# Patient Record
Sex: FEMALE | Race: WHITE | NOT HISPANIC OR LATINO | Employment: OTHER | URBAN - METROPOLITAN AREA
[De-identification: names, ages, dates, MRNs, and addresses within clinical notes are randomized per-mention and may not be internally consistent; named-entity substitution may affect disease eponyms.]

---

## 2017-01-05 RX ORDER — ROSUVASTATIN CALCIUM 40 MG/1
40 TABLET, COATED ORAL DAILY
Qty: 90 TABLET | Refills: 1 | Status: SHIPPED | OUTPATIENT
Start: 2017-01-05 | End: 2017-08-02 | Stop reason: SDUPTHER

## 2017-01-05 NOTE — TELEPHONE ENCOUNTER
Annual 07/12/16. Patient has current script for Rosuvastatin 40 mg for 30 day supply. 90 day supply is being requested. Please refill.

## 2017-01-26 ENCOUNTER — OFFICE VISIT (OUTPATIENT)
Dept: OPTOMETRY | Facility: CLINIC | Age: 67
End: 2017-01-26
Payer: MEDICARE

## 2017-01-26 DIAGNOSIS — H52.4 HYPEROPIA WITH PRESBYOPIA, BILATERAL: ICD-10-CM

## 2017-01-26 DIAGNOSIS — Z13.5 SCREENING FOR GLAUCOMA: ICD-10-CM

## 2017-01-26 DIAGNOSIS — H52.03 HYPEROPIA WITH PRESBYOPIA, BILATERAL: ICD-10-CM

## 2017-01-26 DIAGNOSIS — H04.123 DRY EYES, BILATERAL: ICD-10-CM

## 2017-01-26 DIAGNOSIS — H25.13 NUCLEAR SCLEROSIS, BILATERAL: Primary | ICD-10-CM

## 2017-01-26 PROCEDURE — 92015 DETERMINE REFRACTIVE STATE: CPT | Mod: ,,, | Performed by: OPTOMETRIST

## 2017-01-26 PROCEDURE — 99212 OFFICE O/P EST SF 10 MIN: CPT | Mod: PBBFAC,PO | Performed by: OPTOMETRIST

## 2017-01-26 PROCEDURE — 92014 COMPRE OPH EXAM EST PT 1/>: CPT | Mod: S$PBB,,, | Performed by: OPTOMETRIST

## 2017-01-26 PROCEDURE — 99999 PR PBB SHADOW E&M-EST. PATIENT-LVL II: CPT | Mod: PBBFAC,,, | Performed by: OPTOMETRIST

## 2017-01-26 NOTE — PROGRESS NOTES
HPI     DLS: 1/27/2016  Pt states no vision changes since last visit. +Dry eyes  Denies f/f    Soothe ou PRN  Restasis ou BID         Last edited by Katy Xiong on 1/26/2017  9:38 AM.     ROS     Positive for: Gastrointestinal, Endocrine, Eyes    Negative for: Constitutional, Neurological, Skin, Genitourinary,   Musculoskeletal, HENT, Cardiovascular, Respiratory, Psychiatric,   Allergic/Imm, Heme/Lymph    Last edited by Matthias Gutierrez, OD on 1/26/2017  9:46 AM. (History)        Assessment /Plan     For exam results, see Encounter Report.    Nuclear sclerosis, bilateral    Dry eyes, bilateral    Screening for glaucoma    Hyperopia with presbyopia, bilateral      1. Cat ou--pt happy w spex  2. OCTAVIO (sp punctal plugs LL OU). Pt to cont w RESTASIS BID/ATs as needed       Plan:    rtc 1 yr

## 2017-02-06 ENCOUNTER — OFFICE VISIT (OUTPATIENT)
Dept: ORTHOPEDICS | Facility: CLINIC | Age: 67
End: 2017-02-06
Payer: MEDICARE

## 2017-02-06 ENCOUNTER — HOSPITAL ENCOUNTER (OUTPATIENT)
Dept: RADIOLOGY | Facility: OTHER | Age: 67
Discharge: HOME | End: 2017-02-06
Attending: ORTHOPAEDIC SURGERY
Payer: MEDICARE

## 2017-02-06 VITALS
BODY MASS INDEX: 29.21 KG/M2 | HEART RATE: 76 BPM | SYSTOLIC BLOOD PRESSURE: 133 MMHG | HEIGHT: 62 IN | DIASTOLIC BLOOD PRESSURE: 75 MMHG | WEIGHT: 158.75 LBS

## 2017-02-06 DIAGNOSIS — M65.4 RADIAL STYLOID TENOSYNOVITIS OF LEFT HAND: Primary | ICD-10-CM

## 2017-02-06 DIAGNOSIS — R52 PAIN: ICD-10-CM

## 2017-02-06 PROCEDURE — 99999 PR PBB SHADOW E&M-EST. PATIENT-LVL III: CPT | Mod: PBBFAC,,, | Performed by: ORTHOPAEDIC SURGERY

## 2017-02-06 PROCEDURE — 73110 X-RAY EXAM OF WRIST: CPT | Mod: 26,LT,, | Performed by: RADIOLOGY

## 2017-02-06 PROCEDURE — 99214 OFFICE O/P EST MOD 30 MIN: CPT | Mod: S$PBB,,, | Performed by: ORTHOPAEDIC SURGERY

## 2017-02-06 PROCEDURE — 99213 OFFICE O/P EST LOW 20 MIN: CPT | Mod: PBBFAC | Performed by: ORTHOPAEDIC SURGERY

## 2017-02-15 DIAGNOSIS — M65.4 DE QUERVAIN'S TENOSYNOVITIS, LEFT: Primary | ICD-10-CM

## 2017-02-16 NOTE — PROGRESS NOTES
CHIEF COMPLAINT:  Left wrist styloid tenosynovitis.    HISTORY OF PRESENT ILLNESS:  Ms. Van is a 66-year-old female who was last   seen on 06/21/2016.  She had been evaluated and diagnosed with wrist tendinitis   on the left hand.  We had performed an injection, started on occupational   therapy.  She did well with therapy and she states that her left wrist pain is   0/10, except when she moves it and she states that it can do an 8-9/10 if she is   not immobilized, but she states with immobility, it is 0/10, with mobility it   is 8-9/10.    PAST MEDICAL HISTORY, SOCIAL HISTORY, SURGICAL HISTORY, REVIEW OF SYSTEMS:  Per   electronic medical record.    PHYSICAL EXAMINATION:  VITAL SIGNS:  Please see computer entry.  GENERAL:  Alert and oriented x3, well developed, well nourished, appears stated   age.  MUSCULOSKELETAL:  Gait is normal and nonantalgic.  EXTREMITIES:  On examination of bilateral upper extremities, median, radial,   ulnar, anterior interosseous, posterior interosseous, motor, sensation to light   touch intact.  Brisk capillary refill.  She has no swelling, no erythema.  Skin   is clean, dry and intact.  She is exquisitely tender to palpation over the first   dorsal compartment.  Positive Finkelstein test.    PLAN:  We had reviewed her last chart note with the patient and also in fact we   did an injection due to the immobilization.  However, considering she has return   of symptoms, we did discuss surgical release, risk and benefits were explained   in great detail to the patient.  The patient understands the surgical treatment.    At this time, since she was refractory to conservative treatment, I do think   that this is a good option for the patient.      LES/DARREN  dd: 02/15/2017 15:29:47 (CST)  td: 02/16/2017 10:51:05 (CST)  Doc ID   #[id number]  Job ID #[id number]    CC:

## 2017-02-27 ENCOUNTER — TELEPHONE (OUTPATIENT)
Dept: FAMILY MEDICINE | Facility: CLINIC | Age: 67
End: 2017-02-27

## 2017-02-27 ENCOUNTER — CLINICAL SUPPORT (OUTPATIENT)
Dept: FAMILY MEDICINE | Facility: CLINIC | Age: 67
End: 2017-02-27
Payer: MEDICARE

## 2017-02-27 DIAGNOSIS — R30.0 DYSURIA: Primary | ICD-10-CM

## 2017-02-27 LAB
BACTERIA #/AREA URNS AUTO: ABNORMAL /HPF
BILIRUB SERPL-MCNC: ABNORMAL MG/DL
BILIRUB UR QL STRIP: NEGATIVE
BLOOD URINE, POC: ABNORMAL
CLARITY UR REFRACT.AUTO: ABNORMAL
COLOR UR AUTO: YELLOW
COLOR, POC UA: YELLOW
GLUCOSE UR QL STRIP: NEGATIVE
GLUCOSE UR QL STRIP: NORMAL
HGB UR QL STRIP: NEGATIVE
KETONES UR QL STRIP: ABNORMAL
KETONES UR QL STRIP: NEGATIVE
LEUKOCYTE ESTERASE UR QL STRIP: ABNORMAL
LEUKOCYTE ESTERASE URINE, POC: ABNORMAL
MICROSCOPIC COMMENT: ABNORMAL
NITRITE UR QL STRIP: NEGATIVE
NITRITE, POC UA: ABNORMAL
PH UR STRIP: 7 [PH] (ref 5–8)
PH, POC UA: 7
PROT UR QL STRIP: NEGATIVE
PROTEIN, POC: ABNORMAL
RBC #/AREA URNS AUTO: 3 /HPF (ref 0–4)
SP GR UR STRIP: 1.01 (ref 1–1.03)
SPECIFIC GRAVITY, POC UA: 1
SQUAMOUS #/AREA URNS AUTO: 1 /HPF
URN SPEC COLLECT METH UR: ABNORMAL
UROBILINOGEN UR STRIP-ACNC: NEGATIVE EU/DL
UROBILINOGEN, POC UA: NORMAL
WBC #/AREA URNS AUTO: >100 /HPF (ref 0–5)
WBC CLUMPS UR QL AUTO: ABNORMAL

## 2017-02-27 PROCEDURE — 99999 PR PBB SHADOW E&M-EST. PATIENT-LVL II: CPT | Mod: PBBFAC,,,

## 2017-02-27 PROCEDURE — 81002 URINALYSIS NONAUTO W/O SCOPE: CPT | Mod: PBBFAC,PO

## 2017-02-27 PROCEDURE — 87088 URINE BACTERIA CULTURE: CPT

## 2017-02-27 PROCEDURE — 87077 CULTURE AEROBIC IDENTIFY: CPT

## 2017-02-27 PROCEDURE — 87186 SC STD MICRODIL/AGAR DIL: CPT

## 2017-02-27 PROCEDURE — 81001 URINALYSIS AUTO W/SCOPE: CPT

## 2017-02-27 PROCEDURE — 99212 OFFICE O/P EST SF 10 MIN: CPT | Mod: PBBFAC,PO

## 2017-02-27 PROCEDURE — 87086 URINE CULTURE/COLONY COUNT: CPT

## 2017-02-27 RX ORDER — CEPHALEXIN 250 MG/1
250 CAPSULE ORAL 4 TIMES DAILY
Qty: 40 CAPSULE | Refills: 0 | Status: SHIPPED | OUTPATIENT
Start: 2017-02-27 | End: 2017-03-09

## 2017-02-27 NOTE — PROGRESS NOTES
POCT urine dip performed. CARI Johnson notified of results. Urine collected to U/A and culture as ordered.

## 2017-02-27 NOTE — TELEPHONE ENCOUNTER
----- Message from Walt Myers MA sent at 2/27/2017  8:08 AM CST -----  Contact: [phone]  Type: Urinary Symptoms    Would you like to schedule an appointment? No    What symptoms are you having? Urinary Frequency/Urgency/Cloudy Urine    How long have you had these symptoms? 1 week    Pain? Location? No just a little discomfort    Pain Scale (0-10): 0    Do you have or had a fever? What was the temperature? No    Comments: Please advise and call. Thanks!

## 2017-03-01 ENCOUNTER — TELEPHONE (OUTPATIENT)
Dept: FAMILY MEDICINE | Facility: CLINIC | Age: 67
End: 2017-03-01

## 2017-03-01 NOTE — TELEPHONE ENCOUNTER
----- Message from ROBYN Butler-SARAH sent at 3/1/2017  7:04 AM CST -----  I sent abx for pt on Monday please call her and make sure that she started them.  Tell her I am waiting for the urine culture and if we need to change her antibiotics I will call or have someone call her to let her know.    Thank you

## 2017-03-01 NOTE — TELEPHONE ENCOUNTER
Patient verbalizes that she has started Abx. Informed regarding pending culture and verbalizes understanding.

## 2017-03-02 ENCOUNTER — TELEPHONE (OUTPATIENT)
Dept: FAMILY MEDICINE | Facility: CLINIC | Age: 67
End: 2017-03-02

## 2017-03-02 LAB — BACTERIA UR CULT: NORMAL

## 2017-03-02 NOTE — TELEPHONE ENCOUNTER
----- Message from ROBYN Butler-SARAH sent at 3/2/2017  1:41 PM CST -----  Please call pt and tell her that the antibiotic that I put her on will clear her infection

## 2017-03-06 ENCOUNTER — TELEPHONE (OUTPATIENT)
Dept: ORTHOPEDICS | Facility: CLINIC | Age: 67
End: 2017-03-06

## 2017-03-07 ENCOUNTER — ANESTHESIA EVENT (OUTPATIENT)
Dept: SURGERY | Facility: OTHER | Age: 67
End: 2017-03-07
Payer: MEDICARE

## 2017-03-07 NOTE — ANESTHESIA PREPROCEDURE EVALUATION
03/07/2017  Jackelyn Kendrick is a 66 y.o., female.    [facility] Anesthesia Evaluation    I have reviewed the Patient Summary Reports.    I have reviewed the Nursing Notes.   I have reviewed the Medications.     Review of Systems  Anesthesia Hx:  Personal Hx of Anesthesia complications, Post-Operative Nausea/Vomiting   Social:  Non-Smoker    Hematology/Oncology:     Oncology Normal     Cardiovascular:   Exercise tolerance: good Hypertension, well controlled    Pulmonary:  Pulmonary Normal    Renal/:  Renal/ Normal     Hepatic/GI:   GERD, well controlled    Musculoskeletal:   Arthritis     Endocrine:   Hypothyroidism        Physical Exam  General:  Well nourished      Dental:  Dental Findings: In tact             Anesthesia Plan  Type of Anesthesia, risks & benefits discussed:  Anesthesia Type:  MAC  Patient's Preference:   Intra-op Monitoring Plan:   Intra-op Monitoring Plan Comments:   Post Op Pain Control Plan:   Post Op Pain Control Plan Comments:   Induction:   IV  Beta Blocker:         Informed Consent: Patient understands risks and agrees with Anesthesia plan.  Questions answered. Anesthesia consent signed with patient.  ASA Score: 2     Day of Surgery Review of History & Physical:    H&P update referred to the surgeon.         Ready For Surgery From Anesthesia Perspective.

## 2017-03-07 NOTE — H&P
CHIEF COMPLAINT: Left wrist styloid tenosynovitis.     HISTORY OF PRESENT ILLNESS: Ms. Van is a 66-year-old female who was last   seen on 06/21/2016. She had been evaluated and diagnosed with wrist tendinitis   on the left hand. We had performed an injection, started on occupational   therapy. She did well with therapy and she states that her left wrist pain is   0/10, except when she moves it and she states that it can do an 8-9/10 if she is  not immobilized, but she states with immobility, it is 0/10, with mobility it   is 8-9/10.       PAST MEDICAL HISTORY:   Past Medical History:   Diagnosis Date    Bronchitis     seasonal    Cataract     Diverticulitis     Dry eye syndrome     GERD (gastroesophageal reflux disease)     Hyperlipidemia     Hypertension     Obese     PONV (postoperative nausea and vomiting)     Thyroid disease      PAST SURGICAL HISTORY:   Past Surgical History:   Procedure Laterality Date    BACK SURGERY      CHOLECYSTECTOMY      COLONOSCOPY  2007    diverticulosis    HERNIA REPAIR      HYSTERECTOMY      NASAL SEPTUM SURGERY      SHOULDER SURGERY      TONSILLECTOMY       FAMILY HISTORY:   Family History   Problem Relation Age of Onset    Cataracts Mother     Breast cancer Mother     Diabetes Mother     Hypertension Mother     Heart failure Mother     Cataracts Father     Hypertension Father     Amblyopia Neg Hx     Blindness Neg Hx     Glaucoma Neg Hx     Macular degeneration Neg Hx     Retinal detachment Neg Hx     Strabismus Neg Hx     Ovarian cancer Neg Hx      SOCIAL HISTORY:   Social History     Social History    Marital status:      Spouse name: N/A    Number of children: N/A    Years of education: N/A     Occupational History    Not on file.     Social History Main Topics    Smoking status: Never Smoker    Smokeless tobacco: Never Used    Alcohol use No    Drug use: No    Sexual activity: No     Other Topics Concern    Not on file      Social History Narrative    , 3 children, nonsmoker ,       MEDICATIONS: No current facility-administered medications for this encounter.     Current Outpatient Prescriptions:     acetic acid-hydrocortisone (VOSOL-HC) otic solution, 2-4 drops to affected ear twice a day, Disp: 10 mL, Rfl: 2    albuterol 90 mcg/actuation inhaler, Inhale 2 puffs into the lungs every 6 (six) hours as needed for Wheezing., Disp: 6.7 g, Rfl: 0    amlodipine (NORVASC) 2.5 MG tablet, Take 1 tablet (2.5 mg total) by mouth once daily., Disp: 30 tablet, Rfl: 11    cephALEXin (KEFLEX) 250 MG capsule, Take 1 capsule (250 mg total) by mouth 4 (four) times daily., Disp: 40 capsule, Rfl: 0    cetirizine (ZYRTEC) 10 MG tablet, Take 1 tablet (10 mg total) by mouth once daily., Disp: , Rfl: 0    econazole nitrate 1 % cream, Apply topically 2 (two) times daily. Qd- bid for ears. Ok for maintenance, Disp: 60 g, Rfl: 1    fluocinonide (LIDEX) 0.05 % external solution, Apply topically once daily. To scalp prn flare, Disp: 60 mL, Rfl: 3    hyoscyamine (LEVSIN/SL) 0.125 mg Subl, Take 1 tablet (0.125 mg total) by mouth every 6 (six) hours as needed., Disp: 90 tablet, Rfl: 11    ketoconazole (NIZORAL) 2 % shampoo, Apply topically every other day. Apply to damp scalp, lather, and let sit 5 minutes before rinsing, Disp: 120 mL, Rfl: 5    levothyroxine (SYNTHROID) 75 MCG tablet, Take 1 tablet (75 mcg total) by mouth once daily., Disp: 30 tablet, Rfl: 11    meloxicam (MOBIC) 7.5 MG tablet, Take 7.5 mg by mouth once daily., Disp: , Rfl:     Multi-Vitamin tablet, Take by mouth.  Tablet Oral , Disp: , Rfl:     OMEGA-3S/DHA/EPA/FISH OIL (OMEGA 3 ORAL), , Disp: , Rfl:     omeprazole (PRILOSEC) 20 MG capsule, Take 1 capsule (20 mg total) by mouth once daily. Capsule, Delayed Release(E.C.) Oral .  take one tablet daily, Disp: 30 capsule, Rfl: 11    oxybutynin (DITROPAN-XL) 10 MG 24 hr tablet, Take 1 tablet (10 mg total) by mouth once daily.,  "Disp: 30 tablet, Rfl: 11    PSYLLIUM SEED, WITH SUGAR, (METAMUCIL ORAL), Take by mouth., Disp: , Rfl:     RESTASIS 0.05 % ophthalmic emulsion, INSTILL ONE DROP IN EACH EYE TWO TIMES A DAY, Disp: 60 each, Rfl: 12    rosuvastatin (CRESTOR) 40 MG Tab, Take 1 tablet (40 mg total) by mouth once daily., Disp: 90 tablet, Rfl: 1  ALLERGIES:   Review of patient's allergies indicates:   Allergen Reactions    Erythromycin (bulk) Nausea And Vomiting    Levaquin [levofloxacin]      Other reaction(s): Swelling       VITAL SIGNS: Ht 5' 1" (1.549 m)  Wt 65.8 kg (145 lb)  Breastfeeding? No  BMI 27.4 kg/m2        PHYSICAL EXAMINATION:  VITAL SIGNS: Please see computer entry.  GENERAL: Alert and oriented x3, well developed, well nourished, appears stated   age.  MUSCULOSKELETAL: Gait is normal and nonantalgic.  EXTREMITIES: On examination of bilateral upper extremities, median, radial,   ulnar, anterior interosseous, posterior interosseous, motor, sensation to light   touch intact. Brisk capillary refill. She has no swelling, no erythema. Skin   is clean, dry and intact. She is exquisitely tender to palpation over the first  dorsal compartment. Positive Finkelstein test.     PLAN: We had reviewed her last chart note with the patient and also in fact we   did an injection due to the immobilization. However, considering she has return  of symptoms, we did discuss surgical release, risk and benefits were explained   in great detail to the patient. The patient understands the surgical treatment.  At this time, since she was refractory to conservative treatment, I do think   that this is a good option for the patient.  "

## 2017-03-08 ENCOUNTER — HOSPITAL ENCOUNTER (OUTPATIENT)
Facility: OTHER | Age: 67
Discharge: HOME | End: 2017-03-08
Attending: ORTHOPAEDIC SURGERY | Admitting: ORTHOPAEDIC SURGERY
Payer: MEDICARE

## 2017-03-08 ENCOUNTER — ANESTHESIA (OUTPATIENT)
Dept: SURGERY | Facility: OTHER | Age: 67
End: 2017-03-08
Payer: MEDICARE

## 2017-03-08 ENCOUNTER — SURGERY (OUTPATIENT)
Age: 67
End: 2017-03-08

## 2017-03-08 VITALS
WEIGHT: 145 LBS | SYSTOLIC BLOOD PRESSURE: 110 MMHG | BODY MASS INDEX: 27.38 KG/M2 | RESPIRATION RATE: 16 BRPM | HEART RATE: 65 BPM | HEIGHT: 61 IN | DIASTOLIC BLOOD PRESSURE: 62 MMHG | TEMPERATURE: 98 F | OXYGEN SATURATION: 95 %

## 2017-03-08 DIAGNOSIS — M77.8 TENDINITIS OF LEFT WRIST: Primary | ICD-10-CM

## 2017-03-08 DIAGNOSIS — M65.4 DE QUERVAIN'S DISEASE (TENOSYNOVITIS): ICD-10-CM

## 2017-03-08 PROCEDURE — 63600175 PHARM REV CODE 636 W HCPCS: Performed by: NURSE ANESTHETIST, CERTIFIED REGISTERED

## 2017-03-08 PROCEDURE — 25000003 PHARM REV CODE 250: Performed by: ORTHOPAEDIC SURGERY

## 2017-03-08 PROCEDURE — 25000 INCISION OF TENDON SHEATH: CPT | Mod: LT,GC,, | Performed by: ORTHOPAEDIC SURGERY

## 2017-03-08 PROCEDURE — 36000707: Performed by: ORTHOPAEDIC SURGERY

## 2017-03-08 PROCEDURE — 71000015 HC POSTOP RECOV 1ST HR: Performed by: ORTHOPAEDIC SURGERY

## 2017-03-08 PROCEDURE — 63600175 PHARM REV CODE 636 W HCPCS: Performed by: STUDENT IN AN ORGANIZED HEALTH CARE EDUCATION/TRAINING PROGRAM

## 2017-03-08 PROCEDURE — 37000008 HC ANESTHESIA 1ST 15 MINUTES: Performed by: ORTHOPAEDIC SURGERY

## 2017-03-08 PROCEDURE — 25000003 PHARM REV CODE 250: Performed by: ANESTHESIOLOGY

## 2017-03-08 PROCEDURE — 36000706: Performed by: ORTHOPAEDIC SURGERY

## 2017-03-08 PROCEDURE — 37000009 HC ANESTHESIA EA ADD 15 MINS: Performed by: ORTHOPAEDIC SURGERY

## 2017-03-08 RX ORDER — PROPOFOL 10 MG/ML
VIAL (ML) INTRAVENOUS CONTINUOUS PRN
Status: DISCONTINUED | OUTPATIENT
Start: 2017-03-08 | End: 2017-03-08

## 2017-03-08 RX ORDER — LIDOCAINE HYDROCHLORIDE 10 MG/ML
INJECTION, SOLUTION EPIDURAL; INFILTRATION; INTRACAUDAL; PERINEURAL
Status: DISCONTINUED | OUTPATIENT
Start: 2017-03-08 | End: 2017-03-08 | Stop reason: HOSPADM

## 2017-03-08 RX ORDER — FENTANYL CITRATE 50 UG/ML
INJECTION, SOLUTION INTRAMUSCULAR; INTRAVENOUS
Status: DISCONTINUED | OUTPATIENT
Start: 2017-03-08 | End: 2017-03-08

## 2017-03-08 RX ORDER — BUPIVACAINE HYDROCHLORIDE 2.5 MG/ML
INJECTION, SOLUTION EPIDURAL; INFILTRATION; INTRACAUDAL
Status: DISCONTINUED | OUTPATIENT
Start: 2017-03-08 | End: 2017-03-08 | Stop reason: HOSPADM

## 2017-03-08 RX ORDER — SODIUM CHLORIDE, SODIUM LACTATE, POTASSIUM CHLORIDE, CALCIUM CHLORIDE 600; 310; 30; 20 MG/100ML; MG/100ML; MG/100ML; MG/100ML
INJECTION, SOLUTION INTRAVENOUS CONTINUOUS PRN
Status: DISCONTINUED | OUTPATIENT
Start: 2017-03-08 | End: 2017-03-08

## 2017-03-08 RX ORDER — ACETAMINOPHEN 10 MG/ML
INJECTION, SOLUTION INTRAVENOUS
Status: DISCONTINUED | OUTPATIENT
Start: 2017-03-08 | End: 2017-03-08

## 2017-03-08 RX ORDER — DOCUSATE SODIUM 100 MG/1
100 CAPSULE, LIQUID FILLED ORAL 2 TIMES DAILY
Qty: 60 CAPSULE | Refills: 0 | Status: SHIPPED | OUTPATIENT
Start: 2017-03-08 | End: 2019-08-19

## 2017-03-08 RX ORDER — HYDROCODONE BITARTRATE AND ACETAMINOPHEN 5; 325 MG/1; MG/1
1 TABLET ORAL EVERY 4 HOURS PRN
Qty: 75 TABLET | Refills: 0 | Status: SHIPPED | OUTPATIENT
Start: 2017-03-08 | End: 2017-03-23 | Stop reason: SDUPTHER

## 2017-03-08 RX ORDER — MIDAZOLAM HYDROCHLORIDE 1 MG/ML
INJECTION INTRAMUSCULAR; INTRAVENOUS
Status: DISCONTINUED | OUTPATIENT
Start: 2017-03-08 | End: 2017-03-08

## 2017-03-08 RX ORDER — ONDANSETRON 2 MG/ML
INJECTION INTRAMUSCULAR; INTRAVENOUS
Status: DISCONTINUED | OUTPATIENT
Start: 2017-03-08 | End: 2017-03-08

## 2017-03-08 RX ORDER — PROPOFOL 10 MG/ML
VIAL (ML) INTRAVENOUS
Status: DISCONTINUED | OUTPATIENT
Start: 2017-03-08 | End: 2017-03-08

## 2017-03-08 RX ORDER — CEFAZOLIN SODIUM 2 G/50ML
2 SOLUTION INTRAVENOUS
Status: COMPLETED | OUTPATIENT
Start: 2017-03-08 | End: 2017-03-08

## 2017-03-08 RX ORDER — ONDANSETRON HYDROCHLORIDE 8 MG/1
8 TABLET, FILM COATED ORAL EVERY 12 HOURS PRN
Qty: 60 TABLET | Refills: 0 | Status: SHIPPED | OUTPATIENT
Start: 2017-03-08 | End: 2017-08-02

## 2017-03-08 RX ORDER — BACITRACIN 500 [USP'U]/G
OINTMENT TOPICAL
Status: DISCONTINUED | OUTPATIENT
Start: 2017-03-08 | End: 2017-03-08 | Stop reason: HOSPADM

## 2017-03-08 RX ADMIN — MIDAZOLAM HYDROCHLORIDE 2 MG: 1 INJECTION, SOLUTION INTRAMUSCULAR; INTRAVENOUS at 08:03

## 2017-03-08 RX ADMIN — PROPOFOL 50 MCG/KG/MIN: 10 INJECTION, EMULSION INTRAVENOUS at 09:03

## 2017-03-08 RX ADMIN — LIDOCAINE HYDROCHLORIDE 20 ML: 10 INJECTION, SOLUTION EPIDURAL; INFILTRATION; INTRACAUDAL; PERINEURAL at 08:03

## 2017-03-08 RX ADMIN — BUPIVACAINE HYDROCHLORIDE 10 ML: 2.5 INJECTION, SOLUTION EPIDURAL; INFILTRATION; INTRACAUDAL; PERINEURAL at 08:03

## 2017-03-08 RX ADMIN — ACETAMINOPHEN 1000 MG: 10 INJECTION, SOLUTION INTRAVENOUS at 09:03

## 2017-03-08 RX ADMIN — SODIUM CHLORIDE, SODIUM LACTATE, POTASSIUM CHLORIDE, AND CALCIUM CHLORIDE: 600; 310; 30; 20 INJECTION, SOLUTION INTRAVENOUS at 08:03

## 2017-03-08 RX ADMIN — BACITRACIN 14 G: 500 OINTMENT TOPICAL at 08:03

## 2017-03-08 RX ADMIN — ONDANSETRON 4 MG: 2 INJECTION INTRAMUSCULAR; INTRAVENOUS at 09:03

## 2017-03-08 RX ADMIN — CEFAZOLIN SODIUM 2 G: 2 SOLUTION INTRAVENOUS at 09:03

## 2017-03-08 RX ADMIN — PROPOFOL 20 MG: 10 INJECTION, EMULSION INTRAVENOUS at 09:03

## 2017-03-08 RX ADMIN — FENTANYL CITRATE 50 MCG: 50 INJECTION, SOLUTION INTRAMUSCULAR; INTRAVENOUS at 08:03

## 2017-03-08 NOTE — IP AVS SNAPSHOT
[facility] Location ([facility])  [address] [place] [id number]  Phone: [phone]           Patient Discharge Instructions     Our goal is to set you up for success. This packet includes information on your condition, medications, and your home care. It will help you to care for yourself so you don't get sicker and need to go back to the hospital.     Please ask your nurse if you have any questions.        There are many details to remember when preparing to leave the hospital. Here is what you will need to do:    1. Take your medicine. If you are prescribed medications, review your Medication List in the following pages. You may have new medications to  at the pharmacy and others that you'll need to stop taking. Review the instructions for how and when to take your medications. Talk with your doctor or nurses if you are unsure of what to do.     2. Go to your follow-up appointments. Specific follow-up information is listed in the following pages. Your may be contacted by a transition nurse or clinical provider about future appointments. Be sure we have all of the phone numbers to reach you, if needed. Please contact your provider's office if you are unable to make an appointment.     3. Watch for warning signs. Your doctor or nurse will give you detailed warning signs to watch for and when to call for assistance. These instructions may also include educational information about your condition. If you experience any of warning signs to your health, call your doctor.               ** Verify the list of medication(s) below is accurate and up to date. Carry this with you in case of emergency. If your medications have changed, please notify your healthcare provider.             Medication List      START taking these medications        Additional Info                      docusate sodium 100 MG capsule   Commonly known as:  COLACE   Quantity:  60 capsule   Refills:  0   Dose:  100 mg     Instructions:  Take 1 capsule (100 mg total) by mouth 2 (two) times daily. Available OTC as well     Begin Date    AM    Noon    PM    Bedtime       hydrocodone-acetaminophen 5-325mg 5-325 mg per tablet   Commonly known as:  NORCO   Quantity:  75 tablet   Refills:  0   Dose:  1 tablet    Instructions:  Take 1 tablet by mouth every 4 (four) hours as needed for Pain.     Begin Date    AM    Noon    PM    Bedtime       ondansetron 8 MG tablet   Commonly known as:  ZOFRAN   Quantity:  60 tablet   Refills:  0   Dose:  8 mg    Instructions:  Take 1 tablet (8 mg total) by mouth every 12 (twelve) hours as needed for Nausea.     Begin Date    AM    Noon    PM    Bedtime         CONTINUE taking these medications        Additional Info                      acetic acid-hydrocortisone otic solution   Commonly known as:  VOSOL-HC   Quantity:  10 mL   Refills:  2    Instructions:  2-4 drops to affected ear twice a day     Begin Date    AM    Noon    PM    Bedtime       albuterol 90 mcg/actuation inhaler   Quantity:  6.7 g   Refills:  0   Dose:  2 puff    Instructions:  Inhale 2 puffs into the lungs every 6 (six) hours as needed for Wheezing.     Begin Date    AM    Noon    PM    Bedtime       amlodipine 2.5 MG tablet   Commonly known as:  NORVASC   Quantity:  30 tablet   Refills:  11   Dose:  2.5 mg    Instructions:  Take 1 tablet (2.5 mg total) by mouth once daily.     Begin Date    AM    Noon    PM    Bedtime       cephALEXin 250 MG capsule   Commonly known as:  KEFLEX   Quantity:  40 capsule   Refills:  0   Dose:  250 mg    Instructions:  Take 1 capsule (250 mg total) by mouth 4 (four) times daily.     Begin Date    AM    Noon    PM    Bedtime       cetirizine 10 MG tablet   Commonly known as:  ZYRTEC   Refills:  0   Dose:  10 mg    Instructions:  Take 1 tablet (10 mg total) by mouth once daily.     Begin Date    AM    Noon    PM    Bedtime       econazole nitrate 1 % cream   Quantity:  60 g   Refills:  1    Instructions:   Apply topically 2 (two) times daily. Qd- bid for ears. Ok for maintenance     Begin Date    AM    Noon    PM    Bedtime       fluocinonide 0.05 % external solution   Commonly known as:  LIDEX   Quantity:  60 mL   Refills:  3    Instructions:  Apply topically once daily. To scalp prn flare     Begin Date    AM    Noon    PM    Bedtime       hyoscyamine 0.125 mg Subl   Commonly known as:  LEVSIN/SL   Quantity:  90 tablet   Refills:  11   Dose:  0.125 mg    Instructions:  Take 1 tablet (0.125 mg total) by mouth every 6 (six) hours as needed.     Begin Date    AM    Noon    PM    Bedtime       ketoconazole 2 % shampoo   Commonly known as:  NIZORAL   Quantity:  120 mL   Refills:  5    Instructions:  Apply topically every other day. Apply to damp scalp, lather, and let sit 5 minutes before rinsing     Begin Date    AM    Noon    PM    Bedtime       levothyroxine 75 MCG tablet   Commonly known as:  SYNTHROID   Quantity:  30 tablet   Refills:  11   Dose:  75 mcg    Instructions:  Take 1 tablet (75 mcg total) by mouth once daily.     Begin Date    AM    Noon    PM    Bedtime       meloxicam 7.5 MG tablet   Commonly known as:  MOBIC   Refills:  0   Dose:  7.5 mg    Instructions:  Take 7.5 mg by mouth once daily.     Begin Date    AM    Noon    PM    Bedtime       METAMUCIL ORAL   Refills:  0    Instructions:  Take by mouth.     Begin Date    AM    Noon    PM    Bedtime       MULTI-VITAMIN per tablet   Refills:  0   Generic drug:  multivitamin    Instructions:  Take by mouth.  Tablet Oral     Begin Date    AM    Noon    PM    Bedtime       OMEGA 3 ORAL   Refills:  0      Begin Date    AM    Noon    PM    Bedtime       omeprazole 20 MG capsule   Commonly known as:  PRILOSEC   Quantity:  30 capsule   Refills:  11   Dose:  20 mg    Instructions:  Take 1 capsule (20 mg total) by mouth once daily. Capsule, Delayed Release(E.C.) Oral .  take one tablet daily     Begin Date    AM    Noon    PM    Bedtime       oxybutynin 10 MG 24 hr  tablet   Commonly known as:  DITROPAN-XL   Quantity:  30 tablet   Refills:  11   Dose:  10 mg    Instructions:  Take 1 tablet (10 mg total) by mouth once daily.     Begin Date    AM    Noon    PM    Bedtime       RESTASIS 0.05 % ophthalmic emulsion   Quantity:  60 each   Refills:  12   Generic drug:  cycloSPORINE    Instructions:  INSTILL ONE DROP IN EACH EYE TWO TIMES A DAY     Begin Date    AM    Noon    PM    Bedtime       rosuvastatin 40 MG Tab   Commonly known as:  CRESTOR   Quantity:  90 tablet   Refills:  1   Dose:  40 mg    Instructions:  Take 1 tablet (40 mg total) by mouth once daily.     Begin Date    AM    Noon    PM    Bedtime            Where to Get Your Medications      You can get these medications from any pharmacy     Bring a paper prescription for each of these medications     docusate sodium 100 MG capsule    hydrocodone-acetaminophen 5-325mg 5-325 mg per tablet    ondansetron 8 MG tablet                  Please bring to all follow up appointments:    1. A copy of your discharge instructions.  2. All medicines you are currently taking in their original bottles.  3. Identification and insurance card.    Please arrive 15 minutes ahead of scheduled appointment time.    Please call 24 hours in advance if you must reschedule your appointment and/or time.        Follow-up Information     Follow up with Marcia Rodriguez MD In 2 weeks.    Specialties:  Hand Surgery, Orthopedic Surgery    Why:  For wound re-check, For suture removal    Contact information:    [address] [facility]  [address]  [facility] [id number] [phone]          Discharge Instructions     Future Orders    Activity as tolerated     Call MD for:  difficulty breathing or increased cough     Call MD for:  increased confusion or weakness     Call MD for:  persistent dizziness, light-headedness, or visual disturbances     Call MD for:  persistent nausea and vomiting or diarrhea     Call MD for:  redness, tenderness,  or signs of infection (pain, swelling, redness, odor or green/yellow discharge around incision site)     Call MD for:  severe persistent headache     Call MD for:  severe uncontrolled pain     Call MD for:  temperature >100.4     Call MD for:  worsening rash     Diet general     Questions:    Total calories:      Fat restriction, if any:      Protein restriction, if any:      Na restriction, if any:      Fluid restriction:      Additional restrictions:      Leave dressing on - Keep it clean, dry, and intact until clinic visit     Lifting restrictions     Comments:    No Strenuous Exercise or Lifting Greater Than 5 Pounds    Sponge bath only until clinic visit         Discharge Instructions         Anesthesia: Monitored Anesthesia Care (MAC)  Youre due to have surgery. During surgery, youll be given medication called anesthesia. This will keep you comfortable and pain-free. Your surgeon will use monitored anesthesia care (MAC). This sheet tells you more about this type of anesthesia.    What is monitored anesthesia care?  MAC keeps you very drowsy during surgery. You may be awake, but you will likely not remember much. And you wont feel pain. With MAC, medications are given through an IV line into a vein in your arm or hand. A local anesthetic will usually be injected into the skin and muscle around the surgical site to numb it. The anesthesia provider monitors you during the procedure. He or she checks your heart rate and rhythm, blood pressure, and blood oxygen level.    Anesthesia tools and medications that may be near you during your procedure  You will likely have:  · A pulse oximeter on the end of your finger. This measures your blood oxygen level.  · Electrocardiography leads (electrodes) on your chest. These record your heart rate and rhythm.  · Medications given through an IV. These relax you and prevent pain. You may be awake or sleep lightly. If you have local anesthetic, it is injected directly into  "your skin.  · A facemask to give you oxygen, if needed.  Risks and Possible Complications  MAC has some risks. These include:  · Breathing problems  · Nausea and vomiting  · Allergic reaction to the anesthetic      Anesthesia safety  · Follow all instructions you are given for how long not to eat or drink before your procedure.  · Be sure your doctor knows what medications you take, especially any anti-inflammatory medication or blood thinners. This includes aspirin and any other over-the-counter medications, herbs, and supplements.  · Have an adult family member or friend drive you home after the procedure.  · For the first 24 hours after your surgery:  ¨ Do not drive or use heavy equipment.  ¨ Do not make important decisions or sign documents.  ¨ Avoid alcohol.  ¨ Have someone stay with you, if possible. They can watch for problems and help keep you safe.  Date Last Reviewed: 10/16/2014    © 0008-3329 [employer]. [address] [place] [address]. All rights reserved. This information is not intended as a substitute for professional medical care. Always follow your healthcare professional's instructions.            Primary Diagnosis     Your primary diagnosis was:  Tendonitis      Admission Information     Date & Time Provider Department CSN    3/8/2017  7:17 AM Marcia Rodriguez MD [facility] [id number]      Care Providers     Provider Role Specialty Primary office phone    Marcia Rodriguez MD Attending Provider Hand Surgery [phone]    Marcia Rodriguez MD Surgeon  Hand Surgery [phone]      Your Vitals Were     BP Pulse Temp Resp Height Weight    136/80 (BP Location: Left arm, Patient Position: Sitting, BP Method: Automatic) 68 98.4 °F (36.9 °C) (Oral) 18 5' 1" (1.549 m) 65.8 kg (145 lb)    SpO2 BMI             98% 27.4 kg/m2         Recent Lab Values     No lab values to display.      Allergies as of 3/8/2017        Reactions    Erythromycin (Bulk) " Nausea And Vomiting    Levaquin [Levofloxacin]     Other reaction(s): Swelling      [facility]     [facility] Nurse Care Line - 24/7 Assistance  Unless otherwise directed by your provider, please contact [facility], our nurse care line that is available for 24/7 assistance.     Registered nurses in the [facility] provide clinical advisement, health education, appointment booking, and other advisory services.  Call for this free service at [phone].        Advance Directives     An advance directive is a document which, in the event you are no longer able to make decisions for yourself, tells your healthcare team what kind of treatment you do or do not want to receive, or who you would like to make those decisions for you.  If you do not currently have an advance directive, [facility] encourages you to create one.  For more information call:  [phone]), [phone] ([phone]),  or log on to [email or web address].        Language Assistance Services     ATTENTION: Language assistance services are available, free of charge. Please call [phone].      ATENCIÓN: Si habla español, tiene a kaminski disposición servicios gratuitos de asistencia lingüística. Llame al [phone].     CHÚ Ý: N?u b?n nói Ti?ng Vi?t, có các d?ch v? h? tr? ngôn ng? mi?n phí dành cho b?n. G?i s? [phone].         [facility] complies with applicable Federal civil rights laws and does not discriminate on the basis of race, color, national origin, age, disability, or sex.

## 2017-03-08 NOTE — ANESTHESIA POSTPROCEDURE EVALUATION
"Anesthesia Post Evaluation    Patient: Jackelyn Kendrick    Procedure(s) Performed: Procedure(s) (LRB):  RELEASE-DEQUERVAIN'S left (Left)    Final Anesthesia Type: MAC  Patient location during evaluation: [facility]  Patient participation: Yes- Able to Participate  Level of consciousness: awake and alert  Post-procedure vital signs: reviewed and stable  Pain management: adequate  Airway patency: patent  PONV status at discharge: No PONV  Anesthetic complications: no      Cardiovascular status: blood pressure returned to baseline  Respiratory status: unassisted and spontaneous ventilation  Hydration status: euvolemic  Follow-up not needed.        Visit Vitals    /80 (BP Location: Left arm, Patient Position: Sitting, BP Method: Automatic)    Pulse 68    Temp 36.9 °C (98.4 °F) (Oral)    Resp 18    Ht 5' 1" (1.549 m)    Wt 65.8 kg (145 lb)    SpO2 98%    Breastfeeding No    BMI 27.4 kg/m2       Pain/Maxi Score: Pain Assessment Performed: Yes (3/8/2017  8:28 AM)  Presence of Pain: denies (3/8/2017  8:28 AM)      "

## 2017-03-08 NOTE — INTERVAL H&P NOTE
The patient has been examined and the H&P has been reviewed:    I concur with the findings and no changes have occurred since H&P was written.    Anesthesia/Surgery risks, benefits and alternative options discussed and understood by patient/family.          Active Hospital Problems    Diagnosis  POA    *De Quervain's disease (tenosynovitis) [M65.4]  Yes      Resolved Hospital Problems    Diagnosis Date Resolved POA   No resolved problems to display.

## 2017-03-08 NOTE — DISCHARGE INSTRUCTIONS
Anesthesia: Monitored Anesthesia Care (MAC)  Youre due to have surgery. During surgery, youll be given medication called anesthesia. This will keep you comfortable and pain-free. Your surgeon will use monitored anesthesia care (MAC). This sheet tells you more about this type of anesthesia.    What is monitored anesthesia care?  MAC keeps you very drowsy during surgery. You may be awake, but you will likely not remember much. And you wont feel pain. With MAC, medications are given through an IV line into a vein in your arm or hand. A local anesthetic will usually be injected into the skin and muscle around the surgical site to numb it. The anesthesia provider monitors you during the procedure. He or she checks your heart rate and rhythm, blood pressure, and blood oxygen level.    Anesthesia tools and medications that may be near you during your procedure  You will likely have:  · A pulse oximeter on the end of your finger. This measures your blood oxygen level.  · Electrocardiography leads (electrodes) on your chest. These record your heart rate and rhythm.  · Medications given through an IV. These relax you and prevent pain. You may be awake or sleep lightly. If you have local anesthetic, it is injected directly into your skin.  · A facemask to give you oxygen, if needed.  Risks and Possible Complications  MAC has some risks. These include:  · Breathing problems  · Nausea and vomiting  · Allergic reaction to the anesthetic      Anesthesia safety  · Follow all instructions you are given for how long not to eat or drink before your procedure.  · Be sure your doctor knows what medications you take, especially any anti-inflammatory medication or blood thinners. This includes aspirin and any other over-the-counter medications, herbs, and supplements.  · Have an adult family member or friend drive you home after the procedure.  · For the first 24 hours after your surgery:  ¨ Do not drive or use heavy equipment.  ¨ Do  not make important decisions or sign documents.  ¨ Avoid alcohol.  ¨ Have someone stay with you, if possible. They can watch for problems and help keep you safe.  Date Last Reviewed: 10/16/2014    © 4878-4793 [employer]. [address] [place] [address], [place] [address]. All rights reserved. This information is not intended as a substitute for professional medical care. Always follow your healthcare professional's instructions.

## 2017-03-08 NOTE — PLAN OF CARE
Patient prefers to have spouse  present for discharge teaching. Please contact them @ spouse staying in OPS unit room #53.

## 2017-03-08 NOTE — OP NOTE
DATE OF PROCEDURE:  03/08/2017.    SERVICE:  Orthopedics.    ATTENDING SURGEON:  Marcia Rodriguez M.D.    ASSISTANT SURGEON:  Robson Andujar M.D. (RES).    PREOPERATIVE DIAGNOSIS:  Left de Quervain's tenosynovitis.    POSTOPERATIVE DIAGNOSES:  Left de Quervain tenosynovitis with tenosynovitis    PROCEDURES:  1.  First dorsal compartment release, left side.  2.  Left first dorsal compartment tenosynovectomy.  3.  Splint application.    ANESTHESIA:  Local placed by surgeon and MAC.    FLUIDS:  Lactated Ringers.    BLOOD LOSS:  None.  No blood was given.    TOURNIQUET TIME:  Less than 30 minutes.    PACKS AND DRAINS:  None.    IMPLANTS:  None.    SPECIMENS:  None.    COMPLICATIONS:  None.    INDICATIONS FOR PROCEDURE:  Ms. Kendrick is a 66-year-old female with de   Quervain's tenosynovitis that failed conservative treatment; therefore, after   much discussion with the patient, we elected for surgical intervention.  Risks   and benefits were explained to the patient in clinic.  Consents performed in   clinic.    PROCEDURE IN DETAIL:  After correct site was marked with the patient's   participation in the holding area, the patient was brought to the Operating   Room, placed in supine position, underwent MAC anesthesia.  A well-padded   nonsterile tourniquet was placed on the left forearm.  Timeout was conducted for   correct site, procedure and patient to be indicated.  IV antibiotics were given   to the patient preoperatively.  Under sterile conditions, an injection of   lidocaine 1% was injected as a block.  The arm was prepped and draped in normal   sterile fashion.  A longitudinal incision was marked out at the first dorsal   compartment.  The arm was exsanguinated with Esmarch.  Tourniquet was   insufflated to 250 mmHg.  The incision was made.  Careful dissection down to the   first dorsal compartment was maintained.  The first dorsal compartment was   identified.  There was a very thick retinaculum present, it was  incised and part   of it was excised.  There was a fibro-osseous sheath of the EPB tendons as well   as multiple slips of the APL.  It was significant amount of tenosynovitis,   significant amount of inflammation around the area and therefore, a   tenosynovectomy was conducted at this time.  The area was irrigated with copious   amounts of normal saline.  Vicryl, Monocryl, Dermabond closed the skin.    Sterile dressing was applied.  Tourniquet was deflated.  Brisk capillary refill   ensued.  The patient was placed in a well-padded thumb spica splint, tolerated   the procedure well, was brought to Recovery Room in stable condition.    POSTOPERATIVE PLAN FOR THIS PATIENT:  Keep the dressing clean, dry and intact.    We will see here back in 2 weeks' time.  She was placed in a brace, therapy will   be initiated at that time.      LES/SN  dd: 03/08/2017 11:09:20 (CST)  td: 03/08/2017 12:34:12 (CST)  Doc ID   #[id number]  Job ID #[id number]    CC:

## 2017-03-08 NOTE — DISCHARGE SUMMARY
[facility]  Brief Operative Note     SUMMARY     Surgery Date: 3/8/2017     Surgeon(s) and Role:     * Marcia Rodriguez MD - Primary    Assisting Surgeon: None    Pre-op Diagnosis:  De Quervain's tenosynovitis, left [M65.4]    Post-op Diagnosis:  Post-Op Diagnosis Codes:     * De Quervain's tenosynovitis, left [M65.4]    Procedure(s) (LRB):  RELEASE-DEQUERVAIN'S left (Left)    Anesthesia: Local MAC    Description of the findings of the procedure: see op note    Findings/Key Components: see op note    Estimated Blood Loss: * No values recorded between 3/8/2017  9:13 AM and 3/8/2017  9:38 AM *         Specimens:   Specimen     None          Discharge Note    SUMMARY     Admit Date: 3/8/2017    Discharge Date and Time:  03/08/2017 9:39 AM    Hospital Course:  The patient arrived to the [facility] on the second floor of [facility] for proper pre-operative management.  Upon completion of pre-operative preparation, the patient was taken back to the operative theatre.  A De Quervain's release was performed without complication and the patient was transported to the post anesthesia care unit in stable condition.     Gurrola: nil    Drain: nil    Pain Management:     - [facility]: yes    After appropriate recovery from the anaesthetic agents used during the surgery the patient was discharged home without complications    Final Diagnosis: Post-Op Diagnosis Codes:     * De Quervain's tenosynovitis, left [M65.4]    Disposition: Home or Self Care    Follow Up/Patient Instructions:     Medications:  Reconciled Home Medications:   Current Discharge Medication List      START taking these medications    Details   docusate sodium (COLACE) 100 MG capsule Take 1 capsule (100 mg total) by mouth 2 (two) times daily. Available OTC as well  Qty: 60 capsule, Refills: 0      hydrocodone-acetaminophen 5-325mg (NORCO) 5-325 mg per tablet Take 1 tablet by mouth every 4 (four) hours as needed  for Pain.  Qty: 75 tablet, Refills: 0      ondansetron (ZOFRAN) 8 MG tablet Take 1 tablet (8 mg total) by mouth every 12 (twelve) hours as needed for Nausea.  Qty: 60 tablet, Refills: 0         CONTINUE these medications which have NOT CHANGED    Details   acetic acid-hydrocortisone (VOSOL-HC) otic solution 2-4 drops to affected ear twice a day  Qty: 10 mL, Refills: 2    Associated Diagnoses: Itching of ear      albuterol 90 mcg/actuation inhaler Inhale 2 puffs into the lungs every 6 (six) hours as needed for Wheezing.  Qty: 6.7 g, Refills: 0    Associated Diagnoses: Bronchitis      amlodipine (NORVASC) 2.5 MG tablet Take 1 tablet (2.5 mg total) by mouth once daily.  Qty: 30 tablet, Refills: 11      cetirizine (ZYRTEC) 10 MG tablet Take 1 tablet (10 mg total) by mouth once daily.  Refills: 0    Associated Diagnoses: Bronchitis      econazole nitrate 1 % cream Apply topically 2 (two) times daily. Qd- bid for ears. Ok for maintenance  Qty: 60 g, Refills: 1    Associated Diagnoses: Seborrheic dermatitis      fluocinonide (LIDEX) 0.05 % external solution Apply topically once daily. To scalp prn flare  Qty: 60 mL, Refills: 3    Associated Diagnoses: Seborrheic dermatitis      ketoconazole (NIZORAL) 2 % shampoo Apply topically every other day. Apply to damp scalp, lather, and let sit 5 minutes before rinsing  Qty: 120 mL, Refills: 5    Associated Diagnoses: Seborrheic dermatitis      levothyroxine (SYNTHROID) 75 MCG tablet Take 1 tablet (75 mcg total) by mouth once daily.  Qty: 30 tablet, Refills: 11      meloxicam (MOBIC) 7.5 MG tablet Take 7.5 mg by mouth once daily.      Multi-Vitamin tablet Take by mouth.  Tablet Oral       OMEGA-3S/DHA/EPA/FISH OIL (OMEGA 3 ORAL)       omeprazole (PRILOSEC) 20 MG capsule Take 1 capsule (20 mg total) by mouth once daily. Capsule, Delayed Release(E.C.) Oral .  take one tablet daily  Qty: 30 capsule, Refills: 11      PSYLLIUM SEED, WITH SUGAR, (METAMUCIL ORAL) Take by mouth.       RESTASIS 0.05 % ophthalmic emulsion INSTILL ONE DROP IN EACH EYE TWO TIMES A DAY  Qty: 60 each, Refills: 12      rosuvastatin (CRESTOR) 40 MG Tab Take 1 tablet (40 mg total) by mouth once daily.  Qty: 90 tablet, Refills: 1      cephALEXin (KEFLEX) 250 MG capsule Take 1 capsule (250 mg total) by mouth 4 (four) times daily.  Qty: 40 capsule, Refills: 0    Associated Diagnoses: Dysuria      hyoscyamine (LEVSIN/SL) 0.125 mg Subl Take 1 tablet (0.125 mg total) by mouth every 6 (six) hours as needed.  Qty: 90 tablet, Refills: 11      oxybutynin (DITROPAN-XL) 10 MG 24 hr tablet Take 1 tablet (10 mg total) by mouth once daily.  Qty: 30 tablet, Refills: 11             Discharge Procedure Orders  Diet general     Activity as tolerated     Sponge bath only until clinic visit     Keep surgical extremity elevated     Ice to affected area     Lifting restrictions   Order Comments: No Strenuous Exercise or Lifting Greater Than 5 Pounds     Call MD for:  increased confusion or weakness     Call MD for:  persistent dizziness, light-headedness, or visual disturbances     Call MD for:  worsening rash     Call MD for:  severe persistent headache     Call MD for:  difficulty breathing or increased cough     Call MD for:  redness, tenderness, or signs of infection (pain, swelling, redness, odor or green/yellow discharge around incision site)     Call MD for:  severe uncontrolled pain     Call MD for:  persistent nausea and vomiting or diarrhea     Call MD for:  temperature >100.4     Leave dressing on - Keep it clean, dry, and intact until clinic visit       Follow-up Information     Follow up with Marcia Rodriguez MD In 2 weeks.    Specialties:  Hand Surgery, Orthopedic Surgery    Why:  For wound re-check, For suture removal    Contact information:    [address]  [address]  [facility] [id number] [phone]

## 2017-03-09 ENCOUNTER — TELEPHONE (OUTPATIENT)
Dept: ORTHOPEDICS | Facility: CLINIC | Age: 67
End: 2017-03-09

## 2017-03-09 NOTE — TELEPHONE ENCOUNTER
----- Message from Clarence Serrano sent at 3/8/2017  3:26 PM CST -----  Contact: patients  Hermes swan_ 1st Request   _ 2nd Request   _ 3rd Request     Who: SHEEBA CERNA [[id number]]    Why: Pt  called to schedule 2 week post op appointment.  Nothing available until 3/28 patient wanted to come on 3/24 if possible    What Number to Call Back: [phone]    When to Expect a call back: (Before the end of the day)   -- if call after 3:00 call back will be tomorrow.

## 2017-03-22 ENCOUNTER — TELEPHONE (OUTPATIENT)
Dept: ORTHOPEDICS | Facility: CLINIC | Age: 67
End: 2017-03-22

## 2017-03-23 ENCOUNTER — OFFICE VISIT (OUTPATIENT)
Dept: ORTHOPEDICS | Facility: CLINIC | Age: 67
End: 2017-03-23
Payer: MEDICARE

## 2017-03-23 VITALS
RESPIRATION RATE: 18 BRPM | DIASTOLIC BLOOD PRESSURE: 62 MMHG | HEART RATE: 73 BPM | WEIGHT: 145 LBS | BODY MASS INDEX: 27.38 KG/M2 | SYSTOLIC BLOOD PRESSURE: 135 MMHG | HEIGHT: 61 IN

## 2017-03-23 DIAGNOSIS — M65.4 DE QUERVAIN'S DISEASE (TENOSYNOVITIS): Primary | ICD-10-CM

## 2017-03-23 DIAGNOSIS — Z47.89 ORTHOPEDIC AFTERCARE: ICD-10-CM

## 2017-03-23 PROCEDURE — 99024 POSTOP FOLLOW-UP VISIT: CPT | Mod: ,,, | Performed by: PHYSICIAN ASSISTANT

## 2017-03-23 PROCEDURE — 99999 PR PBB SHADOW E&M-EST. PATIENT-LVL V: CPT | Mod: PBBFAC,,, | Performed by: PHYSICIAN ASSISTANT

## 2017-03-23 PROCEDURE — 99215 OFFICE O/P EST HI 40 MIN: CPT | Mod: PBBFAC | Performed by: PHYSICIAN ASSISTANT

## 2017-03-23 RX ORDER — HYDROCODONE BITARTRATE AND ACETAMINOPHEN 5; 325 MG/1; MG/1
1 TABLET ORAL EVERY 6 HOURS PRN
Qty: 21 TABLET | Refills: 0 | Status: SHIPPED | OUTPATIENT
Start: 2017-03-23 | End: 2017-08-02 | Stop reason: ALTCHOICE

## 2017-03-23 NOTE — MR AVS SNAPSHOT
[facility]  [address], [address]  [facility] [address]  Phone: [phone]                  Jackelyn Meilass   3/23/2017 10:00 AM   Office Visit    Description:  Female : 1950   Provider:  TINO Conner   Department:  [facility]           Reason for Visit     Post-op Evaluation                To Do List           Future Appointments        Provider Department Dept Phone    2017 10:00 AM TINO Conner [facility] [phone]      Goals (5 Years of Data)     None      [facility]     [facility] -  Assistance  Registered nurses in the [facility] provide clinical advisement, health education, appointment booking, and other advisory services.  Call for this free service at [phone].             Medications           Message regarding Medications     Verify the changes and/or additions to your medication regime listed below are the same as discussed with your clinician today.  If any of these changes or additions are incorrect, please notify your healthcare provider.             Verify that the below list of medications is an accurate representation of the medications you are currently taking.  If none reported, the list may be blank. If incorrect, please contact your healthcare provider. Carry this list with you in case of emergency.           Current Medications     acetic acid-hydrocortisone (VOSOL-HC) otic solution 2-4 drops to affected ear twice a day    albuterol 90 mcg/actuation inhaler Inhale 2 puffs into the lungs every 6 (six) hours as needed for Wheezing.    cetirizine (ZYRTEC) 10 MG tablet Take 1 tablet (10 mg total) by mouth once daily.    docusate sodium (COLACE) 100 MG capsule Take 1 capsule (100 mg total) by mouth 2 (two) times daily. Available OTC as well    econazole nitrate 1 % cream Apply topically 2 (two) times daily. Qd- bid for ears. Ok for maintenance    fluocinonide  "(LIDEX) 0.05 % external solution Apply topically once daily. To scalp prn flare    hydrocodone-acetaminophen 5-325mg (NORCO) 5-325 mg per tablet Take 1 tablet by mouth every 4 (four) hours as needed for Pain.    hyoscyamine (LEVSIN/SL) 0.125 mg Subl Take 1 tablet (0.125 mg total) by mouth every 6 (six) hours as needed.    ketoconazole (NIZORAL) 2 % shampoo Apply topically every other day. Apply to damp scalp, lather, and let sit 5 minutes before rinsing    levothyroxine (SYNTHROID) 75 MCG tablet Take 1 tablet (75 mcg total) by mouth once daily.    meloxicam (MOBIC) 7.5 MG tablet Take 7.5 mg by mouth once daily.    Multi-Vitamin tablet Take by mouth.  Tablet Oral     OMEGA-3S/DHA/EPA/FISH OIL (OMEGA 3 ORAL)     omeprazole (PRILOSEC) 20 MG capsule Take 1 capsule (20 mg total) by mouth once daily. Capsule, Delayed Release(E.C.) Oral .  take one tablet daily    ondansetron (ZOFRAN) 8 MG tablet Take 1 tablet (8 mg total) by mouth every 12 (twelve) hours as needed for Nausea.    PSYLLIUM SEED, WITH SUGAR, (METAMUCIL ORAL) Take by mouth.    RESTASIS 0.05 % ophthalmic emulsion INSTILL ONE DROP IN EACH EYE TWO TIMES A DAY    rosuvastatin (CRESTOR) 40 MG Tab Take 1 tablet (40 mg total) by mouth once daily.    amlodipine (NORVASC) 2.5 MG tablet Take 1 tablet (2.5 mg total) by mouth once daily.    oxybutynin (DITROPAN-XL) 10 MG 24 hr tablet Take 1 tablet (10 mg total) by mouth once daily.           Clinical Reference Information           Your Vitals Were     BP Pulse Resp Height Weight BMI    135/62 (BP Location: Right arm, Patient Position: Sitting, BP Method: Automatic) 73 18 5' 1" (1.549 m) 65.8 kg (145 lb) 27.4 kg/m2      Blood Pressure          Most Recent Value    BP  135/62      Allergies as of 3/23/2017     Erythromycin (Bulk)    Levaquin [Levofloxacin]      Immunizations Administered on Date of Encounter - 3/23/2017     None      Language Assistance Services     ATTENTION: Language assistance services are available, " free of charge. Please call [phone].      ATENCIÓN: Si habla español, tiene a kaminski disposición servicios gratuitos de asistencia lingüística. Llame al [phone].     CHÚ Ý: N?u b?n nói Ti?ng Vi?t, có các d?ch v? h? tr? ngôn ng? mi?n phí dành cho b?n. G?i s? [phone].         [facility] complies with applicable Federal civil rights laws and does not discriminate on the basis of race, color, national origin, age, disability, or sex.

## 2017-03-23 NOTE — PROGRESS NOTES
"Ms. Kendrick is here today for a post-operative visit.  She is 15 days status post First dorsal compartment release, left side and Left first dorsal compartment tenosynovectomy by Dr. Rodriguez on 3/8/2017. She reports that she is doing well but having some pain in the left thumb.  Pain is 3/10 currently.  She is taking pain medication, usually Norco 5/325 mg taking 1-2 tablets per day.  She denies fever, chills, and sweats since the time of the surgery.     Physical exam:    Vitals:    03/23/17 1003   BP: 135/62   Pulse: 73   Resp: 18   Weight: 65.8 kg (145 lb)   Height: 5' 1" (1.549 m)   PainSc:   3   PainLoc: Hand     Vital signs are stable, patient is afebrile.  Patient is well dressed and well groomed, no acute distress.  Alert and oriented to person, place, and time.  Post op dressing taken down.  Incision is clean, dry and intact.  There is no erythema or exudate.  There is no sign of any infection. She is NVI. Suture tails removed without difficulty.      Assessment: 15 days status post First dorsal compartment release, left side and Left first dorsal compartment tenosynovectomy    Plan:  Jackelyn was seen today for post-op evaluation.    Diagnoses and all orders for this visit:    De Quervain's disease (tenosynovitis)  -     Ambulatory Referral to Physical/Occupational Therapy    Orthopedic aftercare    Other orders  -     hydrocodone-acetaminophen 5-325mg (NORCO) 5-325 mg per tablet; Take 1 tablet by mouth every 6 (six) hours as needed for Pain (moderate to severe pain).        - PO instruction reviewed and provided to patient, discussed scar massage  - Order placed for hand therapy with OT  - Patient to use thumb spica brace  - Discussed lifting restrictions  - Follow up in 4 weeks  "

## 2017-03-29 ENCOUNTER — CLINICAL SUPPORT (OUTPATIENT)
Dept: REHABILITATION | Facility: HOSPITAL | Age: 67
End: 2017-03-29
Attending: ORTHOPAEDIC SURGERY
Payer: MEDICARE

## 2017-03-29 DIAGNOSIS — M25.532 LEFT WRIST PAIN: ICD-10-CM

## 2017-03-29 DIAGNOSIS — M25.539 PAIN IN WRIST, UNSPECIFIED LATERALITY: ICD-10-CM

## 2017-03-29 DIAGNOSIS — M25.531 WRIST PAIN, RIGHT: ICD-10-CM

## 2017-03-29 PROCEDURE — G8991 OTHER PT/OT GOAL STATUS: HCPCS | Mod: CK,PO

## 2017-03-29 PROCEDURE — 97035 APP MDLTY 1+ULTRASOUND EA 15: CPT | Mod: PO

## 2017-03-29 PROCEDURE — G8990 OTHER PT/OT CURRENT STATUS: HCPCS | Mod: CL,PO

## 2017-03-29 PROCEDURE — 97165 OT EVAL LOW COMPLEX 30 MIN: CPT | Mod: PO

## 2017-03-29 NOTE — PLAN OF CARE
Occupational Therapy Hand/ Wrist  Evaluation    Patient: Jackelyn Kendrick  Date of Evaluation: 3/29/2017  MRN: [id number]    Diagnosis:   Encounter Diagnosis   Name Primary?    Wrist pain, right      Physician: Marcia Rodriguez, *  Treatment Orders: Eval and treat     Past Medical History:   Diagnosis Date    Bronchitis     seasonal    Cataract     Diverticulitis     Dry eye syndrome     GERD (gastroesophageal reflux disease)     Hyperlipidemia     Hypertension     Obese     PONV (postoperative nausea and vomiting)     Thyroid disease      Current Outpatient Prescriptions   Medication Sig    acetic acid-hydrocortisone (VOSOL-HC) otic solution 2-4 drops to affected ear twice a day    albuterol 90 mcg/actuation inhaler Inhale 2 puffs into the lungs every 6 (six) hours as needed for Wheezing.    amlodipine (NORVASC) 2.5 MG tablet Take 1 tablet (2.5 mg total) by mouth once daily.    cetirizine (ZYRTEC) 10 MG tablet Take 1 tablet (10 mg total) by mouth once daily.    docusate sodium (COLACE) 100 MG capsule Take 1 capsule (100 mg total) by mouth 2 (two) times daily. Available OTC as well    econazole nitrate 1 % cream Apply topically 2 (two) times daily. Qd- bid for ears. Ok for maintenance    fluocinonide (LIDEX) 0.05 % external solution Apply topically once daily. To scalp prn flare    hydrocodone-acetaminophen 5-325mg (NORCO) 5-325 mg per tablet Take 1 tablet by mouth every 6 (six) hours as needed for Pain (moderate to severe pain).    hyoscyamine (LEVSIN/SL) 0.125 mg Subl Take 1 tablet (0.125 mg total) by mouth every 6 (six) hours as needed.    ketoconazole (NIZORAL) 2 % shampoo Apply topically every other day. Apply to damp scalp, lather, and let sit 5 minutes before rinsing    levothyroxine (SYNTHROID) 75 MCG tablet Take 1 tablet (75 mcg total) by mouth once daily.    meloxicam (MOBIC) 7.5 MG tablet Take 7.5 mg by mouth once daily.    Multi-Vitamin tablet Take by mouth.  Tablet Oral      "OMEGA-3S/DHA/EPA/FISH OIL (OMEGA 3 ORAL)     omeprazole (PRILOSEC) 20 MG capsule Take 1 capsule (20 mg total) by mouth once daily. Capsule, Delayed Release(E.C.) Oral .  take one tablet daily    ondansetron (ZOFRAN) 8 MG tablet Take 1 tablet (8 mg total) by mouth every 12 (twelve) hours as needed for Nausea.    oxybutynin (DITROPAN-XL) 10 MG 24 hr tablet Take 1 tablet (10 mg total) by mouth once daily.    PSYLLIUM SEED, WITH SUGAR, (METAMUCIL ORAL) Take by mouth.    RESTASIS 0.05 % ophthalmic emulsion INSTILL ONE DROP IN EACH EYE TWO TIMES A DAY    rosuvastatin (CRESTOR) 40 MG Tab Take 1 tablet (40 mg total) by mouth once daily.     No current facility-administered medications for this visit.      Review of patient's allergies indicates:   Allergen Reactions    Erythromycin (bulk) Nausea And Vomiting    Levaquin [levofloxacin]      Other reaction(s): Swelling     Precautions: N/A        Age: 66 y.o.  Sex: female  Hand dominance: Right      Occupation:  [employer]  Working presently:  yes   Last time worked:  no   Workmen's Compensation:   no    Date of surgery: 3-13-17  Involved area:  Left wrist   Mechanism of Injury:   Surgical      Environmental Concerns/Fall Risk: None  Language: None  Cultural/Spiritual: None  Barriers to Learning: None   Abuse/Neglect/Nutritional: None  Medications:  See med card   Allergies:  See allergy card   X-Rays/Tests:  See Epic    Subjective:  Pt reports , " I have a lot of burning pain." Pt arrived with brace on    Pain :  At rest: 0/10  With work/ Activity: 8/10  Sleepin/10 wear splint with sleep   Location of Pain :raidal side of wrist    Patients goals for therapy are:   To  be able to use hand again     Objective:     Sensation: thumb is numb at this time.   Scar / Wound: healed scar, raised /hypertrophic   Edema:  minimal         Range of Motion: AROM extension/flexion                    MP: wnl  PIP: wnl  DIP:wnl                                    AROM Thumb " "exten/flex  MP: 30   IP: 77    AROM:   Thumb radial ABD:38   Thumb izquierdo ABD: 50       AROM:  Thumb opposition: thumb to long finger to P2                                                         AROM   (R)    /   (L)     Wrist extension : 53  Wrist  Flexion: 75  Radial Deviation: 13  Ulnar Deviation: 31  Supination:wnl  Pronation:wnl           Manual Muscle Test:   R/L                                   NT      Strength: (GUMARO Dynamometer in lbs.), Position II  (R): 55  (L):  14    Pinch Strength: (Pinch Gauge in psis),       (L)  LAT: 4  3PT: unable to perform due to positional pain  2PT:unable to perform due to positional pain        Treatment included OT evaluation, the following exercises (HEP) were instructed and pt was able to demonstrate them prior to the end of the session. HEP are as follows:  All wrist AROM fisted and then all thumb motions in all planes x 10 reps 6 times a day, ice for 10 minutes, firm scar massage  With oil x 3 minutes 3/day, Patient received ultrasound to  Left lateral wrist area to increase blood flow, circulation, tissue elasticity, pain management and for wound/scar management for 8 minutes @ 3.3 Mhz, Intensity 0.5 w/cm2 at 20 duty cycle. Adjusted brace as patient having pain with metal rubbing on her incision          Assessment:   Problem List :       Decreased ROM,  Scar formation  Decreased  and pinch strength,   Decreased muscle strength,   Decreased functional hand use,  Increased pain,   Edema      History Examination Decision Making Complexity Score   Occupational Profile:   Pt's full occupational profile reviewed and provided, including OD(surgical notes), including report of previous therapies.    Medical and Therapy History:     Please see "Plan" section for reference of therapy goals.    Pt with Hx/o  Past Medical History:   Diagnosis Date    Bronchitis     seasonal    Cataract     Diverticulitis     Dry eye syndrome     GERD (gastroesophageal reflux " "disease)     Hyperlipidemia     Hypertension     Obese     PONV (postoperative nausea and vomiting)     Thyroid disease        Brief/expanded review:  moderate             Performance Deficits     Physical    Please see under "problem list" and the assessment portion of this eval note    (Biomechanical)  Body systems  -musculoskeletal: ROM,strength,FM and GM deficits,    Integumentary: scar formation, edema  Low     .    Rating: low Treatment to include :  paraffin, fluidotherapy, manual therapy/joint mobilizations,scar massage,   modalities for pain management, iontophoresis with dexamethasone, US 3mhz, therapeutic exercises/activities,        strengthening,       Clinical decision  Making of low complexity, low modifications for required to complete eval      Rating:low low in combination of the 3 categories          G CODE TOOL: FOTO    Category: Self Care      Current Score  =  70%  impaired  Goal at Discharge Score =  48% impaired    Score interpretation is as follows:     TEST SCORE  Modifier  Impairment Limitation Restriction    0%  CH  0 % impaired, limited or restricted    1-19%  CI  @ least 1% but less than 20% impaired, limited or restricted    20-39%  CJ  @ least 20%<40% impaired, limited or restricted    40-59%  CK  @ least 40%<60% impaired, limited or restricted    60-79%  CL  @ least 60% <80% impaired, limited or restricted    80-99%  CM  @ least 80%<100% impaired limited or restricted    100%  CN  100% impaired, limited or restricted         Goals to be met :  1) Patient to be IND with HEP and modalities for pain management by 6-8 weeks.  2)  Pt. will increase ROM 3-5 degrees to increase functional hand use for ADLs by 6-8 weeks.  3)   Pt. will increase  strength 3-5 lbs. to grasp cup by 6-8 weeks.  4)   Pt. will increase pinch 1-3 psis for buttoning by 6-8 weeks.  5)   Pt. will decrease edema .1-.3 mm to increase joint mobility /flexibility for hand use by 6-8 weeks.   6) Pt will score " FOTO score of 20% less than current score and obtain goal score.       Plan:   Patient to be treated by Occupational Therapy    1-2    times per week for  60 days   during the certification period from   3-29-17  to 5-29-17     to achieve the established goals.  Treatment to include :  paraffin, fluidotherapy, manual therapy/joint mobilizations,  modalities for pain management, iontophoresis with dexamethasone, US 3mhz, therapeutic exercises/activities,        strengthening,    as well as any other treatments deemed necessary based on the patient's needs or progress.                 I certify the need for these services furnished under this plan of treatment and while under my care    ____________________________________                         __________________  Physician/Referring Practitioner                                               Date of Signature

## 2017-03-29 NOTE — PROGRESS NOTES
Occupational Therapy Hand/ Wrist  Evaluation    Patient: Jackelyn Kendrick  Date of Evaluation: 3/29/2017  MRN: [id number]    Diagnosis:   Encounter Diagnosis   Name Primary?    Wrist pain, right      Physician: Marcia Rodriguez, *  Treatment Orders: Eval and treat     Past Medical History:   Diagnosis Date    Bronchitis     seasonal    Cataract     Diverticulitis     Dry eye syndrome     GERD (gastroesophageal reflux disease)     Hyperlipidemia     Hypertension     Obese     PONV (postoperative nausea and vomiting)     Thyroid disease      Current Outpatient Prescriptions   Medication Sig    acetic acid-hydrocortisone (VOSOL-HC) otic solution 2-4 drops to affected ear twice a day    albuterol 90 mcg/actuation inhaler Inhale 2 puffs into the lungs every 6 (six) hours as needed for Wheezing.    amlodipine (NORVASC) 2.5 MG tablet Take 1 tablet (2.5 mg total) by mouth once daily.    cetirizine (ZYRTEC) 10 MG tablet Take 1 tablet (10 mg total) by mouth once daily.    docusate sodium (COLACE) 100 MG capsule Take 1 capsule (100 mg total) by mouth 2 (two) times daily. Available OTC as well    econazole nitrate 1 % cream Apply topically 2 (two) times daily. Qd- bid for ears. Ok for maintenance    fluocinonide (LIDEX) 0.05 % external solution Apply topically once daily. To scalp prn flare    hydrocodone-acetaminophen 5-325mg (NORCO) 5-325 mg per tablet Take 1 tablet by mouth every 6 (six) hours as needed for Pain (moderate to severe pain).    hyoscyamine (LEVSIN/SL) 0.125 mg Subl Take 1 tablet (0.125 mg total) by mouth every 6 (six) hours as needed.    ketoconazole (NIZORAL) 2 % shampoo Apply topically every other day. Apply to damp scalp, lather, and let sit 5 minutes before rinsing    levothyroxine (SYNTHROID) 75 MCG tablet Take 1 tablet (75 mcg total) by mouth once daily.    meloxicam (MOBIC) 7.5 MG tablet Take 7.5 mg by mouth once daily.    Multi-Vitamin tablet Take by mouth.  Tablet Oral      "OMEGA-3S/DHA/EPA/FISH OIL (OMEGA 3 ORAL)     omeprazole (PRILOSEC) 20 MG capsule Take 1 capsule (20 mg total) by mouth once daily. Capsule, Delayed Release(E.C.) Oral .  take one tablet daily    ondansetron (ZOFRAN) 8 MG tablet Take 1 tablet (8 mg total) by mouth every 12 (twelve) hours as needed for Nausea.    oxybutynin (DITROPAN-XL) 10 MG 24 hr tablet Take 1 tablet (10 mg total) by mouth once daily.    PSYLLIUM SEED, WITH SUGAR, (METAMUCIL ORAL) Take by mouth.    RESTASIS 0.05 % ophthalmic emulsion INSTILL ONE DROP IN EACH EYE TWO TIMES A DAY    rosuvastatin (CRESTOR) 40 MG Tab Take 1 tablet (40 mg total) by mouth once daily.     No current facility-administered medications for this visit.      Review of patient's allergies indicates:   Allergen Reactions    Erythromycin (bulk) Nausea And Vomiting    Levaquin [levofloxacin]      Other reaction(s): Swelling     Precautions: N/A        Age: 66 y.o.  Sex: female  Hand dominance: Right      Occupation:  [employer]  Working presently:  yes   Last time worked:  no   Workmen's Compensation:   no    Date of surgery: 3-13-17  Involved area:  Left wrist   Mechanism of Injury:   Surgical      Environmental Concerns/Fall Risk: None  Language: None  Cultural/Spiritual: None  Barriers to Learning: None   Abuse/Neglect/Nutritional: None  Medications:  See med card   Allergies:  See allergy card   X-Rays/Tests:  See Epic    Subjective:  Pt reports , " I have a lot of burning pain." Pt arrived with brace on    Pain :  At rest: 0/10  With work/ Activity: 8/10  Sleepin/10 wear splint with sleep   Location of Pain :raidal side of wrist    Patients goals for therapy are:   To  be able to use hand again     Objective:     Sensation: thumb is numb at this time.   Scar / Wound: healed scar, raised /hypertrophic   Edema:  minimal         Range of Motion: AROM extension/flexion                    MP: wnl  PIP: wnl  DIP:wnl                                    AROM Thumb " "exten/flex  MP: 30   IP: 77    AROM:   Thumb radial ABD:38   Thumb izquierdo ABD: 50       AROM:  Thumb opposition: thumb to long finger to P2                                                         AROM   (R)    /   (L)     Wrist extension : 53  Wrist  Flexion: 75  Radial Deviation: 13  Ulnar Deviation: 31  Supination:wnl  Pronation:wnl           Manual Muscle Test:   R/L                                   NT      Strength: (GUMARO Dynamometer in lbs.), Position II  (R): 55  (L):  14    Pinch Strength: (Pinch Gauge in psis),       (L)  LAT: 4  3PT: unable to perform due to positional pain  2PT:unable to perform due to positional pain        Treatment included OT evaluation, the following exercises (HEP) were instructed and pt was able to demonstrate them prior to the end of the session. HEP are as follows:  All wrist AROM fisted and then all thumb motions in all planes x 10 reps 6 times a day, ice for 10 minutes, firm scar massage  With oil x 3 minutes 3/day, Patient received ultrasound to  Left lateral wrist area to increase blood flow, circulation, tissue elasticity, pain management and for wound/scar management for 8 minutes @ 3.3 Mhz, Intensity 0.5 w/cm2 at 20 duty cycle. Adjusted brace as patient having pain with metal rubbing on her incision          Assessment:   Problem List :       Decreased ROM,  Scar formation  Decreased  and pinch strength,   Decreased muscle strength,   Decreased functional hand use,  Increased pain,   Edema      History Examination Decision Making Complexity Score   Occupational Profile:   Pt's full occupational profile reviewed and provided, including OD(surgical notes), including report of previous therapies.    Medical and Therapy History:     Please see "Plan" section for reference of therapy goals.    Pt with Hx/o  Past Medical History:   Diagnosis Date    Bronchitis     seasonal    Cataract     Diverticulitis     Dry eye syndrome     GERD (gastroesophageal reflux " "disease)     Hyperlipidemia     Hypertension     Obese     PONV (postoperative nausea and vomiting)     Thyroid disease        Brief/expanded review:  moderate             Performance Deficits     Physical    Please see under "problem list" and the assessment portion of this eval note    (Biomechanical)  Body systems  -musculoskeletal: ROM,strength,FM and GM deficits,    Integumentary: scar formation, edema  Low     .    Rating: low Treatment to include :  paraffin, fluidotherapy, manual therapy/joint mobilizations,scar massage,   modalities for pain management, iontophoresis with dexamethasone, US 3mhz, therapeutic exercises/activities,        strengthening,       Clinical decision  Making of low complexity, low modifications for required to complete eval      Rating:low low in combination of the 3 categories          G CODE TOOL: FOTO    Category: Self Care      Current Score  =  70%  impaired  Goal at Discharge Score =  48% impaired    Score interpretation is as follows:     TEST SCORE  Modifier  Impairment Limitation Restriction    0%  CH  0 % impaired, limited or restricted    1-19%  CI  @ least 1% but less than 20% impaired, limited or restricted    20-39%  CJ  @ least 20%<40% impaired, limited or restricted    40-59%  CK  @ least 40%<60% impaired, limited or restricted    60-79%  CL  @ least 60% <80% impaired, limited or restricted    80-99%  CM  @ least 80%<100% impaired limited or restricted    100%  CN  100% impaired, limited or restricted         Goals to be met :  1) Patient to be IND with HEP and modalities for pain management by 6-8 weeks.  2)  Pt. will increase ROM 3-5 degrees to increase functional hand use for ADLs by 6-8 weeks.  3)   Pt. will increase  strength 3-5 lbs. to grasp cup by 6-8 weeks.  4)   Pt. will increase pinch 1-3 psis for buttoning by 6-8 weeks.  5)   Pt. will decrease edema .1-.3 mm to increase joint mobility /flexibility for hand use by 6-8 weeks.   6) Pt will score " FOTO score of 20% less than current score and obtain goal score.       Plan:   Patient to be treated by Occupational Therapy    1-2    times per week for  60 days   during the certification period from   3-29-17  to 5-29-17     to achieve the established goals.  Treatment to include :  paraffin, fluidotherapy, manual therapy/joint mobilizations,  modalities for pain management, iontophoresis with dexamethasone, US 3mhz, therapeutic exercises/activities,        strengthening,    as well as any other treatments deemed necessary based on the patient's needs or progress.                 I certify the need for these services furnished under this plan of treatment and while under my care    ____________________________________                         __________________  Physician/Referring Practitioner                                               Date of Signature

## 2017-04-05 ENCOUNTER — CLINICAL SUPPORT (OUTPATIENT)
Dept: REHABILITATION | Facility: HOSPITAL | Age: 67
End: 2017-04-05
Attending: ORTHOPAEDIC SURGERY
Payer: MEDICARE

## 2017-04-05 DIAGNOSIS — M25.539 PAIN IN WRIST, UNSPECIFIED LATERALITY: ICD-10-CM

## 2017-04-05 PROCEDURE — 97110 THERAPEUTIC EXERCISES: CPT | Mod: PO

## 2017-04-05 PROCEDURE — 97022 WHIRLPOOL THERAPY: CPT | Mod: PO

## 2017-04-05 PROCEDURE — 97018 PARAFFIN BATH THERAPY: CPT | Mod: PO

## 2017-04-05 NOTE — PROGRESS NOTES
"Occupational Therapy Progress Note    Patient: Jackelyn Kendrick  OT Diagnosis: s/p  DeQuervain's Tenosynovitis     Date of treatment: 04/05/2017    Total Treatment time: 60  Group Time: 30    Time in:3  Time out:4    Subjective: Pt reported, : every once in a while, I get a shooting pain."     Pain upon arrival:  2/10  Pain post session: 0/10    Objective:   Pt participated in OT on this date. Patient received paraffin bath to left  hand(s) for 8 minutes to increase blood flow, circulation, pain management and for tissue elasticity prior to therex.   Pt was instructed and performed the following therex in fluidotherapy machine to increase AROM for 40 minutes: fisted wrist flex/extension, fisted UD/RD, thumb palmar and radial ABD, thumb slides, and gentle fisting.  Scar massage with mini vibrator to reduce scar adherence x 5 minutes   Pt performed and instructed upon the following: wrist flex, extension on wedge to increase AROM and decrease pain and inflammation.    Treatment: Therex/fluido 40 minutes, scar massage 5 minutes, para x 8 minutes       Assessment:     The patient demonstrated proper understanding  of each exercise. Pt. Demonstrated difficulty with thumb glides and UD/RD secondary to pain. Pt. Demonstrated improved wrist extension AROM and thumb palmar and radial ABD. Pt. Reports being compliant with her HEP. After adjustment of brace last visit, pt. Reports no radial wrist pain. Pt  will continue to benefit from skilled OT intervention. Pt continues to be limited in functional and leisurely pursuits. Patient continues to requires cues and skilled supervision to complete HEP.       New/Revised Goals:  continue with POC      Plan:  Continue 1-2x week x 6-8  weeks during the certification period from 03/29/2017  To 05/29/2017  in pursuit of OT goals.      "

## 2017-04-07 ENCOUNTER — CLINICAL SUPPORT (OUTPATIENT)
Dept: REHABILITATION | Facility: HOSPITAL | Age: 67
End: 2017-04-07
Attending: ORTHOPAEDIC SURGERY
Payer: MEDICARE

## 2017-04-07 DIAGNOSIS — M25.539 PAIN IN WRIST, UNSPECIFIED LATERALITY: ICD-10-CM

## 2017-04-07 PROCEDURE — 97110 THERAPEUTIC EXERCISES: CPT | Mod: PO

## 2017-04-07 PROCEDURE — 97022 WHIRLPOOL THERAPY: CPT | Mod: PO

## 2017-04-07 PROCEDURE — 97124 MASSAGE THERAPY: CPT | Mod: PO

## 2017-04-07 NOTE — PROGRESS NOTES
"Occupational Therapy Progress Note    Patient: Jackelyn Kendrick  OT Diagnosis: s/p  DeQuervain's Tenosynovitis     Date of treatment: 04/07/2017    Total Treatment time: 60  Group Time: 30    Time in: 10:00  Time out:11:00    Subjective: Pt reported, " I did not have any twinges of pain yesterday, and I had a busy day yesterday."     Pain upon arrival:  0/10  Pain post session: 0/10    Objective:   Pt participated in OT on this date. Patient received paraffin bath to left hand(s) for 8 minutes to increase blood flow, circulation, pain management and for tissue elasticity prior to therex.   Pt was instructed and performed the following therex in fluidotherapy machine to increase AROM for 30 minutes: fisted wrist flex/extension, fisted UD/RD, thumb palmar and radial ABD, thumb slides, and gentle fisting.  Scar massage with mini vibrator to reduce scar adherence x 8 minutes   Pt performed and instructed upon the following: wrist flex, extension on wedge to increase AROM and decrease pain and inflammation.    Treatment: Therex/fluido 30 minutes, scar massage 8 minutes, para x 8 minutes.      Assessment:     The patient demonstrated proper understanding  of each exercise. Pt. Demonstrated difficulty with thumb glides and UD/RD secondary to pain. Pt. Demonstrated improved wrist extension AROM and thumb palmar and radial ABD. Pt. Reports being compliant with her HEP. Pt  will continue to benefit from skilled OT intervention. Pt continues to be limited in functional and leisurely pursuits. Patient continues to requires cues and skilled supervision to complete HEP.       New/Revised Goals:  continue with POC      Plan:  Continue 1-2x week x 6-8  weeks during the certification period from 03/29/2017  To 05/29/2017  in pursuit of OT goals.    "

## 2017-04-13 ENCOUNTER — CLINICAL SUPPORT (OUTPATIENT)
Dept: REHABILITATION | Facility: HOSPITAL | Age: 67
End: 2017-04-13
Attending: ORTHOPAEDIC SURGERY
Payer: MEDICARE

## 2017-04-13 DIAGNOSIS — M25.539 PAIN IN WRIST, UNSPECIFIED LATERALITY: ICD-10-CM

## 2017-04-13 PROCEDURE — 97022 WHIRLPOOL THERAPY: CPT | Mod: PO

## 2017-04-13 PROCEDURE — 97035 APP MDLTY 1+ULTRASOUND EA 15: CPT | Mod: PO

## 2017-04-13 PROCEDURE — 97110 THERAPEUTIC EXERCISES: CPT | Mod: PO

## 2017-04-13 PROCEDURE — 97018 PARAFFIN BATH THERAPY: CPT | Mod: PO

## 2017-04-13 NOTE — PROGRESS NOTES
"Occupational Therapy Progress Note    Patient: Jackelyn Kendrick  OT Diagnosis: s/p  DeQuervain's Tenosynovitis     Date of treatment: 04/13/2017    Total Treatment time: 60  Group Time: 30    Time in: 11:00  Time out:12:00    Subjective: Pt reported, " I have pain in my thumb that comes and goes."     Pain upon arrival:  0/10  Pain post session: 0/10    Objective:   Pt participated in OT on this date. Patient received paraffin bath to left hand(s) for 8 minutes to increase blood flow, circulation, pain management and for tissue elasticity prior to therex.   Pt was instructed and performed the following therex in fluidotherapy machine to increase AROM for 30 minutes: fisted wrist flex/extension, fisted UD/RD, thumb palmar and radial ABD, thumb slides, pinching yellow clothespin in lateral and 3 jaw x 1 minute each, gripping sponge x 2 minutes, thumb extension, thumb rad and izquierdo abd x 1 minute each, IP flexion. Yellow  Power web x 1 min for finger extension , applied tape for scar prior to leaving.   Patient receives ultrasound  for pain control and decreased inflammation @ 20 duty cycle, 3.3 Mhz, applied to scar/incision, intensity = 0.8 w/cm2 for 8 minutes.      Treatment: Therex/fluido 30 minutes, scar massage 8 minutes, para x 8 minutes.      Assessment:   Pt with more shooting pain while she is wear her brace more than when she has her brace off. She only takes brace off if she is sitting around the house. She comes to therapy with brace donned. She wears brace out in public and is diligently doing her HEP.   The patient demonstrated proper understanding  of each exercise. Pt. Demonstrated difficulty with thumb glides and UD/RD secondary to pain. Pt. Demonstrated improved wrist extension AROM and thumb palmar and radial ABD. Pt. Reports being compliant with her HEP. Pt  will continue to benefit from skilled OT intervention. Pt continues to be limited in functional and leisurely pursuits. Patient continues to " requires cues and skilled supervision to complete HEP.       New/Revised Goals:  continue with POC      Plan:  Continue 1-2x week x 6-8  weeks during the certification period from 03/29/2017  To 05/29/2017  in pursuit of OT goals.

## 2017-04-18 ENCOUNTER — TELEPHONE (OUTPATIENT)
Dept: ORTHOPEDICS | Facility: CLINIC | Age: 67
End: 2017-04-18

## 2017-04-21 ENCOUNTER — CLINICAL SUPPORT (OUTPATIENT)
Dept: REHABILITATION | Facility: HOSPITAL | Age: 67
End: 2017-04-21
Attending: ORTHOPAEDIC SURGERY
Payer: MEDICARE

## 2017-04-21 DIAGNOSIS — M25.539 PAIN IN WRIST, UNSPECIFIED LATERALITY: ICD-10-CM

## 2017-04-21 PROCEDURE — 97018 PARAFFIN BATH THERAPY: CPT | Mod: 59,PO

## 2017-04-21 PROCEDURE — 97110 THERAPEUTIC EXERCISES: CPT | Mod: PO

## 2017-04-21 PROCEDURE — 97140 MANUAL THERAPY 1/> REGIONS: CPT | Mod: PO

## 2017-04-21 NOTE — PROGRESS NOTES
"Occupational Therapy Progress Note    Patient: Jackelyn Kendrick  OT Diagnosis: s/p  DeQuervain's Tenosynovitis     Date of treatment: 04/21/2017    Total Treatment time: 60  Group Time: 0    Time in: 9:00   Time out: 10:00    Subjective: Pt reported, "The thumb is still bothering me some, and my wrist motion is still limited and has some pain."     Pain upon arrival:  0/10  Pain post session/ with exercises of wrist and thumb: 4/10    Objective:   Pt participated in OT on this date. Patient received paraffin bath to left hand(s) for 8 minutes to increase blood flow, circulation, pain management and for tissue elasticity prior to therex.   Patient received ultrasound for pain control and decreased inflammation @ 20 duty cycle, 3.3 Mhz, applied to scar/incision, intensity = 0.8 w/cm2 for 8 minutes. OT Performed scar massage to scar area for 8 minutes with minivibrator to decrease adhesions and improve tensile glide. Pt was instructed and performed the following therex in fluidotherapy machine to increase AROM for 30 minutes: fisted wrist flex/extension, fisted UD/RD, thumb slides, thumb radial ABD, thumb palmar ABD, thumb extension, pinching yellow clothespin in lateral and 3 jaw x 2 minute each, gripping pink sponge x 2 minutes.       Treatment: Therex/fluido 30 minutes, scar massage 8 minutes, para x 8 minutes, ultrasound x 8 minutes.       Assessment:  OT observed red, dry skin irritation, possibly from brace,  in web space between 1st and 2nd digit and patient reports that she first noticed it yesterday and it is itchy. OT advised her to refrain from wearing brace for the rest of today and to put cream that she already has at home on it and also to call us if it gets worse. After today, patient can continue wearing brace; she wears brace in public to avoid injury. She does not participate in strenuous activities such as lifting. Pt. Demonstrated continued difficulty with thumb glides secondary to adductor " pollicus weakness and difficulty with UD/RD secondary to pain limitation. Pt. Reports increased tenderness at at distal, medial scar perimeter nodule. Pt. Reports being compliant with her HEP 2x/day. Pt  will continue to benefit from skilled OT intervention. Pt continues to be limited in functional and leisurely pursuits. Patient continues to requires cues and skilled supervision to complete HEP.       New/Revised Goals:  continue with POC      Plan:  Continue 1-2x week x 6-8  weeks during the certification period from 03/29/2017  To 05/29/2017  in pursuit of OT goals.

## 2017-04-26 ENCOUNTER — CLINICAL SUPPORT (OUTPATIENT)
Dept: REHABILITATION | Facility: HOSPITAL | Age: 67
End: 2017-04-26
Attending: ORTHOPAEDIC SURGERY
Payer: MEDICARE

## 2017-04-26 DIAGNOSIS — M25.532 LEFT WRIST PAIN: ICD-10-CM

## 2017-04-26 PROCEDURE — 97022 WHIRLPOOL THERAPY: CPT | Mod: PO

## 2017-04-26 PROCEDURE — 97110 THERAPEUTIC EXERCISES: CPT | Mod: PO

## 2017-04-26 PROCEDURE — 97018 PARAFFIN BATH THERAPY: CPT | Mod: PO

## 2017-04-28 ENCOUNTER — CLINICAL SUPPORT (OUTPATIENT)
Dept: REHABILITATION | Facility: HOSPITAL | Age: 67
End: 2017-04-28
Attending: ORTHOPAEDIC SURGERY
Payer: MEDICARE

## 2017-04-28 DIAGNOSIS — M25.532 LEFT WRIST PAIN: ICD-10-CM

## 2017-04-28 PROCEDURE — 97022 WHIRLPOOL THERAPY: CPT | Mod: PO

## 2017-04-28 PROCEDURE — 97018 PARAFFIN BATH THERAPY: CPT | Mod: PO

## 2017-04-28 PROCEDURE — 97110 THERAPEUTIC EXERCISES: CPT | Mod: PO

## 2017-04-28 NOTE — PROGRESS NOTES
"Occupational Therapy Progress Note    Patient: Jackelyn Kendrick  OT Diagnosis: s/p  DeQuervain's Tenosynovitis     Date of treatment: 04/28/2017    Total Treatment time: 60  Group Time: 30    Time in: 9:00   Time out: 10:00    Subjective: Pt reported, "My thumb hurts."     Pain upon arrival:  1/10  Pain post session/ with exercises of wrist and thumb: 3/10    Objective:   Patient received paraffin bath to left hand(s) for 8 minutes in combination with thumb coban-ed thumb into  opposition to increase ROM and increase blood flow, circulation, pain management and for tissue elasticity prior to therex. Patient received ultrasound for pain control and decreased inflammation @ 20 duty cycle, 3.3 Mhz, applied to scar/incision, intensity = 0.8 w/cm2 for 8 minutes. Pt was instructed and performed the following therex in fluidotherapy machine to increase AROM for 30 minutes: fisted wrist flex/extension, fisted UD/RD all with 1#wt x 2 minutes each, thumb slides, thumb radial ABD, thumb palmar ABD, thumb extension, pinching pink hand sponge in lateral pinch, palmar pinch, and tip pinch x 2 minute each, gripping soft ball x 3 minutes.       Treatment: Therex/fluido 30 minutes,  para x 8 minutes, ultrasound x 8 minutes.       Assessment:  Pt arrived wearing brace, numbness is now resloved, but has little shooting pain from time to time in thumb area and scar area. Pt still demonstrates some decreased ROM in opposition secondary to nerve-type shooting pains. Pt. Continues to show improvement with strengthening and endurance in all wrist planes of motion. Pt  will continue to benefit from skilled OT intervention. Pt continues to be limited in functional and leisurely pursuits. Patient continues to requires cues and skilled supervision to complete HEP.       New/Revised Goals:  continue with POC      Plan:  Continue 1-2x week x 6-8  weeks during the certification period from 03/29/2017  To 05/29/2017  in pursuit of OT " "goals.    RODNEY Parker, BSRT(R)(T)(CT)    "I, Courtney ZIMMERMAN, ESVIN,  certify that I was present in the room directing the student in service delivery and guiding them using my skilled judgment. As the co-signing therapist, I have reviewed the student's documentation and am responsible for the treatment, assessment and plan."    "

## 2017-05-05 ENCOUNTER — CLINICAL SUPPORT (OUTPATIENT)
Dept: REHABILITATION | Facility: HOSPITAL | Age: 67
End: 2017-05-05
Attending: ORTHOPAEDIC SURGERY
Payer: MEDICARE

## 2017-05-05 DIAGNOSIS — M25.532 LEFT WRIST PAIN: Primary | ICD-10-CM

## 2017-05-05 PROCEDURE — 97110 THERAPEUTIC EXERCISES: CPT | Mod: PO

## 2017-05-05 PROCEDURE — 97022 WHIRLPOOL THERAPY: CPT | Mod: 59,PO

## 2017-05-05 PROCEDURE — 97018 PARAFFIN BATH THERAPY: CPT | Mod: PO

## 2017-05-05 NOTE — PROGRESS NOTES
"Occupational Therapy Progress Note    Patient: Jackelyn Kendrick  OT Diagnosis: s/p  DeQuervain's Tenosynovitis   DOS: 03/13/2017  Date of treatment: 05/05/2017    Total Treatment time: 60  Group Time: 30    Time in: 10:00  Time out: 10:53    Subjective: Pt reported, "I have been using my hand more, and I have less pain. I do have pain when I put my seatbelt on to drive. I have my brace on, and it still hurts."     Pain upon arrival:  1/10  Pain post session/ with exercises of wrist and thumb: 3/10    Objective:   Patient received paraffin bath to left hand(s) for 8 minutes in and increase blood flow, circulation, pain management and for tissue elasticity prior to therex. Patient received ultrasound for pain control and decreased inflammation @ 50 duty cycle, 3.3 Mhz, applied to scar/incision, intensity = 0.5 w/cm2 for 8 minutes. Pt was instructed and performed the following therex in fluidotherapy machine to increase AROM for 30 minutes: fisted wrist flex/extension, fisted UD/RD all with 1#wt x 2 minutes each, thumb slides, thumb radial ABD, thumb palmar ABD, thumb extension, thumb ADD, pinching pink hand sponge in lateral pinch, palmar pinch, and tip pinch x 2 minute each, gripping soft ball x 3 minutes.       Treatment: Therex/fluido 45 minutes,  para x 8 minutes       Assessment:  Pt. Reports Still experiencing minimal intermittent hypersensitivity over incision area. Pt still has shooting pain intermittently in thumb area and scar area, such as, when putting on seatbelt to drive even with thumb spica brace on; pt demonstrated motion and experienced shooting pain with wrist extension and ulnar deviation that the brace does not inhibit. OT student instructed patient to try to grab seatbelt with other hand for the next couple of days to rest from that motion to decrease pain and inflammation. Pt continues to demonstrate pain at end range of motion and with composite movements such as hooking bra and putting on " "seatbelt. Pt. Continues to show improvement with strengthening and endurance in all wrist and thumb ROM. Pt  will continue to benefit from skilled OT intervention. Pt continues to be limited in functional and leisurely pursuits. Patient continues to requires cues and skilled supervision to complete HEP.        New/Revised Goals:  continue with POC      Plan:  Continue 1-2x week x 6-8  weeks during the certification period from 03/29/2017  To 05/29/2017  in pursuit of OT goals.    RODNEY Parker, BSRT(R)(T)(CT)    "I, Courtney ZIMMERMAN, T,  certify that I was present in the room directing the student in service delivery and guiding them using my skilled judgment. As the co-signing therapist, I have reviewed the student's documentation and am responsible for the treatment, assessment and plan."  "

## 2017-05-10 ENCOUNTER — CLINICAL SUPPORT (OUTPATIENT)
Dept: REHABILITATION | Facility: HOSPITAL | Age: 67
End: 2017-05-10
Attending: ORTHOPAEDIC SURGERY
Payer: MEDICARE

## 2017-05-10 DIAGNOSIS — M25.532 LEFT WRIST PAIN: Primary | ICD-10-CM

## 2017-05-10 PROCEDURE — 97110 THERAPEUTIC EXERCISES: CPT | Mod: PO

## 2017-05-10 PROCEDURE — 97035 APP MDLTY 1+ULTRASOUND EA 15: CPT | Mod: PO

## 2017-05-10 NOTE — PROGRESS NOTES
"Occupational Therapy Progress Note    Patient: Jackelyn Kendrick  OT Diagnosis: s/p  DeQuervain's Tenosynovitis (LEFT)  DOS: 03/08/2017  Date of treatment: 05/10/2017 9 weeks post-op    Total Treatment time: 60  Group Time: 30    Time in: 1  Time out: 2    Subjective: Pt reported, "I have increased pain when I do a thumbs'up motion. It feels like a stretch plus a little bit of pain, about a 4/10. The ultrasound helped decrease the pain immediately and the exercises were easier after that. I remembered to grab my seatbelt with the opposite hand most of the time for the past 5 days since my last OT visit. I did notice that when I forgot and used my left hand to grab and pull the seatbelt, it did not hurt nearly as much. I think resting from that motion helped the pain go away."     Pain upon arrival:  1/10  Pain post session/ with exercises of wrist and thumb: 3/10    Objective:   Patient received paraffin bath to left hand(s) for 8 minutes in and increase blood flow, circulation, pain management and for tissue elasticity prior to therex. Patient received ultrasound for pain control and decreased inflammation @ 50 duty cycle, 3.3 Mhz, applied to left thumb/radial scar/incision, intensity = 0.5 w/cm2 for 8 minutes.Pt was instructed and performed the following therex in fluidotherapy machine to increase AROM for 30 minutes: thumb radial ABD neutral forearm x 2 min, thumb radial ABD pronated forearm x 2 min, thumb flex x 2 min, thumb slides x 2 min, thumb ext x 2 min, thumb ADD x 2 min, wrist RD/UD x 2 min, wrist flex/ext x 2 min; soft sponge in lateral pinch, palmar pinch, and tip pinch x 2 minute each,; gripping soft ball x 2 minutes, fisted wrist flex/ext x 2 min.       Treatment: Therex/fluido 45 minutes,  para x 8 minutes       Assessment:  Pt. Reported pain, 4/10, with end range of AROM radial ABD of thumb against gravity and no pain with radial ABD of the thumb in gravity eliminated position. Updated HEP : soft " "ball pinching in all 3 pinch motions x 15 reps once every other day.   Pt. Reports Still experiencing minimal intermittent hypersensitivity over incision area. Pt still has shooting pain intermittently in thumb area and scar area, such as, when putting on seatbelt to drive even with thumb spica brace on; pt demonstrated motion and experienced shooting pain with wrist extension and ulnar deviation that the brace does not inhibit. OT student instructed patient to try to grab seatbelt with other unaffected hand. Pt continues to demonstrate pain at end range of motion and with composite movements such as hooking bra and putting on seatbelt. Pt. Continues to show improvement with strengthening and endurance in all wrist and thumb ROM. Pt  will continue to benefit from skilled OT intervention. Pt continues to be limited in functional and leisurely pursuits. Patient continues to requires cues and skilled supervision to complete HEP.        New/Revised Goals:  continue with POC      Plan:  Continue 1-2x week x 6-8  weeks during the certification period from 03/29/2017  To 05/29/2017  in pursuit of OT goals.    RODNEY Parker, BSRT(R)(T)(CT)    "I, Courtney ZIMMERMAN, ESVIN,  certify that I was present in the room directing the student in service delivery and guiding them using my skilled judgment. As the co-signing therapist, I have reviewed the student's documentation and am responsible for the treatment, assessment and plan."  "

## 2017-05-17 ENCOUNTER — CLINICAL SUPPORT (OUTPATIENT)
Dept: REHABILITATION | Facility: HOSPITAL | Age: 67
End: 2017-05-17
Attending: ORTHOPAEDIC SURGERY
Payer: MEDICARE

## 2017-05-17 DIAGNOSIS — M25.532 LEFT WRIST PAIN: Primary | ICD-10-CM

## 2017-05-17 PROCEDURE — 97110 THERAPEUTIC EXERCISES: CPT | Mod: PO

## 2017-05-17 PROCEDURE — 97018 PARAFFIN BATH THERAPY: CPT | Mod: PO

## 2017-05-17 PROCEDURE — 97035 APP MDLTY 1+ULTRASOUND EA 15: CPT | Mod: PO

## 2017-05-17 NOTE — PROGRESS NOTES
"Occupational Therapy Progress Note    Patient: Jackelyn Kendrick  OT Diagnosis: s/p  DeQuervain's Tenosynovitis (LEFT)  DOS: 03/08/2017  Date of treatment: 05/17/2017 9 weeks post-op    Total Treatment time: 60  Group Time: 30    Time in: 3  Time out: 4    Subjective: Pt reported, "I have some shooting pain in the thumb sometimes with certain motions, but it's not all the time. I can  my granddaughter again easily."      Pain upon arrival:  1/10  Pain post session: 1/10    Objective:   Patient received paraffin bath to left hand(s) for 8 minutes in and increase blood flow, circulation, pain management and for tissue elasticity prior to therex. Patient received ultrasound for pain control and decreased inflammation @ 50 duty cycle, 3.3 Mhz, applied to left thumb/radial scar/incision, intensity = 0.5 w/cm2 for 8 minutes. Pt. was instructed and performed the following therex in fluidotherapy machine to increase AROM for 15 minutes: gripping with soft sponge x 5 min, wrist flex/ext/UD/RD x 2 min each, thumb radial and palmar ABD x 2 min ea, lateral pinch with red clothespin x 2 min, 3-pt pinch with red clothespin for 2 min, and 2-pt pinch with yellow clothespin x 2 min. Pt instructed and performed the following therex to promote  and pinch strength, to also be performed at home as HEP every other day with yellow putty: gross gripping, thumb ADD, lateral pinch, 3-pt pinch, 2-pt pinch, pinch and pull x 15 reps each; also thumb radial and palmar ABD with rubberband x 15 reps each.      Treatment: para x 8 min, us x 8 min, fluido x 15 min, therex x 20 min    Assessment:  Pt. Provided with new HEP as outlined above in objective to progress strengthening. Pt demonstrated improved strength with decreased pain when performing lateral pinch, 3-pt. Pinch, and 2-pt pinch. Pt demonstrated improved wrist RD AROM, but continues to have increased pain at end range. Pt is progressing well toward meeting all therapy goals. " Pt will continue to benefit from skilled OT intervention at this time. Pt continues to be limited in functional and leisurely pursuits. Patient continues to require cues and skilled supervision to complete HEP.         New/Revised Goals:  continue with POC      Plan:  Continue 1-2x week x 6-8  weeks during the certification period from 03/29/2017  To 05/29/2017  in pursuit of OT goals.

## 2017-05-19 ENCOUNTER — CLINICAL SUPPORT (OUTPATIENT)
Dept: REHABILITATION | Facility: HOSPITAL | Age: 67
End: 2017-05-19
Attending: ORTHOPAEDIC SURGERY
Payer: MEDICARE

## 2017-05-19 DIAGNOSIS — M25.532 LEFT WRIST PAIN: ICD-10-CM

## 2017-05-19 PROCEDURE — G8990 OTHER PT/OT CURRENT STATUS: HCPCS | Mod: CJ,PO

## 2017-05-19 PROCEDURE — 97035 APP MDLTY 1+ULTRASOUND EA 15: CPT | Mod: PO

## 2017-05-19 PROCEDURE — G8991 OTHER PT/OT GOAL STATUS: HCPCS | Mod: CJ,PO

## 2017-05-19 PROCEDURE — 97110 THERAPEUTIC EXERCISES: CPT | Mod: PO

## 2017-05-19 PROCEDURE — 97018 PARAFFIN BATH THERAPY: CPT | Mod: PO

## 2017-05-19 NOTE — PROGRESS NOTES
Occupational Therapy Progress Note    Patient: Jackelyn Kendrick  OT Diagnosis: s/p  DeQuervain's Tenosynovitis (LEFT)  DOS: 03/08/2017  Date of treatment: 05/19/2017     Total Treatment time: 60  Group Time: 30    Time in: 10  Time out: 11    Subjective: Pt reported no pain even with putting on seatbelt     Pain upon arrival:  0/10  Pain post session: 1/10    Objective:   Patient received paraffin bath to left hand with thumb cobanned into flexion for LLPS x 10 minutes to promote AROM and to increase blood flow, circulation, pain management and for tissue elasticity prior to therex. Pt. was instructed and performed the following therex in fluidotherapy machine to increase AROM for 15 minutes: gripping with soft sponge x 5 min, wrist flex/ext/UD/RD x 2 min ea, all three pinches w/ red clothespin x 2 min each, opposition with yellow clothespin x 2 min. Patient received ultrasound for pain control and decreased inflammation @ 100 duty cycle, 3.3 Mhz, applied to left thumb/radial scar/incision, intensity = 0.6 w/cm2 x 8 min. Performed opposition with red clothespin x 10 reps,  rubberband thumb radial and palmar ABD x 10 reps ea. Pt provided with new HEP.    AROM:  Wrist: WNL  Thumb: WNL     Strength: (GUMARO Dynamometer in lbs.), Position II  (R): 55  (L):  24 (+10)    Pinch Strength: (Pinch Gauge in psis),       (L)  LAT:      6 (+2)  3PT:     5 (+5)  2PT:     2 (+2)    Edema: no edema noted today    Treatment: para x 8 min, us x 8 min, fluido x 15 min, therex x 20 min    Assessment:  Pt supplied strengthening HEP including: Pt demonstrates full AROM in all thumb and wrist planes and strength is progressing. Pt demonstrates improved pain during resistive exercises. Pt is no longer limited in leisure/work/ADL pursuits and will not benefit from skilled OT intervention at this time.      G CODE TOOL: FOTO    Category: self care       Goal at Discharge Score = 48% impaired  Discharge Score  = 30% impaired      Score  interpretation is as follows:     TEST SCORE  Modifier  Impairment Limitation Restriction    0%  CH  0 % impaired, limited or restricted    1-19%  CI  @ least 1% but less than 20% impaired, limited or restricted    20-39%  CJ  @ least 20%<40% impaired, limited or restricted    40-59%  CK  @ least 40%<60% impaired, limited or restricted    60-79%  CL  @ least 60% <80% impaired, limited or restricted    80-99%  CM  @ least 80%<100% impaired limited or restricted    100%  CN  100% impaired, limited or restricted       Goals:  1) Patient to be IND with HEP and modalities for pain management by 6-8 weeks.------MET  2)  Pt. will increase ROM 3-5 degrees to increase functional hand use for ADLs by 6-8 weeks.------MET  3)   Pt. will increase  strength 3-5 lbs. to grasp cup by 6-8 weeks.----MET  4)   Pt. will increase pinch 1-3 psis for buttoning by 6-8 weeks.------MET  5)   Pt. will decrease edema .1-.3 mm to increase joint mobility /flexibility for hand use by 6-8 weeks. -----MET  6) Pt will score FOTO score of 20% less than current score and obtain goal score.-----MET     Plan:  Pt. Referred by Dr. Marcia Rodriguez for eval and treat on 03/29/17. Pt. Seen for 10 OT sessions. Pt had modalities, therex, manual therapy, and scar massage. Pt has met all goals at this time. Pt self-discharged at this time as she wants to preserve her visits.

## 2017-07-19 ENCOUNTER — TELEPHONE (OUTPATIENT)
Dept: FAMILY MEDICINE | Facility: CLINIC | Age: 67
End: 2017-07-19

## 2017-07-19 DIAGNOSIS — E78.5 HYPERLIPIDEMIA, UNSPECIFIED HYPERLIPIDEMIA TYPE: ICD-10-CM

## 2017-07-19 DIAGNOSIS — E03.9 HYPOTHYROIDISM, UNSPECIFIED TYPE: ICD-10-CM

## 2017-07-19 DIAGNOSIS — Z00.00 ANNUAL PHYSICAL EXAM: Primary | ICD-10-CM

## 2017-07-19 NOTE — TELEPHONE ENCOUNTER
----- Message from Sujata Cast sent at 7/19/2017  2:57 PM CDT -----  Pt has labwork scheduled for 7/24 and need orders attached please

## 2017-07-24 ENCOUNTER — LAB VISIT (OUTPATIENT)
Dept: LAB | Facility: HOSPITAL | Age: 67
End: 2017-07-24
Attending: FAMILY MEDICINE
Payer: MEDICARE

## 2017-07-24 DIAGNOSIS — E03.9 HYPOTHYROIDISM, UNSPECIFIED TYPE: ICD-10-CM

## 2017-07-24 DIAGNOSIS — E78.5 HYPERLIPIDEMIA, UNSPECIFIED HYPERLIPIDEMIA TYPE: ICD-10-CM

## 2017-07-24 DIAGNOSIS — Z00.00 ANNUAL PHYSICAL EXAM: ICD-10-CM

## 2017-07-24 LAB
ALBUMIN SERPL BCP-MCNC: 3.8 G/DL
ALP SERPL-CCNC: 45 U/L
ALT SERPL W/O P-5'-P-CCNC: 25 U/L
ANION GAP SERPL CALC-SCNC: 7 MMOL/L
AST SERPL-CCNC: 33 U/L
BASOPHILS # BLD AUTO: 0.03 K/UL
BASOPHILS NFR BLD: 0.5 %
BILIRUB SERPL-MCNC: 0.7 MG/DL
BUN SERPL-MCNC: 18 MG/DL
CALCIUM SERPL-MCNC: 9.4 MG/DL
CHLORIDE SERPL-SCNC: 106 MMOL/L
CHOLEST/HDLC SERPL: 2.7 {RATIO}
CO2 SERPL-SCNC: 27 MMOL/L
CREAT SERPL-MCNC: 0.9 MG/DL
DIFFERENTIAL METHOD: NORMAL
EOSINOPHIL # BLD AUTO: 0.2 K/UL
EOSINOPHIL NFR BLD: 2.7 %
ERYTHROCYTE [DISTWIDTH] IN BLOOD BY AUTOMATED COUNT: 13.1 %
EST. GFR  (AFRICAN AMERICAN): >60 ML/MIN/1.73 M^2
EST. GFR  (NON AFRICAN AMERICAN): >60 ML/MIN/1.73 M^2
GLUCOSE SERPL-MCNC: 100 MG/DL
HCT VFR BLD AUTO: 38.2 %
HDL/CHOLESTEROL RATIO: 37.2 %
HDLC SERPL-MCNC: 145 MG/DL
HDLC SERPL-MCNC: 54 MG/DL
HGB BLD-MCNC: 12.5 G/DL
LDLC SERPL CALC-MCNC: 60 MG/DL
LYMPHOCYTES # BLD AUTO: 2.1 K/UL
LYMPHOCYTES NFR BLD: 36 %
MCH RBC QN AUTO: 29.6 PG
MCHC RBC AUTO-ENTMCNC: 32.7 G/DL
MCV RBC AUTO: 90 FL
MONOCYTES # BLD AUTO: 0.5 K/UL
MONOCYTES NFR BLD: 8.7 %
NEUTROPHILS # BLD AUTO: 3 K/UL
NEUTROPHILS NFR BLD: 51.9 %
NONHDLC SERPL-MCNC: 91 MG/DL
PLATELET # BLD AUTO: 209 K/UL
PMV BLD AUTO: 11.1 FL
POTASSIUM SERPL-SCNC: 4.2 MMOL/L
PROT SERPL-MCNC: 7.2 G/DL
RBC # BLD AUTO: 4.23 M/UL
SODIUM SERPL-SCNC: 140 MMOL/L
TRIGL SERPL-MCNC: 155 MG/DL
TSH SERPL DL<=0.005 MIU/L-ACNC: 1.35 UIU/ML
WBC # BLD AUTO: 5.83 K/UL

## 2017-07-24 PROCEDURE — 36415 COLL VENOUS BLD VENIPUNCTURE: CPT | Mod: PO

## 2017-07-24 PROCEDURE — 84443 ASSAY THYROID STIM HORMONE: CPT

## 2017-07-24 PROCEDURE — 80061 LIPID PANEL: CPT

## 2017-07-24 PROCEDURE — 80053 COMPREHEN METABOLIC PANEL: CPT

## 2017-07-24 PROCEDURE — 85025 COMPLETE CBC W/AUTO DIFF WBC: CPT

## 2017-08-02 ENCOUNTER — OFFICE VISIT (OUTPATIENT)
Dept: FAMILY MEDICINE | Facility: CLINIC | Age: 67
End: 2017-08-02
Payer: MEDICARE

## 2017-08-02 VITALS
BODY MASS INDEX: 27.22 KG/M2 | HEIGHT: 61 IN | DIASTOLIC BLOOD PRESSURE: 74 MMHG | WEIGHT: 144.19 LBS | HEART RATE: 68 BPM | SYSTOLIC BLOOD PRESSURE: 126 MMHG | TEMPERATURE: 98 F

## 2017-08-02 DIAGNOSIS — Z12.31 SCREENING MAMMOGRAM FOR HIGH-RISK PATIENT: ICD-10-CM

## 2017-08-02 DIAGNOSIS — Z29.9 PREVENTIVE MEASURE: ICD-10-CM

## 2017-08-02 DIAGNOSIS — Z00.00 ROUTINE GENERAL MEDICAL EXAMINATION AT A HEALTH CARE FACILITY: Primary | ICD-10-CM

## 2017-08-02 DIAGNOSIS — J40 BRONCHITIS: ICD-10-CM

## 2017-08-02 LAB
BILIRUB UR QL STRIP: NEGATIVE
CLARITY UR REFRACT.AUTO: CLEAR
COLOR UR AUTO: YELLOW
GLUCOSE UR QL STRIP: NEGATIVE
HGB UR QL STRIP: NEGATIVE
KETONES UR QL STRIP: NEGATIVE
LEUKOCYTE ESTERASE UR QL STRIP: NEGATIVE
NITRITE UR QL STRIP: NEGATIVE
PH UR STRIP: 7 [PH] (ref 5–8)
PROT UR QL STRIP: NEGATIVE
SP GR UR STRIP: 1.01 (ref 1–1.03)
URN SPEC COLLECT METH UR: NORMAL
UROBILINOGEN UR STRIP-ACNC: NEGATIVE EU/DL

## 2017-08-02 PROCEDURE — 90732 PPSV23 VACC 2 YRS+ SUBQ/IM: CPT | Mod: PBBFAC,PO

## 2017-08-02 PROCEDURE — 99999 PR PBB SHADOW E&M-EST. PATIENT-LVL III: CPT | Mod: PBBFAC,,, | Performed by: FAMILY MEDICINE

## 2017-08-02 PROCEDURE — 99213 OFFICE O/P EST LOW 20 MIN: CPT | Mod: PBBFAC,PO | Performed by: FAMILY MEDICINE

## 2017-08-02 PROCEDURE — 81003 URINALYSIS AUTO W/O SCOPE: CPT

## 2017-08-02 PROCEDURE — 99397 PER PM REEVAL EST PAT 65+ YR: CPT | Mod: S$PBB,,, | Performed by: FAMILY MEDICINE

## 2017-08-02 RX ORDER — AMLODIPINE BESYLATE 2.5 MG/1
2.5 TABLET ORAL DAILY
Qty: 30 TABLET | Refills: 11 | Status: SHIPPED | OUTPATIENT
Start: 2017-08-02 | End: 2018-08-06 | Stop reason: SDUPTHER

## 2017-08-02 RX ORDER — ROSUVASTATIN CALCIUM 40 MG/1
40 TABLET, COATED ORAL DAILY
Qty: 30 TABLET | Refills: 11 | Status: SHIPPED | OUTPATIENT
Start: 2017-08-02 | End: 2017-08-04 | Stop reason: SDUPTHER

## 2017-08-02 RX ORDER — OMEPRAZOLE 40 MG/1
40 CAPSULE, DELAYED RELEASE ORAL DAILY
Qty: 30 CAPSULE | Refills: 11 | Status: SHIPPED | OUTPATIENT
Start: 2017-08-02 | End: 2018-08-02 | Stop reason: SDUPTHER

## 2017-08-02 RX ORDER — LEVOTHYROXINE SODIUM 75 UG/1
75 TABLET ORAL DAILY
Qty: 30 TABLET | Refills: 11 | Status: SHIPPED | OUTPATIENT
Start: 2017-08-02 | End: 2018-08-02 | Stop reason: SDUPTHER

## 2017-08-02 RX ORDER — ALBUTEROL SULFATE 90 UG/1
2 AEROSOL, METERED RESPIRATORY (INHALATION) EVERY 6 HOURS PRN
Qty: 6.7 G | Refills: 11 | Status: SHIPPED | OUTPATIENT
Start: 2017-08-02 | End: 2022-11-10 | Stop reason: SDUPTHER

## 2017-08-02 RX ORDER — OXYBUTYNIN CHLORIDE 10 MG/1
10 TABLET, EXTENDED RELEASE ORAL DAILY
Qty: 30 TABLET | Refills: 11 | Status: SHIPPED | OUTPATIENT
Start: 2017-08-02 | End: 2018-09-04 | Stop reason: SDUPTHER

## 2017-08-02 RX ORDER — HYOSCYAMINE SULFATE 0.12 MG/1
0.12 TABLET SUBLINGUAL EVERY 6 HOURS PRN
Qty: 90 TABLET | Refills: 11 | Status: SHIPPED | OUTPATIENT
Start: 2017-08-02 | End: 2018-09-04 | Stop reason: SDUPTHER

## 2017-08-02 NOTE — PROGRESS NOTES
Pneumovax vaccine given as ordered. Two patient identifiers used, allergies reviewed, consent signed, & vaccine verified. Administered to left deltoid. Pt tolerated injection well; no swelling, redness, or bruising noted at injection site. Pt advised to remain in clinic 15 mins following injection for observation.

## 2017-08-02 NOTE — PROGRESS NOTES
Subjective:       Patient ID: Jackelyn Kendrick is a 66 y.o. female.    Chief Complaint: Annual Exam    Disclaimer: This note has been generated using voice-recognition software. There may be typographical errors that have been missed during proof-reading    65 yo presents for routine exam, last exam one year ago  Immunizations:  Needs Pneumovax  Colonoscopy:  2014, diverticulosis  BMD:  2015, normal repeat 4-5 years      Review of Systems   Constitutional: Negative.  Negative for activity change, appetite change, chills, diaphoresis, fatigue, fever and unexpected weight change.   HENT: Negative.  Negative for congestion, ear pain, hearing loss, rhinorrhea, sinus pressure, sore throat, tinnitus, trouble swallowing and voice change.    Eyes: Negative.  Negative for photophobia, pain and visual disturbance.   Respiratory: Negative.  Negative for cough, chest tightness and shortness of breath.    Cardiovascular: Negative.  Negative for chest pain, palpitations and leg swelling.   Gastrointestinal: Negative.  Negative for abdominal distention, abdominal pain, blood in stool, constipation, diarrhea, nausea and vomiting.   Genitourinary: Negative.  Negative for difficulty urinating, dysuria, frequency and hematuria.   Musculoskeletal: Negative.  Negative for arthralgias, back pain, joint swelling, neck pain and neck stiffness.   Skin: Negative for color change, pallor and rash.   Neurological: Negative.  Negative for dizziness, tremors, speech difficulty, weakness, light-headedness, numbness and headaches.   Hematological: Negative for adenopathy. Does not bruise/bleed easily.   Psychiatric/Behavioral: Negative for agitation, confusion, dysphoric mood, hallucinations and sleep disturbance. The patient is not nervous/anxious.        Objective:      Physical Exam   Constitutional: She is oriented to person, place, and time. She appears well-developed and well-nourished.   HENT:   Right Ear: Tympanic membrane and external  ear normal.   Left Ear: Tympanic membrane and external ear normal.   Nose: Nose normal.   Mouth/Throat: Oropharynx is clear and moist.   Eyes: Conjunctivae and EOM are normal. Pupils are equal, round, and reactive to light.   Neck: Normal range of motion. Neck supple. No tracheal deviation present. No thyromegaly present.   Cardiovascular: Normal rate, regular rhythm, normal heart sounds and intact distal pulses.    Pulmonary/Chest: Effort normal. She has no wheezes. She has no rales. She exhibits no tenderness.   Abdominal: Soft. Bowel sounds are normal. She exhibits no distension and no mass. There is no rebound.   Genitourinary: No vaginal discharge found.   Genitourinary Comments: Breasts show no masses or nipple retraction, no axillary adenopathy     Musculoskeletal: Normal range of motion. She exhibits no edema or tenderness.   Lymphadenopathy:     She has no cervical adenopathy.   Neurological: She is alert and oriented to person, place, and time. She has normal reflexes. No cranial nerve deficit. Coordination normal.   Skin: Skin is warm and dry. No rash noted. No erythema.   Psychiatric: She has a normal mood and affect. Her behavior is normal.       Assessment:       1. Routine general medical examination at a health care facility    2. Bronchitis    3. Screening mammogram for high-risk patient    4. Preventive measure        Plan:       1.  Labs reviewed with pt  2.  Continue present medications  3.  UA, Pneumovax

## 2017-08-04 RX ORDER — ROSUVASTATIN CALCIUM 40 MG/1
40 TABLET, COATED ORAL DAILY
Qty: 90 TABLET | Refills: 3 | Status: SHIPPED | OUTPATIENT
Start: 2017-08-04 | End: 2018-11-01 | Stop reason: SDUPTHER

## 2017-08-10 ENCOUNTER — HOSPITAL ENCOUNTER (OUTPATIENT)
Dept: RADIOLOGY | Facility: HOSPITAL | Age: 67
Discharge: HOME | End: 2017-08-10
Attending: FAMILY MEDICINE
Payer: MEDICARE

## 2017-08-10 DIAGNOSIS — Z12.31 SCREENING MAMMOGRAM FOR HIGH-RISK PATIENT: ICD-10-CM

## 2017-08-10 PROCEDURE — 77067 SCR MAMMO BI INCL CAD: CPT | Mod: 26,,, | Performed by: RADIOLOGY

## 2017-08-10 PROCEDURE — 77067 SCR MAMMO BI INCL CAD: CPT | Mod: TC

## 2017-08-10 PROCEDURE — 77063 BREAST TOMOSYNTHESIS BI: CPT | Mod: 26,,, | Performed by: RADIOLOGY

## 2017-09-04 DIAGNOSIS — L21.9 SEBORRHEIC DERMATITIS: ICD-10-CM

## 2017-09-05 RX ORDER — MELOXICAM 7.5 MG/1
TABLET ORAL
Qty: 60 TABLET | Refills: 11 | Status: SHIPPED | OUTPATIENT
Start: 2017-09-05 | End: 2017-09-19 | Stop reason: SDUPTHER

## 2017-09-06 RX ORDER — FLUOCINONIDE TOPICAL SOLUTION USP, 0.05% 0.5 MG/ML
SOLUTION TOPICAL
Qty: 60 ML | Refills: 3 | Status: SHIPPED | OUTPATIENT
Start: 2017-09-06 | End: 2021-03-23

## 2017-09-11 ENCOUNTER — TELEPHONE (OUTPATIENT)
Dept: OPTOMETRY | Facility: CLINIC | Age: 67
End: 2017-09-11

## 2017-09-11 DIAGNOSIS — H04.123 DRY EYES, BILATERAL: Primary | ICD-10-CM

## 2017-09-11 RX ORDER — CYCLOSPORINE 0.5 MG/ML
1 EMULSION OPHTHALMIC 2 TIMES DAILY
Qty: 60 EACH | Refills: 12 | Status: SHIPPED | OUTPATIENT
Start: 2017-09-11 | End: 2019-10-29 | Stop reason: SDUPTHER

## 2017-09-11 NOTE — TELEPHONE ENCOUNTER
----- Message from Viviana Wadsworth sent at 9/11/2017 10:49 AM CDT -----  Contact: Jackelyn Kendrick   The pharmacy Ms [facility] would like to speak with  nurse about the eye drop prescription ,she can be reached at [phone] please thanks.

## 2017-09-11 NOTE — TELEPHONE ENCOUNTER
----- Message from Katy Xiong sent at 9/11/2017 11:08 AM CDT -----  Contact: Jackelyn Kendrick   Pt is requesting Rx for restasis to [facility]   ----- Message -----  From: Viviana Wadsworth  Sent: 9/11/2017  10:49 AM  To: [facility] Staff    The pharmacy Ms [facility] would like to speak with  nurse about the eye drop prescription ,she can be reached at [phone] please thanks.

## 2017-09-19 ENCOUNTER — OFFICE VISIT (OUTPATIENT)
Dept: FAMILY MEDICINE | Facility: CLINIC | Age: 67
End: 2017-09-19
Payer: MEDICARE

## 2017-09-19 VITALS
HEART RATE: 76 BPM | DIASTOLIC BLOOD PRESSURE: 62 MMHG | HEIGHT: 61 IN | WEIGHT: 142.88 LBS | SYSTOLIC BLOOD PRESSURE: 137 MMHG | TEMPERATURE: 99 F | BODY MASS INDEX: 26.98 KG/M2

## 2017-09-19 DIAGNOSIS — R06.5 MOUTH BREATHING: ICD-10-CM

## 2017-09-19 DIAGNOSIS — R05.9 COUGH: Primary | ICD-10-CM

## 2017-09-19 DIAGNOSIS — R68.2 DRY MOUTH: ICD-10-CM

## 2017-09-19 DIAGNOSIS — J06.9 UPPER RESPIRATORY TRACT INFECTION, UNSPECIFIED TYPE: ICD-10-CM

## 2017-09-19 PROCEDURE — 99213 OFFICE O/P EST LOW 20 MIN: CPT | Mod: PBBFAC,PO | Performed by: FAMILY MEDICINE

## 2017-09-19 PROCEDURE — 1125F AMNT PAIN NOTED PAIN PRSNT: CPT | Mod: ,,, | Performed by: FAMILY MEDICINE

## 2017-09-19 PROCEDURE — 3078F DIAST BP <80 MM HG: CPT | Mod: ,,, | Performed by: FAMILY MEDICINE

## 2017-09-19 PROCEDURE — 3075F SYST BP GE 130 - 139MM HG: CPT | Mod: ,,, | Performed by: FAMILY MEDICINE

## 2017-09-19 PROCEDURE — 1159F MED LIST DOCD IN RCRD: CPT | Mod: ,,, | Performed by: FAMILY MEDICINE

## 2017-09-19 PROCEDURE — 99214 OFFICE O/P EST MOD 30 MIN: CPT | Mod: S$PBB,,, | Performed by: FAMILY MEDICINE

## 2017-09-19 PROCEDURE — 99999 PR PBB SHADOW E&M-EST. PATIENT-LVL III: CPT | Mod: PBBFAC,,, | Performed by: FAMILY MEDICINE

## 2017-09-19 RX ORDER — CODEINE PHOSPHATE AND GUAIFENESIN 10; 100 MG/5ML; MG/5ML
5 SOLUTION ORAL EVERY 8 HOURS PRN
Qty: 240 ML | Refills: 0 | Status: SHIPPED | OUTPATIENT
Start: 2017-09-19 | End: 2017-09-29

## 2017-09-19 RX ORDER — PREDNISONE 20 MG/1
TABLET ORAL
Qty: 3 TABLET | Refills: 0 | Status: SHIPPED | OUTPATIENT
Start: 2017-09-19 | End: 2017-09-24

## 2017-09-19 NOTE — PROGRESS NOTES
Subjective:      Patient ID: Jackelyn Kendrick is a 66 y.o. female.    Chief Complaint: Cough; Chest Congestion (green mucus); and Nasal Congestion    Here today for Presents today  with 10days sore throat, nonproductive cough, post nasal drip . Cough Worse at night & she has increased PPI.  No fever , nausea, vomiting, diarrhea. Denies wheezing , shortness of breath. Nyquil, Mucinex , albuterol not helping much.  She cannot breathe in and out of her nose, has pressure in her ears, she is mainly mouth breathing.  Not sleeping much.  Decreased appetite. tx for bronchitis last year. In July received Pneumonia vaccine.       No results found for: HGBA1C  No results found for: MICALBCREAT      Current Outpatient Prescriptions on File Prior to Visit   Medication Sig    acetic acid-hydrocortisone (VOSOL-HC) otic solution 2-4 drops to affected ear twice a day    albuterol 90 mcg/actuation inhaler Inhale 2 puffs into the lungs every 6 (six) hours as needed for Wheezing.    cetirizine (ZYRTEC) 10 MG tablet Take 1 tablet (10 mg total) by mouth once daily.    cycloSPORINE (RESTASIS) 0.05 % ophthalmic emulsion Place 0.4 mLs (1 drop total) into both eyes 2 (two) times daily.    docusate sodium (COLACE) 100 MG capsule Take 1 capsule (100 mg total) by mouth 2 (two) times daily. Available OTC as well    econazole nitrate 1 % cream Apply topically 2 (two) times daily. Qd- bid for ears. Ok for maintenance    fluocinonide (LIDEX) 0.05 % external solution APPLY TO SCALP ONCE DAILY AS NEEDED FOR FLARE    hyoscyamine (LEVSIN/SL) 0.125 mg Subl Take 1 tablet (0.125 mg total) by mouth every 6 (six) hours as needed.    ketoconazole (NIZORAL) 2 % shampoo Apply topically every other day. Apply to damp scalp, lather, and let sit 5 minutes before rinsing    levothyroxine (SYNTHROID) 75 MCG tablet Take 1 tablet (75 mcg total) by mouth once daily.    meloxicam (MOBIC) 7.5 MG tablet Take 7.5 mg by mouth once daily.    Multi-Vitamin  "tablet Take by mouth.  Tablet Oral     OMEGA-3S/DHA/EPA/FISH OIL (OMEGA 3 ORAL)     omeprazole (PRILOSEC) 40 MG capsule Take 1 capsule (40 mg total) by mouth once daily. Capsule, Delayed Release(E.C.) Oral .  take one tablet daily    PSYLLIUM SEED, WITH SUGAR, (METAMUCIL ORAL) Take by mouth.    rosuvastatin (CRESTOR) 40 MG Tab Take 1 tablet (40 mg total) by mouth once daily.    amlodipine (NORVASC) 2.5 MG tablet Take 1 tablet (2.5 mg total) by mouth once daily.    oxybutynin (DITROPAN-XL) 10 MG 24 hr tablet Take 1 tablet (10 mg total) by mouth once daily.     No current facility-administered medications on file prior to visit.      Past Medical History:   Diagnosis Date    Bronchitis     seasonal    Cataract     Diverticulitis     Dry eye syndrome     GERD (gastroesophageal reflux disease)     Hyperlipidemia     Hypertension     Obese     PONV (postoperative nausea and vomiting)     Thyroid disease      Past Surgical History:   Procedure Laterality Date    BACK SURGERY      CHOLECYSTECTOMY      COLONOSCOPY  2007    diverticulosis    DE QUERVAIN'S RELEASE Left 03/2017    HERNIA REPAIR      HYSTERECTOMY      NASAL SEPTUM SURGERY      SHOULDER SURGERY      TONSILLECTOMY       Social History     Social History Narrative    , 3 children, nonsmoker ,     Family History   Problem Relation Age of Onset    Cataracts Mother     Breast cancer Mother     Diabetes Mother     Hypertension Mother     Heart failure Mother     Cataracts Father     Hypertension Father     Amblyopia Neg Hx     Blindness Neg Hx     Glaucoma Neg Hx     Macular degeneration Neg Hx     Retinal detachment Neg Hx     Strabismus Neg Hx     Ovarian cancer Neg Hx      Vitals:    09/19/17 1055   BP: 137/62   Pulse: 76   Temp: 98.6 °F (37 °C)   Weight: 64.8 kg (142 lb 13.7 oz)   Height: 5' 1" (1.549 m)   PainSc:   1     Objective:   Physical Exam   Constitutional: She appears well-developed and well-nourished. She " "appears distressed.   HENT:   Right Ear: External ear normal. Tympanic membrane is erythematous.   Left Ear: Tympanic membrane and external ear normal.   Nose: Mucosal edema and rhinorrhea present. Right sinus exhibits no maxillary sinus tenderness and no frontal sinus tenderness. Left sinus exhibits no maxillary sinus tenderness and no frontal sinus tenderness.   Mouth/Throat: Mucous membranes are normal. Posterior oropharyngeal erythema present. No oropharyngeal exudate.   Eyes: EOM are normal. Pupils are equal, round, and reactive to light.   Neck: Normal range of motion. Neck supple. No thyromegaly present.   Cardiovascular: Normal rate, regular rhythm and normal heart sounds.    No murmur heard.  Pulmonary/Chest: Effort normal and breath sounds normal. She has no wheezes. She has no rales.   Lymphadenopathy:     She has no cervical adenopathy.   Skin: Skin is warm and dry.   Nursing note and vitals reviewed.    Assessment:     1. Cough    2. Upper respiratory tract infection, unspecified type    3. Dry mouth    4. Mouth breathing      Plan:     Orders Placed This Encounter    predniSONE (DELTASONE) 20 MG tablet    guaifenesin-codeine 100-10 mg/5 ml (TUSSI-ORGANIDIN NR)  mg/5 mL syrup       Patient Instructions          An UPPER RESPIRATORY ILLNESS is initially caused by a virus or allergies 95% of the time. The goal to help you feel better is drying up the drainage & stopping the cough so the virus can run it's course in about 10 days. If your drainage becomes more thick and worse after 7-10 days of trying the below  over the counter medications, please make an appointment for further evaluation    If you have post nasal drip , "runny nose" or sore throat from clearing your throat :  Take an ANTIHISTAMINE  (Claritin or Zyrtec or Allegra ) AT NIGHT    Nasal Saline: Tilt head back and squirt into nostril 2-3 times until you taste saline in back of throat. Spit, and blow nose. Do this 4 times, alternating " "right and left nostril.   Do this routine 2-3 times per day.     Nasacort Nasal spray (steroid spray over the counter) or Flonase (fluticasone prescription)  Twice daily to decrease swelling & post nasal drip ------ very safe , works where the congestion is , & does not interfere with other medication or go through your blood stream    If you still have drainage or ear popping/ pressure/ decreased hearing, and you do NOT have high blood pressure:  You can add a DECONGESTANT like Sudafed (if it doesn't cause palpitations) or Sudafed PE  In the MORNING.     If you have thick mucus drainage from the nose or coughing up thick phlegm:  You can add a MUCOLYTIC like Mucinex (plain)    If your cough persist:  You can add a COUGH SUPPRESSANT like Delsym (dextromethorphan) twice a day    If you have pain, sore throat, fever or chills:  You can alternate 250 mg of  Tylenol with 200mg of   ibuprofen every 3 hours - for the next 3 days  Discontinue and call me if  you have stomach upset    For SORE THROAT , try: Gargle with warm salt water 2-3 times per day.     Drink lots of WATER until your urine is clear because all of the above medications can "dry you out" and cause constipation.     Come & see me  if you are not better in 7-10 days or if your symptoms worsen.                                "

## 2017-09-19 NOTE — MEDICAL/APP STUDENT
"Subjective:       Patient ID: Jackelyn Kendrick is a 66 y.o. female.  Here complaining of URTI symptoms for 1.5 weeks. She is complaining of sore throat, rhinorrhea, and productive cough (green sputum). Symptoms are worse at night and have not been helped by nyquil, mucinex, saline nasal spray or cough drops. Sxs not associated with fever, chills, nausea, weakness, appetite change, AMS, allergies, change in reflux symptoms or chest pain.    Pt has significant PMH of bronchitis last year, and is concerned about spreading pathogen to father or patients in the hospital.    Chief Complaint: Cough; Chest Congestion (green mucus); and Nasal Congestion      Review of Systems   Constitutional: Negative for chills, diaphoresis, fatigue and fever.   HENT: Positive for congestion, rhinorrhea, sinus pressure and sore throat. Negative for facial swelling, hearing loss and sinus pain.    Respiratory: Positive for cough. Negative for chest tightness, shortness of breath, wheezing and stridor.    Cardiovascular: Negative for chest pain and palpitations.   Gastrointestinal: Negative for abdominal pain, nausea and vomiting.        No increased gerd sx's.         Objective:      Vitals:    09/19/17 1055   BP: 137/62   BP Location: Right arm   Pulse: 76   Temp: 98.6 °F (37 °C)   Weight: 64.8 kg (142 lb 13.7 oz)   Height: 5' 1" (1.549 m)     Body mass index is 26.99 kg/m².  Physical Exam   Constitutional: No distress.   HENT:   Right Ear: External ear normal.   Left Ear: External ear normal.   Mouth/Throat: No oropharyngeal exudate.   Eyes: Conjunctivae are normal.   Neck: Normal range of motion.   Cardiovascular: Normal rate, regular rhythm and normal heart sounds.  Exam reveals no gallop and no friction rub.    No murmur heard.  Pulmonary/Chest: Effort normal and breath sounds normal. No stridor. No respiratory distress. She has no wheezes. She has no rales.   Rhinorrhea and nasal congestion causing obstruction of nasal airway. "   Lymphadenopathy:     She has no cervical adenopathy.   Skin: She is not diaphoretic.       Assessment:     Upper respiratory tract infection w/ rhinitis      Plan:     URTI: at this point, we are not concerned for LRTI, as the pt is not hypoxic, and has normal pulmonary auscultation. Abx are not currently indicated, as pt is afebrile, not hypoxic, and not exhibiting other signs of bacterial infection.    Rx: for nasal symptoms, we will try 3 days PO steroids, as pt has failed nasal irrigation and nasacourt spray. Continue with mucinex, add robitussin w/codeine, and take care to practice good droplet precautions/ hygiene to avoid spread of disease.

## 2017-10-02 DIAGNOSIS — L21.9 SEBORRHEIC DERMATITIS: ICD-10-CM

## 2017-10-02 RX ORDER — KETOCONAZOLE 20 MG/ML
SHAMPOO, SUSPENSION TOPICAL
Qty: 120 ML | Refills: 5 | Status: SHIPPED | OUTPATIENT
Start: 2017-10-02 | End: 2021-03-23 | Stop reason: SDUPTHER

## 2017-12-08 ENCOUNTER — OFFICE VISIT (OUTPATIENT)
Dept: FAMILY MEDICINE | Facility: CLINIC | Age: 67
End: 2017-12-08
Payer: MEDICARE

## 2017-12-08 VITALS
HEART RATE: 71 BPM | BODY MASS INDEX: 27.6 KG/M2 | SYSTOLIC BLOOD PRESSURE: 137 MMHG | HEIGHT: 61 IN | TEMPERATURE: 99 F | WEIGHT: 146.19 LBS | DIASTOLIC BLOOD PRESSURE: 67 MMHG

## 2017-12-08 DIAGNOSIS — J32.9 BACTERIAL SINUSITIS: Primary | ICD-10-CM

## 2017-12-08 DIAGNOSIS — J98.01 BRONCHOSPASM: ICD-10-CM

## 2017-12-08 DIAGNOSIS — K21.9 GASTROESOPHAGEAL REFLUX DISEASE, ESOPHAGITIS PRESENCE NOT SPECIFIED: ICD-10-CM

## 2017-12-08 DIAGNOSIS — B96.89 BACTERIAL SINUSITIS: Primary | ICD-10-CM

## 2017-12-08 PROCEDURE — 99214 OFFICE O/P EST MOD 30 MIN: CPT | Mod: S$PBB,,, | Performed by: INTERNAL MEDICINE

## 2017-12-08 PROCEDURE — 99213 OFFICE O/P EST LOW 20 MIN: CPT | Mod: PBBFAC,PO | Performed by: INTERNAL MEDICINE

## 2017-12-08 PROCEDURE — 99999 PR PBB SHADOW E&M-EST. PATIENT-LVL III: CPT | Mod: PBBFAC,,, | Performed by: INTERNAL MEDICINE

## 2017-12-08 RX ORDER — PREDNISONE 20 MG/1
TABLET ORAL
Qty: 10 TABLET | Refills: 0 | Status: SHIPPED | OUTPATIENT
Start: 2017-12-08 | End: 2019-05-06 | Stop reason: ALTCHOICE

## 2017-12-08 RX ORDER — AMOXICILLIN AND CLAVULANATE POTASSIUM 875; 125 MG/1; MG/1
1 TABLET, FILM COATED ORAL 2 TIMES DAILY
Qty: 20 TABLET | Refills: 0 | Status: SHIPPED | OUTPATIENT
Start: 2017-12-08 | End: 2017-12-18

## 2017-12-08 RX ORDER — PROMETHAZINE HYDROCHLORIDE AND CODEINE PHOSPHATE 6.25; 1 MG/5ML; MG/5ML
5 SOLUTION ORAL EVERY 4 HOURS PRN
Qty: 120 ML | Refills: 0 | Status: SHIPPED | OUTPATIENT
Start: 2017-12-08 | End: 2017-12-18

## 2017-12-08 NOTE — PATIENT INSTRUCTIONS
Add zantac 150 mg at bedtime or evening    Take prilosec 30 min before breakfast.    Prednisone 20 mg 2 tabs daily for 5 days    Aluberol inhaler 2 puffs 4 times a day    Cough syrup.    Restart nasacort when able.

## 2017-12-08 NOTE — PROGRESS NOTES
Subjective:       Patient ID: Jackelyn Kendrick is a 66 y.o. female.    Chief Complaint: Cough    HPI   She was seen in September for cough, but never resolved completely after steroids and codeine cough syrup.  Cough recurs with coughing.  Coughs up green and yellow sputum.  Congested in sinuses.  Neck pain, but no facial pain.  No fever.  Nasal congestion.  Some wheezing.  No sob.  She has an oral inhaler but doesn't use it.  H/o pneumonia.  Nasal congestion prevents use of nasal sprays.    She does have Gerd, for which she takes omeprazole 40 mg daily.     Review of Systems   Constitutional: Negative for fever and unexpected weight change.   HENT: Negative for congestion and postnasal drip.    Eyes: Negative for pain, discharge and visual disturbance.   Respiratory: Negative for cough, chest tightness, shortness of breath and wheezing.    Cardiovascular: Negative for chest pain and leg swelling.   Gastrointestinal: Negative for abdominal pain, constipation, diarrhea and nausea.   Genitourinary: Negative for difficulty urinating, dysuria and hematuria.   Skin: Negative for rash.   Neurological: Negative for headaches.   Psychiatric/Behavioral: Negative for dysphoric mood and sleep disturbance. The patient is not nervous/anxious.        Objective:      Physical Exam   Constitutional: She is oriented to person, place, and time. She appears well-developed and well-nourished. No distress.   HENT:   Head: Normocephalic and atraumatic.   Mouth/Throat: Oropharynx is clear and moist. No oropharyngeal exudate.   Tm's clear   Neck: Neck supple.   Cardiovascular: Normal rate and regular rhythm.    Pulmonary/Chest: Effort normal. No respiratory distress. She has wheezes (with forced expiration only). She has no rales.   Frequent coughing - bronchial with obvious bronchospasm   Lymphadenopathy:     She has no cervical adenopathy.   Neurological: She is alert and oriented to person, place, and time.   Psychiatric: She has a  normal mood and affect. Her behavior is normal.       Assessment:       1. Bacterial sinusitis    2. Bronchospasm    3. Gastroesophageal reflux disease, esophagitis presence not specified        Plan:       Jackelyn was seen today for cough.    Diagnoses and all orders for this visit:    Bacterial sinusitis    Bronchospasm    Gastroesophageal reflux disease, esophagitis presence not specified    Other orders  -     amoxicillin-clavulanate 875-125mg (AUGMENTIN) 875-125 mg per tablet; Take 1 tablet by mouth 2 (two) times daily.  -     predniSONE (DELTASONE) 20 MG tablet; 2 tabs po q day for 5 days  -     promethazine-codeine 6.25-10 mg/5 ml (PHENERGAN WITH CODEINE) 6.25-10 mg/5 mL syrup; Take 5 mLs by mouth every 4 (four) hours as needed for Cough.  -     ranitidine (ZANTAC) 150 MG tablet; 1 tab po q pm       Add zantac 150 mg at bedtime or evening    Take prilosec 30 min before breakfast.    Prednisone 20 mg 2 tabs daily for 5 days    Aluberol inhaler 2 puffs 4 times a day    Restart nasacort when able.

## 2018-02-02 ENCOUNTER — OFFICE VISIT (OUTPATIENT)
Dept: OPTOMETRY | Facility: CLINIC | Age: 68
End: 2018-02-02
Payer: MEDICARE

## 2018-02-02 DIAGNOSIS — H04.123 DRY EYES, BILATERAL: Primary | ICD-10-CM

## 2018-02-02 DIAGNOSIS — H52.4 HYPEROPIA WITH PRESBYOPIA, BILATERAL: ICD-10-CM

## 2018-02-02 DIAGNOSIS — H25.13 NUCLEAR SCLEROSIS, BILATERAL: ICD-10-CM

## 2018-02-02 DIAGNOSIS — H52.03 HYPEROPIA WITH PRESBYOPIA, BILATERAL: ICD-10-CM

## 2018-02-02 DIAGNOSIS — Z13.5 SCREENING FOR GLAUCOMA: ICD-10-CM

## 2018-02-02 PROCEDURE — 92014 COMPRE OPH EXAM EST PT 1/>: CPT | Mod: S$PBB,,, | Performed by: OPTOMETRIST

## 2018-02-02 PROCEDURE — 92015 DETERMINE REFRACTIVE STATE: CPT | Mod: ,,, | Performed by: OPTOMETRIST

## 2018-02-02 PROCEDURE — 99212 OFFICE O/P EST SF 10 MIN: CPT | Mod: PBBFAC,PO | Performed by: OPTOMETRIST

## 2018-02-02 PROCEDURE — 99999 PR PBB SHADOW E&M-EST. PATIENT-LVL II: CPT | Mod: PBBFAC,,, | Performed by: OPTOMETRIST

## 2018-02-02 RX ORDER — NITROFURANTOIN 25; 75 MG/1; MG/1
CAPSULE ORAL
COMMUNITY
Start: 2018-01-29 | End: 2018-05-07

## 2018-02-02 RX ORDER — AMLODIPINE BESYLATE 2.5 MG/1
TABLET ORAL
COMMUNITY
Start: 2018-01-02 | End: 2018-08-02 | Stop reason: SDUPTHER

## 2018-02-02 NOTE — PROGRESS NOTES
HPI     DLS: 1/26/2017  Pt states no vision changes since last visit. +Dry eyes--on RESTASIS and   ATs  Denies f/f    Soothe ou PRN  Restasis ou BID    CAT OU    Last edited by Matthias Gutierrez, OD on 2/2/2018 11:21 AM. (History)        ROS     Negative for: Constitutional, Gastrointestinal, Neurological, Skin,   Genitourinary, Musculoskeletal, HENT, Endocrine, Cardiovascular, Eyes,   Respiratory, Psychiatric, Allergic/Imm, Heme/Lymph    Last edited by Matthias Gutierrez, OD on 2/2/2018 11:21 AM. (History)        Assessment /Plan     For exam results, see Encounter Report.    Dry eyes, bilateral    Nuclear sclerosis, bilateral    Screening for glaucoma    Hyperopia with presbyopia, bilateral          1. Cat ou--pt happy w spex  2. OCTAVIO (sp punctal plugs LL OU). Pt to cont w RESTASIS BID/ATs as needed       Plan:    rtc 1 yr

## 2018-04-13 ENCOUNTER — TELEPHONE (OUTPATIENT)
Dept: FAMILY MEDICINE | Facility: CLINIC | Age: 68
End: 2018-04-13

## 2018-04-13 DIAGNOSIS — Z00.00 ANNUAL PHYSICAL EXAM: Primary | ICD-10-CM

## 2018-04-13 DIAGNOSIS — E03.9 HYPOTHYROIDISM, UNSPECIFIED TYPE: ICD-10-CM

## 2018-04-13 DIAGNOSIS — E78.5 HYPERLIPIDEMIA, UNSPECIFIED HYPERLIPIDEMIA TYPE: ICD-10-CM

## 2018-04-13 NOTE — TELEPHONE ENCOUNTER
----- Message from Tosin White sent at 4/13/2018 12:22 PM CDT -----  Contact: patient  Doctor appointment and lab have been scheduled.  Please link lab orders to the lab appointment.  Date of doctor appointment: 8-6   Physical or EP:  physical  Date of lab appointment:  8-1  Comments:

## 2018-05-07 ENCOUNTER — TELEPHONE (OUTPATIENT)
Dept: FAMILY MEDICINE | Facility: CLINIC | Age: 68
End: 2018-05-07

## 2018-05-07 ENCOUNTER — CLINICAL SUPPORT (OUTPATIENT)
Dept: FAMILY MEDICINE | Facility: CLINIC | Age: 68
End: 2018-05-07
Payer: MEDICARE

## 2018-05-07 DIAGNOSIS — R35.0 URINARY FREQUENCY: Primary | ICD-10-CM

## 2018-05-07 LAB
BILIRUB SERPL-MCNC: ABNORMAL MG/DL
BLOOD URINE, POC: ABNORMAL
COLOR, POC UA: YELLOW
GLUCOSE UR QL STRIP: NORMAL
KETONES UR QL STRIP: ABNORMAL
LEUKOCYTE ESTERASE URINE, POC: ABNORMAL
NITRITE, POC UA: ABNORMAL
PH, POC UA: 6
PROTEIN, POC: ABNORMAL
SPECIFIC GRAVITY, POC UA: 1.01
UROBILINOGEN, POC UA: NORMAL

## 2018-05-07 PROCEDURE — 81002 URINALYSIS NONAUTO W/O SCOPE: CPT | Mod: PBBFAC,PO

## 2018-05-07 RX ORDER — NITROFURANTOIN (MACROCRYSTALS) 100 MG/1
100 CAPSULE ORAL EVERY 12 HOURS
Qty: 20 CAPSULE | Refills: 0 | Status: SHIPPED | OUTPATIENT
Start: 2018-05-07 | End: 2018-05-17

## 2018-05-07 NOTE — PROGRESS NOTES
Patient here for urine dip. Dr. Area notified regarding results. Macrodantin ordered per Dr. Frank.

## 2018-05-07 NOTE — TELEPHONE ENCOUNTER
Patient had urine dip today. Dr. Frank ordered antibiotics. Patient notified regarding antibiotics and verbalizes understanding.

## 2018-05-07 NOTE — TELEPHONE ENCOUNTER
----- Message from Evette Contreras sent at 5/7/2018  8:07 AM CDT -----  Contact: self/[phone]  .1 Patient would like to get medical advice.  Symptoms (please be specific): Possible UTI  How long has patient had these symptoms: 3 to 4 days  Pharmacy name and phone#: [facility] - [place] [address] [phone] (Phone)  [phone] (Fax)  Any drug allergies: Erythromycin, Levaquin  Comments: Patient would like to get medical advice.

## 2018-05-21 ENCOUNTER — TELEPHONE (OUTPATIENT)
Dept: FAMILY MEDICINE | Facility: CLINIC | Age: 68
End: 2018-05-21

## 2018-05-21 ENCOUNTER — CLINICAL SUPPORT (OUTPATIENT)
Dept: FAMILY MEDICINE | Facility: CLINIC | Age: 68
End: 2018-05-21
Payer: MEDICARE

## 2018-05-21 DIAGNOSIS — N30.90 CYSTITIS: ICD-10-CM

## 2018-05-21 DIAGNOSIS — R35.0 URINARY FREQUENCY: Primary | ICD-10-CM

## 2018-05-21 LAB
BACTERIA #/AREA URNS AUTO: ABNORMAL /HPF
BILIRUB SERPL-MCNC: ABNORMAL MG/DL
BILIRUB UR QL STRIP: NEGATIVE
BLOOD URINE, POC: ABNORMAL
CLARITY UR REFRACT.AUTO: ABNORMAL
COLOR UR AUTO: ABNORMAL
COLOR, POC UA: YELLOW
GLUCOSE UR QL STRIP: NEGATIVE
GLUCOSE UR QL STRIP: NORMAL
HGB UR QL STRIP: NEGATIVE
HYALINE CASTS UR QL AUTO: 0 /LPF
KETONES UR QL STRIP: ABNORMAL
KETONES UR QL STRIP: NEGATIVE
LEUKOCYTE ESTERASE UR QL STRIP: ABNORMAL
LEUKOCYTE ESTERASE URINE, POC: ABNORMAL
MICROSCOPIC COMMENT: ABNORMAL
NITRITE UR QL STRIP: NEGATIVE
NITRITE, POC UA: ABNORMAL
PH UR STRIP: 5 [PH] (ref 5–8)
PH, POC UA: 5
PROT UR QL STRIP: ABNORMAL
PROTEIN, POC: ABNORMAL
RBC #/AREA URNS AUTO: 4 /HPF (ref 0–4)
SP GR UR STRIP: 1.02 (ref 1–1.03)
SPECIFIC GRAVITY, POC UA: 1.01
SQUAMOUS #/AREA URNS AUTO: 1 /HPF
URN SPEC COLLECT METH UR: ABNORMAL
UROBILINOGEN UR STRIP-ACNC: NEGATIVE EU/DL
UROBILINOGEN, POC UA: NORMAL
WBC #/AREA URNS AUTO: >100 /HPF (ref 0–5)
WBC CLUMPS UR QL AUTO: ABNORMAL

## 2018-05-21 PROCEDURE — 87086 URINE CULTURE/COLONY COUNT: CPT

## 2018-05-21 PROCEDURE — 81001 URINALYSIS AUTO W/SCOPE: CPT

## 2018-05-21 PROCEDURE — 87186 SC STD MICRODIL/AGAR DIL: CPT

## 2018-05-21 PROCEDURE — 87077 CULTURE AEROBIC IDENTIFY: CPT

## 2018-05-21 PROCEDURE — 99999 PR PBB SHADOW E&M-EST. PATIENT-LVL III: CPT | Mod: PBBFAC,,,

## 2018-05-21 PROCEDURE — 99213 OFFICE O/P EST LOW 20 MIN: CPT | Mod: PBBFAC,PO

## 2018-05-21 PROCEDURE — 87088 URINE BACTERIA CULTURE: CPT

## 2018-05-21 PROCEDURE — 81002 URINALYSIS NONAUTO W/O SCOPE: CPT | Mod: PBBFAC,PO,59

## 2018-05-21 RX ORDER — TETRACYCLINE HYDROCHLORIDE 250 MG/1
250 CAPSULE ORAL 4 TIMES DAILY
Qty: 28 CAPSULE | Refills: 0 | Status: SHIPPED | OUTPATIENT
Start: 2018-05-21 | End: 2018-06-12 | Stop reason: ALTCHOICE

## 2018-05-21 NOTE — PROGRESS NOTES
Patient here for POCT urine dip. Results given to CARI Johnson. Discussed results and instructions with patient.

## 2018-05-21 NOTE — TELEPHONE ENCOUNTER
----- Message from Pilar Anderson sent at 5/21/2018  8:06 AM CDT -----  Contact: call pt at [phone]  Patient is calling to speak with you about her UTI, states that she was given medication on 05/07/2018 for this, seems like it cleared up but now she is having symptoms again.

## 2018-05-21 NOTE — PROGRESS NOTES
Nurse came to me with dip Ua results  Pos for blood, leukocytes and nitrites  RX sent to pharmacy for tetracycline  Urology consult placed and UA/ Urine culture ordered  Erica to discuss all with pt

## 2018-05-21 NOTE — TELEPHONE ENCOUNTER
Recently treated for UTI. Reports return of symptoms. Reports cloudy urine, urinary frequency, urinary urgency and pressure. Patient to come give urine specimen.

## 2018-05-23 LAB — BACTERIA UR CULT: NORMAL

## 2018-05-24 ENCOUNTER — TELEPHONE (OUTPATIENT)
Dept: FAMILY MEDICINE | Facility: CLINIC | Age: 68
End: 2018-05-24

## 2018-05-24 NOTE — TELEPHONE ENCOUNTER
----- Message from ROBYN Butler-SARAH sent at 5/24/2018  7:41 AM CDT -----  Please call pt and tell her that she had E Coli in her urine.  This is a bacteria from her rectum.  Make sure she is wiping front o back when she goes to the restroom, drinks plenty of water.  Also let her know that the Tetracycline that we sent in will clear the infection, take all doses until complete.  Thank you

## 2018-05-31 ENCOUNTER — OFFICE VISIT (OUTPATIENT)
Dept: UROLOGY | Facility: CLINIC | Age: 68
End: 2018-05-31
Payer: MEDICARE

## 2018-05-31 VITALS
BODY MASS INDEX: 27.91 KG/M2 | DIASTOLIC BLOOD PRESSURE: 66 MMHG | HEART RATE: 72 BPM | SYSTOLIC BLOOD PRESSURE: 141 MMHG | WEIGHT: 147.69 LBS

## 2018-05-31 DIAGNOSIS — N39.0 RECURRENT UTI: Primary | ICD-10-CM

## 2018-05-31 PROCEDURE — 99213 OFFICE O/P EST LOW 20 MIN: CPT | Mod: PBBFAC | Performed by: NURSE PRACTITIONER

## 2018-05-31 PROCEDURE — 81002 URINALYSIS NONAUTO W/O SCOPE: CPT | Mod: PBBFAC | Performed by: NURSE PRACTITIONER

## 2018-05-31 PROCEDURE — 99999 PR PBB SHADOW E&M-EST. PATIENT-LVL III: CPT | Mod: PBBFAC,,, | Performed by: NURSE PRACTITIONER

## 2018-05-31 PROCEDURE — 99203 OFFICE O/P NEW LOW 30 MIN: CPT | Mod: S$PBB,,, | Performed by: NURSE PRACTITIONER

## 2018-05-31 NOTE — LETTER
May 31, 2018      Beba Johnson, FNP-C  [address] [facility]  [address]  [facility] [address]           [facility] - Urology 4th Floor  [address]  [place] [address]  Phone: [phone]          Patient: Jackelyn Kendrick   MR Number: [id number]   YOB: 1950   Date of Visit: 5/31/2018       Dear Beba Johnson:    Thank you for referring Jackelyn Kendrick to me for evaluation. Attached you will find relevant portions of my assessment and plan of care.    If you have questions, please do not hesitate to call me. I look forward to following Jackelyn Kendrick along with you.    Sincerely,    Ivory Pierce, CRAI    Enclosure  CC:  No Recipients    If you would like to receive this communication electronically, please contact [email or web address] or [phone] to request more information on [employer] access.    For providers and/or their staff who would like to refer a patient to [facility], please contact us through our one-stop-shop provider referral line, [facility], at [phone].    If you feel you have received this communication in error or would no longer like to receive these types of communications, please e-mail [email or web address]

## 2018-05-31 NOTE — PROGRESS NOTES
Subjective:       Patient ID: Jackelyn Kendrick is a 67 y.o. female.    Chief Complaint: Urinary Tract Infection (finished abx given by [facility] med)      HPI: Jackelyn Kendrick is a 67 y.o. White female who presents today for evaluation and management of recurrent UTI's. She is an established patient with [facility] but a new patient to me. Her last clinic visit was with Marta Rodriguez NP 12/2/14.    Today she presents to clinic for recurrent UTI's. She reports she has one documented UC but the other two UTI's she had this year were treated by GYN based on symptoms only and no UC completed. Her symptoms associated with UTI include urgency, frequency with decreased amounts, bladder pressure, and nocturia x 10. Denies f/c/n/v, dysuria, hematuria or flank pain associated with UTI.  Her baseline urination includes nocturia 2-3 x per night. Denies urgency, daytime frequency, urinary incontinence, dysuria, flank pain, bladder pain or pressure, or hematuria. She takes oxybutynin for OAB. She feels she empties her bladder. Denies f/c/n/v.  She does have leg swelling and does not elevate her legs prior to bed. She has decreased water intake and minimal caffeine intake. She has IBS both constipation and diarrhea and episodes of stool incontinence at times.   Denies vaginal dryness.    Urine culture 5/21/18:  ESCHERICHIA COLI >100,000 cfu/ml    Review of patient's allergies indicates:   Allergen Reactions    Erythromycin (bulk) Nausea And Vomiting    Levaquin [levofloxacin]      Other reaction(s): Swelling       Current Outpatient Prescriptions   Medication Sig Dispense Refill    acetic acid-hydrocortisone (VOSOL-HC) otic solution 2-4 drops to affected ear twice a day 10 mL 2    albuterol 90 mcg/actuation inhaler Inhale 2 puffs into the lungs every 6 (six) hours as needed for Wheezing. 6.7 g 11    amLODIPine (NORVASC) 2.5 MG tablet       cetirizine (ZYRTEC) 10 MG tablet Take 1 tablet (10 mg total) by mouth once daily.  0     cycloSPORINE (RESTASIS) 0.05 % ophthalmic emulsion Place 0.4 mLs (1 drop total) into both eyes 2 (two) times daily. 60 each 12    docusate sodium (COLACE) 100 MG capsule Take 1 capsule (100 mg total) by mouth 2 (two) times daily. Available OTC as well 60 capsule 0    econazole nitrate 1 % cream Apply topically 2 (two) times daily. Qd- bid for ears. Ok for maintenance 60 g 1    fluocinonide (LIDEX) 0.05 % external solution APPLY TO SCALP ONCE DAILY AS NEEDED FOR FLARE 60 mL 3    hyoscyamine (LEVSIN/SL) 0.125 mg Subl Take 1 tablet (0.125 mg total) by mouth every 6 (six) hours as needed. 90 tablet 11    ketoconazole (NIZORAL) 2 % shampoo APPLY TO DAMP SCALP EVERY OTHER DAY, LATHER AND LET SIT 5 MINUTES BEFORE RINSING 120 mL 5    levothyroxine (SYNTHROID) 75 MCG tablet Take 1 tablet (75 mcg total) by mouth once daily. 30 tablet 11    meloxicam (MOBIC) 7.5 MG tablet Take 7.5 mg by mouth once daily.      Multi-Vitamin tablet Take by mouth.  Tablet Oral       OMEGA-3S/DHA/EPA/FISH OIL (OMEGA 3 ORAL)       omeprazole (PRILOSEC) 40 MG capsule Take 1 capsule (40 mg total) by mouth once daily. Capsule, Delayed Release(E.C.) Oral .  take one tablet daily 30 capsule 11    oxybutynin (DITROPAN-XL) 10 MG 24 hr tablet Take 1 tablet (10 mg total) by mouth once daily. 30 tablet 11    predniSONE (DELTASONE) 20 MG tablet 2 tabs po q day for 5 days 10 tablet 0    PSYLLIUM SEED, WITH SUGAR, (METAMUCIL ORAL) Take by mouth.      ranitidine (ZANTAC) 150 MG tablet 1 tab po q pm 30 tablet 5    rosuvastatin (CRESTOR) 40 MG Tab Take 1 tablet (40 mg total) by mouth once daily. 90 tablet 3    tetracycline (ACHROMYCIN,SUMYCIN) 250 MG capsule Take 1 capsule (250 mg total) by mouth 4 (four) times daily. 28 capsule 0     No current facility-administered medications for this visit.        Past Medical History:   Diagnosis Date    Bronchitis     seasonal    Cataract     Diverticulitis     Dry eye syndrome     GERD (gastroesophageal  reflux disease)     Hyperlipidemia     Hypertension     Obese     PONV (postoperative nausea and vomiting)     Thyroid disease        Past Surgical History:   Procedure Laterality Date    BACK SURGERY      CHOLECYSTECTOMY      COLONOSCOPY  2007    diverticulosis    DE QUERVAIN'S RELEASE Left 03/2017    HERNIA REPAIR      HYSTERECTOMY      NASAL SEPTUM SURGERY      SHOULDER SURGERY      TONSILLECTOMY         Family History   Problem Relation Age of Onset    Cataracts Mother     Breast cancer Mother     Diabetes Mother     Hypertension Mother     Heart failure Mother     Cataracts Father     Hypertension Father     Amblyopia Neg Hx     Blindness Neg Hx     Glaucoma Neg Hx     Macular degeneration Neg Hx     Retinal detachment Neg Hx     Strabismus Neg Hx     Ovarian cancer Neg Hx        Review of Systems   Constitutional: Negative for chills, diaphoresis, fatigue and fever.   HENT: Negative for congestion and trouble swallowing.    Eyes: Negative for visual disturbance.   Respiratory: Negative for chest tightness and shortness of breath.    Cardiovascular: Negative for chest pain and palpitations.   Gastrointestinal: Positive for constipation and diarrhea. Negative for abdominal pain, nausea and vomiting.   Genitourinary: Negative for decreased urine volume, difficulty urinating, dysuria, enuresis, flank pain, frequency, hematuria and urgency.        See HPI   Musculoskeletal: Negative for back pain and gait problem.   Skin: Negative for pallor and rash.   Allergic/Immunologic: Negative for immunocompromised state.   Neurological: Negative for dizziness, seizures, syncope, weakness, light-headedness and headaches.   Hematological: Negative for adenopathy.   Psychiatric/Behavioral: Negative for confusion. The patient is not nervous/anxious.          All other systems were reviewed and were negative.    Objective:     Vitals:    05/31/18 0954   BP: (!) 141/66   Pulse: 72        Physical Exam    Nursing note and vitals reviewed.  Constitutional: She is oriented to person, place, and time. She appears well-developed and well-nourished.  Non-toxic appearance. She does not have a sickly appearance. She does not appear ill. No distress.   HENT:   Head: Normocephalic and atraumatic.   Eyes: Conjunctivae and EOM are normal.   Neck: Normal range of motion.   Cardiovascular: Normal rate and regular rhythm.    Pulmonary/Chest: Effort normal. No respiratory distress.   Abdominal: Soft. She exhibits no distension. There is no CVA tenderness.   Musculoskeletal: Normal range of motion.   Neurological: She is alert and oriented to person, place, and time.   Skin: Skin is warm and dry.     Psychiatric: She has a normal mood and affect. Her behavior is normal. Judgment and thought content normal.         Lab Results   Component Value Date    CREATININE 0.9 07/24/2017     Lab Results   Component Value Date    EGFRNONAA >60.0 07/24/2017     Lab Results   Component Value Date    ESTGFRAFRICA >60.0 07/24/2017     Urine dipstick in clinic: sp grav 1.010, pH 7, negative results    Assessment:       1. Recurrent UTI        Plan:     Jackelyn was seen today for urinary tract infection.    Diagnoses and all orders for this visit:    Recurrent UTI  -     POCT urinalysis, dipstick or tablet reag  -     Urine culture; Future  -     Urine culture; Future      -Discussed plan of care with patient  -Discussed with patient importance of UC prior to treating with antibiotics to prevent antibiotic resistance.  She verbalized understanding  -Home collect urine cultures ordered for if she becomes symptomatic   -Discussed recurrent UTI's. If she continues to have + UC will proceed with renal US and cystoscopy  She verbalized understanding  -Discussed E. Coli typically comes from stool; proper hygiene is important for UTI prevention  -UTI precautions:  Wipe front to back   Avoid constipation.  Avoid caffeine.  Drink lots of water- 2-3 liters  per day  Void every 3 hrs. Do not hold urine once urge arises  Expel urine from vagina post void  No dryer sheets or harsh detergents with the undergarments  No bubble baths  Void before and after intercourse  Avoid hot tub use  Void soon after urge arises  Avoid tight fitting clothes and panty hose  Start Cranberry supplement- need 36mg of proanthocyanidins (PAC) in supplement- helps to block bacteria from attaching to bladder wall.  Start Probiotic- [employer] Ultra 25 Billion CFU Probiotic Complex, Multi Strain.  The Multi Strain is specifically the one that is important as the greater variety of strains is better.  Make sure the product is not .    Start D-mannose- 2 grams daily- blocks bacteria receptors  -Follow up as needed     I spent 35 minutes with the patient of which more than half was spent in coordinating the patient's care as well as in direct consultation with the patient in regards to our treatment and plan.

## 2018-05-31 NOTE — PATIENT INSTRUCTIONS
UTI precautions:  Wipe front to back   avoid constipation.  Avoid caffeine.  Drink lots of water  Void every 3-4 hrs.  Expel urine from vagina post void  No dryer sheets or harsh detergents with the undergarments  No bubble baths  Void before and after intercourse  Avoid hot tub use  Void soon after urge arises  Avoid tight fitting clothes and panty hose  Cranberry supplement- need 36mg of proanthocyanidins (PAC) in supplement- helps to block bacteria from attaching to bladder wall.  Probiotic- [employer] Ultra 25 Billion CFU Probiotic Complex, Multi Strain.  The Multi Strain is specifically the one that is important as the greater variety of strains is better.  Make sure the product is not .    D-mannose- 2 grams daily- blocks bacteria receptors      UTI PREVENTION: (from National Association for Continence)  Urinary Tract Infection (UTI)    More than 4 million doctor office visits per year in the [place] are for urinary tract infections (UTI). About 12% of men and 50% of women will have a UTI during his or her lifetime. Most UTIs arise from bacteria that normally live in the colon and rectum and are present in bowel movements. These bacteria cling to the opening of the urethra, begin to multiply, & travel up to the bladder. Urine flow from the bladder usually washes bacteria out of the body. However, because women have a shorter urethra than men, bacteria can reach the bladder more easily and settle into the bladder wall. This is why women are more likely to develop UTIs than men. This risk factor is exacerbated by the greater likelihood that women introduce bacteria from fecal matter, following a bowel movement, into the urinary tract. Less often, bacteria can spread to the kidney from the bloodstream. A recurrent UTI is classified as three or more a year.    Risk Factors for UTI  Several factors can contribute to the risk of UTI. Sexual intercourse, use of contraceptive spermicide, low levels of estrogen,  catheterization, diabetes, pregnancy, and immune suppression increase susceptibility to UTI.  Naturally occurring estrogen helps prevent recurrent UTI in women. After menopause, estrogen levels drop along with the number of vaginal lactobacilli, the good bacteria which prevent growth of intestinal bacteria in the vagina. This makes postmenopausal women especially susceptible to UTI.  Catheters also present a risk of recurring UTI. Catheters are associated with colonization of bacteria and increased risks of clinical infection. Using the techniques described previously can help keep catheters clean and prevent recurrent UTI. While single-use of sterile catheters reduces the risks, it does not prevent UTI from occurring. It is therefore important to maintain proper care and use of catheters at all times while remaining alert to symptoms of UTI.    Common Symptoms of UTI   Painful urination   Frequency   Urgency   Lower abdominal or pelvic pain or pressure   Blood in the urine   Milky, cloudy, or pink/red urine   Fever   Strong smelling urine  In the elderly populations, symptoms of a urinary tract infection, can be easily overlooked, causing a delay in diagnosis. Elderly people with a UTI are more likely than younger people not to be diagnosed until the complication of sepsis occurs. The elderly often do not experience or report obvious symptoms that younger people have, such as difficult urination, or frequent urination. Instead they may exhibit far more vague symptoms that are similiar to many disease or may be assumed to be due to the aging process. These symptoms include: confusion, feelings of general discomfort, disorientation, fatigue, weakness, behavior changes, falling, and/or a new, acute incontinence. In addition, because incontinence is common in the elderly, they may not be aware of the symptom of frequency. If you experience any of these symptoms, see your healthcare professional.    Types of  UTIs   Cystitis - an inflammation of the bladder and the most common UTI. Almost always, it occurs in women. The infection typically affects only the surface of the bladder and is brief & acute.   Pyelonephritis - more commonly known as a kidney infection and usually occurs when a lower UTI spreads to the kidneys. Occasionally, bacteria will spread to the kidney from the bloodstresam. Kidney infections are more serious and less common than bladder infections. Symptoms include a fever, pain in the back or side below the ribs, nausea, or vomiting.   Urethritis - is an inflammation of the urethra. Men commonly contract urethritis through sexual intercourse with partners infected with sexually transmitted infections. Urethritis may also result from trauma to the area or from the catheterization process.    Prevention of UTI  There are several ways to prevent bladder infections.  Bathroom Behavior:Periodically emptying the bladder by trying to urinate every two to three hours.  Diet:The use of cranberry products seems to decrease the ability of bacteria to adhere to the lining of the urethra and bladder. As cranberry juice can have a high amount of sugar, cranberry extract can be taken in capsule or pill form instead. Increasing water intake by one or two glasses per day may help limit the length of time that you have symptoms and reduce the infections.  Hygiene: Proper hygiene in caring for the urethral area is another way to limit the amount or type of bacteria that can be drawn into the urethra. This is especially true in people who have decreased sensation in the perineal region such as those with MS or who experience any amount of fecal incontinence. Using soap & water or commercially available cleansing wipes several times per day & frequent changing of incontinence pads as they become wet can minimize the amount of bacteria in the urethral area. Women should always wipe from the front of the vagina to the  back of the anus after urination or a bowel movement and wear cotton underwear. It is also reported that wearing thong undergarments may increase one's risk of developing an infection.  Sexual Activity: Can be the source of UTIs in women. Insuring proper lubrication to the vagina and voiding before and after intercourse are tactics to help prevent infection. Using diaphragms and spermicidal jelly and/or foam may increase the risk of infection, so it is important to evaluate what type of birth control you use. Some physicians encourage women who have a history of recurrent infections to take an prophylactic antibiotic after intercourse, as it reduces risk of recurrent UTI by about 85%. At a minimum, restrict your number of sexual partners and urinate immediately after sexual intimacy to lower the risk of recurrent UTIs.    Treatment of UTI  Depending on the severity of infection, UTI can be treated with oral antibiotics. A three-day course of antibiotics can treat a simple UTI. However, some infections may need to be treated for several days or weeks. Length of antibiotic treatment also depends on the type of antibiotic prescribed.  Even if a few doses of medication relieve some symptoms, you should still complete the full course of medication prescribed by your doctor. Taking aspirin, Tylenol, or non-steroidal, anti-inflammatory medications may reduce symptoms during this episode, as they may be a result of increased body temperature.

## 2018-06-09 ENCOUNTER — PATIENT MESSAGE (OUTPATIENT)
Dept: DERMATOLOGY | Facility: CLINIC | Age: 68
End: 2018-06-09

## 2018-06-11 ENCOUNTER — PATIENT MESSAGE (OUTPATIENT)
Dept: DERMATOLOGY | Facility: CLINIC | Age: 68
End: 2018-06-11

## 2018-06-12 ENCOUNTER — OFFICE VISIT (OUTPATIENT)
Dept: DERMATOLOGY | Facility: CLINIC | Age: 68
End: 2018-06-12
Payer: MEDICARE

## 2018-06-12 DIAGNOSIS — L21.9 SEBORRHEIC DERMATITIS: Primary | ICD-10-CM

## 2018-06-12 DIAGNOSIS — R20.2 NOTALGIA PARESTHETICA: ICD-10-CM

## 2018-06-12 PROCEDURE — 99999 PR PBB SHADOW E&M-EST. PATIENT-LVL II: CPT | Mod: PBBFAC,,, | Performed by: DERMATOLOGY

## 2018-06-12 PROCEDURE — 99212 OFFICE O/P EST SF 10 MIN: CPT | Mod: S$PBB,,, | Performed by: DERMATOLOGY

## 2018-06-12 PROCEDURE — 99212 OFFICE O/P EST SF 10 MIN: CPT | Mod: PBBFAC | Performed by: DERMATOLOGY

## 2018-06-12 RX ORDER — TRIAMCINOLONE ACETONIDE 1 MG/G
CREAM TOPICAL
Qty: 60 G | Refills: 3 | Status: SHIPPED | OUTPATIENT
Start: 2018-06-12

## 2018-06-12 RX ORDER — FLUOCINONIDE TOPICAL SOLUTION USP, 0.05% 0.5 MG/ML
SOLUTION TOPICAL
Qty: 60 ML | Refills: 3 | Status: SHIPPED | OUTPATIENT
Start: 2018-06-12 | End: 2019-05-06 | Stop reason: SDUPTHER

## 2018-06-12 NOTE — PROGRESS NOTES
Subjective:       Patient ID:  Jackelyn Kendrick is a 67 y.o. female who presents for   Chief Complaint   Patient presents with    Lesion     on back     Lesion  - Initial  Affected locations: back  Duration: 3 months  Signs / symptoms: itching and dryness  Aggravated by: nothing  Relieving factors/Treatments tried: OTC moisturizer    Has had 2 back surgeries in the past.  This itching sensation is new; right sided only. Uses a towel to scrub at it.    Scalp flaking much better with Dr Díaz's treatment plan.  Needs refill of lidex solution to use for her ears.    Review of Systems   Skin: Positive for itching and rash.   Hematologic/Lymphatic: Does not bruise/bleed easily.        Objective:    Physical Exam   Constitutional: She appears well-developed and well-nourished. No distress.   Neurological: She is alert and oriented to person, place, and time. She is not disoriented.   Psychiatric: She has a normal mood and affect.   Skin:   Areas Examined (abnormalities noted in diagram):   Scalp / Hair Palpated and Inspected  Head / Face Inspection Performed  Neck Inspection Performed  Back Inspection Performed                  Diagram Legend     Erythematous scaling macule/papule c/w actinic keratosis       Vascular papule c/w angioma      Pigmented verrucoid papule/plaque c/w seborrheic keratosis      Yellow umbilicated papule c/w sebaceous hyperplasia      Irregularly shaped tan macule c/w lentigo     1-2 mm smooth white papules consistent with Milia      Movable subcutaneous cyst with punctum c/w epidermal inclusion cyst      Subcutaneous movable cyst c/w pilar cyst      Firm pink to brown papule c/w dermatofibroma      Pedunculated fleshy papule(s) c/w skin tag(s)      Evenly pigmented macule c/w junctional nevus     Mildly variegated pigmented, slightly irregular-bordered macule c/w mildly atypical nevus      Flesh colored to evenly pigmented papule c/w intradermal nevus       Pink pearly papule/plaque c/w  basal cell carcinoma      Erythematous hyperkeratotic cursted plaque c/w SCC      Surgical scar with no sign of skin cancer recurrence      Open and closed comedones      Inflammatory papules and pustules      Verrucoid papule consistent consistent with wart     Erythematous eczematous patches and plaques     Dystrophic onycholytic nail with subungual debris c/w onychomycosis     Umbilicated papule    Erythematous-base heme-crusted tan verrucoid plaque consistent with inflamed seborrheic keratosis     Erythematous Silvery Scaling Plaque c/w Psoriasis     See annotation      Assessment / Plan:        Seborrheic dermatitis - improved - continue nizoral shampoo PRN  -     fluocinonide (LIDEX) 0.05 % external solution; AAA scalp qday - bid prn pruritus  Dispense: 60 mL; Refill: 3    Notalgia paresthetica - back - may be related to underlying DJD - reassured not cancerous  -     triamcinolone acetonide 0.1% (KENALOG) 0.1 % cream; AAA bid  Dispense: 60 g; Refill: 3  -SARNA otc lotion daily  Good skin care regimen discussed including limiting to one bath or shower/day, using lukewarm water with mild soap.             Follow-up if symptoms worsen or fail to improve.

## 2018-07-05 ENCOUNTER — OFFICE VISIT (OUTPATIENT)
Dept: SPORTS MEDICINE | Facility: CLINIC | Age: 68
End: 2018-07-05
Payer: MEDICARE

## 2018-07-05 ENCOUNTER — HOSPITAL ENCOUNTER (OUTPATIENT)
Dept: RADIOLOGY | Facility: HOSPITAL | Age: 68
Discharge: HOME | End: 2018-07-05
Attending: ORTHOPAEDIC SURGERY
Payer: MEDICARE

## 2018-07-05 VITALS — HEIGHT: 61 IN | BODY MASS INDEX: 27.38 KG/M2 | WEIGHT: 145 LBS

## 2018-07-05 DIAGNOSIS — M25.512 LEFT SHOULDER PAIN, UNSPECIFIED CHRONICITY: ICD-10-CM

## 2018-07-05 DIAGNOSIS — M25.512 LEFT SHOULDER PAIN, UNSPECIFIED CHRONICITY: Primary | ICD-10-CM

## 2018-07-05 PROCEDURE — 73030 X-RAY EXAM OF SHOULDER: CPT | Mod: 26,LT,, | Performed by: RADIOLOGY

## 2018-07-05 PROCEDURE — 99214 OFFICE O/P EST MOD 30 MIN: CPT | Mod: S$PBB,,, | Performed by: ORTHOPAEDIC SURGERY

## 2018-07-05 PROCEDURE — 99213 OFFICE O/P EST LOW 20 MIN: CPT | Mod: PBBFAC,25,PO | Performed by: ORTHOPAEDIC SURGERY

## 2018-07-05 PROCEDURE — 99999 PR PBB SHADOW E&M-EST. PATIENT-LVL III: CPT | Mod: PBBFAC,,, | Performed by: ORTHOPAEDIC SURGERY

## 2018-07-05 PROCEDURE — 73030 X-RAY EXAM OF SHOULDER: CPT | Mod: TC,FY,PO,LT

## 2018-07-05 NOTE — PROGRESS NOTES
CC: LEFT shoulder pain      67 y.o. Female with a history of left shoulder pain who has a history of left rototar cuff repair in 2010.  She states that her recent pain began about 2 months ago. She does not recall a specific injury or trauma. The patient reports that her shoulder feels the same as before her surgery.         She reports that the pain and weakness is worse with overhead activity. It also bothers her at night.    Is affecting ADLs.  Pain is 6/10 at it's worst.      Past Medical History:   Diagnosis Date    Bronchitis     seasonal    Cataract     Diverticulitis     Dry eye syndrome     GERD (gastroesophageal reflux disease)     Hyperlipidemia     Hypertension     Obese     PONV (postoperative nausea and vomiting)     Thyroid disease        Past Surgical History:   Procedure Laterality Date    BACK SURGERY      CHOLECYSTECTOMY      COLONOSCOPY  2007    diverticulosis    DE QUERVAIN'S RELEASE Left 03/2017    HERNIA REPAIR      HYSTERECTOMY      NASAL SEPTUM SURGERY      SHOULDER SURGERY      TONSILLECTOMY         Family History   Problem Relation Age of Onset    Cataracts Mother     Breast cancer Mother     Diabetes Mother     Hypertension Mother     Heart failure Mother     Cataracts Father     Hypertension Father     Amblyopia Neg Hx     Blindness Neg Hx     Glaucoma Neg Hx     Macular degeneration Neg Hx     Retinal detachment Neg Hx     Strabismus Neg Hx     Ovarian cancer Neg Hx     Melanoma Neg Hx          Current Outpatient Prescriptions:     acetic acid-hydrocortisone (VOSOL-HC) otic solution, 2-4 drops to affected ear twice a day, Disp: 10 mL, Rfl: 2    albuterol 90 mcg/actuation inhaler, Inhale 2 puffs into the lungs every 6 (six) hours as needed for Wheezing., Disp: 6.7 g, Rfl: 11    amLODIPine (NORVASC) 2.5 MG tablet, , Disp: , Rfl:     cetirizine (ZYRTEC) 10 MG tablet, Take 1 tablet (10 mg total) by mouth once daily., Disp: , Rfl: 0    cycloSPORINE  (RESTASIS) 0.05 % ophthalmic emulsion, Place 0.4 mLs (1 drop total) into both eyes 2 (two) times daily., Disp: 60 each, Rfl: 12    docusate sodium (COLACE) 100 MG capsule, Take 1 capsule (100 mg total) by mouth 2 (two) times daily. Available OTC as well, Disp: 60 capsule, Rfl: 0    econazole nitrate 1 % cream, Apply topically 2 (two) times daily. Qd- bid for ears. Ok for maintenance, Disp: 60 g, Rfl: 1    fluocinonide (LIDEX) 0.05 % external solution, APPLY TO SCALP ONCE DAILY AS NEEDED FOR FLARE, Disp: 60 mL, Rfl: 3    fluocinonide (LIDEX) 0.05 % external solution, AAA scalp qday - bid prn pruritus, Disp: 60 mL, Rfl: 3    hyoscyamine (LEVSIN/SL) 0.125 mg Subl, Take 1 tablet (0.125 mg total) by mouth every 6 (six) hours as needed., Disp: 90 tablet, Rfl: 11    ketoconazole (NIZORAL) 2 % shampoo, APPLY TO DAMP SCALP EVERY OTHER DAY, LATHER AND LET SIT 5 MINUTES BEFORE RINSING, Disp: 120 mL, Rfl: 5    levothyroxine (SYNTHROID) 75 MCG tablet, Take 1 tablet (75 mcg total) by mouth once daily., Disp: 30 tablet, Rfl: 11    meloxicam (MOBIC) 7.5 MG tablet, Take 7.5 mg by mouth once daily., Disp: , Rfl:     Multi-Vitamin tablet, Take by mouth.  Tablet Oral , Disp: , Rfl:     OMEGA-3S/DHA/EPA/FISH OIL (OMEGA 3 ORAL), , Disp: , Rfl:     omeprazole (PRILOSEC) 40 MG capsule, Take 1 capsule (40 mg total) by mouth once daily. Capsule, Delayed Release(E.C.) Oral .  take one tablet daily, Disp: 30 capsule, Rfl: 11    oxybutynin (DITROPAN-XL) 10 MG 24 hr tablet, Take 1 tablet (10 mg total) by mouth once daily., Disp: 30 tablet, Rfl: 11    predniSONE (DELTASONE) 20 MG tablet, 2 tabs po q day for 5 days, Disp: 10 tablet, Rfl: 0    PSYLLIUM SEED, WITH SUGAR, (METAMUCIL ORAL), Take by mouth., Disp: , Rfl:     rosuvastatin (CRESTOR) 40 MG Tab, Take 1 tablet (40 mg total) by mouth once daily., Disp: 90 tablet, Rfl: 3    triamcinolone acetonide 0.1% (KENALOG) 0.1 % cream, AAA bid, Disp: 60 g, Rfl: 3    Review of patient's  allergies indicates:   Allergen Reactions    Erythromycin (bulk) Nausea And Vomiting    Levaquin [levofloxacin]      Other reaction(s): Swelling          REVIEW OF SYSTEMS:  Constitution: Negative. Negative for chills, fever and night sweats.   HENT: Negative for congestion and headaches.    Eyes: Negative for blurred vision, left vision loss and right vision loss.   Cardiovascular: Negative for chest pain and syncope.   Respiratory: Negative for cough and shortness of breath.    Endocrine: Negative for polydipsia, polyphagia and polyuria.   Hematologic/Lymphatic: Negative for bleeding problem. Does not bruise/bleed easily.   Skin: Negative for dry skin, itching and rash.   Musculoskeletal: Negative for falls.  Positive for left shoulder pain and muscle weakness.   Gastrointestinal: Negative for abdominal pain and bowel incontinence.   Genitourinary: Negative for bladder incontinence and nocturia.   Neurological: Negative for disturbances in coordination, loss of balance and seizures.   Psychiatric/Behavioral: Negative for depression. The patient does not have insomnia.    Allergic/Immunologic: Negative for hives and persistent infections.      PHYSICAL EXAMINATION:  Vitals:  There were no vitals taken for this visit.   General: The patient is alert and oriented x 3.  Mood is pleasant.  Observation of ears, eyes and nose reveal no gross abnormalities.  No labored breathing observed.  Gait is coordinated. Patient can toe walk and heel walk without difficulty.      LEFT Shoulder / Upper Extremity Exam    OBSERVATION:     Swelling  none  Deformity  none   Discoloration  none   Scapular winging none   Scars   none  Atrophy  none    TENDERNESS / CREPITUS (T/C):          T/C      T/C   Clavicle   -/-  SUPRAspinatus    -/-     AC Jt.    -/-  INFRAspinatus  -/-    SC Jt.    -/-  Deltoid    -/-      G. Tuberosity  -/-  LH BICEP groove  -/-   Acromion:  -/-  Midline Neck   -/-     Scapular Spine -/-  Trapezium   -/-   SMA  Scapula  -/-  GH jt. line - post  -/-     Scapulothoracic  -/-         ROM: (* = with pain)  Right shoulder   Left shoulder        AROM (PROM)   AROM (PROM)   FE    170° (175°)     85° (175°)     ER at 0°    60°  (65°)    65°  (65°)   ER at 90° ABD  90°  (90°)    90°  (90°)   IR at 90°  ABD   NA  (40°)     30  (40°)      IR (spine level)   T10     L5    STRENGTH: (* = with pain) Right shoulder   Left shoulder    SCAPTION   5/5    4/5    IR    5/5    5/5   ER    5/5    4/5   BICEPS   5/5    5/5   Deltoid    5/5    4/5     SIGNS:  Painful side       NEER   -    OYEES  neg    BLACK   +    SPEEDS  neg     DROP ARM   +   BELLY PRESS neg   Superior escape none    LIFT-OFF  neg   X-Body ADD    neg    MOVING VALGUS neg        STABILITY TESTING    Right shoulder   Left shoulder    Translation     Anterior  up face     up face    Posterior  up face    up face    Sulcus   < 10mm    < 10 mm     Signs   Apprehension   neg      neg       Relocation   no change     no change      Jerk test  neg     neg    EXTREMITY NEURO-VASCULAR EXAM:    Sensation grossly intact to light touch all dermatomal regions.    DTR 2+ Biceps, Triceps, BR and Negative Janias sign   Grossly intact motor function at Elbow, Wrist and Hand   Distal pulses radial and ulnar 2+, brisk cap refill, symmetric.      NECK:  Painless FROM and spinous processes non-tender. Negative Spurlings sign.      OTHER FINDINGS:      XRAYS:  Xrays including AP, Outlet and Axillary Lateral of Left shoulder are ordered / images reviewed by me:   No fracture dislocation or other pathology   Acromion type 1   Proximal migration of humeral head: None   GH arthritis: None          ASSESSMENT:   Left shoulder pain, possible:  1. Rotator cuff re-tear       PLAN:      1. MRI to rule out Left Rotator Cuff Tear  2. Follow up in clinic to discuss MRI results    All questions were answered, patient will contact us for questions or concerns in the interim.

## 2018-07-13 ENCOUNTER — HOSPITAL ENCOUNTER (OUTPATIENT)
Dept: RADIOLOGY | Facility: HOSPITAL | Age: 68
Discharge: HOME | End: 2018-07-13
Attending: ORTHOPAEDIC SURGERY
Payer: MEDICARE

## 2018-07-13 DIAGNOSIS — M25.512 LEFT SHOULDER PAIN, UNSPECIFIED CHRONICITY: ICD-10-CM

## 2018-07-13 PROCEDURE — 73221 MRI JOINT UPR EXTREM W/O DYE: CPT | Mod: 26,LT,, | Performed by: RADIOLOGY

## 2018-07-13 PROCEDURE — 73221 MRI JOINT UPR EXTREM W/O DYE: CPT | Mod: TC,LT

## 2018-07-19 ENCOUNTER — OFFICE VISIT (OUTPATIENT)
Dept: SPORTS MEDICINE | Facility: CLINIC | Age: 68
End: 2018-07-19
Payer: MEDICARE

## 2018-07-19 VITALS
HEIGHT: 61 IN | DIASTOLIC BLOOD PRESSURE: 75 MMHG | HEART RATE: 72 BPM | WEIGHT: 145 LBS | SYSTOLIC BLOOD PRESSURE: 172 MMHG | BODY MASS INDEX: 27.38 KG/M2

## 2018-07-19 DIAGNOSIS — M75.112 INCOMPLETE ROTATOR CUFF TEAR OR RUPTURE OF LEFT SHOULDER, NOT SPECIFIED AS TRAUMATIC: Primary | ICD-10-CM

## 2018-07-19 PROCEDURE — 99214 OFFICE O/P EST MOD 30 MIN: CPT | Mod: PBBFAC,PO | Performed by: PHYSICIAN ASSISTANT

## 2018-07-19 PROCEDURE — 99999 PR PBB SHADOW E&M-EST. PATIENT-LVL IV: CPT | Mod: PBBFAC,,, | Performed by: PHYSICIAN ASSISTANT

## 2018-07-19 PROCEDURE — 99214 OFFICE O/P EST MOD 30 MIN: CPT | Mod: S$PBB,,, | Performed by: PHYSICIAN ASSISTANT

## 2018-07-19 NOTE — PROGRESS NOTES
CC: LEFT shoulder pain      67 y.o. Female with a history of left shoulder pain who has a history of left rototar cuff repair in 2010.  She states that her recent pain began about 2.5 months ago. She does not recall a specific injury or trauma. The patient reports that her shoulder feels the same as before her surgery. She had a recent MRI performed to evaluate for RC re-tear. She is here today to review her results.        She reports that the pain and weakness is worse with overhead activity. It also bothers her at night.    Is affecting ADLs.  Pain is 6/10 at it's worst.      Past Medical History:   Diagnosis Date    Bronchitis     seasonal    Cataract     Diverticulitis     Dry eye syndrome     GERD (gastroesophageal reflux disease)     Hyperlipidemia     Hypertension     Obese     PONV (postoperative nausea and vomiting)     Thyroid disease        Past Surgical History:   Procedure Laterality Date    BACK SURGERY      CHOLECYSTECTOMY      COLONOSCOPY  2007    diverticulosis    DE QUERVAIN'S RELEASE Left 03/2017    HERNIA REPAIR      HYSTERECTOMY      NASAL SEPTUM SURGERY      SHOULDER SURGERY      TONSILLECTOMY         Family History   Problem Relation Age of Onset    Cataracts Mother     Breast cancer Mother     Diabetes Mother     Hypertension Mother     Heart failure Mother     Cataracts Father     Hypertension Father     Amblyopia Neg Hx     Blindness Neg Hx     Glaucoma Neg Hx     Macular degeneration Neg Hx     Retinal detachment Neg Hx     Strabismus Neg Hx     Ovarian cancer Neg Hx     Melanoma Neg Hx          Current Outpatient Prescriptions:     acetic acid-hydrocortisone (VOSOL-HC) otic solution, 2-4 drops to affected ear twice a day, Disp: 10 mL, Rfl: 2    albuterol 90 mcg/actuation inhaler, Inhale 2 puffs into the lungs every 6 (six) hours as needed for Wheezing., Disp: 6.7 g, Rfl: 11    amLODIPine (NORVASC) 2.5 MG tablet, , Disp: , Rfl:     cycloSPORINE  (RESTASIS) 0.05 % ophthalmic emulsion, Place 0.4 mLs (1 drop total) into both eyes 2 (two) times daily., Disp: 60 each, Rfl: 12    docusate sodium (COLACE) 100 MG capsule, Take 1 capsule (100 mg total) by mouth 2 (two) times daily. Available OTC as well, Disp: 60 capsule, Rfl: 0    econazole nitrate 1 % cream, Apply topically 2 (two) times daily. Qd- bid for ears. Ok for maintenance, Disp: 60 g, Rfl: 1    fluocinonide (LIDEX) 0.05 % external solution, APPLY TO SCALP ONCE DAILY AS NEEDED FOR FLARE, Disp: 60 mL, Rfl: 3    fluocinonide (LIDEX) 0.05 % external solution, AAA scalp qday - bid prn pruritus, Disp: 60 mL, Rfl: 3    hyoscyamine (LEVSIN/SL) 0.125 mg Subl, Take 1 tablet (0.125 mg total) by mouth every 6 (six) hours as needed., Disp: 90 tablet, Rfl: 11    ketoconazole (NIZORAL) 2 % shampoo, APPLY TO DAMP SCALP EVERY OTHER DAY, LATHER AND LET SIT 5 MINUTES BEFORE RINSING, Disp: 120 mL, Rfl: 5    levothyroxine (SYNTHROID) 75 MCG tablet, Take 1 tablet (75 mcg total) by mouth once daily., Disp: 30 tablet, Rfl: 11    meloxicam (MOBIC) 7.5 MG tablet, Take 7.5 mg by mouth once daily., Disp: , Rfl:     Multi-Vitamin tablet, Take by mouth.  Tablet Oral , Disp: , Rfl:     OMEGA-3S/DHA/EPA/FISH OIL (OMEGA 3 ORAL), , Disp: , Rfl:     omeprazole (PRILOSEC) 40 MG capsule, Take 1 capsule (40 mg total) by mouth once daily. Capsule, Delayed Release(E.C.) Oral .  take one tablet daily, Disp: 30 capsule, Rfl: 11    predniSONE (DELTASONE) 20 MG tablet, 2 tabs po q day for 5 days, Disp: 10 tablet, Rfl: 0    PSYLLIUM SEED, WITH SUGAR, (METAMUCIL ORAL), Take by mouth., Disp: , Rfl:     rosuvastatin (CRESTOR) 40 MG Tab, Take 1 tablet (40 mg total) by mouth once daily., Disp: 90 tablet, Rfl: 3    triamcinolone acetonide 0.1% (KENALOG) 0.1 % cream, AAA bid, Disp: 60 g, Rfl: 3    cetirizine (ZYRTEC) 10 MG tablet, Take 1 tablet (10 mg total) by mouth once daily., Disp: , Rfl: 0    oxybutynin (DITROPAN-XL) 10 MG 24 hr  "tablet, Take 1 tablet (10 mg total) by mouth once daily., Disp: 30 tablet, Rfl: 11    Review of patient's allergies indicates:   Allergen Reactions    Erythromycin (bulk) Nausea And Vomiting    Levaquin [levofloxacin]      Other reaction(s): Swelling          REVIEW OF SYSTEMS:  Constitution: Negative. Negative for chills, fever and night sweats.   HENT: Negative for congestion and headaches.    Eyes: Negative for blurred vision, left vision loss and right vision loss.   Cardiovascular: Negative for chest pain and syncope.   Respiratory: Negative for cough and shortness of breath.    Endocrine: Negative for polydipsia, polyphagia and polyuria.   Hematologic/Lymphatic: Negative for bleeding problem. Does not bruise/bleed easily.   Skin: Negative for dry skin, itching and rash.   Musculoskeletal: Negative for falls.  Positive for left shoulder pain and muscle weakness.   Gastrointestinal: Negative for abdominal pain and bowel incontinence.   Genitourinary: Negative for bladder incontinence and nocturia.   Neurological: Negative for disturbances in coordination, loss of balance and seizures.   Psychiatric/Behavioral: Negative for depression. The patient does not have insomnia.    Allergic/Immunologic: Negative for hives and persistent infections.      PHYSICAL EXAMINATION:  Vitals:  BP (!) 172/75   Pulse 72   Ht 5' 1" (1.549 m)   Wt 65.8 kg (145 lb)   BMI 27.40 kg/m²    General: The patient is alert and oriented x 3.  Mood is pleasant.  Observation of ears, eyes and nose reveal no gross abnormalities.  No labored breathing observed.  Gait is coordinated. Patient can toe walk and heel walk without difficulty.      LEFT Shoulder / Upper Extremity Exam    OBSERVATION:     Swelling  none  Deformity  none   Discoloration  none   Scapular winging none   Scars   none  Atrophy  none    TENDERNESS / CREPITUS (T/C):          T/C      T/C   Clavicle   -/-  SUPRAspinatus    -/-     AC Jt.    -/-  INFRAspinatus  -/-    SC Jt. "    -/-  Deltoid    -/-      G. Tuberosity  -/-  LH BICEP groove  -/-   Acromion:  -/-  Midline Neck   -/-     Scapular Spine -/-  Trapezium   -/-   SMA Scapula  -/-  GH jt. line - post  -/-     Scapulothoracic  -/-         ROM: (* = with pain)  Right shoulder   Left shoulder        AROM (PROM)   AROM (PROM)   FE    170° (175°)     85° (175°)     ER at 0°    60°  (65°)    65°  (65°)   ER at 90° ABD  90°  (90°)    90°  (90°)   IR at 90°  ABD   NA  (40°)     30  (40°)      IR (spine level)   T10     L5    STRENGTH: (* = with pain) Right shoulder   Left shoulder    SCAPTION   5/5    4/5    IR    5/5    5/5   ER    5/5    4/5   BICEPS   5/5    5/5   Deltoid    5/5    4/5     SIGNS:  Painful side       NEER   -    OYEES  neg    BLACK   +    SPEEDS  neg     DROP ARM   +   BELLY PRESS neg   Superior escape none    LIFT-OFF  neg   X-Body ADD    neg    MOVING VALGUS neg        STABILITY TESTING    Right shoulder   Left shoulder    Translation     Anterior  up face     up face    Posterior  up face    up face    Sulcus   < 10mm    < 10 mm     Signs   Apprehension   neg      neg       Relocation   no change     no change      Jerk test  neg     neg    EXTREMITY NEURO-VASCULAR EXAM:    Sensation grossly intact to light touch all dermatomal regions.    DTR 2+ Biceps, Triceps, BR and Negative Janias sign   Grossly intact motor function at Elbow, Wrist and Hand   Distal pulses radial and ulnar 2+, brisk cap refill, symmetric.      NECK:  Painless FROM and spinous processes non-tender. Negative Spurlings sign.      XRAYS:  Xrays including AP, Outlet and Axillary Lateral of Left shoulder are ordered / images reviewed by me:   No fracture dislocation or other pathology   Acromion type 1   Proximal migration of humeral head: None   GH arthritis: None    MRI Left shoulder 7/13/18    FINDINGS:  Rotator cuff: Status post rotator cuff repair low-grade undersurface tear of the infraspinatus tendon measuring 0.4 x 1.7 cm (AP  x TV) with fluid extending to the myotendinous junction.  Supraspinatus, subscapularis, and teres minor tendons are intact.  Muscle bulk is maintained.    Labrum: Circumferential fraying.    Biceps: Postoperative changes of prior biceps tenotomy.    Bone: No fractures.  Bone marrow signal is unremarkable.    Acromioclavicular joint: Hypertrophy and moderate arthrosis of the AC joint.    Cartilage: Focal full-thickness fissuring noted over the humeral head superiorly.    Miscellaneous: No additional findings.    Impression:  1. Low-grade undersurface retear of the rotator cuff.  2. Acromioclavicular arthrosis.  3. Focal superior humeral head cartilage loss.      ASSESSMENT:   Left shoulder pain, low grade RC re-tear (infraspinatous)    PLAN:      1. Continue Mobic  2. Start PT at [facility]  3. RTC in 6 weeks for follow up    All questions were answered, patient will contact us for questions or concerns in the interim.

## 2018-08-01 ENCOUNTER — CLINICAL SUPPORT (OUTPATIENT)
Dept: REHABILITATION | Facility: HOSPITAL | Age: 68
End: 2018-08-01
Payer: MEDICARE

## 2018-08-01 DIAGNOSIS — M25.512 LEFT SHOULDER PAIN, UNSPECIFIED CHRONICITY: ICD-10-CM

## 2018-08-01 PROCEDURE — G8985 CARRY GOAL STATUS: HCPCS | Mod: CJ,PO

## 2018-08-01 PROCEDURE — G8984 CARRY CURRENT STATUS: HCPCS | Mod: CK,PO

## 2018-08-01 PROCEDURE — 97161 PT EVAL LOW COMPLEX 20 MIN: CPT | Mod: PO

## 2018-08-01 PROCEDURE — 97530 THERAPEUTIC ACTIVITIES: CPT | Mod: PO

## 2018-08-01 NOTE — PROGRESS NOTES
Physical Therapy Evaluation  Visit:  1    Name: Jackelyn ARGUETA [facility] Number: [id number]    Jackelyn is a 67 y.o. female evaluated on 08/01/2018.     Diagnosis:   Encounter Diagnosis   Name Primary?    Left shoulder pain, unspecified chronicity        DOS: 8 years ago L RTC    Physician: Zoila Dos Santos PA-C  Treatment Orders: PT Eval and Treat    Past Medical History:   Diagnosis Date    Bronchitis     seasonal    Cataract     Diverticulitis     Dry eye syndrome     GERD (gastroesophageal reflux disease)     Hyperlipidemia     Hypertension     Obese     PONV (postoperative nausea and vomiting)     Thyroid disease      Current Outpatient Prescriptions   Medication Sig    acetic acid-hydrocortisone (VOSOL-HC) otic solution 2-4 drops to affected ear twice a day    albuterol 90 mcg/actuation inhaler Inhale 2 puffs into the lungs every 6 (six) hours as needed for Wheezing.    amLODIPine (NORVASC) 2.5 MG tablet     cetirizine (ZYRTEC) 10 MG tablet Take 1 tablet (10 mg total) by mouth once daily.    cycloSPORINE (RESTASIS) 0.05 % ophthalmic emulsion Place 0.4 mLs (1 drop total) into both eyes 2 (two) times daily.    docusate sodium (COLACE) 100 MG capsule Take 1 capsule (100 mg total) by mouth 2 (two) times daily. Available OTC as well    econazole nitrate 1 % cream Apply topically 2 (two) times daily. Qd- bid for ears. Ok for maintenance    fluocinonide (LIDEX) 0.05 % external solution APPLY TO SCALP ONCE DAILY AS NEEDED FOR FLARE    fluocinonide (LIDEX) 0.05 % external solution AAA scalp qday - bid prn pruritus    hyoscyamine (LEVSIN/SL) 0.125 mg Subl Take 1 tablet (0.125 mg total) by mouth every 6 (six) hours as needed.    ketoconazole (NIZORAL) 2 % shampoo APPLY TO DAMP SCALP EVERY OTHER DAY, LATHER AND LET SIT 5 MINUTES BEFORE RINSING    levothyroxine (SYNTHROID) 75 MCG tablet Take 1 tablet (75 mcg total) by mouth once  "daily.    meloxicam (MOBIC) 7.5 MG tablet Take 7.5 mg by mouth once daily.    Multi-Vitamin tablet Take by mouth.  Tablet Oral     OMEGA-3S/DHA/EPA/FISH OIL (OMEGA 3 ORAL)     omeprazole (PRILOSEC) 40 MG capsule Take 1 capsule (40 mg total) by mouth once daily. Capsule, Delayed Release(E.C.) Oral .  take one tablet daily    oxybutynin (DITROPAN-XL) 10 MG 24 hr tablet Take 1 tablet (10 mg total) by mouth once daily.    predniSONE (DELTASONE) 20 MG tablet 2 tabs po q day for 5 days    PSYLLIUM SEED, WITH SUGAR, (METAMUCIL ORAL) Take by mouth.    rosuvastatin (CRESTOR) 40 MG Tab Take 1 tablet (40 mg total) by mouth once daily.    triamcinolone acetonide 0.1% (KENALOG) 0.1 % cream AAA bid     No current facility-administered medications for this visit.      Review of patient's allergies indicates:   Allergen Reactions    Erythromycin (bulk) Nausea And Vomiting    Levaquin [levofloxacin]      Other reaction(s): Swelling     Precautions: Blood pressure/syncope  Occupation: Retired  Dominant hand: R      Subjective  Onset/GEOFFREY: gradual.  3 months ago  Involved Area/Location: left  Current Symptoms: L shoulder pain with certain movements    Increases symptoms: Raising her L arm over head  Decreases Symptoms: Avoiding painful movements    Previous Level of function: Independent in ADL's  Current level of function: Pain in L shoulder with "certain" movements    Diagnostic Tests: MRI    Pain Scale: Jackelyn rates pain to be 2.    Patient Goals: Pt would like to avoid surgery  Pt reported a Hx of an arthroscopic and RTC surgeries on the L shoulder - RTC 8 years ago    Objective    Functional Limitations  Reports - G Codes    Category:  carry   Tool:  FOTO Outcomes Measurement System.   Score:  Patient scored out 50 of 100 on the FOTO Outcomes Measurement System.   Current:  G 8944 CK   Goal:  G 8985 CJ         Posture: Rounded shoulders B    Passive Range of Motion: Shoulder   Left Right   Flexion: 115 175   IR: To " abdomen @ 45 deg of abd 50   ER: 37 deg @ 45 degrees of abd 90   Abduction: 80 170      UE Strength:  L shoulder ER 3+/5 p, Flexion 3+/5 p    Special Tests: NA    Joint Mobility: hypomobile L GH joint  Palpation: L shoulder joint line and B upper thoracic L greater than R    PT Evaluation Completed? Yes  Discussed Plan of Care with patient: Yes    TREATMENT:  Pt performed there ex's for L  strengthening, ROM, flexibility and endurance including:  Manual Therapy x 15 min  L GH joint Inferior, lateral anterior and posterior glides    Therapeutic exercise x 10 min  Scapular retractions  Hand slides on plinth    Exercises were reviewed and pt was able to demonstrate them prior to the end of the session.    Jackelyn chuo good understanding of the education provided. Patient demo good return demo of skill of exercises.      Assessment  This is a 67 y.o. female referred to outpatient physical therapy and presents with a medical diagnosis of   Encounter Diagnosis   Name Primary?    Left shoulder pain, unspecified chronicity     and demonstrates limitations as described in the problem list. Pt will benefit from physcial therapy services in order to maximize pain free and/or functional use of left shoulder. The following goals were discussed with the patient and patient is in agreement with them as to be addressed in the treatment plan.     Problem List: pain, decreased ROM, decreased flexibility, decreased strength and inability to perform ADL's independently..    GOALS: Short Term Goals:  3 weeks  1. Pt will demonstrate increased L shoulder ROM by.  2. Pt Independent with HEP to improve ROM and independence with ADL's    Long Term Goals: 6 weeks  1.Report decreased    L  shoulder    pain  <   / =  2  /10  to increase tolerance for ADL's  2.Increased MMT  for  L UE  to increase tolerance for ADLs.  3.Increased PROM to WNL's of L shoulder to improve normal movement patterns during ADL's.  4. Pt will report improvement in  overall functional abilities of UE, evidenced by improved score on FOTO Shoulder Survey.      Plan  Pt will be treated by physical therapy 2 times a week for 6 weeks for Pt Education, HEP, therapeutic exercises, neuromuscular re-education, joint mobilizations, modalities prn to achieve established goals. Jackelyn may at times be seen by a PTA as part of the Rehab Team.     Cont PT for  6         weeks.     I certify the need for these services furnished under this plan of treatment and while under my care.______________________________ Physician/Referring Practitioner  Date of Signature

## 2018-08-02 ENCOUNTER — CLINICAL SUPPORT (OUTPATIENT)
Dept: REHABILITATION | Facility: HOSPITAL | Age: 68
End: 2018-08-02
Payer: MEDICARE

## 2018-08-02 DIAGNOSIS — M25.512 LEFT SHOULDER PAIN, UNSPECIFIED CHRONICITY: ICD-10-CM

## 2018-08-02 PROCEDURE — 97140 MANUAL THERAPY 1/> REGIONS: CPT | Mod: PO

## 2018-08-02 PROCEDURE — 97110 THERAPEUTIC EXERCISES: CPT | Mod: PO

## 2018-08-02 RX ORDER — AMLODIPINE BESYLATE 2.5 MG/1
2.5 TABLET ORAL DAILY
Qty: 30 TABLET | Refills: 11 | Status: SHIPPED | OUTPATIENT
Start: 2018-08-02 | End: 2019-08-01 | Stop reason: SDUPTHER

## 2018-08-02 RX ORDER — LEVOTHYROXINE SODIUM 75 UG/1
75 TABLET ORAL DAILY
Qty: 30 TABLET | Refills: 11 | Status: SHIPPED | OUTPATIENT
Start: 2018-08-02 | End: 2019-08-01 | Stop reason: SDUPTHER

## 2018-08-02 RX ORDER — OMEPRAZOLE 40 MG/1
40 CAPSULE, DELAYED RELEASE ORAL DAILY
Qty: 30 CAPSULE | Refills: 11 | Status: SHIPPED | OUTPATIENT
Start: 2018-08-02 | End: 2019-08-01 | Stop reason: SDUPTHER

## 2018-08-02 NOTE — PROGRESS NOTES
Physical Therapy Daily note  Visit:  2    Name: Jackelyn ARGUETA [facility] Number: [id number]    Jackelyn is a 67 y.o. female treated on 08/02/2018.     Diagnosis:   Encounter Diagnosis   Name Primary?    Left shoulder pain, unspecified chronicity        DOS: 8 years ago L RTC    Physician: Zoila Dos Santos PA-C  Treatment Orders: PT Eval and Treat    Past Medical History:   Diagnosis Date    Bronchitis     seasonal    Cataract     Diverticulitis     Dry eye syndrome     GERD (gastroesophageal reflux disease)     Hyperlipidemia     Hypertension     Obese     PONV (postoperative nausea and vomiting)     Thyroid disease      Current Outpatient Prescriptions   Medication Sig    acetic acid-hydrocortisone (VOSOL-HC) otic solution 2-4 drops to affected ear twice a day    albuterol 90 mcg/actuation inhaler Inhale 2 puffs into the lungs every 6 (six) hours as needed for Wheezing.    amLODIPine (NORVASC) 2.5 MG tablet Take 1 tablet (2.5 mg total) by mouth once daily.    cetirizine (ZYRTEC) 10 MG tablet Take 1 tablet (10 mg total) by mouth once daily.    cycloSPORINE (RESTASIS) 0.05 % ophthalmic emulsion Place 0.4 mLs (1 drop total) into both eyes 2 (two) times daily.    docusate sodium (COLACE) 100 MG capsule Take 1 capsule (100 mg total) by mouth 2 (two) times daily. Available OTC as well    econazole nitrate 1 % cream Apply topically 2 (two) times daily. Qd- bid for ears. Ok for maintenance    fluocinonide (LIDEX) 0.05 % external solution APPLY TO SCALP ONCE DAILY AS NEEDED FOR FLARE    fluocinonide (LIDEX) 0.05 % external solution AAA scalp qday - bid prn pruritus    hyoscyamine (LEVSIN/SL) 0.125 mg Subl Take 1 tablet (0.125 mg total) by mouth every 6 (six) hours as needed.    ketoconazole (NIZORAL) 2 % shampoo APPLY TO DAMP SCALP EVERY OTHER DAY, LATHER AND LET SIT 5 MINUTES BEFORE RINSING    levothyroxine (SYNTHROID) 75 MCG  "tablet Take 1 tablet (75 mcg total) by mouth once daily.    meloxicam (MOBIC) 7.5 MG tablet Take 7.5 mg by mouth once daily.    Multi-Vitamin tablet Take by mouth.  Tablet Oral     OMEGA-3S/DHA/EPA/FISH OIL (OMEGA 3 ORAL)     omeprazole (PRILOSEC) 40 MG capsule Take 1 capsule (40 mg total) by mouth once daily. Capsule, Delayed Release(E.C.) Oral .  take one tablet daily    oxybutynin (DITROPAN-XL) 10 MG 24 hr tablet Take 1 tablet (10 mg total) by mouth once daily.    predniSONE (DELTASONE) 20 MG tablet 2 tabs po q day for 5 days    PSYLLIUM SEED, WITH SUGAR, (METAMUCIL ORAL) Take by mouth.    rosuvastatin (CRESTOR) 40 MG Tab Take 1 tablet (40 mg total) by mouth once daily.    triamcinolone acetonide 0.1% (KENALOG) 0.1 % cream AAA bid     No current facility-administered medications for this visit.      Review of patient's allergies indicates:   Allergen Reactions    Erythromycin (bulk) Nausea And Vomiting    Levaquin [levofloxacin]      Other reaction(s): Swelling     Precautions: Blood pressure/syncope  Occupation: Retired  Dominant hand: R      Subjective  Onset/GEOFFREY: gradual.  3 months ago  Involved Area/Location: left  Current Symptoms: L shoulder pain with certain movements    Increases symptoms: Raising her L arm over head  Decreases Symptoms: Avoiding painful movements    Previous Level of function: Independent in ADL's  Current level of function: Pain in L shoulder with "certain" movements    Diagnostic Tests: MRI    Pain Scale: Jackelyn rates pain to be 2.    Patient Goals: Pt would like to avoid surgery  Pt reported a Hx of an arthroscopic and RTC surgeries on the L shoulder - RTC 8 years ago  Pt stated that she was not very sore following the initial evaluation  Objective    Functional Limitations  Reports - G Codes    Category:  carry   Tool:  FOTO Outcomes Measurement System.   Score:  Patient scored out 50 of 100 on the FOTO Outcomes Measurement System.   Current:  G 0174 CK   Goal:  G 5145 CJ "         Posture: Rounded shoulders B    Passive Range of Motion: Shoulder - not reassessed   Left Right   Flexion: 115 175   IR: To abdomen @ 45 deg of abd 50   ER: 37 deg @ 45 degrees of abd 90   Abduction: 80 170      UE Strength:  L shoulder ER 3+/5 p, Flexion 3+/5 p    Special Tests: NA    Joint Mobility: hypomobile L GH joint  Palpation: L shoulder joint line and B upper thoracic L greater than R      TREATMENT:  Pt performed there ex's for L  strengthening, ROM, flexibility and endurance including:  Manual Therapy x 30 min  L GH joint Inferior, lateral anterior and posterior glides  Cx spine manual traction  Soft tissue mobilization Thoracic paraspinals and L upper trap  Passive mobilization thoracic spine and rib cage    Therapeutic exercise x 15 min  Sternal lifts - HEP  Hand slides on plinth  Manually resisted L shoulder ir/er 10 x 3  Active L shoulder flexion  Exercises were reviewed and pt was able to demonstrate them prior to the end of the session.    Jackelyn ruiz good understanding of the education provided. Patient demo good return demo of skill of exercises.      Assessment  This is a 67 y.o. female referred to outpatient physical therapy and presents with a medical diagnosis of   Encounter Diagnosis   Name Primary?    Left shoulder pain, unspecified chronicity     and demonstrates limitations as described in the problem list. Pt will benefit from physcial therapy services in order to maximize pain free and/or functional use of left shoulder. The following goals were discussed with the patient and patient is in agreement with them as to be addressed in the treatment plan.   Pt tolerated all Tx activities with some c/o discomfort with active supine L shoulder flexion and L thoracic spine/rib cage passive mobilization.  Problem List: pain, decreased ROM, decreased flexibility, decreased strength and inability to perform ADL's independently..    GOALS: Short Term Goals:  3 weeks  1. Pt will demonstrate  increased L shoulder ROM by.  2. Pt Independent with HEP to improve ROM and independence with ADL's    Long Term Goals: 6 weeks  1.Report decreased    L  shoulder    pain  <   / =  2  /10  to increase tolerance for ADL's  2.Increased MMT  for  L UE  to increase tolerance for ADLs.  3.Increased PROM to WNL's of L shoulder to improve normal movement patterns during ADL's.  4. Pt will report improvement in overall functional abilities of UE, evidenced by improved score on FOTO Shoulder Survey.      Plan  Pt will be treated by physical therapy 2 times a week for 6 weeks for Pt Education, HEP, therapeutic exercises, neuromuscular re-education, joint mobilizations, modalities prn to achieve established goals. Jackelyn may at times be seen by a PTA as part of the Rehab Team.     Cont PT for  6         weeks.     I certify the need for these services furnished under this plan of treatment and while under my care.______________________________ Physician/Referring Practitioner  Date of Signature

## 2018-08-02 NOTE — PLAN OF CARE
Physical Therapy Evaluation  Visit:  1    Name: Jackelyn ARGUETA [facility] Number: [id number]    Jackelyn is a 67 y.o. female evaluated on 08/01/2018.     Diagnosis:   Encounter Diagnosis   Name Primary?    Left shoulder pain, unspecified chronicity        DOS: 8 years ago L RTC    Physician: Zoila Dos Santos PA-C  Treatment Orders: PT Eval and Treat    Past Medical History:   Diagnosis Date    Bronchitis     seasonal    Cataract     Diverticulitis     Dry eye syndrome     GERD (gastroesophageal reflux disease)     Hyperlipidemia     Hypertension     Obese     PONV (postoperative nausea and vomiting)     Thyroid disease      Current Outpatient Prescriptions   Medication Sig    acetic acid-hydrocortisone (VOSOL-HC) otic solution 2-4 drops to affected ear twice a day    albuterol 90 mcg/actuation inhaler Inhale 2 puffs into the lungs every 6 (six) hours as needed for Wheezing.    amLODIPine (NORVASC) 2.5 MG tablet     cetirizine (ZYRTEC) 10 MG tablet Take 1 tablet (10 mg total) by mouth once daily.    cycloSPORINE (RESTASIS) 0.05 % ophthalmic emulsion Place 0.4 mLs (1 drop total) into both eyes 2 (two) times daily.    docusate sodium (COLACE) 100 MG capsule Take 1 capsule (100 mg total) by mouth 2 (two) times daily. Available OTC as well    econazole nitrate 1 % cream Apply topically 2 (two) times daily. Qd- bid for ears. Ok for maintenance    fluocinonide (LIDEX) 0.05 % external solution APPLY TO SCALP ONCE DAILY AS NEEDED FOR FLARE    fluocinonide (LIDEX) 0.05 % external solution AAA scalp qday - bid prn pruritus    hyoscyamine (LEVSIN/SL) 0.125 mg Subl Take 1 tablet (0.125 mg total) by mouth every 6 (six) hours as needed.    ketoconazole (NIZORAL) 2 % shampoo APPLY TO DAMP SCALP EVERY OTHER DAY, LATHER AND LET SIT 5 MINUTES BEFORE RINSING    levothyroxine (SYNTHROID) 75 MCG tablet Take 1 tablet (75 mcg total) by mouth once daily.    meloxicam (MOBIC) 7.5 MG tablet Take 7.5 mg by mouth once daily.  "   Multi-Vitamin tablet Take by mouth.  Tablet Oral     OMEGA-3S/DHA/EPA/FISH OIL (OMEGA 3 ORAL)     omeprazole (PRILOSEC) 40 MG capsule Take 1 capsule (40 mg total) by mouth once daily. Capsule, Delayed Release(E.C.) Oral .  take one tablet daily    oxybutynin (DITROPAN-XL) 10 MG 24 hr tablet Take 1 tablet (10 mg total) by mouth once daily.    predniSONE (DELTASONE) 20 MG tablet 2 tabs po q day for 5 days    PSYLLIUM SEED, WITH SUGAR, (METAMUCIL ORAL) Take by mouth.    rosuvastatin (CRESTOR) 40 MG Tab Take 1 tablet (40 mg total) by mouth once daily.    triamcinolone acetonide 0.1% (KENALOG) 0.1 % cream AAA bid     No current facility-administered medications for this visit.      Review of patient's allergies indicates:   Allergen Reactions    Erythromycin (bulk) Nausea And Vomiting    Levaquin [levofloxacin]      Other reaction(s): Swelling     Precautions: Blood pressure/syncope  Occupation: Retired  Dominant hand: R      Subjective  Onset/GEOFFREY: gradual.  3 months ago  Involved Area/Location: left  Current Symptoms: L shoulder pain with certain movements    Increases symptoms: Raising her L arm over head  Decreases Symptoms: Avoiding painful movements    Previous Level of function: Independent in ADL's  Current level of function: Pain in L shoulder with "certain" movements    Diagnostic Tests: MRI    Pain Scale: Jackelyn rates pain to be 2.    Patient Goals: Pt would like to avoid surgery  Pt reported a Hx of an arthroscopic and RTC surgeries on the L shoulder - RTC 8 years ago    Objective    Functional Limitations  Reports - G Codes    Category:  carry   Tool:  FOTO Outcomes Measurement System.   Score:  Patient scored out 50 of 100 on the FOTO Outcomes Measurement System.   Current:  G 6982 CK   Goal:  G 8985 CJ         Posture: Rounded shoulders B    Passive Range of Motion: Shoulder   Left Right   Flexion: 115 175   IR: To abdomen @ 45 deg of abd 50   ER: 37 deg @ 45 degrees of abd 90   Abduction: 80 " 170      UE Strength:  L shoulder ER 3+/5 p, Flexion 3+/5 p    Special Tests: NA    Joint Mobility: hypomobile L GH joint  Palpation: L shoulder joint line and B upper thoracic L greater than R    PT Evaluation Completed? Yes  Discussed Plan of Care with patient: Yes    TREATMENT:  Pt performed there ex's for L  strengthening, ROM, flexibility and endurance including:  Manual Therapy x 15 min  L GH joint Inferior, lateral anterior and posterior glides    Therapeutic exercise x 10 min  Scapular retractions  Hand slides on plinth    Exercises were reviewed and pt was able to demonstrate them prior to the end of the session.    Jackelyn ruiz good understanding of the education provided. Patient demo good return demo of skill of exercises.      Assessment  This is a 67 y.o. female referred to outpatient physical therapy and presents with a medical diagnosis of   Encounter Diagnosis   Name Primary?    Left shoulder pain, unspecified chronicity     and demonstrates limitations as described in the problem list. Pt will benefit from physcial therapy services in order to maximize pain free and/or functional use of left shoulder. The following goals were discussed with the patient and patient is in agreement with them as to be addressed in the treatment plan.     Problem List: pain, decreased ROM, decreased flexibility, decreased strength and inability to perform ADL's independently..    GOALS: Short Term Goals:  3 weeks  1. Pt will demonstrate increased L shoulder ROM by.  2. Pt Independent with HEP to improve ROM and independence with ADL's    Long Term Goals: 6 weeks  1.Report decreased    L  shoulder    pain  <   / =  2  /10  to increase tolerance for ADL's  2.Increased MMT  for  L UE  to increase tolerance for ADLs.  3.Increased PROM to WNL's of L shoulder to improve normal movement patterns during ADL's.  4. Pt will report improvement in overall functional abilities of UE, evidenced by improved score on FOTO Shoulder  Survey.      Plan  Pt will be treated by physical therapy 2 times a week for 6 weeks for Pt Education, HEP, therapeutic exercises, neuromuscular re-education, joint mobilizations, modalities prn to achieve established goals. Jackelyn may at times be seen by a PTA as part of the Rehab Team.     Cont PT for  6         weeks.     I certify the need for these services furnished under this plan of treatment and while under my care.______________________________ Physician/Referring Practitioner  Date of Signature

## 2018-08-03 ENCOUNTER — PATIENT MESSAGE (OUTPATIENT)
Dept: UROLOGY | Facility: CLINIC | Age: 68
End: 2018-08-03

## 2018-08-03 ENCOUNTER — LAB VISIT (OUTPATIENT)
Dept: LAB | Facility: HOSPITAL | Age: 68
End: 2018-08-03
Attending: NURSE PRACTITIONER
Payer: MEDICARE

## 2018-08-03 DIAGNOSIS — N39.0 RECURRENT UTI: ICD-10-CM

## 2018-08-03 PROCEDURE — 87186 SC STD MICRODIL/AGAR DIL: CPT

## 2018-08-03 PROCEDURE — 87086 URINE CULTURE/COLONY COUNT: CPT

## 2018-08-03 PROCEDURE — 87088 URINE BACTERIA CULTURE: CPT

## 2018-08-03 PROCEDURE — 87077 CULTURE AEROBIC IDENTIFY: CPT

## 2018-08-06 ENCOUNTER — TELEPHONE (OUTPATIENT)
Dept: UROLOGY | Facility: CLINIC | Age: 68
End: 2018-08-06

## 2018-08-06 ENCOUNTER — PATIENT MESSAGE (OUTPATIENT)
Dept: UROLOGY | Facility: CLINIC | Age: 68
End: 2018-08-06

## 2018-08-06 LAB — BACTERIA UR CULT: NORMAL

## 2018-08-06 RX ORDER — OMEPRAZOLE 40 MG/1
CAPSULE, DELAYED RELEASE ORAL
Qty: 30 CAPSULE | Refills: 11 | Status: SHIPPED | OUTPATIENT
Start: 2018-08-06 | End: 2019-05-06 | Stop reason: SDUPTHER

## 2018-08-06 RX ORDER — AMLODIPINE BESYLATE 2.5 MG/1
TABLET ORAL
Qty: 30 TABLET | Refills: 11 | Status: SHIPPED | OUTPATIENT
Start: 2018-08-06 | End: 2019-05-06 | Stop reason: SDUPTHER

## 2018-08-06 RX ORDER — LEVOTHYROXINE SODIUM 75 UG/1
TABLET ORAL
Qty: 30 TABLET | Refills: 11 | Status: SHIPPED | OUTPATIENT
Start: 2018-08-06 | End: 2019-05-06 | Stop reason: SDUPTHER

## 2018-08-06 NOTE — TELEPHONE ENCOUNTER
Called patient regarding + UC (ESCHERICHIA COLI >100,000 cfu/ml.)    No answer to cell phone and not available on home phone. Will send message to patient through portal.  Will treat with Bactrim.

## 2018-08-07 DIAGNOSIS — A49.9 BACTERIAL UTI: Primary | ICD-10-CM

## 2018-08-07 DIAGNOSIS — N39.0 BACTERIAL UTI: Primary | ICD-10-CM

## 2018-08-07 RX ORDER — SULFAMETHOXAZOLE AND TRIMETHOPRIM 800; 160 MG/1; MG/1
1 TABLET ORAL 2 TIMES DAILY
Qty: 10 TABLET | Refills: 0 | Status: SHIPPED | OUTPATIENT
Start: 2018-08-07 | End: 2018-08-12

## 2018-08-07 NOTE — PROGRESS NOTES
+UC ESCHERICHIA COLI   >100,000 cfu/ml    Will treat with bactrim. Prescription sent to pharmacy.  Creatinine 0.8/ GFR >60  Allergy to erythromycin and levaquin

## 2018-08-09 ENCOUNTER — CLINICAL SUPPORT (OUTPATIENT)
Dept: REHABILITATION | Facility: HOSPITAL | Age: 68
End: 2018-08-09
Payer: MEDICARE

## 2018-08-09 DIAGNOSIS — M25.512 LEFT SHOULDER PAIN, UNSPECIFIED CHRONICITY: ICD-10-CM

## 2018-08-09 PROCEDURE — 97140 MANUAL THERAPY 1/> REGIONS: CPT | Mod: PO

## 2018-08-09 PROCEDURE — 97110 THERAPEUTIC EXERCISES: CPT | Mod: PO

## 2018-08-10 ENCOUNTER — PATIENT MESSAGE (OUTPATIENT)
Dept: UROLOGY | Facility: CLINIC | Age: 68
End: 2018-08-10

## 2018-08-10 NOTE — PROGRESS NOTES
Physical Therapy Daily note  Visit:  3    Name: Jackelyn ARGUETA [facility] Number: [id number]    Jackelyn is a 67 y.o. female treated on 08/09/2018.     Diagnosis:   Encounter Diagnosis   Name Primary?    Left shoulder pain, unspecified chronicity        DOS: 8 years ago L RTC    Physician: Zoila Dos Santos PA-C  Treatment Orders: PT Eval and Treat    Past Medical History:   Diagnosis Date    Bronchitis     seasonal    Cataract     Diverticulitis     Dry eye syndrome     GERD (gastroesophageal reflux disease)     Hyperlipidemia     Hypertension     Obese     PONV (postoperative nausea and vomiting)     Thyroid disease      Current Outpatient Prescriptions   Medication Sig    acetic acid-hydrocortisone (VOSOL-HC) otic solution 2-4 drops to affected ear twice a day    albuterol 90 mcg/actuation inhaler Inhale 2 puffs into the lungs every 6 (six) hours as needed for Wheezing.    amLODIPine (NORVASC) 2.5 MG tablet Take 1 tablet (2.5 mg total) by mouth once daily.    amLODIPine (NORVASC) 2.5 MG tablet TAKE ONE TABLET BY MOUTH EVERY DAY    cetirizine (ZYRTEC) 10 MG tablet Take 1 tablet (10 mg total) by mouth once daily.    cycloSPORINE (RESTASIS) 0.05 % ophthalmic emulsion Place 0.4 mLs (1 drop total) into both eyes 2 (two) times daily.    docusate sodium (COLACE) 100 MG capsule Take 1 capsule (100 mg total) by mouth 2 (two) times daily. Available OTC as well    econazole nitrate 1 % cream Apply topically 2 (two) times daily. Qd- bid for ears. Ok for maintenance    fluocinonide (LIDEX) 0.05 % external solution APPLY TO SCALP ONCE DAILY AS NEEDED FOR FLARE    fluocinonide (LIDEX) 0.05 % external solution AAA scalp qday - bid prn pruritus    hyoscyamine (LEVSIN/SL) 0.125 mg Subl Take 1 tablet (0.125 mg total) by mouth every 6 (six) hours as needed.    ketoconazole (NIZORAL) 2 % shampoo APPLY TO DAMP SCALP EVERY OTHER DAY, LATHER AND  "LET SIT 5 MINUTES BEFORE RINSING    levothyroxine (SYNTHROID) 75 MCG tablet Take 1 tablet (75 mcg total) by mouth once daily.    levothyroxine (SYNTHROID) 75 MCG tablet TAKE ONE TABLET BY MOUTH EVERY DAY    meloxicam (MOBIC) 7.5 MG tablet Take 7.5 mg by mouth once daily.    Multi-Vitamin tablet Take by mouth.  Tablet Oral     OMEGA-3S/DHA/EPA/FISH OIL (OMEGA 3 ORAL)     omeprazole (PRILOSEC) 40 MG capsule Take 1 capsule (40 mg total) by mouth once daily. Capsule, Delayed Release(E.C.) Oral .  take one tablet daily    omeprazole (PRILOSEC) 40 MG capsule TAKE ONE CAPSULE BY MOUTH EVERY DAY    oxybutynin (DITROPAN-XL) 10 MG 24 hr tablet Take 1 tablet (10 mg total) by mouth once daily.    predniSONE (DELTASONE) 20 MG tablet 2 tabs po q day for 5 days    PSYLLIUM SEED, WITH SUGAR, (METAMUCIL ORAL) Take by mouth.    rosuvastatin (CRESTOR) 40 MG Tab Take 1 tablet (40 mg total) by mouth once daily.    sulfamethoxazole-trimethoprim 800-160mg (BACTRIM DS) 800-160 mg Tab Take 1 tablet by mouth 2 (two) times daily. for 5 days    triamcinolone acetonide 0.1% (KENALOG) 0.1 % cream AAA bid     No current facility-administered medications for this visit.      Review of patient's allergies indicates:   Allergen Reactions    Erythromycin (bulk) Nausea And Vomiting    Levaquin [levofloxacin]      Other reaction(s): Swelling     Precautions: Blood pressure/syncope  Occupation: Retired  Dominant hand: R      Subjective  Onset/GEOFFREY: gradual.  3 months ago  Involved Area/Location: left  Current Symptoms: L shoulder pain with certain movements    Increases symptoms: Raising her L arm over head  Decreases Symptoms: Avoiding painful movements    Previous Level of function: Independent in ADL's  Current level of function: Pain in L shoulder with "certain" movements    Diagnostic Tests: MRI    Pain Scale: Jackelyn rates pain to be 2.    Patient Goals: Pt would like to avoid surgery  Pt reported a Hx of an arthroscopic and RTC " "surgeries on the L shoulder - RTC 8 years ago  Pt stated that she had a good time at the beach and that she continues to have pain with"certain" movements.  She also stated that her L arm will feel "heavy" from time to time  Objective    Functional Limitations  Reports - G Codes    Category:  carry   Tool:  FOTO Outcomes Measurement System.   Score:  Patient scored out 50 of 100 on the FOTO Outcomes Measurement System.   Current:  G 8984 CK   Goal:  G 8985 CJ         Posture: Rounded shoulders B    Passive Range of Motion: Shoulder - not reassessed   Left Right   Flexion: 115 175   IR: To abdomen @ 45 deg of abd 50   ER: 37 deg @ 45 degrees of abd 90   Abduction: 80 170      UE Strength:  L shoulder ER 3+/5 p, Flexion 3+/5 p    Special Tests: NA    Joint Mobility: hypomobile L GH joint  Palpation: L shoulder joint line and B upper thoracic L greater than R      TREATMENT:  Pt performed there ex's for L  strengthening, ROM, flexibility and endurance including:  Manual Therapy x 30 min  L GH joint Inferior, lateral anterior and posterior glides  Cx spine manual traction  Soft tissue mobilization Thoracic paraspinals and L upper trap  Passive mobilization thoracic spine and rib cage    Therapeutic exercise x 10 min  Sternal lifts - HEP  B shoulder pro/retraction 20 x 2 with ball on plinth  B shoulder horizontal abd/add with ball on plinth 20 x 2    Tx not performed today  Hand slides on plinth  Manually resisted L shoulder ir/er 10 x 3  Active L shoulder flexion  Exercises were reviewed and pt was able to demonstrate them prior to the end of the session.    Jackelyn ruiz good understanding of the education provided. Patient demo good return demo of skill of exercises.      Assessment  This is a 67 y.o. female referred to outpatient physical therapy and presents with a medical diagnosis of   Encounter Diagnosis   Name Primary?    Left shoulder pain, unspecified chronicity     and demonstrates limitations as described in " the problem list. Pt will benefit from physcial therapy services in order to maximize pain free and/or functional use of left shoulder. The following goals were discussed with the patient and patient is in agreement with them as to be addressed in the treatment plan.   Pt tolerated all Tx activities with some c/o discomfort.   She is hypomobile in her Thoracic spine L > R.  Pt appears to have a neural component to her L upper 1/4 Sx  Problem List: pain, decreased ROM, decreased flexibility, decreased strength and inability to perform ADL's independently..    GOALS: Short Term Goals:  3 weeks  1. Pt will demonstrate increased L shoulder ROM by.  2. Pt Independent with HEP to improve ROM and independence with ADL's    Long Term Goals: 6 weeks  1.Report decreased    L  shoulder    pain  <   / =  2  /10  to increase tolerance for ADL's  2.Increased MMT  for  L UE  to increase tolerance for ADLs.  3.Increased PROM to WNL's of L shoulder to improve normal movement patterns during ADL's.  4. Pt will report improvement in overall functional abilities of UE, evidenced by improved score on FOTO Shoulder Survey.      Plan  Pt will be treated by physical therapy 2 times a week for 6 weeks for Pt Education, HEP, therapeutic exercises, neuromuscular re-education, joint mobilizations, modalities prn to achieve established goals. Jackelyn may at times be seen by a PTA as part of the Rehab Team.     Cont PT for  6         weeks.     I certify the need for these services furnished under this plan of treatment and while under my care.______________________________ Physician/Referring Practitioner  Date of Signature

## 2018-08-14 NOTE — PROGRESS NOTES
Physical Therapy Daily Treatment Note     Name: Jackelyn ARGUETA [place]  Clinic Number: [id number]    Therapy Diagnosis:   Encounter Diagnosis   Name Primary?    Left shoulder pain, unspecified chronicity Yes     Physician: Zoila Dos Santos PA-C    Visit Date: 8/15/2018    Medical Diagnosis: Left shoulder pain, unspecified chronicity  Evaluation Date: 8/1/18    Visit #/Visits authorized: 4/20  Time In: 9:45   Time Out:10:45  Treatment time: 50  Total Billable Time:30 minutes    Precautions:Blood pressure/syncope    Subjective     Pt reports: diminished (L) shoulder symptoms at rest now.  SOme continued (L) shoulder discomfort with end range movements.   Pain: 3/10       Objective   Patient receives moist heat to (L) shoulder x 5 minutes for muscle relaxation prior to ex's.     Jackelyn received therapeutic exercises to develop UE  strength, endurance, ROM and flexibility for 40 minutes including:  AAROM on pulleys for flex to tolerance x 2 minutes each  Scap retract/row with YTB 2 x 10  Shoulder ext with YTB 2 x 10  Shoulder er/ir with YTB 2 x 10  B shoulder pro/retraction 20 x 2 with ball on plinth  B shoulder horizontal abd/add with ball on plinth 20 x 2  AAROM shoulder flex with dowel 2 x 10  Supine scap protract 2 x 10    Jackelyn received the following manual therapy techniques: Joint mobilizations were applied to the: (L) shoulder  for 10 minutes, including:  L GH joint Inferior, lateral anterior and posterior glides  Cx spine manual traction--NP  Soft tissue mobilization Thoracic paraspinals and L upper trap  Passive mobilization thoracic spine and rib cage--NP     Written Home Exercises Provided: Patient educated to continue with previously issued HEP to tolerance.  Exercises were reviewed and Jackelyn was able to demonstrate them prior to the end of the session.  Jackelyn demonstrated good  understanding of the education provided.        Assessment     Patient daria Tx fairly well. She was able to perform the above  ex's/activities within tolerable level of muscular fatigue and discomfort including progressing to perform pulley ROM ex's and scap/shoulder strengthening activities.  Some guarding evident initially with manual techniques. Some discomfort and decreased ROM lingering although she does report a reduction in resting (L) shoulder pain. Pain level remained tolerable post session. WIll monitor and attempt to progress as tolerated.     Pt will continue to benefit from skilled outpatient physical therapy to address the deficits listed in the problem list box on initial evaluation, provide pt/family education and to maximize pt's level of independence in the home and community environment.      GOALS: Short Term Goals:  3 weeks  1. Pt will demonstrate increased L shoulder ROM by.  2. Pt Independent with HEP to improve ROM and independence with ADL's     Long Term Goals: 6 weeks  1.Report decreased    L  shoulder    pain  <   / =  2  /10  to increase tolerance for ADL's  2.Increased MMT  for  L UE  to increase tolerance for ADLs.  3.Increased PROM to WNL's of L shoulder to improve normal movement patterns during ADL's.  4. Pt will report improvement in overall functional abilities of UE, evidenced by improved score on FOTO Shoulder Survey.       Plan     Continue PT towards established plan of care and goals.     Subhash Wayne, PTA

## 2018-08-15 ENCOUNTER — OFFICE VISIT (OUTPATIENT)
Dept: FAMILY MEDICINE | Facility: CLINIC | Age: 68
End: 2018-08-15
Payer: MEDICARE

## 2018-08-15 ENCOUNTER — CLINICAL SUPPORT (OUTPATIENT)
Dept: REHABILITATION | Facility: HOSPITAL | Age: 68
End: 2018-08-15
Payer: MEDICARE

## 2018-08-15 VITALS
HEIGHT: 61 IN | DIASTOLIC BLOOD PRESSURE: 74 MMHG | WEIGHT: 153.25 LBS | HEART RATE: 69 BPM | BODY MASS INDEX: 28.93 KG/M2 | SYSTOLIC BLOOD PRESSURE: 130 MMHG | TEMPERATURE: 98 F

## 2018-08-15 DIAGNOSIS — Z12.39 SCREENING BREAST EXAMINATION: Primary | ICD-10-CM

## 2018-08-15 DIAGNOSIS — Z00.00 ROUTINE GENERAL MEDICAL EXAMINATION AT A HEALTH CARE FACILITY: ICD-10-CM

## 2018-08-15 DIAGNOSIS — E78.5 HYPERLIPIDEMIA, UNSPECIFIED HYPERLIPIDEMIA TYPE: ICD-10-CM

## 2018-08-15 DIAGNOSIS — I10 ESSENTIAL HYPERTENSION: ICD-10-CM

## 2018-08-15 DIAGNOSIS — M25.512 LEFT SHOULDER PAIN, UNSPECIFIED CHRONICITY: Primary | ICD-10-CM

## 2018-08-15 PROCEDURE — 99999 PR PBB SHADOW E&M-EST. PATIENT-LVL V: CPT | Mod: PBBFAC,,, | Performed by: FAMILY MEDICINE

## 2018-08-15 PROCEDURE — 97140 MANUAL THERAPY 1/> REGIONS: CPT | Mod: PO

## 2018-08-15 PROCEDURE — 99215 OFFICE O/P EST HI 40 MIN: CPT | Mod: PBBFAC,PO | Performed by: FAMILY MEDICINE

## 2018-08-15 PROCEDURE — 99397 PER PM REEVAL EST PAT 65+ YR: CPT | Mod: S$PBB,,, | Performed by: FAMILY MEDICINE

## 2018-08-15 PROCEDURE — 97110 THERAPEUTIC EXERCISES: CPT | Mod: PO

## 2018-08-15 NOTE — PROGRESS NOTES
Subjective:       Patient ID: Jackelyn Kendrick is a 67 y.o. female.    Chief Complaint: Annual Exam    Disclaimer: This note has been generated using voice-recognition software. There may be typographical errors that have been missed during proof-reading    68 yo presents for routine exam, last exam one year ago  Immunizations:  UTD  Colonoscopy:  2014, repeat 10 years  Followed by Urology for recurrent UTIs      Review of Systems   Constitutional: Negative.  Negative for activity change, appetite change, chills, diaphoresis, fatigue, fever and unexpected weight change.   HENT: Negative.  Negative for congestion, ear pain, hearing loss, rhinorrhea, sinus pressure, sore throat, tinnitus, trouble swallowing and voice change.    Eyes: Negative.  Negative for photophobia, pain, discharge and visual disturbance.   Respiratory: Negative.  Negative for cough, chest tightness, shortness of breath and wheezing.    Cardiovascular: Negative.  Negative for chest pain, palpitations and leg swelling.   Gastrointestinal: Negative.  Negative for abdominal distention, abdominal pain, blood in stool, constipation, diarrhea, nausea and vomiting.   Endocrine: Negative for polydipsia and polyuria.   Genitourinary: Negative.  Negative for difficulty urinating, dysuria, frequency, hematuria and menstrual problem.   Musculoskeletal: Negative.  Negative for arthralgias, back pain, joint swelling, neck pain and neck stiffness.   Skin: Negative for color change, pallor and rash.   Neurological: Negative.  Negative for dizziness, tremors, speech difficulty, weakness, light-headedness, numbness and headaches.   Hematological: Negative for adenopathy. Does not bruise/bleed easily.   Psychiatric/Behavioral: Negative for agitation, confusion, dysphoric mood, hallucinations and sleep disturbance. The patient is not nervous/anxious.        Objective:      Physical Exam   Constitutional: She is oriented to person, place, and time. She appears  well-developed and well-nourished.   HENT:   Right Ear: Tympanic membrane, external ear and ear canal normal.   Left Ear: Tympanic membrane, external ear and ear canal normal.   Nose: Nose normal.   Mouth/Throat: Oropharynx is clear and moist. No posterior oropharyngeal erythema.   Eyes: Conjunctivae and EOM are normal. Pupils are equal, round, and reactive to light.   Neck: Normal range of motion. Neck supple. No tracheal deviation present. No thyromegaly present.   Cardiovascular: Normal rate, regular rhythm, normal heart sounds and intact distal pulses.   Pulmonary/Chest: Effort normal. She has no wheezes. She has no rales. She exhibits no tenderness.   Abdominal: Soft. Bowel sounds are normal. She exhibits no distension and no mass. There is no rebound.   Genitourinary:   Genitourinary Comments: Breasts show no masses or nipple retraction, no axillary adenopathy     Musculoskeletal: Normal range of motion. She exhibits no edema or tenderness.   Lymphadenopathy:     She has no cervical adenopathy.   Neurological: She is alert and oriented to person, place, and time. She has normal reflexes. No cranial nerve deficit. Coordination normal.   Skin: Skin is warm and dry. No rash noted. No erythema.   Psychiatric: She has a normal mood and affect. Her behavior is normal.       Assessment:       1.  Physical exam  2.  Hypertension  3.  Hyperlipidemia  4.  hypothyroidism  Plan:       1.  Labs reviewed with pt  2.  Continue present medications  3.  mammogram

## 2018-08-17 ENCOUNTER — CLINICAL SUPPORT (OUTPATIENT)
Dept: REHABILITATION | Facility: HOSPITAL | Age: 68
End: 2018-08-17
Payer: MEDICARE

## 2018-08-17 DIAGNOSIS — M25.512 LEFT SHOULDER PAIN, UNSPECIFIED CHRONICITY: Primary | ICD-10-CM

## 2018-08-17 PROCEDURE — 97110 THERAPEUTIC EXERCISES: CPT | Mod: PO

## 2018-08-17 PROCEDURE — 97140 MANUAL THERAPY 1/> REGIONS: CPT | Mod: PO

## 2018-08-17 NOTE — PROGRESS NOTES
Physical Therapy Daily Treatment Note     Name: Jackelyn Meilass  Clinic Number: [id number]    Therapy Diagnosis:   Encounter Diagnosis   Name Primary?    Left shoulder pain, unspecified chronicity Yes     Physician: Zoila Dos Santos PA-C    Visit Date: 8/17/2018    Medical Diagnosis: Left shoulder pain, unspecified chronicity  Evaluation Date: 8/1/18    Visit #/Visits authorized: 5/20  Time In: 9:00  Time Out9:55  Treatment time: 50  Total Billable Time:50minutes    Precautions:Blood pressure/syncope    Subjective     Pt reports: diminished (L) shoulder symptoms at rest now.  Some continued (L) shoulder discomfort with end range movements. Min residual soreness with ex's. States that she does not have much neck pain.  Pain: 3/10 to (L0 shoulder       Objective   Patient receives moist heat to (L) shoulder x 5 minutes for muscle relaxation prior to ex's.     Jackelyn received therapeutic exercises to develop UE  strength, endurance, ROM and flexibility for 40 minutes including:  AAROM on pulleys for flex to tolerance x 2 minutes each  Scap retract/row with YTB 2 x 10  Shoulder ext with YTB 2 x 10  Shoulder er/ir with YTB 2 x 10  B shoulder pro/retraction 20 x 2 with ball on plinth  B shoulder horizontal abd/add with ball on plinth 20 x 2  AAROM shoulder flex with dowel 2 x 10  Supine scap protract 2 x 10  Sidelying ER 2 x 10    Jackelyn received the following manual therapy techniques: Joint mobilizations were applied to the: (L) shoulder  for 10 minutes, including:  L GH joint Inferior, lateral anterior and posterior glides  Cx spine manual traction--NP  Soft tissue mobilization Thoracic paraspinals and L upper trap  Passive mobilization thoracic spine and rib cage--NP     Written Home Exercises Provided: Patient educated to continue with previously issued HEP to tolerance. She was provided with a copy of scap retract.row and shoulder ext with YTB.   Exercises were reviewed and Jackelyn was able to demonstrate them  prior to the end of the session.  Jackelyn demonstrated good  understanding of the education provided.        Assessment     Patient daria Tx fairly well. She was challenged with ER strengthening with YTB in standing with increased muscular fatigue reported.  Still  some guarding initially with manual techniques but this improves with time . Some discomfort and decreased ROM lingering although she does report a reduction in resting (L) shoulder pain which she was encouraged by.. Pain level remained tolerable post session. WIll monitor and attempt to progress as tolerated.     Pt will continue to benefit from skilled outpatient physical therapy to address the deficits listed in the problem list box on initial evaluation, provide pt/family education and to maximize pt's level of independence in the home and community environment.      GOALS: Short Term Goals:  3 weeks  1. Pt will demonstrate increased L shoulder ROM by.  2. Pt Independent with HEP to improve ROM and independence with ADL's     Long Term Goals: 6 weeks  1.Report decreased    L  shoulder    pain  <   / =  2  /10  to increase tolerance for ADL's  2.Increased MMT  for  L UE  to increase tolerance for ADLs.  3.Increased PROM to WNL's of L shoulder to improve normal movement patterns during ADL's.  4. Pt will report improvement in overall functional abilities of UE, evidenced by improved score on FOTO Shoulder Survey.       Plan     Continue PT towards established plan of care and goals.     Subhash Wayne, PTA

## 2018-08-21 ENCOUNTER — CLINICAL SUPPORT (OUTPATIENT)
Dept: REHABILITATION | Facility: HOSPITAL | Age: 68
End: 2018-08-21
Payer: MEDICARE

## 2018-08-21 DIAGNOSIS — M25.512 LEFT SHOULDER PAIN, UNSPECIFIED CHRONICITY: ICD-10-CM

## 2018-08-21 PROCEDURE — 97110 THERAPEUTIC EXERCISES: CPT | Mod: PO

## 2018-08-21 PROCEDURE — 97140 MANUAL THERAPY 1/> REGIONS: CPT | Mod: PO

## 2018-08-21 NOTE — PROGRESS NOTES
Physical Therapy Daily Treatment Note     Name: Jackelyn ARGUETA [place]  Clinic Number: [id number]    Therapy Diagnosis:   Encounter Diagnosis   Name Primary?    Left shoulder pain, unspecified chronicity      Physician: Zoila Dos Santos PA-C    Visit Date: 8/21/2018    Medical Diagnosis: Left shoulder pain, unspecified chronicity  Evaluation Date: 8/1/18    Visit #/Visits authorized: 5/20  Time In: 9:00  Time Out 10:00  Treatment time: 45  Total Billable Time: 45 minutes    Precautions:Blood pressure/syncope    Subjective     Pt reports: that she is feeling a little better.  Sharp pains do not ocure as often.  Pain: 3/10 to (L shoulder)       Objective   Patient receives moist heat to (L) shoulder x 5 minutes for muscle relaxation prior to ex's.     Jackelyn received therapeutic exercises to develop UE  strength, endurance, ROM and flexibility for 40 minutes including:  AAROM on pulleys for flex to tolerance x 2 minutes each  Scap retract/row with YTB 2 x 10  Shoulder ext with YTB 2 x 10  Shoulder er/ir with YTB 2 x 10  B shoulder pro/retraction 20 x 2 with ball on plinth  B shoulder horizontal abd/add with ball on plinth 20 x 2  AAROM shoulder flex with dowel 2 x 10  Supine scap protract 2 x 10  Sidelying ER 2 x 10    Jackelyn received the following manual therapy techniques: Joint mobilizations were applied to the: (L) shoulder  for 15 minutes, including:  L GH joint Inferior, lateral anterior and posterior glides  Cx spine manual traction  Soft tissue mobilization Thoracic paraspinals and L upper trap  Passive mobilization thoracic spine and rib cage     Written Home Exercises Provided: Patient educated to continue with previously issued HEP to tolerance. She was provided with a copy of scap retract.row and shoulder ext with YTB.   Exercises were reviewed and Jackelyn was able to demonstrate them prior to the end of the session.  Jackelyn demonstrated good  understanding of the education provided.        Assessment   Pt  tolerated all exercises well.  Passive mobilization of the Thoracic spine and rib cage will reproduce her L upper arm Sx.    Pt will continue to benefit from skilled outpatient physical therapy to address the deficits listed in the problem list box on initial evaluation, provide pt/family education and to maximize pt's level of independence in the home and community environment.      GOALS: Short Term Goals:  3 weeks  1. Pt will demonstrate increased L shoulder ROM by.  2. Pt Independent with HEP to improve ROM and independence with ADL's     Long Term Goals: 6 weeks  1.Report decreased    L  shoulder    pain  <   / =  2  /10  to increase tolerance for ADL's  2.Increased MMT  for  L UE  to increase tolerance for ADLs.  3.Increased PROM to WNL's of L shoulder to improve normal movement patterns during ADL's.  4. Pt will report improvement in overall functional abilities of UE, evidenced by improved score on FOTO Shoulder Survey.       Plan     Continue PT towards established plan of care and goals.     Fermin Frias, PT

## 2018-08-23 ENCOUNTER — CLINICAL SUPPORT (OUTPATIENT)
Dept: REHABILITATION | Facility: HOSPITAL | Age: 68
End: 2018-08-23
Payer: MEDICARE

## 2018-08-23 DIAGNOSIS — M25.512 LEFT SHOULDER PAIN, UNSPECIFIED CHRONICITY: ICD-10-CM

## 2018-08-23 PROCEDURE — 97110 THERAPEUTIC EXERCISES: CPT | Mod: PO

## 2018-08-23 PROCEDURE — 97140 MANUAL THERAPY 1/> REGIONS: CPT | Mod: PO

## 2018-08-23 NOTE — PROGRESS NOTES
Physical Therapy Daily Treatment Note     Name: Jackelyn ARGUETA [place]  Clinic Number: [id number]    Therapy Diagnosis:   Encounter Diagnosis   Name Primary?    Left shoulder pain, unspecified chronicity      Physician: Zoila Dos Santos PA-C    Visit Date: 8/23/2018    Medical Diagnosis: Left shoulder pain, unspecified chronicity  Evaluation Date: 8/1/18    Visit #/Visits authorized: 5/20  Time In: 9:00  Time Out 10:00  Treatment time: 55  Total Billable Time: 30 minutes    Precautions:Blood pressure/syncope    Subjective     Pt reports: that she is feeling a little better, but continues to have pain with reaching forward.  Pain: 3/10 to (L shoulder)       Objective   Patient receives moist heat to (L) shoulder x 5 minutes for muscle relaxation prior to ex's.     Jackelyn received therapeutic exercises to develop UE  strength, endurance, ROM and flexibility for 40 minutes including:  AAROM on pulleys for flex to tolerance x 2 minutes each  Scap retract/row with YTB 2 x 10  Shoulder ext with YTB 2 x 10  Shoulder er/ir with YTB 2 x 10  B shoulder pro/retraction 20 x 2 with ball on plinth  B shoulder horizontal abd/add with ball on plinth 20 x 2  AAROM shoulder flex with dowel 2 x 10  Supine scap protract 2 x 10  Sidelying ER 2 x 10    Jackelyn received the following manual therapy techniques: Joint mobilizations were applied to the: (L) shoulder  for 15 minutes, including:  L GH joint Inferior, lateral anterior and posterior glides  Cx spine manual traction  Soft tissue mobilization Thoracic paraspinals and L upper trap  Passive mobilization thoracic spine and rib cage     Written Home Exercises Provided: Patient educated to continue with previously issued HEP to tolerance. She was provided with a copy of scap retract.row and shoulder ext with YTB.   Exercises were reviewed and Jackelyn was able to demonstrate them prior to the end of the session.  Jackelyn demonstrated good  understanding of the education provided.         Assessment     Patient daria Tx fairly well.  Reaching remains painful   Pt will continue to benefit from skilled outpatient physical therapy to address the deficits listed in the problem list box on initial evaluation, provide pt/family education and to maximize pt's level of independence in the home and community environment.      GOALS: Short Term Goals:  3 weeks  1. Pt will demonstrate increased L shoulder ROM by.  2. Pt Independent with HEP to improve ROM and independence with ADL's     Long Term Goals: 6 weeks  1.Report decreased    L  shoulder    pain  <   / =  2  /10  to increase tolerance for ADL's  2.Increased MMT  for  L UE  to increase tolerance for ADLs.  3.Increased PROM to WNL's of L shoulder to improve normal movement patterns during ADL's.  4. Pt will report improvement in overall functional abilities of UE, evidenced by improved score on FOTO Shoulder Survey.       Plan     Continue PT towards established plan of care and goals.     Fermin Frias, PT

## 2018-08-27 ENCOUNTER — HOSPITAL ENCOUNTER (OUTPATIENT)
Dept: RADIOLOGY | Facility: HOSPITAL | Age: 68
Discharge: HOME | End: 2018-08-27
Attending: FAMILY MEDICINE
Payer: MEDICARE

## 2018-08-27 DIAGNOSIS — Z12.39 SCREENING BREAST EXAMINATION: ICD-10-CM

## 2018-08-27 PROCEDURE — 77063 BREAST TOMOSYNTHESIS BI: CPT | Mod: 26,,, | Performed by: RADIOLOGY

## 2018-08-27 PROCEDURE — 77067 SCR MAMMO BI INCL CAD: CPT | Mod: TC,PO

## 2018-08-27 PROCEDURE — 77067 SCR MAMMO BI INCL CAD: CPT | Mod: 26,,, | Performed by: RADIOLOGY

## 2018-08-28 ENCOUNTER — CLINICAL SUPPORT (OUTPATIENT)
Dept: REHABILITATION | Facility: HOSPITAL | Age: 68
End: 2018-08-28
Payer: MEDICARE

## 2018-08-28 DIAGNOSIS — M25.512 LEFT SHOULDER PAIN, UNSPECIFIED CHRONICITY: ICD-10-CM

## 2018-08-28 PROCEDURE — 97140 MANUAL THERAPY 1/> REGIONS: CPT | Mod: PO

## 2018-08-28 PROCEDURE — 97110 THERAPEUTIC EXERCISES: CPT | Mod: PO

## 2018-08-28 NOTE — PROGRESS NOTES
Physical Therapy Daily Treatment Note     Name: Jackelyn Meilass  Clinic Number: [id number]    Therapy Diagnosis:   Encounter Diagnosis   Name Primary?    Left shoulder pain, unspecified chronicity      Physician: Zoila Dos Santos PA-C    Visit Date: 8/28/2018    Medical Diagnosis: Left shoulder pain, unspecified chronicity  Evaluation Date: 8/1/18    Visit #/Visits authorized: 5/20  Time In: 9:00  Time Out 10:00  Treatment time: 55  Total Billable Time: 55 minutes    Precautions:Blood pressure/syncope    Subjective     Pt reports: that she is feeling a little better.  She continues to report achyness in the L shoulder, but is not having the sharp pains she was having in the past.  Seh also reported increased pain with reaching into the back of the refrigerator   Pain: 3/10 to (L shoulder)       Objective   Patient receives moist heat to (L) shoulder x 5 minutes for muscle relaxation prior to ex's. - np    Jackelyn received therapeutic exercises to develop UE  strength, endurance, ROM and flexibility for 10 minutes including:  AAROM on pulleys for flex to tolerance x 2 minutes each - np  Scap retract/row with YTB 2 x 10 - np  Shoulder ext with YTB 2 x 10 - np  Shoulder er/ir with YTB 2 x 10 - np  B shoulder pro/retraction 20 x 2 with ball on plinth  B shoulder horizontal abd/add with ball on plinth 20 x 2  AAROM shoulder flex with dowel 2 x 10 - np  Supine scap protract 2 x 10 - np  Sidelying ER 2 x 10 - np    Jackelyn received the following manual therapy techniques: Joint mobilizations were applied to the: (L) shoulder  for 35 minutes, including:  L GH joint Inferior, lateral anterior and posterior glides  Cx spine manual traction  Soft tissue mobilization Thoracic paraspinals and L upper trap  Passive mobilization thoracic spine and rib cage     Written Home Exercises Provided: Patient educated to continue with previously issued HEP to tolerance. She was provided with a copy of scap retract.row and shoulder ext with  JESSE.   Exercises were reviewed and Jackelyn was able to demonstrate them prior to the end of the session.  Jackelyn demonstrated good  understanding of the education provided.        Assessment     Patient daria Tx fairly well. She reported increased pain following L shoulder and Cx spine mobilization today Pain level remained tolerable post session. WIll monitor and attempt to progress as tolerated.     Pt will continue to benefit from skilled outpatient physical therapy to address the deficits listed in the problem list box on initial evaluation, provide pt/family education and to maximize pt's level of independence in the home and community environment.      GOALS: Short Term Goals:  3 weeks  1. Pt will demonstrate increased L shoulder ROM by.  2. Pt Independent with HEP to improve ROM and independence with ADL's     Long Term Goals: 6 weeks  1.Report decreased    L  shoulder    pain  <   / =  2  /10  to increase tolerance for ADL's  2.Increased MMT  for  L UE  to increase tolerance for ADLs.  3.Increased PROM to WNL's of L shoulder to improve normal movement patterns during ADL's.  4. Pt will report improvement in overall functional abilities of UE, evidenced by improved score on FOTO Shoulder Survey.       Plan     Continue PT towards established plan of care and goals.     Fermin Frias, PT

## 2018-08-30 ENCOUNTER — OFFICE VISIT (OUTPATIENT)
Dept: SPORTS MEDICINE | Facility: CLINIC | Age: 68
End: 2018-08-30
Payer: MEDICARE

## 2018-08-30 VITALS
HEIGHT: 61 IN | WEIGHT: 153 LBS | HEART RATE: 64 BPM | SYSTOLIC BLOOD PRESSURE: 131 MMHG | DIASTOLIC BLOOD PRESSURE: 70 MMHG | BODY MASS INDEX: 28.89 KG/M2

## 2018-08-30 DIAGNOSIS — M75.112 INCOMPLETE ROTATOR CUFF TEAR OR RUPTURE OF LEFT SHOULDER, NOT SPECIFIED AS TRAUMATIC: Primary | ICD-10-CM

## 2018-08-30 PROCEDURE — 99999 PR PBB SHADOW E&M-EST. PATIENT-LVL IV: CPT | Mod: PBBFAC,,, | Performed by: PHYSICIAN ASSISTANT

## 2018-08-30 PROCEDURE — 99214 OFFICE O/P EST MOD 30 MIN: CPT | Mod: S$PBB,,, | Performed by: PHYSICIAN ASSISTANT

## 2018-08-30 PROCEDURE — 99214 OFFICE O/P EST MOD 30 MIN: CPT | Mod: PBBFAC,PO | Performed by: PHYSICIAN ASSISTANT

## 2018-08-30 RX ORDER — DICLOFENAC SODIUM 10 MG/G
2 GEL TOPICAL 4 TIMES DAILY
Qty: 1 TUBE | Refills: 5 | Status: SHIPPED | OUTPATIENT
Start: 2018-08-30 | End: 2020-11-30

## 2018-08-30 NOTE — PROGRESS NOTES
CC: LEFT shoulder pain      67 y.o. Female here for follow up of left shoulder pain who has a history of left rototar cuff repair in 2010. She has been going to PT at [facility] with a little improvement. They are going to try dry needling next. She does not feel like the mobic is helping.  She states that her recent pain began about 3-4 months ago. She does not recall a specific injury or trauma. The patient reports that her shoulder feels the same as before her surgery.         She reports that the pain and weakness is worse with overhead activity. It also bothers her at night.    Is affecting ADLs.  Pain is 6/10 at it's worst.      Past Medical History:   Diagnosis Date    Bronchitis     seasonal    Cataract     Diverticulitis     Dry eye syndrome     GERD (gastroesophageal reflux disease)     Hyperlipidemia     Hypertension     Hypothyroidism 7/19/2012    Obese     PONV (postoperative nausea and vomiting)     Thyroid disease        Past Surgical History:   Procedure Laterality Date    BACK SURGERY      CHOLECYSTECTOMY      COLONOSCOPY  2007    diverticulosis    DE QUERVAIN'S RELEASE Left 03/2017    HERNIA REPAIR      HYSTERECTOMY      NASAL SEPTUM SURGERY      SHOULDER SURGERY      TONSILLECTOMY         Family History   Problem Relation Age of Onset    Cataracts Mother     Breast cancer Mother     Diabetes Mother     Hypertension Mother     Heart failure Mother     Cataracts Father     Hypertension Father     Amblyopia Neg Hx     Blindness Neg Hx     Glaucoma Neg Hx     Macular degeneration Neg Hx     Retinal detachment Neg Hx     Strabismus Neg Hx     Ovarian cancer Neg Hx     Melanoma Neg Hx          Current Outpatient Medications:     acetic acid-hydrocortisone (VOSOL-HC) otic solution, 2-4 drops to affected ear twice a day, Disp: 10 mL, Rfl: 2    albuterol 90 mcg/actuation inhaler, Inhale 2 puffs into the lungs every 6 (six) hours as needed for Wheezing., Disp: 6.7 g,  Rfl: 11    amLODIPine (NORVASC) 2.5 MG tablet, Take 1 tablet (2.5 mg total) by mouth once daily., Disp: 30 tablet, Rfl: 11    amLODIPine (NORVASC) 2.5 MG tablet, TAKE ONE TABLET BY MOUTH EVERY DAY, Disp: 30 tablet, Rfl: 11    cycloSPORINE (RESTASIS) 0.05 % ophthalmic emulsion, Place 0.4 mLs (1 drop total) into both eyes 2 (two) times daily., Disp: 60 each, Rfl: 12    docusate sodium (COLACE) 100 MG capsule, Take 1 capsule (100 mg total) by mouth 2 (two) times daily. Available OTC as well, Disp: 60 capsule, Rfl: 0    econazole nitrate 1 % cream, Apply topically 2 (two) times daily. Qd- bid for ears. Ok for maintenance, Disp: 60 g, Rfl: 1    fluocinonide (LIDEX) 0.05 % external solution, APPLY TO SCALP ONCE DAILY AS NEEDED FOR FLARE, Disp: 60 mL, Rfl: 3    fluocinonide (LIDEX) 0.05 % external solution, AAA scalp qday - bid prn pruritus, Disp: 60 mL, Rfl: 3    hyoscyamine (LEVSIN/SL) 0.125 mg Subl, Take 1 tablet (0.125 mg total) by mouth every 6 (six) hours as needed., Disp: 90 tablet, Rfl: 11    ketoconazole (NIZORAL) 2 % shampoo, APPLY TO DAMP SCALP EVERY OTHER DAY, LATHER AND LET SIT 5 MINUTES BEFORE RINSING, Disp: 120 mL, Rfl: 5    levothyroxine (SYNTHROID) 75 MCG tablet, Take 1 tablet (75 mcg total) by mouth once daily., Disp: 30 tablet, Rfl: 11    levothyroxine (SYNTHROID) 75 MCG tablet, TAKE ONE TABLET BY MOUTH EVERY DAY, Disp: 30 tablet, Rfl: 11    meloxicam (MOBIC) 7.5 MG tablet, Take 7.5 mg by mouth once daily., Disp: , Rfl:     Multi-Vitamin tablet, Take by mouth.  Tablet Oral , Disp: , Rfl:     OMEGA-3S/DHA/EPA/FISH OIL (OMEGA 3 ORAL), , Disp: , Rfl:     omeprazole (PRILOSEC) 40 MG capsule, Take 1 capsule (40 mg total) by mouth once daily. Capsule, Delayed Release(E.C.) Oral .  take one tablet daily, Disp: 30 capsule, Rfl: 11    omeprazole (PRILOSEC) 40 MG capsule, TAKE ONE CAPSULE BY MOUTH EVERY DAY, Disp: 30 capsule, Rfl: 11    predniSONE (DELTASONE) 20 MG tablet, 2 tabs po q day for 5  "days, Disp: 10 tablet, Rfl: 0    PSYLLIUM SEED, WITH SUGAR, (METAMUCIL ORAL), Take by mouth., Disp: , Rfl:     ranitidine (ZANTAC) 150 MG tablet, Take 1 tablet by mouth every evening., Disp: , Rfl:     triamcinolone acetonide 0.1% (KENALOG) 0.1 % cream, AAA bid, Disp: 60 g, Rfl: 3    cetirizine (ZYRTEC) 10 MG tablet, Take 1 tablet (10 mg total) by mouth once daily., Disp: , Rfl: 0    oxybutynin (DITROPAN-XL) 10 MG 24 hr tablet, Take 1 tablet (10 mg total) by mouth once daily., Disp: 30 tablet, Rfl: 11    rosuvastatin (CRESTOR) 40 MG Tab, Take 1 tablet (40 mg total) by mouth once daily., Disp: 90 tablet, Rfl: 3    Review of patient's allergies indicates:   Allergen Reactions    Erythromycin (bulk) Nausea And Vomiting    Levaquin [levofloxacin]      Other reaction(s): Swelling          REVIEW OF SYSTEMS:  Constitution: Negative. Negative for chills, fever and night sweats.   HENT: Negative for congestion and headaches.    Eyes: Negative for blurred vision, left vision loss and right vision loss.   Cardiovascular: Negative for chest pain and syncope.   Respiratory: Negative for cough and shortness of breath.    Endocrine: Negative for polydipsia, polyphagia and polyuria.   Hematologic/Lymphatic: Negative for bleeding problem. Does not bruise/bleed easily.   Skin: Negative for dry skin, itching and rash.   Musculoskeletal: Negative for falls.  Positive for left shoulder pain and muscle weakness.   Gastrointestinal: Negative for abdominal pain and bowel incontinence.   Genitourinary: Negative for bladder incontinence and nocturia.   Neurological: Negative for disturbances in coordination, loss of balance and seizures.   Psychiatric/Behavioral: Negative for depression. The patient does not have insomnia.    Allergic/Immunologic: Negative for hives and persistent infections.      PHYSICAL EXAMINATION:  Vitals:  /70   Pulse 64   Ht 5' 1" (1.549 m)   Wt 69.4 kg (153 lb)   BMI 28.91 kg/m²    General: The " patient is alert and oriented x 3.  Mood is pleasant.  Observation of ears, eyes and nose reveal no gross abnormalities.  No labored breathing observed.  Gait is coordinated. Patient can toe walk and heel walk without difficulty.      LEFT Shoulder / Upper Extremity Exam    OBSERVATION:     Swelling  none  Deformity  none   Discoloration  none   Scapular winging none   Scars   none  Atrophy  none    TENDERNESS / CREPITUS (T/C):          T/C      T/C   Clavicle   -/-  SUPRAspinatus    -/-     AC Jt.    -/-  INFRAspinatus  -/-    SC Jt.    -/-  Deltoid    -/-      G. Tuberosity  -/-  LH BICEP groove  -/-   Acromion:  -/-  Midline Neck   -/-     Scapular Spine -/-  Trapezium   -/-   SMA Scapula  -/-  GH jt. line - post  -/-     Scapulothoracic  -/-         ROM: (* = with pain)  Right shoulder   Left shoulder        AROM (PROM)   AROM (PROM)   FE    170° (175°)     120° (175°)     ER at 0°    60°  (65°)    60°  (65°)   ER at 90° ABD  90°  (90°)    90°  (90°)   IR at 90°  ABD   NA  (40°)     NA  (40°)      IR (spine level)   T10     L1    STRENGTH: (* = with pain) Right shoulder   Left shoulder    SCAPTION   5/5    4/5    IR    5/5    5/5   ER    5/5    4/5   BICEPS   5/5    5/5   Deltoid    5/5    4/5     SIGNS:  Painful side       NEER   -    OYEES  neg    BLACK   +    SPEEDS  neg     DROP ARM   +   BELLY PRESS neg   Superior escape none    LIFT-OFF  neg   X-Body ADD    neg    MOVING VALGUS neg        STABILITY TESTING    Right shoulder   Left shoulder    Translation     Anterior  up face     up face    Posterior  up face    up face    Sulcus   < 10mm    < 10 mm     Signs   Apprehension   neg      neg       Relocation   no change     no change      Jerk test  neg     neg    EXTREMITY NEURO-VASCULAR EXAM:    Sensation grossly intact to light touch all dermatomal regions.    DTR 2+ Biceps, Triceps, BR and Negative Janias sign   Grossly intact motor function at Elbow, Wrist and Hand   Distal pulses radial  and ulnar 2+, brisk cap refill, symmetric.      NECK:  Painless FROM and spinous processes non-tender. Negative Spurlings sign.      XRAYS:  Xrays including AP, Outlet and Axillary Lateral of Left shoulder are ordered / images reviewed by me:   No fracture dislocation or other pathology   Acromion type 1   Proximal migration of humeral head: None   GH arthritis: None    MRI Left shoulder 7/13/18    FINDINGS:  Rotator cuff: Status post rotator cuff repair low-grade undersurface tear of the infraspinatus tendon measuring 0.4 x 1.7 cm (AP x TV) with fluid extending to the myotendinous junction.  Supraspinatus, subscapularis, and teres minor tendons are intact.  Muscle bulk is maintained.    Labrum: Circumferential fraying.    Biceps: Postoperative changes of prior biceps tenotomy.    Bone: No fractures.  Bone marrow signal is unremarkable.    Acromioclavicular joint: Hypertrophy and moderate arthrosis of the AC joint.    Cartilage: Focal full-thickness fissuring noted over the humeral head superiorly.    Miscellaneous: No additional findings.    Impression:  1. Low-grade undersurface retear of the rotator cuff.  2. Acromioclavicular arthrosis.  3. Focal superior humeral head cartilage loss.      ASSESSMENT:   Left shoulder pain, low grade RC re-tear (infraspinatous)    PLAN:      1. Start Voltaren gel  2. Continue PT at [facility]  3. RTC as needed for follow-up, will consider injection in the future but patient scared of needles  4. She is not interested in surgery at this time    All questions were answered, patient will contact us for questions or concerns in the interim.

## 2018-08-31 ENCOUNTER — CLINICAL SUPPORT (OUTPATIENT)
Dept: REHABILITATION | Facility: HOSPITAL | Age: 68
End: 2018-08-31
Payer: MEDICARE

## 2018-08-31 DIAGNOSIS — M25.512 LEFT SHOULDER PAIN, UNSPECIFIED CHRONICITY: ICD-10-CM

## 2018-08-31 PROCEDURE — 97140 MANUAL THERAPY 1/> REGIONS: CPT | Mod: PO

## 2018-08-31 PROCEDURE — 97110 THERAPEUTIC EXERCISES: CPT | Mod: PO

## 2018-08-31 NOTE — PROGRESS NOTES
Physical Therapy Daily Treatment Note     Name: Jackelyn ARGUETA Whitney  Clinic Number: [id number]    Therapy Diagnosis:   Encounter Diagnosis   Name Primary?    Left shoulder pain, unspecified chronicity      Physician: Zoila Dos Santos PA-C    Visit Date: 8/31/2018    Medical Diagnosis: Left shoulder pain, unspecified chronicity  Evaluation Date: 8/1/18    Visit #/Visits authorized: 5/20  Time In: 10:30  Time Out 11:30  Treatment time: 55  Total Billable Time: 55 minutes    Precautions:Blood pressure/syncope    Subjective     Pt reports: that she is feeling a little better, although her L shoulder remains sore  Pain: 3/10 to (L shoulder)       Objective   Patient receives moist heat to (L) shoulder x 5 minutes for muscle relaxation prior to ex's. - np    Jackelyn received therapeutic exercises to develop UE  strength, endurance, ROM and flexibility for 25 minutes including:  AAROM on pulleys for flex to tolerance x 2 minutes each - np  Scap retract/row with OTB 2 x 10   Shoulder ext with OTB 2 x 10   Shoulder er/ir with YTB 2 x 10 - np  B shoulder pro/retraction 20 x 2 with ball on plinth  B shoulder horizontal abd/add with ball on plinth 20 x 2  AAROM shoulder flex with dowel 2 x 10 - np  Supine scap protract 2 x 10 - np  Sidelying ER 2 x 10 - np    Jackelyn received the following manual therapy techniques: Joint mobilizations were applied to the: (L) shoulder  for 25 minutes, including:  L GH joint Inferior, lateral anterior and posterior glides  Cx spine manual traction  Soft tissue mobilization Thoracic paraspinals and L upper trap  Passive mobilization thoracic spine and rib cage     Written Home Exercises Provided: Patient educated to continue with previously issued HEP to tolerance. She was provided with a copy of scap retract.row and shoulder ext with YTB.   Exercises were reviewed and Jackelyn was able to demonstrate them prior to the end of the session.  Jackelyn demonstrated good  understanding of the education  provided.        Assessment     Patient daria Tx fairly well. She reported increased pain following L shoulder and Cx spine mobilization today Pain level remained tolerable post session. WIll monitor and attempt to progress as tolerated.     Pt will continue to benefit from skilled outpatient physical therapy to address the deficits listed in the problem list box on initial evaluation, provide pt/family education and to maximize pt's level of independence in the home and community environment.      GOALS: Short Term Goals:  3 weeks  1. Pt will demonstrate increased L shoulder ROM by.  2. Pt Independent with HEP to improve ROM and independence with ADL's     Long Term Goals: 6 weeks  1.Report decreased    L  shoulder    pain  <   / =  2  /10  to increase tolerance for ADL's  2.Increased MMT  for  L UE  to increase tolerance for ADLs.  3.Increased PROM to WNL's of L shoulder to improve normal movement patterns during ADL's.  4. Pt will report improvement in overall functional abilities of UE, evidenced by improved score on FOTO Shoulder Survey.       Plan     Continue PT towards established plan of care and goals.     Fermin Frias, PT

## 2018-09-04 ENCOUNTER — CLINICAL SUPPORT (OUTPATIENT)
Dept: REHABILITATION | Facility: HOSPITAL | Age: 68
End: 2018-09-04
Payer: MEDICARE

## 2018-09-04 DIAGNOSIS — M25.512 LEFT SHOULDER PAIN, UNSPECIFIED CHRONICITY: ICD-10-CM

## 2018-09-04 PROCEDURE — 97140 MANUAL THERAPY 1/> REGIONS: CPT | Mod: PO

## 2018-09-04 PROCEDURE — 97110 THERAPEUTIC EXERCISES: CPT | Mod: PO

## 2018-09-04 RX ORDER — OXYBUTYNIN CHLORIDE 10 MG/1
TABLET, EXTENDED RELEASE ORAL
Qty: 30 TABLET | Refills: 11 | Status: SHIPPED | OUTPATIENT
Start: 2018-09-04 | End: 2019-10-01 | Stop reason: SDUPTHER

## 2018-09-04 RX ORDER — MELOXICAM 7.5 MG/1
TABLET ORAL
Qty: 60 TABLET | Refills: 11 | Status: SHIPPED | OUTPATIENT
Start: 2018-09-04 | End: 2019-09-02 | Stop reason: SDUPTHER

## 2018-09-04 RX ORDER — HYOSCYAMINE SULFATE 0.12 MG/1
TABLET SUBLINGUAL
Qty: 90 TABLET | Refills: 11 | Status: SHIPPED | OUTPATIENT
Start: 2018-09-04 | End: 2019-10-01 | Stop reason: SDUPTHER

## 2018-09-04 NOTE — PROGRESS NOTES
Physical Therapy Daily Treatment Note     Name: Jackelyn eMilass  Clinic Number: [id number]    Therapy Diagnosis:   Encounter Diagnosis   Name Primary?    Left shoulder pain, unspecified chronicity      Physician: Zoila Dos Santos PA-C    Visit Date: 9/4/2018    Medical Diagnosis: Left shoulder pain, unspecified chronicity  Evaluation Date: 8/1/18    Visit #/Visits authorized: 6/20  Time In: 2:00  Time Out 3:00  Treatment time: 55  Total Billable Time: 30 minutes    Precautions:Blood pressure/syncope    Subjective     Pt reports: that her shoulder is feeling about the same.  Pain: 3/10 to (L shoulder)       Objective   Patient receives moist heat to (L) shoulder x 5 minutes for muscle relaxation prior to ex's. - np    Jackelyn received therapeutic exercises to develop UE  strength, endurance, ROM and flexibility for 25 minutes including:  UBE x 4 min  AAROM on pulleys for flex to tolerance x 2 minutes each - np  Scap retract/row with OTB 3 x 15   Shoulder ext with OTB 3 x 10   Shoulder er/ir with YTB 2 x 10   Supine B shoulder horizontal Abduction with yellow TB 10 x 2      Jackelyn received the following manual therapy techniques: Joint mobilizations were applied to the: (L) shoulder  for 25 minutes, including:  L GH joint Inferior, lateral anterior and posterior glides  Cx spine manual traction  Soft tissue mobilization Thoracic paraspinals and L upper trap  Passive mobilization thoracic spine and rib cage    Tx not performed today:  B shoulder pro/retraction 20 x 2 with ball on plinth  B shoulder horizontal abd/add with ball on plinth 20 x 2  AAROM shoulder flex with dowel 2 x 10 - np  Supine scap protract 2 x 10 - np  Sidelying ER 2 x 10 - np  Cx spine manual traction       Written Home Exercises Provided: Patient educated to continue with previously issued HEP to tolerance. She was provided with a copy of scap retract.row and shoulder ext with YTB.   Exercises were reviewed and Jackelyn was able to demonstrate them  prior to the end of the session.  Jackelyn demonstrated good  understanding of the education provided.        Assessment     Patient daria Tx fairly well today. She reported increased pain following thoracic spine and rib cage mobilization.  Pt will continue to benefit from skilled outpatient physical therapy to address the deficits listed in the problem list box on initial evaluation, provide pt/family education and to maximize pt's level of independence in the home and community environment.      GOALS: Short Term Goals:  3 weeks  1. Pt will demonstrate increased L shoulder ROM by.  2. Pt Independent with HEP to improve ROM and independence with ADL's     Long Term Goals: 6 weeks  1.Report decreased    L  shoulder    pain  <   / =  2  /10  to increase tolerance for ADL's  2.Increased MMT  for  L UE  to increase tolerance for ADLs.  3.Increased PROM to WNL's of L shoulder to improve normal movement patterns during ADL's.  4. Pt will report improvement in overall functional abilities of UE, evidenced by improved score on FOTO Shoulder Survey.       Plan     Continue PT towards established plan of care and goals.     Fermin Frias, PT

## 2018-09-06 ENCOUNTER — CLINICAL SUPPORT (OUTPATIENT)
Dept: REHABILITATION | Facility: HOSPITAL | Age: 68
End: 2018-09-06
Payer: MEDICARE

## 2018-09-06 DIAGNOSIS — M25.512 LEFT SHOULDER PAIN, UNSPECIFIED CHRONICITY: ICD-10-CM

## 2018-09-06 PROCEDURE — 97140 MANUAL THERAPY 1/> REGIONS: CPT | Mod: PO

## 2018-09-06 PROCEDURE — 97110 THERAPEUTIC EXERCISES: CPT | Mod: PO

## 2018-09-06 NOTE — PROGRESS NOTES
Physical Therapy Daily Treatment Note     Name: Jackelyn Meilass  Clinic Number: [id number]    Therapy Diagnosis:   Encounter Diagnosis   Name Primary?    Left shoulder pain, unspecified chronicity      Physician: Zoila Dos Santos PA-C    Visit Date: 9/6/2018    Medical Diagnosis: Left shoulder pain, unspecified chronicity  Evaluation Date: 8/1/18    Visit #/Visits authorized: 6/20  Time In: 8:00  Time Out 8:55  Treatment time: 55  Total Billable Time: 55 minutes    Precautions:Blood pressure/syncope    Subjective     Pt reports: that her shoulder is feeling about the same.  Pain: 3/10 to (L shoulder)       Objective   Patient receives moist heat to (L) shoulder x 5 minutes for muscle relaxation prior to ex's. - np    Jackelyn received therapeutic exercises to develop UE  strength, endurance, ROM and flexibility for 25 minutes including:  UBE x 4 min  AAROM on pulleys for flex to tolerance x 2 minutes each - np  Scap retract/row with OTB 3 x 15   Shoulder ext with OTB 3 x 10   Shoulder er/ir with YTB 2 x 10   Supine B shoulder horizontal Abduction with yellow TB 10 x 2      Jackelyn received the following manual therapy techniques: Joint mobilizations were applied to the: (L) shoulder  for 25 minutes, including:  L GH joint Inferior, lateral anterior and posterior glides  Cx spine manual traction  Soft tissue mobilization Thoracic paraspinals and L upper trap  Passive mobilization thoracic spine and rib cage    Tx not performed today:  B shoulder pro/retraction 20 x 2 with ball on plinth  B shoulder horizontal abd/add with ball on plinth 20 x 2  AAROM shoulder flex with dowel 2 x 10 - np  Supine scap protract 2 x 10 - np  Sidelying ER 2 x 10 - np  Cx spine manual traction       Written Home Exercises Provided: Patient educated to continue with previously issued HEP to tolerance. She was provided with a copy of scap retract.row and shoulder ext with YTB.   Exercises were reviewed and Jackelyn was able to demonstrate them  prior to the end of the session.  Jackelyn demonstrated good  understanding of the education provided.        Assessment     Patient daria Tx fairly well today. She reported increased pain following thoracic spine and rib cage mobilization.  Pt will continue to benefit from skilled outpatient physical therapy to address the deficits listed in the problem list box on initial evaluation, provide pt/family education and to maximize pt's level of independence in the home and community environment.      GOALS: Short Term Goals:  3 weeks  1. Pt will demonstrate increased L shoulder ROM by.  2. Pt Independent with HEP to improve ROM and independence with ADL's     Long Term Goals: 6 weeks  1.Report decreased    L  shoulder    pain  <   / =  2  /10  to increase tolerance for ADL's  2.Increased MMT  for  L UE  to increase tolerance for ADLs.  3.Increased PROM to WNL's of L shoulder to improve normal movement patterns during ADL's.  4. Pt will report improvement in overall functional abilities of UE, evidenced by improved score on FOTO Shoulder Survey.       Plan     Continue PT towards established plan of care and goals.     Fermin Frias, PT

## 2018-09-11 ENCOUNTER — CLINICAL SUPPORT (OUTPATIENT)
Dept: REHABILITATION | Facility: HOSPITAL | Age: 68
End: 2018-09-11
Payer: MEDICARE

## 2018-09-11 DIAGNOSIS — M25.512 LEFT SHOULDER PAIN, UNSPECIFIED CHRONICITY: ICD-10-CM

## 2018-09-11 PROCEDURE — 97140 MANUAL THERAPY 1/> REGIONS: CPT | Mod: PO

## 2018-09-11 PROCEDURE — 97110 THERAPEUTIC EXERCISES: CPT | Mod: PO

## 2018-09-13 ENCOUNTER — CLINICAL SUPPORT (OUTPATIENT)
Dept: REHABILITATION | Facility: HOSPITAL | Age: 68
End: 2018-09-13
Payer: MEDICARE

## 2018-09-13 DIAGNOSIS — M25.512 LEFT SHOULDER PAIN, UNSPECIFIED CHRONICITY: ICD-10-CM

## 2018-09-13 PROCEDURE — 97140 MANUAL THERAPY 1/> REGIONS: CPT | Mod: PO

## 2018-09-13 NOTE — PROGRESS NOTES
Physical Therapy Daily Treatment Note     Name: Jackelyn Kendrick  Clinic Number: [id number]    Therapy Diagnosis:   Encounter Diagnosis   Name Primary?    Left shoulder pain, unspecified chronicity      Physician: Zoila Dos Santos PA-C    Visit Date: 9/13/2018    Medical Diagnosis: Left shoulder pain, unspecified chronicity  Evaluation Date: 8/1/18    Visit #/Visits authorized: 6/20  Time In: 10:00  Time Out11:00  Treatment time: 55  Total Billable Time: 30 minutes    Precautions:Blood pressure/syncope    Subjective     Pt reports: that her shoulder is feeling better and that she is able to sleep on the L side with less discomfort  Pain: 3/10 to (L shoulder)       Objective   Patient receives moist heat to (L) shoulder x 5 minutes for muscle relaxation prior to ex's. - np    Jackelyn received therapeutic exercises to develop UE  strength, endurance, ROM and flexibility for 4 minutes including:  UBE x 4 min      Jackelyn received the following manual therapy techniques: Joint mobilizations were applied to the: (L) shoulder  for 25 minutes, including:  L GH joint Inferior, lateral anterior and posterior glides  Cx spine manual traction  Soft tissue mobilization Thoracic paraspinals and L upper trap  Passive mobilization thoracic spine and rib cage    Tx not performed today:  B shoulder pro/retraction 20 x 2 with ball on plinth  B shoulder horizontal abd/add with ball on plinth 20 x 2  AAROM shoulder flex with dowel 2 x 10 - np  Supine scap protract 2 x 10 - np  Sidelying ER 2 x 10 - np  AAROM on pulleys for flex to tolerance x 2 minutes each - np  Scap retract/row with OTB 3 x 15 - np  Shoulder ext with OTB 3 x 10 - np  Shoulder er/ir with YTB 2 x 10 - np  Supine B shoulder horizontal Abduction with yellow TB 10 x 2 - np           Written Home Exercises Provided: Patient educated to continue with previously issued HEP to tolerance. She was provided with a copy of [email or web address] [email or web address] and shoulder ext with YTB.   Exercises  were reviewed and Jackelyn was able to demonstrate them prior to the end of the session.  Jackelyn demonstrated good  understanding of the education provided.        Assessment     Patient daria Tx fairly well today. She reported increased pain with thoracic spine and rib cage mobilization in her L thoracic region and L shoulder.  Pt will continue to benefit from skilled outpatient physical therapy to address the deficits listed in the problem list box on initial evaluation, provide pt/family education and to maximize pt's level of independence in the home and community environment.      GOALS: Short Term Goals:  3 weeks  1. Pt will demonstrate increased L shoulder ROM by.  2. Pt Independent with HEP to improve ROM and independence with ADL's     Long Term Goals: 6 weeks  1.Report decreased    L  shoulder    pain  <   / =  2  /10  to increase tolerance for ADL's  2.Increased MMT  for  L UE  to increase tolerance for ADLs.  3.Increased PROM to WNL's of L shoulder to improve normal movement patterns during ADL's.  4. Pt will report improvement in overall functional abilities of UE, evidenced by improved score on FOTO Shoulder Survey.       Plan     Continue PT towards established plan of care and goals.     Fermin Frias, PT

## 2018-09-18 ENCOUNTER — CLINICAL SUPPORT (OUTPATIENT)
Dept: REHABILITATION | Facility: HOSPITAL | Age: 68
End: 2018-09-18
Payer: MEDICARE

## 2018-09-18 DIAGNOSIS — M25.512 LEFT SHOULDER PAIN, UNSPECIFIED CHRONICITY: ICD-10-CM

## 2018-09-18 PROCEDURE — 97110 THERAPEUTIC EXERCISES: CPT | Mod: PO

## 2018-09-18 PROCEDURE — 97012 MECHANICAL TRACTION THERAPY: CPT | Mod: PO

## 2018-09-18 PROCEDURE — 97140 MANUAL THERAPY 1/> REGIONS: CPT | Mod: PO,59

## 2018-09-18 NOTE — PROGRESS NOTES
Physical Therapy Daily Treatment Note     Name: Jackelyn Kendrick  Clinic Number: [id number]    Therapy Diagnosis:   Encounter Diagnosis   Name Primary?    Left shoulder pain, unspecified chronicity      Physician: Zoila Dos Santos PA-C    Visit Date: 9/18/2018    Medical Diagnosis: Left shoulder pain, unspecified chronicity  Evaluation Date: 8/1/18    Visit #/Visits authorized: 6/20  Time In: 9:00  Time Out10:00  Treatment time: 55  Total Billable Time: 30 minutes    Precautions:Blood pressure/syncope    Subjective     Pt reports: continued L shoulder pain   Pain: 3/10 to (L shoulder)       Objective   Patient receives moist heat to (L) shoulder x 5 minutes for muscle relaxation prior to ex's. - np    Jackelyn received therapeutic exercises to develop UE  strength, endurance, ROM and flexibility for 15 minutes including:  UBE x 4 min  Supine B shoulder horizontal Abduction with yellow TB 10 x 2  Shoulder ext with OTB 3 x 10   Standing rows 10 x 3 with orange TB    Modalities: Castro mechanical traction w 12# x 15 min    Jackelyn received the following manual therapy techniques: Joint mobilizations were applied to the: (L) shoulder  for 00 minutes, including:   Soft tissue mobilization Thoracic paraspinals and L upper trap  Passive mobilization thoracic spine and rib cage    Tx not performed today:  B shoulder pro/retraction 20 x 2 with ball on plinth  B shoulder horizontal abd/add with ball on plinth 20 x 2  AAROM shoulder flex with dowel 2 x 10 - np  Supine scap protract 2 x 10 - np  Sidelying ER 2 x 10 - np  AAROM on pulleys for flex to tolerance x 2 minutes each - np  Scap retract/row with OTB 3 x 15 - np  Shoulder er/ir with YTB 2 x 10 - np  L GH joint Inferior, lateral anterior and posterior glides  Cx spine manual traction           Written Home Exercises Provided: Patient educated to continue with previously issued HEP to tolerance. She was provided with a copy of [email or web address] [email or web address].row and shoulder ext with YTB.    Exercises were reviewed and Jackelyn was able to demonstrate them prior to the end of the session.  Jackelyn demonstrated good  understanding of the education provided.        Assessment     Patient daria Tx fairly well today. She reported increased pain with thoracic spine and rib cage mobilization in her L thoracic region and L shoulder.  Pt will continue to benefit from skilled outpatient physical therapy to address the deficits listed in the problem list box on initial evaluation, provide pt/family education and to maximize pt's level of independence in the home and community environment.      GOALS: Short Term Goals:  3 weeks  1. Pt will demonstrate increased L shoulder ROM by.  2. Pt Independent with HEP to improve ROM and independence with ADL's     Long Term Goals: 6 weeks  1.Report decreased    L  shoulder    pain  <   / =  2  /10  to increase tolerance for ADL's  2.Increased MMT  for  L UE  to increase tolerance for ADLs.  3.Increased PROM to WNL's of L shoulder to improve normal movement patterns during ADL's.  4. Pt will report improvement in overall functional abilities of UE, evidenced by improved score on FOTO Shoulder Survey.       Plan     Continue PT towards established plan of care and goals.     Fermin Frias, PT

## 2018-09-20 ENCOUNTER — CLINICAL SUPPORT (OUTPATIENT)
Dept: REHABILITATION | Facility: HOSPITAL | Age: 68
End: 2018-09-20
Payer: MEDICARE

## 2018-09-20 DIAGNOSIS — M25.512 LEFT SHOULDER PAIN, UNSPECIFIED CHRONICITY: ICD-10-CM

## 2018-09-20 PROCEDURE — 97110 THERAPEUTIC EXERCISES: CPT | Mod: PO

## 2018-09-20 PROCEDURE — 97140 MANUAL THERAPY 1/> REGIONS: CPT | Mod: PO

## 2018-09-25 ENCOUNTER — CLINICAL SUPPORT (OUTPATIENT)
Dept: REHABILITATION | Facility: HOSPITAL | Age: 68
End: 2018-09-25
Payer: MEDICARE

## 2018-09-25 DIAGNOSIS — M25.512 LEFT SHOULDER PAIN, UNSPECIFIED CHRONICITY: ICD-10-CM

## 2018-09-25 PROCEDURE — 97140 MANUAL THERAPY 1/> REGIONS: CPT | Mod: PO

## 2018-09-25 PROCEDURE — 97110 THERAPEUTIC EXERCISES: CPT | Mod: PO

## 2018-09-25 NOTE — PROGRESS NOTES
Physical Therapy Daily Treatment Note     Name: Jackelyn Kendrick  Clinic Number: [id number]    Therapy Diagnosis:   Encounter Diagnosis   Name Primary?    Left shoulder pain, unspecified chronicity      Physician: Zoila Dos Santos PA-C    Visit Date: 9/25/2018    Medical Diagnosis: Left shoulder pain, unspecified chronicity  Evaluation Date: 8/1/18    Visit #/Visits authorized: 6/20  Time In: 8:00  Time Out9:00  Treatment time: 55  Total Billable Time: 30 minutes    Precautions:Blood pressure/syncope    Subjective     Pt reports: that she has pain in her shoulder with seated B shoulder horizontal abduction and side lying L shoulder ER at end range  Pain: 3/10 to (L shoulder)       Objective   Patient receives moist heat to (L) shoulder x 5 minutes for muscle relaxation prior to ex's. - np    Jackelyn received therapeutic exercises to develop UE  strength, endurance, ROM and flexibility for 15 minutes including:  UBE x 4 min  Instruction in seated resisted B shoulder retractions  Instruction in R side lying L shoulder ER avoiding painful end range    Modalities: Castro mechanical traction w 12# x 15 min    Jackelyn received the following manual therapy techniques: Joint mobilizations were applied to the: (L) shoulder  for 00 minutes, including: Not performed today  L GH joint Inferior, lateral anterior and posterior glides  Cx spine manual traction  Soft tissue mobilization Thoracic paraspinals and L upper trap  Passive mobilization thoracic spine and rib cage    Tx not performed today:  B shoulder pro/retraction 20 x 2 with ball on plinth  B shoulder horizontal abd/add with ball on plinth 20 x 2  AAROM shoulder flex with dowel 2 x 10 - np  Supine scap protract 2 x 10 - np  Sidelying ER 2 x 10 - np  AAROM on pulleys for flex to tolerance x 2 minutes each - np  Scap retract/row with OTB 3 x 15 - np  Shoulder ext with OTB 3 x 10 - np  Shoulder er/ir with YTB 2 x 10 - np  Supine B shoulder horizontal Abduction with  yellow TB 10 x 2 - np           Written Home Exercises Provided: Patient educated to continue with previously issued HEP to tolerance. She was provided with a copy of scap retract.row and shoulder ext with YTB.   Exercises were reviewed and Jackelyn was able to demonstrate them prior to the end of the session.  Jackelyn demonstrated good  understanding of the education provided.        Assessment     Patient daria Tx fairly well today. She reported soreness with passive mobilization of the Thoracic spine.  Attempts at passive mobilization of the L shoulder increased her L upper arm pian.  Pt will continue to benefit from skilled outpatient physical therapy to address the deficits listed in the problem list box on initial evaluation, provide pt/family education and to maximize pt's level of independence in the home and community environment.      GOALS: Short Term Goals:  3 weeks  1. Pt will demonstrate increased L shoulder ROM by.  2. Pt Independent with HEP to improve ROM and independence with ADL's     Long Term Goals: 6 weeks  1.Report decreased    L  shoulder    pain  <   / =  2  /10  to increase tolerance for ADL's  2.Increased MMT  for  L UE  to increase tolerance for ADLs.  3.Increased PROM to WNL's of L shoulder to improve normal movement patterns during ADL's.  4. Pt will report improvement in overall functional abilities of UE, evidenced by improved score on FOTO Shoulder Survey.       Plan     Continue PT towards established plan of care and goals.     Fermin Frias, PT

## 2018-09-27 ENCOUNTER — CLINICAL SUPPORT (OUTPATIENT)
Dept: REHABILITATION | Facility: HOSPITAL | Age: 68
End: 2018-09-27
Payer: MEDICARE

## 2018-09-27 DIAGNOSIS — M25.512 LEFT SHOULDER PAIN, UNSPECIFIED CHRONICITY: ICD-10-CM

## 2018-09-27 PROCEDURE — 97140 MANUAL THERAPY 1/> REGIONS: CPT | Mod: PO

## 2018-09-27 PROCEDURE — 97110 THERAPEUTIC EXERCISES: CPT | Mod: PO

## 2018-10-05 ENCOUNTER — CLINICAL SUPPORT (OUTPATIENT)
Dept: REHABILITATION | Facility: HOSPITAL | Age: 68
End: 2018-10-05
Payer: MEDICARE

## 2018-10-05 DIAGNOSIS — M25.512 LEFT SHOULDER PAIN, UNSPECIFIED CHRONICITY: ICD-10-CM

## 2018-10-05 PROCEDURE — 97110 THERAPEUTIC EXERCISES: CPT | Mod: PO

## 2018-10-05 PROCEDURE — 97140 MANUAL THERAPY 1/> REGIONS: CPT | Mod: PO

## 2018-10-05 NOTE — PROGRESS NOTES
Physical Therapy Daily Treatment Note     Name: Jackelyn Meilass  Clinic Number: [id number]    Therapy Diagnosis:   Encounter Diagnosis   Name Primary?    Left shoulder pain, unspecified chronicity      Physician: Zoila Dos Santos PA-C    Visit Date: 10/5/2018    Medical Diagnosis: Left shoulder pain, unspecified chronicity  Evaluation Date: 8/1/18    Visit #/Visits authorized: 7/20  Time In: 9:30  Time Out10:30  Treatment time: 55  Total Billable Time: 55 minutes    Precautions:Blood pressure/syncope    Subjective     Pt reports: that she is not too bad today.  Pain remains at 3/10 at worse.  Pain: 3/10 to (L shoulder)       Objective       Jackelyn received therapeutic exercises to develop UE  strength, endurance, ROM and flexibility for 30 minutes including:  B shoulder pro/retraction 20 x 2 with ball on plinth  B shoulder horizontal abd/add with ball on plinth 20 x 2  UBE x 4 min  Standing Rows 10 x 3 with green TB  Standing B shoulder ext 10 x 3  Side lying L shoulder horizontal abduction      Jackelyn received the following manual therapy techniques: Joint mobilizations were applied to the: (L) shoulder  for 15 minutes, including:   Soft tissue mobilization Thoracic paraspinals and L upper trap  Passive mobilization thoracic spine and rib cage    Tx not performed today:  AAROM shoulder flex with dowel 2 x 10 - np  Supine scap protract 2 x 10 - np  Sidelying ER 2 x 10 - np  AAROM on pulleys for flex to tolerance x 2 minutes each - np  Scap retract/row with OTB 3 x 15 - np  Shoulder ext with OTB 3 x 10 - np  Shoulder er/ir with YTB 2 x 10 - np  Supine B shoulder horizontal Abduction with yellow TB 10 x 2 - np  Modalities: Castro mechanical traction w 12# x 15 min  L GH joint Inferior, lateral anterior and posterior glides  Cx spine manual traction  Patient receives moist heat to (L) shoulder x 5 minutes for muscle relaxation prior to ex's.      Written Home Exercises Provided: Patient educated to continue with  previously issued HEP to tolerance. She was provided with a copy of scap retract.row and shoulder ext with YTB.   Exercises were reviewed and Jackelyn was able to demonstrate them prior to the end of the session.  Jackelyn demonstrated good  understanding of the education provided.        Assessment     Patient daria Tx fairly well today.   Pt will continue to benefit from skilled outpatient physical therapy to address the deficits listed in the problem list box on initial evaluation, provide pt/family education and to maximize pt's level of independence in the home and community environment.      GOALS: Short Term Goals:  3 weeks  1. Pt will demonstrate increased L shoulder ROM by.  2. Pt Independent with HEP to improve ROM and independence with ADL's     Long Term Goals: 6 weeks  1.Report decreased    L  shoulder    pain  <   / =  2  /10  to increase tolerance for ADL's  2.Increased MMT  for  L UE  to increase tolerance for ADLs.  3.Increased PROM to WNL's of L shoulder to improve normal movement patterns during ADL's.  4. Pt will report improvement in overall functional abilities of UE, evidenced by improved score on FOTO Shoulder Survey.       Plan     Continue PT towards established plan of care and goals.     Fermin Frias, PT

## 2018-10-09 ENCOUNTER — CLINICAL SUPPORT (OUTPATIENT)
Dept: REHABILITATION | Facility: HOSPITAL | Age: 68
End: 2018-10-09
Payer: MEDICARE

## 2018-10-09 ENCOUNTER — PATIENT MESSAGE (OUTPATIENT)
Dept: SPORTS MEDICINE | Facility: CLINIC | Age: 68
End: 2018-10-09

## 2018-10-09 DIAGNOSIS — M25.512 LEFT SHOULDER PAIN, UNSPECIFIED CHRONICITY: ICD-10-CM

## 2018-10-09 PROCEDURE — 97110 THERAPEUTIC EXERCISES: CPT | Mod: PO

## 2018-10-09 PROCEDURE — 97140 MANUAL THERAPY 1/> REGIONS: CPT | Mod: PO

## 2018-10-10 NOTE — PROGRESS NOTES
Physical Therapy Daily Treatment Note     Name: Jackelyn Meilass  Clinic Number: [id number]    Therapy Diagnosis:   Encounter Diagnosis   Name Primary?    Left shoulder pain, unspecified chronicity      Physician: Zoila Dos Santos PA-C    Visit Date: 10/9/2018    Medical Diagnosis: Left shoulder pain, unspecified chronicity  Evaluation Date: 8/1/18    Visit #/Visits authorized: 6/20  Time In: 11:00  Time Out12:00  Treatment time: 55  Total Billable Time: 30 minutes    Precautions:Blood pressure/syncope    Subjective     Pt reports: that she has the same pain with the same movements.  Pain: 3/10 to (L shoulder)       Objective       Jackelyn received therapeutic exercises to develop UE  strength, endurance, ROM and flexibility for 30 minutes including:  UBE x 6 min  Standing Rows 10 x 3 with green TB  Standing B shoulder ext 10 x 3    Jackelyn received the following manual therapy techniques: Joint mobilizations were applied to the: (L) shoulder  for 15 minutes, including: Not performed today  Soft tissue mobilization Thoracic paraspinals and L upper trap  Passive mobilization thoracic spine and rib cage    Tx not performed today:  B shoulder pro/retraction 20 x 2 with ball on plinth  B shoulder horizontal abd/add with ball on plinth 20 x 2  Cx spine manual traction  AAROM shoulder flex with dowel 2 x 10 - np  Supine scap protract 2 x 10 - np  Sidelying ER 2 x 10 - np  AAROM on pulleys for flex to tolerance x 2 minutes each - np  Scap retract/row with OTB 3 x 15 - np  Shoulder ext with OTB 3 x 10 - np  Shoulder er/ir with YTB 2 x 10 - np  Supine B shoulder horizontal Abduction with yellow TB 10 x 2 - np  Modalities: Castro mechanical traction w 12# x 15 min  L GH joint Inferior, lateral anterior and posterior glides  Patient receives moist heat to (L) shoulder x 5 minutes for muscle relaxation prior to ex's.      Written Home Exercises Provided: Patient educated to continue with previously issued HEP to tolerance. She  was provided with a copy of scap retract.row and shoulder ext with YTB.   Exercises were reviewed and Jackelyn was able to demonstrate them prior to the end of the session.  Jackelyn demonstrated good  understanding of the education provided.        Assessment     Patient daria Tx fairly well today. Discussed her Sx and if we were addressing her problem.  Pt reported that she is feeling better, but that she continues to have limitations in pain free movement in her L shoulder girdle.  Pt will continue to benefit from skilled outpatient physical therapy to address the deficits listed in the problem list box on initial evaluation, provide pt/family education and to maximize pt's level of independence in the home and community environment.      GOALS: Short Term Goals:  3 weeks  1. Pt will demonstrate increased L shoulder ROM by.  2. Pt Independent with HEP to improve ROM and independence with ADL's     Long Term Goals: 6 weeks  1.Report decreased    L  shoulder    pain  <   / =  2  /10  to increase tolerance for ADL's  2.Increased MMT  for  L UE  to increase tolerance for ADLs.  3.Increased PROM to WNL's of L shoulder to improve normal movement patterns during ADL's.  4. Pt will report improvement in overall functional abilities of UE, evidenced by improved score on FOTO Shoulder Survey.       Plan     Continue PT towards established plan of care and goals.     Ferimn Frias, PT

## 2018-10-11 ENCOUNTER — OFFICE VISIT (OUTPATIENT)
Dept: SPORTS MEDICINE | Facility: CLINIC | Age: 68
End: 2018-10-11
Payer: MEDICARE

## 2018-10-11 ENCOUNTER — CLINICAL SUPPORT (OUTPATIENT)
Dept: REHABILITATION | Facility: HOSPITAL | Age: 68
End: 2018-10-11
Payer: MEDICARE

## 2018-10-11 VITALS
SYSTOLIC BLOOD PRESSURE: 129 MMHG | HEART RATE: 87 BPM | WEIGHT: 153 LBS | DIASTOLIC BLOOD PRESSURE: 72 MMHG | HEIGHT: 61 IN | BODY MASS INDEX: 28.89 KG/M2

## 2018-10-11 DIAGNOSIS — M25.512 LEFT SHOULDER PAIN, UNSPECIFIED CHRONICITY: ICD-10-CM

## 2018-10-11 DIAGNOSIS — M75.112 INCOMPLETE ROTATOR CUFF TEAR OR RUPTURE OF LEFT SHOULDER, NOT SPECIFIED AS TRAUMATIC: Primary | ICD-10-CM

## 2018-10-11 PROCEDURE — 99999 PR PBB SHADOW E&M-EST. PATIENT-LVL III: CPT | Mod: PBBFAC,,, | Performed by: PHYSICIAN ASSISTANT

## 2018-10-11 PROCEDURE — 97110 THERAPEUTIC EXERCISES: CPT | Mod: PO

## 2018-10-11 PROCEDURE — 99213 OFFICE O/P EST LOW 20 MIN: CPT | Mod: PBBFAC,PO,25 | Performed by: PHYSICIAN ASSISTANT

## 2018-10-11 PROCEDURE — 20610 DRAIN/INJ JOINT/BURSA W/O US: CPT | Mod: S$PBB,LT,, | Performed by: PHYSICIAN ASSISTANT

## 2018-10-11 PROCEDURE — 99214 OFFICE O/P EST MOD 30 MIN: CPT | Mod: 25,S$PBB,, | Performed by: PHYSICIAN ASSISTANT

## 2018-10-11 PROCEDURE — 20610 DRAIN/INJ JOINT/BURSA W/O US: CPT | Mod: PBBFAC,PO | Performed by: PHYSICIAN ASSISTANT

## 2018-10-11 PROCEDURE — 97140 MANUAL THERAPY 1/> REGIONS: CPT | Mod: PO

## 2018-10-11 RX ORDER — BUPIVACAINE HYDROCHLORIDE 2.5 MG/ML
3 INJECTION, SOLUTION INFILTRATION; PERINEURAL
Status: COMPLETED | OUTPATIENT
Start: 2018-10-11 | End: 2018-10-11

## 2018-10-11 RX ORDER — TRIAMCINOLONE ACETONIDE 40 MG/ML
40 INJECTION, SUSPENSION INTRA-ARTICULAR; INTRAMUSCULAR
Status: COMPLETED | OUTPATIENT
Start: 2018-10-11 | End: 2018-10-11

## 2018-10-11 RX ADMIN — TRIAMCINOLONE ACETONIDE 40 MG: 40 INJECTION, SUSPENSION INTRA-ARTICULAR; INTRAMUSCULAR at 09:10

## 2018-10-11 RX ADMIN — BUPIVACAINE HYDROCHLORIDE 7.5 MG: 2.5 INJECTION, SOLUTION INFILTRATION; PERINEURAL at 09:10

## 2018-10-11 NOTE — PROGRESS NOTES
CC: LEFT shoulder pain      67 y.o. Female here for follow up of left shoulder pain who has a history of left rototar cuff repair in 2010.     She has been going to PT at [facility] with a little improvement. She has tried dry needling.  She states that her recent pain began about 6 or so months ago. She does not recall a specific injury or trauma. The patient reports that her shoulder feels the same as before her surgery. She is here today requesting CSI.         She reports that the pain and weakness is worse with overhead activity. It also bothers her at night.    Is affecting ADLs.  Pain is 2/10 currently.    Past Medical History:   Diagnosis Date    Bronchitis     seasonal    Cataract     Diverticulitis     Dry eye syndrome     GERD (gastroesophageal reflux disease)     Hyperlipidemia     Hypertension     Hypothyroidism 7/19/2012    Obese     PONV (postoperative nausea and vomiting)     Thyroid disease        Past Surgical History:   Procedure Laterality Date    BACK SURGERY      CHOLECYSTECTOMY      COLONOSCOPY  2007    diverticulosis    COLONOSCOPY N/A 10/28/2014    Performed by LATRELL Crawford MD at [facility] ENDO (4TH FLR)    DE QUERVAIN'S RELEASE Left 03/2017    HERNIA REPAIR      HYSTERECTOMY      NASAL SEPTUM SURGERY      RELEASE-DEQUERVAIN'S left Left 3/8/2017    Performed by Marcia Rodriguez MD at [facility] OR    SHOULDER SURGERY      TONSILLECTOMY         Family History   Problem Relation Age of Onset    Cataracts Mother     Breast cancer Mother     Diabetes Mother     Hypertension Mother     Heart failure Mother     Cataracts Father     Hypertension Father     Amblyopia Neg Hx     Blindness Neg Hx     Glaucoma Neg Hx     Macular degeneration Neg Hx     Retinal detachment Neg Hx     Strabismus Neg Hx     Ovarian cancer Neg Hx     Melanoma Neg Hx          Current Outpatient Medications:     acetic acid-hydrocortisone (VOSOL-HC) otic solution, 2-4 drops to affected  ear twice a day, Disp: 10 mL, Rfl: 2    albuterol 90 mcg/actuation inhaler, Inhale 2 puffs into the lungs every 6 (six) hours as needed for Wheezing., Disp: 6.7 g, Rfl: 11    amLODIPine (NORVASC) 2.5 MG tablet, TAKE ONE TABLET BY MOUTH EVERY DAY, Disp: 30 tablet, Rfl: 11    cycloSPORINE (RESTASIS) 0.05 % ophthalmic emulsion, Place 0.4 mLs (1 drop total) into both eyes 2 (two) times daily., Disp: 60 each, Rfl: 12    docusate sodium (COLACE) 100 MG capsule, Take 1 capsule (100 mg total) by mouth 2 (two) times daily. Available OTC as well, Disp: 60 capsule, Rfl: 0    econazole nitrate 1 % cream, Apply topically 2 (two) times daily. Qd- bid for ears. Ok for maintenance, Disp: 60 g, Rfl: 1    fluocinonide (LIDEX) 0.05 % external solution, APPLY TO SCALP ONCE DAILY AS NEEDED FOR FLARE, Disp: 60 mL, Rfl: 3    fluocinonide (LIDEX) 0.05 % external solution, AAA scalp qday - bid prn pruritus, Disp: 60 mL, Rfl: 3    hyoscyamine (LEVSIN/SL) 0.125 mg Subl, PLACE ONE TABLET UNDER THE TONGUE EVERY 6 HOURS AS NEEDED, Disp: 90 tablet, Rfl: 11    ketoconazole (NIZORAL) 2 % shampoo, APPLY TO DAMP SCALP EVERY OTHER DAY, LATHER AND LET SIT 5 MINUTES BEFORE RINSING, Disp: 120 mL, Rfl: 5    levothyroxine (SYNTHROID) 75 MCG tablet, TAKE ONE TABLET BY MOUTH EVERY DAY, Disp: 30 tablet, Rfl: 11    meloxicam (MOBIC) 7.5 MG tablet, TAKE ONE TABLET BY MOUTH EVERY 12 HOURS, Disp: 60 tablet, Rfl: 11    Multi-Vitamin tablet, Take by mouth.  Tablet Oral , Disp: , Rfl:     OMEGA-3S/DHA/EPA/FISH OIL (OMEGA 3 ORAL), , Disp: , Rfl:     oxybutynin (DITROPAN-XL) 10 MG 24 hr tablet, TAKE ONE TABLET BY MOUTH EVERY DAY, Disp: 30 tablet, Rfl: 11    PSYLLIUM SEED, WITH SUGAR, (METAMUCIL ORAL), Take by mouth., Disp: , Rfl:     ranitidine (ZANTAC) 150 MG tablet, TAKE ONE TABLET BY MOUTH EVERY EVENING, Disp: 30 tablet, Rfl: 5    triamcinolone acetonide 0.1% (KENALOG) 0.1 % cream, AAA bid, Disp: 60 g, Rfl: 3    amLODIPine (NORVASC) 2.5 MG tablet,  Take 1 tablet (2.5 mg total) by mouth once daily., Disp: 30 tablet, Rfl: 11    cetirizine (ZYRTEC) 10 MG tablet, Take 1 tablet (10 mg total) by mouth once daily., Disp: , Rfl: 0    diclofenac sodium 1 % Gel, Apply 2 g topically 4 (four) times daily. for 10 days, Disp: 1 Tube, Rfl: 5    levothyroxine (SYNTHROID) 75 MCG tablet, Take 1 tablet (75 mcg total) by mouth once daily., Disp: 30 tablet, Rfl: 11    meloxicam (MOBIC) 7.5 MG tablet, Take 7.5 mg by mouth once daily., Disp: , Rfl:     omeprazole (PRILOSEC) 40 MG capsule, Take 1 capsule (40 mg total) by mouth once daily. Capsule, Delayed Release(E.C.) Oral .  take one tablet daily, Disp: 30 capsule, Rfl: 11    omeprazole (PRILOSEC) 40 MG capsule, TAKE ONE CAPSULE BY MOUTH EVERY DAY, Disp: 30 capsule, Rfl: 11    predniSONE (DELTASONE) 20 MG tablet, 2 tabs po q day for 5 days, Disp: 10 tablet, Rfl: 0    ranitidine (ZANTAC) 150 MG tablet, Take 1 tablet by mouth every evening., Disp: , Rfl:     rosuvastatin (CRESTOR) 40 MG Tab, Take 1 tablet (40 mg total) by mouth once daily., Disp: 90 tablet, Rfl: 3    Review of patient's allergies indicates:   Allergen Reactions    Erythromycin (bulk) Nausea And Vomiting    Levaquin [levofloxacin]      Other reaction(s): Swelling          REVIEW OF SYSTEMS:  Constitution: Negative. Negative for chills, fever and night sweats.   HENT: Negative for congestion and headaches.    Eyes: Negative for blurred vision, left vision loss and right vision loss.   Cardiovascular: Negative for chest pain and syncope.   Respiratory: Negative for cough and shortness of breath.    Endocrine: Negative for polydipsia, polyphagia and polyuria.   Hematologic/Lymphatic: Negative for bleeding problem. Does not bruise/bleed easily.   Skin: Negative for dry skin, itching and rash.   Musculoskeletal: Negative for falls.  Positive for left shoulder pain and muscle weakness.   Gastrointestinal: Negative for abdominal pain and bowel incontinence.  "  Genitourinary: Negative for bladder incontinence and nocturia.   Neurological: Negative for disturbances in coordination, loss of balance and seizures.   Psychiatric/Behavioral: Negative for depression. The patient does not have insomnia.    Allergic/Immunologic: Negative for hives and persistent infections.      PHYSICAL EXAMINATION:  Vitals:  /72   Pulse 87   Ht 5' 1" (1.549 m)   Wt 69.4 kg (153 lb)   BMI 28.91 kg/m²    General: The patient is alert and oriented x 3.  Mood is pleasant.  Observation of ears, eyes and nose reveal no gross abnormalities.  No labored breathing observed.  Gait is coordinated. Patient can toe walk and heel walk without difficulty.      LEFT Shoulder / Upper Extremity Exam    OBSERVATION:     Swelling  none  Deformity  none   Discoloration  none   Scapular winging none   Scars   none  Atrophy  none    TENDERNESS / CREPITUS (T/C):          T/C      T/C   Clavicle   -/-  SUPRAspinatus    -/-     AC Jt.    -/-  INFRAspinatus  -/-    SC Jt.    -/-  Deltoid    -/-      G. Tuberosity  -/-  LH BICEP groove  -/-   Acromion:  -/-  Midline Neck   -/-     Scapular Spine -/-  Trapezium   -/-   SMA Scapula  -/-  GH jt. line - post  -/-     Scapulothoracic  -/-         ROM: (* = with pain)  Right shoulder   Left shoulder        AROM (PROM)   AROM (PROM)   FE    170° (175°)     120° (175°)     ER at 0°    60°  (65°)    60°  (65°)   ER at 90° ABD  90°  (90°)    90°  (90°)   IR at 90°  ABD   NA  (40°)     NA  (40°)      IR (spine level)   T10     L1    STRENGTH: (* = with pain) Right shoulder   Left shoulder    SCAPTION   5/5    4/5    IR    5/5    5/5   ER    5/5    4/5   BICEPS   5/5    5/5   Deltoid    5/5    4/5     SIGNS:  Painful side       NEER   -    OYEES  neg    BLACK   +    SPEEDS  neg     DROP ARM   +   BELLY PRESS neg   Superior escape none    LIFT-OFF  neg   X-Body ADD    neg    MOVING VALGUS neg        STABILITY TESTING    Right shoulder   Left shoulder "    Translation     Anterior  up face     up face    Posterior  up face    up face    Sulcus   < 10mm    < 10 mm     Signs   Apprehension   neg      neg       Relocation   no change     no change      Jerk test  neg     neg    EXTREMITY NEURO-VASCULAR EXAM:    Sensation grossly intact to light touch all dermatomal regions.    DTR 2+ Biceps, Triceps, BR and Negative Janias sign   Grossly intact motor function at Elbow, Wrist and Hand   Distal pulses radial and ulnar 2+, brisk cap refill, symmetric.      NECK:  Painless FROM and spinous processes non-tender. Negative Spurlings sign.      XRAYS:  Xrays including AP, Outlet and Axillary Lateral of Left shoulder are ordered / images reviewed by me:   No fracture dislocation or other pathology   Acromion type 1   Proximal migration of humeral head: None   GH arthritis: None    MRI Left shoulder 7/13/18    FINDINGS:  Rotator cuff: Status post rotator cuff repair low-grade undersurface tear of the infraspinatus tendon measuring 0.4 x 1.7 cm (AP x TV) with fluid extending to the myotendinous junction.  Supraspinatus, subscapularis, and teres minor tendons are intact.  Muscle bulk is maintained.    Labrum: Circumferential fraying.    Biceps: Postoperative changes of prior biceps tenotomy.    Bone: No fractures.  Bone marrow signal is unremarkable.    Acromioclavicular joint: Hypertrophy and moderate arthrosis of the AC joint.    Cartilage: Focal full-thickness fissuring noted over the humeral head superiorly.    Miscellaneous: No additional findings.    Impression:  1. Low-grade undersurface retear of the rotator cuff.  2. Acromioclavicular arthrosis.  3. Focal superior humeral head cartilage loss.      ASSESSMENT:   Left shoulder pain, low grade RC re-tear (infraspinatous)    PLAN:      1. CSI left shoulder  2. Continue PT at [facility]  3. If patient fails injection therapy she wishes to consider surgery for RC re-tear    Injection Procedure  A time out was performed,  including verification of patient ID, procedure, site and side, availability of information and equipment, review of safety issues, and agreement with consent, the procedure site was marked.    After time out was performed, the patient was prepped aseptically with povidone-iodine swabsticks. A diagnostic and therapeutic injection of 3:1cc Marcaine:Kenalog was given under sterile technique using a 22.5 gauge needle from the Posterior aspect of the left Subacromial in the sitting position.      Jackelyn Kendrick had no adverse reactions to the medication. Pain decreased. She was instructed to apply ice to the joint for 20 minutes and avoid strenuous activities for 24-36 hours following the injection. She was warned of possible blood sugar and/or blood pressure changes during that time. Following that time, she can resume regular activities.    She was reminded to call the clinic immediately for any adverse side effects as explained in clinic today.      All questions were answered, patient will contact us for questions or concerns in the interim.

## 2018-10-12 NOTE — PROGRESS NOTES
Physical Therapy Daily Treatment Note     Name: Jackelyn Meilass  Clinic Number: [id number]    Therapy Diagnosis:   Encounter Diagnosis   Name Primary?    Left shoulder pain, unspecified chronicity      Physician: Zoila Dos Santos PA-C    Visit Date: 10/11/2018    Medical Diagnosis: Left shoulder pain, unspecified chronicity  Evaluation Date: 8/1/18    Visit #/Visits authorized: 21/20  Time In: 09:00  Time Out9:45  Treatment time: 45  Total Billable Time: 30 minutes    Precautions:Blood pressure/syncope    Subjective     Pt reports: that she has the same pain with the same movements.  She also reported that she received a cortisone injection in her L upper 1/4 toby morning and was told that she could perform her PT Tx as usual.  Pain: 3/10 to (L shoulder)       Objective       Jackelyn received therapeutic exercises to develop UE  strength, endurance, ROM and flexibility for 15 minutes including:  UBE x 6 min - np  Standing Rows 10 x 3 with green TB  Standing B shoulder ext 10 x 3    Jackelyn received the following manual therapy techniques: Joint mobilizations were applied to the: (L) shoulder  for 30 minutes, including: Not performed today  Soft tissue mobilization Thoracic paraspinals and L upper trap  Passive mobilization thoracic spine and rib cage    Tx not performed today:  B shoulder pro/retraction 20 x 2 with ball on plinth  B shoulder horizontal abd/add with ball on plinth 20 x 2  Cx spine manual traction  AAROM shoulder flex with dowel 2 x 10 - np  Supine scap protract 2 x 10 - np  Sidelying ER 2 x 10 - np  AAROM on pulleys for flex to tolerance x 2 minutes each - np  Scap retract/row with OTB 3 x 15 - np  Shoulder ext with OTB 3 x 10 - np  Shoulder er/ir with YTB 2 x 10 - np  Supine B shoulder horizontal Abduction with yellow TB 10 x 2 - np  Modalities: Castro mechanical traction w 12# x 15 min  L GH joint Inferior, lateral anterior and posterior glides  Patient receives moist heat to (L) shoulder x 5  minutes for muscle relaxation prior to ex's.      Written Home Exercises Provided: Patient educated to continue with previously issued HEP to tolerance. She was provided with a copy of scap retract.row and shoulder ext with JESSE.   Exercises were reviewed and Jackelyn was able to demonstrate them prior to the end of the session.  Jackelyn demonstrated good  understanding of the education provided.        Assessment     Patient daria Tx fairly well today.  Pt received a cortisone injection today.  She reports no change in her Sx from the injection at this time.  Pt will continue to benefit from skilled outpatient physical therapy to address the deficits listed in the problem list box on initial evaluation, provide pt/family education and to maximize pt's level of independence in the home and community environment.      GOALS: Short Term Goals:  3 weeks  1. Pt will demonstrate increased L shoulder ROM by.  2. Pt Independent with HEP to improve ROM and independence with ADL's     Long Term Goals: 6 weeks  1.Report decreased    L  shoulder    pain  <   / =  2  /10  to increase tolerance for ADL's  2.Increased MMT  for  L UE  to increase tolerance for ADLs.  3.Increased PROM to WNL's of L shoulder to improve normal movement patterns during ADL's.  4. Pt will report improvement in overall functional abilities of UE, evidenced by improved score on FOTO Shoulder Survey.       Plan     Continue PT towards established plan of care and goals.     Fermin Frias, PT

## 2018-10-19 ENCOUNTER — CLINICAL SUPPORT (OUTPATIENT)
Dept: REHABILITATION | Facility: HOSPITAL | Age: 68
End: 2018-10-19
Payer: MEDICARE

## 2018-10-19 DIAGNOSIS — M25.512 LEFT SHOULDER PAIN, UNSPECIFIED CHRONICITY: ICD-10-CM

## 2018-10-19 PROCEDURE — 97110 THERAPEUTIC EXERCISES: CPT | Mod: PO

## 2018-10-19 PROCEDURE — 97140 MANUAL THERAPY 1/> REGIONS: CPT | Mod: PO

## 2018-10-19 NOTE — PROGRESS NOTES
Physical Therapy Daily Treatment Note     Name: Jackelyn Meilass  Clinic Number: [id number]    Therapy Diagnosis:   Encounter Diagnosis   Name Primary?    Left shoulder pain, unspecified chronicity      Physician: Zoila Dos Santos PA-C    Visit Date: 10/19/2018    Medical Diagnosis: Left shoulder pain, unspecified chronicity  Evaluation Date: 8/1/18    Visit #/Visits authorized: 21/20  Time In: 09:00  Time Out9:45  Treatment time: 45  Total Billable Time: 30 minutes    Precautions:Blood pressure/syncope    Subjective     Pt reports: that she is feeling a little better following her injection last week.  Pain: 3/10 to (L shoulder)       Objective       Jackelyn received therapeutic exercises to develop UE  strength, endurance, ROM and flexibility for 15 minutes including:  UBE x 6 min - np  Standing Rows 10 x 3 with green TB  Standing B shoulder ext 10 x 3    Jackelyn received the following manual therapy techniques: Joint mobilizations were applied to the: (L) shoulder  for 30 minutes, including: Not performed today  Soft tissue mobilization Thoracic paraspinals and L upper trap  Passive mobilization thoracic spine and rib cage    Tx not performed today:  B shoulder pro/retraction 20 x 2 with ball on plinth  B shoulder horizontal abd/add with ball on plinth 20 x 2  Cx spine manual traction  AAROM shoulder flex with dowel 2 x 10 - np  Supine scap protract 2 x 10 - np  Sidelying ER 2 x 10 - np  AAROM on pulleys for flex to tolerance x 2 minutes each - np  Scap retract/row with OTB 3 x 15 - np  Shoulder ext with OTB 3 x 10 - np  Shoulder er/ir with YTB 2 x 10 - np  Supine B shoulder horizontal Abduction with yellow TB 10 x 2 - np  Modalities: Castro mechanical traction w 12# x 15 min  L GH joint Inferior, lateral anterior and posterior glides  Patient receives moist heat to (L) shoulder x 5 minutes for muscle relaxation prior to ex's.      Written Home Exercises Provided: Patient educated to continue with previously  issued HEP to tolerance. She was provided with a copy of scap retract.row and shoulder ext with YTB.   Exercises were reviewed and Jackelny was able to demonstrate them prior to the end of the session.  Jackelyn demonstrated good  understanding of the education provided.        Assessment     Patient daria Tx fairly well today.  Pt received a cortisone injection last week and she is beginning to report improvement with less overall pain.  Pt will continue to benefit from skilled outpatient physical therapy to address the deficits listed in the problem list box on initial evaluation, provide pt/family education and to maximize pt's level of independence in the home and community environment.      GOALS: Short Term Goals:  3 weeks  1. Pt will demonstrate increased L shoulder ROM by.  2. Pt Independent with HEP to improve ROM and independence with ADL's     Long Term Goals: 6 weeks  1.Report decreased    L  shoulder    pain  <   / =  2  /10  to increase tolerance for ADL's  2.Increased MMT  for  L UE  to increase tolerance for ADLs.  3.Increased PROM to WNL's of L shoulder to improve normal movement patterns during ADL's.  4. Pt will report improvement in overall functional abilities of UE, evidenced by improved score on FOTO Shoulder Survey.       Plan     Continue PT towards established plan of care and goals.     Fermin Frias, PT c

## 2018-11-01 RX ORDER — ROSUVASTATIN CALCIUM 40 MG/1
TABLET, COATED ORAL
Qty: 90 TABLET | Refills: 3 | Status: SHIPPED | OUTPATIENT
Start: 2018-11-01 | End: 2019-08-19 | Stop reason: SDUPTHER

## 2018-11-02 ENCOUNTER — CLINICAL SUPPORT (OUTPATIENT)
Dept: REHABILITATION | Facility: HOSPITAL | Age: 68
End: 2018-11-02
Payer: MEDICARE

## 2018-11-02 DIAGNOSIS — M25.512 LEFT SHOULDER PAIN, UNSPECIFIED CHRONICITY: ICD-10-CM

## 2018-11-02 PROCEDURE — 97140 MANUAL THERAPY 1/> REGIONS: CPT | Mod: PO

## 2018-11-02 PROCEDURE — 97110 THERAPEUTIC EXERCISES: CPT | Mod: PO

## 2018-11-02 NOTE — PROGRESS NOTES
Physical Therapy Daily Treatment Note     Name: Jackelyn Meilass  Clinic Number: [id number]    Therapy Diagnosis:   Encounter Diagnosis   Name Primary?    Left shoulder pain, unspecified chronicity      Physician: Zoila Dos Santos PA-C    Visit Date: 11/2/2018    Medical Diagnosis: Left shoulder pain, unspecified chronicity  Evaluation Date: 8/1/18    Visit #/Visits authorized: 21/20  Time In: 7:30  Time Out 8:30  Treatment time: 60  Total Billable Time: 60 minutes    Precautions:Blood pressure/syncope    Subjective     Pt reports: that she is feeling a better, but not too much better  Pain: 3/10 to (L shoulder)       Objective       Jackelyn received therapeutic exercises to develop UE  strength, endurance, ROM and flexibility for 30 minutes including:  UBE x 6 min   Supine B shoulder horizontal adb x 10 with no resistance to ~ 45 deg, x 10 with yellow TB  Standing Rows 10 x 3 with 3# on cable cross  Standing B shoulder ext 10 x 3 with 3# on cable cross  Overhead pulley x 4 min      Jackelyn received the following manual therapy techniques: Joint mobilizations were applied to the: (L) shoulder  for 15 minutes, including: Not performed today  Soft tissue mobilization Thoracic paraspinals and L upper trap  Passive mobilization thoracic spine and rib cage    Tx not performed today:  B shoulder pro/retraction 20 x 2 with ball on plinth  B shoulder horizontal abd/add with ball on plinth 20 x 2  Cx spine manual traction  AAROM shoulder flex with dowel 2 x 10 - np  Supine scap protract 2 x 10 - np  Sidelying ER 2 x 10 - np  AAROM on pulleys for flex to tolerance x 2 minutes each - np  Scap retract/row with OTB 3 x 15 - np  Shoulder ext with OTB 3 x 10 - np  Shoulder er/ir with YTB 2 x 10 - np  Supine B shoulder horizontal Abduction with yellow TB 10 x 2 - np  Modalities: Castro mechanical traction w 12# x 15 min  L GH joint Inferior, lateral anterior and posterior glides  Patient receives moist heat to (L) shoulder x 5  minutes for muscle relaxation prior to ex's.      Written Home Exercises Provided: Patient educated to continue with previously issued HEP to tolerance. She was provided with a copy of scap retract.row and shoulder ext with JESSE.   Exercises were reviewed and Jackelyn was able to demonstrate them prior to the end of the session.  Jackelyn demonstrated good  understanding of the education provided.        Assessment     Patient daria Tx fairly well today.  Pt tolerated cable cross exercise swith no increase in pain.  Pt will continue to benefit from skilled outpatient physical therapy to address the deficits listed in the problem list box on initial evaluation, provide pt/family education and to maximize pt's level of independence in the home and community environment.      GOALS: Short Term Goals:  3 weeks  1. Pt will demonstrate increased L shoulder ROM by.  2. Pt Independent with HEP to improve ROM and independence with ADL's     Long Term Goals: 6 weeks  1.Report decreased    L  shoulder    pain  <   / =  2  /10  to increase tolerance for ADL's  2.Increased MMT  for  L UE  to increase tolerance for ADLs.  3.Increased PROM to WNL's of L shoulder to improve normal movement patterns during ADL's.  4. Pt will report improvement in overall functional abilities of UE, evidenced by improved score on FOTO Shoulder Survey.       Plan     Continue PT towards established plan of care and goals.     Fermin Frias, PT c

## 2018-11-06 ENCOUNTER — CLINICAL SUPPORT (OUTPATIENT)
Dept: REHABILITATION | Facility: HOSPITAL | Age: 68
End: 2018-11-06
Payer: MEDICARE

## 2018-11-06 DIAGNOSIS — M25.512 LEFT SHOULDER PAIN, UNSPECIFIED CHRONICITY: ICD-10-CM

## 2018-11-06 PROCEDURE — 97140 MANUAL THERAPY 1/> REGIONS: CPT | Mod: PO

## 2018-11-06 NOTE — PROGRESS NOTES
"  Physical Therapy Daily Treatment Note     Name: Jackelyn Meilass  Clinic Number: [id number]    Therapy Diagnosis:   Encounter Diagnosis   Name Primary?    Left shoulder pain, unspecified chronicity      Physician: Zoila Dos Santos PA-C    Visit Date: 11/6/2018    Medical Diagnosis: Left shoulder pain, unspecified chronicity  Evaluation Date: 8/1/18    Visit #/Visits authorized: 21/20  Time In: 9:00  Time Out 10:00  Treatment time: 60  Total Billable Time: 30 minutes    Precautions:Blood pressure/syncope    Subjective     Pt reports: that she is feeling a better and that she is no longer having "shooting" pain into her L arm.  Pain: 3/10 to (L shoulder)       Objective       Jackelyn received therapeutic exercises to develop UE  strength, endurance, ROM and flexibility for 10 minutes including:  UBE x 6 min         Jackelyn received the following manual therapy techniques: Joint mobilizations were applied to the: (L) shoulder  for 30 minutes, including:   Soft tissue mobilization Thoracic paraspinals and L upper trap  Passive mobilization thoracic spine and rib cage  L AC and SC joint mobilization  GH joint mobilization into lateral glide, anterior glide and inferior glide.      Tx not performed today:  Supine B shoulder horizontal adb x 10 with no resistance to ~ 45 deg, x 10 with yellow TB - np  Standing Rows 10 x 3 with 3# on cable cross - np  Standing B shoulder ext 10 x 3 with 3# on cable cross - np  Overhead pulley x 4 min - np  B shoulder pro/retraction 20 x 2 with ball on plinth  B shoulder horizontal abd/add with ball on plinth 20 x 2  Cx spine manual traction  AAROM shoulder flex with dowel 2 x 10 - np  Supine scap protract 2 x 10 - np  Sidelying ER 2 x 10 - np  AAROM on pulleys for flex to tolerance x 2 minutes each - np  Scap retract/row with OTB 3 x 15 - np  Shoulder ext with OTB 3 x 10 - np  Shoulder er/ir with YTB 2 x 10 - np  Supine B shoulder horizontal Abduction with yellow TB 10 x 2 - np  Modalities: " Gloria mechanical traction w 12# x 15 min  L GH joint Inferior, lateral anterior and posterior glides  Patient receives moist heat to (L) shoulder x 5 minutes for muscle relaxation prior to ex's.      Written Home Exercises Provided: Patient educated to continue with previously issued HEP to tolerance. She was provided with a copy of [email or web address] [email or web address] and shoulder ext with YTB.   Exercises were reviewed and Jackelyn was able to demonstrate them prior to the end of the session.  Jackelyn demonstrated good  understanding of the education provided.        Assessment     Patient daria Tx fairly well today.  She reported increased discomfort with thoracic mobs in the mid thoracic spine and L clavical mobilization.  Pt will continue to benefit from skilled outpatient physical therapy to address the deficits listed in the problem list box on initial evaluation, provide pt/family education and to maximize pt's level of independence in the home and community environment.      GOALS: Short Term Goals:  3 weeks  1. Pt will demonstrate increased L shoulder ROM by.  2. Pt Independent with HEP to improve ROM and independence with ADL's     Long Term Goals: 6 weeks  1.Report decreased    L  shoulder    pain  <   / =  2  /10  to increase tolerance for ADL's  2.Increased MMT  for  L UE  to increase tolerance for ADLs.  3.Increased PROM to WNL's of L shoulder to improve normal movement patterns during ADL's.  4. Pt will report improvement in overall functional abilities of UE, evidenced by improved score on FOTO Shoulder Survey.       Plan     Continue PT towards established plan of care and goals.     Fermin Frias, PT vikki

## 2018-11-13 ENCOUNTER — CLINICAL SUPPORT (OUTPATIENT)
Dept: REHABILITATION | Facility: HOSPITAL | Age: 68
End: 2018-11-13
Payer: MEDICARE

## 2018-11-13 DIAGNOSIS — M25.512 LEFT SHOULDER PAIN, UNSPECIFIED CHRONICITY: ICD-10-CM

## 2018-11-13 PROCEDURE — 97140 MANUAL THERAPY 1/> REGIONS: CPT | Mod: PO

## 2018-11-13 PROCEDURE — 97110 THERAPEUTIC EXERCISES: CPT | Mod: PO

## 2018-11-13 PROCEDURE — 97530 THERAPEUTIC ACTIVITIES: CPT | Mod: PO

## 2018-11-14 NOTE — PROGRESS NOTES
Physical Therapy Daily Treatment Note     Name: Jackelyn ARGUETA [place]  Clinic Number: [id number]    Therapy Diagnosis:   Encounter Diagnosis   Name Primary?    Left shoulder pain, unspecified chronicity      Physician: Zoila Dos Santos PA-C    Visit Date: 11/13/2018    Medical Diagnosis: Left shoulder pain, unspecified chronicity  Evaluation Date: 8/1/18    Visit #/Visits authorized: 21/20  Time In: 8:00  Time Out 8:45  Treatment time: 45  Total Billable Time: 30 minutes    Precautions:Blood pressure/syncope    Subjective     Pt reports: that she was sore following her last appointment.  Pain: 3/10 to (L shoulder)       Objective       Jackelyn received therapeutic exercises to develop UE  strength, endurance, ROM and flexibility for 15 minutes including:  UBE x 6 min   Seated B shoulder horz abd 10 x 3 - pt instructed in a new exercise.  Side lying L shoulder horz abd 10 x 3      Jackelyn received the following manual therapy techniques: Joint mobilizations were applied to the: (L) shoulder  for 30 minutes, including:   Soft tissue mobilization Thoracic paraspinals and L upper trap  Passive mobilization thoracic spine and rib cage  GH joint mobilization into lateral glide, anterior glide and inferior glide.      Tx not performed today:  L AC and SC joint mobilization  Supine B shoulder horizontal adb x 10 with no resistance to ~ 45 deg, x 10 with yellow TB - np  Standing Rows 10 x 3 with 3# on cable cross - np  Standing B shoulder ext 10 x 3 with 3# on cable cross - np  Overhead pulley x 4 min - np  B shoulder pro/retraction 20 x 2 with ball on plinth  B shoulder horizontal abd/add with ball on plinth 20 x 2  Cx spine manual traction  AAROM shoulder flex with dowel 2 x 10 - np  Supine scap protract 2 x 10 - np  Sidelying ER 2 x 10 - np  AAROM on pulleys for flex to tolerance x 2 minutes each - np  Scap retract/row with OTB 3 x 15 - np  Shoulder ext with OTB 3 x 10 - np  Shoulder er/ir with YTB 2 x 10 - np  Supine B  shoulder horizontal Abduction with yellow TB 10 x 2 - np  Modalities: Castro mechanical traction w 12# x 15 min  L GH joint Inferior, lateral anterior and posterior glides  Patient receives moist heat to (L) shoulder x 5 minutes for muscle relaxation prior to ex's.      Written Home Exercises Provided: Patient educated to continue with previously issued HEP to tolerance. She was provided with a copy of [email or web address] [email or web address] and shoulder ext with YTB.   Exercises were reviewed and Jackelyn was able to demonstrate them prior to the end of the session.  Jackelyn demonstrated good  understanding of the education provided.        Assessment     Patient daria Tx fairly well today.  She reported increased discomfort with thoracic mobs in the mid thoracic spine and L clavical mobilization.  Pt will continue to benefit from skilled outpatient physical therapy to address the deficits listed in the problem list box on initial evaluation, provide pt/family education and to maximize pt's level of independence in the home and community environment.      GOALS: Short Term Goals:  3 weeks  1. Pt will demonstrate increased L shoulder ROM by.  2. Pt Independent with HEP to improve ROM and independence with ADL's     Long Term Goals: 6 weeks  1.Report decreased    L  shoulder    pain  <   / =  2  /10  to increase tolerance for ADL's  2.Increased MMT  for  L UE  to increase tolerance for ADLs.  3.Increased PROM to WNL's of L shoulder to improve normal movement patterns during ADL's.  4. Pt will report improvement in overall functional abilities of UE, evidenced by improved score on FOTO Shoulder Survey.       Plan     Continue PT towards established plan of care and goals.     Fermin Frias, PT vikki

## 2018-11-15 ENCOUNTER — CLINICAL SUPPORT (OUTPATIENT)
Dept: REHABILITATION | Facility: HOSPITAL | Age: 68
End: 2018-11-15
Payer: MEDICARE

## 2018-11-15 DIAGNOSIS — M25.512 LEFT SHOULDER PAIN, UNSPECIFIED CHRONICITY: ICD-10-CM

## 2018-11-15 PROCEDURE — 97140 MANUAL THERAPY 1/> REGIONS: CPT | Mod: PO

## 2018-11-15 PROCEDURE — 97110 THERAPEUTIC EXERCISES: CPT | Mod: PO

## 2018-11-15 NOTE — PROGRESS NOTES
Physical Therapy Daily Treatment Note     Name: Jackelyn Meilass  Clinic Number: [id number]    Therapy Diagnosis:   Encounter Diagnosis   Name Primary?    Left shoulder pain, unspecified chronicity      Physician: Zoila Dos Santos PA-C    Visit Date: 11/15/2018    Medical Diagnosis: Left shoulder pain, unspecified chronicity  Evaluation Date: 8/1/18    Visit #/Visits authorized: 21/20  Time In: 9:00  Time Out 9:45  Treatment time: 45  Total Billable Time: 30 minutes    Precautions:Blood pressure/syncope    Subjective     Pt reports: that she was sore following her last appointment.  Pain: 3/10 to (L shoulder)       Objective       Jackelyn received therapeutic exercises to develop UE  strength, endurance, ROM and flexibility for 15 minutes including:  UBE x 6 min   Supine B shoulder horz abd 10 x 3 with orange TB  Cable cross rows 10 x 3 with 3#  Cable cross B shld ext 10 x 3 with 3#      Jackelyn received the following manual therapy techniques: Joint mobilizations were applied to the: (L) shoulder  for 30 minutes, including:   Soft tissue mobilization Thoracic paraspinals and L upper trap  Passive mobilization thoracic spine and rib cage  GH joint mobilization into lateral glide, anterior glide and inferior glide.      Tx not performed today:  Side lying L shoulder horz abd 10 x 3  L AC and SC joint mobilization  Supine B shoulder horizontal adb x 10 with no resistance to ~ 45 deg, x 10 with yellow TB - np  Standing Rows 10 x 3 with 3# on cable cross - np  Standing B shoulder ext 10 x 3 with 3# on cable cross - np  Overhead pulley x 4 min - np  B shoulder pro/retraction 20 x 2 with ball on plinth  B shoulder horizontal abd/add with ball on plinth 20 x 2  Cx spine manual traction  AAROM shoulder flex with dowel 2 x 10 - np  Supine scap protract 2 x 10 - np  Sidelying ER 2 x 10 - np  AAROM on pulleys for flex to tolerance x 2 minutes each - np  Scap retract/row with OTB 3 x 15 - np  Shoulder ext with OTB 3 x 10 -  np  Shoulder er/ir with YTB 2 x 10 - np  Supine B shoulder horizontal Abduction with yellow TB 10 x 2 - np  Modalities: Castro mechanical traction w 12# x 15 min  L GH joint Inferior, lateral anterior and posterior glides  Patient receives moist heat to (L) shoulder x 5 minutes for muscle relaxation prior to ex's.      Written Home Exercises Provided: Patient educated to continue with previously issued HEP to tolerance. She was provided with a copy of [email or web address] [email or web address] and shoulder ext with YTB.   Exercises were reviewed and Jackelyn was able to demonstrate them prior to the end of the session.  Jackelyn demonstrated good  understanding of the education provided.        Assessment     Patient daria Tx fairly well today.  She reported cramping in her upper thoracic region and discomfort on the R & L With passive mobilization of her mid thoracic spin and rib cage.  Pt will continue to benefit from skilled outpatient physical therapy to address the deficits listed in the problem list box on initial evaluation, provide pt/family education and to maximize pt's level of independence in the home and community environment.      GOALS: Short Term Goals:  3 weeks  1. Pt will demonstrate increased L shoulder ROM by.  2. Pt Independent with HEP to improve ROM and independence with ADL's     Long Term Goals: 6 weeks  1.Report decreased    L  shoulder    pain  <   / =  2  /10  to increase tolerance for ADL's  2.Increased MMT  for  L UE  to increase tolerance for ADLs.  3.Increased PROM to WNL's of L shoulder to improve normal movement patterns during ADL's.  4. Pt will report improvement in overall functional abilities of UE, evidenced by improved score on FOTO Shoulder Survey.       Plan     Continue PT towards established plan of care and goals.     Fermin Frias, PT

## 2018-11-20 ENCOUNTER — CLINICAL SUPPORT (OUTPATIENT)
Dept: REHABILITATION | Facility: HOSPITAL | Age: 68
End: 2018-11-20
Payer: MEDICARE

## 2018-11-20 DIAGNOSIS — M25.512 LEFT SHOULDER PAIN, UNSPECIFIED CHRONICITY: ICD-10-CM

## 2018-11-20 PROCEDURE — 97140 MANUAL THERAPY 1/> REGIONS: CPT | Mod: PO

## 2018-11-20 PROCEDURE — 97110 THERAPEUTIC EXERCISES: CPT | Mod: PO

## 2018-11-20 NOTE — PROGRESS NOTES
Physical Therapy Daily Treatment Note     Name: Jackelyn Meilass  Clinic Number: [id number]    Therapy Diagnosis:   Encounter Diagnosis   Name Primary?    Left shoulder pain, unspecified chronicity      Physician: Zoila Dos Santos PA-C    Visit Date: 11/20/2018    Medical Diagnosis: Left shoulder pain, unspecified chronicity  Evaluation Date: 8/1/18    Visit #/Visits authorized: 21/20  Time In: 9:00  Time Out 9:45  Treatment time: 45  Total Billable Time: 30 minutes    Precautions:Blood pressure/syncope    Subjective     Pt reports: that her L shoulder is a little more uncomfortable today secondary to doing too much with her grandchildren last week.  Pain: 4/10 to (L shoulder)       Objective       Jackelyn received therapeutic exercises to develop UE  strength, endurance, ROM and flexibility for 15 minutes including:  UBE x 6 min   Supine B shoulder horz abd 10 x 3 with orange TB  Cable cross rows 10 x 3 with 3#  Cable cross B shld ext 10 x 3 with 3#  Supine B shoulder horizontal abd with orange TB 10 x 3    Jackelyn received the following manual therapy techniques: Joint mobilizations were applied to the: (L) shoulder  for 30 minutes, including:   Soft tissue mobilization Thoracic paraspinals and L upper trap  Passive mobilization thoracic spine and rib cage  GH joint mobilization into lateral glide, anterior glide and inferior glide.      Tx not performed today:  Side lying L shoulder horz abd 10 x 3  L AC and SC joint mobilization  Supine B shoulder horizontal adb x 10 with no resistance to ~ 45 deg, x 10 with yellow TB - np  Standing Rows 10 x 3 with 3# on cable cross - np  Standing B shoulder ext 10 x 3 with 3# on cable cross - np  Overhead pulley x 4 min - np  B shoulder pro/retraction 20 x 2 with ball on plinth  B shoulder horizontal abd/add with ball on plinth 20 x 2  Cx spine manual traction  AAROM shoulder flex with dowel 2 x 10 - np  Supine scap protract 2 x 10 - np  Sidelying ER 2 x 10 - np  AAROM on  pulleys for flex to tolerance x 2 minutes each - np  Scap retract/row with OTB 3 x 15 - np  Shoulder ext with OTB 3 x 10 - np  Shoulder er/ir with YTB 2 x 10 - np  Supine B shoulder horizontal Abduction with yellow TB 10 x 2 - np  Modalities: Castro mechanical traction w 12# x 15 min  L GH joint Inferior, lateral anterior and posterior glides  Patient receives moist heat to (L) shoulder x 5 minutes for muscle relaxation prior to ex's.      Written Home Exercises Provided: Patient educated to continue with previously issued HEP to tolerance. She was provided with a copy of scap retract.row and shoulder ext with YTB.   Exercises were reviewed and Jackelyn was able to demonstrate them prior to the end of the session.  Jackelyn demonstrated good  understanding of the education provided.        Assessment     Patient daria Tx fairly well today.  She reported greater discomfort in her L shoulder the last few days which may be related to playing basketball with her grandsons last week.  Pt with less costovertebral mobility in the L side of her thoracic spine than the R.  Pt will continue to benefit from skilled outpatient physical therapy to address the deficits listed in the problem list box on initial evaluation, provide pt/family education and to maximize pt's level of independence in the home and community environment.      GOALS: Short Term Goals:  3 weeks  1. Pt will demonstrate increased L shoulder ROM by.  2. Pt Independent with HEP to improve ROM and independence with ADL's     Long Term Goals: 6 weeks  1.Report decreased    L  shoulder    pain  <   / =  2  /10  to increase tolerance for ADL's  2.Increased MMT  for  L UE  to increase tolerance for ADLs.  3.Increased PROM to WNL's of L shoulder to improve normal movement patterns during ADL's.  4. Pt will report improvement in overall functional abilities of UE, evidenced by improved score on FOTO Shoulder Survey.       Plan     Continue PT towards established plan  of care and goals.     Fermin Frias, PT

## 2018-11-27 ENCOUNTER — CLINICAL SUPPORT (OUTPATIENT)
Dept: REHABILITATION | Facility: HOSPITAL | Age: 68
End: 2018-11-27
Payer: MEDICARE

## 2018-11-27 DIAGNOSIS — M25.512 LEFT SHOULDER PAIN, UNSPECIFIED CHRONICITY: ICD-10-CM

## 2018-11-27 PROCEDURE — 97140 MANUAL THERAPY 1/> REGIONS: CPT | Mod: PO

## 2018-11-27 PROCEDURE — 97110 THERAPEUTIC EXERCISES: CPT | Mod: PO

## 2018-11-27 NOTE — PROGRESS NOTES
Physical Therapy Daily Treatment Note     Name: Jackelyn Meilass  Clinic Number: [id number]    Therapy Diagnosis:   Encounter Diagnosis   Name Primary?    Left shoulder pain, unspecified chronicity      Physician: Zoila Dos Santos PA-C    Visit Date: 11/27/2018    Medical Diagnosis: Left shoulder pain, unspecified chronicity  Evaluation Date: 8/1/18    Visit #/Visits authorized: 7/20  Time In: 9:00  Time Out 9:45  Treatment time: 45  Total Billable Time: 30 minutes    Precautions:Blood pressure/syncope    Subjective     Pt reports: that her R upper trap region is sore today with the L not as sore as usual.  Pain: 4/10 to (L shoulder)       Objective       Jackelyn received therapeutic exercises to develop UE  strength, endurance, ROM and flexibility for 15 minutes including:  UBE x 6 min   Supine B shoulder horz abd 10 x 3 with orange TB  Cable cross rows 10 x 3 with 7#  Cable cross B shld ext 10 x 3 with 3#      Jackelyn received the following manual therapy techniques: Joint mobilizations were applied to the: (L) shoulder  for 30 minutes, including:   Soft tissue mobilization Thoracic paraspinals and B upper trap  Passive mobilization thoracic spine and rib cage  Cx spine manual traction      Tx not performed today:  Side lying L shoulder horz abd 10 x 3  L AC and SC joint mobilization  Supine B shoulder horizontal adb x 10 with no resistance to ~ 45 deg, x 10 with yellow TB - np  Standing Rows 10 x 3 with 3# on cable cross - np  Standing B shoulder ext 10 x 3 with 3# on cable cross - np  Overhead pulley x 4 min - np  B shoulder pro/retraction 20 x 2 with ball on plinth  B shoulder horizontal abd/add with ball on plinth 20 x 2  Cx spine manual traction  AAROM shoulder flex with dowel 2 x 10 - np  Supine scap protract 2 x 10 - np  Sidelying ER 2 x 10 - np  AAROM on pulleys for flex to tolerance x 2 minutes each - np  Scap retract/row with OTB 3 x 15 - np  Shoulder ext with OTB 3 x 10 - np  Shoulder er/ir with YTB 2 x  10 - np  Supine B shoulder horizontal Abduction with yellow TB 10 x 2 - np  Modalities: Castro mechanical traction w 12# x 15 min  L GH joint Inferior, lateral anterior and posterior glides  Patient receives moist heat to (L) shoulder x 5 minutes for muscle relaxation prior to ex's.   GH joint mobilization into lateral glide, anterior glide and inferior glide.   Written Home Exercises Provided: Patient educated to continue with previously issued HEP to tolerance. She was provided with a copy of scap [email or web address] and shoulder ext with YTB.   Exercises were reviewed and Jackelyn was able to demonstrate them prior to the end of the session.  Jackelyn demonstrated good  understanding of the education provided.        Assessment     Patient daria Tx with mis thoracic discomfort with passive mobilization of her thoracic spine and today.  She reported greater discomfort in her R upper trap region the last few days.  Pt with less costovertebral mobility in the R side of her thoracic spine than the L today especially in the mid to lower T spine.  Pt will continue to benefit from skilled outpatient physical therapy to address the deficits listed in the problem list box on initial evaluation, provide pt/family education and to maximize pt's level of independence in the home and community environment.      GOALS: Short Term Goals:  3 weeks  1. Pt will demonstrate increased L shoulder ROM by.  2. Pt Independent with HEP to improve ROM and independence with ADL's     Long Term Goals: 6 weeks  1.Report decreased    L  shoulder    pain  <   / =  2  /10  to increase tolerance for ADL's  2.Increased MMT  for  L UE  to increase tolerance for ADLs.  3.Increased PROM to WNL's of L shoulder to improve normal movement patterns during ADL's.  4. Pt will report improvement in overall functional abilities of UE, evidenced by improved score on FOTO Shoulder Survey.       Plan     Continue PT towards established plan of care and goals.      Fermin Frias, PT

## 2018-11-29 ENCOUNTER — CLINICAL SUPPORT (OUTPATIENT)
Dept: REHABILITATION | Facility: HOSPITAL | Age: 68
End: 2018-11-29
Payer: MEDICARE

## 2018-11-29 DIAGNOSIS — M25.512 LEFT SHOULDER PAIN, UNSPECIFIED CHRONICITY: ICD-10-CM

## 2018-11-29 PROCEDURE — 97140 MANUAL THERAPY 1/> REGIONS: CPT | Mod: KX,PO

## 2018-11-29 PROCEDURE — 97110 THERAPEUTIC EXERCISES: CPT | Mod: KX,PO

## 2018-12-04 ENCOUNTER — CLINICAL SUPPORT (OUTPATIENT)
Dept: REHABILITATION | Facility: HOSPITAL | Age: 68
End: 2018-12-04
Payer: MEDICARE

## 2018-12-04 DIAGNOSIS — M25.512 LEFT SHOULDER PAIN, UNSPECIFIED CHRONICITY: ICD-10-CM

## 2018-12-04 PROCEDURE — 97140 MANUAL THERAPY 1/> REGIONS: CPT | Mod: KX,PO

## 2018-12-04 PROCEDURE — 97110 THERAPEUTIC EXERCISES: CPT | Mod: KX,PO

## 2018-12-04 NOTE — PROGRESS NOTES
Physical Therapy Daily Treatment Note     Name: Jackelyn Meilass  Clinic Number: [id number]    Therapy Diagnosis:   Encounter Diagnosis   Name Primary?    Left shoulder pain, unspecified chronicity      Physician: Zoila Dos Santos PA-C    Visit Date: 12/4/2018    Medical Diagnosis: Left shoulder pain, unspecified chronicity  Evaluation Date: 8/1/18    Visit #/Visits authorized: 8/20  Time In: 8:00  Time Out 8:55  Treatment time: 55  Total Billable Time: 55 minutes    Precautions:Blood pressure/syncope    Subjective     Pt reports: that seated trunk rotation hurt her lower back.  Pain: 3/10 to (L shoulder)       Objective       Jackelyn received therapeutic exercises to develop UE  strength, endurance, ROM and flexibility for 15 minutes including:  UBE x 6 min   Cable cross rows 10 x 3 with 7#  Cable cross B shld ext 10 x 3 with 3#  Side lying L shoulder horz abd 10 x 3  Supine punch 10 x 3    Jackelyn received the following manual therapy techniques: Joint mobilizations were applied to the: (L) shoulder  for 30 minutes, including:   Soft tissue mobilization Thoracic paraspinals and B upper trap  Passive mobilization thoracic spine and rib cage  Contract relax L rib cage mobilization  L GH joint mobs    Tx not performed today:  Cx spine manual traction  Supine B shoulder horz abd 10 x 3 with orange TB  L AC and SC joint mobilization  Supine B shoulder horizontal adb x 10 with no resistance to ~ 45 deg, x 10 with yellow TB - np  Standing Rows 10 x 3 with 3# on cable cross - np  Standing B shoulder ext 10 x 3 with 3# on cable cross - np  Overhead pulley x 4 min - np  B shoulder pro/retraction 20 x 2 with ball on plinth  B shoulder horizontal abd/add with ball on plinth 20 x 2  Cx spine manual traction  AAROM shoulder flex with dowel 2 x 10 - np  Supine scap protract 2 x 10 - np  Sidelying ER 2 x 10 - np  AAROM on pulleys for flex to tolerance x 2 minutes each - np  Scap retract/row with OTB 3 x 15 - np  Shoulder ext with  OTB 3 x 10 - np  Shoulder er/ir with YTB 2 x 10 - np  Supine B shoulder horizontal Abduction with yellow TB 10 x 2 - np  Modalities: Castro mechanical traction w 12# x 15 min  L GH joint Inferior, lateral anterior and posterior glides  Patient receives moist heat to (L) shoulder x 5 minutes for muscle relaxation prior to ex's.   GH joint mobilization into lateral glide, anterior glide and inferior glide.   Written Home Exercises Provided: Patient educated to continue with previously issued HEP to tolerance. She was provided with a copy of scap [email or web address] and shoulder ext with YTB.   Exercises were reviewed and Jackelyn was able to demonstrate them prior to the end of the session.  Jackelyn demonstrated good  understanding of the education provided.        Assessment     Pt tolerated L rib cage contract relax mobilization with c/o L shoulder discomfort.  She had greater Thoracic spine mobility following tx in the thoracic region.  Pt reported that her lower back did not tolerate seated trunk rotation.     Pt will continue to benefit from skilled outpatient physical therapy to address the deficits listed in the problem list box on initial evaluation, provide pt/family education and to maximize pt's level of independence in the home and community environment.      GOALS: Short Term Goals:  3 weeks  1. Pt will demonstrate increased L shoulder ROM by.  2. Pt Independent with HEP to improve ROM and independence with ADL's     Long Term Goals: 6 weeks  1.Report decreased    L  shoulder    pain  <   / =  2  /10  to increase tolerance for ADL's  2.Increased MMT  for  L UE  to increase tolerance for ADLs.  3.Increased PROM to WNL's of L shoulder to improve normal movement patterns during ADL's.  4. Pt will report improvement in overall functional abilities of UE, evidenced by improved score on FOTO Shoulder Survey.       Plan     Continue PT towards established plan of care and goals.     Fermin Frias, PT

## 2018-12-07 ENCOUNTER — CLINICAL SUPPORT (OUTPATIENT)
Dept: REHABILITATION | Facility: HOSPITAL | Age: 68
End: 2018-12-07
Payer: MEDICARE

## 2018-12-07 DIAGNOSIS — M25.512 LEFT SHOULDER PAIN, UNSPECIFIED CHRONICITY: ICD-10-CM

## 2018-12-07 PROCEDURE — 97140 MANUAL THERAPY 1/> REGIONS: CPT | Mod: PO

## 2018-12-07 PROCEDURE — 97110 THERAPEUTIC EXERCISES: CPT | Mod: PO

## 2018-12-12 NOTE — PROGRESS NOTES
Physical Therapy Daily Treatment Note     Name: Jackelyn Meilass  Clinic Number: [id number]    Therapy Diagnosis:   Encounter Diagnosis   Name Primary?    Left shoulder pain, unspecified chronicity      Physician: Zoila Dos Santos PA-C    Visit Date: 9/20/2018    Medical Diagnosis: Left shoulder pain, unspecified chronicity  Evaluation Date: 8/1/18    Visit #/Visits authorized: 6/20  Time In: 10:00  Time Out11:00  Treatment time: 55  Total Billable Time: 30 minutes    Precautions:Blood pressure/syncope    Subjective     Pt reports: that her shoulder is feeling better and that she is able to sleep on the L side with less discomfort  Pain: 3/10 to (L shoulder)       Objective   Patient receives moist heat to (L) shoulder x 5 minutes for muscle relaxation prior to ex's. - np    Jackelyn received therapeutic exercises to develop UE  strength, endurance, ROM and flexibility for 20 minutes including:  UBE x 4 min  Shoulder ext with OTB 3 x 10   Supine B shoulder horizontal Abduction with yellow TB 10 x 2       Jackelyn received the following manual therapy techniques: Joint mobilizations were applied to the: (L) shoulder  for 10 minutes, including:  Soft tissue mobilization Thoracic paraspinals and L upper trap  Passive mobilization thoracic spine and rib cage    Tx not performed today:  B shoulder pro/retraction 20 x 2 with ball on plinth  B shoulder horizontal abd/add with ball on plinth 20 x 2  AAROM shoulder flex with dowel 2 x 10 - np  Supine scap protract 2 x 10 - np  Sidelying ER 2 x 10 - np  AAROM on pulleys for flex to tolerance x 2 minutes each - np  Scap retract/row with OTB 3 x 15 - np  L GH joint Inferior, lateral anterior and posterior glides  Shoulder er/ir with YTB 2 x 10 - np  Cx spine manual traction           Written Home Exercises Provided: Patient educated to continue with previously issued HEP to tolerance. She was provided with a copy of scap retract.row and shoulder ext with YTB.   Exercises were  reviewed and Jackelyn was able to demonstrate them prior to the end of the session.  Jackelyn demonstrated good  understanding of the education provided.        Assessment     Patient daria Tx fairly well today. She was able to perform exercises with little increase in pain.  Pt will continue to benefit from skilled outpatient physical therapy to address the deficits listed in the problem list box on initial evaluation, provide pt/family education and to maximize pt's level of independence in the home and community environment.      GOALS: Short Term Goals:  3 weeks  1. Pt will demonstrate increased L shoulder ROM by.  2. Pt Independent with HEP to improve ROM and independence with ADL's     Long Term Goals: 6 weeks  1.Report decreased    L  shoulder    pain  <   / =  2  /10  to increase tolerance for ADL's  2.Increased MMT  for  L UE  to increase tolerance for ADLs.  3.Increased PROM to WNL's of L shoulder to improve normal movement patterns during ADL's.  4. Pt will report improvement in overall functional abilities of UE, evidenced by improved score on FOTO Shoulder Survey.       Plan     Continue PT towards established plan of care and goals.     Fermin Frias, PT

## 2018-12-13 NOTE — PROGRESS NOTES
Physical Therapy Daily Treatment Note     Name: Jackelyn Meilass  Clinic Number: [id number]    Therapy Diagnosis:   Encounter Diagnosis   Name Primary?    Left shoulder pain, unspecified chronicity      Physician: Zoila Dos Santos PA-C    Visit Date: 9/11/2018    Medical Diagnosis: Left shoulder pain, unspecified chronicity  Evaluation Date: 8/1/18    Visit #/Visits authorized: 7/20  Time In: 9:00  Time Out 10:00  Treatment time: 55  Total Billable Time: 30 minutes    Precautions:Blood pressure/syncope    Subjective     Pt reports: that her shoulder is feeling about the same.  Pain: 3/10 to (L shoulder)       Objective   Patient receives moist heat to (L) shoulder x 5 minutes for muscle relaxation prior to ex's. - np    Jackelyn received therapeutic exercises to develop UE  strength, endurance, ROM and flexibility for 25 minutes including:  UBE x 4 min  AAROM on pulleys for flex to tolerance x 2 minutes each - np  Scap retract/row with OTB 3 x 15   Shoulder ext with OTB 3 x 10   Shoulder er/ir with YTB 2 x 10   Supine B shoulder horizontal Abduction with yellow TB 10 x 2      Jackelyn received the following manual therapy techniques: Joint mobilizations were applied to the: (L) shoulder  for 25 minutes, including:  L GH joint Inferior, lateral anterior and posterior glides  Cx spine manual traction  Soft tissue mobilization Thoracic paraspinals and L upper trap  Passive mobilization thoracic spine and rib cage    Tx not performed today:  B shoulder pro/retraction 20 x 2 with ball on plinth  B shoulder horizontal abd/add with ball on plinth 20 x 2  AAROM shoulder flex with dowel 2 x 10 - np  Supine scap protract 2 x 10 - np  Sidelying ER 2 x 10 - np  Cx spine manual traction       Written Home Exercises Provided: Patient educated to continue with previously issued HEP to tolerance. She was provided with a copy of scap retract.row and shoulder ext with YTB.   Exercises were reviewed and Jackelyn was able to demonstrate  them prior to the end of the session.  Jackelyn demonstrated good  understanding of the education provided.        Assessment     Patient daria Tx fairly well today. She reported increased pain following thoracic spine and rib cage mobilization.  Pt will continue to benefit from skilled outpatient physical therapy to address the deficits listed in the problem list box on initial evaluation, provide pt/family education and to maximize pt's level of independence in the home and community environment.      GOALS: Short Term Goals:  3 weeks  1. Pt will demonstrate increased L shoulder ROM by.  2. Pt Independent with HEP to improve ROM and independence with ADL's     Long Term Goals: 6 weeks  1.Report decreased    L  shoulder    pain  <   / =  2  /10  to increase tolerance for ADL's  2.Increased MMT  for  L UE  to increase tolerance for ADLs.  3.Increased PROM to WNL's of L shoulder to improve normal movement patterns during ADL's.  4. Pt will report improvement in overall functional abilities of UE, evidenced by improved score on FOTO Shoulder Survey.       Plan     Continue PT towards established plan of care and goals.     Fermin Frias, PT

## 2018-12-18 ENCOUNTER — CLINICAL SUPPORT (OUTPATIENT)
Dept: REHABILITATION | Facility: HOSPITAL | Age: 68
End: 2018-12-18
Payer: MEDICARE

## 2018-12-18 DIAGNOSIS — M25.512 LEFT SHOULDER PAIN, UNSPECIFIED CHRONICITY: ICD-10-CM

## 2018-12-18 PROCEDURE — 97140 MANUAL THERAPY 1/> REGIONS: CPT | Mod: KX,PO

## 2018-12-18 PROCEDURE — 97110 THERAPEUTIC EXERCISES: CPT | Mod: KX,PO

## 2018-12-18 NOTE — PROGRESS NOTES
Physical Therapy Daily Treatment Note     Name: Jackelyn Meilass  Clinic Number: [id number]    Therapy Diagnosis:   Encounter Diagnosis   Name Primary?    Left shoulder pain, unspecified chronicity      Physician: Zoila Dos Santos PA-C    Visit Date: 12/18/2018    Medical Diagnosis: Left shoulder pain, unspecified chronicity  Evaluation Date: 8/1/18    Visit # 31  Time In:9:00  Time Out: 10:00  Treatment time:  45  Total Billable Time: 25 minutes    Precautions:Blood pressure/syncope    Subjective     Pt reports: multiple c/o pain and stiffness to neck , upper back and (L) shoulder alogn with c/o HA. States that previous shoulder injection was only temporarily helpful.  Pain: 6/10 to (L shoulder, neck, upper back        Objective       Jackelyn received therapeutic exercises to develop UE  strength, endurance, ROM and flexibility for 15 minutes including:  UBE x 6 min level 1 ( 3 min forward/backward)  Cable cross rows 10 x 3 with 7#  Cable cross B shld ext 10 x 3 with 3#  Side lying L shoulder horz abd 10 x 3  Supine punch 10 x 3  Sub occipital release with lacrosse balls x 3 minutes     Jackelyn received the following manual therapy techniques: Joint mobilizations were applied to the: (L) shoulder  for 30 minutes, including:   Soft tissue mobilization Thoracic paraspinals and B upper trap  Suboccipital release  Passive mobilization thoracic spine and rib cage--NP  Contract relax L rib cage mobilization--NP  L GH joint mobs     Patient receives moist heat to neck and back x 10 minutes for muscle relaxation pain control.    Tx not performed today:  Cx spine manual traction  Supine B shoulder horz abd 10 x 3 with orange TB  L AC and SC joint mobilization  Supine B shoulder horizontal adb x 10 with no resistance to ~ 45 deg, x 10 with yellow TB - np  Standing Rows 10 x 3 with 3# on cable cross - np  Standing B shoulder ext 10 x 3 with 3# on cable cross - np  Overhead pulley x 4 min - np  B shoulder pro/retraction 20 x 2  with ball on plinth  B shoulder horizontal abd/add with ball on plinth 20 x 2  Cx spine manual traction  AAROM shoulder flex with dowel 2 x 10 - np  Supine scap protract 2 x 10 - np  Sidelying ER 2 x 10 - np  AAROM on pulleys for flex to tolerance x 2 minutes each - np  Scap retract/row with OTB 3 x 15 - np  Shoulder ext with OTB 3 x 10 - np  Shoulder er/ir with YTB 2 x 10 - np  Supine B shoulder horizontal Abduction with yellow TB 10 x 2 - np  Modalities: Castro mechanical traction w 12# x 15 min  L GH joint Inferior, lateral anterior and posterior glides  Patient receives moist heat to (L) shoulder x 5 minutes for muscle relaxation prior to ex's.   GH joint mobilization into lateral glide, anterior glide and inferior glide.     Written Home Exercises Provided: Patient educated to continue with previously issued HEP to tolerance.    Exercises were reviewed and Jackelyn was able to demonstrate them prior to the end of the session.  Jackelyn demonstrated good  understanding of the education provided.        Assessment    Patient daria Tx fair. Patient presenting to clinic reporting increased pain to neck/back and (L) shoulder.  She reported some increased (L) shoulder discomfort with serratus punches in supine today and had limited tolerance for GH mobs due to pain. She reports fair/temporary relief with shoulder injection previously and will discuss possibly having another injection with her MD.  Added self suboccipital release with use of lacrosse balls which she reported was helpful to reduce some of her HA symptoms . Pain level remained in the moderate range post session.    Pt will continue to benefit from skilled outpatient physical therapy to address the deficits listed in the problem list box on initial evaluation, provide pt/family education and to maximize pt's level of independence in the home and community environment.      GOALS: Short Term Goals:  3 weeks  1. Pt will demonstrate increased L shoulder ROM  by.  2. Pt Independent with HEP to improve ROM and independence with ADL's     Long Term Goals: 6 weeks  1.Report decreased    L  shoulder    pain  <   / =  2  /10  to increase tolerance for ADL's  2.Increased MMT  for  L UE  to increase tolerance for ADLs.  3.Increased PROM to WNL's of L shoulder to improve normal movement patterns during ADL's.  4. Pt will report improvement in overall functional abilities of UE, evidenced by improved score on FOTO Shoulder Survey.       Plan     Continue PT towards established plan of care and goals.     Subhash Wayne, PTA

## 2018-12-20 NOTE — PROGRESS NOTES
Physical Therapy Daily Treatment Note     Name: Jackelyn Kendrick  Clinic Number: [id number]    Therapy Diagnosis:   Encounter Diagnosis   Name Primary?    Left shoulder pain, unspecified chronicity      Physician: Zoila Dos Santos PA-C    Visit Date: 12/7/2018    Medical Diagnosis: Left shoulder pain, unspecified chronicity  Evaluation Date: 8/1/18    Visit #/Visits authorized: 8/20  Time In: 9:30  Time Out 10:30  Treatment time: 55  Total Billable Time: 30 minutes    Precautions:Blood pressure/syncope    Subjective     Pt reports: that she likes the side lying shoulder horz abd exercise.  Pain: 3/10 to (L shoulder)       Objective       Jackelyn received therapeutic exercises to develop UE  strength, endurance, ROM and flexibility for 15 minutes including:  UBE x 6 min   Cable cross rows 10 x 3 with 7#  Cable cross B shld ext 10 x 3 with 3#  Side lying L shoulder horz abd 10 x 3  Supine punch 10 x 3    Jackelyn received the following manual therapy techniques: Joint mobilizations were applied to the: (L) shoulder  for 30 minutes, including:   Soft tissue mobilization Thoracic paraspinals and B upper trap  Passive mobilization thoracic spine and rib cage  Contract relax L rib cage mobilization  L GH joint mobs    Tx not performed today:  Cx spine manual traction  Supine B shoulder horz abd 10 x 3 with orange TB  L AC and SC joint mobilization  Supine B shoulder horizontal adb x 10 with no resistance to ~ 45 deg, x 10 with yellow TB - np  Standing Rows 10 x 3 with 3# on cable cross - np  Standing B shoulder ext 10 x 3 with 3# on cable cross - np  Overhead pulley x 4 min - np  B shoulder pro/retraction 20 x 2 with ball on plinth  B shoulder horizontal abd/add with ball on plinth 20 x 2  Cx spine manual traction  AAROM shoulder flex with dowel 2 x 10 - np  Supine scap protract 2 x 10 - np  Sidelying ER 2 x 10 - np  AAROM on pulleys for flex to tolerance x 2 minutes each - np  Scap retract/row with OTB 3 x 15 -  np  Shoulder ext with OTB 3 x 10 - np  Shoulder er/ir with YTB 2 x 10 - np  Supine B shoulder horizontal Abduction with yellow TB 10 x 2 - np  Modalities: Castro mechanical traction w 12# x 15 min  L GH joint Inferior, lateral anterior and posterior glides  Patient receives moist heat to (L) shoulder x 5 minutes for muscle relaxation prior to ex's.   GH joint mobilization into lateral glide, anterior glide and inferior glide.   Written Home Exercises Provided: Patient educated to continue with previously issued HEP to tolerance. She was provided with a copy of scap retract.row and shoulder ext with YTB.   Exercises were reviewed and Jackelyn was able to demonstrate them prior to the end of the session.  Jackelyn demonstrated good  understanding of the education provided.        Assessment     Pt tolerated L rib cage mobilization and thoracic paraspinal soft tissue mobilisation well.     Pt will continue to benefit from skilled outpatient physical therapy to address the deficits listed in the problem list box on initial evaluation, provide pt/family education and to maximize pt's level of independence in the home and community environment.      GOALS: Short Term Goals:  3 weeks  1. Pt will demonstrate increased L shoulder ROM by.  2. Pt Independent with HEP to improve ROM and independence with ADL's     Long Term Goals: 6 weeks  1.Report decreased    L  shoulder    pain  <   / =  2  /10  to increase tolerance for ADL's  2.Increased MMT  for  L UE  to increase tolerance for ADLs.  3.Increased PROM to WNL's of L shoulder to improve normal movement patterns during ADL's.  4. Pt will report improvement in overall functional abilities of UE, evidenced by improved score on FOTO Shoulder Survey.       Plan     Continue PT towards established plan of care and goals.     Fermin Frias, PT

## 2018-12-21 ENCOUNTER — CLINICAL SUPPORT (OUTPATIENT)
Dept: REHABILITATION | Facility: HOSPITAL | Age: 68
End: 2018-12-21
Payer: MEDICARE

## 2018-12-21 DIAGNOSIS — M25.512 LEFT SHOULDER PAIN, UNSPECIFIED CHRONICITY: ICD-10-CM

## 2018-12-21 PROCEDURE — 97140 MANUAL THERAPY 1/> REGIONS: CPT | Mod: PO

## 2018-12-22 NOTE — PROGRESS NOTES
Physical Therapy Daily Treatment Note     Name: Jackelyn Kendrick  Clinic Number: [id number]    Therapy Diagnosis:   Encounter Diagnosis   Name Primary?    Left shoulder pain, unspecified chronicity      Physician: Zoila Dos Santos PA-C    Visit Date: 12/21/2018    Medical Diagnosis: Left shoulder pain, unspecified chronicity  Evaluation Date: 8/1/18    Visit # 31  Time In:7:30  Time Out: 8:30  Treatment time:  45  Total Billable Time: 25 minutes    Precautions:Blood pressure/syncope    Subjective     Pt reports: that she has been in more pain lately.  She stated that she may go back to the Dr for an injection because it seemed to help before.  She reported that she was going to try the trigger point technique Subhash showed her last Tx.  Pain: 6/10 to (L shoulder, neck, upper back        Objective       Jackelyn received therapeutic exercises to develop UE  strength, endurance, ROM and flexibility for 6 minutes including:  UBE x 6 min level 1 ( 3 min forward/backward)   Cable cross rows 10 x 3 with 7# - np  Cable cross B shld ext 10 x 3 with 3# - np  Side lying L shoulder horz abd 10 x 3 - np  Supine punch 10 x 3 - np  Sub occipital release with lacrosse balls x 3 minutes - np    Jackelyn received the following manual therapy techniques: Joint mobilizations were applied to the: (L) shoulder  for 30 minutes, including:  Cx spine manual traction  Soft tissue mobilization Thoracic paraspinals and B upper trap  Suboccipital release  Passive mobilization thoracic spine and rib cage  Contract relax L rib cage mobilization  L GH joint mobs - np     Patient receives moist heat to neck and back x 10 minutes for muscle relaxation pain control.    Tx not performed today:    Supine B shoulder horz abd 10 x 3 with orange TB  L AC and SC joint mobilization  Supine B shoulder horizontal adb x 10 with no resistance to ~ 45 deg, x 10 with yellow TB - np  Standing Rows 10 x 3 with 3# on cable cross - np  Standing B shoulder ext 10 x 3 with  3# on cable cross - np  Overhead pulley x 4 min - np  B shoulder pro/retraction 20 x 2 with ball on plinth  B shoulder horizontal abd/add with ball on plinth 20 x 2  Cx spine manual traction  AAROM shoulder flex with dowel 2 x 10 - np  Supine scap protract 2 x 10 - np  Sidelying ER 2 x 10 - np  AAROM on pulleys for flex to tolerance x 2 minutes each - np  Scap retract/row with OTB 3 x 15 - np  Shoulder ext with OTB 3 x 10 - np  Shoulder er/ir with YTB 2 x 10 - np  Supine B shoulder horizontal Abduction with yellow TB 10 x 2 - np  Modalities: Castro mechanical traction w 12# x 15 min  L GH joint Inferior, lateral anterior and posterior glides  Patient receives moist heat to (L) shoulder x 5 minutes for muscle relaxation prior to ex's.   GH joint mobilization into lateral glide, anterior glide and inferior glide.     Written Home Exercises Provided: Patient educated to continue with previously issued HEP to tolerance.    Exercises were reviewed and Jackelyn was able to demonstrate them prior to the end of the session.  Jackelyn demonstrated good  understanding of the education provided.        Assessment    Patient daria Tx fair. She reported that she has had more L upper arm pain the last few days.  She felt like the suboccipital release with the two balls was helpful and she is planning to do it at home.  Decreased Pt's exercise secondary to increased discomfort in her L upper arm.   Pt will continue to benefit from skilled outpatient physical therapy to address the deficits listed in the problem list box on initial evaluation, provide pt/family education and to maximize pt's level of independence in the home and community environment.      GOALS: Short Term Goals:  3 weeks  1. Pt will demonstrate increased L shoulder ROM by.  2. Pt Independent with HEP to improve ROM and independence with ADL's     Long Term Goals: 6 weeks  1.Report decreased    L  shoulder    pain  <   / =  2  /10  to increase tolerance for  ADL's  2.Increased MMT  for  L UE  to increase tolerance for ADLs.  3.Increased PROM to WNL's of L shoulder to improve normal movement patterns during ADL's.  4. Pt will report improvement in overall functional abilities of UE, evidenced by improved score on FOTO Shoulder Survey.       Plan     Continue PT towards established plan of care and goals.     Fermin Frias, PT

## 2019-01-04 ENCOUNTER — CLINICAL SUPPORT (OUTPATIENT)
Dept: REHABILITATION | Facility: HOSPITAL | Age: 69
End: 2019-01-04
Attending: FAMILY MEDICINE
Payer: MEDICARE

## 2019-01-04 DIAGNOSIS — M25.512 LEFT SHOULDER PAIN, UNSPECIFIED CHRONICITY: ICD-10-CM

## 2019-01-04 PROCEDURE — 97110 THERAPEUTIC EXERCISES: CPT | Mod: PO

## 2019-01-04 PROCEDURE — 97530 THERAPEUTIC ACTIVITIES: CPT | Mod: PO

## 2019-01-04 NOTE — PROGRESS NOTES
Physical Therapy Daily Treatment Note     Name: Jackelyn Meilass  Clinic Number: [id number]    Therapy Diagnosis:   Encounter Diagnosis   Name Primary?    Left shoulder pain, unspecified chronicity      Physician: Zoila Dos Santos PA-C    Visit Date: 1/4/2019    Medical Diagnosis: Left shoulder pain, unspecified chronicity  Evaluation Date: 8/1/18    Visit # 31  Time In:12:30  Time Out: 1:30  Treatment time:  45  Total Billable Time: 25 minutes    Precautions:Blood pressure/syncope    Subjective     Pt reports: Pt reported that she will forget that her L arm hurts until she reaches in a way that causes her pain in her L shoulder.  Pain: 6/10 intermittent pain to the L shoulder, neck, upper back region.       Objective       Jackelyn received therapeutic exercises to develop UE  strength, endurance, ROM and flexibility for 6 minutes including:  B shoulder pro/retraction 20 x 2 with ball on plinth  B shoulder horizontal abd/add with ball on plinth 20 x 2  Cable cross rows 10 x 3 with 7# - np  Cable cross B shld ext 10 x 3 with 3#   Side lying L shoulder horz abd 10 x 3   Side lying L shoulder ER 10 x 1 with 0#, 10 x 1 with 1#  Supine punch 10 x 3 - np    Discussed HEP going forward including: bike, UBE, rows and shoulder ext with TB, side lying horz abd, side lying ER and standing IR with TB    Jackelyn received the following manual therapy techniques: Joint mobilizations were applied to the: (L) shoulder  for 0 minutes, including:    Manual therapy not performed today  Cx spine manual traction  Soft tissue mobilization Thoracic paraspinals and B upper trap  Suboccipital release  Passive mobilization thoracic spine and rib cage  Contract relax L rib cage mobilization  L GH joint mobs - np     Patient receives moist heat to neck and back x 10 minutes for muscle relaxation pain control.    Tx not performed today:  Supine B shoulder horz abd 10 x 3 with orange TB  L AC and SC joint mobilization  Supine B shoulder horizontal  adb x 10 with no resistance to ~ 45 deg, x 10 with yellow TB - np  Standing Rows 10 x 3 with 3# on cable cross - np  Standing B shoulder ext 10 x 3 with 3# on cable cross - np  Overhead pulley x 4 min - np  UBE x 6 min level 1 ( 3 min forward/backward)   Cx spine manual traction  AAROM shoulder flex with dowel 2 x 10 - np  Supine scap protract 2 x 10 - np  Sidelying ER 2 x 10 - np  AAROM on pulleys for flex to tolerance x 2 minutes each - np  Scap retract/row with OTB 3 x 15 - np  Shoulder ext with OTB 3 x 10 - np  Shoulder er/ir with YTB 2 x 10 - np  Supine B shoulder horizontal Abduction with yellow TB 10 x 2 - np  Modalities: Castro mechanical traction w 12# x 15 min  L GH joint Inferior, lateral anterior and posterior glides  Patient receives moist heat to (L) shoulder x 5 minutes for muscle relaxation prior to ex's.   GH joint mobilization into lateral glide, anterior glide and inferior glide.     Written Home Exercises Provided: Patient educated to continue with previously issued HEP to tolerance.    Exercises were reviewed and Jackelyn was able to demonstrate them prior to the end of the session.  Jackelyn demonstrated good  understanding of the education provided.        Assessment    Patient daria Tx fair. She reported that she has gotten to the point where she will forget that she has a problem until she reaches for something causing the return of her pain.  D/C pt to a HEP.   GOALS: Short Term Goals:  3 weeks  1. Pt will demonstrate increased L shoulder ROM - met  2. Pt Independent with HEP to improve ROM and independence with ADL's - met     Long Term Goals: 6 weeks  1.Report decreased    L  shoulder    pain  <   / =  2  /10  to increase tolerance for ADL's - not met  2.Increased MMT  for  L UE  to increase tolerance for ADLs. - met  3.Increased PROM to WNL's of L shoulder to improve normal movement patterns during ADL's. - not met  4. Pt will report improvement in overall functional abilities of UE,  evidenced by improved score on FOTO Shoulder Survey. - not completed.       Plan     D/C Pt to a HEP.    Fermin Frias, PT

## 2019-01-10 ENCOUNTER — PES CALL (OUTPATIENT)
Dept: ADMINISTRATIVE | Facility: CLINIC | Age: 69
End: 2019-01-10

## 2019-01-31 PROBLEM — I70.0 ATHEROSCLEROSIS OF AORTA: Status: ACTIVE | Noted: 2019-01-31

## 2019-01-31 PROBLEM — M25.539 WRIST PAIN: Status: RESOLVED | Noted: 2017-03-29 | Resolved: 2019-01-31

## 2019-01-31 PROBLEM — M25.532 WRIST PAIN, LEFT: Status: RESOLVED | Noted: 2017-03-29 | Resolved: 2019-01-31

## 2019-01-31 PROBLEM — H04.123 DRY EYE SYNDROME OF BOTH EYES: Status: ACTIVE | Noted: 2019-01-31

## 2019-02-08 ENCOUNTER — OFFICE VISIT (OUTPATIENT)
Dept: OPTOMETRY | Facility: CLINIC | Age: 69
End: 2019-02-08
Payer: MEDICARE

## 2019-02-08 DIAGNOSIS — H25.13 NUCLEAR SCLEROSIS, BILATERAL: ICD-10-CM

## 2019-02-08 DIAGNOSIS — H52.03 HYPEROPIA WITH PRESBYOPIA, BILATERAL: ICD-10-CM

## 2019-02-08 DIAGNOSIS — H04.123 DRY EYES, BILATERAL: Primary | ICD-10-CM

## 2019-02-08 DIAGNOSIS — H52.4 HYPEROPIA WITH PRESBYOPIA, BILATERAL: ICD-10-CM

## 2019-02-08 DIAGNOSIS — Z13.5 SCREENING FOR GLAUCOMA: ICD-10-CM

## 2019-02-08 PROCEDURE — 99999 PR PBB SHADOW E&M-EST. PATIENT-LVL II: ICD-10-PCS | Mod: PBBFAC,,, | Performed by: OPTOMETRIST

## 2019-02-08 PROCEDURE — 92014 PR EYE EXAM, EST PATIENT,COMPREHESV: ICD-10-PCS | Mod: S$PBB,,, | Performed by: OPTOMETRIST

## 2019-02-08 PROCEDURE — 92014 COMPRE OPH EXAM EST PT 1/>: CPT | Mod: S$PBB,,, | Performed by: OPTOMETRIST

## 2019-02-08 PROCEDURE — 99212 OFFICE O/P EST SF 10 MIN: CPT | Mod: PBBFAC,PO | Performed by: OPTOMETRIST

## 2019-02-08 PROCEDURE — 92015 PR REFRACTION: ICD-10-PCS | Mod: ,,, | Performed by: OPTOMETRIST

## 2019-02-08 PROCEDURE — 92015 DETERMINE REFRACTIVE STATE: CPT | Mod: ,,, | Performed by: OPTOMETRIST

## 2019-02-08 PROCEDURE — 99999 PR PBB SHADOW E&M-EST. PATIENT-LVL II: CPT | Mod: PBBFAC,,, | Performed by: OPTOMETRIST

## 2019-02-08 NOTE — PROGRESS NOTES
HPI     DLS:2/2/18  Pt states she has been having dry eye and red eye off and on for the past   few months. Did try an OTC ointment for her eyes (bacitracin). Pt states   her eyes feel gritty today.  No VA problems.   No f/ f  Restasis gtts       Last edited by Matthias Gutierrez, OD on 2/8/2019  9:18 AM. (History)        ROS     Negative for: Constitutional, Gastrointestinal, Neurological, Skin,   Genitourinary, Musculoskeletal, HENT, Endocrine, Cardiovascular, Eyes,   Respiratory, Psychiatric, Allergic/Imm, Heme/Lymph    Last edited by Matthias Gutierrez, OD on 2/8/2019  9:18 AM. (History)        Assessment /Plan     For exam results, see Encounter Report.    Dry eyes, bilateral    Nuclear sclerosis, bilateral    Screening for glaucoma    Hyperopia with presbyopia, bilateral        1. Cat ou--pt happy w spex  2. OCTAVIO (sp punctal plugs LL OU). Pt to cont w RESTASIS BID, but add SOOTHE XP BID+.  Also OK to use REFRESH PM RUBIN at night       Plan:    rtc 1 yr

## 2019-04-09 ENCOUNTER — TELEPHONE (OUTPATIENT)
Dept: UROLOGY | Facility: CLINIC | Age: 69
End: 2019-04-09

## 2019-04-09 ENCOUNTER — PATIENT MESSAGE (OUTPATIENT)
Dept: UROLOGY | Facility: CLINIC | Age: 69
End: 2019-04-09

## 2019-04-09 ENCOUNTER — TELEPHONE (OUTPATIENT)
Dept: FAMILY MEDICINE | Facility: CLINIC | Age: 69
End: 2019-04-09

## 2019-04-09 NOTE — TELEPHONE ENCOUNTER
----- Message from Sujata Leal sent at 4/9/2019 10:31 AM CDT -----  Contact: Sujata Beaulieu ext [phone]  Pt dropped off a urine sample today we need orders in for it,please advise.

## 2019-04-09 NOTE — TELEPHONE ENCOUNTER
Spoke with pt and notified her that Ivory needs to see her in clinic for cath specimen for her uti sx. Pt voiced understanding. Scheduled appt for tomorrow. Pt agreed to date and time of appt.

## 2019-04-10 ENCOUNTER — OFFICE VISIT (OUTPATIENT)
Dept: UROLOGY | Facility: CLINIC | Age: 69
End: 2019-04-10
Payer: MEDICARE

## 2019-04-10 VITALS
BODY MASS INDEX: 29.38 KG/M2 | HEART RATE: 79 BPM | DIASTOLIC BLOOD PRESSURE: 68 MMHG | SYSTOLIC BLOOD PRESSURE: 138 MMHG | WEIGHT: 155.63 LBS | HEIGHT: 61 IN

## 2019-04-10 DIAGNOSIS — R35.0 URINARY FREQUENCY: ICD-10-CM

## 2019-04-10 DIAGNOSIS — R35.1 NOCTURIA: ICD-10-CM

## 2019-04-10 DIAGNOSIS — R39.9 UTI SYMPTOMS: Primary | ICD-10-CM

## 2019-04-10 DIAGNOSIS — R39.89 SENSATION OF PRESSURE IN BLADDER AREA: ICD-10-CM

## 2019-04-10 PROCEDURE — 87086 URINE CULTURE/COLONY COUNT: CPT

## 2019-04-10 PROCEDURE — 99214 OFFICE O/P EST MOD 30 MIN: CPT | Mod: S$PBB,25,, | Performed by: NURSE PRACTITIONER

## 2019-04-10 PROCEDURE — 51701 INSERT BLADDER CATHETER: CPT | Mod: PBBFAC | Performed by: NURSE PRACTITIONER

## 2019-04-10 PROCEDURE — 99213 OFFICE O/P EST LOW 20 MIN: CPT | Mod: PBBFAC,25 | Performed by: NURSE PRACTITIONER

## 2019-04-10 PROCEDURE — 51701 INSERT BLADDER CATHETER: CPT | Mod: S$PBB,,, | Performed by: NURSE PRACTITIONER

## 2019-04-10 PROCEDURE — 99214 PR OFFICE/OUTPT VISIT, EST, LEVL IV, 30-39 MIN: ICD-10-PCS | Mod: S$PBB,25,, | Performed by: NURSE PRACTITIONER

## 2019-04-10 PROCEDURE — 51701 PR INSERTION OF NON-INDWELLING BLADDER CATHETERIZATION FOR RESIDUAL UR: ICD-10-PCS | Mod: S$PBB,,, | Performed by: NURSE PRACTITIONER

## 2019-04-10 PROCEDURE — 99999 PR PBB SHADOW E&M-EST. PATIENT-LVL III: ICD-10-PCS | Mod: PBBFAC,,, | Performed by: NURSE PRACTITIONER

## 2019-04-10 PROCEDURE — 99999 PR PBB SHADOW E&M-EST. PATIENT-LVL III: CPT | Mod: PBBFAC,,, | Performed by: NURSE PRACTITIONER

## 2019-04-10 RX ORDER — SULFAMETHOXAZOLE AND TRIMETHOPRIM 800; 160 MG/1; MG/1
1 TABLET ORAL 2 TIMES DAILY
Qty: 6 TABLET | Refills: 0 | Status: SHIPPED | OUTPATIENT
Start: 2019-04-10 | End: 2019-04-13

## 2019-04-10 NOTE — PROGRESS NOTES
Subjective:       Patient ID: Jackelyn Kendrick is a 68 y.o. female.    Chief Complaint: Urinary Tract Infection      HPI: Jackelyn Kendrick is a 68 y.o. White female who presents today for possible UTI. Her last clinic visit was 5/31/18.    Pt reports frequency, nocturia x 5-6, and bladder pressure for the past few days. She started taking Uribel Monday which did improve symptoms. Denies dysuria, hematuria or flank pain. Denies fever or chills. She has IBS constipation and diarrhea and unsure if that may have caused UTI.She drinks 2-3 glasses of water per day. She is taking cranberry supplement daily but was not able to find d-mannose. Does not take probiotic. She takes oxybutynin for OAB, which controls symptoms.   Denies UTI since 8/3/18.      Last clinic visit:  Hx of recurrent UTI's. She reports she has one documented UC but the other two UTI's she had this year were treated by GYN based on symptoms only and no UC completed. Her symptoms associated with UTI include urgency, frequency with decreased amounts, bladder pressure, and nocturia x 10. Denies f/c/n/v, dysuria, hematuria or flank pain associated with UTI.  Her baseline urination includes nocturia 2-3 x per night. Denies urgency, daytime frequency, urinary incontinence, dysuria, flank pain, bladder pain or pressure, or hematuria. She takes oxybutynin for OAB. She feels she empties her bladder. Denies f/c/n/v.  She does have leg swelling and does not elevate her legs prior to bed. She has decreased water intake and minimal caffeine intake. She has IBS both constipation and diarrhea and episodes of stool incontinence at times.   Denies vaginal dryness.    Urine culture:  8/3/18 E coli  5/21/18 E coli  2/27/17 Enterobacter aerogenes      Review of patient's allergies indicates:   Allergen Reactions    Erythromycin (bulk) Nausea And Vomiting    Levaquin [levofloxacin]      Other reaction(s): Swelling       Current Outpatient Medications   Medication Sig  Dispense Refill    acetic acid-hydrocortisone (VOSOL-HC) otic solution 2-4 drops to affected ear twice a day 10 mL 2    albuterol 90 mcg/actuation inhaler Inhale 2 puffs into the lungs every 6 (six) hours as needed for Wheezing. 6.7 g 11    amLODIPine (NORVASC) 2.5 MG tablet TAKE ONE TABLET BY MOUTH EVERY DAY 30 tablet 11    cycloSPORINE (RESTASIS) 0.05 % ophthalmic emulsion Place 0.4 mLs (1 drop total) into both eyes 2 (two) times daily. 60 each 12    docusate sodium (COLACE) 100 MG capsule Take 1 capsule (100 mg total) by mouth 2 (two) times daily. Available OTC as well 60 capsule 0    econazole nitrate 1 % cream Apply topically 2 (two) times daily. Qd- bid for ears. Ok for maintenance 60 g 1    fluocinonide (LIDEX) 0.05 % external solution APPLY TO SCALP ONCE DAILY AS NEEDED FOR FLARE 60 mL 3    hyoscyamine (LEVSIN/SL) 0.125 mg Subl PLACE ONE TABLET UNDER THE TONGUE EVERY 6 HOURS AS NEEDED 90 tablet 11    ketoconazole (NIZORAL) 2 % shampoo APPLY TO DAMP SCALP EVERY OTHER DAY, LATHER AND LET SIT 5 MINUTES BEFORE RINSING 120 mL 5    levothyroxine (SYNTHROID) 75 MCG tablet TAKE ONE TABLET BY MOUTH EVERY DAY 30 tablet 11    meloxicam (MOBIC) 7.5 MG tablet TAKE ONE TABLET BY MOUTH EVERY 12 HOURS 60 tablet 11    Multi-Vitamin tablet Take by mouth.  Tablet Oral       OMEGA-3S/DHA/EPA/FISH OIL (OMEGA 3 ORAL)       omeprazole (PRILOSEC) 40 MG capsule Take 1 capsule (40 mg total) by mouth once daily. Capsule, Delayed Release(E.C.) Oral .  take one tablet daily 30 capsule 11    oxybutynin (DITROPAN-XL) 10 MG 24 hr tablet TAKE ONE TABLET BY MOUTH EVERY DAY 30 tablet 11    predniSONE (DELTASONE) 20 MG tablet 2 tabs po q day for 5 days 10 tablet 0    PSYLLIUM SEED, WITH SUGAR, (METAMUCIL ORAL) Take by mouth.      ranitidine (ZANTAC) 150 MG tablet Take 1 tablet by mouth every evening.      rosuvastatin (CRESTOR) 40 MG Tab TAKE ONE TABLET BY MOUTH EVERY DAY 90 tablet 3    triamcinolone acetonide 0.1%  (KENALOG) 0.1 % cream AAA bid 60 g 3    amLODIPine (NORVASC) 2.5 MG tablet Take 1 tablet (2.5 mg total) by mouth once daily. 30 tablet 11    cetirizine (ZYRTEC) 10 MG tablet Take 1 tablet (10 mg total) by mouth once daily.  0    diclofenac sodium 1 % Gel Apply 2 g topically 4 (four) times daily. for 10 days 1 Tube 5    fluocinonide (LIDEX) 0.05 % external solution AAA scalp qday - bid prn pruritus 60 mL 3    levothyroxine (SYNTHROID) 75 MCG tablet Take 1 tablet (75 mcg total) by mouth once daily. 30 tablet 11    meloxicam (MOBIC) 7.5 MG tablet Take 7.5 mg by mouth once daily.      omeprazole (PRILOSEC) 40 MG capsule TAKE ONE CAPSULE BY MOUTH EVERY DAY 30 capsule 11    ranitidine (ZANTAC) 150 MG tablet TAKE ONE TABLET BY MOUTH EVERY EVENING 30 tablet 5    sulfamethoxazole-trimethoprim 800-160mg (BACTRIM DS) 800-160 mg Tab Take 1 tablet by mouth 2 (two) times daily. for 3 days 6 tablet 0     No current facility-administered medications for this visit.        Past Medical History:   Diagnosis Date    Bronchitis     seasonal    Cataract     Diverticulitis     Dry eye syndrome     GERD (gastroesophageal reflux disease)     Hyperlipidemia     Hypertension     Hypothyroidism 7/19/2012    Obese     PONV (postoperative nausea and vomiting)     Thyroid disease        Past Surgical History:   Procedure Laterality Date    BACK SURGERY      CHOLECYSTECTOMY      COLONOSCOPY  2007    diverticulosis    COLONOSCOPY N/A 10/28/2014    Performed by LATRELL Crawford MD at [facility] END[facility] (4TH FLR)    DE QUERVAIN'S RELEASE Left 03/2017    HERNIA REPAIR      HYSTERECTOMY      NASAL SEPTUM SURGERY      RELEASE-DEQUERVAIN'S left Left 3/8/2017    Performed by [facility] at [facility]    SHOULDER SURGERY      TONSILLECTOMY         Family History   Problem Relation Age of Onset    Cataracts Mother     Breast cancer Mother     Diabetes Mother     Hypertension Mother     Heart failure Mother      Cataracts Father     Hypertension Father     Amblyopia Neg Hx     Blindness Neg Hx     Glaucoma Neg Hx     Macular degeneration Neg Hx     Retinal detachment Neg Hx     Strabismus Neg Hx     Ovarian cancer Neg Hx     Melanoma Neg Hx        Review of Systems   Constitutional: Negative for chills and fever.   HENT: Negative for congestion.    Eyes: Negative for discharge.   Respiratory: Negative for chest tightness and shortness of breath.    Cardiovascular: Negative for chest pain and palpitations.   Gastrointestinal: Positive for constipation and diarrhea. Negative for abdominal pain, nausea and vomiting.   Genitourinary: Positive for frequency. Negative for decreased urine volume, difficulty urinating, dysuria, enuresis, flank pain, hematuria and urgency.        See HPI   Musculoskeletal: Negative for gait problem.   Skin: Negative for rash.   Allergic/Immunologic: Negative for immunocompromised state.   Neurological: Negative for seizures and headaches.   Hematological: Negative for adenopathy.   Psychiatric/Behavioral: Negative for confusion.         All other systems were reviewed and were negative.    Objective:     Vitals:    04/10/19 1322   BP: 138/68   Pulse: 79        Physical Exam   Nursing note and vitals reviewed.  Constitutional: She is oriented to person, place, and time. She appears well-developed and well-nourished.  Non-toxic appearance. She does not have a sickly appearance. She does not appear ill. No distress.   HENT:   Head: Normocephalic.   Eyes: Right eye exhibits no discharge. Left eye exhibits no discharge.   Neck: Normal range of motion.   Cardiovascular: Normal rate and regular rhythm.    Pulmonary/Chest: Effort normal. No respiratory distress.   Abdominal: Soft. She exhibits no distension. There is no CVA tenderness.   Genitourinary:   Genitourinary Comments: The external genitalia were normal. No rash. The urethral meatus normal. Perineum normal.    Musculoskeletal: Normal range  of motion.   Neurological: She is alert and oriented to person, place, and time.   Skin: Skin is warm and dry.     Psychiatric: She has a normal mood and affect. Her behavior is normal. Judgment and thought content normal.         Lab Results   Component Value Date    CREATININE 0.8 08/01/2018     Lab Results   Component Value Date    EGFRNONAA >60.0 08/01/2018     Lab Results   Component Value Date    ESTGFRAFRICA >60.0 08/01/2018     Urine dipstick in clinic: unable to obtain, patient on Uribel    PVR: after verbal consent, an in and out cath was performed under sterile conditions immediately after voiding. The PVR was 20 ml.      Assessment:       1. UTI symptoms    2. Urinary frequency    3. Sensation of pressure in bladder area    4. Nocturia        Plan:     Jackelyn was seen today for urinary tract infection.    Diagnoses and all orders for this visit:    UTI symptoms  -     Urine culture  -     sulfamethoxazole-trimethoprim 800-160mg (BACTRIM DS) 800-160 mg Tab; Take 1 tablet by mouth 2 (two) times daily. for 3 days    Urinary frequency    Sensation of pressure in bladder area    Nocturia    -Catheterized urine specimen sent for urine culture  Will start on bactrim based on symptoms  Discussed side effects, indications, and MOA for bactrim. Prescription sent to the pharmacy. Pt verbalized understanding.  -UTI precautions:  Wipe front to back   Avoid constipation.  Avoid caffeine.  Drink lots of water- at least 64 oz per day  Void every 3 hrs. Do not hold urine once urge arises  Expel urine from vagina post void  No dryer sheets or harsh detergents with the undergarments  No bubble baths  Void before and after intercourse  Avoid hot tub use  Void soon after urge arises  Avoid tight fitting clothes and panty hose  Continue Cranberry supplement- need 36mg of proanthocyanidins (PAC) in supplement- helps to block bacteria from attaching to bladder wall.  Start Probiotic  Start D-mannose- 2 grams daily- blocks  bacteria receptors  -Discussed recurrent UTI work up (cysto and renal ultrasound) in detail. Will proceed with work up if patient develops 2 culture proven UTIs in 6 months or 3 culture proven UTIs in 12 months.  -RTC as needed     I spent 25 minutes with the patient of which more than half was spent in coordinating the patient's care as well as in direct consultation with the patient in regards to our treatment and plan.

## 2019-04-11 LAB — BACTERIA UR CULT: NO GROWTH

## 2019-04-12 ENCOUNTER — TELEPHONE (OUTPATIENT)
Dept: UROLOGY | Facility: CLINIC | Age: 69
End: 2019-04-12

## 2019-04-25 ENCOUNTER — TELEPHONE (OUTPATIENT)
Dept: FAMILY MEDICINE | Facility: CLINIC | Age: 69
End: 2019-04-25

## 2019-04-25 DIAGNOSIS — I10 ESSENTIAL HYPERTENSION: ICD-10-CM

## 2019-04-25 DIAGNOSIS — E78.2 MIXED HYPERLIPIDEMIA: ICD-10-CM

## 2019-04-25 DIAGNOSIS — E03.9 ACQUIRED HYPOTHYROIDISM: Primary | ICD-10-CM

## 2019-04-25 NOTE — TELEPHONE ENCOUNTER
----- Message from Leyla Desai sent at 4/25/2019 10:58 AM CDT -----  Doctor appointment and lab have been scheduled.  Please link lab orders to the lab appointment.  Date of doctor appointment:  8/19  Date of lab appointment:  8/12  Physical or EP: physical   Comments:

## 2019-05-06 ENCOUNTER — TELEPHONE (OUTPATIENT)
Dept: PRIMARY CARE CLINIC | Facility: CLINIC | Age: 69
End: 2019-05-06

## 2019-05-06 ENCOUNTER — OFFICE VISIT (OUTPATIENT)
Dept: PRIMARY CARE CLINIC | Facility: CLINIC | Age: 69
End: 2019-05-06
Payer: MEDICARE

## 2019-05-06 VITALS
SYSTOLIC BLOOD PRESSURE: 148 MMHG | BODY MASS INDEX: 29.3 KG/M2 | RESPIRATION RATE: 16 BRPM | WEIGHT: 155.19 LBS | DIASTOLIC BLOOD PRESSURE: 78 MMHG | HEIGHT: 61 IN | TEMPERATURE: 99 F

## 2019-05-06 DIAGNOSIS — R05.9 COUGH: Primary | ICD-10-CM

## 2019-05-06 PROCEDURE — 99214 PR OFFICE/OUTPT VISIT, EST, LEVL IV, 30-39 MIN: ICD-10-PCS | Mod: S$PBB,,, | Performed by: FAMILY MEDICINE

## 2019-05-06 PROCEDURE — 99999 PR PBB SHADOW E&M-EST. PATIENT-LVL III: CPT | Mod: PBBFAC,,, | Performed by: FAMILY MEDICINE

## 2019-05-06 PROCEDURE — 99214 OFFICE O/P EST MOD 30 MIN: CPT | Mod: S$PBB,,, | Performed by: FAMILY MEDICINE

## 2019-05-06 PROCEDURE — 99213 OFFICE O/P EST LOW 20 MIN: CPT | Mod: PBBFAC,PN | Performed by: FAMILY MEDICINE

## 2019-05-06 PROCEDURE — 99999 PR PBB SHADOW E&M-EST. PATIENT-LVL III: ICD-10-PCS | Mod: PBBFAC,,, | Performed by: FAMILY MEDICINE

## 2019-05-06 RX ORDER — PROMETHAZINE HYDROCHLORIDE AND DEXTROMETHORPHAN HYDROBROMIDE 6.25; 15 MG/5ML; MG/5ML
5 SYRUP ORAL 4 TIMES DAILY PRN
Qty: 180 ML | Refills: 1 | Status: SHIPPED | OUTPATIENT
Start: 2019-05-06 | End: 2019-05-16

## 2019-05-06 RX ORDER — PROMETHAZINE HYDROCHLORIDE 6.25 MG/5ML
6.25 SYRUP ORAL EVERY 6 HOURS PRN
Qty: 240 ML | Refills: 1 | Status: SHIPPED | OUTPATIENT
Start: 2019-05-06 | End: 2019-08-19

## 2019-05-06 RX ORDER — DOXYCYCLINE 100 MG/1
100 CAPSULE ORAL 2 TIMES DAILY
Qty: 20 CAPSULE | Refills: 0 | Status: SHIPPED | OUTPATIENT
Start: 2019-05-06 | End: 2019-05-16

## 2019-05-06 NOTE — TELEPHONE ENCOUNTER
----- Message from Adelaide Corey sent at 5/6/2019  2:00 PM CDT -----  Contact: MASON [phone] Crystal  You e-scribed a rx for promethazine with codeine which is on back order. The plain promethazine is available. Also, Bromped DM. Please send in another rx or call pharmacy to order something else.

## 2019-05-06 NOTE — PROGRESS NOTES
Subjective:       Patient ID: Jackelyn Kendrick is a 68 y.o. female.    Chief Complaint: Cough    67 yo developed generalized myalgias, fever and cough over 3 weeks ago.  Symptoms have mostly resolved except for a persistent cough, productive of yellow sputum, present most of the day.  She has noted some wheezing which is controlled with Albuterol MDI.  She has noted some nasal congestion without headaches.      Cough   This is a new problem. The current episode started in the past 7 days. The problem has been gradually worsening. The problem occurs hourly. The cough is productive of purulent sputum. Associated symptoms include headaches, myalgias, nasal congestion, postnasal drip, a sore throat, shortness of breath and wheezing. Pertinent negatives include no chest pain, chills, ear congestion, ear pain, fever, heartburn, hemoptysis, rash, rhinorrhea, sweats or weight loss. The symptoms are aggravated by other. She has tried OTC cough suppressant, OTC inhaler, a beta-agonist inhaler, body position changes and prescription cough suppressant for the symptoms. The treatment provided no relief. Her past medical history is significant for bronchitis and pneumonia. There is no history of asthma, bronchiectasis, COPD, emphysema or environmental allergies.     Review of Systems   Constitutional: Negative for chills, fever and weight loss.   HENT: Positive for postnasal drip and sore throat. Negative for ear pain and rhinorrhea.    Respiratory: Positive for cough, shortness of breath and wheezing. Negative for hemoptysis.    Cardiovascular: Negative for chest pain.   Gastrointestinal: Negative for heartburn.   Musculoskeletal: Positive for myalgias.   Skin: Negative for rash.   Allergic/Immunologic: Negative for environmental allergies.   Neurological: Positive for headaches.       Objective:      Physical Exam   Constitutional: She appears well-developed and well-nourished. No distress.   HENT:   Right Ear: Tympanic  membrane and ear canal normal.   Left Ear: Tympanic membrane and ear canal normal.   Nose: Rhinorrhea present. Right sinus exhibits no maxillary sinus tenderness and no frontal sinus tenderness. Left sinus exhibits no maxillary sinus tenderness and no frontal sinus tenderness.   Neck: Normal range of motion. No thyromegaly present.   Pulmonary/Chest: She has no wheezes. She has rales.   Scattered coarse rales and ronchi   Lymphadenopathy:     She has no cervical adenopathy.       Assessment:         Acute Bronchitis  Plan:     1.  Continue Albuterol on prn basis  2.  Doxycycline bid  3.  Promethazine DM syrup

## 2019-08-01 RX ORDER — OMEPRAZOLE 40 MG/1
CAPSULE, DELAYED RELEASE ORAL
Qty: 30 CAPSULE | Refills: 11 | Status: SHIPPED | OUTPATIENT
Start: 2019-08-01 | End: 2020-08-01 | Stop reason: SDUPTHER

## 2019-08-01 RX ORDER — AMLODIPINE BESYLATE 2.5 MG/1
TABLET ORAL
Qty: 30 TABLET | Refills: 11 | Status: SHIPPED | OUTPATIENT
Start: 2019-08-01 | End: 2020-08-01 | Stop reason: SDUPTHER

## 2019-08-01 RX ORDER — LEVOTHYROXINE SODIUM 75 UG/1
TABLET ORAL
Qty: 30 TABLET | Refills: 11 | Status: SHIPPED | OUTPATIENT
Start: 2019-08-01 | End: 2020-08-25 | Stop reason: SDUPTHER

## 2019-08-05 ENCOUNTER — PATIENT OUTREACH (OUTPATIENT)
Dept: ADMINISTRATIVE | Facility: HOSPITAL | Age: 69
End: 2019-08-05

## 2019-08-05 NOTE — PROGRESS NOTES
Pre-visit chart review completed  Immunizations reviewed and updated.    Patient eligible for digital htn.  Orders pended.

## 2019-08-12 ENCOUNTER — LAB VISIT (OUTPATIENT)
Dept: LAB | Facility: HOSPITAL | Age: 69
End: 2019-08-12
Attending: FAMILY MEDICINE
Payer: MEDICARE

## 2019-08-12 DIAGNOSIS — I10 ESSENTIAL HYPERTENSION: ICD-10-CM

## 2019-08-12 DIAGNOSIS — E03.9 ACQUIRED HYPOTHYROIDISM: ICD-10-CM

## 2019-08-12 DIAGNOSIS — E78.2 MIXED HYPERLIPIDEMIA: ICD-10-CM

## 2019-08-12 LAB
ALBUMIN SERPL BCP-MCNC: 3.7 G/DL (ref 3.5–5.2)
ALP SERPL-CCNC: 59 U/L (ref 55–135)
ALT SERPL W/O P-5'-P-CCNC: 17 U/L (ref 10–44)
ANION GAP SERPL CALC-SCNC: 8 MMOL/L (ref 8–16)
AST SERPL-CCNC: 24 U/L (ref 10–40)
BASOPHILS # BLD AUTO: 0.05 K/UL (ref 0–0.2)
BASOPHILS NFR BLD: 0.9 % (ref 0–1.9)
BILIRUB SERPL-MCNC: 0.5 MG/DL (ref 0.1–1)
BUN SERPL-MCNC: 14 MG/DL (ref 8–23)
CALCIUM SERPL-MCNC: 9.7 MG/DL (ref 8.7–10.5)
CHLORIDE SERPL-SCNC: 107 MMOL/L (ref 95–110)
CHOLEST SERPL-MCNC: 153 MG/DL (ref 120–199)
CHOLEST/HDLC SERPL: 3.1 {RATIO} (ref 2–5)
CO2 SERPL-SCNC: 29 MMOL/L (ref 23–29)
CREAT SERPL-MCNC: 0.9 MG/DL (ref 0.5–1.4)
DIFFERENTIAL METHOD: ABNORMAL
EOSINOPHIL # BLD AUTO: 0.1 K/UL (ref 0–0.5)
EOSINOPHIL NFR BLD: 2.1 % (ref 0–8)
ERYTHROCYTE [DISTWIDTH] IN BLOOD BY AUTOMATED COUNT: 12.6 % (ref 11.5–14.5)
EST. GFR  (AFRICAN AMERICAN): >60 ML/MIN/1.73 M^2
EST. GFR  (NON AFRICAN AMERICAN): >60 ML/MIN/1.73 M^2
GLUCOSE SERPL-MCNC: 95 MG/DL (ref 70–110)
HCT VFR BLD AUTO: 40.2 % (ref 37–48.5)
HDLC SERPL-MCNC: 50 MG/DL (ref 40–75)
HDLC SERPL: 32.7 % (ref 20–50)
HGB BLD-MCNC: 12.8 G/DL (ref 12–16)
IMM GRANULOCYTES # BLD AUTO: 0.01 K/UL (ref 0–0.04)
IMM GRANULOCYTES NFR BLD AUTO: 0.2 % (ref 0–0.5)
LDLC SERPL CALC-MCNC: 76.6 MG/DL (ref 63–159)
LYMPHOCYTES # BLD AUTO: 1.6 K/UL (ref 1–4.8)
LYMPHOCYTES NFR BLD: 29.4 % (ref 18–48)
MCH RBC QN AUTO: 28.8 PG (ref 27–31)
MCHC RBC AUTO-ENTMCNC: 31.8 G/DL (ref 32–36)
MCV RBC AUTO: 90 FL (ref 82–98)
MONOCYTES # BLD AUTO: 0.5 K/UL (ref 0.3–1)
MONOCYTES NFR BLD: 8.6 % (ref 4–15)
NEUTROPHILS # BLD AUTO: 3.1 K/UL (ref 1.8–7.7)
NEUTROPHILS NFR BLD: 58.8 % (ref 38–73)
NONHDLC SERPL-MCNC: 103 MG/DL
NRBC BLD-RTO: 0 /100 WBC
PLATELET # BLD AUTO: 210 K/UL (ref 150–350)
PMV BLD AUTO: 11.8 FL (ref 9.2–12.9)
POTASSIUM SERPL-SCNC: 4.4 MMOL/L (ref 3.5–5.1)
PROT SERPL-MCNC: 7 G/DL (ref 6–8.4)
RBC # BLD AUTO: 4.45 M/UL (ref 4–5.4)
SODIUM SERPL-SCNC: 144 MMOL/L (ref 136–145)
TRIGL SERPL-MCNC: 132 MG/DL (ref 30–150)
TSH SERPL DL<=0.005 MIU/L-ACNC: 1.69 UIU/ML (ref 0.4–4)
WBC # BLD AUTO: 5.34 K/UL (ref 3.9–12.7)

## 2019-08-12 PROCEDURE — 85025 COMPLETE CBC W/AUTO DIFF WBC: CPT

## 2019-08-12 PROCEDURE — 36415 COLL VENOUS BLD VENIPUNCTURE: CPT | Mod: PO

## 2019-08-12 PROCEDURE — 84443 ASSAY THYROID STIM HORMONE: CPT

## 2019-08-12 PROCEDURE — 80053 COMPREHEN METABOLIC PANEL: CPT

## 2019-08-12 PROCEDURE — 80061 LIPID PANEL: CPT

## 2019-08-19 ENCOUNTER — OFFICE VISIT (OUTPATIENT)
Dept: PRIMARY CARE CLINIC | Facility: CLINIC | Age: 69
End: 2019-08-19
Payer: MEDICARE

## 2019-08-19 VITALS
SYSTOLIC BLOOD PRESSURE: 115 MMHG | BODY MASS INDEX: 28.1 KG/M2 | HEIGHT: 61 IN | DIASTOLIC BLOOD PRESSURE: 60 MMHG | HEART RATE: 72 BPM | WEIGHT: 148.81 LBS | TEMPERATURE: 99 F

## 2019-08-19 DIAGNOSIS — Z00.00 ROUTINE GENERAL MEDICAL EXAMINATION AT A HEALTH CARE FACILITY: ICD-10-CM

## 2019-08-19 DIAGNOSIS — E03.9 HYPOTHYROIDISM, UNSPECIFIED TYPE: ICD-10-CM

## 2019-08-19 DIAGNOSIS — E78.5 HYPERLIPIDEMIA, UNSPECIFIED HYPERLIPIDEMIA TYPE: ICD-10-CM

## 2019-08-19 DIAGNOSIS — Z23 NEED FOR TD VACCINE: ICD-10-CM

## 2019-08-19 DIAGNOSIS — Z29.9 PREVENTIVE MEASURE: Primary | ICD-10-CM

## 2019-08-19 DIAGNOSIS — N39.3 STRESS INCONTINENCE: ICD-10-CM

## 2019-08-19 DIAGNOSIS — M81.0 OSTEOPOROSIS, UNSPECIFIED OSTEOPOROSIS TYPE, UNSPECIFIED PATHOLOGICAL FRACTURE PRESENCE: ICD-10-CM

## 2019-08-19 DIAGNOSIS — N81.6 RECTOCELE: ICD-10-CM

## 2019-08-19 DIAGNOSIS — I10 ESSENTIAL HYPERTENSION: ICD-10-CM

## 2019-08-19 PROCEDURE — 99999 PR PBB SHADOW E&M-EST. PATIENT-LVL III: CPT | Mod: PBBFAC,,, | Performed by: FAMILY MEDICINE

## 2019-08-19 PROCEDURE — 99397 PER PM REEVAL EST PAT 65+ YR: CPT | Mod: S$PBB,,, | Performed by: FAMILY MEDICINE

## 2019-08-19 PROCEDURE — 99397 PR PREVENTIVE VISIT,EST,65 & OVER: ICD-10-PCS | Mod: S$PBB,,, | Performed by: FAMILY MEDICINE

## 2019-08-19 PROCEDURE — 90714 TD VACC NO PRESV 7 YRS+ IM: CPT | Mod: PBBFAC,PN

## 2019-08-19 PROCEDURE — 99213 OFFICE O/P EST LOW 20 MIN: CPT | Mod: PBBFAC,PN,25 | Performed by: FAMILY MEDICINE

## 2019-08-19 PROCEDURE — 99999 PR PBB SHADOW E&M-EST. PATIENT-LVL III: ICD-10-PCS | Mod: PBBFAC,,, | Performed by: FAMILY MEDICINE

## 2019-08-19 RX ORDER — ROSUVASTATIN CALCIUM 40 MG/1
40 TABLET, COATED ORAL DAILY
Qty: 90 TABLET | Refills: 3 | Status: SHIPPED | OUTPATIENT
Start: 2019-08-19 | End: 2020-08-25 | Stop reason: SDUPTHER

## 2019-08-19 NOTE — PROGRESS NOTES
Two patient identifiers used, allergies reviewed, vaccine confirmed.  Pt tolerated well, no redness or bruising at injection site.  Pt advised to remain in clinic 15 mins following injection for observation.

## 2019-08-19 NOTE — PROGRESS NOTES
Subjective:       Patient ID: Jackelyn Kendrick is a 68 y.o. female.    Chief Complaint: Annual Exam    69 yo presents for routine exam, last exam one year ago  Immunizations:  Needs Td    Review of Systems   Constitutional: Negative.  Negative for activity change, appetite change, chills, diaphoresis, fatigue, fever and unexpected weight change.   HENT: Negative for congestion, ear pain, rhinorrhea, sinus pressure, sore throat, tinnitus, trouble swallowing and voice change.    Eyes: Positive for discharge. Negative for photophobia, pain and visual disturbance.   Respiratory: Positive for wheezing. Negative for cough, chest tightness and shortness of breath.    Cardiovascular: Negative.  Negative for chest pain, palpitations and leg swelling.   Gastrointestinal: Positive for constipation and diarrhea. Negative for abdominal distention, abdominal pain, blood in stool, nausea and vomiting.   Endocrine: Positive for polyuria. Negative for polydipsia.   Genitourinary: Positive for frequency. Negative for difficulty urinating, dysuria, hematuria, menstrual problem and pelvic pain.        Recurrent symptoms of stress incontinence, prior history of rectocele   Musculoskeletal: Positive for arthralgias, joint swelling and neck pain. Negative for back pain and neck stiffness.   Skin: Negative for color change, pallor and rash.   Neurological: Positive for headaches. Negative for dizziness, tremors, speech difficulty, weakness, light-headedness and numbness.   Hematological: Negative for adenopathy. Does not bruise/bleed easily.   Psychiatric/Behavioral: Negative for agitation, confusion, dysphoric mood, hallucinations and sleep disturbance. The patient is not nervous/anxious.        Objective:      Physical Exam   Constitutional: She is oriented to person, place, and time. She appears well-developed and well-nourished.   HENT:   Right Ear: Tympanic membrane, external ear and ear canal normal.   Left Ear: Tympanic membrane,  external ear and ear canal normal.   Nose: Nose normal.   Mouth/Throat: Oropharynx is clear and moist. No posterior oropharyngeal erythema.   Eyes: Pupils are equal, round, and reactive to light. Conjunctivae and EOM are normal.   Neck: Normal range of motion. Neck supple. No tracheal deviation present. No thyromegaly present.   Cardiovascular: Normal rate, regular rhythm, normal heart sounds and intact distal pulses.   Pulmonary/Chest: Effort normal. She has no wheezes. She has no rales. She exhibits no tenderness.   Abdominal: Soft. Bowel sounds are normal. She exhibits no distension and no mass. There is no rebound.   Musculoskeletal: Normal range of motion. She exhibits no edema or tenderness.   Lymphadenopathy:     She has no cervical adenopathy.   Neurological: She is alert and oriented to person, place, and time. She has normal reflexes. No cranial nerve deficit. Coordination normal.   Skin: Skin is warm and dry. No rash noted. No erythema.   Psychiatric: She has a normal mood and affect. Her behavior is normal.       Assessment:         1.  Physical exam  2.  Stress incontinence  3.  Hypertension  4.  Hypothyroid  5.  hyperlipidemia  Plan:       1.  Labs reviewed with pt  2.  Continue present medications  3.  Urogyn consult  4.  Td  5.  BMD

## 2019-08-20 ENCOUNTER — PATIENT MESSAGE (OUTPATIENT)
Dept: PRIMARY CARE CLINIC | Facility: CLINIC | Age: 69
End: 2019-08-20

## 2019-08-21 ENCOUNTER — TELEPHONE (OUTPATIENT)
Dept: UROGYNECOLOGY | Facility: CLINIC | Age: 69
End: 2019-08-21

## 2019-08-21 NOTE — TELEPHONE ENCOUNTER
Marco A Marin,  Appointment scheduled with Dr. Thurston for 8/29/2019 at 10:30am.      Thank you,  Francisca

## 2019-08-21 NOTE — TELEPHONE ENCOUNTER
----- Message from Lisset Horne sent at 8/20/2019  4:49 PM CDT -----  Regarding: Referral to Uro/GYN  Dr. Frank put in a referral for Uro/GYN.     Diag: Stress incontinence    Can you assist with scheduling, any help would be appreciated.    Thanks

## 2019-08-21 NOTE — TELEPHONE ENCOUNTER
Marco A Espinosa,    Can you please contact pt to schedule consult appointment with Nenita Fowler, or Pia for stress incontinence referred by Dr. Frank.    Thank you,  Tomas

## 2019-08-23 ENCOUNTER — APPOINTMENT (OUTPATIENT)
Dept: RADIOLOGY | Facility: CLINIC | Age: 69
End: 2019-08-23
Attending: FAMILY MEDICINE
Payer: MEDICARE

## 2019-08-23 DIAGNOSIS — M81.0 OSTEOPOROSIS, UNSPECIFIED OSTEOPOROSIS TYPE, UNSPECIFIED PATHOLOGICAL FRACTURE PRESENCE: ICD-10-CM

## 2019-08-23 PROCEDURE — 77080 DXA BONE DENSITY AXIAL: CPT | Mod: 26,,, | Performed by: INTERNAL MEDICINE

## 2019-08-23 PROCEDURE — 77080 DXA BONE DENSITY AXIAL: CPT | Mod: TC,PO

## 2019-08-23 PROCEDURE — 77080 DEXA BONE DENSITY SPINE HIP: ICD-10-PCS | Mod: 26,,, | Performed by: INTERNAL MEDICINE

## 2019-09-02 RX ORDER — MELOXICAM 7.5 MG/1
TABLET ORAL
Qty: 60 TABLET | Refills: 11 | Status: SHIPPED | OUTPATIENT
Start: 2019-09-02 | End: 2020-10-01 | Stop reason: SDUPTHER

## 2019-10-01 RX ORDER — OXYBUTYNIN CHLORIDE 10 MG/1
TABLET, EXTENDED RELEASE ORAL
Qty: 30 TABLET | Refills: 11 | Status: SHIPPED | OUTPATIENT
Start: 2019-10-01 | End: 2020-08-25 | Stop reason: SDUPTHER

## 2019-10-01 RX ORDER — HYOSCYAMINE SULFATE 0.12 MG/1
TABLET SUBLINGUAL
Qty: 90 TABLET | Refills: 11 | Status: SHIPPED | OUTPATIENT
Start: 2019-10-01 | End: 2020-03-10

## 2019-10-01 NOTE — PROGRESS NOTES
Physical Therapy Daily Treatment Note     Name: Jackelyn Meilass  Clinic Number: [id number]    Therapy Diagnosis:   Encounter Diagnosis   Name Primary?    Left shoulder pain, unspecified chronicity      Physician: Zoila Dos Santos PA-C    Visit Date: 11/29/2018    Medical Diagnosis: Left shoulder pain, unspecified chronicity  Evaluation Date: 8/1/18    Visit #/Visits authorized: 7/20  Time In: 9:00  Time Out 9:55  Treatment time: 55  Total Billable Time: 55 minutes    Precautions:Blood pressure/syncope    Subjective     Pt reports: that her L shoulder pain is about the same  Pain: 3/10 to (L shoulder)       Objective       Jackelyn received therapeutic exercises to develop UE  strength, endurance, ROM and flexibility for 15 minutes including:  UBE x 6 min         Jackelyn received the following manual therapy techniques: Joint mobilizations were applied to the: (L) shoulder  for 30 minutes, including:   Soft tissue mobilization Thoracic paraspinals and B upper trap  Passive mobilization thoracic spine and rib cage  Cx spine manual traction  Contract relax L rib cage mobilization  L GH joint mobs    Tx not performed today:  Supine B shoulder horz abd 10 x 3 with orange TB  Cable cross rows 10 x 3 with 7#  Cable cross B shld ext 10 x 3 with 3#  Side lying L shoulder horz abd 10 x 3  L AC and SC joint mobilization  Supine B shoulder horizontal adb x 10 with no resistance to ~ 45 deg, x 10 with yellow TB - np  Standing Rows 10 x 3 with 3# on cable cross - np  Standing B shoulder ext 10 x 3 with 3# on cable cross - np  Overhead pulley x 4 min - np  B shoulder pro/retraction 20 x 2 with ball on plinth  B shoulder horizontal abd/add with ball on plinth 20 x 2  Cx spine manual traction  AAROM shoulder flex with dowel 2 x 10 - np  Supine scap protract 2 x 10 - np  Sidelying ER 2 x 10 - np  AAROM on pulleys for flex to tolerance x 2 minutes each - np  Scap retract/row with OTB 3 x 15 - np  Shoulder ext with OTB 3 x 10 -  GI CLINIC VISIT    CC/REFERRING MD:  Terry Demarco    REASON FOR CONSULTATION:   The pt is a 26 year old female who I was asked to see in consultation at the request of Dr. Terry Demarco for belching and dysphagia.     ASSESSMENT/PLAN:  25 yo woman with anxiety who presents for belching and dysphagia x5 years.     # Dysphagia to solids and liquids  Feels like food gets stuck at levels of cricopharyngeus and clavicles before passing, occasionally regurgitates/refluxes when bending over (acidic) or belching (non acidic). Doesn't chew food again- not c/w rumination. No food impaction. No structural abnormalities aside from esophageal/cardia polyp that was biopsied (not resected) during EGD 3/1/19, consistent with reflux related changes. Manometry c/w IEM- peristalsis intact. EoE ruled out.  - video swallow and esophagram  - increase PPI to high dose x2 weeks as empiric trial    # Belching  Frequent belching throughout the day. May be related to acid reflux and clearing acid bolus, vs supradiaphragmatic belching vs aerophagia.   - PPI trial as above  - referral to health psychology for diaphragmatic breathing    # GERD  Changes related to reflux on recent biopsies of distal esophagus and polyp, although endoscopically no esophagitis. May be related to recent weight gain (152lbs 11/2017, 181lbs 10/2019). Asymptomatic prior to starting PPI, now notices symptoms if she forgets a PPI dose. Gastric emptying study not consistent with delayed gastric emptying.   - discuss weight loss at future appointment, not covered today  - omeprazole 40mg BID x2 weeks    RTC 3 months    Thank you for this consultation.  It was a pleasure to participate in the care of this patient; please contact us with any further questions.     Hernandez Coreas MD    Division of Gastroenterology, Hepatology and [facility]    HPI  25 yo woman with anxiety who presents for belching and dysphagia  np  Shoulder er/ir with YTB 2 x 10 - np  Supine B shoulder horizontal Abduction with yellow TB 10 x 2 - np  Modalities: Castro mechanical traction w 12# x 15 min  L GH joint Inferior, lateral anterior and posterior glides  Patient receives moist heat to (L) shoulder x 5 minutes for muscle relaxation prior to ex's.   GH joint mobilization into lateral glide, anterior glide and inferior glide.   Written Home Exercises Provided: Patient educated to continue with previously issued HEP to tolerance. She was provided with a copy of scap retract.row and shoulder ext with YTB.   Exercises were reviewed and Jackelyn was able to demonstrate them prior to the end of the session.  Jackelyn demonstrated good  understanding of the education provided.        Assessment     Pt tolerated L rib cage contract relax mobilization with c/o L shoulder discomfort.  She had greater Thoracic spine mobility following tx in the thoracic region, but continues with L shoulder pain.     Pt will continue to benefit from skilled outpatient physical therapy to address the deficits listed in the problem list box on initial evaluation, provide pt/family education and to maximize pt's level of independence in the home and community environment.      GOALS: Short Term Goals:  3 weeks  1. Pt will demonstrate increased L shoulder ROM by.  2. Pt Independent with HEP to improve ROM and independence with ADL's     Long Term Goals: 6 weeks  1.Report decreased    L  shoulder    pain  <   / =  2  /10  to increase tolerance for ADL's  2.Increased MMT  for  L UE  to increase tolerance for ADLs.  3.Increased PROM to WNL's of L shoulder to improve normal movement patterns during ADL's.  4. Pt will report improvement in overall functional abilities of UE, evidenced by improved score on FOTO Shoulder Survey.       Plan     Continue PT towards established plan of care and goals.     Fermin Frias, PT    x5 years.     She describes food and liquids sticking at the level of the cricopharyngeus and clavicles for the past 5 years. Usually food goes down but occasionally she will regurgitate after meals if she bends over (which is acidic) or if she belches (non acidid), ~once/week. She notices worse symptoms with lettuce, chicken, but still sometimes with liquids. Symptoms are not progressive. Never had food impaction. She had an EGD 3/1/19 which demonstrated 10mm polyp at distal esophagus/high cardia which was consistent with reactive changes from reflux- biopsied, not resected. She was started on omeprazole 20mg once per day with no improvement.     She also belches throughout the day continuously. She does not notice swallowing air. She doesn't chew gum, drink carbonated beverages, use straws, etc. No symptoms at night. She has regular bowel movements.     She underwent gastric emptying study that demonstrated normal gastric emptying at 4 hours (3%). She underwent manometry which was negative for hiatal hernia, had mean IRP 11.7, mean .  She had 2 swallows with IRP greater than 15.  She had ineffective motility on 5 swallows and no failed swallows. Bolus clearance was read as incomplete for 50% of swallows.     ROS:    No fevers or chills  No weight loss  No blurry vision, double vision or change in vision  No sore throat  No lymphadenopathy  No headache, paraesthesias, or weakness in a limb  No shortness of breath or wheezing  No chest pain or pressure  No arthralgias or myalgias  No rashes or skin changes  +dysphagia  No BRBPR, hematochezia, melena  No dysuria, frequency or urgency  No hot/cold intolerance or polyria  +anxiety    PROBLEM LIST  Patient Active Problem List    Diagnosis Date Noted     Seasonal allergic rhinitis due to pollen 07/11/2019     Priority: Medium     Allergic rhinitis due to dust mite 07/11/2019     Priority: Medium     Ear itching 07/11/2019     Priority: Medium     GERD  (gastroesophageal reflux disease) 07/11/2019     Priority: Medium     esophageal polyp       Dumping syndrome 07/11/2019     Priority: Medium     Lactose intolerance 07/11/2019     Priority: Medium     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     Priority: Medium     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/2018     Priority: Medium     PERTINENT PAST MEDICAL HISTORY:  Past Medical History:   Diagnosis Date     Anxiety Age 24     Esophageal reflux Age 10    Didnt notice as i aged until 25     Gastroparesis      Genital problems Age 24    Genito pelvic pain     GERD (gastroesophageal reflux disease)     esophageal polyp     PREVIOUS SURGERIES:  Past Surgical History:   Procedure Laterality Date     APPENDECTOMY      4/2002     ENT SURGERY  2011    tonsils     PREVIOUS ENDOSCOPY:  EGD 3/1/19  Z line 38 cm, biopsied from mid and distal  10mm esophageal polyp at GE junction biopsied with cold forceps  Normal stomach  Normal duodenum    ALLERGIES:   No Known Allergies    PERTINENT MEDICATIONS:    Current Outpatient Medications:      etonogestrel-ethinyl estradiol (NUVARING) 0.12-0.015 MG/24HR vaginal ring, Insert 1 ring vaginally every 21 days then remove for 1 week then repeat with new ring., Disp: 3 each, Rfl: 3     hydrOXYzine (ATARAX) 25 MG tablet, Take 1 tablet (25 mg) by mouth every 6 hours as needed for anxiety, Disp: 60 tablet, Rfl: 0     LORazepam (ATIVAN) 0.5 MG tablet, Take 1 tablet (0.5 mg) by mouth every 8 hours as needed for anxiety, Disp: 20 tablet, Rfl: 0     omeprazole (PRILOSEC) 20 MG DR capsule, , Disp: , Rfl:      ondansetron (ZOFRAN-ODT) 4 MG ODT tab, Take 8 mg by mouth every 8 hours as needed for nausea, Disp: , Rfl:      propranolol (INDERAL) 20 MG tablet, Take 0.5-1 tablets (10-20 mg) by mouth 3 times daily as needed (for anxiety), Disp: 60 tablet, Rfl: 0    SOCIAL HISTORY:  Social History     Socioeconomic History     Marital status:      Spouse name: Not on file     Number of children: Not on file      Years of education: Not on file     Highest education level: Not on file   Occupational History     Not on file   Social Needs     Financial resource strain: Not on file     Food insecurity:     Worry: Not on file     Inability: Not on file     Transportation needs:     Medical: Not on file     Non-medical: Not on file   Tobacco Use     Smoking status: Never Smoker     Smokeless tobacco: Never Used   Substance and Sexual Activity     Alcohol use: Yes     Comment: less than 1x a week     Drug use: No     Sexual activity: Yes     Partners: Male     Birth control/protection: Inserts/Ring   Lifestyle     Physical activity:     Days per week: Not on file     Minutes per session: Not on file     Stress: Not on file   Relationships     Social connections:     Talks on phone: Not on file     Gets together: Not on file     Attends Religion service: Not on file     Active member of club or organization: Not on file     Attends meetings of clubs or organizations: Not on file     Relationship status: Not on file     Intimate partner violence:     Fear of current or ex partner: Not on file     Emotionally abused: Not on file     Physically abused: Not on file     Forced sexual activity: Not on file   Other Topics Concern     Parent/sibling w/ CABG, MI or angioplasty before 65F 55M? Not Asked   Social History Narrative     Not on file     FAMILY HISTORY:  Family History   Problem Relation Age of Onset     Obesity Mother      Velasquez's esophagus Mother      Other - See Comments Mother         eosinophilic esophagitis     Hypertension Father      Hyperlipidemia Father      Diabetes Father      Anxiety Disorder Father      Stomach Cancer Maternal Grandmother      Breast Cancer Maternal Aunt      Diabetes Paternal Grandfather      Diabetes Paternal Grandmother      Other Cancer Paternal Grandmother         Stomach cancer     Breast Cancer Other      Anxiety Disorder Sister    Past/family/social history reviewed and no  "changes    PHYSICAL EXAMINATION:  Constitutional: aaox3, cooperative, pleasant, not dyspneic/diaphoretic, no acute distress  Vitals reviewed: /70   Pulse 73   Temp 98.3  F (36.8  C) (Oral)   Ht 1.638 m (5' 4.5\")   Wt 82.2 kg (181 lb 4.8 oz)   SpO2 95%   BMI 30.64 kg/m    Wt:   Wt Readings from Last 2 Encounters:   10/01/19 82.2 kg (181 lb 4.8 oz)   07/11/19 81.2 kg (179 lb)     Eyes: Sclera anicteric/injected  Ears/nose/mouth/throat: Normal oropharynx without ulcers or exudate, mucus membranes moist, hearing intact  Neck: supple, thyroid normal size  CV: No edema  Respiratory: Unlabored breathing  Lymph: No cervical lymphadenopathy  Abd: Nondistended, +bs, no hepatosplenomegaly, nontender, no peritoneal signs  Skin: warm, perfused, no jaundice  Psych: Normal affect  MSK: Normal gait    PERTINENT STUDIES:  Office Visit on 07/18/2018   Component Date Value Ref Range Status     PAP 07/18/2018 NIL   Final     Copath Report 07/18/2018    Final                    Value:  Patient Name: NATANAEL WADE  MR#: [id number]  Specimen #: [id number]  Collected: 7/18/2018  Received: 7/19/2018  Reported: 7/20/2018 14:11  Ordering Phy(s): DOUG PAULA    For improved result formatting, select 'View Enhanced Report Format' under   Linked Documents section.    SPECIMEN/STAIN PROCESS:  Pap imaged thin layer prep screening (Surepath, FocalPoint with guided   screening)       Pap-Cyto x 1, Pap with reflex to HPV if ASCUS x 1    SOURCE: Cervical, endocervical  ----------------------------------------------------------------   Pap imaged thin layer prep screening (Surepath, FocalPoint with guided   screening)  SPECIMEN ADEQUACY:  Satisfactory for evaluation.  -Transformation zone component present.    CYTOLOGIC INTERPRETATION:    Negative for intraepithelial lesion or malignancy    Electronically signed out by:  GHAZALA Scott (ASCP)    Processed and screened at [facility],   [facility] [place]    CLINICAL HISTORY:  LMP: 6/21/20                          18  Previous normal pap  Date of Last Pap: 9/1/2015,    Papanicolaou Test Limitations:  Cervical cytology is a screening test with   limited sensitivity; regular  screening is critical for cancer prevention; Pap tests are primarily   effective for the diagnosis/prevention of  squamous cell carcinoma, not adenocarcinomas or other cancers.    TESTING LAB LOCATION:  [facility], [address]  [address] [phone]    COLLECTION SITE:  Client:  [facility]  [facility] emptying 4/5/19  FINDINGS:  Percent retention at 60 min = 81%.  Percent retention at 120 min = 53%.  Percent retention at 180 min = 11%.  Percent retention at 240 min = 3%.   T 1/2 emptying time =  127 mins.  Answers for HPI/ROS submitted by the patient on 9/30/2019   General Symptoms: Yes  Skin Symptoms: No  HENT Symptoms: Yes  EYE SYMPTOMS: No  HEART SYMPTOMS: No  LUNG SYMPTOMS: No  INTESTINAL SYMPTOMS: Yes  URINARY SYMPTOMS: No  GYNECOLOGIC SYMPTOMS: No  BREAST SYMPTOMS: No  SKELETAL SYMPTOMS: No  BLOOD SYMPTOMS: No  NERVOUS SYSTEM SYMPTOMS: Yes  MENTAL HEALTH SYMPTOMS: Yes  Fever: No  Loss of appetite: No  Weight loss: No  Weight gain: No  Fatigue: No  Night sweats: No  Chills: No  Increased stress: Yes  Excessive hunger: No  Excessive thirst: No  Feeling hot or cold when others believe the temperature is normal: No  Loss of height: No  Post-operative complications: No  Surgical site pain: No  Hallucinations: No  Change in or Loss of Energy: No  Hyperactivity: No  Confusion: No  Ear pain: No  Ear discharge: No  Hearing loss: No  Tinnitus: No  Nosebleeds: No  Congestion: Yes  Sinus pain: No  Trouble swallowing: No   Voice hoarseness: No  Mouth sores: No  Sore throat: No  Tooth pain: No  Gum tenderness: No  Bleeding gums: No  Change in  taste: No  Change in sense of smell: No  Dry mouth: No  Hearing aid used: No  Neck lump: No  Heart burn or indigestion: Yes  Nausea: Yes  Vomiting: No  Abdominal pain: Yes  Bloating: Yes  Constipation: No  Diarrhea: Yes  Blood in stool: No  Black stools: No  Rectal or Anal pain: No  Fecal incontinence: No  Yellowing of skin or eyes: No  Vomit with blood: No  Change in stools: No  Trouble with coordination: No  Dizziness or trouble with balance: No  Fainting or black-out spells: No  Memory loss: No  Headache: Yes  Seizures: No  Speech problems: No  Tingling: No  Tremor: No  Weakness: No  Difficulty walking: No  Paralysis: No  Numbness: No  Nervous or Anxious: Yes  Depression: No  Trouble sleeping: Yes  Trouble thinking or concentrating: No  Mood changes: Yes  Panic attacks: Yes

## 2019-10-27 RX ORDER — LEVOTHYROXINE SODIUM 75 UG/1
TABLET ORAL
Qty: 30 TABLET | Refills: 6 | Status: SHIPPED | OUTPATIENT
Start: 2019-10-27 | End: 2020-03-02 | Stop reason: SDUPTHER

## 2019-10-29 DIAGNOSIS — H04.123 DRY EYES, BILATERAL: ICD-10-CM

## 2019-10-29 RX ORDER — CYCLOSPORINE 0.5 MG/ML
EMULSION OPHTHALMIC
Qty: 60 EACH | Refills: 12 | Status: SHIPPED | OUTPATIENT
Start: 2019-10-29 | End: 2020-12-01

## 2020-01-13 ENCOUNTER — PES CALL (OUTPATIENT)
Dept: ADMINISTRATIVE | Facility: CLINIC | Age: 70
End: 2020-01-13

## 2020-02-10 ENCOUNTER — OFFICE VISIT (OUTPATIENT)
Dept: OPTOMETRY | Facility: CLINIC | Age: 70
End: 2020-02-10
Payer: MEDICARE

## 2020-02-10 DIAGNOSIS — H25.13 NUCLEAR SCLEROSIS, BILATERAL: ICD-10-CM

## 2020-02-10 DIAGNOSIS — Z13.5 SCREENING FOR GLAUCOMA: ICD-10-CM

## 2020-02-10 DIAGNOSIS — H52.03 HYPEROPIA WITH PRESBYOPIA, BILATERAL: ICD-10-CM

## 2020-02-10 DIAGNOSIS — H52.4 HYPEROPIA WITH PRESBYOPIA, BILATERAL: ICD-10-CM

## 2020-02-10 DIAGNOSIS — H04.123 DRY EYES, BILATERAL: Primary | ICD-10-CM

## 2020-02-10 PROCEDURE — 99999 PR PBB SHADOW E&M-EST. PATIENT-LVL II: ICD-10-PCS | Mod: PBBFAC,,, | Performed by: OPTOMETRIST

## 2020-02-10 PROCEDURE — 99212 OFFICE O/P EST SF 10 MIN: CPT | Mod: PBBFAC,PO | Performed by: OPTOMETRIST

## 2020-02-10 PROCEDURE — 92015 DETERMINE REFRACTIVE STATE: CPT | Mod: ,,, | Performed by: OPTOMETRIST

## 2020-02-10 PROCEDURE — 92015 PR REFRACTION: ICD-10-PCS | Mod: ,,, | Performed by: OPTOMETRIST

## 2020-02-10 PROCEDURE — 92014 COMPRE OPH EXAM EST PT 1/>: CPT | Mod: S$PBB,,, | Performed by: OPTOMETRIST

## 2020-02-10 PROCEDURE — 92014 PR EYE EXAM, EST PATIENT,COMPREHESV: ICD-10-PCS | Mod: S$PBB,,, | Performed by: OPTOMETRIST

## 2020-02-10 PROCEDURE — 99999 PR PBB SHADOW E&M-EST. PATIENT-LVL II: CPT | Mod: PBBFAC,,, | Performed by: OPTOMETRIST

## 2020-02-10 RX ORDER — PREDNISOLONE ACETATE 10 MG/ML
1 SUSPENSION/ DROPS OPHTHALMIC 4 TIMES DAILY
Qty: 1 BOTTLE | Refills: 12 | Status: SHIPPED | OUTPATIENT
Start: 2020-02-10 | End: 2020-03-23 | Stop reason: ALTCHOICE

## 2020-02-10 RX ORDER — LOTEPREDNOL ETABONATE 5 MG/ML
1 SUSPENSION/ DROPS OPHTHALMIC 4 TIMES DAILY
Qty: 5 ML | Refills: 4 | Status: SHIPPED | OUTPATIENT
Start: 2020-02-10 | End: 2020-02-10

## 2020-02-10 NOTE — PROGRESS NOTES
HPI     DLS: 2/8/19  Pt states she has been having red and irritated eyes for a few weeks    States her eyes are gritty, also states that the gritty is affecting her   reading [facility].  Occ. Flashes, no floaters.   restasis gtts   Soothe XP gtts   Refresh gel   RESTASIS 3 or 4 times a day    Last edited by Matthias Gutierrez, OD on 2/10/2020  9:40 AM. (History)        ROS     Negative for: Constitutional, Gastrointestinal, Neurological, Skin,   Genitourinary, Musculoskeletal, HENT, Endocrine, Cardiovascular, Eyes,   Respiratory, Psychiatric, Allergic/Imm, Heme/Lymph    Last edited by Matthias Gutierrez, OD on 2/10/2020  9:40 AM. (History)        Assessment /Plan     For exam results, see Encounter Report.    Dry eyes, bilateral  -     loteprednol (LOTEMAX) 0.5 % ophthalmic suspension; Place 1 drop into both eyes 4 (four) times daily.  Dispense: 5 mL; Refill: 4    Nuclear sclerosis, bilateral    Screening for glaucoma    Hyperopia with presbyopia, bilateral      1. Cat ou--pt happy w spex  2. OCTAVIO (sp punctal plugs LL OU). Pt reports no relief w RESTASIS BID/SOOTHE XP BID+/ REFRESH PM RUBIN at night.  Will try pulse dose LOTEMAX--discussed risk of iop rise       Plan:    1. Start LOTEMAX QID OU  2. OK to DC RESTASIS for now, but cont w SOOTHE XP/gel at night as needed  3. rtc 2 weeks for follow up/iop ck         2/10/20 addendum--pharmacy called: LOTEMAX too expensive.  Will try PRED FORTE QID OU, and return as above

## 2020-02-28 ENCOUNTER — OFFICE VISIT (OUTPATIENT)
Dept: OPTOMETRY | Facility: CLINIC | Age: 70
End: 2020-02-28
Payer: MEDICARE

## 2020-02-28 DIAGNOSIS — H04.123 DRY EYES, BILATERAL: Primary | ICD-10-CM

## 2020-02-28 PROCEDURE — 92012 PR EYE EXAM, EST PATIENT,INTERMED: ICD-10-PCS | Mod: S$PBB,,, | Performed by: OPTOMETRIST

## 2020-02-28 PROCEDURE — 99999 PR PBB SHADOW E&M-EST. PATIENT-LVL II: CPT | Mod: PBBFAC,,, | Performed by: OPTOMETRIST

## 2020-02-28 PROCEDURE — 99212 OFFICE O/P EST SF 10 MIN: CPT | Mod: PBBFAC,PO | Performed by: OPTOMETRIST

## 2020-02-28 PROCEDURE — 99999 PR PBB SHADOW E&M-EST. PATIENT-LVL II: ICD-10-PCS | Mod: PBBFAC,,, | Performed by: OPTOMETRIST

## 2020-02-28 PROCEDURE — 92012 INTRM OPH EXAM EST PATIENT: CPT | Mod: S$PBB,,, | Performed by: OPTOMETRIST

## 2020-02-28 NOTE — PROGRESS NOTES
"HPI     DLS   02/10/2020  HX      Dry eyes OU  Pt here for IOP CK  Pt states she still experience some dryness with redness. FEELS "75%   BETTER"    Last edited by Matthias Gutierrez, OD on 2/28/2020  9:51 AM. (History)        ROS     Negative for: Constitutional, Gastrointestinal, Neurological, Skin,   Genitourinary, Musculoskeletal, HENT, Endocrine, Cardiovascular, Eyes,   Respiratory, Psychiatric, Allergic/Imm, Heme/Lymph    Last edited by Matthias Gutierrez, OD on 2/28/2020  9:51 AM. (History)        Assessment /Plan     For exam results, see Encounter Report.    Dry eyes, bilateral      See notes from last week:  1. Cat ou--pt happy w spex  2. OCTAVIO (sp punctal plugs LL OU). Pt reports no relief w RESTASIS BID/SOOTHE XP BID+/ REFRESH PM RUBIN at night.  Will try pulse dose LOTEMAX--discussed risk of iop rise (pt got PRED instead because Lotemax too expensive)    TODAY pt presents saying after 2 weeks of PRED eyes are "75% better", but still having some irritation.      PLAN:    1. Cont PRED QID OU  2. Cont ATs as needed  3. I will call pt in 2 weeks.  If better will discuss taper                 "

## 2020-03-02 ENCOUNTER — OFFICE VISIT (OUTPATIENT)
Dept: PRIMARY CARE CLINIC | Facility: CLINIC | Age: 70
End: 2020-03-02
Payer: MEDICARE

## 2020-03-02 VITALS
HEIGHT: 61 IN | DIASTOLIC BLOOD PRESSURE: 80 MMHG | HEART RATE: 82 BPM | WEIGHT: 149.94 LBS | SYSTOLIC BLOOD PRESSURE: 140 MMHG | OXYGEN SATURATION: 96 % | BODY MASS INDEX: 28.31 KG/M2

## 2020-03-02 DIAGNOSIS — R31.9 URINARY TRACT INFECTION WITH HEMATURIA, SITE UNSPECIFIED: ICD-10-CM

## 2020-03-02 DIAGNOSIS — Z00.00 ENCOUNTER FOR PREVENTIVE HEALTH EXAMINATION: Primary | ICD-10-CM

## 2020-03-02 DIAGNOSIS — E03.9 HYPOTHYROIDISM, UNSPECIFIED TYPE: ICD-10-CM

## 2020-03-02 DIAGNOSIS — K21.9 GASTROESOPHAGEAL REFLUX DISEASE, ESOPHAGITIS PRESENCE NOT SPECIFIED: ICD-10-CM

## 2020-03-02 DIAGNOSIS — I10 ESSENTIAL HYPERTENSION: ICD-10-CM

## 2020-03-02 DIAGNOSIS — I70.0 ATHEROSCLEROSIS OF AORTA: ICD-10-CM

## 2020-03-02 DIAGNOSIS — K58.2 IRRITABLE BOWEL SYNDROME WITH BOTH CONSTIPATION AND DIARRHEA: ICD-10-CM

## 2020-03-02 DIAGNOSIS — E66.3 OVERWEIGHT (BMI 25.0-29.9): ICD-10-CM

## 2020-03-02 DIAGNOSIS — N39.0 URINARY TRACT INFECTION WITH HEMATURIA, SITE UNSPECIFIED: ICD-10-CM

## 2020-03-02 LAB
BILIRUB SERPL-MCNC: ABNORMAL MG/DL
BLOOD URINE, POC: 250
COLOR, POC UA: ABNORMAL
GLUCOSE UR QL STRIP: NORMAL
KETONES UR QL STRIP: ABNORMAL
LEUKOCYTE ESTERASE URINE, POC: ABNORMAL
NITRITE, POC UA: ABNORMAL
PH, POC UA: 5
PROTEIN, POC: ABNORMAL
SPECIFIC GRAVITY, POC UA: 1.01
UROBILINOGEN, POC UA: NORMAL

## 2020-03-02 PROCEDURE — 87186 SC STD MICRODIL/AGAR DIL: CPT

## 2020-03-02 PROCEDURE — 99215 OFFICE O/P EST HI 40 MIN: CPT | Mod: PBBFAC,PN | Performed by: NURSE PRACTITIONER

## 2020-03-02 PROCEDURE — 87086 URINE CULTURE/COLONY COUNT: CPT

## 2020-03-02 PROCEDURE — 99999 PR PBB SHADOW E&M-EST. PATIENT-LVL V: CPT | Mod: PBBFAC,,, | Performed by: NURSE PRACTITIONER

## 2020-03-02 PROCEDURE — G0439 PPPS, SUBSEQ VISIT: HCPCS | Mod: S$GLB,,, | Performed by: NURSE PRACTITIONER

## 2020-03-02 PROCEDURE — 81002 URINALYSIS NONAUTO W/O SCOPE: CPT | Mod: PBBFAC,PN | Performed by: NURSE PRACTITIONER

## 2020-03-02 PROCEDURE — 87077 CULTURE AEROBIC IDENTIFY: CPT

## 2020-03-02 PROCEDURE — G0439 PR MEDICARE ANNUAL WELLNESS SUBSEQUENT VISIT: ICD-10-PCS | Mod: S$GLB,,, | Performed by: NURSE PRACTITIONER

## 2020-03-02 PROCEDURE — 87088 URINE BACTERIA CULTURE: CPT

## 2020-03-02 PROCEDURE — 99999 PR PBB SHADOW E&M-EST. PATIENT-LVL V: ICD-10-PCS | Mod: PBBFAC,,, | Performed by: NURSE PRACTITIONER

## 2020-03-02 RX ORDER — SULFAMETHOXAZOLE AND TRIMETHOPRIM 800; 160 MG/1; MG/1
1 TABLET ORAL 2 TIMES DAILY
Qty: 14 TABLET | Refills: 0 | Status: SHIPPED | OUTPATIENT
Start: 2020-03-02 | End: 2020-08-26

## 2020-03-02 RX ORDER — FLUTICASONE PROPIONATE 50 MCG
1 SPRAY, SUSPENSION (ML) NASAL DAILY
COMMUNITY
End: 2021-03-23

## 2020-03-02 NOTE — PATIENT INSTRUCTIONS
Counseling and Referral of Other Preventative  (Italic type indicates deductible and co-insurance are waived)    Patient Name: Jackelyn Kendrick  Today's Date: 3/2/2020    Health Maintenance       Date Due Completion Date    Mammogram 08/27/2020 8/27/2018    DEXA SCAN 08/23/2022 8/23/2019    Lipid Panel 08/12/2024 8/12/2019    Colonoscopy 10/28/2024 10/28/2014    TETANUS VACCINE 08/19/2029 8/19/2019    Override on 8/19/2019: Done        No orders of the defined types were placed in this encounter.    The following information is provided to all patients.  This information is to help you find resources for any of the problems found today that may be affecting your health:                Living healthy guide: www.[facility].[place].gov      Understanding Diabetes: www.diabetes.org      Eating healthy: www.cdc.gov/healthyweight      [facility] home safety checklist: www.cdc.gov/steadi/patient.html      Agency on Aging: www.goea.[place].gov      Alcoholics anonymous (AA): www.aa.org      Physical Activity: www.gerri.nih.gov/mn1vfkm      Tobacco use: www.quitwithusla.org     
Add 34369 Cpt? (Important Note: In 2017 The Use Of 68177 Is Being Tracked By Cms To Determine Future Global Period Reimbursement For Global Periods): no
Detail Level: Simple

## 2020-03-03 NOTE — PROGRESS NOTES
"Jackelyn Kendrick presented for a  Medicare AWV and comprehensive Health Risk Assessment today. The following components were reviewed and updated:    · Medical history  · Family History  · Social history  · Allergies and Current Medications  · Health Risk Assessment  · Health Maintenance  · Care Team     ** See Completed Assessments for Annual Wellness Visit within the encounter summary.**       The following assessments were completed:  · Living Situation  · CAGE  · Depression Screening  · Timed Get Up and Go  · Whisper Test  · Cognitive Function Screening  · Nutrition Screening  · ADL Screening  · PAQ Screening    Vitals:    03/02/20 0952   BP: (!) 140/80   Pulse: 82   SpO2: 96%   Weight: 68 kg (149 lb 14.6 oz)   Height: 5' 1" (1.549 m)     Body mass index is 28.33 kg/m².  Physical Exam   Constitutional: She is oriented to person, place, and time. She appears well-developed. No distress.   overweight   HENT:   Head: Atraumatic.   Eyes: No scleral icterus.   Neck: Normal range of motion. Neck supple.   Cardiovascular: Normal rate, regular rhythm and normal heart sounds.   Pulmonary/Chest: Effort normal and breath sounds normal. No stridor. No respiratory distress. She has no wheezes. She has no rales.   Abdominal: Soft. Bowel sounds are normal. She exhibits no distension.   Neurological: She is alert and oriented to person, place, and time.   Skin: Skin is warm and dry. She is not diaphoretic.   Psychiatric: She has a normal mood and affect. Her behavior is normal.   Nursing note and vitals reviewed.        Diagnoses and health risks identified today and associated recommendations/orders:    1. Encounter for preventive health examination  - Screenings performed, as noted above. Personal preventative testing needs reviewed.       2. Urinary tract infection with hematuria, site unspecified  - POCT urine dipstick without microscope  - Urine culture  - sulfamethoxazole-trimethoprim 800-160mg (BACTRIM DS) 800-160 mg " Tab; Take 1 tablet by mouth 2 (two) times daily.  Dispense: 14 tablet; Refill: 0    3. Irritable bowel syndrome with both constipation and diarrhea  - Ambulatory referral/consult to Gastroenterology; Future    4. Gastroesophageal reflux disease, esophagitis presence not specified  - Ambulatory referral/consult to Gastroenterology; Future    5. Atherosclerosis of aorta  - Stable, on statin    6. Essential hypertension  Slightly above goal today.     7. Hypothyroidism, unspecified type  - Controlled, followed by PCP    8. Overweight (BMI 25.0-29.9)  - Weight is stable       Provided Jackelyn with a 5-10 year written screening schedule and personal prevention plan. Recommendations were developed using the USPSTF age appropriate recommendations. Education, counseling, and referrals were provided as needed. After Visit Summary printed and given to patient which includes a list of additional screenings\tests needed.    No follow-ups on file.    Kathia Torres NP

## 2020-03-04 LAB — BACTERIA UR CULT: ABNORMAL

## 2020-03-10 ENCOUNTER — OFFICE VISIT (OUTPATIENT)
Dept: GASTROENTEROLOGY | Facility: CLINIC | Age: 70
End: 2020-03-10
Payer: MEDICARE

## 2020-03-10 ENCOUNTER — LAB VISIT (OUTPATIENT)
Dept: LAB | Facility: HOSPITAL | Age: 70
End: 2020-03-10
Attending: NURSE PRACTITIONER
Payer: MEDICARE

## 2020-03-10 VITALS
SYSTOLIC BLOOD PRESSURE: 118 MMHG | WEIGHT: 149.5 LBS | HEIGHT: 61 IN | DIASTOLIC BLOOD PRESSURE: 58 MMHG | BODY MASS INDEX: 28.22 KG/M2

## 2020-03-10 DIAGNOSIS — K57.90 DIVERTICULOSIS: ICD-10-CM

## 2020-03-10 DIAGNOSIS — K52.9 CHRONIC DIARRHEA: ICD-10-CM

## 2020-03-10 DIAGNOSIS — K21.9 GASTROESOPHAGEAL REFLUX DISEASE, ESOPHAGITIS PRESENCE NOT SPECIFIED: ICD-10-CM

## 2020-03-10 DIAGNOSIS — Z90.49 S/P CHOLE: ICD-10-CM

## 2020-03-10 DIAGNOSIS — K58.0 IRRITABLE BOWEL SYNDROME WITH DIARRHEA: ICD-10-CM

## 2020-03-10 DIAGNOSIS — K58.0 IRRITABLE BOWEL SYNDROME WITH DIARRHEA: Primary | ICD-10-CM

## 2020-03-10 DIAGNOSIS — R15.9 INCONTINENCE OF FECES, UNSPECIFIED FECAL INCONTINENCE TYPE: ICD-10-CM

## 2020-03-10 DIAGNOSIS — R10.9 ABDOMINAL PAIN, UNSPECIFIED ABDOMINAL LOCATION: ICD-10-CM

## 2020-03-10 LAB
ALBUMIN SERPL BCP-MCNC: 3.7 G/DL (ref 3.5–5.2)
ALP SERPL-CCNC: 57 U/L (ref 55–135)
ALT SERPL W/O P-5'-P-CCNC: 35 U/L (ref 10–44)
ANION GAP SERPL CALC-SCNC: 7 MMOL/L (ref 8–16)
AST SERPL-CCNC: 40 U/L (ref 10–40)
BASOPHILS # BLD AUTO: 0.06 K/UL (ref 0–0.2)
BASOPHILS NFR BLD: 0.8 % (ref 0–1.9)
BILIRUB SERPL-MCNC: 0.3 MG/DL (ref 0.1–1)
BUN SERPL-MCNC: 16 MG/DL (ref 8–23)
CALCIUM SERPL-MCNC: 9.5 MG/DL (ref 8.7–10.5)
CHLORIDE SERPL-SCNC: 106 MMOL/L (ref 95–110)
CO2 SERPL-SCNC: 27 MMOL/L (ref 23–29)
CREAT SERPL-MCNC: 1.1 MG/DL (ref 0.5–1.4)
CRP SERPL-MCNC: 1 MG/L (ref 0–8.2)
DIFFERENTIAL METHOD: ABNORMAL
EOSINOPHIL # BLD AUTO: 0.3 K/UL (ref 0–0.5)
EOSINOPHIL NFR BLD: 3.5 % (ref 0–8)
ERYTHROCYTE [DISTWIDTH] IN BLOOD BY AUTOMATED COUNT: 13.1 % (ref 11.5–14.5)
EST. GFR  (AFRICAN AMERICAN): 59.2 ML/MIN/1.73 M^2
EST. GFR  (NON AFRICAN AMERICAN): 51.3 ML/MIN/1.73 M^2
GLUCOSE SERPL-MCNC: 113 MG/DL (ref 70–110)
HCT VFR BLD AUTO: 39.3 % (ref 37–48.5)
HGB BLD-MCNC: 11.9 G/DL (ref 12–16)
IMM GRANULOCYTES # BLD AUTO: 0.01 K/UL (ref 0–0.04)
IMM GRANULOCYTES NFR BLD AUTO: 0.1 % (ref 0–0.5)
LYMPHOCYTES # BLD AUTO: 2.2 K/UL (ref 1–4.8)
LYMPHOCYTES NFR BLD: 30.1 % (ref 18–48)
MCH RBC QN AUTO: 29.1 PG (ref 27–31)
MCHC RBC AUTO-ENTMCNC: 30.3 G/DL (ref 32–36)
MCV RBC AUTO: 96 FL (ref 82–98)
MONOCYTES # BLD AUTO: 0.5 K/UL (ref 0.3–1)
MONOCYTES NFR BLD: 7.2 % (ref 4–15)
NEUTROPHILS # BLD AUTO: 4.3 K/UL (ref 1.8–7.7)
NEUTROPHILS NFR BLD: 58.3 % (ref 38–73)
NRBC BLD-RTO: 0 /100 WBC
PLATELET # BLD AUTO: 276 K/UL (ref 150–350)
PMV BLD AUTO: 11.7 FL (ref 9.2–12.9)
POTASSIUM SERPL-SCNC: 4.3 MMOL/L (ref 3.5–5.1)
PROT SERPL-MCNC: 6.9 G/DL (ref 6–8.4)
RBC # BLD AUTO: 4.09 M/UL (ref 4–5.4)
SODIUM SERPL-SCNC: 140 MMOL/L (ref 136–145)
WBC # BLD AUTO: 7.37 K/UL (ref 3.9–12.7)

## 2020-03-10 PROCEDURE — 85025 COMPLETE CBC W/AUTO DIFF WBC: CPT

## 2020-03-10 PROCEDURE — 80053 COMPREHEN METABOLIC PANEL: CPT

## 2020-03-10 PROCEDURE — 99999 PR PBB SHADOW E&M-EST. PATIENT-LVL III: ICD-10-PCS | Mod: PBBFAC,,, | Performed by: NURSE PRACTITIONER

## 2020-03-10 PROCEDURE — 99214 OFFICE O/P EST MOD 30 MIN: CPT | Mod: S$PBB,,, | Performed by: NURSE PRACTITIONER

## 2020-03-10 PROCEDURE — 99999 PR PBB SHADOW E&M-EST. PATIENT-LVL III: CPT | Mod: PBBFAC,,, | Performed by: NURSE PRACTITIONER

## 2020-03-10 PROCEDURE — 99213 OFFICE O/P EST LOW 20 MIN: CPT | Mod: PBBFAC,PO | Performed by: NURSE PRACTITIONER

## 2020-03-10 PROCEDURE — 99214 PR OFFICE/OUTPT VISIT, EST, LEVL IV, 30-39 MIN: ICD-10-PCS | Mod: S$PBB,,, | Performed by: NURSE PRACTITIONER

## 2020-03-10 PROCEDURE — 86140 C-REACTIVE PROTEIN: CPT

## 2020-03-10 PROCEDURE — 36415 COLL VENOUS BLD VENIPUNCTURE: CPT | Mod: PO

## 2020-03-10 RX ORDER — CHOLESTYRAMINE 4 G/9G
4 POWDER, FOR SUSPENSION ORAL 2 TIMES DAILY
Qty: 180 PACKET | Refills: 0 | Status: SHIPPED | OUTPATIENT
Start: 2020-03-10 | End: 2021-03-23

## 2020-03-10 RX ORDER — DICYCLOMINE HYDROCHLORIDE 10 MG/1
10 CAPSULE ORAL 3 TIMES DAILY
Qty: 90 CAPSULE | Refills: 5 | Status: SHIPPED | OUTPATIENT
Start: 2020-03-10 | End: 2020-12-01

## 2020-03-10 NOTE — PROGRESS NOTES
[facility] Consultation Note    Reason for Consult:  The primary encounter diagnosis was Irritable bowel syndrome with diarrhea. Diagnoses of Gastroesophageal reflux disease, esophagitis presence not specified, S/P deanna, Abdominal pain, unspecified abdominal location, Incontinence of feces, unspecified fecal incontinence type, Chronic diarrhea, and Diverticulosis were also pertinent to this visit.    PCP:   Everett SMITH [facility]   [address] / [facility] [address]    Referring MD:  Kathia Torres, Np  [address] [place] [address]    Initial History of Present Illness (HPI):  This is a 69 y.o. female here for evaluation of GERD and IBS. New pt.     GERD for many years. Has been on numerous different medications.   She gets regurgitation with burning up into her throat. Applesauce helps her sx. These episods happen once every couple of weeks. Day to day she reports her reflux is stabled.  Taking omeprazole 40 mg in the am and ranitidine at night.      IBS mixed. She goes between constipation and diarrhea. She reports she was diagnosed with this many years ago.   She does get cramping with this. Lower abd cramping is very bothersome to her.   +Accidents during diarrhea. When she has the diarrhea sh can have about 3 BMs, liquid stools in a day. The constipation starts only after taking imodium.  Taking Fiberconn. Taking two tablets now  S/p deanna long time ago.  Dysphagia - no   GI bleeding - none  NSAID usage - none      ROS:  Constitutional: No fevers, +chills, No weight loss  ENT:  No sore throat, no PND  CV: No chest pain, no palpitation  Pulm: No cough, + BERNAL, no wheezing  Ophtho: No vision changes, + dry eyes  GI: see HPI  Derm: No rash, no itching  Heme: No easy bruising  MSK: No significant arthritis  : No dysuria, No hematuria  Neuro: No syncope, No seizure  Psych: No uncontrolled anxiety, No uncontrolled depression    Medical History:  has a past medical history of  Bronchitis, Cataract, Diverticulitis, Dry eye syndrome, GERD (gastroesophageal reflux disease), Hyperlipidemia, Hypertension, Hypothyroidism (7/19/2012), Obese, PONV (postoperative nausea and vomiting), and Thyroid disease.    Surgical History:  has a past surgical history that includes Colonoscopy (2007); Nasal septum surgery; Shoulder surgery; Hysterectomy; Cholecystectomy; Hernia repair; Tonsillectomy; Back surgery; and De Quervain's release (Left, 03/2017).    Family History: family history includes Arthritis in her brother and sister; Breast cancer in her mother; Cataracts in her father and mother; Diabetes in her mother and paternal grandmother; Heart disease in her mother; Heart failure in her mother; Hyperlipidemia in her brother and sister; Hypertension in her father and mother; No Known Problems in her daughter, son, and son; Stroke in her father..     Social History:  reports that she has never smoked. She has never used smokeless tobacco. She reports that she does not drink alcohol or use drugs.    Review of patient's allergies indicates:   Allergen Reactions    Erythromycin (bulk) Nausea And Vomiting    Levaquin [levofloxacin]      Other reaction(s): Swelling       Medication List with Changes/Refills   New Medications    CHOLESTYRAMINE (QUESTRAN) 4 GRAM PACKET    Take 1 packet (4 g total) by mouth 2 (two) times daily.    DICYCLOMINE (BENTYL) 10 MG CAPSULE    Take 1 capsule (10 mg total) by mouth 3 (three) times daily.   Current Medications    ACETAMINOPHEN/DIPHENHYDRAMINE (TYLENOL PM ORAL)    Take by mouth.    ACETIC ACID-HYDROCORTISONE (VOSOL-HC) OTIC SOLUTION    2-4 drops to affected ear twice a day    ALBUTEROL 90 MCG/ACTUATION INHALER    Inhale 2 puffs into the lungs every 6 (six) hours as needed for Wheezing.    AMLODIPINE (NORVASC) 2.5 MG TABLET    TAKE ONE TABLET BY MOUTH EVERY DAY    CETIRIZINE (ZYRTEC) 10 MG TABLET    Take 1 tablet (10 mg total) by mouth once daily.    DICLOFENAC SODIUM 1 %  "GEL    Apply 2 g topically 4 (four) times daily. for 10 days    FLUOCINONIDE (LIDEX) 0.05 % EXTERNAL SOLUTION    APPLY TO SCALP ONCE DAILY AS NEEDED FOR FLARE    FLUTICASONE PROPIONATE (FLONASE) 50 MCG/ACTUATION NASAL SPRAY    1 spray by Each Nostril route once daily.    KETOCONAZOLE (NIZORAL) 2 % SHAMPOO    APPLY TO DAMP SCALP EVERY OTHER DAY, LATHER AND LET SIT 5 MINUTES BEFORE RINSING    LEVOTHYROXINE (SYNTHROID) 75 MCG TABLET    TAKE ONE TABLET BY MOUTH EVERY DAY    MELOXICAM (MOBIC) 7.5 MG TABLET    TAKE ONE TABLET BY MOUTH EVERY 12 HOURS    MULTI-VITAMIN TABLET    Take by mouth.  Tablet Oral     OMEPRAZOLE (PRILOSEC) 40 MG CAPSULE    TAKE ONE CAPSULE BY MOUTH EVERY DAY    OXYBUTYNIN (DITROPAN-XL) 10 MG 24 HR TABLET    TAKE ONE TABLET BY MOUTH EVERY DAY    PREDNISOLONE ACETATE (PRED FORTE) 1 % DRPS    Place 1 drop into both eyes 4 (four) times daily.    PSYLLIUM SEED, WITH SUGAR, (METAMUCIL ORAL)    Take by mouth.    RANITIDINE (ZANTAC) 150 MG TABLET    TAKE ONE TABLET BY MOUTH EVERY EVENING    RESTASIS 0.05 % OPHTHALMIC EMULSION    PLACE 1 DROP IN EACH EYE TWO TIMES A DAY    ROSUVASTATIN (CRESTOR) 40 MG TAB    Take 1 tablet (40 mg total) by mouth once daily.    SULFAMETHOXAZOLE-TRIMETHOPRIM 800-160MG (BACTRIM DS) 800-160 MG TAB    Take 1 tablet by mouth 2 (two) times daily.    TRIAMCINOLONE ACETONIDE 0.1% (KENALOG) 0.1 % CREAM    AAA bid   Discontinued Medications    HYOSCYAMINE (LEVSIN/SL) 0.125 MG SUBL    PLACE ONE TABLET UNDER THE TONGUE EVERY 6 HOURS AS NEEDED         Objective Findings:    Vital Signs:  BP (!) 118/58 (BP Location: Left arm)   Ht 5' 1" (1.549 m)   Wt 67.8 kg (149 lb 7.6 oz)   BMI 28.24 kg/m²   Body mass index is 28.24 kg/m².    Physical Exam:  General Appearance: Well appearing, NAD noted  Eyes:    No scleral icterus  ENT:  No lesions or masses   Lungs: BBS CTA ,  no wheezes  Heart:  HRRR, S1 & S2 normal, no murmurs heard  Abdomen:  Non distended, soft, no guarding, no rebound, +RLQ " tenderness  Musculoskeletal:  No major joint deformities  Skin: No petechiae or rash on exposed skin areas  Neurologic:  Alert and oriented x4  Psychiatric:  Normal speech mentation and affect    Labs reviewed:  Lab Results   Component Value Date    WBC 5.34 08/12/2019    HGB 12.8 08/12/2019    HCT 40.2 08/12/2019     08/12/2019    CHOL 153 08/12/2019    TRIG 132 08/12/2019    HDL 50 08/12/2019    ALT 17 08/12/2019    AST 24 08/12/2019     08/12/2019    K 4.4 08/12/2019     08/12/2019    CREATININE 0.9 08/12/2019    BUN 14 08/12/2019    CO2 29 08/12/2019    TSH 1.694 08/12/2019           Imaging reviewed:    Endoscopy reviewed:    Colonoscopy in 2014   Impression:           - Diverticulosis in the sigmoid colon, in the                         descending colon and at the splenic flexure.                        - The examination was otherwise normal.                        - The examined portion of the ileum was normal.  Recommendation:       - Repeat colonoscopy in 10 years for screening                         purposes.    Medical Decision Making:    Assessment:    Jackelyn Kendrick is a 69 y.o. female here for:    1. Irritable bowel syndrome with diarrhea    2. Gastroesophageal reflux disease, esophagitis presence not specified    3. S/P deanna    4. Abdominal pain, unspecified abdominal location    5. Incontinence of feces, unspecified fecal incontinence type    6. Chronic diarrhea    7. Diverticulosis        Fecal incontinence and diarrhea her main complaint. Has had four vaginal births in the past. Has urinary incontinence as well. May benefit from PFT in the future.     GERD is stable.    Recommendations:  1. Continue omeprazole in the am, zantac in PM. Start gaviscon PRN  2. Cholestyramine once to twice a day. We discussed she can titrate this as needed.   3. Colonoscopy . If normal will refer to CRS for the incontinence  4. Bentyl for the abd pain. She will let me know if this does not as  well as the levsin and I will change it back to levsin  5. Increase fibercon to 4 tablets per day  6. If Labs show elevated wbc or CRP will order CT to r/o diverticulitis    F/u pending above    Order summary:  Orders Placed This Encounter    Comprehensive metabolic panel    CBC auto differential    C-reactive protein    cholestyramine (QUESTRAN) 4 gram packet    dicyclomine (BENTYL) 10 MG capsule    Case request GI: COLONOSCOPY         Thank you for allowing me to participate in the care of CHARLES Mccabe

## 2020-03-10 NOTE — PATIENT INSTRUCTIONS
Treatment Names Currently Using Have Tried in the Past Wish to Discuss   General IBS Treatments    Diet Modifications   Low-FODMAP diet        Lifestyle Modifications   Exercise        Psychologic and Behavioral Therapies   Cognitive Behavioral Therapy       Hypnosis       Psychodynamic Psychotherapy        Over-the-counter Medicine   Soluble fiber (e.g. Psyllium, Ispaghula Husk)       Probiotics (e.g. Align, VSL#3)       Peppermint Oil (e.g. IBgard, Colpermin)        Prescription Medicine   Dicyclomine (Bentyl)       Hyoscyamine (Levsin)       Tricyclic Antidepressants (e.g. Elavil, Pamelor, Norpramin)       SSRIs (e.g. Celexa, Lexapro, Prozac, Zoloft)       SNRIs (e.g. Cymbalta, Effexor)       IBS with Constipation    Over-the-counter Medicine   Soluble fiber (e.g. Psyllium, Ispaghula Husk)       Magnesium oxide (Milk of Magnesia)       Polyethylene GlycolEG (Miralax)       Senna (Senokot)       Bisacodyl (Dulcolax)        Prescription Medicine   Lubiprostone (Amitiza)       Linaclotide (Linzess)       Plecanatide (Trulance)       Tegaserod (Zelnorm)       IBS with Diarrhea    Over-the-counter Medicine   Loperamide (Imodium)        Prescription Medicine   Alosetron (Lotronex)       Ondansetron (Zofran)       Eluxadoline (Viberzi)       Rifaximin (Xifaxan)         This table is copied directly from the American College of Gastroenterology web site and can be found at https://gi.org/patients/ibs-treatment-checklist/            The following foods have been identified as being high in FODMAPs:   THESE ARE FOODS TO AVOID  Fruits    Apples  Apricots  Blackberries  Cherries  Grapefruit  [place]  Nectarines  Peaches  Pears  Plums and prunes  Pomegranates  Watermelon  High concentration of fructose from canned fruit, dried fruit or fruit juice  Grains    Barley  Couscous  [place]ro  Rye  Semolina  Wheat  Lactose-Containing Foods    Buttermilk  Cream  Custard  Ice cream  Margarine  Milk (cow, goat, sheep)  Soft cheese, including  cottage cheese and ricotta  Yogurt (regular and Greek)  Dairy Substitutes    Oat milk (although a 1/8 serving is considered low-FODMAP)  Soy milk (U.S.)  Legumes    Baked beans  Black-eyed peas  Butter beans  Chickpeas  Lentils  Kidney beans  Lima beans  Soybeans  Split peas  Sweeteners    Agave  Fructose  High fructose corn syrup  Honey  Isomalt  Maltitol  Mannitol  Molasses  Sorbitol  Xylitol  Vegetables    Artichokes  Asparagus  Beets  [place] sprouts  Cauliflower  Celery  Garlic  Leeks  Mushrooms  Okra  Onions  Peas  Scallions (white parts)  Shallots  Snow peas  Sugar snap peas          The following foods have been identified as being low in FODMAPs:  THESE ARE FOODS THAT ARE OKAY TO EAT  Fruits    Avocado (limit 1/8 of whole)  Banana  Blueberry  Cantaloupe  Grapes  Honeydew melon  Kiwi  Lemon  Lime  Mandarin oranges  Olives  Orange  Papaya  Plantain  Pineapple  Raspberry  Rhubarb  Strawberry  Tangelo  Sweeteners    Artificial sweeteners that do not end in -ol  Brown sugar  Glucose  Maple syrup  Powdered sugar  Sugar (sucrose)  Dairy and Alternatives    [place] milk  Coconut milk (limit 1/2 cup)  Hemp milk  Rice milk  Butter  Certain cheeses, such as  brie, camembert, mozzarella, Parmesan  Lactose-free products, such as lactose-free milk, ice cream, and yogurt  Vegetables    Arugula (rocket lettuce)  Bamboo shoots  Bell peppers  Broccoli  Bok jade  Carrots  Celeriac  Slime greens  Common Cabbage  Corn (half a cob)  Eggplant  Endive  Fennel  Green beans  Kale  Lettuce  Parsley  Parsnip  Potato  Radicchio   Scallions (green parts only)  Spinach, baby  Squash  Sweet potato  Swiss chard  Tomato  Turnip  Water chestnut  Zucchini  Grains    Amaranth  Brown rice  Bulgur wheat (limit to 1/4 cup cooked)  Oats  Gluten-free products  Quinoa  Spelt products  Nuts    Almonds (limit 10)  Brazil nuts  Hazelnuts (limit 10)  Macadamia nuts  Peanuts  Pecan  Pine  nuts  Walnuts  Seeds    [place]  Jose  Pumpkin  Sesame  Sunflower  Protein Sources    Beef  Chicken  Eggs  Fish  Lamb  Pork  Shellfish  Tofu and tempeh  Turkey  *    [phone] endoscopy schedulers    *Start taking 4 tablets of fiber per day    * Start cholestyramine for the diarrhea    *During your bad reflux episodes try taking liquid gaviscon for relief

## 2020-03-10 NOTE — LETTER
March 10, 2020      Kathia Torres, CARI  [address] [facility] [address]           [place] [facility]  [address] [facility] [address]  Phone: [phone]          Patient: Jackelyn Kendrick   MR Number: [id number]   YOB: 1950   Date of Visit: 3/10/2020       Dear Kathia Torres:    Thank you for referring Jackelyn Kendrick to me for evaluation. Attached you will find relevant portions of my assessment and plan of care.    If you have questions, please do not hesitate to call me. I look forward to following Jackelyn Kendrick along with you.    Sincerely,    Kimberlee Briggs, CARI    Enclosure  CC:  No Recipients    If you would like to receive this communication electronically, please contact [email or web address] or [phone] to request more information on [employer] access.    For providers and/or their staff who would like to refer a patient to [facility], please contact us through our one-stop-shop provider referral line, [facility], at [phone].    If you feel you have received this communication in error or would no longer like to receive these types of communications, please e-mail [email or web address]

## 2020-03-11 ENCOUNTER — TELEPHONE (OUTPATIENT)
Dept: GASTROENTEROLOGY | Facility: CLINIC | Age: 70
End: 2020-03-11

## 2020-03-11 NOTE — TELEPHONE ENCOUNTER
Spoke with patient and gave her lab results and information.  She verbalized understanding and had no further questions at this time.  ----- Message from Kimberlee Briggs NP sent at 3/11/2020  7:55 AM CDT -----  Labs show a very slight anemia. Sometimes it can be that minimally low due to hydration status, so we will just monitor for now since the rest of her labs looked okay

## 2020-03-11 NOTE — PROGRESS NOTES
Labs show a very slight anemia. Sometimes it can be that minimally low due to hydration status, so we will just monitor for now since the rest of her labs looked okay

## 2020-03-11 NOTE — TELEPHONE ENCOUNTER
----- Message from Jodi Olson sent at 3/11/2020 12:03 PM CDT -----  Contact: pt: [phone]  Pt is returning a call to Delma     Please contact pt: [phone]

## 2020-03-13 ENCOUNTER — TELEPHONE (OUTPATIENT)
Dept: OPTOMETRY | Facility: CLINIC | Age: 70
End: 2020-03-13

## 2020-03-13 NOTE — TELEPHONE ENCOUNTER
"Called pt 12:30 PM.  She has been using PRED QID for 4 weeks, and is only "80% better" in her dry eyes.  AGAIN discussed switch to LOTEMAX or DUREZOL to improve her symptoms, but she wants to try PRED for one more week to see if she gets better.  She will message me in one week.  Discussed risks of long term steroid use with glaucoma/etc!  "

## 2020-03-20 ENCOUNTER — PATIENT MESSAGE (OUTPATIENT)
Dept: OPTOMETRY | Facility: CLINIC | Age: 70
End: 2020-03-20

## 2020-03-20 DIAGNOSIS — H04.123 DRY EYES, BILATERAL: Primary | ICD-10-CM

## 2020-03-23 RX ORDER — LOTEPREDNOL ETABONATE 5 MG/ML
1 SUSPENSION/ DROPS OPHTHALMIC 4 TIMES DAILY
Qty: 4 ML | Refills: 2 | Status: SHIPPED | OUTPATIENT
Start: 2020-03-23 | End: 2020-04-06

## 2020-03-27 ENCOUNTER — PATIENT MESSAGE (OUTPATIENT)
Dept: OPTOMETRY | Facility: CLINIC | Age: 70
End: 2020-03-27

## 2020-04-22 ENCOUNTER — PATIENT MESSAGE (OUTPATIENT)
Dept: OPTOMETRY | Facility: CLINIC | Age: 70
End: 2020-04-22

## 2020-04-24 ENCOUNTER — PATIENT MESSAGE (OUTPATIENT)
Dept: OPTOMETRY | Facility: CLINIC | Age: 70
End: 2020-04-24

## 2020-04-24 DIAGNOSIS — H04.123 DRY EYES, BILATERAL: Primary | ICD-10-CM

## 2020-04-24 RX ORDER — DIFLUPREDNATE OPHTHALMIC 0.5 MG/ML
1 EMULSION OPHTHALMIC 4 TIMES DAILY
Qty: 3 ML | Refills: 1 | Status: SHIPPED | OUTPATIENT
Start: 2020-04-24 | End: 2020-05-04

## 2020-04-25 ENCOUNTER — PATIENT MESSAGE (OUTPATIENT)
Dept: OPTOMETRY | Facility: CLINIC | Age: 70
End: 2020-04-25

## 2020-05-13 ENCOUNTER — PATIENT MESSAGE (OUTPATIENT)
Dept: OPTOMETRY | Facility: CLINIC | Age: 70
End: 2020-05-13

## 2020-05-14 ENCOUNTER — PATIENT MESSAGE (OUTPATIENT)
Dept: OPTOMETRY | Facility: CLINIC | Age: 70
End: 2020-05-14

## 2020-05-15 ENCOUNTER — PATIENT MESSAGE (OUTPATIENT)
Dept: PRIMARY CARE CLINIC | Facility: CLINIC | Age: 70
End: 2020-05-15

## 2020-05-15 RX ORDER — SULFAMETHOXAZOLE AND TRIMETHOPRIM 800; 160 MG/1; MG/1
1 TABLET ORAL 2 TIMES DAILY
Qty: 14 TABLET | Refills: 0 | Status: SHIPPED | OUTPATIENT
Start: 2020-05-15 | End: 2020-05-22

## 2020-05-19 ENCOUNTER — HOSPITAL ENCOUNTER (OUTPATIENT)
Dept: RADIOLOGY | Facility: HOSPITAL | Age: 70
Discharge: HOME | End: 2020-05-19
Attending: ORTHOPAEDIC SURGERY
Payer: MEDICARE

## 2020-05-19 ENCOUNTER — OFFICE VISIT (OUTPATIENT)
Dept: SPORTS MEDICINE | Facility: CLINIC | Age: 70
End: 2020-05-19
Payer: MEDICARE

## 2020-05-19 VITALS
BODY MASS INDEX: 28.13 KG/M2 | HEIGHT: 61 IN | HEART RATE: 83 BPM | SYSTOLIC BLOOD PRESSURE: 148 MMHG | WEIGHT: 149 LBS | DIASTOLIC BLOOD PRESSURE: 73 MMHG

## 2020-05-19 DIAGNOSIS — M25.511 RIGHT SHOULDER PAIN, UNSPECIFIED CHRONICITY: ICD-10-CM

## 2020-05-19 DIAGNOSIS — M75.41 IMPINGEMENT SYNDROME OF RIGHT SHOULDER: Primary | ICD-10-CM

## 2020-05-19 PROCEDURE — 20610 DRAIN/INJ JOINT/BURSA W/O US: CPT | Mod: PBBFAC | Performed by: ORTHOPAEDIC SURGERY

## 2020-05-19 PROCEDURE — 20610 LARGE JOINT ASPIRATION/INJECTION: R SUBACROMIAL BURSA: ICD-10-PCS | Mod: S$PBB,RT,, | Performed by: ORTHOPAEDIC SURGERY

## 2020-05-19 PROCEDURE — 99214 PR OFFICE/OUTPT VISIT, EST, LEVL IV, 30-39 MIN: ICD-10-PCS | Mod: 25,S$PBB,, | Performed by: ORTHOPAEDIC SURGERY

## 2020-05-19 PROCEDURE — 99214 OFFICE O/P EST MOD 30 MIN: CPT | Mod: 25,S$PBB,, | Performed by: ORTHOPAEDIC SURGERY

## 2020-05-19 PROCEDURE — 99213 OFFICE O/P EST LOW 20 MIN: CPT | Mod: PBBFAC,25 | Performed by: ORTHOPAEDIC SURGERY

## 2020-05-19 PROCEDURE — 73030 XR SHOULDER COMPLETE 2 OR MORE VIEWS RIGHT: ICD-10-PCS | Mod: 26,RT,, | Performed by: RADIOLOGY

## 2020-05-19 PROCEDURE — 99999 PR PBB SHADOW E&M-EST. PATIENT-LVL III: CPT | Mod: PBBFAC,,, | Performed by: ORTHOPAEDIC SURGERY

## 2020-05-19 PROCEDURE — 73030 X-RAY EXAM OF SHOULDER: CPT | Mod: TC,RT

## 2020-05-19 PROCEDURE — 99999 PR PBB SHADOW E&M-EST. PATIENT-LVL III: ICD-10-PCS | Mod: PBBFAC,,, | Performed by: ORTHOPAEDIC SURGERY

## 2020-05-19 PROCEDURE — 73030 X-RAY EXAM OF SHOULDER: CPT | Mod: 26,RT,, | Performed by: RADIOLOGY

## 2020-05-19 RX ORDER — TRIAMCINOLONE ACETONIDE 40 MG/ML
40 INJECTION, SUSPENSION INTRA-ARTICULAR; INTRAMUSCULAR
Status: DISCONTINUED | OUTPATIENT
Start: 2020-05-19 | End: 2020-05-19 | Stop reason: HOSPADM

## 2020-05-19 RX ADMIN — TRIAMCINOLONE ACETONIDE 40 MG: 40 INJECTION, SUSPENSION INTRA-ARTICULAR; INTRAMUSCULAR at 03:05

## 2020-05-19 NOTE — PROGRESS NOTES
CC: RIGHT shoulder pain     69 y.o. Female with a history of right shoulder pain of about 3 weeks in duration.  She states that the pain is severe and not responding to any conservative care.      She reports that the pain and weakness is worse with overhead activity and reaching behind. It also bothers her at night.    Is affecting ADLs.  Pain is 8/10 at it's worst. Currently 2-3/10    Denies any CSI or PT for the shoulder. She is RHD.    Hx of left shoulder arthroscopic RTC repair in 01/2010 by Dr. Pickens. No major complaints today      Past Medical History:   Diagnosis Date    Bronchitis     seasonal    Cataract     Diverticulitis     Dry eye syndrome     GERD (gastroesophageal reflux disease)     Hyperlipidemia     Hypertension     Hypothyroidism 7/19/2012    Obese     PONV (postoperative nausea and vomiting)     Thyroid disease        Past Surgical History:   Procedure Laterality Date    BACK SURGERY      CHOLECYSTECTOMY      COLONOSCOPY  2007    diverticulosis    DE QUERVAIN'S RELEASE Left 03/2017    HERNIA REPAIR      HYSTERECTOMY      NASAL SEPTUM SURGERY      SHOULDER SURGERY      TONSILLECTOMY         Family History   Problem Relation Age of Onset    Cataracts Mother     Breast cancer Mother     Diabetes Mother     Hypertension Mother     Heart failure Mother     Heart disease Mother     Cataracts Father     Hypertension Father     Stroke Father     No Known Problems Daughter     No Known Problems Son     Diabetes Paternal Grandmother     Hyperlipidemia Sister     Arthritis Sister     Hyperlipidemia Brother     Arthritis Brother     No Known Problems Son     Amblyopia Neg Hx     Blindness Neg Hx     Glaucoma Neg Hx     Macular degeneration Neg Hx     Retinal detachment Neg Hx     Strabismus Neg Hx     Ovarian cancer Neg Hx     Melanoma Neg Hx     Celiac disease Neg Hx     Colon cancer Neg Hx     Colon polyps Neg Hx     Esophageal cancer Neg Hx     Liver  cancer Neg Hx     Liver disease Neg Hx     Rectal cancer Neg Hx     Stomach cancer Neg Hx          Current Outpatient Medications:     acetaminophen/diphenhydramine (TYLENOL PM ORAL), Take by mouth., Disp: , Rfl:     acetic acid-hydrocortisone (VOSOL-HC) otic solution, 2-4 drops to affected ear twice a day, Disp: 10 mL, Rfl: 2    albuterol 90 mcg/actuation inhaler, Inhale 2 puffs into the lungs every 6 (six) hours as needed for Wheezing., Disp: 6.7 g, Rfl: 11    amLODIPine (NORVASC) 2.5 MG tablet, TAKE ONE TABLET BY MOUTH EVERY DAY, Disp: 30 tablet, Rfl: 11    cholestyramine (QUESTRAN) 4 gram packet, Take 1 packet (4 g total) by mouth 2 (two) times daily., Disp: 180 packet, Rfl: 0    dicyclomine (BENTYL) 10 MG capsule, Take 1 capsule (10 mg total) by mouth 3 (three) times daily., Disp: 90 capsule, Rfl: 5    fluocinonide (LIDEX) 0.05 % external solution, APPLY TO SCALP ONCE DAILY AS NEEDED FOR FLARE, Disp: 60 mL, Rfl: 3    fluticasone propionate (FLONASE) 50 mcg/actuation nasal spray, 1 spray by Each Nostril route once daily., Disp: , Rfl:     ketoconazole (NIZORAL) 2 % shampoo, APPLY TO DAMP SCALP EVERY OTHER DAY, LATHER AND LET SIT 5 MINUTES BEFORE RINSING, Disp: 120 mL, Rfl: 5    levothyroxine (SYNTHROID) 75 MCG tablet, TAKE ONE TABLET BY MOUTH EVERY DAY, Disp: 30 tablet, Rfl: 11    meloxicam (MOBIC) 7.5 MG tablet, TAKE ONE TABLET BY MOUTH EVERY 12 HOURS, Disp: 60 tablet, Rfl: 11    Multi-Vitamin tablet, Take by mouth.  Tablet Oral , Disp: , Rfl:     omeprazole (PRILOSEC) 40 MG capsule, TAKE ONE CAPSULE BY MOUTH EVERY DAY, Disp: 30 capsule, Rfl: 11    oxybutynin (DITROPAN-XL) 10 MG 24 hr tablet, TAKE ONE TABLET BY MOUTH EVERY DAY, Disp: 30 tablet, Rfl: 11    PSYLLIUM SEED, WITH SUGAR, (METAMUCIL ORAL), Take by mouth., Disp: , Rfl:     ranitidine (ZANTAC) 150 MG tablet, TAKE ONE TABLET BY MOUTH EVERY EVENING, Disp: 30 tablet, Rfl: 5    RESTASIS 0.05 % ophthalmic emulsion, PLACE 1 DROP IN EACH  EYE TWO TIMES A DAY, Disp: 60 each, Rfl: 12    rosuvastatin (CRESTOR) 40 MG Tab, Take 1 tablet (40 mg total) by mouth once daily., Disp: 90 tablet, Rfl: 3    sulfamethoxazole-trimethoprim 800-160mg (BACTRIM DS) 800-160 mg Tab, Take 1 tablet by mouth 2 (two) times daily., Disp: 14 tablet, Rfl: 0    sulfamethoxazole-trimethoprim 800-160mg (BACTRIM DS) 800-160 mg Tab, Take 1 tablet by mouth 2 (two) times daily. for 7 days, Disp: 14 tablet, Rfl: 0    triamcinolone acetonide 0.1% (KENALOG) 0.1 % cream, AAA bid, Disp: 60 g, Rfl: 3    cetirizine (ZYRTEC) 10 MG tablet, Take 1 tablet (10 mg total) by mouth once daily., Disp: , Rfl: 0    diclofenac sodium 1 % Gel, Apply 2 g topically 4 (four) times daily. for 10 days, Disp: 1 Tube, Rfl: 5    Review of patient's allergies indicates:   Allergen Reactions    Erythromycin (bulk) Nausea And Vomiting    Levaquin [levofloxacin]      Other reaction(s): Swelling          REVIEW OF SYSTEMS:  Constitution: Negative. Negative for chills, fever and night sweats.   HENT: Negative for congestion and headaches.    Eyes: Negative for blurred vision, left vision loss and right vision loss.   Cardiovascular: Negative for chest pain and syncope.   Respiratory: Negative for cough and shortness of breath.    Endocrine: Negative for polydipsia, polyphagia and polyuria.   Hematologic/Lymphatic: Negative for bleeding problem. Does not bruise/bleed easily.   Skin: Negative for dry skin, itching and rash.   Musculoskeletal: Negative for falls.  Positive for right shoulder pain and muscle weakness.   Gastrointestinal: Negative for abdominal pain and bowel incontinence.   Genitourinary: Negative for bladder incontinence and nocturia.   Neurological: Negative for disturbances in coordination, loss of balance and seizures.   Psychiatric/Behavioral: Negative for depression. The patient does not have insomnia.    Allergic/Immunologic: Negative for hives and persistent infections.      PHYSICAL  "EXAMINATION:  Vitals:  BP (!) 148/73   Pulse 83   Ht 5' 1" (1.549 m)   Wt 67.6 kg (149 lb)   BMI 28.15 kg/m²    General: The patient is alert and oriented x 3.  Mood is pleasant.  Observation of ears, eyes and nose reveal no gross abnormalities.  No labored breathing observed.  Gait is coordinated. Patient can toe walk and heel walk without difficulty.      RIGHT SHOULDER / UPPER EXTREMITY EXAM    OBSERVATION:     Swelling  none  Deformity  none   Discoloration  none   Scapular winging none   Scars   none  Atrophy  none    TENDERNESS / CREPITUS (T/C):          T/C      T/C   Clavicle   -/-  SUPRAspinatus    -/-     AC Jt.    -/-  INFRAspinatus  -/-    SC Jt.    -/-  Deltoid    -/-      G. Tuberosity  -/-  LH BICEP groove  -/-   Acromion:  -/-  Midline Neck   -/-     Scapular Spine -/-  Trapezium   -/-   SMA Scapula  -/-  GH jt. line - post  -/-     Scapulothoracic  -/-         ROM: (* = with pain)  Left shoulder   Right shoulder        AROM (PROM)   AROM (PROM)   FE    170° (175°)     170° (175°)*     ER at 0°    60°  (65°)    60°  (65°)   ER at 90° ABD  90°  (90°)    90°  (90°)   IR at 90°  ABD   NA  (40°)     NA  (40°)      IR (spine level)   T10     L5    STRENGTH: (* = with pain) Left shoulder   Right shoulder   SCAPTION   5/5    5-/5    IR    5/5    5-/5   ER    5/5    5/5   BICEPS   5/5    5/5   Deltoid    5/5    5/5     SIGNS:  Painful side       NEER   +    OYEES  neg    BLACK   +   SPEEDS  neg     DROP ARM   -   BELLY PRESS neg   Superior escape none    LIFT-OFF  neg   X-Body ADD    neg    MOVING VALGUS neg        STABILITY TESTING    Left shoulder   Right shoulder    Translation     Anterior  up face     up face    Posterior  up face    up face    Sulcus   < 10mm    < 10 mm     Signs   Apprehension   neg      neg       Relocation   no change     no change      Jerk test  neg     neg    EXTREMITY NEURO-VASCULAR EXAM:    Sensation grossly intact to light touch all dermatomal regions.    DTR 2+ " Biceps, Triceps, BR and Negative Janias sign   Grossly intact motor function at Elbow, Wrist and Hand   Distal pulses radial and ulnar 2+, brisk cap refill, symmetric.      NECK:  Painless FROM and spinous processes non-tender. Negative Spurlings sign.      OTHER FINDINGS:  + scapular dyskinesia    XRAYS:  Xrays including AP, Outlet and Axillary Lateral of shoulder are ordered / images reviewed by me:   No fracture dislocation or other pathology   Acromion type 2   Proximal migration of humeral head: None   GH arthritis: None          ASSESSMENT:   Right shoulder pain:  1. Right shoulder impingement syndrome   2.  Right shoulder possible RTC tear      PLAN:      1. Discussed diagnosis and treatment options. Patient agrees to CSI. This was given and tolerated  2. Physical therapy to include CORE  3. Nsaids prn, activity modification  4. F/u prn, next step will be MRI    All questions were answered, patient will contact us for questions or concerns in the interim.

## 2020-05-19 NOTE — PROCEDURES
Large Joint Aspiration/Injection: R subacromial bursa  Date/Time: 5/19/2020 3:45 PM  Performed by: Reginald Pickens MD  Authorized by: Reginald Pickens MD     Consent Done?:  Yes (Verbal)  Indications:  Pain  Site marked: the procedure site was marked    Timeout: prior to procedure the correct patient, procedure, and site was verified    Prep: patient was prepped and draped in usual sterile fashion      Details:  Needle Size:  22 G  Ultrasonic Guidance for needle placement?: No    Approach:  Posterior  Location:  Shoulder  Site:  R subacromial bursa  Medications:  40 mg triamcinolone acetonide 40 mg/mL  Patient tolerance:  Patient tolerated the procedure well with no immediate complications

## 2020-05-21 NOTE — PROGRESS NOTES
[facility]  Physical Therapy Initial Evaluation    Date: 5/22/2020   Name: Jackelyn Kendrick  Clinic Number: [id number]    Therapy Diagnosis:   Encounter Diagnoses   Name Primary?    Impingement syndrome of right shoulder     Post-traumatic stiffness of right shoulder joint      Physician: Foreign Masters, *    Physician Orders: PT Eval and Treat   Medical Diagnosis from Referral: Impingement syndrome of right shoulder  Evaluation Date: 5/22/2020  Authorization Period Expiration: 12/31/2020  Plan of Care Expiration: 7/17/2020  Visit # / Visits authorized: 1/ 20    Time In: 7:45  Time Out: 8:30  Total Appointment Time (timed & untimed codes): 45 minutes    Precautions: Standard    Subjective   Date of onset: R shoulder pain started about a month ago.  History of current condition - Jackelyn reports: Pt reports that she has pain with sleeping on her R side and has been waking with numbness in her both of her hands.  She reported that her R shoulder is feeling better since receiving an injection on Tuesday.       Medical History:   Past Medical History:   Diagnosis Date    Bronchitis     seasonal    Cataract     Diverticulitis     Dry eye syndrome     GERD (gastroesophageal reflux disease)     Hyperlipidemia     Hypertension     Hypothyroidism 7/19/2012    Obese     PONV (postoperative nausea and vomiting)     Thyroid disease        Surgical History:   Jackelyn Kendrick  has a past surgical history that includes Colonoscopy (2007); Nasal septum surgery; Shoulder surgery; Hysterectomy; Cholecystectomy; Hernia repair; Tonsillectomy; Back surgery; and De Quervain's release (Left, 03/2017).    Medications:   Jackelyn has a current medication list which includes the following prescription(s): acetaminophen/diphenhydramine, acetic acid-hydrocortisone, albuterol, amlodipine, cetirizine, cholestyramine, diclofenac sodium, dicyclomine, fluocinonide, fluticasone propionate,  ketoconazole, levothyroxine, meloxicam, multi-vitamin, omeprazole, oxybutynin, psyllium husk, ranitidine, restasis, rosuvastatin, sulfamethoxazole-trimethoprim 800-160mg, sulfamethoxazole-trimethoprim 800-160mg, and triamcinolone acetonide 0.1%.    Allergies:   Review of patient's allergies indicates:   Allergen Reactions    Erythromycin (bulk) Nausea And Vomiting    Levaquin [levofloxacin]      Other reaction(s): Swelling        Imaging, X-rays:     Prior Therapy: yes  Social History: Pt lives with their spouse  Occupation: retired  Prior Level of Function: Independent with R UE function   Current Level of Function: Pain with reaching into the cabinet or out to the side    Pain:  Current 2/10, worst 9/10, best 1/10   Location: right shoulder    Description: Aching, Dull and Sharp  Aggravating Factors: Laying and reaching  Easing Factors: avoiding painful motions and positions    Pts goals: decrease R shoulder pain    Objective     Observation: Pt was able to enter the clinic ambulating independently.    Posture: B rounded shoulders R greater than L      Passive Range of Motion:   Shoulder Left Right   Flexion 120 170   Abduction 107 95   ER at 0 nt nt   ER at 90 65 90   IR 30 65        Upper Extremity Strength   (L) UE (R) UE   Shoulder flexion: 4/5 4/5   Shoulder Abduction: 4/5 4/5   Shoulder ER 4/5 4/5   Shoulder IR 4+/5 4+/5   Lower Trap nt nt   Middle Trap nt nt   Rhomboids nt nt       Joint Mobility: hypomobile lower Cx and thoracic spine    Palpation: palpable tenderness along the transverse processes of the lower Cx and upper thoracic spine.    Sensation: intact        TREATMENT   Treatment Time In: 8:10  Treatment Time Out: 8:25  Total Treatment time (time-based codes) separate from Evaluation: 8 minutes    Jackelyn received therapeutic exercises to develop strength, endurance, posture and core stabilization for 8 minutes including:  Scapular retractions   Standing rows with green thera-band    Jackelyn  received the following manual therapy techniques: Joint mobilizations and Soft tissue Mobilization were applied to the: B shoulder girdles for 7 minutes, including:  GH joint mobilization  Soft tissue mobilization B upper traps and intrascapular muscles      Home Exercises and Patient Education Provided    Education provided:   - yes    Written Home Exercises Provided: yes.  Exercises were reviewed and Jackelyn was able to demonstrate them prior to the end of the session.  Jackelyn demonstrated good  understanding of the education provided.     See EMR under Patient Instructions for exercises provided 5/22/2020.    Assessment   Jackelyn is a 69 y.o. female referred to outpatient Physical Therapy with a medical diagnosis of  Impingement syndrome of right shoulder. Pt presents with Good ROM and strength in her R GH joint which may be due, in part, to an injection she received on Tuesday.  Pt has limited ROM in the L shoulder today and c/o numbness in B hands while in bed and when she awakens in the morning.    Pt prognosis is Good.   Pt will benefit from skilled outpatient Physical Therapy to address the deficits stated above and in the chart below, provide pt/family education, and to maximize pt's level of independence.     Plan of care discussed with patient: Yes  Pt's spiritual, cultural and educational needs considered and patient is agreeable to the plan of care and goals as stated below:     Anticipated Barriers for therapy: scheduling    Medical Necessity is demonstrated by the following  History  Co-morbidities and personal factors that may impact the plan of care Co-morbidities:   advanced age, high BMI and HTN    Personal Factors:   age     low   Examination  Body Structures and Functions, activity limitations and participation restrictions that may impact the plan of care Body Regions:   back  upper extremities  trunk    Body Systems:    gross symmetry  ROM  strength    Participation Restrictions:   Working  over head    Activity limitations:   Learning and applying knowledge  no deficits    General Tasks and Commands  no deficits    Communication  no deficits    Mobility  lifting and carrying objects  fine hand use (grasping/picking up)    Self care  washing oneself (bathing, drying, washing hands)  dressing    Domestic Life  doing house work (cleaning house, washing dishes, laundry)    Interactions/Relationships  no deficits    Life Areas  no deficits    Community and Social Life  recreation and leisure         low   Clinical Presentation evolving clinical presentation with changing clinical characteristics moderate   Decision Making/ Complexity Score: low     Goals:  Short Term Goals: 4 weeks   1. Pt will be instructed in a HEP to address B shoulder and UE function  2. Pt will have B shoulder abduction to 135 degrees  3. Pt will have L shoulder abduction to 135 degrees, ER to 70 degrees and IR to 40 degrees.    Long Term Goals: 8 weeks   1. Pt will have full pain free B shoulder PROM   2. Pt will report that she is able to perform ADL's without R or L shoulder pain  3. Pt will report that she is able to manage her R shoulder Sx through a HEP    Plan   Plan of care Certification: 5/22/2020 to 7/17/2020.    Outpatient Physical Therapy 2 times weekly for 8 weeks to include the following interventions: Manual Therapy, Moist Heat/ Ice, Neuromuscular Re-ed, Patient Education, Self Care, Therapeutic Activites, Therapeutic Exercise and Dry needling.     Fermin Frias, PT

## 2020-05-22 ENCOUNTER — CLINICAL SUPPORT (OUTPATIENT)
Dept: REHABILITATION | Facility: HOSPITAL | Age: 70
End: 2020-05-22
Payer: MEDICARE

## 2020-05-22 DIAGNOSIS — M25.611 POST-TRAUMATIC STIFFNESS OF RIGHT SHOULDER JOINT: ICD-10-CM

## 2020-05-22 DIAGNOSIS — M75.41 IMPINGEMENT SYNDROME OF RIGHT SHOULDER: ICD-10-CM

## 2020-05-22 PROCEDURE — 97530 THERAPEUTIC ACTIVITIES: CPT | Mod: PO

## 2020-05-22 PROCEDURE — 97161 PT EVAL LOW COMPLEX 20 MIN: CPT | Mod: PO

## 2020-05-22 NOTE — PLAN OF CARE
[facility]  Physical Therapy Initial Evaluation    Date: 5/22/2020   Name: Jackelyn Kendrick  Clinic Number: [id number]    Therapy Diagnosis:   Encounter Diagnoses   Name Primary?    Impingement syndrome of right shoulder     Post-traumatic stiffness of right shoulder joint      Physician: Foreign Masters, *    Physician Orders: PT Eval and Treat   Medical Diagnosis from Referral: Impingement syndrome of right shoulder  Evaluation Date: 5/22/2020  Authorization Period Expiration: 12/31/2020  Plan of Care Expiration: 7/17/2020  Visit # / Visits authorized: 1/ 20    Time In: 7:45  Time Out: 8:30  Total Appointment Time (timed & untimed codes): 45 minutes    Precautions: Standard    Subjective   Date of onset: R shoulder pain started about a month ago.  History of current condition - Jackelyn reports: Pt reports that she has pain with sleeping on her R side and has been waking with numbness in her both of her hands.  She reported that her R shoulder is feeling better since receiving an injection on Tuesday.       Medical History:   Past Medical History:   Diagnosis Date    Bronchitis     seasonal    Cataract     Diverticulitis     Dry eye syndrome     GERD (gastroesophageal reflux disease)     Hyperlipidemia     Hypertension     Hypothyroidism 7/19/2012    Obese     PONV (postoperative nausea and vomiting)     Thyroid disease        Surgical History:   Jaceklyn Kendrick  has a past surgical history that includes Colonoscopy (2007); Nasal septum surgery; Shoulder surgery; Hysterectomy; Cholecystectomy; Hernia repair; Tonsillectomy; Back surgery; and De Quervain's release (Left, 03/2017).    Medications:   Jackelyn has a current medication list which includes the following prescription(s): acetaminophen/diphenhydramine, acetic acid-hydrocortisone, albuterol, amlodipine, cetirizine, cholestyramine, diclofenac sodium, dicyclomine, fluocinonide, fluticasone propionate,  ketoconazole, levothyroxine, meloxicam, multi-vitamin, omeprazole, oxybutynin, psyllium husk, ranitidine, restasis, rosuvastatin, sulfamethoxazole-trimethoprim 800-160mg, sulfamethoxazole-trimethoprim 800-160mg, and triamcinolone acetonide 0.1%.    Allergies:   Review of patient's allergies indicates:   Allergen Reactions    Erythromycin (bulk) Nausea And Vomiting    Levaquin [levofloxacin]      Other reaction(s): Swelling        Imaging, X-rays:     Prior Therapy: yes  Social History: Pt lives with their spouse  Occupation: retired  Prior Level of Function: Independent with R UE function   Current Level of Function: Pain with reaching into the cabinet or out to the side    Pain:  Current 2/10, worst 9/10, best 1/10   Location: right shoulder    Description: Aching, Dull and Sharp  Aggravating Factors: Laying and reaching  Easing Factors: avoiding painful motions and positions    Pts goals: decrease R shoulder pain    Objective     Observation: Pt was able to enter the clinic ambulating independently.    Posture: B rounded shoulders R greater than L      Passive Range of Motion:   Shoulder Left Right   Flexion 120 170   Abduction 107 95   ER at 0 nt nt   ER at 90 65 90   IR 30 65        Upper Extremity Strength   (L) UE (R) UE   Shoulder flexion: 4/5 4/5   Shoulder Abduction: 4/5 4/5   Shoulder ER 4/5 4/5   Shoulder IR 4+/5 4+/5   Lower Trap nt nt   Middle Trap nt nt   Rhomboids nt nt       Joint Mobility: hypomobile lower Cx and thoracic spine    Palpation: palpable tenderness along the transverse processes of the lower Cx and upper thoracic spine.    Sensation: intact        TREATMENT   Treatment Time In: 8:10  Treatment Time Out: 8:25  Total Treatment time (time-based codes) separate from Evaluation: 8 minutes    Jackelyn received therapeutic exercises to develop strength, endurance, posture and core stabilization for 8 minutes including:  Scapular retractions   Standing rows with green thera-band    Jackelyn  received the following manual therapy techniques: Joint mobilizations and Soft tissue Mobilization were applied to the: B shoulder girdles for 7 minutes, including:  GH joint mobilization  Soft tissue mobilization B upper traps and intrascapular muscles      Home Exercises and Patient Education Provided    Education provided:   - yes    Written Home Exercises Provided: yes.  Exercises were reviewed and Jackelyn was able to demonstrate them prior to the end of the session.  Jackelyn demonstrated good  understanding of the education provided.     See EMR under Patient Instructions for exercises provided 5/22/2020.    Assessment   Jackelyn is a 69 y.o. female referred to outpatient Physical Therapy with a medical diagnosis of  Impingement syndrome of right shoulder. Pt presents with Good ROM and strength in her R GH joint which may be due, in part, to an injection she received on Tuesday.  Pt has limited ROM in the L shoulder today and c/o numbness in B hands while in bed and when she awakens in the morning.    Pt prognosis is Good.   Pt will benefit from skilled outpatient Physical Therapy to address the deficits stated above and in the chart below, provide pt/family education, and to maximize pt's level of independence.     Plan of care discussed with patient: Yes  Pt's spiritual, cultural and educational needs considered and patient is agreeable to the plan of care and goals as stated below:     Anticipated Barriers for therapy: scheduling    Medical Necessity is demonstrated by the following  History  Co-morbidities and personal factors that may impact the plan of care Co-morbidities:   advanced age, high BMI and HTN    Personal Factors:   age     low   Examination  Body Structures and Functions, activity limitations and participation restrictions that may impact the plan of care Body Regions:   back  upper extremities  trunk    Body Systems:    gross symmetry  ROM  strength    Participation Restrictions:   Working  over head    Activity limitations:   Learning and applying knowledge  no deficits    General Tasks and Commands  no deficits    Communication  no deficits    Mobility  lifting and carrying objects  fine hand use (grasping/picking up)    Self care  washing oneself (bathing, drying, washing hands)  dressing    Domestic Life  doing house work (cleaning house, washing dishes, laundry)    Interactions/Relationships  no deficits    Life Areas  no deficits    Community and Social Life  recreation and leisure         low   Clinical Presentation evolving clinical presentation with changing clinical characteristics moderate   Decision Making/ Complexity Score: low     Goals:  Short Term Goals: 4 weeks   1. Pt will be instructed in a HEP to address B shoulder and UE function  2. Pt will have B shoulder abduction to 135 degrees  3. Pt will have L shoulder abduction to 135 degrees, ER to 70 degrees and IR to 40 degrees.    Long Term Goals: 8 weeks   1. Pt will have full pain free B shoulder PROM   2. Pt will report that she is able to perform ADL's without R or L shoulder pain  3. Pt will report that she is able to manage her R shoulder Sx through a HEP    Plan   Plan of care Certification: 5/22/2020 to 7/17/2020.    Outpatient Physical Therapy 2 times weekly for 8 weeks to include the following interventions: Manual Therapy, Moist Heat/ Ice, Neuromuscular Re-ed, Patient Education, Self Care, Therapeutic Activites, Therapeutic Exercise and Dry needling.     Fermin Frias, PT

## 2020-05-29 ENCOUNTER — CLINICAL SUPPORT (OUTPATIENT)
Dept: REHABILITATION | Facility: HOSPITAL | Age: 70
End: 2020-05-29
Payer: MEDICARE

## 2020-05-29 DIAGNOSIS — M25.611 POST-TRAUMATIC STIFFNESS OF RIGHT SHOULDER JOINT: ICD-10-CM

## 2020-05-29 PROCEDURE — 97110 THERAPEUTIC EXERCISES: CPT | Mod: PO

## 2020-05-29 PROCEDURE — 97140 MANUAL THERAPY 1/> REGIONS: CPT | Mod: PO

## 2020-05-29 NOTE — PROGRESS NOTES
"  Physical Therapy Treatment Note     Name: Jackelyn Meilass  Clinic Number: [id number]    Therapy Diagnosis:   Encounter Diagnosis   Name Primary?    Post-traumatic stiffness of right shoulder joint      Physician: Foreign Masters, *    Visit Date: 5/29/2020    Physician Orders: PT Eval and Treat   Medical Diagnosis from Referral: Impingement syndrome of right shoulder  Evaluation Date: 5/22/2020  Authorization Period Expiration: 12/31/2020  Plan of Care Expiration: 7/17/2020  Visit # / Visits authorized: 1/ 20  Time In: 1:00  Time Out: 1:45  Total Billable Time: 45 minutes    Precautions: Standard    Subjective     Pt reports: That her left arm was numb earlier today.  She also reported pain in her R shoulder at end range of flexion.  She was compliant with home exercise program.  Response to previous treatment: no specific difficulty  Functional change: none    Pain: 2/10  Location: right shoulder      Objective     Jackelyn received therapeutic exercises to develop strength, endurance, ROM, flexibility, posture and core stabilization for 30 minutes including:  Standing row 10 x 3 with green TB  Standing B shoulder extension 10 x 3 with orange TB  Cable cross B lat pull downs 10 x 3 with 7# on each with machine arms at #4 - pt introduced to a new exercise.  Supine lying over a 1/2 6" bolster x 5 min    Jackelyn received the following manual therapy techniques: Joint mobilizations, Manual traction and Soft tissue Mobilization were applied to the: R shoudler and thoracic   for  minutes, including:  R GH joint passive mobilization   B upper trap soft tissue mobilization    Next tx:  Prone Thoracic and rib cage mobilization.  .      Home Exercises Provided and Patient Education Provided     Education provided:   - yes    Written Home Exercises Provided: yes.  Exercises were reviewed and Jackelyn was able to demonstrate them prior to the end of the session.  Jackelyn demonstrated good  understanding of the education " provided.     See EMR under Patient Instructions for exercises provided 5/29/2020.    Assessment     Pt with c/o B UE numbness and tingling into her hands.  Sx of numbness and tingling in B UE's are indicative of Cx and upper thoracic spine involvement.  Jackelyn is progressing well towards her goals.   Pt prognosis is Good.     Pt will continue to benefit from skilled outpatient physical therapy to address the deficits listed in the problem list box on initial evaluation, provide pt/family education and to maximize pt's level of independence in the home and community environment.     Pt's spiritual, cultural and educational needs considered and pt agreeable to plan of care and goals.     Anticipated barriers to physical therapy: none    Goals:   Short Term Goals: 4 weeks   1. Pt will be instructed in a HEP to address B shoulder and UE function  2. Pt will have B shoulder abduction to 135 degrees  3. Pt will have L shoulder abduction to 135 degrees, ER to 70 degrees and IR to 40 degrees.     Long Term Goals: 8 weeks   1. Pt will have full pain free B shoulder PROM   2. Pt will report that she is able to perform ADL's without R or L shoulder pain  3. Pt will report that she is able to manage her R shoulder Sx through a HEP    Plan     Progress B shoulder girdle mobilization and CX and thoracic stabilization exercises.    Fermin Frias, PT

## 2020-06-03 ENCOUNTER — CLINICAL SUPPORT (OUTPATIENT)
Dept: REHABILITATION | Facility: HOSPITAL | Age: 70
End: 2020-06-03
Payer: MEDICARE

## 2020-06-03 DIAGNOSIS — M25.611 POST-TRAUMATIC STIFFNESS OF RIGHT SHOULDER JOINT: ICD-10-CM

## 2020-06-03 PROCEDURE — 97110 THERAPEUTIC EXERCISES: CPT | Mod: PO

## 2020-06-03 PROCEDURE — 97140 MANUAL THERAPY 1/> REGIONS: CPT | Mod: PO

## 2020-06-03 NOTE — PROGRESS NOTES
"  Physical Therapy Treatment Note     Name: Jackelyn Meilass  Clinic Number: [id number]    Therapy Diagnosis:   Encounter Diagnosis   Name Primary?    Post-traumatic stiffness of right shoulder joint      Physician: Foreign Masters, *    Visit Date: 6/3/2020    Physician Orders: PT Eval and Treat   Medical Diagnosis from Referral: Impingement syndrome of right shoulder  Evaluation Date: 5/22/2020  Authorization Period Expiration: 12/31/2020  Plan of Care Expiration: 7/17/2020  Visit # / Visits authorized: 3/ 20  Time In: 2:30  Time Out: 3:15  Total Billable Time: 45 minutes    Precautions: Standard    Subjective     Pt reports: That both of her shoulders are sore today.  She was compliant with home exercise program.  Response to previous treatment: no specific difficulty  Functional change: none    Pain: 2/10  Location: right shoulder      Objective     Jackelyn received therapeutic exercises to develop strength, endurance, ROM, flexibility, posture and core stabilization for 30 minutes including:  Standing row 10 x 3 with green TB  Standing B shoulder extension 10 x 3 with orange TB  Cable cross B lat pull downs 10 x 3 with 7# on each with machine arms at #4 - pt introduced to a new exercise.  Supine lying over a 1/2 6" bolster x 5 min    Jackelyn received the following manual therapy techniques: Joint mobilizations, Manual traction and Soft tissue Mobilization were applied to the: R shoudler and thoracic   for  minutes, including:  R GH joint passive mobilization   B upper trap soft tissue mobilization    Next tx:  Prone Thoracic and rib cage mobilization.  .      Home Exercises Provided and Patient Education Provided     Education provided:   - yes    Written Home Exercises Provided: yes.  Exercises were reviewed and Jackelyn was able to demonstrate them prior to the end of the session.  Jackelyn demonstrated good  understanding of the education provided.     See EMR under Patient Instructions for exercises " provided 5/29/2020.    Assessment     Pt did not c/o numbness in her hands today.  Her L shoulder remains sore as well as the R.     Jackelyn is progressing well towards her goals.   Pt prognosis is Good.     Pt will continue to benefit from skilled outpatient physical therapy to address the deficits listed in the problem list box on initial evaluation, provide pt/family education and to maximize pt's level of independence in the home and community environment.     Pt's spiritual, cultural and educational needs considered and pt agreeable to plan of care and goals.     Anticipated barriers to physical therapy: none    Goals:   Short Term Goals: 4 weeks   1. Pt will be instructed in a HEP to address B shoulder and UE function  2. Pt will have B shoulder abduction to 135 degrees  3. Pt will have L shoulder abduction to 135 degrees, ER to 70 degrees and IR to 40 degrees.     Long Term Goals: 8 weeks   1. Pt will have full pain free B shoulder PROM   2. Pt will report that she is able to perform ADL's without R or L shoulder pain  3. Pt will report that she is able to manage her R shoulder Sx through a HEP    Plan     Progress B shoulder girdle mobilization and CX and thoracic stabilization exercises.    Fermin Frias, PT

## 2020-06-05 ENCOUNTER — CLINICAL SUPPORT (OUTPATIENT)
Dept: REHABILITATION | Facility: HOSPITAL | Age: 70
End: 2020-06-05
Payer: MEDICARE

## 2020-06-05 DIAGNOSIS — M25.611 POST-TRAUMATIC STIFFNESS OF RIGHT SHOULDER JOINT: ICD-10-CM

## 2020-06-05 PROCEDURE — 97110 THERAPEUTIC EXERCISES: CPT | Mod: PO

## 2020-06-05 PROCEDURE — 97140 MANUAL THERAPY 1/> REGIONS: CPT | Mod: PO

## 2020-06-05 NOTE — PROGRESS NOTES
"  Physical Therapy Treatment Note     Name: Jackelyn Meilass  Clinic Number: [id number]    Therapy Diagnosis:   Encounter Diagnosis   Name Primary?    Post-traumatic stiffness of right shoulder joint      Physician: Foreign Masters, *    Visit Date: 6/5/2020    Physician Orders: PT Eval and Treat   Medical Diagnosis from Referral: Impingement syndrome of right shoulder  Evaluation Date: 5/22/2020  Authorization Period Expiration: 12/31/2020  Plan of Care Expiration: 7/17/2020  Visit # / Visits authorized 4/ 20  Time In: 1:00  Time Out: 1:45  Total Billable Time: 45 minutes    Precautions: Standard    Subjective     Pt reports: That her left shoulder is more painful than her R today.  She was compliant with home exercise program.  Response to previous treatment: no specific difficulty  Functional change: none    Pain: 2/10  Location: right shoulder      Objective     Jackelyn received therapeutic exercises to develop strength, endurance, ROM, flexibility, posture and core stabilization for 30 minutes including:  B shoulder protraction/retraction 20 x 2 with ball on plinth  Overhead pulley x 4 min  Standing row 10 x 3 with green TB  Standing B shoulder extension 10 x 3 with green TB    Not performed:  Cable cross B lat pull downs 10 x 3 with 7# on each with machine arms at #4  Supine lying over a 1/2 6" bolster x 5 min    Jackelyn received the following manual therapy techniques: Joint mobilizations, Manual traction and Soft tissue Mobilization were applied to the: R shoudler and thoracic   for  minutes, including:  U GH joint passive mobilization   B upper trap and intrascapular muscle soft tissue mobilization  Prone Thoracic and rib cage mobilization.  .      Home Exercises Provided and Patient Education Provided     Education provided:   - yes    Written Home Exercises Provided: yes.  Exercises were reviewed and Jackelyn was able to demonstrate them prior to the end of the session.  Jackelyn demonstrated good  " understanding of the education provided.     See EMR under Patient Instructions for exercises provided 5/29/2020.    Assessment     Pt with symptoms that indicate upper thoracic spine as well as Cx spine involvement.  Plan to continue to progress thoracic stabilization and mobilization activities.      Jackelyn is progressing well towards her goals.   Pt prognosis is Good.     Pt will continue to benefit from skilled outpatient physical therapy to address the deficits listed in the problem list box on initial evaluation, provide pt/family education and to maximize pt's level of independence in the home and community environment.     Pt's spiritual, cultural and educational needs considered and pt agreeable to plan of care and goals.     Anticipated barriers to physical therapy: none    Goals:   Short Term Goals: 4 weeks   1. Pt will be instructed in a HEP to address B shoulder and UE function  2. Pt will have B shoulder abduction to 135 degrees  3. Pt will have L shoulder abduction to 135 degrees, ER to 70 degrees and IR to 40 degrees.     Long Term Goals: 8 weeks   1. Pt will have full pain free B shoulder PROM   2. Pt will report that she is able to perform ADL's without R or L shoulder pain  3. Pt will report that she is able to manage her R shoulder Sx through a HEP    Plan     Progress B shoulder girdle mobilization and CX and thoracic stabilization exercises.    Fermin Frias, PT

## 2020-06-08 ENCOUNTER — CLINICAL SUPPORT (OUTPATIENT)
Dept: REHABILITATION | Facility: HOSPITAL | Age: 70
End: 2020-06-08
Payer: MEDICARE

## 2020-06-08 DIAGNOSIS — M25.611 POST-TRAUMATIC STIFFNESS OF RIGHT SHOULDER JOINT: ICD-10-CM

## 2020-06-08 PROCEDURE — 97140 MANUAL THERAPY 1/> REGIONS: CPT | Mod: PO

## 2020-06-08 PROCEDURE — 97110 THERAPEUTIC EXERCISES: CPT | Mod: PO

## 2020-06-08 NOTE — PROGRESS NOTES
"  Physical Therapy Treatment Note     Name: Jackelyn Kendrick  Clinic Number: [id number]    Therapy Diagnosis:   Encounter Diagnoses   Name Primary?    Impingement syndrome of right shoulder      Post-traumatic stiffness of right shoulder joint        Physician: Foreign Masters, *    Visit Date: 6/8/2020    Physician Orders: PT Eval and Treat   Medical Diagnosis from Referral: Impingement syndrome of right shoulder  Evaluation Date: 5/22/2020  Authorization Period Expiration: 12/31/2020  Plan of Care Expiration: 7/17/2020  Visit # / Visits authorized 5/ 20  Time In: 1:50  Time Out: 2:45  Total Billable Time: 55 minutes  (MT-2, TE-2)    Precautions: Standard    Subjective     Pt reports: That her left shoulder is more painful than her R today.  She was compliant with home exercise program.  Response to previous treatment: no specific difficulty  Functional change: none    Pain: 2/10 R pre-session and 0/10 post session: 2/10 on L pre-session and 0/10 post session  Location: right shoulder      Objective     Jackelyn received therapeutic exercises to develop strength, endurance, ROM, flexibility, posture and core stabilization for 30 minutes including:  B shoulder protraction/retraction 20 x 1 with ball on plinth  Lat pulldown bilateral 3 x 10 7#  Overhead pulley 2 x1 min in scaption/flexion  Standing row 10 x 3 with orange TB  Standing B shoulder extension 10 x 3 with orange TB    Not performed:  Cable cross B lat pull downs 10 x 3 with 7# on each with machine arms at #4  Supine lying over a 1/2 6" bolster x 5 min    Jackelyn received the following manual therapy techniques: Joint mobilizations, Manual traction and Soft tissue Mobilization were applied to the: B shoudler and thoracic   for 25 minutes, including:  U GH joint passive mobilization   B upper trap, levator, rhomboids, biceps long head , triceps long head and intrascapular muscle soft tissue mobilization/MFR  Prone Thoracic and rib cage mobilization. " Grade II 3 x 20 sec oscillation for pain   .      Home Exercises Provided and Patient Education Provided     Education provided:   - yes    Written Home Exercises Provided: yes.  Exercises were reviewed and Jackelyn was able to demonstrate them prior to the end of the session.  Jackelyn demonstrated good  understanding of the education provided.     See EMR under Patient Instructions for exercises provided 5/29/2020.    Assessment     Pt with good relief overall today and noted issues with sleeping position still but overall better with pain control.  Pt with increased guarding levator and rhomboids and tender with biceps and triceps with compensation and overuse.  Pt with L rib hump and hypomobility at L thoracic middle.  Pt with good insight with POC and insight.  Pt with  R shoulder AROM scaption 170 and abduction 150 and L scaption 160 and abd 150 without pain at end of session      Jackelyn is progressing well towards her goals.   Pt prognosis is Good.     Pt will continue to benefit from skilled outpatient physical therapy to address the deficits listed in the problem list box on initial evaluation, provide pt/family education and to maximize pt's level of independence in the home and community environment.     Pt's spiritual, cultural and educational needs considered and pt agreeable to plan of care and goals.     Anticipated barriers to physical therapy: none    Goals:   Short Term Goals: 4 weeks   1. Pt will be instructed in a HEP to address B shoulder and UE function. Progressing 6/8/20  2. Pt will have B shoulder abduction to 135 degrees - Met 6/820  3. Pt will have L shoulder abduction to 135 degrees, ER to 70 degrees and IR to 40 degrees.     Long Term Goals: 8 weeks   1. Pt will have full pain free B shoulder PROM   2. Pt will report that she is able to perform ADL's without R or L shoulder pain  3. Pt will report that she is able to manage her R shoulder Sx through a HEP    Plan     Progress B shoulder  girdle mobilization and CX and thoracic stabilization exercises.    Yelena Thompson, PT

## 2020-06-09 NOTE — PROGRESS NOTES
After Visit Summary   10/26/2017    Yusef Alvares    MRN: [id number]           Patient Information     Date Of Birth          1974        Visit Information        Provider Department      10/26/2017 9:00 AM Aleida Ayala MD [facility]        Today's Diagnoses     Abdominal pain, left lower quadrant    -  1      Care Instructions          Thank you for choosing [facility].  You may be receiving a survey in the mail from [facility] regarding your visit today.  Please take a few minutes to complete and return the survey to let us know how we are doing.      If you have questions or concerns, please contact us via [employer] or you can contact your care team at [phone].    Our Clinic hours are:  Monday 6:40 am  to 7:00 pm  Tuesday -Friday 6:40 am to 5:00 pm    The [place] outpatient lab hours are:  Monday - Friday 6:10 am to 4:45 pm  Saturdays 7:00 am to 11:00 am  Appointments are required, call [phone]    If you have clinical questions after hours or would like to schedule an appointment,  call the clinic at [phone].  Treating Constipation    Constipation is a common and often uncomfortable problem. Constipation means you have bowel movements fewer than 3 times per week, or strain to pass hard, dry stool. It can last a short time. Or it can be a problem that never seems to go away. The good news is that it can often be treated and controlled.  Eat more fiber  One of the best ways to help treat constipation is to increase your fiber intake. You can do this either through diet or by using fiber supplements. Fiber (in whole grains, fruits, and vegetables) adds bulk and absorbs water to soften the stool. This helps the stool pass through the colon more easily. When you increase your fiber intake, do it slowly to avoid side effects such as bloating. Also increase the amount of water that you drink. Eating more of the following foods can add fiber to your  "  Physical Therapy Treatment Note     Name: Jackelyn Meilass  Clinic Number: [id number]    Therapy Diagnosis:   Encounter Diagnoses   Name Primary?    Impingement syndrome of right shoulder      Post-traumatic stiffness of right shoulder joint        Physician: Foreign Masters, *    Visit Date: 6/10/2020    Physician Orders: PT Eval and Treat   Medical Diagnosis from Referral: Impingement syndrome of right shoulder  Evaluation Date: 5/22/2020  Authorization Period Expiration: 12/31/2020  Plan of Care Expiration: 7/17/2020  Visit # / Visits authorized 6/ 20  Time In: 1:20 pm  Time Out: 2:17 pm  Total Billable Time: 57 minutes  (MT-2, TE-2)    Precautions: Standard    Subjective     Pt reports: woke up fine today but noted more pain today as day went on but uneventful.  She was compliant with home exercise program.  Response to previous treatment: good relief for 36 hrs after session  Functional change: increase activity with less pain    Pain: 4/10 R pre-session and 2/10 post session: 0/10 on L pre-session and 0/10 post session  Location: right shoulder      Objective     Jackelyn received therapeutic exercises to develop strength, endurance, ROM, flexibility, posture and core stabilization for 35 minutes including:  B shoulder protraction/retraction 20 x 2 with no ball on plinth supine  Lat pulldown bilateral 3 x 10 7#  Overhead pulley 2 x2 min in scaption/flexion  Standing row 10 x 3 with orange TB  Standing B shoulder extension 10 x 3 with yellow TB    Supine lying over a 3" towel roll x 2-3 min prolonged stretch in vertical and then horizontal each    AROM R 150 flexion 120 abduction at shoulder before painful range.    Jackelyn received the following manual therapy techniques: Joint mobilizations, Manual traction and Soft tissue Mobilization were applied to the: B shoudler and thoracic   for 27 minutes, including:  U GH joint passive mobilization   B upper trap, levator, rhomboids, biceps long head , triceps " diet.    High-fiber cereals    Whole grains, bran, and brown rice    Vegetables such as carrots, broccoli, and greens    Fresh fruits (especially apples, pears, and dried fruits like raisins and apricots)    Nuts and legumes (especially beans such as lentils, kidney beans, and lima beans)  Get physically active  Exercise helps improve the working of your colon which helps ease constipation. Try to get some physical activity every day. If you haven t been active for a while, talk to your healthcare provider before starting again.  Laxatives  Your healthcare provider may suggest an over-the-counter product to help ease your constipation. He or she may suggest the use of bulk-forming agents or laxatives. The use of laxatives, if used as directed, is common and safe. Follow directions carefully when using them. See your healthcare provider for new-onset constipation, or long-term constipation, to rule out other causes such as medicines or thyroid disease.  Date Last Reviewed: 7/1/2016 2000-2017 [employer]. [address] [place] [address]. All rights reserved. This information is not intended as a substitute for professional medical care. Always follow your healthcare professional's instructions.                Follow-ups after your visit        Who to contact     If you have questions or need follow up information about today's clinic visit or your schedule please contact [facility] directly at [phone].  Normal or non-critical lab and imaging results will be communicated to you by MyChart, letter or phone within 4 business days after the clinic has received the results. If you do not hear from us within 7 days, please contact the clinic through MyChart or phone. If you have a critical or abnormal lab result, we will notify you by phone as soon as possible.  Submit refill requests through [employer] or call your pharmacy and they will forward the refill request to us. Please  long head and intrascapular muscle soft tissue mobilization/MFR  Prone Thoracic and rib cage mobilization. Grade II 3 x 20 sec oscillation for pain   .      Home Exercises Provided and Patient Education Provided     Education provided:   - yes    Written Home Exercises Provided: yes.  Exercises were reviewed and Jackelyn was able to demonstrate them prior to the end of the session.  Jackelyn demonstrated good  understanding of the education provided.     See EMR under Patient Instructions for exercises provided 5/29/2020.    Assessment     Pt with good relief overall with last session and tolerated increased work load today without complaints of increased pain.  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  Pt independent with pain management otherwise with good technique with HEP and exercise in session.      Jackelyn is progressing well towards her goals.   Pt prognosis is Good.     Pt will continue to benefit from skilled outpatient physical therapy to address the deficits listed in the problem list box on initial evaluation, provide pt/family education and to maximize pt's level of independence in the home and community environment.     Pt's spiritual, cultural and educational needs considered and pt agreeable to plan of care and goals.     Anticipated barriers to physical therapy: none    Goals:   Short Term Goals: 4 weeks   1. Pt will be instructed in a HEP to address B shoulder and UE function. Achieved 6/10/20  2. Pt will have B shoulder abduction to 135 degrees - Met 6/820  3. Pt will have L shoulder abduction to 135 degrees, ER to 70 degrees and IR to 40 degrees. Progressing 6/10/20     Long Term Goals: 8 weeks   1. Pt will have full pain free B shoulder PROM   2. Pt will report that she is able to perform ADL's without R or L shoulder  "allow 3 business days for your refill to be completed.          Additional Information About Your Visit        MyChart Information     [facility] lets you send messages to your doctor, view your test results, renew your prescriptions, schedule appointments and more. To sign up, go to [email or web address] . Click on \"Log in\" on the left side of the screen, which will take you to the Welcome page. Then click on \"Sign up Now\" on the right side of the page.     You will be asked to enter the access code listed below, as well as some personal information. Please follow the directions to create your username and password.     Your access code is: [email or web address]  Expires: 2018  9:57 AM     Your access code will  in 90 days. If you need help or a new code, please call your [facility] clinic or [phone].        Care EveryWhere ID     This is your Care EveryWhere ID. This could be used by other organizations to access your [facility] medical records  [id number]        Your Vitals Were     Pulse Temperature Height BMI (Body Mass Index)          73 97.7  F (36.5  C) (Tympanic) 5' 5\" (1.651 m) 30.45 kg/m2         Blood Pressure from Last 3 Encounters:   10/26/17 126/86   17 120/64   17 133/84    Weight from Last 3 Encounters:   10/26/17 183 lb (83 kg)   17 193 lb 12.8 oz (87.9 kg)   17 193 lb (87.5 kg)               Primary Care Provider Office Phone # Fax #    Yusef Katz [phone] [address] [facility] [address]        Equal Access to Services     [facility]: Hadii kathy Montiel, wajose rda luqadaha, qaybta kaalmada negrita, vince burks. So [facility] [phone].    ATENCIÓN: Si habla español, tiene a lima disposición servicios gratuitos de asistencia lingüística. Llame al [phone].    We comply with applicable federal civil rights laws and [place] laws. We do not discriminate on the basis of race, color, national " pain  3. Pt will report that she is able to manage her R shoulder Sx through a HEP    Plan     Progress B shoulder girdle mobilization and CX and thoracic stabilization exercises.    Yelena Thompson, PT      origin, age, disability, sex, sexual orientation, or gender identity.            Thank you!     Thank you for choosing [facility]  for your care. Our goal is always to provide you with excellent care. Hearing back from our patients is one way we can continue to improve our services. Please take a few minutes to complete the written survey that you may receive in the mail after your visit with us. Thank you!             Your Updated Medication List - Protect others around you: Learn how to safely use, store and throw away your medicines at www.disposemymeds.org.          This list is accurate as of: 10/26/17  9:57 AM.  Always use your most recent med list.                   Brand Name Dispense Instructions for use Diagnosis    ABILIFY 15 MG tablet   Generic drug:  ARIPiprazole      Take 15 mg by mouth daily.        citalopram 40 MG tablet    celeXA     Take 40 mg by mouth daily.        DEPAKOTE  MG 24 hr tablet   Generic drug:  divalproex      Take 500 mg by mouth daily. Taking 1500 mg at night        hydrOXYzine 50 MG capsule    VISTARIL    120 capsule    Take 1 capsule (50 mg) by mouth 2 times daily As needed        levothyroxine 50 MCG tablet    SYNTHROID/LEVOTHROID    90 tablet    TAKE 1 TABLET(50 MCG) BY MOUTH DAILY    Hypothyroidism       naltrexone 50 MG tablet    DEPADE;REVIA     Take 50 mg by mouth daily        * simvastatin 40 MG tablet    ZOCOR    90 tablet    Take 1 tablet (40 mg) by mouth At Bedtime Take with 20 mg dose so that the total dose is 60 mg a day    Hyperlipidemia LDL goal <130       * simvastatin 20 MG tablet    ZOCOR    90 tablet    Take 1 tablet (20 mg) by mouth At Bedtime Take with 40 mg for total dose of 60 mg a day.    Hyperlipidemia LDL goal <130       STRATTERA 80 MG capsule   Generic drug:  atomoxetine      Take 80 mg by mouth daily        triamcinolone 0.1 % paste    KENALOG    5 g    Take by mouth 2 times daily Until sore is healed    Canker sore        triamcinolone 0.5 % cream    KENALOG    30 g    Apply sparingly to affected area three times daily to elbows    Eczema       * Notice:  This list has 2 medication(s) that are the same as other medications prescribed for you. Read the directions carefully, and ask your doctor or other care provider to review them with you.

## 2020-06-10 ENCOUNTER — CLINICAL SUPPORT (OUTPATIENT)
Dept: REHABILITATION | Facility: HOSPITAL | Age: 70
End: 2020-06-10
Payer: MEDICARE

## 2020-06-10 DIAGNOSIS — M25.611 POST-TRAUMATIC STIFFNESS OF RIGHT SHOULDER JOINT: ICD-10-CM

## 2020-06-10 PROCEDURE — 97140 MANUAL THERAPY 1/> REGIONS: CPT | Mod: PO

## 2020-06-10 PROCEDURE — 97110 THERAPEUTIC EXERCISES: CPT | Mod: PO

## 2020-06-17 ENCOUNTER — CLINICAL SUPPORT (OUTPATIENT)
Dept: REHABILITATION | Facility: HOSPITAL | Age: 70
End: 2020-06-17
Payer: MEDICARE

## 2020-06-17 DIAGNOSIS — M25.611 POST-TRAUMATIC STIFFNESS OF RIGHT SHOULDER JOINT: ICD-10-CM

## 2020-06-17 PROCEDURE — 97140 MANUAL THERAPY 1/> REGIONS: CPT | Mod: PO

## 2020-06-17 PROCEDURE — 97110 THERAPEUTIC EXERCISES: CPT | Mod: PO

## 2020-06-17 NOTE — PROGRESS NOTES
"  Physical Therapy Treatment Note     Name: Jackelyn Kendrick  Clinic Number: [id number]    Therapy Diagnosis:   Encounter Diagnoses   Name Primary?    Impingement syndrome of right shoulder      Post-traumatic stiffness of right shoulder joint        Physician: Foreign Masters, *    Visit Date: 6/17/2020    Physician Orders: PT Eval and Treat   Medical Diagnosis from Referral: Impingement syndrome of right shoulder  Evaluation Date: 5/22/2020  Authorization Period Expiration: 12/31/2020  Plan of Care Expiration: 7/17/2020  Visit # / Visits authorized 6/ 20  Time In: 1:30 pm  Time Out: 2:15 pm  Total Billable Time: 57 minutes  (MT-2)    Precautions: Standard    Subjective     Pt reports: that her R shoulder felt fine yesterday and she cleaned a double glass door.  She stated that today she has not been able to raise her arm today secondary to pain.  She was compliant with home exercise program.  Response to previous treatment: good relief for 36 hrs after session  Functional change: increase activity with less pain    Pain: 4/10 R pre-session and 2/10 post session: 0/10 on L pre-session and 0/10 post session  Location: right shoulder      Objective     Jackelyn received therapeutic exercises to develop strength, endurance, ROM, flexibility, posture and core stabilization for 35 minutes including:  B shoulder protraction/retraction 20 x 2 with no ball on plinth supine  Overhead pulley 2 x2 min in scaption/flexion  Standing row 10 x 3 with orange TB  Standing B shoulder extension 10 x 3 with orange TB        Not performed today:  Supine lying over a 3" towel roll x 2-3 min prolonged stretch in vertical and then horizontal each  Lat pulldown bilateral 3 x 10 7#  AROM R 150 flexion 120 abduction at shoulder before painful range.    Jackelyn received the following manual therapy techniques: Joint mobilizations, Manual traction and Soft tissue Mobilization were applied to the: B shoudler and thoracic   for 27 minutes, " including:  U GH joint passive mobilization   Prone Thoracic and rib cage mobilization. Grade II 3 x 20 sec oscillation for pain       Not performed:  B upper trap, levator, rhomboids, biceps long head , triceps long head and intrascapular muscle soft tissue mobilization/MFR    Home Exercises Provided and Patient Education Provided     Education provided:   - yes    Written Home Exercises Provided: yes.  Exercises were reviewed and Jackelyn was able to demonstrate them prior to the end of the session.  Jackelyn demonstrated good  understanding of the education provided.     See EMR under Patient Instructions for exercises provided 5/29/2020.    Assessment     Pt with increased pain today although she reported that she was able to reach to clean a sliding glass door yesterday.  Pt remains limited in her ability to elevate her R UE following Tx today.      Jackelyn is progressing well towards her goals.   Pt prognosis is Good.     Pt will continue to benefit from skilled outpatient physical therapy to address the deficits listed in the problem list box on initial evaluation, provide pt/family education and to maximize pt's level of independence in the home and community environment.     Pt's spiritual, cultural and educational needs considered and pt agreeable to plan of care and goals.     Anticipated barriers to physical therapy: none    Goals:   Short Term Goals: 4 weeks   1. Pt will be instructed in a HEP to address B shoulder and UE function. Achieved 6/10/20  2. Pt will have B shoulder abduction to 135 degrees - Met 6/820  3. Pt will have L shoulder abduction to 135 degrees, ER to 70 degrees and IR to 40 degrees. Progressing 6/10/20     Long Term Goals: 8 weeks   1. Pt will have full pain free B shoulder PROM   2. Pt will report that she is able to perform ADL's without R or L shoulder pain  3. Pt will report that she is able to manage her R shoulder Sx through a HEP    Plan     Progress B shoulder girdle  mobilization and CX and thoracic stabilization exercises.    Fermin Frias, PT

## 2020-06-19 ENCOUNTER — CLINICAL SUPPORT (OUTPATIENT)
Dept: REHABILITATION | Facility: HOSPITAL | Age: 70
End: 2020-06-19
Payer: MEDICARE

## 2020-06-19 DIAGNOSIS — M25.611 POST-TRAUMATIC STIFFNESS OF RIGHT SHOULDER JOINT: ICD-10-CM

## 2020-06-19 PROCEDURE — 97110 THERAPEUTIC EXERCISES: CPT | Mod: PO

## 2020-06-19 PROCEDURE — 97140 MANUAL THERAPY 1/> REGIONS: CPT | Mod: PO

## 2020-06-19 NOTE — PROGRESS NOTES
"  Physical Therapy Treatment Note     Name: Jackelyn Meilass  Clinic Number: [id number]    Therapy Diagnosis:   Encounter Diagnoses   Name Primary?    Impingement syndrome of right shoulder      Post-traumatic stiffness of right shoulder joint        Physician: Foreign Masters, *    Visit Date: 6/19/2020    Physician Orders: PT Eval and Treat   Medical Diagnosis from Referral: Impingement syndrome of right shoulder  Evaluation Date: 5/22/2020  Authorization Period Expiration: 12/31/2020  Plan of Care Expiration: 7/17/2020  Visit # / Visits authorized 6/ 20  Time In: 1:00 pm  Time Out: 1:45 pm  Total Billable Time: 57 minutes  (MT-1, TE - 2)    Precautions: Standard    Subjective     Pt reports: that she will be out of town the next week or so.  She stated that she will try to schedule for when she gets back, but her  is having a TKA in early July so she may have to wait until he is moving around better.  She was compliant with home exercise program.  Response to previous treatment: good relief for 36 hrs after session  Functional change: increase activity with less pain    Pain: 4/10 R   Location: right shoulder      Objective     Jackelyn received therapeutic exercises to develop strength, endurance, ROM, flexibility, posture and core stabilization for 35 minutes including:  B shoulder protraction/retraction 20 x 2 with no ball on plinth supine  Overhead pulley 2 x2 min in scaption/flexion  Standing row 10 x 3 with orange TB  Standing B shoulder extension 10 x 3 with orange TB        Not performed today:  Supine lying over a 3" towel roll x 2-3 min prolonged stretch in vertical and then horizontal each  Lat pulldown bilateral 3 x 10 7#  AROM R 150 flexion 120 abduction at shoulder before painful range.    Jackelyn received the following manual therapy techniques: Joint mobilizations, Manual traction and Soft tissue Mobilization were applied to the: B shoudler and thoracic   for 27 minutes, including:  U " GH joint passive mobilization   Prone Thoracic and rib cage mobilization. Grade II 3 x 20 sec oscillation for pain       Not performed:  B upper trap, levator, rhomboids, biceps long head , triceps long head and intrascapular muscle soft tissue mobilization/MFR    Home Exercises Provided and Patient Education Provided     Education provided:   - yes    Written Home Exercises Provided: yes.  Exercises were reviewed and Jackelyn was able to demonstrate them prior to the end of the session.  Jackelyn demonstrated good  understanding of the education provided.     See EMR under Patient Instructions for exercises provided 5/29/2020.    Assessment     Pt with little change in her pain level in the last few days although she was able to use her R UE over head the other day.  Pt will be out of town helping her son move.  I am afraid this my irritate her B shoulder symptoms.  Pt has exercise to perform while she is away.      Jackelyn is progressing well towards her goals.   Pt prognosis is Good.     Pt will continue to benefit from skilled outpatient physical therapy to address the deficits listed in the problem list box on initial evaluation, provide pt/family education and to maximize pt's level of independence in the home and community environment.     Pt's spiritual, cultural and educational needs considered and pt agreeable to plan of care and goals.     Anticipated barriers to physical therapy: none    Goals:   Short Term Goals: 4 weeks   1. Pt will be instructed in a HEP to address B shoulder and UE function. Achieved 6/10/20  2. Pt will have B shoulder abduction to 135 degrees - Met 6/820  3. Pt will have L shoulder abduction to 135 degrees, ER to 70 degrees and IR to 40 degrees. Progressing 6/10/20     Long Term Goals: 8 weeks   1. Pt will have full pain free B shoulder PROM   2. Pt will report that she is able to perform ADL's without R or L shoulder pain  3. Pt will report that she is able to manage her R shoulder  Sx through a HEP    Plan     Progress B shoulder girdle mobilization and CX and thoracic stabilization exercises.    Fermin Frias, PT

## 2020-07-06 ENCOUNTER — OFFICE VISIT (OUTPATIENT)
Dept: OPHTHALMOLOGY | Facility: CLINIC | Age: 70
End: 2020-07-06
Payer: MEDICARE

## 2020-07-06 DIAGNOSIS — H25.11 NUCLEAR SCLEROTIC CATARACT OF RIGHT EYE: ICD-10-CM

## 2020-07-06 DIAGNOSIS — H16.223 KERATOCONJUNCTIVITIS SICCA NOT SPECIFIED AS SJOGREN'S, BILATERAL: Primary | ICD-10-CM

## 2020-07-06 DIAGNOSIS — H25.12 NUCLEAR SCLEROTIC CATARACT OF LEFT EYE: ICD-10-CM

## 2020-07-06 PROCEDURE — 92014 PR EYE EXAM, EST PATIENT,COMPREHESV: ICD-10-PCS | Mod: 25,S$PBB,, | Performed by: OPHTHALMOLOGY

## 2020-07-06 PROCEDURE — 92014 COMPRE OPH EXAM EST PT 1/>: CPT | Mod: 25,S$PBB,, | Performed by: OPHTHALMOLOGY

## 2020-07-06 PROCEDURE — 99214 OFFICE O/P EST MOD 30 MIN: CPT | Mod: PBBFAC | Performed by: OPHTHALMOLOGY

## 2020-07-06 PROCEDURE — 99999 PR PBB SHADOW E&M-EST. PATIENT-LVL IV: CPT | Mod: PBBFAC,,, | Performed by: OPHTHALMOLOGY

## 2020-07-06 PROCEDURE — 68761 PR CLOSE TEAR DUCT OPENING BY PLUG,EA: ICD-10-PCS | Mod: 50,S$PBB,, | Performed by: OPHTHALMOLOGY

## 2020-07-06 PROCEDURE — 99999 PR PBB SHADOW E&M-EST. PATIENT-LVL IV: ICD-10-PCS | Mod: PBBFAC,,, | Performed by: OPHTHALMOLOGY

## 2020-07-06 PROCEDURE — 68761 CLOSE TEAR DUCT OPENING: CPT | Mod: 50,S$PBB,, | Performed by: OPHTHALMOLOGY

## 2020-07-06 PROCEDURE — 68761 CLOSE TEAR DUCT OPENING: CPT | Mod: PBBFAC | Performed by: OPHTHALMOLOGY

## 2020-07-06 NOTE — PATIENT INSTRUCTIONS
RESTASIS - 3 TIMES A DAY    LOTEMAX - 3 TIMES A DAY FOR 1 WK THEN DECREASE TO TWICE A DAY FOR 1 WK THEN DAILY FOR ONE WEEK THEN STOP    PRESERVATIVE FREE ARTIFICIAL TEARS - AS NEEDED

## 2020-07-06 NOTE — LETTER
July 6, 2020      Matthias Gutierrez, OD  [address]  [place] [address]           [facility] Ophthalmology  [address]  [place] [address]  Phone: [phone]  Fax: [phone]          Patient: Jackelyn Kendrick   MR Number: [id number]   YOB: 1950   Date of Visit: 7/6/2020       Dear Dr. Matthias Gutierrez:    Thank you for referring Jackelyn Kendrick to me for evaluation. Attached you will find relevant portions of my assessment and plan of care.    If you have questions, please do not hesitate to call me. I look forward to following Jackelyn Kendrick along with you.    Sincerely,    Martha Leigh MD    Enclosure  CC:  No Recipients    If you would like to receive this communication electronically, please contact [email or web address] or [phone] to request more information on [facility] access.    For providers and/or their staff who would like to refer a patient to [facility], please contact us through our one-stop-shop provider referral line, [facility], at [phone].    If you feel you have received this communication in error or would no longer like to receive these types of communications, please e-mail [email or web address]

## 2020-07-06 NOTE — PROGRESS NOTES
HPI     Refer by Dr. Gutierrez    OCTAVIO  NSC    Restasis bid ou  Systane ou prn    Patient here today with c/o persistent OCTAVIO ou x several months. Patient   has tried both prescription and OTC drops with no resolve. Patient has   tried plugs in the past with some resolve. No other ocular complaints at   this time.      Last edited by Sandi Mccann on 7/6/2020  8:35 AM. (History)            Assessment /Plan     For exam results, see Encounter Report.    Keratoconjunctivitis sicca not specified as Sjogren's, bilateral    Nuclear sclerotic cataract of right eye    Nuclear sclerotic cataract of left eye      Keratitis Sicca   -> normal schirmers however may be false negative  - >mild NaFl staining pattern, no philly bengal stain    Has tried in the past with minimal relief:  ATs, restasis, steroids    - Change ATs to preservative free ATs     - 3 wk tapering course of topical mild steroid    - Address lid margin inflammation component / evaporative OCTAVIO: warm compresses, Lid hygiene; discussed the option for oral doxy / topical azasite     - placed  plugs RLL, СВЕТЛАНА    - discussed other options for management of chronic condition: supplements - omega 3FA, flax seed/fish oil, humidifier, modifying external conditions at work on in the car, wrap around glasses, sleeping with humidifier    - also discussed the option of autologous serum tears if/when available in the near future.     PROCEDURE NOTE:    Diagnosis:  See above.    Indication:  Severe keratoconjunctivitis sicca    Procedure: Permanent punctal plug placed RLL, LLL today at the slit lamp, size 0.7.      C/O: none

## 2020-07-08 ENCOUNTER — CLINICAL SUPPORT (OUTPATIENT)
Dept: REHABILITATION | Facility: HOSPITAL | Age: 70
End: 2020-07-08
Payer: MEDICARE

## 2020-07-08 DIAGNOSIS — M25.611 POST-TRAUMATIC STIFFNESS OF RIGHT SHOULDER JOINT: ICD-10-CM

## 2020-07-08 DIAGNOSIS — Z12.11 SPECIAL SCREENING FOR MALIGNANT NEOPLASMS, COLON: Primary | ICD-10-CM

## 2020-07-08 DIAGNOSIS — Z01.818 PRE-OP TESTING: ICD-10-CM

## 2020-07-08 PROCEDURE — 97110 THERAPEUTIC EXERCISES: CPT | Mod: PO

## 2020-07-08 PROCEDURE — 97140 MANUAL THERAPY 1/> REGIONS: CPT | Mod: PO

## 2020-07-08 RX ORDER — POLYETHYLENE GLYCOL 3350, SODIUM SULFATE ANHYDROUS, SODIUM BICARBONATE, SODIUM CHLORIDE, POTASSIUM CHLORIDE 236; 22.74; 6.74; 5.86; 2.97 G/4L; G/4L; G/4L; G/4L; G/4L
4 POWDER, FOR SOLUTION ORAL ONCE
Qty: 4000 ML | Refills: 0 | Status: SHIPPED | OUTPATIENT
Start: 2020-07-08 | End: 2020-07-08

## 2020-07-08 NOTE — PROGRESS NOTES
"  Physical Therapy Treatment Note     Name: Jackelyn Kendrick  Clinic Number: [id number]    Therapy Diagnosis:   Encounter Diagnoses   Name Primary?    Impingement syndrome of right shoulder      Post-traumatic stiffness of right shoulder joint        Physician: Foreign Masters, *    Visit Date: 7/8/2020    Physician Orders: PT Eval and Treat   Medical Diagnosis from Referral: Impingement syndrome of right shoulder  Evaluation Date: 5/22/2020  Authorization Period Expiration: 12/31/2020  Plan of Care Expiration: 7/17/2020  Visit # / Visits authorized 8/ 20  Time In: 1:00 pm  Time Out: 1:45 pm  Total Billable Time: 57 minutes  (MT-1, TE - 2)    Precautions: Standard    Subjective     Pt reports: that she had some difficulty with helping her son move, but she avoided lifting too much.  She was compliant with home exercise program.  Response to previous treatment: good relief for 36 hrs after session  Functional change: reported that she has difficulty pushing off with her R UE to come to stand.    Pain: 4/10 R   Location: right shoulder      Objective     Jackelyn Kendrick received therapeutic exercises to develop strength, endurance, ROM, flexibility, posture and core stabilization for 20 minutes including:  B shoulder protraction/retraction 20 x 2 with no ball on plinth supine  Overhead pulley 2 x2 min in scaption/flexion  Standing row 10 x 3 with orange TB  Standing B shoulder extension 10 x 3 with orange TB        Not performed today:  Supine lying over a 3" towel roll x 2-3 min prolonged stretch in vertical and then horizontal each  Lat pulldown bilateral 3 x 10 7#  AROM R 150 flexion 120 abduction at shoulder before painful range.    Jackelyn Kendrick received the following manual therapy techniques: Joint mobilizations, Manual traction and Soft tissue Mobilization were applied to the: B shoudler and thoracic   for 25 minutes, including:  U GH joint passive mobilization   Prone Thoracic and rib cage " mobilization. Grade II 3 x 20 sec oscillation for pain  Cx spine manual traction     Not performed:  B upper trap, levator, rhomboids, biceps long head , triceps long head and intrascapular muscle soft tissue mobilization/MFR    Home Exercises Provided and Patient Education Provided     Education provided:   - yes    Written Home Exercises Provided: yes.  Exercises were reviewed and Jackelyn Kendrick was able to demonstrate them prior to the end of the session.  Jackelyn Kendrick demonstrated good  understanding of the education provided.     See EMR under Patient Instructions for exercises provided 5/29/2020.    Assessment     Pt with increased pain in her R shoulder and upper arm after her trip to help her son move.  Pt unable to use her R UE to assist with supine to sit transfers.     Jackelyn Kendrick is progressing well towards her goals.   Pt prognosis is Good.     Pt will continue to benefit from skilled outpatient physical therapy to address the deficits listed in the problem list box on initial evaluation, provide pt/family education and to maximize pt's level of independence in the home and community environment.     Pt's spiritual, cultural and educational needs considered and pt agreeable to plan of care and goals.     Anticipated barriers to physical therapy: none    Goals:   Short Term Goals: 4 weeks   1. Pt will be instructed in a HEP to address B shoulder and UE function. Achieved 6/10/20  2. Pt will have B shoulder abduction to 135 degrees - Met 6/820  3. Pt will have L shoulder abduction to 135 degrees, ER to 70 degrees and IR to 40 degrees. Progressing 6/10/20     Long Term Goals: 8 weeks   1. Pt will have full pain free B shoulder PROM   2. Pt will report that she is able to perform ADL's without R or L shoulder pain  3. Pt will report that she is able to manage her R shoulder Sx through a HEP    Plan     Progress B shoulder girdle mobilization and CX and thoracic stabilization  exercises.    Fermin Frias, PT

## 2020-07-10 ENCOUNTER — CLINICAL SUPPORT (OUTPATIENT)
Dept: REHABILITATION | Facility: HOSPITAL | Age: 70
End: 2020-07-10
Payer: MEDICARE

## 2020-07-10 DIAGNOSIS — M25.611 POST-TRAUMATIC STIFFNESS OF RIGHT SHOULDER JOINT: ICD-10-CM

## 2020-07-10 PROCEDURE — 97110 THERAPEUTIC EXERCISES: CPT | Mod: PO

## 2020-07-10 PROCEDURE — 97140 MANUAL THERAPY 1/> REGIONS: CPT | Mod: PO

## 2020-07-13 ENCOUNTER — TELEPHONE (OUTPATIENT)
Dept: SPORTS MEDICINE | Facility: CLINIC | Age: 70
End: 2020-07-13

## 2020-07-14 ENCOUNTER — TELEPHONE (OUTPATIENT)
Dept: SPORTS MEDICINE | Facility: CLINIC | Age: 70
End: 2020-07-14

## 2020-07-14 NOTE — TELEPHONE ENCOUNTER
----- Message from Jeremiah Coreas sent at 7/14/2020  9:21 AM CDT -----  Contact: Patient @ [phone]  Patient calling to r/s the 7-13th appt, patient states she needs to be seen before September ( which is the next available )

## 2020-07-16 NOTE — PROGRESS NOTES
"  Physical Therapy Treatment Note     Name: Jackelyn Kendrick  Clinic Number: [id number]    Therapy Diagnosis:   Encounter Diagnoses   Name Primary?    Impingement syndrome of right shoulder      Post-traumatic stiffness of right shoulder joint        Physician: Foreign Masters, *    Visit Date: 7/10/2020    Physician Orders: PT Eval and Treat   Medical Diagnosis from Referral: Impingement syndrome of right shoulder  Evaluation Date: 5/22/2020  Authorization Period Expiration: 12/31/2020  Plan of Care Expiration: 7/17/2020  Visit # / Visits authorized 8/ 20  Time In: 10:30m  Time Out: 11:15 pm  Total Billable Time: 45 minutes  (MT-2, TE - 1)    Precautions: Standard    Subjective     Pt reports: that she still has trouble pushing of her R arm to get out of achair or out of bed.  She was compliant with home exercise program.  Response to previous treatment: good relief for 36 hrs after session  Functional change: reported that she has difficulty pushing off with her R UE to come to stand.    Pain: 4/10 R   Location: right shoulder      Objective     Jackelyn Kendrick received therapeutic exercises to develop strength, endurance, ROM, flexibility, posture and core stabilization for 20 minutes including:  B shoulder protraction/retraction 20 x 2 with no ball on plinth supine  Overhead pulley 2 x2 min in scaption/flexion  Standing row 10 x 3 with orange TB  Standing B shoulder extension 10 x 3 with orange TB        Not performed today:  Supine lying over a 3" towel roll x 2-3 min prolonged stretch in vertical and then horizontal each  Lat pulldown bilateral 3 x 10 7#  AROM R 150 flexion 120 abduction at shoulder before painful range.    Jackelyn Kendrick received the following manual therapy techniques: Joint mobilizations, Manual traction and Soft tissue Mobilization were applied to the: B shoudler and thoracic   for 25 minutes, including:  U GH joint passive mobilization   Prone Thoracic and rib cage " mobilization. Grade II 3 x 20 sec oscillation for pain  Cx spine manual traction     Not performed:  B upper trap, levator, rhomboids, biceps long head , triceps long head and intrascapular muscle soft tissue mobilization/MFR    Home Exercises Provided and Patient Education Provided     Education provided:   - yes    Written Home Exercises Provided: yes.  Exercises were reviewed and Jackelyn Kendrick was able to demonstrate them prior to the end of the session.  Jackelyn Kendrick demonstrated good  understanding of the education provided.     See EMR under Patient Instructions for exercises provided 5/29/2020.    Assessment     Pt with continued R shoulder pain with supine to sit transfers.     Jackelyn Kendrick is progressing well towards her goals.   Pt prognosis is Good.     Pt will continue to benefit from skilled outpatient physical therapy to address the deficits listed in the problem list box on initial evaluation, provide pt/family education and to maximize pt's level of independence in the home and community environment.     Pt's spiritual, cultural and educational needs considered and pt agreeable to plan of care and goals.     Anticipated barriers to physical therapy: none    Goals:   Short Term Goals: 4 weeks   1. Pt will be instructed in a HEP to address B shoulder and UE function. Achieved 6/10/20  2. Pt will have B shoulder abduction to 135 degrees - Met 6/820  3. Pt will have L shoulder abduction to 135 degrees, ER to 70 degrees and IR to 40 degrees. Progressing 6/10/20     Long Term Goals: 8 weeks   1. Pt will have full pain free B shoulder PROM   2. Pt will report that she is able to perform ADL's without R or L shoulder pain  3. Pt will report that she is able to manage her R shoulder Sx through a HEP    Plan     Progress B shoulder girdle mobilization and CX and thoracic stabilization exercises.    Fermin Frias, PT

## 2020-07-22 ENCOUNTER — CLINICAL SUPPORT (OUTPATIENT)
Dept: REHABILITATION | Facility: HOSPITAL | Age: 70
End: 2020-07-22
Payer: MEDICARE

## 2020-07-22 DIAGNOSIS — M25.611 POST-TRAUMATIC STIFFNESS OF RIGHT SHOULDER JOINT: ICD-10-CM

## 2020-07-22 PROCEDURE — 97110 THERAPEUTIC EXERCISES: CPT | Mod: PO,CQ

## 2020-07-22 PROCEDURE — 97140 MANUAL THERAPY 1/> REGIONS: CPT | Mod: PO,CQ

## 2020-07-22 NOTE — PROGRESS NOTES
"  Physical Therapy Treatment Note     Name: Jackelyn Kendrick  Clinic Number: [id number]    Therapy Diagnosis:   Encounter Diagnoses   Name Primary?    Impingement syndrome of right shoulder      Post-traumatic stiffness of right shoulder joint        Physician: Foreign Masters, *    Visit Date: 7/22/2020    Physician Orders: PT Eval and Treat   Medical Diagnosis from Referral: Impingement syndrome of right shoulder  Evaluation Date: 5/22/2020  Authorization Period Expiration: 12/31/2020  Plan of Care Expiration: 7/17/2020  Visit # / Visits authorized 8/ 20  Time In: 10:30m  Time Out: 11:15 pm  Total Billable Time: 45 minutes  (MT-2, TE - 1)    Precautions: Standard    Subjective     Pt reports: "it hurts"   She was compliant with home exercise program.  Response to previous treatment: good relief for 36 hrs after session  Functional change: reported that she has difficulty pushing off with her R UE to come to stand.    Pain: 4/10 R   Location: right shoulder      Objective     Jackelyn Kendrick received therapeutic exercises to develop strength, endurance, ROM, flexibility, posture and core stabilization for 20 minutes including:    B shoulder protraction/retraction 20 x 2 with no ball on plinth supine  Overhead pulley 2 x2 min in scaption/flexion  Standing row 10 x 3 with orange TB  Standing B shoulder extension 10 x 3 with orange TB        Not performed today:  Supine lying over a 3" towel roll x 2-3 min prolonged stretch in vertical and then horizontal each  Lat pulldown bilateral 3 x 10 7#  AROM R 150 flexion 120 abduction at shoulder before painful range.    Jackelyn Kendrick received the following manual therapy techniques: Joint mobilizations, Manual traction and Soft tissue Mobilization were applied to the: B shoudler and thoracic   for 25 minutes, including:    U GH joint passive mobilization   Prone Thoracic and rib cage mobilization. Grade II 3 x 20 sec oscillation for pain - not performed   Cx " spine manual traction - not performed      Not performed:  B upper trap, levator, rhomboids, biceps long head , triceps long head and intrascapular muscle soft tissue mobilization/MFR    Home Exercises Provided and Patient Education Provided     Education provided:   - yes    Written Home Exercises Provided: yes.  Exercises were reviewed and Jackelyn Kendrick was able to demonstrate them prior to the end of the session.  Jackelyn Kendrick demonstrated good  understanding of the education provided.     See EMR under Patient Instructions for exercises provided 5/29/2020.    Assessment     Patient with fair tolerance for therapy session. Patient with increased pain with most shoulder movements. Will continue to benefit from skilled physical therapy to address deficits.     Jackelyn Kendrick is progressing well towards her goals.   Pt prognosis is Good.     Pt will continue to benefit from skilled outpatient physical therapy to address the deficits listed in the problem list box on initial evaluation, provide pt/family education and to maximize pt's level of independence in the home and community environment.     Pt's spiritual, cultural and educational needs considered and pt agreeable to plan of care and goals.     Anticipated barriers to physical therapy: none    Goals:   Short Term Goals: 4 weeks   1. Pt will be instructed in a HEP to address B shoulder and UE function. Achieved 6/10/20  2. Pt will have B shoulder abduction to 135 degrees - Met 6/820  3. Pt will have L shoulder abduction to 135 degrees, ER to 70 degrees and IR to 40 degrees. Progressing 6/10/20     Long Term Goals: 8 weeks   1. Pt will have full pain free B shoulder PROM   2. Pt will report that she is able to perform ADL's without R or L shoulder pain  3. Pt will report that she is able to manage her R shoulder Sx through a HEP    Plan     Progress B shoulder girdle mobilization and CX and thoracic stabilization exercises.    Lydia  Nickie, PTA

## 2020-07-24 ENCOUNTER — CLINICAL SUPPORT (OUTPATIENT)
Dept: REHABILITATION | Facility: HOSPITAL | Age: 70
End: 2020-07-24
Payer: MEDICARE

## 2020-07-24 DIAGNOSIS — M25.611 POST-TRAUMATIC STIFFNESS OF RIGHT SHOULDER JOINT: ICD-10-CM

## 2020-07-24 PROCEDURE — 97110 THERAPEUTIC EXERCISES: CPT | Mod: PO,CQ

## 2020-07-24 PROCEDURE — 97140 MANUAL THERAPY 1/> REGIONS: CPT | Mod: PO,CQ

## 2020-07-24 NOTE — PROGRESS NOTES
"     Physical Therapy Treatment Note     Name: Jackelyn Kendrick  Clinic Number: [id number]    Therapy Diagnosis:   Encounter Diagnoses   Name Primary?    Impingement syndrome of right shoulder      Post-traumatic stiffness of right shoulder joint        Physician: Foreign Masters, *    Visit Date: 7/24/2020    Physician Orders: PT Eval and Treat   Medical Diagnosis from Referral: Impingement syndrome of right shoulder  Evaluation Date: 5/22/2020  Authorization Period Expiration: 12/31/2020  Plan of Care Expiration: 7/17/2020  Visit # / Visits authorized 12 / 20  Time In: 10:30m  Time Out: 11:15 pm  Total Billable Time: 45 minutes  (MT-2, TE - 1)    Precautions: Standard    Subjective     Pt reports: feeling "eh"  She was compliant with home exercise program.  Response to previous treatment: good relief for 36 hrs after session  Functional change: reported that she has difficulty pushing off with her R UE to come to stand.    Pain: 4 /10 R   Location: right shoulder      Objective     Jackelyn Kendrick received therapeutic exercises to develop strength, endurance, ROM, flexibility, posture and core stabilization for 20 minutes including:    B shoulder protraction/retraction 20 x 2 with no ball on plinth supine  Overhead pulley 2 x2 min in scaption/flexion  Standing row 10 x 3 with orange TB  Standing B shoulder extension 10 x 3 with orange TB        Not performed today:  Supine lying over a 3" towel roll x 2-3 min prolonged stretch in vertical and then horizontal each  Lat pulldown bilateral 3 x 10 7#  AROM R 150 flexion 120 abduction at shoulder before painful range.    Jackelyn Kendrick received the following manual therapy techniques: Joint mobilizations, Manual traction and Soft tissue Mobilization were applied to the: B shoudler and thoracic   for 25 minutes, including:    U GH joint passive mobilization   Prone Thoracic and rib cage mobilization. Grade II 3 x 20 sec oscillation for pain - not " performed   Cx spine manual traction - not performed      Not performed:  B upper trap, levator, rhomboids, biceps long head , triceps long head and intrascapular muscle soft tissue mobilization/MFR    Home Exercises Provided and Patient Education Provided     Education provided:   - yes    Written Home Exercises Provided: yes.  Exercises were reviewed and Jackelyn Kendrick was able to demonstrate them prior to the end of the session.  Jackelyn Kendrick demonstrated good  understanding of the education provided.     See EMR under Patient Instructions for exercises provided 5/29/2020.    Assessment     Patient tolerated therapy session fairly well. Patient with increased pain with most shoulder movements. Will continue to benefit from skilled physical therapy to address deficits.     Jackelyn Kendrick is progressing well towards her goals.   Pt prognosis is Good.     Pt will continue to benefit from skilled outpatient physical therapy to address the deficits listed in the problem list box on initial evaluation, provide pt/family education and to maximize pt's level of independence in the home and community environment.     Pt's spiritual, cultural and educational needs considered and pt agreeable to plan of care and goals.     Anticipated barriers to physical therapy: none    Goals:   Short Term Goals: 4 weeks   1. Pt will be instructed in a HEP to address B shoulder and UE function. Achieved 6/10/20  2. Pt will have B shoulder abduction to 135 degrees - Met 6/820  3. Pt will have L shoulder abduction to 135 degrees, ER to 70 degrees and IR to 40 degrees. Progressing 6/10/20     Long Term Goals: 8 weeks   1. Pt will have full pain free B shoulder PROM   2. Pt will report that she is able to perform ADL's without R or L shoulder pain  3. Pt will report that she is able to manage her R shoulder Sx through a HEP    Plan     Progress B shoulder girdle mobilization and CX and thoracic stabilization  exercises.    Lydia Fu, PTA

## 2020-07-29 ENCOUNTER — CLINICAL SUPPORT (OUTPATIENT)
Dept: REHABILITATION | Facility: HOSPITAL | Age: 70
End: 2020-07-29
Payer: MEDICARE

## 2020-07-29 DIAGNOSIS — M25.611 POST-TRAUMATIC STIFFNESS OF RIGHT SHOULDER JOINT: ICD-10-CM

## 2020-07-29 PROCEDURE — 97140 MANUAL THERAPY 1/> REGIONS: CPT | Mod: PO

## 2020-07-29 NOTE — PROGRESS NOTES
"     Physical Therapy Treatment Note     Name: Jackelyn Kendrick  Clinic Number: [id number]    Therapy Diagnosis:   Encounter Diagnoses   Name Primary?    Impingement syndrome of right shoulder      Post-traumatic stiffness of right shoulder joint        Physician: Foreign Masters, *    Visit Date: 7/29/2020    Physician Orders: PT Eval and Treat   Medical Diagnosis from Referral: Impingement syndrome of right shoulder  Evaluation Date: 5/22/2020  Authorization Period Expiration: 12/31/2020  Plan of Care Expiration: 7/17/2020  Visit # / Visits authorized 13 / 20  Time In: 1:00 pm  Time Out: 1:45 pm  Total Billable Time: 45 minutes  (MT-3)    Precautions: Standard    Subjective     Pt reports: I feel like it is getting worse.  I have pain with trying to cut a sandwich.  She was compliant with home exercise program.  Response to previous treatment: good relief for 36 hrs after session  Functional change: reported that she has difficulty pushing off with her R UE to come to stand.    Pain: 4 /10 R   Location: right shoulder      Objective     Jackelyn Kendrick did not receive therapeutic exercises to develop strength, endurance, ROM, flexibility, posture and core stabilization for  minutes including:    B shoulder protraction/retraction 20 x 2 with no ball on plinth supine  Overhead pulley 2 x2 min in scaption/flexion  Standing row 10 x 3 with orange TB  Standing B shoulder extension 10 x 3 with orange TB        Not performed today:  Supine lying over a 3" towel roll x 2-3 min prolonged stretch in vertical and then horizontal each  Lat pulldown bilateral 3 x 10 7#  AROM R 150 flexion 120 abduction at shoulder before painful range.    Jackelyn Kendrick received the following manual therapy techniques: Joint mobilizations, Manual traction and Soft tissue Mobilization were applied to the: B shoudler and thoracic   for 40 minutes, including:    U GH joint passive mobilization   Prone Thoracic and rib cage " mobilization.  Cx spine manual traction      Not performed:  B upper trap, levator, rhomboids, biceps long head , triceps long head and intrascapular muscle soft tissue mobilization/MFR    Home Exercises Provided and Patient Education Provided     Education provided:   - yes    Written Home Exercises Provided: yes.  Exercises were reviewed and Jackelyn Kendrick was able to demonstrate them prior to the end of the session.  Jackelyn Kendrick demonstrated good  understanding of the education provided.     See EMR under Patient Instructions for exercises provided 5/29/2020.    Assessment     Patient tolerated therapy session with discomfort into the R UE with thoracic spine and rib cage mobilization. It appears that Pt's shoulder ROM limitation is related to the hypomibility in her thoracic spine and rib cage.     Jackelyn Kendrick is progressing well towards her goals.   Pt prognosis is Good.     Pt will continue to benefit from skilled outpatient physical therapy to address the deficits listed in the problem list box on initial evaluation, provide pt/family education and to maximize pt's level of independence in the home and community environment.     Pt's spiritual, cultural and educational needs considered and pt agreeable to plan of care and goals.     Anticipated barriers to physical therapy: none    Goals:   Short Term Goals: 4 weeks   1. Pt will be instructed in a HEP to address B shoulder and UE function. Achieved 6/10/20  2. Pt will have B shoulder abduction to 135 degrees - Met 6/820  3. Pt will have L shoulder abduction to 135 degrees, ER to 70 degrees and IR to 40 degrees. Progressing 6/10/20     Long Term Goals: 8 weeks   1. Pt will have full pain free B shoulder PROM   2. Pt will report that she is able to perform ADL's without R or L shoulder pain  3. Pt will report that she is able to manage her R shoulder Sx through a HEP    Plan     Progress B shoulder girdle mobilization and CX and thoracic  stabilization exercises.  2x/week x 4 weeks  Fermin Frias, PT

## 2020-07-30 NOTE — PLAN OF CARE
"  Physical Therapy Treatment Note     Name: Jackelyn Kendrick  Clinic Number: [id number]    Therapy Diagnosis:   Encounter Diagnoses   Name Primary?    Impingement syndrome of right shoulder      Post-traumatic stiffness of right shoulder joint        Physician: Foreign Masters, *    Visit Date: 7/29/2020    Physician Orders: PT Eval and Treat   Medical Diagnosis from Referral: Impingement syndrome of right shoulder  Evaluation Date: 5/22/2020  Authorization Period Expiration: 12/31/2020  Plan of Care Expiration: 7/17/2020  Visit # / Visits authorized 13 / 20  Time In: 1:00 pm  Time Out: 1:45 pm  Total Billable Time: 45 minutes  (MT-3)    Precautions: Standard    Subjective     Pt reports: I feel like it is getting worse.  I have pain with trying to cut a sandwich.  She was compliant with home exercise program.  Response to previous treatment: good relief for 36 hrs after session  Functional change: reported that she has difficulty pushing off with her R UE to come to stand.    Pain: 4 /10 R   Location: right shoulder      Objective     Jackelyn Kendrick did not receive therapeutic exercises to develop strength, endurance, ROM, flexibility, posture and core stabilization for  minutes including:    B shoulder protraction/retraction 20 x 2 with no ball on plinth supine  Overhead pulley 2 x2 min in scaption/flexion  Standing row 10 x 3 with orange TB  Standing B shoulder extension 10 x 3 with orange TB        Not performed today:  Supine lying over a 3" towel roll x 2-3 min prolonged stretch in vertical and then horizontal each  Lat pulldown bilateral 3 x 10 7#  AROM R 150 flexion 120 abduction at shoulder before painful range.    Jackelyn Kendrick received the following manual therapy techniques: Joint mobilizations, Manual traction and Soft tissue Mobilization were applied to the: B shoudler and thoracic   for 40 minutes, including:    U GH joint passive mobilization   Prone Thoracic and rib cage " mobilization.  Cx spine manual traction      Not performed:  B upper trap, levator, rhomboids, biceps long head , triceps long head and intrascapular muscle soft tissue mobilization/MFR    Home Exercises Provided and Patient Education Provided     Education provided:   - yes    Written Home Exercises Provided: yes.  Exercises were reviewed and Jackelyn Kendrick was able to demonstrate them prior to the end of the session.  Jackelyn Kendrick demonstrated good  understanding of the education provided.     See EMR under Patient Instructions for exercises provided 5/29/2020.    Assessment     Patient tolerated therapy session with discomfort into the R UE with thoracic spine and rib cage mobilization. It appears that Pt's shoulder ROM limitation is related to the hypomibility in her thoracic spine and rib cage.     Jackelyn Kendrick is progressing well towards her goals.   Pt prognosis is Good.     Pt will continue to benefit from skilled outpatient physical therapy to address the deficits listed in the problem list box on initial evaluation, provide pt/family education and to maximize pt's level of independence in the home and community environment.     Pt's spiritual, cultural and educational needs considered and pt agreeable to plan of care and goals.     Anticipated barriers to physical therapy: none    Goals:   Short Term Goals: 4 weeks   1. Pt will be instructed in a HEP to address B shoulder and UE function. Achieved 6/10/20  2. Pt will have B shoulder abduction to 135 degrees - Met 6/820  3. Pt will have L shoulder abduction to 135 degrees, ER to 70 degrees and IR to 40 degrees. Progressing 6/10/20     Long Term Goals: 8 weeks   1. Pt will have full pain free B shoulder PROM   2. Pt will report that she is able to perform ADL's without R or L shoulder pain  3. Pt will report that she is able to manage her R shoulder Sx through a HEP    Plan     Progress B shoulder girdle mobilization and CX and thoracic  stabilization exercises.  2x/week x 4 weeks  Fermin Frias, PT

## 2020-07-31 ENCOUNTER — OFFICE VISIT (OUTPATIENT)
Dept: SPORTS MEDICINE | Facility: CLINIC | Age: 70
End: 2020-07-31
Payer: MEDICARE

## 2020-07-31 VITALS
HEART RATE: 69 BPM | DIASTOLIC BLOOD PRESSURE: 71 MMHG | SYSTOLIC BLOOD PRESSURE: 120 MMHG | WEIGHT: 145 LBS | BODY MASS INDEX: 27.38 KG/M2 | HEIGHT: 61 IN

## 2020-07-31 DIAGNOSIS — M25.511 RIGHT SHOULDER PAIN, UNSPECIFIED CHRONICITY: ICD-10-CM

## 2020-07-31 PROCEDURE — 99215 OFFICE O/P EST HI 40 MIN: CPT | Mod: PBBFAC | Performed by: PHYSICIAN ASSISTANT

## 2020-07-31 PROCEDURE — 99999 PR PBB SHADOW E&M-EST. PATIENT-LVL V: CPT | Mod: PBBFAC,,, | Performed by: PHYSICIAN ASSISTANT

## 2020-07-31 PROCEDURE — 99212 OFFICE O/P EST SF 10 MIN: CPT | Mod: S$PBB,,, | Performed by: PHYSICIAN ASSISTANT

## 2020-07-31 PROCEDURE — 99212 PR OFFICE/OUTPT VISIT, EST, LEVL II, 10-19 MIN: ICD-10-PCS | Mod: S$PBB,,, | Performed by: PHYSICIAN ASSISTANT

## 2020-07-31 PROCEDURE — 99999 PR PBB SHADOW E&M-EST. PATIENT-LVL V: ICD-10-PCS | Mod: PBBFAC,,, | Performed by: PHYSICIAN ASSISTANT

## 2020-08-03 ENCOUNTER — OFFICE VISIT (OUTPATIENT)
Dept: OPTOMETRY | Facility: CLINIC | Age: 70
End: 2020-08-03
Payer: MEDICARE

## 2020-08-03 DIAGNOSIS — H04.123 DRY EYES, BILATERAL: Primary | ICD-10-CM

## 2020-08-03 PROCEDURE — 99499 UNLISTED E&M SERVICE: CPT | Mod: S$PBB,,, | Performed by: OPTOMETRIST

## 2020-08-03 PROCEDURE — 99213 OFFICE O/P EST LOW 20 MIN: CPT | Mod: PBBFAC,PO | Performed by: OPTOMETRIST

## 2020-08-03 PROCEDURE — 99499 NO LOS: ICD-10-PCS | Mod: S$PBB,,, | Performed by: OPTOMETRIST

## 2020-08-03 PROCEDURE — 99999 PR PBB SHADOW E&M-EST. PATIENT-LVL III: ICD-10-PCS | Mod: PBBFAC,,, | Performed by: OPTOMETRIST

## 2020-08-03 PROCEDURE — 99999 PR PBB SHADOW E&M-EST. PATIENT-LVL III: CPT | Mod: PBBFAC,,, | Performed by: OPTOMETRIST

## 2020-08-03 NOTE — PROGRESS NOTES
HPI     DLS; 7/6/2020 maria guadalupe   Pt states she is having very dry eye, with both eyes being red, no   tearing, some crusting. Pt states her eyes are always irritated.   No f/f  restasis gtts   systane gtts     Last edited by Paulina Horne MA on 8/3/2020  9:22 AM. (History)        ROS     Negative for: Constitutional, Gastrointestinal, Neurological, Skin,   Genitourinary, Musculoskeletal, HENT, Endocrine, Cardiovascular, Eyes,   Respiratory, Psychiatric, Allergic/Imm, Heme/Lymph    Last edited by Matthias Gutierrez, OD on 8/3/2020 11:22 AM. (History)        Assessment /Plan     For exam results, see Encounter Report.    Dry eyes, bilateral      See ALL prev notes re. Failed dry eye tx.  Dr Leigh put in plugs last month--pt says eyes felt good for a week, then went back to normal.  I do NOT see plug heads in, so suspect they fell out.  Pt may be candidate for laser/surgical punctal closure.      PLAN:    Will send message to Dr Leigh.  ZIYAD exam today

## 2020-08-03 NOTE — Clinical Note
Dr Leigh inserted punctal plugs in this patient.  She reports her eyes felt good for a week, then went back to normal.  I do NOT see plug heads on exam, so suspect they fell out.  She may be a candidate for laser or surgical punctal occlusion.  Will let you make an appt for pt to see Dr Leigh, or Dr Kaplan if Dr CHO doesn't do the procedure.  thanks

## 2020-08-04 ENCOUNTER — TELEPHONE (OUTPATIENT)
Dept: OPHTHALMOLOGY | Facility: CLINIC | Age: 70
End: 2020-08-04

## 2020-08-04 NOTE — TELEPHONE ENCOUNTER
Spoke to pt and apoglized for delay in getting back to her   Will see her in office mon 08/17 for dry eye chantal

## 2020-08-06 ENCOUNTER — TELEPHONE (OUTPATIENT)
Dept: PRIMARY CARE CLINIC | Facility: CLINIC | Age: 70
End: 2020-08-06

## 2020-08-06 NOTE — TELEPHONE ENCOUNTER
Received message from spouse regarding Omeprazole and Amlodipine prescriptions.      08/01/2020 Prescriptions defaulting to print at another location, called into pharmacy today.

## 2020-08-10 ENCOUNTER — HOSPITAL ENCOUNTER (OUTPATIENT)
Dept: RADIOLOGY | Facility: HOSPITAL | Age: 70
Discharge: HOME | End: 2020-08-10
Attending: PHYSICIAN ASSISTANT
Payer: MEDICARE

## 2020-08-10 DIAGNOSIS — M25.511 RIGHT SHOULDER PAIN, UNSPECIFIED CHRONICITY: ICD-10-CM

## 2020-08-10 PROCEDURE — 73221 MRI SHOULDER WITHOUT CONTRAST RIGHT: ICD-10-PCS | Mod: 26,RT,, | Performed by: RADIOLOGY

## 2020-08-10 PROCEDURE — 73221 MRI JOINT UPR EXTREM W/O DYE: CPT | Mod: 26,RT,, | Performed by: RADIOLOGY

## 2020-08-10 PROCEDURE — 73221 MRI JOINT UPR EXTREM W/O DYE: CPT | Mod: TC,RT

## 2020-08-12 ENCOUNTER — OFFICE VISIT (OUTPATIENT)
Dept: SPORTS MEDICINE | Facility: CLINIC | Age: 70
End: 2020-08-12
Payer: MEDICARE

## 2020-08-12 VITALS — WEIGHT: 145 LBS | HEIGHT: 61 IN | BODY MASS INDEX: 27.38 KG/M2

## 2020-08-12 DIAGNOSIS — M25.511 RIGHT SHOULDER PAIN, UNSPECIFIED CHRONICITY: Primary | ICD-10-CM

## 2020-08-12 DIAGNOSIS — M19.011 ARTHRITIS OF RIGHT ACROMIOCLAVICULAR JOINT: ICD-10-CM

## 2020-08-12 DIAGNOSIS — M75.21 BICEPS TENDINITIS OF RIGHT UPPER EXTREMITY: ICD-10-CM

## 2020-08-12 DIAGNOSIS — M75.101 NONTRAUMATIC TEAR OF RIGHT ROTATOR CUFF, UNSPECIFIED TEAR EXTENT: ICD-10-CM

## 2020-08-12 PROCEDURE — 99999 PR PBB SHADOW E&M-EST. PATIENT-LVL IV: CPT | Mod: PBBFAC,,, | Performed by: PHYSICIAN ASSISTANT

## 2020-08-12 PROCEDURE — 99212 OFFICE O/P EST SF 10 MIN: CPT | Mod: S$PBB,,, | Performed by: PHYSICIAN ASSISTANT

## 2020-08-12 PROCEDURE — 99999 PR PBB SHADOW E&M-EST. PATIENT-LVL IV: ICD-10-PCS | Mod: PBBFAC,,, | Performed by: PHYSICIAN ASSISTANT

## 2020-08-12 PROCEDURE — 99212 PR OFFICE/OUTPT VISIT, EST, LEVL II, 10-19 MIN: ICD-10-PCS | Mod: S$PBB,,, | Performed by: PHYSICIAN ASSISTANT

## 2020-08-12 PROCEDURE — 99214 OFFICE O/P EST MOD 30 MIN: CPT | Mod: PBBFAC | Performed by: PHYSICIAN ASSISTANT

## 2020-08-12 NOTE — PROGRESS NOTES
CC: RIGHT shoulder pain    Current HPI:  Patient presents today for follow-up evaluation of right shoulder pain.  She is here today to discuss right shoulder MRI results.  She states that since her last visit her symptoms have neither improved nor worsened.  She continues to endorse pain with overhead movement and a decrease in strength of her right shoulder.    Interval Hx: (7/31/2020):  Patient presents today for follow-up evaluation of right shoulder pain.  She has been compliant with formal physical therapy for the past 2 months.  She also received a steroid injection in May which she states provided relief for only 1-2 weeks and has since worn off.  Patient has also tried topical Voltaren gel which only provides some relief.  She would like to discuss further management options.    Previous HPI (5/19/2020): 69 y.o. Female with a history of right shoulder pain of about 3 weeks in duration.  She states that the pain is severe and not responding to any conservative care.      She reports that the pain and weakness is worse with overhead activity and reaching behind. It also bothers her at night.    Is affecting ADLs.  Pain is 8/10 at it's worst. Currently 2-3/10    Denies any CSI or PT for the shoulder. She is RHD.    Hx of left shoulder arthroscopic RTC repair in 01/2010 by Dr. Pickens. No major complaints today      Past Medical History:   Diagnosis Date    Bronchitis     seasonal    Cataract     Diverticulitis     Dry eye syndrome     GERD (gastroesophageal reflux disease)     Hyperlipidemia     Hypertension     Hypothyroidism 7/19/2012    Obese     PONV (postoperative nausea and vomiting)     Thyroid disease        Past Surgical History:   Procedure Laterality Date    BACK SURGERY      CHOLECYSTECTOMY      COLONOSCOPY  2007    diverticulosis    DE QUERVAIN'S RELEASE Left 03/2017    HERNIA REPAIR      HYSTERECTOMY      NASAL SEPTUM SURGERY      SHOULDER SURGERY      TONSILLECTOMY          Family History   Problem Relation Age of Onset    Cataracts Mother     Breast cancer Mother     Diabetes Mother     Hypertension Mother     Heart failure Mother     Heart disease Mother     Cataracts Father     Hypertension Father     Stroke Father     No Known Problems Daughter     No Known Problems Son     Diabetes Paternal Grandmother     Hyperlipidemia Sister     Arthritis Sister     Hyperlipidemia Brother     Arthritis Brother     No Known Problems Son     Amblyopia Neg Hx     Blindness Neg Hx     Glaucoma Neg Hx     Macular degeneration Neg Hx     Retinal detachment Neg Hx     Strabismus Neg Hx     Ovarian cancer Neg Hx     Melanoma Neg Hx     Celiac disease Neg Hx     Colon cancer Neg Hx     Colon polyps Neg Hx     Esophageal cancer Neg Hx     Liver cancer Neg Hx     Liver disease Neg Hx     Rectal cancer Neg Hx     Stomach cancer Neg Hx          Current Outpatient Medications:     acetaminophen/diphenhydramine (TYLENOL PM ORAL), Take by mouth., Disp: , Rfl:     acetic acid-hydrocortisone (VOSOL-HC) otic solution, 2-4 drops to affected ear twice a day, Disp: 10 mL, Rfl: 2    albuterol 90 mcg/actuation inhaler, Inhale 2 puffs into the lungs every 6 (six) hours as needed for Wheezing., Disp: 6.7 g, Rfl: 11    amLODIPine (NORVASC) 2.5 MG tablet, TAKE ONE TABLET BY MOUTH EVERY DAY, Disp: 30 tablet, Rfl: 11    dicyclomine (BENTYL) 10 MG capsule, Take 1 capsule (10 mg total) by mouth 3 (three) times daily., Disp: 90 capsule, Rfl: 5    fluocinonide (LIDEX) 0.05 % external solution, APPLY TO SCALP ONCE DAILY AS NEEDED FOR FLARE, Disp: 60 mL, Rfl: 3    fluticasone propionate (FLONASE) 50 mcg/actuation nasal spray, 1 spray by Each Nostril route once daily., Disp: , Rfl:     ketoconazole (NIZORAL) 2 % shampoo, APPLY TO DAMP SCALP EVERY OTHER DAY, LATHER AND LET SIT 5 MINUTES BEFORE RINSING, Disp: 120 mL, Rfl: 5    levothyroxine (SYNTHROID) 75 MCG tablet, TAKE ONE TABLET BY  MOUTH EVERY DAY, Disp: 30 tablet, Rfl: 11    meloxicam (MOBIC) 7.5 MG tablet, TAKE ONE TABLET BY MOUTH EVERY 12 HOURS, Disp: 60 tablet, Rfl: 11    Multi-Vitamin tablet, Take by mouth.  Tablet Oral , Disp: , Rfl:     omeprazole (PRILOSEC) 40 MG capsule, TAKE ONE CAPSULE BY MOUTH EVERY DAY, Disp: 30 capsule, Rfl: 11    oxybutynin (DITROPAN-XL) 10 MG 24 hr tablet, TAKE ONE TABLET BY MOUTH EVERY DAY, Disp: 30 tablet, Rfl: 11    PSYLLIUM SEED, WITH SUGAR, (METAMUCIL ORAL), Take by mouth., Disp: , Rfl:     RESTASIS 0.05 % ophthalmic emulsion, PLACE 1 DROP IN EACH EYE TWO TIMES A DAY, Disp: 60 each, Rfl: 12    rosuvastatin (CRESTOR) 40 MG Tab, Take 1 tablet (40 mg total) by mouth once daily., Disp: 90 tablet, Rfl: 3    sulfamethoxazole-trimethoprim 800-160mg (BACTRIM DS) 800-160 mg Tab, Take 1 tablet by mouth 2 (two) times daily., Disp: 14 tablet, Rfl: 0    triamcinolone acetonide 0.1% (KENALOG) 0.1 % cream, AAA bid, Disp: 60 g, Rfl: 3    cetirizine (ZYRTEC) 10 MG tablet, Take 1 tablet (10 mg total) by mouth once daily., Disp: , Rfl: 0    cholestyramine (QUESTRAN) 4 gram packet, Take 1 packet (4 g total) by mouth 2 (two) times daily., Disp: 180 packet, Rfl: 0    diclofenac sodium 1 % Gel, Apply 2 g topically 4 (four) times daily. for 10 days, Disp: 1 Tube, Rfl: 5    ranitidine (ZANTAC) 150 MG tablet, TAKE ONE TABLET BY MOUTH EVERY EVENING, Disp: 30 tablet, Rfl: 5    Review of patient's allergies indicates:   Allergen Reactions    Erythromycin (bulk) Nausea And Vomiting    Levaquin [levofloxacin]      Other reaction(s): Swelling          REVIEW OF SYSTEMS:  Constitution: Negative. Negative for chills, fever and night sweats.   HENT: Negative for congestion and headaches.    Eyes: Negative for blurred vision, left vision loss and right vision loss.   Cardiovascular: Negative for chest pain and syncope.   Respiratory: Negative for cough and shortness of breath.    Endocrine: Negative for polydipsia, polyphagia  "and polyuria.   Hematologic/Lymphatic: Negative for bleeding problem. Does not bruise/bleed easily.   Skin: Negative for dry skin, itching and rash.   Musculoskeletal: Negative for falls.  Positive for right shoulder pain and muscle weakness.   Gastrointestinal: Negative for abdominal pain and bowel incontinence.   Genitourinary: Negative for bladder incontinence and nocturia.   Neurological: Negative for disturbances in coordination, loss of balance and seizures.   Psychiatric/Behavioral: Negative for depression. The patient does not have insomnia.    Allergic/Immunologic: Negative for hives and persistent infections.      PHYSICAL EXAMINATION:  Vitals:  Ht 5' 1" (1.549 m)   Wt 65.8 kg (145 lb)   BMI 27.40 kg/m²    General: The patient is alert and oriented x 3.  Mood is pleasant.  Observation of ears, eyes and nose reveal no gross abnormalities.  No labored breathing observed.  Gait is coordinated. Patient can toe walk and heel walk without difficulty.      RIGHT SHOULDER / UPPER EXTREMITY EXAM    OBSERVATION:     Swelling  none  Deformity  none   Discoloration  none   Scapular winging none   Scars   none  Atrophy  none    TENDERNESS / CREPITUS (T/C):          T/C      T/C   Clavicle   -/-  SUPRAspinatus    +/-     AC Jt.    +/-  INFRAspinatus  -/-    SC Jt.    -/-  Deltoid    -/-      G. Tuberosity  -/-  LH BICEP groove  +/-   Acromion:  -/-  Midline Neck   -/-     Scapular Spine -/-  Trapezium   -/-   SMA Scapula  -/-  GH jt. line - post  -/-     Scapulothoracic  -/-         ROM: (* = with pain)  Left shoulder   Right shoulder        AROM (PROM)   AROM (PROM)   FE    170° (175°)     110°* (160°*)     ER at 0°    60°  (65°)    30°*  (45°*)     IR (spine level)   T10     Sacrum*    STRENGTH: (* = with pain) Left shoulder   Right shoulder   SCAPTION   5/5    5-/5    IR    5/5    4+/5*   ER    5/5    4+/5*   BICEPS   5/5    5/5*   Deltoid    5/5    5/5     SIGNS:  Painful side       NEER   + "    OYEES  neg    BLACK   +   SPEEDS  neg     DROP ARM   -   BELLY PRESS neg   Superior escape none    LIFT-OFF  neg   X-Body ADD    neg    MOVING VALGUS neg        STABILITY TESTING    Left shoulder   Right shoulder    Translation     Anterior  up face     up face    Posterior  up face    up face    Sulcus   < 10mm    < 10 mm     Signs   Apprehension   neg      neg       Relocation   no change     no change      Jerk test  neg     neg    EXTREMITY NEURO-VASCULAR EXAM:    Sensation grossly intact to light touch all dermatomal regions.    DTR 2+ Biceps, Triceps, BR and Negative Janias sign   Grossly intact motor function at Elbow, Wrist and Hand   Distal pulses radial and ulnar 2+, brisk cap refill, symmetric.      NECK:  Painless FROM and spinous processes non-tender. Negative Spurlings sign.      OTHER FINDINGS:  + scapular dyskinesia    XRAYS (5/19/2020):  FINDINGS:  Bones: No acute fracture.  No lytic or blastic lesion.     Joints: No evidence for glenohumeral dislocation.  Mild AC joint hypertrophy.     Soft tissues: Unremarkable.    MRI right shoulder (08/10/2020):  Impression:     1. High-grade partial thickness, partial width articular surface tear of the supraspinatus at the footplate.  2. Low-grade interstitial infraspinatus tear.  3. Tear through the superior labrum extending posterior to anterior (SLAP tear).  4. AC joint hypertrophic degenerative changes with moderate undersurface spurring.        ASSESSMENT:     1.  Right shoulder pain  2. High-grade partial-thickness rotator cuff tear involving supraspinatus and infraspinatus, right shoulder   3.  AC joint arthritis, right shoulder  4.  Biceps tendonitis, right shoulder      PLAN:      1. I made the decision to obtain old records of the patient including previous notes and imaging. I independently reviewed and interpreted the radiographs and/or MRIs today as well as prior imaging. Reviewed MRI and radiograph results with patient in  detail.    2.  Treatment options were discussed with the patient about shoulder.  I reviewed the MRI images with her and what this means for her shoulder.    We discussed both non-operative and operative options for her shoulder and the risks and benefits of each. Time was given for questions to be asked and all concerns were answered.    She would like to go forward with surgery for her shoulder which I think is reasonable.  All specific risks and benefits were reviewed.    These risks include but are not limited to: bleeding, infection, scarring, re-tear of repair, irreparability of the tear, damage to neurovascular structures, damage to cartilage, stiffness, blood clots, pulmonary embolism, swelling, compartment syndrome, need for further surgery, and the risks of anesthesia.      She verbalized her understanding of these risks and wished to proceed with surgery.    Time was spent face-to-face with the patient during this encounter on counseling about treatment options including surgery and coordination of her care for preoperative visits, surgery and post-operative rehab.     The operative plan will be:    right   1. Arthroscopic rotator cuff repair  2. Arthroscopic subacromial decompression  3. Arthroscopic distal clavicle excision  4. Possible open biceps subpectoral tenodesis    Patient will  need medical clearance prior to the pre-operative appointment.    All questions were answered, patient will contact us for questions or concerns in the interim.          Medical Dictation software was used during the dictation of portions or the entirety of this medical record.  Phonetic or grammatic errors may exist due to the use of this software. For clarification, refer to the author of the document.

## 2020-08-17 ENCOUNTER — TELEPHONE (OUTPATIENT)
Dept: SPORTS MEDICINE | Facility: CLINIC | Age: 70
End: 2020-08-17

## 2020-08-17 ENCOUNTER — OFFICE VISIT (OUTPATIENT)
Dept: OPHTHALMOLOGY | Facility: CLINIC | Age: 70
End: 2020-08-17
Payer: MEDICARE

## 2020-08-17 ENCOUNTER — TELEPHONE (OUTPATIENT)
Dept: PRIMARY CARE CLINIC | Facility: CLINIC | Age: 70
End: 2020-08-17

## 2020-08-17 DIAGNOSIS — M75.21 BICEPS TENDINITIS OF RIGHT UPPER EXTREMITY: ICD-10-CM

## 2020-08-17 DIAGNOSIS — H04.123 DRY EYE SYNDROME, BILATERAL: ICD-10-CM

## 2020-08-17 DIAGNOSIS — H16.223 KERATOCONJUNCTIVITIS SICCA NOT SPECIFIED AS SJOGREN'S, BILATERAL: Primary | ICD-10-CM

## 2020-08-17 DIAGNOSIS — H25.12 NUCLEAR SCLEROTIC CATARACT OF LEFT EYE: ICD-10-CM

## 2020-08-17 DIAGNOSIS — M75.101 NONTRAUMATIC TEAR OF RIGHT ROTATOR CUFF, UNSPECIFIED TEAR EXTENT: Primary | ICD-10-CM

## 2020-08-17 DIAGNOSIS — Z01.812 ENCOUNTER FOR PRE-OPERATIVE LABORATORY TESTING: Primary | ICD-10-CM

## 2020-08-17 DIAGNOSIS — Z12.31 SCREENING MAMMOGRAM FOR HIGH-RISK PATIENT: Primary | ICD-10-CM

## 2020-08-17 DIAGNOSIS — M19.011 ARTHRITIS OF RIGHT ACROMIOCLAVICULAR JOINT: ICD-10-CM

## 2020-08-17 DIAGNOSIS — H25.11 NUCLEAR SCLEROTIC CATARACT OF RIGHT EYE: ICD-10-CM

## 2020-08-17 PROCEDURE — 68761 CLOSE TEAR DUCT OPENING: CPT | Mod: PBBFAC | Performed by: OPHTHALMOLOGY

## 2020-08-17 PROCEDURE — 92014 PR EYE EXAM, EST PATIENT,COMPREHESV: ICD-10-PCS | Mod: 25,S$PBB,, | Performed by: OPHTHALMOLOGY

## 2020-08-17 PROCEDURE — 99213 OFFICE O/P EST LOW 20 MIN: CPT | Mod: PBBFAC | Performed by: OPHTHALMOLOGY

## 2020-08-17 PROCEDURE — 68761 CLOSE TEAR DUCT OPENING: CPT | Mod: 50,S$PBB,, | Performed by: OPHTHALMOLOGY

## 2020-08-17 PROCEDURE — 68761 PR CLOSE TEAR DUCT OPENING BY PLUG,EA: ICD-10-PCS | Mod: 50,S$PBB,, | Performed by: OPHTHALMOLOGY

## 2020-08-17 PROCEDURE — 92014 COMPRE OPH EXAM EST PT 1/>: CPT | Mod: 25,S$PBB,, | Performed by: OPHTHALMOLOGY

## 2020-08-17 PROCEDURE — 99999 PR PBB SHADOW E&M-EST. PATIENT-LVL III: ICD-10-PCS | Mod: PBBFAC,,, | Performed by: OPHTHALMOLOGY

## 2020-08-17 PROCEDURE — 99999 PR PBB SHADOW E&M-EST. PATIENT-LVL III: CPT | Mod: PBBFAC,,, | Performed by: OPHTHALMOLOGY

## 2020-08-17 NOTE — PROGRESS NOTES
HPI     Refer by Dr. Roger CUNNINGHAM   NSC     Restasis bid ou   Systane ou prn     Patient is here for OCTAVIO patient sts her dry eye drops are not working.  (-)Flashes (-)Floaters  (+)Itch, (-)tear, (+)burn, (+)Dryness      Last edited by Martha Leigh MD on 8/17/2020 10:22 AM. (History)            Assessment /Plan     For exam results, see Encounter Report.    Keratoconjunctivitis sicca not specified as Sjogren's, bilateral    Dry eye syndrome, bilateral    Nuclear sclerotic cataract of right eye    Nuclear sclerotic cataract of left eye        Keratitis Sicca   -> normal schirmers however may be false negative  - >mild NaFl staining pattern, no philly bengal stain     - replace plugs RLL, LLL today.    PROCEDURE NOTE:    Diagnosis:  See above.    Indication:  Severe keratoconjunctivitis sicca    Procedure: Permanent punctal plug placed RLL, LLL today at the slit lamp, size 0.8    C/O: none    F/up roger LONGORIA

## 2020-08-17 NOTE — TELEPHONE ENCOUNTER
----- Message from Ashley Block sent at 8/17/2020  4:57 PM CDT -----  Contact: Pt [phone]  Patient would like to schedule their annual mammogram appointment. Please put in the orders and contact the patient to schedule.    Thank You

## 2020-08-24 ENCOUNTER — TELEPHONE (OUTPATIENT)
Dept: RADIOLOGY | Facility: HOSPITAL | Age: 70
End: 2020-08-24

## 2020-08-25 ENCOUNTER — OFFICE VISIT (OUTPATIENT)
Dept: PRIMARY CARE CLINIC | Facility: CLINIC | Age: 70
End: 2020-08-25
Payer: MEDICARE

## 2020-08-25 ENCOUNTER — TELEPHONE (OUTPATIENT)
Dept: PRIMARY CARE CLINIC | Facility: CLINIC | Age: 70
End: 2020-08-25

## 2020-08-25 VITALS
WEIGHT: 148.56 LBS | HEART RATE: 80 BPM | HEIGHT: 61 IN | BODY MASS INDEX: 28.05 KG/M2 | DIASTOLIC BLOOD PRESSURE: 68 MMHG | SYSTOLIC BLOOD PRESSURE: 130 MMHG

## 2020-08-25 DIAGNOSIS — Z01.818 PREOPERATIVE CLEARANCE: Primary | ICD-10-CM

## 2020-08-25 DIAGNOSIS — E03.9 ACQUIRED HYPOTHYROIDISM: ICD-10-CM

## 2020-08-25 DIAGNOSIS — N39.46 URINARY INCONTINENCE, MIXED: Primary | ICD-10-CM

## 2020-08-25 DIAGNOSIS — R30.0 DYSURIA: ICD-10-CM

## 2020-08-25 DIAGNOSIS — E78.00 PURE HYPERCHOLESTEROLEMIA: ICD-10-CM

## 2020-08-25 LAB
BACTERIA #/AREA URNS AUTO: ABNORMAL /HPF
BILIRUB UR QL STRIP: ABNORMAL
CAOX CRY UR QL COMP ASSIST: ABNORMAL
CLARITY UR REFRACT.AUTO: ABNORMAL
COLOR UR AUTO: ABNORMAL
GLUCOSE UR QL STRIP: NEGATIVE
HGB UR QL STRIP: NEGATIVE
HYALINE CASTS UR QL AUTO: 0 /LPF
KETONES UR QL STRIP: NEGATIVE
LEUKOCYTE ESTERASE UR QL STRIP: ABNORMAL
MICROSCOPIC COMMENT: ABNORMAL
NITRITE UR QL STRIP: POSITIVE
PH UR STRIP: 5 [PH] (ref 5–8)
PROT UR QL STRIP: ABNORMAL
RBC #/AREA URNS AUTO: 0 /HPF (ref 0–4)
SP GR UR STRIP: 1.03 (ref 1–1.03)
SQUAMOUS #/AREA URNS AUTO: 13 /HPF
URN SPEC COLLECT METH UR: ABNORMAL
WBC #/AREA URNS AUTO: >100 /HPF (ref 0–5)

## 2020-08-25 PROCEDURE — 99999 PR PBB SHADOW E&M-EST. PATIENT-LVL IV: CPT | Mod: PBBFAC,,, | Performed by: FAMILY MEDICINE

## 2020-08-25 PROCEDURE — 99999 PR PBB SHADOW E&M-EST. PATIENT-LVL IV: ICD-10-PCS | Mod: PBBFAC,,, | Performed by: FAMILY MEDICINE

## 2020-08-25 PROCEDURE — 93010 EKG 12-LEAD: ICD-10-PCS | Mod: S$PBB,,, | Performed by: INTERNAL MEDICINE

## 2020-08-25 PROCEDURE — 99214 OFFICE O/P EST MOD 30 MIN: CPT | Mod: PBBFAC,PN,25 | Performed by: FAMILY MEDICINE

## 2020-08-25 PROCEDURE — 99214 OFFICE O/P EST MOD 30 MIN: CPT | Mod: S$PBB,,, | Performed by: FAMILY MEDICINE

## 2020-08-25 PROCEDURE — 81001 URINALYSIS AUTO W/SCOPE: CPT

## 2020-08-25 PROCEDURE — 93005 ELECTROCARDIOGRAM TRACING: CPT | Mod: PBBFAC,PN | Performed by: INTERNAL MEDICINE

## 2020-08-25 PROCEDURE — 93010 ELECTROCARDIOGRAM REPORT: CPT | Mod: S$PBB,,, | Performed by: INTERNAL MEDICINE

## 2020-08-25 PROCEDURE — 99214 PR OFFICE/OUTPT VISIT, EST, LEVL IV, 30-39 MIN: ICD-10-PCS | Mod: S$PBB,,, | Performed by: FAMILY MEDICINE

## 2020-08-25 RX ORDER — LEVOTHYROXINE SODIUM 75 UG/1
75 TABLET ORAL DAILY
Qty: 30 TABLET | Refills: 11 | Status: SHIPPED | OUTPATIENT
Start: 2020-08-25 | End: 2021-03-23 | Stop reason: SDUPTHER

## 2020-08-25 RX ORDER — OXYBUTYNIN CHLORIDE 10 MG/1
10 TABLET, EXTENDED RELEASE ORAL DAILY
Qty: 30 TABLET | Refills: 11 | Status: SHIPPED | OUTPATIENT
Start: 2020-08-25 | End: 2021-03-23 | Stop reason: SDUPTHER

## 2020-08-25 RX ORDER — ROSUVASTATIN CALCIUM 40 MG/1
40 TABLET, COATED ORAL DAILY
Qty: 90 TABLET | Refills: 3 | Status: SHIPPED | OUTPATIENT
Start: 2020-08-25 | End: 2021-03-23 | Stop reason: SDUPTHER

## 2020-08-25 NOTE — PROGRESS NOTES
Subjective:       Patient ID: Jackelyn Kendrick is a 69 y.o. female.    Chief Complaint: Pre-op Exam (right shoulder, rotator cuff) and Urinary Frequency    68 yo presents today for preop clearance, scheduled for right shoulder surgery by Dr. Pickens    Review of Systems   Constitutional: Negative.  Negative for activity change, appetite change, chills, diaphoresis, fatigue, fever and unexpected weight change.   HENT: Negative.  Negative for nasal congestion, ear pain, hearing loss, rhinorrhea, sinus pressure/congestion, sore throat, tinnitus, trouble swallowing and voice change.    Eyes: Negative.  Negative for photophobia, pain and visual disturbance.   Respiratory: Negative.  Negative for cough, chest tightness and shortness of breath.    Cardiovascular: Negative.  Negative for chest pain, palpitations and leg swelling.   Gastrointestinal: Negative.  Negative for abdominal distention, abdominal pain, blood in stool, constipation, diarrhea, nausea and vomiting.   Genitourinary: Positive for urgency. Negative for difficulty urinating, dysuria, frequency and hematuria.   Musculoskeletal: Positive for arthralgias. Negative for back pain, joint swelling, neck pain and neck stiffness.   Integumentary:  Negative for color change, pallor and rash.   Neurological: Negative.  Negative for dizziness, tremors, speech difficulty, weakness, light-headedness, numbness and headaches.   Hematological: Negative for adenopathy. Does not bruise/bleed easily.   Psychiatric/Behavioral: Negative for agitation, confusion, dysphoric mood, hallucinations and sleep disturbance. The patient is not nervous/anxious.          Objective:      Physical Exam  Constitutional:       Appearance: She is well-developed.   HENT:      Right Ear: Tympanic membrane, ear canal and external ear normal.      Left Ear: Tympanic membrane, ear canal and external ear normal.      Nose: Nose normal.      Mouth/Throat:      Pharynx: No posterior oropharyngeal  erythema.   Eyes:      Conjunctiva/sclera: Conjunctivae normal.      Pupils: Pupils are equal, round, and reactive to light.   Neck:      Musculoskeletal: Normal range of motion and neck supple.      Thyroid: No thyromegaly.      Trachea: No tracheal deviation.   Cardiovascular:      Rate and Rhythm: Normal rate and regular rhythm.      Heart sounds: Normal heart sounds.   Pulmonary:      Effort: Pulmonary effort is normal.      Breath sounds: No wheezing or rales.   Chest:      Chest wall: No tenderness.   Abdominal:      General: Bowel sounds are normal. There is no distension.      Palpations: Abdomen is soft. There is no mass.      Tenderness: There is no rebound.   Lymphadenopathy:      Cervical: No cervical adenopathy.   Skin:     General: Skin is warm and dry.      Findings: No erythema or rash.   Neurological:      Mental Status: She is alert and oriented to person, place, and time.      Cranial Nerves: No cranial nerve deficit.      Coordination: Coordination normal.      Deep Tendon Reflexes: Reflexes are normal and symmetric.   Psychiatric:         Behavior: Behavior normal.         Assessment:       1. Preoperative clearance    2. Acquired hypothyroidism    3. Pure hypercholesterolemia    4. Dysuria        Plan:       1.  Fasting labs  2.  EKG  3.  UA    8/31/20:  Pt's labs and EKG have been reviewed.  Pt has been cleared for anesthesia and surgery

## 2020-08-28 ENCOUNTER — LAB VISIT (OUTPATIENT)
Dept: LAB | Facility: HOSPITAL | Age: 70
End: 2020-08-28
Attending: FAMILY MEDICINE
Payer: MEDICARE

## 2020-08-28 DIAGNOSIS — E03.9 ACQUIRED HYPOTHYROIDISM: ICD-10-CM

## 2020-08-28 DIAGNOSIS — Z01.818 PREOPERATIVE CLEARANCE: ICD-10-CM

## 2020-08-28 DIAGNOSIS — E78.00 PURE HYPERCHOLESTEROLEMIA: ICD-10-CM

## 2020-08-28 LAB
ALBUMIN SERPL BCP-MCNC: 3.6 G/DL (ref 3.5–5.2)
ALP SERPL-CCNC: 55 U/L (ref 55–135)
ALT SERPL W/O P-5'-P-CCNC: 20 U/L (ref 10–44)
ANION GAP SERPL CALC-SCNC: 11 MMOL/L (ref 8–16)
AST SERPL-CCNC: 28 U/L (ref 10–40)
BASOPHILS # BLD AUTO: 0.04 K/UL (ref 0–0.2)
BASOPHILS NFR BLD: 0.6 % (ref 0–1.9)
BILIRUB SERPL-MCNC: 0.2 MG/DL (ref 0.1–1)
BUN SERPL-MCNC: 12 MG/DL (ref 8–23)
CALCIUM SERPL-MCNC: 9.1 MG/DL (ref 8.7–10.5)
CHLORIDE SERPL-SCNC: 108 MMOL/L (ref 95–110)
CHOLEST SERPL-MCNC: 148 MG/DL (ref 120–199)
CHOLEST/HDLC SERPL: 2.6 {RATIO} (ref 2–5)
CO2 SERPL-SCNC: 25 MMOL/L (ref 23–29)
CREAT SERPL-MCNC: 0.8 MG/DL (ref 0.5–1.4)
DIFFERENTIAL METHOD: ABNORMAL
EOSINOPHIL # BLD AUTO: 0.2 K/UL (ref 0–0.5)
EOSINOPHIL NFR BLD: 2.9 % (ref 0–8)
ERYTHROCYTE [DISTWIDTH] IN BLOOD BY AUTOMATED COUNT: 12.5 % (ref 11.5–14.5)
EST. GFR  (AFRICAN AMERICAN): >60 ML/MIN/1.73 M^2
EST. GFR  (NON AFRICAN AMERICAN): >60 ML/MIN/1.73 M^2
GLUCOSE SERPL-MCNC: 87 MG/DL (ref 70–110)
HCT VFR BLD AUTO: 39.8 % (ref 37–48.5)
HDLC SERPL-MCNC: 58 MG/DL (ref 40–75)
HDLC SERPL: 39.2 % (ref 20–50)
HGB BLD-MCNC: 12.3 G/DL (ref 12–16)
IMM GRANULOCYTES # BLD AUTO: 0.01 K/UL (ref 0–0.04)
IMM GRANULOCYTES NFR BLD AUTO: 0.2 % (ref 0–0.5)
LDLC SERPL CALC-MCNC: 71.2 MG/DL (ref 63–159)
LYMPHOCYTES # BLD AUTO: 1.7 K/UL (ref 1–4.8)
LYMPHOCYTES NFR BLD: 25.4 % (ref 18–48)
MCH RBC QN AUTO: 29.1 PG (ref 27–31)
MCHC RBC AUTO-ENTMCNC: 30.9 G/DL (ref 32–36)
MCV RBC AUTO: 94 FL (ref 82–98)
MONOCYTES # BLD AUTO: 0.5 K/UL (ref 0.3–1)
MONOCYTES NFR BLD: 7.2 % (ref 4–15)
NEUTROPHILS # BLD AUTO: 4.2 K/UL (ref 1.8–7.7)
NEUTROPHILS NFR BLD: 63.7 % (ref 38–73)
NONHDLC SERPL-MCNC: 90 MG/DL
NRBC BLD-RTO: 0 /100 WBC
PLATELET # BLD AUTO: 222 K/UL (ref 150–350)
PMV BLD AUTO: 12.4 FL (ref 9.2–12.9)
POTASSIUM SERPL-SCNC: 3.7 MMOL/L (ref 3.5–5.1)
PROT SERPL-MCNC: 7.1 G/DL (ref 6–8.4)
RBC # BLD AUTO: 4.22 M/UL (ref 4–5.4)
SODIUM SERPL-SCNC: 144 MMOL/L (ref 136–145)
TRIGL SERPL-MCNC: 94 MG/DL (ref 30–150)
TSH SERPL DL<=0.005 MIU/L-ACNC: 1.86 UIU/ML (ref 0.4–4)
WBC # BLD AUTO: 6.53 K/UL (ref 3.9–12.7)

## 2020-08-28 PROCEDURE — 36415 COLL VENOUS BLD VENIPUNCTURE: CPT | Mod: PO

## 2020-08-28 PROCEDURE — 84443 ASSAY THYROID STIM HORMONE: CPT

## 2020-08-28 PROCEDURE — 80053 COMPREHEN METABOLIC PANEL: CPT

## 2020-08-28 PROCEDURE — 85025 COMPLETE CBC W/AUTO DIFF WBC: CPT

## 2020-08-28 PROCEDURE — 80061 LIPID PANEL: CPT

## 2020-08-31 ENCOUNTER — LAB VISIT (OUTPATIENT)
Dept: URGENT CARE | Facility: CLINIC | Age: 70
End: 2020-08-31
Payer: MEDICARE

## 2020-08-31 VITALS — HEART RATE: 84 BPM | OXYGEN SATURATION: 97 % | TEMPERATURE: 98 F

## 2020-08-31 DIAGNOSIS — Z01.818 PRE-OP TESTING: ICD-10-CM

## 2020-08-31 PROCEDURE — U0003 INFECTIOUS AGENT DETECTION BY NUCLEIC ACID (DNA OR RNA); SEVERE ACUTE RESPIRATORY SYNDROME CORONAVIRUS 2 (SARS-COV-2) (CORONAVIRUS DISEASE [COVID-19]), AMPLIFIED PROBE TECHNIQUE, MAKING USE OF HIGH THROUGHPUT TECHNOLOGIES AS DESCRIBED BY CMS-2020-01-R: HCPCS

## 2020-09-01 LAB — SARS-COV-2 RNA RESP QL NAA+PROBE: NOT DETECTED

## 2020-09-03 ENCOUNTER — ANESTHESIA (OUTPATIENT)
Dept: ENDOSCOPY | Facility: HOSPITAL | Age: 70
End: 2020-09-03
Payer: MEDICARE

## 2020-09-03 ENCOUNTER — ANESTHESIA EVENT (OUTPATIENT)
Dept: ENDOSCOPY | Facility: HOSPITAL | Age: 70
End: 2020-09-03
Payer: MEDICARE

## 2020-09-03 ENCOUNTER — HOSPITAL ENCOUNTER (OUTPATIENT)
Facility: HOSPITAL | Age: 70
Discharge: HOME | End: 2020-09-03
Attending: COLON & RECTAL SURGERY | Admitting: COLON & RECTAL SURGERY
Payer: MEDICARE

## 2020-09-03 VITALS
DIASTOLIC BLOOD PRESSURE: 74 MMHG | HEIGHT: 61 IN | HEART RATE: 73 BPM | WEIGHT: 145 LBS | SYSTOLIC BLOOD PRESSURE: 137 MMHG | RESPIRATION RATE: 17 BRPM | TEMPERATURE: 98 F | BODY MASS INDEX: 27.38 KG/M2 | OXYGEN SATURATION: 95 %

## 2020-09-03 DIAGNOSIS — Z12.11 COLON CANCER SCREENING: ICD-10-CM

## 2020-09-03 PROCEDURE — 37000009 HC ANESTHESIA EA ADD 15 MINS: Performed by: COLON & RECTAL SURGERY

## 2020-09-03 PROCEDURE — 63600175 PHARM REV CODE 636 W HCPCS: Performed by: NURSE ANESTHETIST, CERTIFIED REGISTERED

## 2020-09-03 PROCEDURE — E9220 PRA ENDO ANESTHESIA: ICD-10-PCS | Mod: ,,, | Performed by: NURSE ANESTHETIST, CERTIFIED REGISTERED

## 2020-09-03 PROCEDURE — 25000003 PHARM REV CODE 250: Performed by: COLON & RECTAL SURGERY

## 2020-09-03 PROCEDURE — 45380 PR COLONOSCOPY,BIOPSY: ICD-10-PCS | Mod: ,,, | Performed by: COLON & RECTAL SURGERY

## 2020-09-03 PROCEDURE — 45380 COLONOSCOPY AND BIOPSY: CPT | Mod: ,,, | Performed by: COLON & RECTAL SURGERY

## 2020-09-03 PROCEDURE — E9220 PRA ENDO ANESTHESIA: HCPCS | Mod: ,,, | Performed by: NURSE ANESTHETIST, CERTIFIED REGISTERED

## 2020-09-03 PROCEDURE — 27201012 HC FORCEPS, HOT/COLD, DISP: Performed by: COLON & RECTAL SURGERY

## 2020-09-03 PROCEDURE — 88305 TISSUE EXAM BY PATHOLOGIST: CPT | Performed by: PATHOLOGY

## 2020-09-03 PROCEDURE — 88305 TISSUE EXAM BY PATHOLOGIST: CPT | Mod: 26,,, | Performed by: PATHOLOGY

## 2020-09-03 PROCEDURE — 37000008 HC ANESTHESIA 1ST 15 MINUTES: Performed by: COLON & RECTAL SURGERY

## 2020-09-03 PROCEDURE — 88305 TISSUE EXAM BY PATHOLOGIST: ICD-10-PCS | Mod: 26,,, | Performed by: PATHOLOGY

## 2020-09-03 PROCEDURE — 45380 COLONOSCOPY AND BIOPSY: CPT | Performed by: COLON & RECTAL SURGERY

## 2020-09-03 RX ORDER — LIDOCAINE HYDROCHLORIDE 20 MG/ML
INJECTION INTRAVENOUS
Status: DISCONTINUED | OUTPATIENT
Start: 2020-09-03 | End: 2020-09-03

## 2020-09-03 RX ORDER — PROPOFOL 10 MG/ML
VIAL (ML) INTRAVENOUS CONTINUOUS PRN
Status: DISCONTINUED | OUTPATIENT
Start: 2020-09-03 | End: 2020-09-03

## 2020-09-03 RX ORDER — SODIUM CHLORIDE 9 MG/ML
INJECTION, SOLUTION INTRAVENOUS CONTINUOUS
Status: DISCONTINUED | OUTPATIENT
Start: 2020-09-03 | End: 2020-09-03 | Stop reason: HOSPADM

## 2020-09-03 RX ORDER — PROPOFOL 10 MG/ML
VIAL (ML) INTRAVENOUS
Status: DISCONTINUED | OUTPATIENT
Start: 2020-09-03 | End: 2020-09-03

## 2020-09-03 RX ADMIN — LIDOCAINE HYDROCHLORIDE 100 MG: 20 INJECTION, SOLUTION INTRAVENOUS at 10:09

## 2020-09-03 RX ADMIN — PROPOFOL 40 MG: 10 INJECTION, EMULSION INTRAVENOUS at 10:09

## 2020-09-03 RX ADMIN — PROPOFOL 30 MG: 10 INJECTION, EMULSION INTRAVENOUS at 10:09

## 2020-09-03 RX ADMIN — PROPOFOL 150 MCG/KG/MIN: 10 INJECTION, EMULSION INTRAVENOUS at 10:09

## 2020-09-03 RX ADMIN — SODIUM CHLORIDE: 0.9 INJECTION, SOLUTION INTRAVENOUS at 09:09

## 2020-09-03 NOTE — [employer] PATIENT INSTRUCTIONS
Discharge Summary/Instructions after an Endoscopic Procedure  Patient Name: Jackelyn Kendrick  Patient MRN: [id number]  Patient YOB: 1950  Thursday, September 3, 2020  Alberta Henriquez MD  RESTRICTIONS:  During your procedure today, you received medications for sedation.  These   medications may affect your judgment, balance and coordination.  Therefore,   for 24 hours, you have the following restrictions:   - DO NOT drive a car, operate machinery, make legal/financial decisions,   sign important papers or drink alcohol.    ACTIVITY:  Today: no heavy lifting, straining or running due to procedural   sedation/anesthesia.  The following day: return to full activity including work.  DIET:  Eat and drink normally unless instructed otherwise.     TREATMENT FOR COMMON SIDE EFFECTS:  - Mild abdominal pain, nausea, belching, bloating or excessive gas:  rest,   eat lightly and use a heating pad.  - Sore Throat: treat with throat lozenges and/or gargle with warm salt   water.  - Because air was used during the procedure, expelling large amounts of air   from your rectum or belching is normal.  - If a bowel prep was taken, you may not have a bowel movement for 1-3 days.    This is normal.  SYMPTOMS TO WATCH FOR AND REPORT TO YOUR PHYSICIAN:  1. Abdominal pain or bloating, other than gas cramps.  2. Chest pain.  3. Back pain.  4. Signs of infection such as: chills or fever occurring within 24 hours   after the procedure.  5. Rectal bleeding, which would show as bright red, maroon, or black stools.   (A tablespoon of blood from the rectum is not serious, especially if   hemorrhoids are present.)  6. Vomiting.  7. Weakness or dizziness.  GO DIRECTLY TO THE NEAREST EMERGENCY ROOM IF YOU HAVE ANY OF THE FOLLOWING:      Difficulty breathing              Chills and/or fever over 101 F   Persistent vomiting and/or vomiting blood   Severe abdominal pain   Severe chest pain   Black, tarry stools   Bleeding- more than one  tablespoon   Any other symptom or condition that you feel may need urgent attention  Your doctor recommends these additional instructions:  If any biopsies were taken, your doctors clinic will contact you in 1 to 2   weeks with any results.  - Discharge patient to home.   - Resume previous diet.   - Continue present medications.   - Await pathology results.   - Repeat colonoscopy in 7 years for screening purposes.   - Return to referring physician.   - Written discharge instructions were provided to the patient.   - The signs and symptoms of potential delayed complications were discussed   with the patient.   - Patient has a contact number available for emergencies.   - Return to normal activities tomorrow.  For questions, problems or results please call your physician - Alberta Henriquez MD at Work:  [phone].  [facility], EMERGENCY ROOM PHONE NUMBER: [phone]  IF A COMPLICATION OR EMERGENCY SITUATION ARISES AND YOU ARE UNABLE TO REACH   YOUR PHYSICIAN - GO DIRECTLY TO THE EMERGENCY ROOM.  Alberta Henriquez MD  9/3/2020 11:10:35 AM  This report has been verified and signed electronically.  [employer]

## 2020-09-03 NOTE — ANESTHESIA PREPROCEDURE EVALUATION
09/03/2020  Jackelyn Kendrick is a 69 y.o., female.    Past Medical History:   Diagnosis Date    Bronchitis     seasonal    Cataract     Diverticulitis     Dry eye syndrome     GERD (gastroesophageal reflux disease)     Hyperlipidemia     Hypertension     Hypothyroidism 7/19/2012    Obese     PONV (postoperative nausea and vomiting)     Thyroid disease      Past Surgical History:   Procedure Laterality Date    BACK SURGERY      CHOLECYSTECTOMY      COLONOSCOPY  2007    diverticulosis    DE QUERVAIN'S RELEASE Left 03/2017    HERNIA REPAIR      HYSTERECTOMY      NASAL SEPTUM SURGERY      SHOULDER SURGERY      TONSILLECTOMY           Anesthesia Evaluation    I have reviewed the Patient Summary Reports.      I have reviewed the Medications.     Review of Systems  Anesthesia Hx:  No problems with previous Anesthesia Denies Hx of Anesthetic complications  Neg history of prior surgery. Denies Family Hx of Anesthesia complications.   Denies Personal Hx of Anesthesia complications.   Social:  Non-Smoker    Hematology/Oncology:  Hematology Normal   Oncology Normal     EENT/Dental:EENT/Dental Normal   Cardiovascular:   Exercise tolerance: good Hypertension    Pulmonary:  Pulmonary Normal    Renal/:  Renal/ Normal     Hepatic/GI:   GERD    Musculoskeletal:   Arthritis     Neurological:  Neurology Normal    Endocrine:   Hypothyroidism    Dermatological:  Skin Normal    Psych:  Psychiatric Normal           Physical Exam  General:  Well nourished    Airway/Jaw/Neck:  Airway Findings: Mouth Opening: Normal Tongue: Normal  General Airway Assessment: Adult  Mallampati: II  TM Distance: Normal, at least 6 cm        Eyes/Ears/Nose:  EYES/EARS/NOSE FINDINGS: Normal   Dental:  Dental Findings: In tact   Chest/Lungs:  Chest/Lungs Findings: Clear to auscultation, Normal Respiratory Rate      Heart/Vascular:  Heart Findings: Rate: Normal  Rhythm: Regular Rhythm  Sounds: Normal  Heart murmur: negative Vascular Findings: Normal    Abdomen:  Abdomen Findings:  Normal, Soft, Nontender     Musculoskeletal:  Musculoskeletal Findings: Normal   Skin:  Skin Findings: Normal    Mental Status:  Mental Status Findings:  Cooperative, Alert and Oriented         Anesthesia Plan  Type of Anesthesia, risks & benefits discussed:  Anesthesia Type:  general  Patient's Preference:   Intra-op Monitoring Plan: standard ASA monitors  Intra-op Monitoring Plan Comments:   Post Op Pain Control Plan:   Post Op Pain Control Plan Comments:   Induction:   IV  Beta Blocker:  Patient is not currently on a Beta-Blocker (No further documentation required).       Informed Consent: Patient understands risks and agrees with Anesthesia plan.  Questions answered. Anesthesia consent signed with patient.  ASA Score: 2     Day of Surgery Review of History & Physical:    H&P update referred to the provider.         Ready For Surgery From Anesthesia Perspective.

## 2020-09-03 NOTE — ANESTHESIA POSTPROCEDURE EVALUATION
Anesthesia Post Evaluation    Patient: Jackelyn Kendrick    Procedure(s) Performed: Procedure(s) (LRB):  COLONOSCOPY (N/A)    Final Anesthesia Type: general    Patient location during evaluation: PACU  Patient participation: Yes- Able to Participate  Level of consciousness: awake and alert and oriented  Post-procedure vital signs: reviewed and stable  Pain management: adequate  Airway patency: patent    PONV status at discharge: No PONV  Anesthetic complications: no      Cardiovascular status: stable  Respiratory status: unassisted, spontaneous ventilation and room air  Hydration status: euvolemic  Follow-up not needed.          Vitals Value Taken Time   /59 09/03/20 1116   Temp 36.6 °C (97.9 °F) 09/03/20 1116   Pulse 68 09/03/20 1116   Resp 16 09/03/20 1116   SpO2 99 % 09/03/20 1116         No case tracking events are documented in the log.      Pain/Maxi Score: Maxi Score: 9 (9/3/2020 11:17 AM)

## 2020-09-03 NOTE — ADDENDUM NOTE
Addendum  created 09/03/20 1143 by Alberta Stanton, CHAR    Attestation recorded in Intraprocedure, Flowsheet accepted, Intraprocedure Attestations filed, Intraprocedure Flowsheets edited

## 2020-09-03 NOTE — H&P
COLONOSCOPY HISTORY AND PHYSICAL EXAM    Procedure : Colonoscopy      INDICATIONS: abdominal pain. Patient has history of irritable bowel syndrome - both with diarrhea and constipation. Pain is intermittent.    Family Hx of CRC: None    Last Colonoscopy:  2014 with Dr. Crawford  Findings: Normal colonoscopy       Past Medical History:   Diagnosis Date    Bronchitis     seasonal    Cataract     Diverticulitis     Dry eye syndrome     GERD (gastroesophageal reflux disease)     Hyperlipidemia     Hypertension     Hypothyroidism 7/19/2012    Obese     PONV (postoperative nausea and vomiting)     Thyroid disease      Sedation Problems: NO  Family History   Problem Relation Age of Onset    Cataracts Mother     Breast cancer Mother     Diabetes Mother     Hypertension Mother     Heart failure Mother     Heart disease Mother     Cataracts Father     Hypertension Father     Stroke Father     No Known Problems Daughter     No Known Problems Son     Diabetes Paternal Grandmother     Hyperlipidemia Sister     Arthritis Sister     Hyperlipidemia Brother     Arthritis Brother     No Known Problems Son     Amblyopia Neg Hx     Blindness Neg Hx     Glaucoma Neg Hx     Macular degeneration Neg Hx     Retinal detachment Neg Hx     Strabismus Neg Hx     Ovarian cancer Neg Hx     Melanoma Neg Hx     Celiac disease Neg Hx     Colon cancer Neg Hx     Colon polyps Neg Hx     Esophageal cancer Neg Hx     Liver cancer Neg Hx     Liver disease Neg Hx     Rectal cancer Neg Hx     Stomach cancer Neg Hx      Fam Hx of Sedation Problems: NO  Social History     Socioeconomic History    Marital status:      Spouse name: Not on file    Number of children: Not on file    Years of education: Not on file    Highest education level: Not on file   Occupational History    Not on file   Social Needs    Financial resource strain: Not hard at all    Food insecurity     Worry: Never true     Inability:  "Never true    Transportation needs     Medical: No     Non-medical: No   Tobacco Use    Smoking status: Never Smoker    Smokeless tobacco: Never Used   Substance and Sexual Activity    Alcohol use: No     Frequency: Never     Binge frequency: Never     Comment: rare on a special occasion    Drug use: No    Sexual activity: Never   Lifestyle    Physical activity     Days per week: 2 days     Minutes per session: 30 min    Stress: Only a little   Relationships    Social connections     Talks on phone: Twice a week     Gets together: Once a week     Attends Christian service: Not on file     Active member of club or organization: Yes     Attends meetings of clubs or organizations: More than 4 times per year     Relationship status:    Other Topics Concern    Are you pregnant or think you may be? Not Asked    Breast-feeding Not Asked   Social History Narrative    , 3 children, nonsmoker ,       Review of Systems - Negative except   Respiratory ROS: no dyspnea  Cardiovascular ROS: no exertional chest pain  Gastrointestinal ROS: NO abdominal discomfort,  NO rectal bleeding  Musculoskeletal ROS: no muscular pain  Neurological ROS: no recent stroke    Physical Exam:  BP (!) 140/64 (BP Location: Left arm, Patient Position: Lying)   Pulse 86   Temp 98.2 °F (36.8 °C) (Temporal)   Resp 16   Ht 5' 1" (1.549 m)   Wt 65.8 kg (145 lb)   SpO2 (!) 94%   Breastfeeding No   BMI 27.40 kg/m²   General: no distress  Head: normocephalic  Mallampati Score   Neck: supple, symmetrical, trachea midline  Lungs:  clear to auscultation bilaterally and normal respiratory effort  Heart: regular rate and rhythm and no murmur  Abdomen: soft, non-tender non-distented; bowel sounds normal; no masses,  no organomegaly  Extremities: no cyanosis or edema, or clubbing      PLAN  COLONOSCOPY.    SedationPlan :MAC    The details of the procedure, the possible need for biopsy or polypectomy and the potential risks including " bleeding, perforation, missed polyps were discussed in detail.

## 2020-09-03 NOTE — TRANSFER OF CARE
"Anesthesia Transfer of Care Note    Patient: Jackelyn Kendrick    Procedure(s) Performed: Procedure(s) (LRB):  COLONOSCOPY (N/A)    Patient location: PACU    Anesthesia Type: general    Transport from OR: Transported from OR on 6-10 L/min O2 by face mask with adequate spontaneous ventilation    Post pain: adequate analgesia    Post assessment: no apparent anesthetic complications and tolerated procedure well    Post vital signs: stable    Level of consciousness: sedated and responds to stimulation    Nausea/Vomiting: no nausea/vomiting    Complications: none    Transfer of care protocol was followed      Last vitals:   Visit Vitals  BP (!) 123/59   Pulse 68   Temp 36.6 °C (97.9 °F)   Resp 16   Ht 5' 1" (1.549 m)   Wt 65.8 kg (145 lb)   SpO2 99%   Breastfeeding No   BMI 27.40 kg/m²     "

## 2020-09-03 NOTE — DISCHARGE INSTRUCTIONS
Colonoscopy     A camera attached to a flexible tube with a viewing lens is used to take video pictures.     Colonoscopy is a test to view the inside of your lower digestive tract (colon and rectum). Sometimes it can show the last part of the small intestine (ileum). During the test, small pieces of tissue may be removed for testing. This is called a biopsy. Small growths, such as polyps, may also be removed.   Why is colonoscopy done?  The test is done to help look for colon cancer. And it can help find the source of abdominal pain, bleeding, and changes in bowel habits. It may be needed once a year, depending on factors such as your:  · Age  · Health history  · Family health history  · Symptoms  · Results from any prior colonoscopy  Risks and possible complications  These include:  · Bleeding               · A puncture or tear in the colon   · Risks of anesthesia  · A cancer lesion not being seen  Getting ready   To prepare for the test:  · Talk with your healthcare provider about the risks of the test (see below). Also ask your healthcare provider about alternatives to the test.  · Tell your healthcare provider about any medicines you take. Also tell him or her about any health conditions you may have.  · Make sure your rectum and colon are empty for the test. Follow the diet and bowel prep instructions exactly. If you dont, the test may need to be rescheduled.  · Plan for a friend or family member to drive you home after the test.     Colonoscopy provides an inside view of the entire colon.     You may discuss the results with your doctor right away or at a future visit.  During the test   The test is usually done in the hospital on an outpatient basis. This means you go home the same day. The procedure takes about 30 minutes. During that time:  · You are given relaxing (sedating) medicine through an IV line. You may be drowsy, or fully asleep.  · The healthcare provider will first give you a physical exam to  check for anal and rectal problems.  · Then the anus is lubricated and the scope inserted.  · If you are awake, you may have a feeling similar to needing to have a bowel movement. You may also feel pressure as air is pumped into the colon. Its OK to pass gas during the procedure.  · Biopsy, polyp removal, or other treatments may be done during the test.  After the test   You may have gas right after the test. It can help to try to pass it to help prevent later bloating. Your healthcare provider may discuss the results with you right away. Or you may need to schedule a follow-up visit to talk about the results. After the test, you can go back to your normal eating and other activities. You may be tired from the sedation and need to rest for a few hours.  Date Last Reviewed: 11/1/2016 © 2000-2017 [employer] [address] [place] [address], [place] [address]. All rights reserved. This information is not intended as a substitute for professional medical care. Always follow your healthcare professional's instructions.

## 2020-09-04 NOTE — PLAN OF CARE
04-Sep-2020 Jackelyn Kendrick has met all discharge criteria from Phase II. Vital Signs are stable, ambulating  without difficulty. Discharge instructions given, patient verbalized understanding. Discharged from facility via wheelchair in stable condition.

## 2020-09-09 ENCOUNTER — TELEPHONE (OUTPATIENT)
Dept: SPORTS MEDICINE | Facility: CLINIC | Age: 70
End: 2020-09-09

## 2020-09-09 DIAGNOSIS — M75.101 NONTRAUMATIC TEAR OF RIGHT ROTATOR CUFF, UNSPECIFIED TEAR EXTENT: Primary | ICD-10-CM

## 2020-09-09 NOTE — TELEPHONE ENCOUNTER
Right RCR 9/28    ----- Message from Shani Luna MA sent at 9/9/2020 10:09 AM CDT -----  Patient will do PT at [facility]    Pre op - 09/18/20    Date of surgery - 09/23/20

## 2020-09-10 LAB
FINAL PATHOLOGIC DIAGNOSIS: NORMAL
GROSS: NORMAL

## 2020-09-16 ENCOUNTER — NURSE TRIAGE (OUTPATIENT)
Dept: ADMINISTRATIVE | Facility: CLINIC | Age: 70
End: 2020-09-16

## 2020-09-16 NOTE — TELEPHONE ENCOUNTER
Contacted patient on behalf of [facility]'s post procedure call back symptom tracker.  Patient denies any Covid 19 symptoms

## 2020-09-17 ENCOUNTER — PATIENT OUTREACH (OUTPATIENT)
Dept: ADMINISTRATIVE | Facility: OTHER | Age: 70
End: 2020-09-17

## 2020-09-18 ENCOUNTER — OFFICE VISIT (OUTPATIENT)
Dept: SPORTS MEDICINE | Facility: CLINIC | Age: 70
End: 2020-09-18
Payer: MEDICARE

## 2020-09-18 VITALS
HEART RATE: 75 BPM | BODY MASS INDEX: 27.68 KG/M2 | WEIGHT: 146.63 LBS | HEIGHT: 61 IN | SYSTOLIC BLOOD PRESSURE: 130 MMHG | DIASTOLIC BLOOD PRESSURE: 73 MMHG

## 2020-09-18 DIAGNOSIS — M75.101 NONTRAUMATIC TEAR OF RIGHT ROTATOR CUFF, UNSPECIFIED TEAR EXTENT: Primary | ICD-10-CM

## 2020-09-18 PROCEDURE — 99499 UNLISTED E&M SERVICE: CPT | Mod: S$PBB,,, | Performed by: PHYSICIAN ASSISTANT

## 2020-09-18 PROCEDURE — 99499 NO LOS: ICD-10-PCS | Mod: S$PBB,,, | Performed by: PHYSICIAN ASSISTANT

## 2020-09-18 PROCEDURE — 99999 PR PBB SHADOW E&M-EST. PATIENT-LVL V: CPT | Mod: PBBFAC,,, | Performed by: PHYSICIAN ASSISTANT

## 2020-09-18 PROCEDURE — 99999 PR PBB SHADOW E&M-EST. PATIENT-LVL V: ICD-10-PCS | Mod: PBBFAC,,, | Performed by: PHYSICIAN ASSISTANT

## 2020-09-18 PROCEDURE — 99215 OFFICE O/P EST HI 40 MIN: CPT | Mod: PBBFAC | Performed by: PHYSICIAN ASSISTANT

## 2020-09-18 RX ORDER — SODIUM CHLORIDE 9 MG/ML
INJECTION, SOLUTION INTRAVENOUS CONTINUOUS
Status: CANCELLED | OUTPATIENT
Start: 2020-09-18

## 2020-09-18 RX ORDER — TRAMADOL HYDROCHLORIDE 50 MG/1
50 TABLET ORAL EVERY 6 HOURS PRN
Qty: 28 TABLET | Refills: 0 | Status: SHIPPED | OUTPATIENT
Start: 2020-09-18 | End: 2020-10-21 | Stop reason: SDUPTHER

## 2020-09-18 RX ORDER — OXYCODONE AND ACETAMINOPHEN 10; 325 MG/1; MG/1
1 TABLET ORAL EVERY 6 HOURS PRN
Qty: 28 TABLET | Refills: 0 | Status: SHIPPED | OUTPATIENT
Start: 2020-09-18 | End: 2021-03-23

## 2020-09-18 RX ORDER — ASPIRIN 325 MG
325 TABLET ORAL DAILY
Qty: 21 TABLET | Refills: 0 | Status: SHIPPED | OUTPATIENT
Start: 2020-09-18 | End: 2021-03-23

## 2020-09-18 RX ORDER — PROMETHAZINE HYDROCHLORIDE 25 MG/1
25 TABLET ORAL EVERY 6 HOURS PRN
Qty: 20 TABLET | Refills: 0 | Status: SHIPPED | OUTPATIENT
Start: 2020-09-18 | End: 2021-03-23 | Stop reason: SDUPTHER

## 2020-09-18 NOTE — H&P
Jackelyn Kendrick  is here for a completion of her perioperative paperwork. she  Is scheduled to undergo:    right   1. Arthroscopic rotator cuff repair  2. Arthroscopic subacromial decompression  3. Arthroscopic distal clavicle excision  4. Possible open biceps subpectoral tenodesis     on 9/23/2020.      She is a healthy individual but does need clearance for this procedure.    Per patient's PCP Dr. Frank, patient is medically cleared for surgery.     PAST MEDICAL HISTORY:   Past Medical History:   Diagnosis Date    Bronchitis     seasonal    Cataract     Diverticulitis     Dry eye syndrome     GERD (gastroesophageal reflux disease)     Hyperlipidemia     Hypertension     Hypothyroidism 7/19/2012    Obese     PONV (postoperative nausea and vomiting)     Thyroid disease      PAST SURGICAL HISTORY:   Past Surgical History:   Procedure Laterality Date    BACK SURGERY      CHOLECYSTECTOMY      COLONOSCOPY  2007    diverticulosis    COLONOSCOPY N/A 9/3/2020    Procedure: COLONOSCOPY;  Surgeon: Alberta Henriquez MD;  Location: [facility] (4TH FLR);  Service: Colon and Rectal;  Laterality: N/A;  pt requested this time-8/31-covid-uc [place]-tb    DE QUERVAIN'S RELEASE Left 03/2017    HERNIA REPAIR      HYSTERECTOMY      NASAL SEPTUM SURGERY      SHOULDER SURGERY      TONSILLECTOMY       FAMILY HISTORY:   Family History   Problem Relation Age of Onset    Cataracts Mother     Breast cancer Mother     Diabetes Mother     Hypertension Mother     Heart failure Mother     Heart disease Mother     Cataracts Father     Hypertension Father     Stroke Father     No Known Problems Daughter     No Known Problems Son     Diabetes Paternal Grandmother     Hyperlipidemia Sister     Arthritis Sister     Hyperlipidemia Brother     Arthritis Brother     No Known Problems Son     Amblyopia Neg Hx     Blindness Neg Hx     Glaucoma Neg Hx     Macular degeneration Neg Hx     Retinal detachment Neg  Hx     Strabismus Neg Hx     Ovarian cancer Neg Hx     Melanoma Neg Hx     Celiac disease Neg Hx     Colon cancer Neg Hx     Colon polyps Neg Hx     Esophageal cancer Neg Hx     Liver cancer Neg Hx     Liver disease Neg Hx     Rectal cancer Neg Hx     Stomach cancer Neg Hx      SOCIAL HISTORY:   Social History     Socioeconomic History    Marital status:      Spouse name: Not on file    Number of children: Not on file    Years of education: Not on file    Highest education level: Not on file   Occupational History    Not on file   Social Needs    Financial resource strain: Not hard at all    Food insecurity     Worry: Never true     Inability: Never true    Transportation needs     Medical: No     Non-medical: No   Tobacco Use    Smoking status: Never Smoker    Smokeless tobacco: Never Used   Substance and Sexual Activity    Alcohol use: No     Frequency: Never     Binge frequency: Never     Comment: rare on a special occasion    Drug use: No    Sexual activity: Never   Lifestyle    Physical activity     Days per week: 2 days     Minutes per session: 30 min    Stress: Only a little   Relationships    Social connections     Talks on phone: Twice a week     Gets together: Once a week     Attends [employer] witness service: Not on file     Active member of club or organization: Yes     Attends meetings of clubs or organizations: More than 4 times per year     Relationship status:    Other Topics Concern    Are you pregnant or think you may be? Not Asked    Breast-feeding Not Asked   Social History Narrative    , 3 children, nonsmoker ,       MEDICATIONS:   Current Outpatient Medications:     acetaminophen/diphenhydramine (TYLENOL PM ORAL), Take by mouth., Disp: , Rfl:     acetic acid-hydrocortisone (VOSOL-HC) otic solution, 2-4 drops to affected ear twice a day, Disp: 10 mL, Rfl: 2    albuterol 90 mcg/actuation inhaler, Inhale 2 puffs into the lungs every 6 (six) hours as  needed for Wheezing., Disp: 6.7 g, Rfl: 11    amLODIPine (NORVASC) 2.5 MG tablet, TAKE ONE TABLET BY MOUTH EVERY DAY, Disp: 30 tablet, Rfl: 11    fluocinonide (LIDEX) 0.05 % external solution, APPLY TO SCALP ONCE DAILY AS NEEDED FOR FLARE, Disp: 60 mL, Rfl: 3    fluticasone propionate (FLONASE) 50 mcg/actuation nasal spray, 1 spray by Each Nostril route once daily., Disp: , Rfl:     ketoconazole (NIZORAL) 2 % shampoo, APPLY TO DAMP SCALP EVERY OTHER DAY, LATHER AND LET SIT 5 MINUTES BEFORE RINSING, Disp: 120 mL, Rfl: 5    levothyroxine (SYNTHROID) 75 MCG tablet, Take 1 tablet (75 mcg total) by mouth once daily., Disp: 30 tablet, Rfl: 11    meloxicam (MOBIC) 7.5 MG tablet, TAKE ONE TABLET BY MOUTH EVERY 12 HOURS, Disp: 60 tablet, Rfl: 11    Multi-Vitamin tablet, Take by mouth.  Tablet Oral , Disp: , Rfl:     nitrofurantoin (MACRODANTIN) 100 MG capsule, Take 1 capsule (100 mg total) by mouth every 12 (twelve) hours., Disp: 20 capsule, Rfl: 0    omeprazole (PRILOSEC) 40 MG capsule, TAKE ONE CAPSULE BY MOUTH EVERY DAY, Disp: 30 capsule, Rfl: 11    oxybutynin (DITROPAN-XL) 10 MG 24 hr tablet, Take 1 tablet (10 mg total) by mouth once daily., Disp: 30 tablet, Rfl: 11    PSYLLIUM SEED, WITH SUGAR, (METAMUCIL ORAL), Take by mouth., Disp: , Rfl:     RESTASIS 0.05 % ophthalmic emulsion, PLACE 1 DROP IN EACH EYE TWO TIMES A DAY, Disp: 60 each, Rfl: 12    rosuvastatin (CRESTOR) 40 MG Tab, Take 1 tablet (40 mg total) by mouth once daily., Disp: 90 tablet, Rfl: 3    triamcinolone acetonide 0.1% (KENALOG) 0.1 % cream, AAA bid, Disp: 60 g, Rfl: 3    cetirizine (ZYRTEC) 10 MG tablet, Take 1 tablet (10 mg total) by mouth once daily., Disp: , Rfl: 0    cholestyramine (QUESTRAN) 4 gram packet, Take 1 packet (4 g total) by mouth 2 (two) times daily., Disp: 180 packet, Rfl: 0    diclofenac sodium 1 % Gel, Apply 2 g topically 4 (four) times daily. for 10 days, Disp: 1 Tube, Rfl: 5  ALLERGIES:   Review of patient's  "allergies indicates:   Allergen Reactions    Erythromycin (bulk) Nausea And Vomiting    Levaquin [levofloxacin]      Other reaction(s): Swelling       VITAL SIGNS: /73   Pulse 75   Ht 5' 1" (1.549 m)   Wt 66.5 kg (146 lb 9.6 oz)   BMI 27.70 kg/m²      Risks, indications and benefits of the surgical procedure were discussed with the patient. All questions with regard to surgery, rehab, expected return to functional activities, activities of daily living and recreational endeavors were answered to her satisfaction.    It was explained to the patient that there may be an increase in surgical risks if the patient has certain co-morbidities such as but not limited to: Obesity, Cardiovascular issues (CHF, CAD, Arrhythmias), chronic pulmonary issues, previous or current neurovascular/neurological issues, previous strokes, diabetes mellitus, previous wound healing issues, previous wound or skin infections, PVD, clotting disorders, if the patient uses chronic steroids, if the patient takes or has immune compromising medications or diseases, or has previously or currently used tobacco products.     The patient verbalized that he/she does not have any additional clotting, bleeding, or blood disorders, other than what is list in her chart on today's review.     Then a brief history and physical exam were performed.    Review of Systems   Constitution: Negative. Negative for chills, fever and night sweats.   HENT: Negative for congestion and headaches.    Eyes: Negative for blurred vision, left vision loss and right vision loss.   Cardiovascular: Negative for chest pain and syncope.   Respiratory: Negative for cough and shortness of breath.    Endocrine: Negative for polydipsia, polyphagia and polyuria.   Hematologic/Lymphatic: Negative for bleeding problem. Does not bruise/bleed easily.   Skin: Negative for dry skin, itching and rash.   Musculoskeletal: Negative for falls and muscle weakness.   Gastrointestinal: " Negative for abdominal pain and bowel incontinence.   Genitourinary: Negative for bladder incontinence and nocturia.   Neurological: Negative for disturbances in coordination, loss of balance and seizures.   Psychiatric/Behavioral: Negative for depression. The patient does not have insomnia.    Allergic/Immunologic: Negative for hives and persistent infections.     PHYSICAL EXAM:  GEN: A&Ox3, WD WN NAD  HEENT: WNL  CHEST: CTAB, no W/R/R  HEART: RRR, no M/R/G  ABD: Soft, NT ND, BS x4 QUADS  MS; See Epic  NEURO: CN II-XII intact       The surgical consent was then reviewed with the patient, who agreed with all the contents of the consent form and it was signed. she was then given the [facility] surgery packet to bring with her to surgery for the anesthesia portion of her perioperative paperwork.   For all physicians except for Dr. Pickens, we will email and possibly fax the consent forms and booking sheets to [facility] pre-admit.    The patient was given the opportunity to ask questions about the surgical plan and consent form, and once no other questions were asked, I proceeded with the pre-op appointment.    PHYSICAL THERAPY:  She was also instructed regarding physical therapy and will begin on  POD#3-5. She was given a copy of the original prescription to schedule. Another copy of this prescription was also faxed to [facility] and [facility].    POST OP CARE:instructions were reviewed including care of the wound and dressing after surgery and when she can shower.     CRUTCHES OR WALKER: It was explained to the patient that if they are having a lower extremity surgery that they will require either a walker or crutches to ambulate safely with after surgery. It was explained that a cane or other assistive devices are not sufficient to safely ambulate with after surgery. I explained to the patient that I will place an order for them to receive either crutches or a walker after  surgery to go home with. It was explained that if they have crutches or a walker at home already, that they are REQUIRED to bring them to the hospital on the day of surgery. It was explained that if they do not have them at the hospital on the day of surgery that they WILL be provided a new pair or crutches or a walker to go home with to ensure ambulation will be safe if the patient needs to stop somewhere on the way home.      PAIN MANAGEMENT: Jackelyn Kendrick was also given her pain management regime, which includes the TENS unit given to her by lilian along with the education required for its use. She was also instructed regarding the Polar ice unit that will be in place after surgery and her postoperative pain medications.     PATIENT WILL RECEIVE TENS UNIT    PAIN MEDICATION:  Percocet 10/325mg 1 po q 4-6 hours prn pain  Ultram 50 mg Take 1-2 p.o. q.6 hours p.r.n. breakthrough pain,   Phenergan 25 mg one p.o. q.6 hours p.r.n. nausea and vomiting.  Aspirin 325 mg one p.o. QD x 3 weeks for DVT prophylaxis    Post op meds to be delivered bedside prior to discharge. Deliver to family if patient is in surgery at 5pm.    The patient was told that narcotic pain medications may make them drowsy and instructions were given to not sign legal documents, drive or operate heavy machinery, cars, or equipment while under the influence of narcotic medications. The patient was told and understands that narcotic pain medications should only be used as needed to control pain and that other options of pain control include TENs unit and ice packs/unit.     Patient was instructed to purchase and take Colace to counter possible GI side effects of taking opiates.     DVT prophylaxis was discussed with the patient today including risk factors for developing DVTs and history of DVTs. The patient was asked if any specific recommendations were given from the doctor/s that did pre-operative surgical clearance. The patient was then given  an education sheet about DVTs and PE with warning signs and symptoms of both and steps to take if they suspect either of these.    Patient was asked if they were taking or using OCP pills or devices. If they answered yes, then they were instructed to stop using OCPs at this pre-operative appointment until 2 months post-op to help prevent DVT development. They understand that there are other forms of birth control that do not involve hormones. They expressed understanding that ignoring/not following this instruction could result in a DVT which could turn into a deadly pulmonary embolism.     This along with the Modified Caprini risk assessment model for VTE in general surgical patients was used to determine the patient's DVT risk.     From: Sally DYE, Vimal DA, Becca SM, et al. Prevention of VTE in nonorthopedic surgical patients: antithrombotic therapy and prevention of thrombosis, 9th ed: American College of Chest Physicians evidence-based clinical practical guidelines. Chest 2012; 141:e227S. Copyright © 2012. Reproduced with permission from the American College of Chest Physicians.    The below listed DVT prophylaxis regimen was discussed along with SCDs during surgery and bilateral TIM compression stockings to be used post-op. Length of treatment has been determined to be 10-42 days post-op by the above noted Caprini assessment model. Early ambulation post-op was also discussed and emphasized with the patient.     Patient was instructed to buy and take:  Aspirin 325mg QD x 3 weeks for DVT prophylaxis starting on the evening after surgery.  Patient will also use bilateral TEDs on lower extremities, SCDs during surgery, and early ambulation post-op. If the patient was previously taking 81mg baby aspirin, they were told to not take it will using the above stated aspirin and to restart the 81mg aspirin after completion of the aspirin dose.      Patient was also told to buy over the counter Prilosec medication and take  it once daily for GI protection as long as they are taking NSAIDs or Aspirin.     Published data in Santiago OMER, et al. J Arthroplasty. Oct; 31(10):2237-40, 2016; showed that aspirin use as prophylaxis during revision total joint arthroplasty was more effective than warfarin in preventing symptomatic venous thromboembolic events and was associated with lower complications.  Patients in the study were also treated with intermittent pneumatic compression devices. Compression stockings would be our method of mechanical prophylaxis, which has been shown to be similar to pneumatic compression in the systematic review, Jacob RJ, et al. Marta Surg. Feb; 239(2): 162-171, 2004.     Results showed a significantly higher incidence of symptomatic venous thromboembolic events among patients in the warfarin group vs. the aspirin group (1.75% vs. 0.56%). Researchers also noted a bleeding event rate of 1.5% among patients who received warfarin compared with a rate of 0.4% among patients who received aspirin.    Patient denies history of seizures.     I explained to following and the patient expressed understanding:  The patient is currently aware of the COVID19 pandemic and that proceeding with their surgical procedure could potentially increase exposure to coronavirus in the community. The patient understands that there is the possibility of delayed or cancelled appts or PT visits in the future. They understand that infection with the coronavirus could complicate their surgery recovery. They are aware of the current policies and procedures of [facility] and the government regarding the pandemic and they were given the option of delaying my surgery. The patient elects to proceed with surgery at this time.       The patient has been scheduled to undergo coronavirus testing on the day prior to surgery.     The patient was instructed to practice strict social distancing, hand washing/hygiene, respiratory hygiene, and cough etiquette  from now until 6 weeks following surgery to reduce the risk of tiff coronavirus.    As there were no other questions to be asked, she was given my business card along with Reginald Pickens MD business card if she has any questions or concerns prior to surgery or in the postop period.

## 2020-09-18 NOTE — PROGRESS NOTES
Care Everywhere:   Immunization:   Health Maintenance: updated  Media Review:   Legacy Review:   Order placed:   Upcoming appts:  Mammogram scheduling ticket sent to patient's portal

## 2020-09-18 NOTE — H&P (VIEW-ONLY)
Jackelyn Kendrick  is here for a completion of her perioperative paperwork. she  Is scheduled to undergo:    right   1. Arthroscopic rotator cuff repair  2. Arthroscopic subacromial decompression  3. Arthroscopic distal clavicle excision  4. Possible open biceps subpectoral tenodesis     on 9/23/2020.      She is a healthy individual but does need clearance for this procedure.    Per patient's PCP Dr. Frank, patient is medically cleared for surgery.     PAST MEDICAL HISTORY:   Past Medical History:   Diagnosis Date    Bronchitis     seasonal    Cataract     Diverticulitis     Dry eye syndrome     GERD (gastroesophageal reflux disease)     Hyperlipidemia     Hypertension     Hypothyroidism 7/19/2012    Obese     PONV (postoperative nausea and vomiting)     Thyroid disease      PAST SURGICAL HISTORY:   Past Surgical History:   Procedure Laterality Date    BACK SURGERY      CHOLECYSTECTOMY      COLONOSCOPY  2007    diverticulosis    COLONOSCOPY N/A 9/3/2020    Procedure: COLONOSCOPY;  Surgeon: Alberta Henriquez MD;  Location: [facility] (4TH FLR);  Service: Colon and Rectal;  Laterality: N/A;  pt requested this time-8/31-covid-uc [place]    DE QUERVAIN'S RELEASE Left 03/2017    HERNIA REPAIR      HYSTERECTOMY      NASAL SEPTUM SURGERY      SHOULDER SURGERY      TONSILLECTOMY       FAMILY HISTORY:   Family History   Problem Relation Age of Onset    Cataracts Mother     Breast cancer Mother     Diabetes Mother     Hypertension Mother     Heart failure Mother     Heart disease Mother     Cataracts Father     Hypertension Father     Stroke Father     No Known Problems Daughter     No Known Problems Son     Diabetes Paternal Grandmother     Hyperlipidemia Sister     Arthritis Sister     Hyperlipidemia Brother     Arthritis Brother     No Known Problems Son     Amblyopia Neg Hx     Blindness Neg Hx     Glaucoma Neg Hx     Macular degeneration Neg Hx     Retinal detachment Neg  Hx     Strabismus Neg Hx     Ovarian cancer Neg Hx     Melanoma Neg Hx     Celiac disease Neg Hx     Colon cancer Neg Hx     Colon polyps Neg Hx     Esophageal cancer Neg Hx     Liver cancer Neg Hx     Liver disease Neg Hx     Rectal cancer Neg Hx     Stomach cancer Neg Hx      SOCIAL HISTORY:   Social History     Socioeconomic History    Marital status:      Spouse name: Not on file    Number of children: Not on file    Years of education: Not on file    Highest education level: Not on file   Occupational History    Not on file   Social Needs    Financial resource strain: Not hard at all    Food insecurity     Worry: Never true     Inability: Never true    Transportation needs     Medical: No     Non-medical: No   Tobacco Use    Smoking status: Never Smoker    Smokeless tobacco: Never Used   Substance and Sexual Activity    Alcohol use: No     Frequency: Never     Binge frequency: Never     Comment: rare on a special occasion    Drug use: No    Sexual activity: Never   Lifestyle    Physical activity     Days per week: 2 days     Minutes per session: 30 min    Stress: Only a little   Relationships    Social connections     Talks on phone: Twice a week     Gets together: Once a week     Attends [employer] service: Not on file     Active member of club or organization: Yes     Attends meetings of clubs or organizations: More than 4 times per year     Relationship status:    Other Topics Concern    Are you pregnant or think you may be? Not Asked    Breast-feeding Not Asked   Social History Narrative    , 3 children, nonsmoker ,       MEDICATIONS:   Current Outpatient Medications:     acetaminophen/diphenhydramine (TYLENOL PM ORAL), Take by mouth., Disp: , Rfl:     acetic acid-hydrocortisone (VOSOL-HC) otic solution, 2-4 drops to affected ear twice a day, Disp: 10 mL, Rfl: 2    albuterol 90 mcg/actuation inhaler, Inhale 2 puffs into the lungs every 6 (six) hours as  needed for Wheezing., Disp: 6.7 g, Rfl: 11    amLODIPine (NORVASC) 2.5 MG tablet, TAKE ONE TABLET BY MOUTH EVERY DAY, Disp: 30 tablet, Rfl: 11    fluocinonide (LIDEX) 0.05 % external solution, APPLY TO SCALP ONCE DAILY AS NEEDED FOR FLARE, Disp: 60 mL, Rfl: 3    fluticasone propionate (FLONASE) 50 mcg/actuation nasal spray, 1 spray by Each Nostril route once daily., Disp: , Rfl:     ketoconazole (NIZORAL) 2 % shampoo, APPLY TO DAMP SCALP EVERY OTHER DAY, LATHER AND LET SIT 5 MINUTES BEFORE RINSING, Disp: 120 mL, Rfl: 5    levothyroxine (SYNTHROID) 75 MCG tablet, Take 1 tablet (75 mcg total) by mouth once daily., Disp: 30 tablet, Rfl: 11    meloxicam (MOBIC) 7.5 MG tablet, TAKE ONE TABLET BY MOUTH EVERY 12 HOURS, Disp: 60 tablet, Rfl: 11    Multi-Vitamin tablet, Take by mouth.  Tablet Oral , Disp: , Rfl:     nitrofurantoin (MACRODANTIN) 100 MG capsule, Take 1 capsule (100 mg total) by mouth every 12 (twelve) hours., Disp: 20 capsule, Rfl: 0    omeprazole (PRILOSEC) 40 MG capsule, TAKE ONE CAPSULE BY MOUTH EVERY DAY, Disp: 30 capsule, Rfl: 11    oxybutynin (DITROPAN-XL) 10 MG 24 hr tablet, Take 1 tablet (10 mg total) by mouth once daily., Disp: 30 tablet, Rfl: 11    PSYLLIUM SEED, WITH SUGAR, (METAMUCIL ORAL), Take by mouth., Disp: , Rfl:     RESTASIS 0.05 % ophthalmic emulsion, PLACE 1 DROP IN EACH EYE TWO TIMES A DAY, Disp: 60 each, Rfl: 12    rosuvastatin (CRESTOR) 40 MG Tab, Take 1 tablet (40 mg total) by mouth once daily., Disp: 90 tablet, Rfl: 3    triamcinolone acetonide 0.1% (KENALOG) 0.1 % cream, AAA bid, Disp: 60 g, Rfl: 3    cetirizine (ZYRTEC) 10 MG tablet, Take 1 tablet (10 mg total) by mouth once daily., Disp: , Rfl: 0    cholestyramine (QUESTRAN) 4 gram packet, Take 1 packet (4 g total) by mouth 2 (two) times daily., Disp: 180 packet, Rfl: 0    diclofenac sodium 1 % Gel, Apply 2 g topically 4 (four) times daily. for 10 days, Disp: 1 Tube, Rfl: 5  ALLERGIES:   Review of patient's  "allergies indicates:   Allergen Reactions    Erythromycin (bulk) Nausea And Vomiting    Levaquin [levofloxacin]      Other reaction(s): Swelling       VITAL SIGNS: /73   Pulse 75   Ht 5' 1" (1.549 m)   Wt 66.5 kg (146 lb 9.6 oz)   BMI 27.70 kg/m²      Risks, indications and benefits of the surgical procedure were discussed with the patient. All questions with regard to surgery, rehab, expected return to functional activities, activities of daily living and recreational endeavors were answered to her satisfaction.    It was explained to the patient that there may be an increase in surgical risks if the patient has certain co-morbidities such as but not limited to: Obesity, Cardiovascular issues (CHF, CAD, Arrhythmias), chronic pulmonary issues, previous or current neurovascular/neurological issues, previous strokes, diabetes mellitus, previous wound healing issues, previous wound or skin infections, PVD, clotting disorders, if the patient uses chronic steroids, if the patient takes or has immune compromising medications or diseases, or has previously or currently used tobacco products.     The patient verbalized that he/she does not have any additional clotting, bleeding, or blood disorders, other than what is list in her chart on today's review.     Then a brief history and physical exam were performed.    Review of Systems   Constitution: Negative. Negative for chills, fever and night sweats.   HENT: Negative for congestion and headaches.    Eyes: Negative for blurred vision, left vision loss and right vision loss.   Cardiovascular: Negative for chest pain and syncope.   Respiratory: Negative for cough and shortness of breath.    Endocrine: Negative for polydipsia, polyphagia and polyuria.   Hematologic/Lymphatic: Negative for bleeding problem. Does not bruise/bleed easily.   Skin: Negative for dry skin, itching and rash.   Musculoskeletal: Negative for falls and muscle weakness.   Gastrointestinal: " Negative for abdominal pain and bowel incontinence.   Genitourinary: Negative for bladder incontinence and nocturia.   Neurological: Negative for disturbances in coordination, loss of balance and seizures.   Psychiatric/Behavioral: Negative for depression. The patient does not have insomnia.    Allergic/Immunologic: Negative for hives and persistent infections.     PHYSICAL EXAM:  GEN: A&Ox3, WD WN NAD  HEENT: WNL  CHEST: CTAB, no W/R/R  HEART: RRR, no M/R/G  ABD: Soft, NT ND, BS x4 QUADS  MS; See Epic  NEURO: CN II-XII intact       The surgical consent was then reviewed with the patient, who agreed with all the contents of the consent form and it was signed. she was then given the [facility] surgery packet to bring with her to surgery for the anesthesia portion of her perioperative paperwork.   For all physicians except for Dr. Pickens, we will email and possibly fax the consent forms and booking sheets to [facility] pre-admit.    The patient was given the opportunity to ask questions about the surgical plan and consent form, and once no other questions were asked, I proceeded with the pre-op appointment.    PHYSICAL THERAPY:  She was also instructed regarding physical therapy and will begin on  POD#3-5. She was given a copy of the original prescription to schedule. Another copy of this prescription was also faxed to [facility] and [facility].    POST OP CARE:instructions were reviewed including care of the wound and dressing after surgery and when she can shower.     CRUTCHES OR WALKER: It was explained to the patient that if they are having a lower extremity surgery that they will require either a walker or crutches to ambulate safely with after surgery. It was explained that a cane or other assistive devices are not sufficient to safely ambulate with after surgery. I explained to the patient that I will place an order for them to receive either crutches or a walker after  surgery to go home with. It was explained that if they have crutches or a walker at home already, that they are REQUIRED to bring them to the hospital on the day of surgery. It was explained that if they do not have them at the hospital on the day of surgery that they WILL be provided a new pair or crutches or a walker to go home with to ensure ambulation will be safe if the patient needs to stop somewhere on the way home.      PAIN MANAGEMENT: Jackelyn Kendrick was also given her pain management regime, which includes the TENS unit given to her by lilian along with the education required for its use. She was also instructed regarding the Polar ice unit that will be in place after surgery and her postoperative pain medications.     PATIENT WILL RECEIVE TENS UNIT    PAIN MEDICATION:  Percocet 10/325mg 1 po q 4-6 hours prn pain  Ultram 50 mg Take 1-2 p.o. q.6 hours p.r.n. breakthrough pain,   Phenergan 25 mg one p.o. q.6 hours p.r.n. nausea and vomiting.  Aspirin 325 mg one p.o. QD x 3 weeks for DVT prophylaxis    Post op meds to be delivered bedside prior to discharge. Deliver to family if patient is in surgery at 5pm.    The patient was told that narcotic pain medications may make them drowsy and instructions were given to not sign legal documents, drive or operate heavy machinery, cars, or equipment while under the influence of narcotic medications. The patient was told and understands that narcotic pain medications should only be used as needed to control pain and that other options of pain control include TENs unit and ice packs/unit.     Patient was instructed to purchase and take Colace to counter possible GI side effects of taking opiates.     DVT prophylaxis was discussed with the patient today including risk factors for developing DVTs and history of DVTs. The patient was asked if any specific recommendations were given from the doctor/s that did pre-operative surgical clearance. The patient was then given  an education sheet about DVTs and PE with warning signs and symptoms of both and steps to take if they suspect either of these.    Patient was asked if they were taking or using OCP pills or devices. If they answered yes, then they were instructed to stop using OCPs at this pre-operative appointment until 2 months post-op to help prevent DVT development. They understand that there are other forms of birth control that do not involve hormones. They expressed understanding that ignoring/not following this instruction could result in a DVT which could turn into a deadly pulmonary embolism.     This along with the Modified Caprini risk assessment model for VTE in general surgical patients was used to determine the patient's DVT risk.     From: Sally DYE, Vimal DA, Becca SM, et al. Prevention of VTE in nonorthopedic surgical patients: antithrombotic therapy and prevention of thrombosis, 9th ed: American College of Chest Physicians evidence-based clinical practical guidelines. Chest 2012; 141:e227S. Copyright © 2012. Reproduced with permission from the American College of Chest Physicians.    The below listed DVT prophylaxis regimen was discussed along with SCDs during surgery and bilateral TIM compression stockings to be used post-op. Length of treatment has been determined to be 10-42 days post-op by the above noted Caprini assessment model. Early ambulation post-op was also discussed and emphasized with the patient.     Patient was instructed to buy and take:  Aspirin 325mg QD x 3 weeks for DVT prophylaxis starting on the evening after surgery.  Patient will also use bilateral TEDs on lower extremities, SCDs during surgery, and early ambulation post-op. If the patient was previously taking 81mg baby aspirin, they were told to not take it will using the above stated aspirin and to restart the 81mg aspirin after completion of the aspirin dose.      Patient was also told to buy over the counter Prilosec medication and take  it once daily for GI protection as long as they are taking NSAIDs or Aspirin.     Published data in Santiago OMER, et al. J Arthroplasty. Oct; 31(10):2237-40, 2016; showed that aspirin use as prophylaxis during revision total joint arthroplasty was more effective than warfarin in preventing symptomatic venous thromboembolic events and was associated with lower complications.  Patients in the study were also treated with intermittent pneumatic compression devices. Compression stockings would be our method of mechanical prophylaxis, which has been shown to be similar to pneumatic compression in the systematic review, Jacob RJ, et al. Marta Surg. Feb; 239(2): 162-171, 2004.     Results showed a significantly higher incidence of symptomatic venous thromboembolic events among patients in the warfarin group vs. the aspirin group (1.75% vs. 0.56%). Researchers also noted a bleeding event rate of 1.5% among patients who received warfarin compared with a rate of 0.4% among patients who received aspirin.    Patient denies history of seizures.     I explained to following and the patient expressed understanding:  The patient is currently aware of the COVID19 pandemic and that proceeding with their surgical procedure could potentially increase exposure to coronavirus in the community. The patient understands that there is the possibility of delayed or cancelled appts or PT visits in the future. They understand that infection with the coronavirus could complicate their surgery recovery. They are aware of the current policies and procedures of [facility] and the government regarding the pandemic and they were given the option of delaying my surgery. The patient elects to proceed with surgery at this time.       The patient has been scheduled to undergo coronavirus testing on the day prior to surgery.     The patient was instructed to practice strict social distancing, hand washing/hygiene, respiratory hygiene, and cough etiquette  from now until 6 weeks following surgery to reduce the risk of tiff coronavirus.    As there were no other questions to be asked, she was given my business card along with Reginald Pickens MD business card if she has any questions or concerns prior to surgery or in the postop period.

## 2020-09-20 ENCOUNTER — LAB VISIT (OUTPATIENT)
Dept: SPORTS MEDICINE | Facility: CLINIC | Age: 70
End: 2020-09-20
Payer: MEDICARE

## 2020-09-20 DIAGNOSIS — Z01.812 ENCOUNTER FOR PRE-OPERATIVE LABORATORY TESTING: ICD-10-CM

## 2020-09-20 LAB — SARS-COV-2 RNA RESP QL NAA+PROBE: NOT DETECTED

## 2020-09-20 PROCEDURE — U0003 INFECTIOUS AGENT DETECTION BY NUCLEIC ACID (DNA OR RNA); SEVERE ACUTE RESPIRATORY SYNDROME CORONAVIRUS 2 (SARS-COV-2) (CORONAVIRUS DISEASE [COVID-19]), AMPLIFIED PROBE TECHNIQUE, MAKING USE OF HIGH THROUGHPUT TECHNOLOGIES AS DESCRIBED BY CMS-2020-01-R: HCPCS

## 2020-09-21 NOTE — ANESTHESIA PAT ROS NOTE
09/21/2020  Jackelyn Kendrick is a 69 y.o., female.      Pre-op Assessment    I have reviewed the Patient Summary Reports.     I have reviewed the Nursing Notes. I have reviewed the NPO Status.   I have reviewed the Medications.     Review of Systems  Anesthesia Hx:  Hx of Anesthetic complications  History of prior surgery of interest to airway management or planning: Denies Family Hx of Anesthesia complications.  Personal Hx of Anesthesia complications, Post-Operative Nausea/Vomiting   Social:  Non-Smoker, No Alcohol Use  Denies Tobacco Use. Denies Alcohol Use.   Hematology/Oncology:  Hematology Normal   Oncology Normal     EENT/Dental:  EENT/Dental Normal Denies Eye Symptoms   Denies ear symptoms  Denies Active Dental Problems    Cardiovascular:   Hypertension  Denies Deep Venous Thrombosis (DVT)  Hypertension    Pulmonary:  Pulmonary Normal  Denies Asthma.  Denies Chronic Obstructive Pulmonary Disease (COPD).  Denies Pulmonary Infection.    Renal/:  Renal/ Normal   Denies Renal Symptoms/Infections/Stones    Hepatic/GI:   GERD  Denies Hepatic/GI Symptoms    Musculoskeletal:   Arthritis   Denies Musculoskeletal General/Symptoms  Denies Bone Disorder    Neurological:   no Neuro Symptoms Denies Seizure Disorder  Denies Head Injury    Endocrine:   Hypothyroidism  Denies Diabetes  Denies Thyroid Disease   Denies Pituitary Disease    Dermatological:  Skin Normal    Psych:  Psychiatric Normal    Denies Anxiety Disorder.             Anesthesia Assessment: Preoperative EQUATION    Planned Procedure: Procedure(s) (LRB):  REPAIR, ROTATOR CUFF, ARTHROSCOPIC (Right)  FIXATION, TENDON (Right)  Requested Anesthesia Type:General  Surgeon: Reginald Pickens MD  Service: Orthopedics  Known or anticipated Date of Surgery:9/23/2020    Surgeon notes: reviewed     Past Surgical History:   Procedure Laterality Date     BACK SURGERY      CHOLECYSTECTOMY      COLONOSCOPY  2007    diverticulosis    COLONOSCOPY N/A 9/3/2020    Procedure: COLONOSCOPY;  Surgeon: Alberta Henriquez MD;  Location: [facility] ([facility]TH [facility]);  Service: Colon and Rectal;  Laterality: N/A;  pt requested this time-8/31-covid- [place]-tb    DE QUERVAIN'S RELEASE Left 03/2017    HERNIA REPAIR      HYSTERECTOMY      NASAL SEPTUM SURGERY      SHOULDER SURGERY      TONSILLECTOMY         Electronic QUestionnaire Assessment completed via nurse interview with patient.        Triage considerations:     The patient has no apparent active cardiac condition (No unstable coronary Syndrome such as severe unstable angina or recent [<1 month] myocardial infarction, decompensated CHF, severe valvular   disease or significant arrhythmia)    Previous anesthesia records:Hx PONV    Last PCP note: within [facility]      Per Dr. Area 8/31/20:  Pt's labs and EKG have been reviewed.  Pt has been cleared for anesthesia and surgery      Subspecialty notes: Ortho     Past Medical History:   Diagnosis Date    Bronchitis     seasonal    Cataract     Diverticulitis     Dry eye syndrome     GERD (gastroesophageal reflux disease)     Hyperlipidemia     Hypertension     Hypothyroidism 7/19/2012    Obese     PONV (postoperative nausea and vomiting)     Thyroid disease      Other important co-morbidities: see hx       Tests already available:  No recent tests. EKG and labs     Vent. Rate : 071 BPM     Atrial Rate : 071 BPM      P-R Int : 126 ms          QRS Dur : 088 ms       QT Int : 414 ms       P-R-T Axes : 041 016 030 degrees      QTc Int : 449 ms     Normal sinus rhythm   Low voltage, precordial leads     When compared with ECG of 02-JUL-2014 13:35,   No significant change was found   Confirmed by DINAH HINOJOSA MD ([id number]) on 8/26/2020 9:45:39 AM             Instructions given. (See in Nurse's note)    Optimization:  Anesthesia Preop Clinic Assessment  Indicated    Medical Opinion  Indicated       Sub-specialist consult indicated:   TBD       Plan:    Testing:  See Orders.   Pre-anesthesia  visit       Visit focus: possible regional anesthesia and/or nerve block      Consultation:Patient's PCP for a statement of optimization      Patient  has previously scheduled Medical Appointment:    Navigation: Tests Scheduled.              Consults scheduled.             Results will be tracked by Preop Clinic.                 Straight Line to surgery.               No tests, anesthesia preop clinic visit, or consult required.

## 2020-09-22 ENCOUNTER — ANESTHESIA EVENT (OUTPATIENT)
Dept: SURGERY | Facility: HOSPITAL | Age: 70
End: 2020-09-22
Payer: MEDICARE

## 2020-09-22 ENCOUNTER — TELEPHONE (OUTPATIENT)
Dept: SPORTS MEDICINE | Facility: CLINIC | Age: 70
End: 2020-09-22

## 2020-09-23 ENCOUNTER — HOSPITAL ENCOUNTER (OUTPATIENT)
Facility: HOSPITAL | Age: 70
Discharge: HOME | End: 2020-09-23
Attending: ORTHOPAEDIC SURGERY | Admitting: ORTHOPAEDIC SURGERY
Payer: MEDICARE

## 2020-09-23 ENCOUNTER — ANESTHESIA (OUTPATIENT)
Dept: SURGERY | Facility: HOSPITAL | Age: 70
End: 2020-09-23
Payer: MEDICARE

## 2020-09-23 DIAGNOSIS — M75.111 NONTRAUMATIC INCOMPLETE TEAR OF RIGHT ROTATOR CUFF: Primary | ICD-10-CM

## 2020-09-23 DIAGNOSIS — M75.101 NONTRAUMATIC TEAR OF RIGHT ROTATOR CUFF, UNSPECIFIED TEAR EXTENT: ICD-10-CM

## 2020-09-23 PROCEDURE — 94761 N-INVAS EAR/PLS OXIMETRY MLT: CPT

## 2020-09-23 PROCEDURE — 23430 PR REPAIR BICEPS LONG TENDON: ICD-10-PCS | Mod: RT,GC,, | Performed by: ORTHOPAEDIC SURGERY

## 2020-09-23 PROCEDURE — 99900035 HC TECH TIME PER 15 MIN (STAT)

## 2020-09-23 PROCEDURE — 25000003 PHARM REV CODE 250: Performed by: PHYSICIAN ASSISTANT

## 2020-09-23 PROCEDURE — 25000003 PHARM REV CODE 250: Performed by: ORTHOPAEDIC SURGERY

## 2020-09-23 PROCEDURE — 64416 NJX AA&/STRD BRCH PL NFS IMG: CPT | Mod: 59,RT,, | Performed by: ANESTHESIOLOGY

## 2020-09-23 PROCEDURE — 25000003 PHARM REV CODE 250: Performed by: NURSE ANESTHETIST, CERTIFIED REGISTERED

## 2020-09-23 PROCEDURE — 63600175 PHARM REV CODE 636 W HCPCS: Performed by: STUDENT IN AN ORGANIZED HEALTH CARE EDUCATION/TRAINING PROGRAM

## 2020-09-23 PROCEDURE — 71000033 HC RECOVERY, INTIAL HOUR: Performed by: ORTHOPAEDIC SURGERY

## 2020-09-23 PROCEDURE — 29826 PR SHLDR ARTHROSCOP,PART ACROMIOPLAS: ICD-10-PCS | Mod: RT,GC,, | Performed by: ORTHOPAEDIC SURGERY

## 2020-09-23 PROCEDURE — D9220A PRA ANESTHESIA: Mod: ANES,,, | Performed by: ANESTHESIOLOGY

## 2020-09-23 PROCEDURE — S0028 INJECTION, FAMOTIDINE, 20 MG: HCPCS | Performed by: NURSE ANESTHETIST, CERTIFIED REGISTERED

## 2020-09-23 PROCEDURE — 63600175 PHARM REV CODE 636 W HCPCS: Performed by: PHYSICIAN ASSISTANT

## 2020-09-23 PROCEDURE — 63600175 PHARM REV CODE 636 W HCPCS: Performed by: ORTHOPAEDIC SURGERY

## 2020-09-23 PROCEDURE — 76942 ECHO GUIDE FOR BIOPSY: CPT | Performed by: STUDENT IN AN ORGANIZED HEALTH CARE EDUCATION/TRAINING PROGRAM

## 2020-09-23 PROCEDURE — 64416 PR NERVE BLOCK INJ, ANES/STEROID, BRACHIAL PLEXUS, CONT INFUSION, INCL IMAG GUIDANCE: ICD-10-PCS | Mod: 59,RT,, | Performed by: ANESTHESIOLOGY

## 2020-09-23 PROCEDURE — 25000003 PHARM REV CODE 250: Performed by: ANESTHESIOLOGY

## 2020-09-23 PROCEDURE — 63600175 PHARM REV CODE 636 W HCPCS: Performed by: NURSE ANESTHETIST, CERTIFIED REGISTERED

## 2020-09-23 PROCEDURE — 64416 NJX AA&/STRD BRCH PL NFS IMG: CPT | Performed by: STUDENT IN AN ORGANIZED HEALTH CARE EDUCATION/TRAINING PROGRAM

## 2020-09-23 PROCEDURE — 36000711: Performed by: ORTHOPAEDIC SURGERY

## 2020-09-23 PROCEDURE — 23430 REPAIR BICEPS TENDON: CPT | Mod: RT,GC,, | Performed by: ORTHOPAEDIC SURGERY

## 2020-09-23 PROCEDURE — 29826 SHO ARTHRS SRG DECOMPRESSION: CPT | Mod: RT,GC,, | Performed by: ORTHOPAEDIC SURGERY

## 2020-09-23 PROCEDURE — 71000015 HC POSTOP RECOV 1ST HR: Performed by: ORTHOPAEDIC SURGERY

## 2020-09-23 PROCEDURE — 37000008 HC ANESTHESIA 1ST 15 MINUTES: Performed by: ORTHOPAEDIC SURGERY

## 2020-09-23 PROCEDURE — D9220A PRA ANESTHESIA: ICD-10-PCS | Mod: ANES,,, | Performed by: ANESTHESIOLOGY

## 2020-09-23 PROCEDURE — C1713 ANCHOR/SCREW BN/BN,TIS/BN: HCPCS | Performed by: ORTHOPAEDIC SURGERY

## 2020-09-23 PROCEDURE — 36000710: Performed by: ORTHOPAEDIC SURGERY

## 2020-09-23 PROCEDURE — D9220A PRA ANESTHESIA: Mod: CRNA,,, | Performed by: NURSE ANESTHETIST, CERTIFIED REGISTERED

## 2020-09-23 PROCEDURE — D9220A PRA ANESTHESIA: ICD-10-PCS | Mod: CRNA,,, | Performed by: NURSE ANESTHETIST, CERTIFIED REGISTERED

## 2020-09-23 PROCEDURE — 94770 HC EXHALED C02 TEST: CPT

## 2020-09-23 PROCEDURE — 29824 PR SHLDR ARTHROSCOP,SURG,DIS CLAVICULECTOMY: ICD-10-PCS | Mod: 51,RT,GC, | Performed by: ORTHOPAEDIC SURGERY

## 2020-09-23 PROCEDURE — 27201423 OPTIME MED/SURG SUP & DEVICES STERILE SUPPLY: Performed by: ORTHOPAEDIC SURGERY

## 2020-09-23 PROCEDURE — 76942 ECHO GUIDE FOR BIOPSY: CPT | Mod: 26,,, | Performed by: ANESTHESIOLOGY

## 2020-09-23 PROCEDURE — 76942 PR U/S GUIDANCE FOR NEEDLE GUIDANCE: ICD-10-PCS | Mod: 26,,, | Performed by: ANESTHESIOLOGY

## 2020-09-23 PROCEDURE — 37000009 HC ANESTHESIA EA ADD 15 MINS: Performed by: ORTHOPAEDIC SURGERY

## 2020-09-23 PROCEDURE — 29824 SHO ARTHRS SRG DSTL CLAVICLC: CPT | Mod: 51,RT,GC, | Performed by: ORTHOPAEDIC SURGERY

## 2020-09-23 DEVICE — ANCHOR FORKED TIP 7MM PEEI: Type: IMPLANTABLE DEVICE | Site: SHOULDER | Status: FUNCTIONAL

## 2020-09-23 RX ORDER — HYDROMORPHONE HYDROCHLORIDE 1 MG/ML
0.2 INJECTION, SOLUTION INTRAMUSCULAR; INTRAVENOUS; SUBCUTANEOUS EVERY 5 MIN PRN
Status: DISCONTINUED | OUTPATIENT
Start: 2020-09-23 | End: 2020-09-23 | Stop reason: HOSPADM

## 2020-09-23 RX ORDER — EPHEDRINE SULFATE 50 MG/ML
INJECTION, SOLUTION INTRAVENOUS
Status: DISCONTINUED | OUTPATIENT
Start: 2020-09-23 | End: 2020-09-23

## 2020-09-23 RX ORDER — LIDOCAINE HCL/PF 100 MG/5ML
SYRINGE (ML) INTRAVENOUS
Status: DISCONTINUED | OUTPATIENT
Start: 2020-09-23 | End: 2020-09-23

## 2020-09-23 RX ORDER — BUPIVACAINE HYDROCHLORIDE 2.5 MG/ML
INJECTION, SOLUTION EPIDURAL; INFILTRATION; INTRACAUDAL
Status: DISCONTINUED | OUTPATIENT
Start: 2020-09-23 | End: 2020-09-23 | Stop reason: HOSPADM

## 2020-09-23 RX ORDER — ONDANSETRON 2 MG/ML
INJECTION INTRAMUSCULAR; INTRAVENOUS
Status: DISCONTINUED | OUTPATIENT
Start: 2020-09-23 | End: 2020-09-23

## 2020-09-23 RX ORDER — SODIUM CHLORIDE 0.9 % (FLUSH) 0.9 %
3 SYRINGE (ML) INJECTION
Status: DISCONTINUED | OUTPATIENT
Start: 2020-09-23 | End: 2020-09-23 | Stop reason: HOSPADM

## 2020-09-23 RX ORDER — ROCURONIUM BROMIDE 10 MG/ML
INJECTION, SOLUTION INTRAVENOUS
Status: DISCONTINUED | OUTPATIENT
Start: 2020-09-23 | End: 2020-09-23

## 2020-09-23 RX ORDER — HALOPERIDOL 5 MG/ML
0.5 INJECTION INTRAMUSCULAR ONCE AS NEEDED
Status: DISCONTINUED | OUTPATIENT
Start: 2020-09-23 | End: 2020-09-23 | Stop reason: HOSPADM

## 2020-09-23 RX ORDER — NEOSTIGMINE METHYLSULFATE 1 MG/ML
INJECTION, SOLUTION INTRAVENOUS
Status: DISCONTINUED | OUTPATIENT
Start: 2020-09-23 | End: 2020-09-23

## 2020-09-23 RX ORDER — ROPIVACAINE HYDROCHLORIDE 5 MG/ML
INJECTION, SOLUTION EPIDURAL; INFILTRATION; PERINEURAL
Status: DISCONTINUED | OUTPATIENT
Start: 2020-09-23 | End: 2020-09-23

## 2020-09-23 RX ORDER — FAMOTIDINE 10 MG/ML
INJECTION INTRAVENOUS
Status: DISCONTINUED | OUTPATIENT
Start: 2020-09-23 | End: 2020-09-23

## 2020-09-23 RX ORDER — PROPOFOL 10 MG/ML
VIAL (ML) INTRAVENOUS
Status: DISCONTINUED | OUTPATIENT
Start: 2020-09-23 | End: 2020-09-23

## 2020-09-23 RX ORDER — CELECOXIB 200 MG/1
400 CAPSULE ORAL ONCE
Status: COMPLETED | OUTPATIENT
Start: 2020-09-23 | End: 2020-09-23

## 2020-09-23 RX ORDER — CARBOXYMETHYLCELLULOSE SODIUM 10 MG/ML
GEL OPHTHALMIC
Status: DISCONTINUED | OUTPATIENT
Start: 2020-09-23 | End: 2020-09-23

## 2020-09-23 RX ORDER — KETAMINE HCL IN 0.9 % NACL 50 MG/5 ML
SYRINGE (ML) INTRAVENOUS
Status: DISCONTINUED | OUTPATIENT
Start: 2020-09-23 | End: 2020-09-23

## 2020-09-23 RX ORDER — CEFAZOLIN SODIUM 1 G/3ML
2 INJECTION, POWDER, FOR SOLUTION INTRAMUSCULAR; INTRAVENOUS
Status: COMPLETED | OUTPATIENT
Start: 2020-09-23 | End: 2020-09-23

## 2020-09-23 RX ORDER — MIDAZOLAM HYDROCHLORIDE 1 MG/ML
0.5 INJECTION INTRAMUSCULAR; INTRAVENOUS
Status: DISCONTINUED | OUTPATIENT
Start: 2020-09-23 | End: 2020-09-23 | Stop reason: HOSPADM

## 2020-09-23 RX ORDER — FENTANYL CITRATE 50 UG/ML
25 INJECTION, SOLUTION INTRAMUSCULAR; INTRAVENOUS EVERY 5 MIN PRN
Status: DISCONTINUED | OUTPATIENT
Start: 2020-09-23 | End: 2020-09-23 | Stop reason: HOSPADM

## 2020-09-23 RX ORDER — MIDAZOLAM HYDROCHLORIDE 1 MG/ML
INJECTION, SOLUTION INTRAMUSCULAR; INTRAVENOUS
Status: DISCONTINUED | OUTPATIENT
Start: 2020-09-23 | End: 2020-09-23

## 2020-09-23 RX ORDER — PHENYLEPHRINE HYDROCHLORIDE 10 MG/ML
INJECTION INTRAVENOUS
Status: DISCONTINUED | OUTPATIENT
Start: 2020-09-23 | End: 2020-09-23

## 2020-09-23 RX ORDER — EPINEPHRINE 1 MG/ML
INJECTION, SOLUTION INTRACARDIAC; INTRAMUSCULAR; INTRAVENOUS; SUBCUTANEOUS
Status: DISCONTINUED | OUTPATIENT
Start: 2020-09-23 | End: 2020-09-23 | Stop reason: HOSPADM

## 2020-09-23 RX ORDER — DEXAMETHASONE SODIUM PHOSPHATE 4 MG/ML
INJECTION, SOLUTION INTRA-ARTICULAR; INTRALESIONAL; INTRAMUSCULAR; INTRAVENOUS; SOFT TISSUE
Status: DISCONTINUED | OUTPATIENT
Start: 2020-09-23 | End: 2020-09-23

## 2020-09-23 RX ORDER — ACETAMINOPHEN 500 MG
1000 TABLET ORAL ONCE
Status: COMPLETED | OUTPATIENT
Start: 2020-09-23 | End: 2020-09-23

## 2020-09-23 RX ORDER — FENTANYL CITRATE 50 UG/ML
INJECTION, SOLUTION INTRAMUSCULAR; INTRAVENOUS
Status: DISCONTINUED | OUTPATIENT
Start: 2020-09-23 | End: 2020-09-23

## 2020-09-23 RX ORDER — SODIUM CHLORIDE 9 MG/ML
INJECTION, SOLUTION INTRAVENOUS CONTINUOUS
Status: DISCONTINUED | OUTPATIENT
Start: 2020-09-23 | End: 2020-09-23 | Stop reason: HOSPADM

## 2020-09-23 RX ADMIN — EPHEDRINE SULFATE 10 MG: 50 INJECTION INTRAVENOUS at 01:09

## 2020-09-23 RX ADMIN — CELECOXIB 400 MG: 200 CAPSULE ORAL at 11:09

## 2020-09-23 RX ADMIN — ACETAMINOPHEN 1000 MG: 500 TABLET ORAL at 11:09

## 2020-09-23 RX ADMIN — DEXAMETHASONE SODIUM PHOSPHATE 8 MG: 4 INJECTION, SOLUTION INTRAMUSCULAR; INTRAVENOUS at 12:09

## 2020-09-23 RX ADMIN — ROPIVACAINE HYDROCHLORIDE: 2 INJECTION, SOLUTION EPIDURAL; INFILTRATION at 02:09

## 2020-09-23 RX ADMIN — ROCURONIUM BROMIDE 40 MG: 10 INJECTION, SOLUTION INTRAVENOUS at 12:09

## 2020-09-23 RX ADMIN — NEOSTIGMINE METHYLSULFATE 3.5 MG: 1 INJECTION INTRAVENOUS at 01:09

## 2020-09-23 RX ADMIN — PHENYLEPHRINE HYDROCHLORIDE 100 MCG: 10 INJECTION INTRAVENOUS at 01:09

## 2020-09-23 RX ADMIN — FENTANYL CITRATE 50 MCG: 50 INJECTION, SOLUTION INTRAMUSCULAR; INTRAVENOUS at 12:09

## 2020-09-23 RX ADMIN — SODIUM CHLORIDE, SODIUM GLUCONATE, SODIUM ACETATE, POTASSIUM CHLORIDE, MAGNESIUM CHLORIDE, SODIUM PHOSPHATE, DIBASIC, AND POTASSIUM PHOSPHATE: .53; .5; .37; .037; .03; .012; .00082 INJECTION, SOLUTION INTRAVENOUS at 01:09

## 2020-09-23 RX ADMIN — Medication 10 MG: at 12:09

## 2020-09-23 RX ADMIN — ONDANSETRON 4 MG: 2 INJECTION, SOLUTION INTRAMUSCULAR; INTRAVENOUS at 01:09

## 2020-09-23 RX ADMIN — ROPIVACAINE HYDROCHLORIDE 10 ML: 5 INJECTION, SOLUTION EPIDURAL; INFILTRATION; PERINEURAL at 11:09

## 2020-09-23 RX ADMIN — FENTANYL CITRATE 50 MCG: 50 INJECTION, SOLUTION INTRAMUSCULAR; INTRAVENOUS at 01:09

## 2020-09-23 RX ADMIN — MIDAZOLAM 2 MG: 1 INJECTION INTRAMUSCULAR; INTRAVENOUS at 11:09

## 2020-09-23 RX ADMIN — MIDAZOLAM HYDROCHLORIDE 2 MG: 1 INJECTION, SOLUTION INTRAMUSCULAR; INTRAVENOUS at 12:09

## 2020-09-23 RX ADMIN — EPHEDRINE SULFATE 5 MG: 50 INJECTION INTRAVENOUS at 01:09

## 2020-09-23 RX ADMIN — FENTANYL CITRATE 50 MCG: 50 INJECTION INTRAMUSCULAR; INTRAVENOUS at 11:09

## 2020-09-23 RX ADMIN — CARBOXYMETHYLCELLULOSE SODIUM 2 DROP: 10 GEL OPHTHALMIC at 12:09

## 2020-09-23 RX ADMIN — CEFAZOLIN 2 G: 330 INJECTION, POWDER, FOR SOLUTION INTRAMUSCULAR; INTRAVENOUS at 12:09

## 2020-09-23 RX ADMIN — PROPOFOL 180 MG: 10 INJECTION, EMULSION INTRAVENOUS at 12:09

## 2020-09-23 RX ADMIN — PHENYLEPHRINE HYDROCHLORIDE 100 MCG: 10 INJECTION INTRAVENOUS at 12:09

## 2020-09-23 RX ADMIN — SODIUM CHLORIDE: 0.9 INJECTION, SOLUTION INTRAVENOUS at 11:09

## 2020-09-23 RX ADMIN — FAMOTIDINE 20 MG: 10 INJECTION, SOLUTION INTRAVENOUS at 12:09

## 2020-09-23 RX ADMIN — LIDOCAINE HYDROCHLORIDE 100 MG: 20 INJECTION, SOLUTION INTRAVENOUS at 12:09

## 2020-09-23 NOTE — DISCHARGE INSTRUCTIONS
[address], [place]                                    [phone]             Postoperative Instructions for Shoulder Surgery               Your Surgery Included:   Open              Arthroscopic [] Instability Repair     [] Diagnostic   [] Rotator Cuff Repair     [] Lysis of Adhesions / Manipulation [] Distal Clavicle Resection    [x] Debridement [x] Biceps Tenodesis       [x] Labrum  [x] Rotator Cuff   [] Cartilage   [] Contracture Release    [] SLAP Repair   [] Fracture Fixation    [] Instability Repair  [] AC Joint Reconstruction      [] Rotator Cuff Repair [] Joint Replacement     [x] Subacromial Decompression / Bursectomy   [] Hemiarthroplasty  [] Total Shoulder    [] Biceps Tenotomy / Tenodesis    [] Reverse Total Shoulder       [x] Distal Clavicle Resection          [] Amniox Arthrocentesis    [] Contracture Release                 Call our office ([phone]) immediately if you experience any of the following:      Excessive bleeding or pus like drainage at the incision site      Uncontrollable pain not relieved by pain medication      Excessive swelling or redness at the incision site      Fever above 101.5 degrees not controlled with Tylenol or Motrin      Shortness of Breath      Any foul odor or blistering from the surgery site   FOR EMERGENCIES: If any unusual problems or difficulties occur, call our office at [phone], or page the  at [phone] who will direct your call appropriately   1.   Pain Management: A cold therapy cuff, pain medications, local injections, and in some cases, regional anesthesia injections are used to manage your post-operative pain. The decision to use each of these options is based on their risks and benefits.    Medications: You were given one or more of the following medication prescriptions before leaving the hospital. Have the prescriptions filled at a pharmacy on your way home and follow the instructions on  the bottles. If you need a refill, please call your pharmacy.     Narcotic Medication (usually Vicodin ES, Lortab, Percocet or Nucynta): Begin taking the medication before your shoulder starts to hurt. Some patients do not like to take any medication, but if you wait until your pain is severe before taking, you will be very uncomfortable for several hours waiting for the narcotic to work. Always take with food.    Nausea / Vomiting: For this issue, we prescribe Phenergan, use this medication as directed.    Cold Therapy: You may have been sent home with a Eligible cold therapy unit and wrap for your shoulder. Fill with ice and water to the indicated fill line and use throughout the day for the first two days and then as needed to help relieve pain and control swelling.     Regional Anesthesia Injections (Blocks): You may have been given a regional nerve block either before or after surgery. This may make your entire shoulder numb for 24-36 hours.                    2.   Diet: Eat a bland diet for the first day after surgery. Progress your diet as tolerated. Constipation may occur with Narcotic usage, contact our office if you are experiencing constipation.   3.   Activity: After you arrive at home, spend most of the first 24 hours resting in bed, on the couch, or in a reclining chair. After the first 24 hours at home, slowly increase your activity level based on your symptoms.   4.   Dressing Change: Remove the dressing on the 3rd day. It is normal for some blood to be seen on the dressings. It is also normal for you to see apparent bruising on the skin around your shoulder when you remove the dressing. If present, leave the steri-strip tape across the incisions. If you are concerned by the drainage or the appearance of your shoulder, please call one of the numbers listed below.   5.   Showering: You may shower on the 3rd day after surgery with waterproof bandages over small incisions. If you have an incision  with Prineo (clear mesh), it does not need to be covered. Do not submerge in any water until after your postoperative appointment in clinic.   6.   Shoulder Sling (with/without Pillow attachment): You may have been sent home with a sling / pillow attachment holding your arm away from your body. You may remove the sling when changing clothes or bathing. Make sure to wear the sling while sleeping unless instructed otherwise. You may remove at rest or for exercises.           [x] You need to wear the sling with pillow for 24 hours a day for 4 weeks.   7.  Shoulder Exercises: Begin these exercises the first day after surgery in order to help you regain your motion and strength. You may do the following marked exercises for 2-5 mins five times a day:   [x] Shoulder shrugs - Shrug your shoulders up and down.   [] Pendulums - Bend forward allowing your arm to hang down in front of you. Gently swing your arm side-to-side and front to back.                                                                                                                               [] Passive abduction - Have a family member gently lift your arm away from your body bringing your elbow up to the level of your shoulder.                                                                                                                   [] Shoulder rotation - With your arm at your side, have a family member gently rotate your arm internally and externally.                                                                                                                  [x] Scapular retractions - (Squeeze shoulder blades together): Squeeze shoulder blades together while slightly pulling them down (do not shrug your shoulders upward); You can perform 10-15 reps, several times throughout the day, when seated at your desk, driving in the car, etc.                                                                                                                      [] Pulley exercises - Put a towel or long sleeve shirt over the top of a door. Stand facing the door. Use your good arm to gently pull your operative arm up in front of you.   [] Elbow motion - Straighten and bend your elbow.                                                                                                               [x] Ball squeezes - use ball attached to sling/pillow or soft (nerf) ball for  strengthening                                                                                                                 8.  Physical Therapy: Physical therapy is an essential component to your recovery from surgery. Your physical therapy will start in 5 days.   FIRST POSTOPERATIVE VISIT: As scheduled.

## 2020-09-23 NOTE — ANESTHESIA PROCEDURE NOTES
Intubation  Performed by: Lex Yung CRNA  Authorized by: Lex Yung CRNA     Intubation:     Induction:  Intravenous    Intubated:  Postinduction    Mask Ventilation:  Easy mask    Attempts:  1    Attempted By:  Staff anesthesiologist    Method of Intubation:  Direct    Blade:  Hurd 2    Laryngeal View Grade: Grade I - full view of chords      Difficult Airway Encountered?: No      Complications:  None    Airway Device Size:  7.0    Style/Cuff Inflation:  Cuffed    Inflation Amount (mL):  5    Tube secured:  22    Complicating Factors:  None

## 2020-09-23 NOTE — ANESTHESIA PROCEDURE NOTES
Interscalene catheter    Patient location during procedure: pre-op   Block not for primary anesthetic.  Reason for block: at surgeon's request and post-op pain management   Post-op Pain Location: right shoulder  Start time: 9/23/2020 11:29 AM  Timeout: 9/23/2020 11:28 AM   End time: 9/23/2020 11:50 AM    Staffing  Authorizing Provider: Francisca Pizarro MD  Performing Provider: Selam Cruz MD    Preanesthetic Checklist  Completed: patient identified, site marked, surgical consent, pre-op evaluation, timeout performed, IV checked, risks and benefits discussed and monitors and equipment checked  Peripheral Block  Patient position: sitting  Prep: ChloraPrep and site prepped and draped  Patient monitoring: heart rate, cardiac monitor, continuous pulse ox, continuous capnometry and frequent blood pressure checks  Block type: interscalene  Laterality: right  Injection technique: continuous  Needle  Needle type: Tuohy   Needle gauge: 18 G  Needle length: 2 in  Needle localization: anatomical landmarks and ultrasound guidance  Catheter type: non-stimulating  Catheter size: 20 G  Test dose: lidocaine 1.5% with Epi 1-to-200,000 and negative   -ultrasound image captured on disc.  Assessment  Injection assessment: negative aspiration, negative parasthesia and local visualized surrounding nerve  Paresthesia pain: none  Heart rate change: no  Slow fractionated injection: yes  Additional Notes  VSS.  [facility] RN monitoring vitals throughout procedure.  Patient tolerated procedure well. 20cc 0.25% ropivicaine with 1:200k epi given

## 2020-09-23 NOTE — TRANSFER OF CARE
"Anesthesia Transfer of Care Note    Patient: Jackelyn Kendrick    Procedure(s) Performed: Procedure(s) (LRB):  REPAIR, ROTATOR CUFF, ARTHROSCOPIC (Right)  FIXATION, TENDON (Right)    Patient location: PACU    Anesthesia Type: general    Transport from OR: Transported from OR on 6-10 L/min O2 by face mask with adequate spontaneous ventilation    Post pain: adequate analgesia    Post assessment: no apparent anesthetic complications    Post vital signs: stable    Level of consciousness: sedated    Nausea/Vomiting: no nausea/vomiting    Complications: none          Last vitals:   Visit Vitals  BP (!) 128/57 (BP Location: Left arm, Patient Position: Lying)   Pulse 78   Temp 36.9 °C (98.4 °F) (Oral)   Resp 14   Ht 5' 1" (1.549 m)   Wt 66.2 kg (146 lb)   SpO2 100%   Breastfeeding No   BMI 27.59 kg/m²     "

## 2020-09-23 NOTE — PROGRESS NOTES
On-Q teaching done with patients  in waiting room. Verbalizes understanding.  agrees to stay with patient for the next 72 hours while medication is infusing. All questions answered. On-Q contract signed, home care instruction pamphlet and extra home care supplies provided to patient upon discharge. Encouraged to contact anesthesia with any questions/concerns.

## 2020-09-23 NOTE — PLAN OF CARE
VSS.  Patient tolerating oral liquids without difficulty.   No apparent s&s of distress noted at this time, no complaints voiced at this time.   Discharge instructions reviewed with patient/ with good verbal feedback received.   Post op medications received, sling and polar ice in place.   Patient ready for discharge.

## 2020-09-23 NOTE — BRIEF OP NOTE
Operative Note       Surgery Date: 9/23/2020     Surgeon(s) and Role:     * Reginald Pickens MD - Primary    Pre-op Diagnosis:  Nontraumatic tear of right rotator cuff, unspecified tear extent [M75.101]  Biceps tendinitis of right upper extremity [M75.21]  Arthritis of right acromioclavicular joint [M19.011]    Post-op Diagnosis:  Nontraumatic tear of right rotator cuff, unspecified tear extent [M75.101]  Biceps tendinitis of right upper extremity [M75.21]  Arthritis of right acromioclavicular joint [M19.011]    Procedure(s) (LRB):  REPAIR, ROTATOR CUFF, ARTHROSCOPIC (Right)  FIXATION, TENDON (Right)    Anesthesia: General    Findings/Key Components:  See op note    Core Measure Documentation:  Were antibiotics extended? No  Was the patient administered a VTE Prophylaxis? No. Short procedure; low risk  Estimated Blood Loss: minimal           Specimens (From admission, onward)    None        Implants:   Implant Name Type Inv. Item Serial No.  Lot No. LRB No. Used Action   ANCHOR FORKED TIP 7MM PEEI - [id number]  ANCHOR FORKED TIP 7MM PEEI  [employer] [id number] Right 1 Implanted       Complications: none           Disposition: PACU - hemodynamically stable.           Condition: Stable    Attestation:  I was present for the entire procedure.

## 2020-09-23 NOTE — ANESTHESIA POSTPROCEDURE EVALUATION
Anesthesia Post Evaluation    Patient: Jackelyn Kendrick    Procedure(s) Performed: Procedure(s) (LRB):  REPAIR, ROTATOR CUFF, ARTHROSCOPIC (Right)  FIXATION, TENDON (Right)    Final Anesthesia Type: general    Patient location during evaluation: PACU  Patient participation: Yes- Able to Participate  Level of consciousness: awake and alert  Post-procedure vital signs: reviewed and stable  Pain management: adequate  Airway patency: patent    PONV status at discharge: No PONV  Anesthetic complications: no      Cardiovascular status: blood pressure returned to baseline  Respiratory status: unassisted and spontaneous ventilation  Hydration status: euvolemic  Follow-up not needed.          Vitals Value Taken Time   /61 09/23/20 1448   Temp 36.7 °C (98 °F) 09/23/20 1445   Pulse 80 09/23/20 1456   Resp 24 09/23/20 1455   SpO2 92 % 09/23/20 1456   Vitals shown include unvalidated device data.      Event Time   Out of Recovery 15:00:00         Pain/Maxi Score: Pain Rating Prior to Med Admin: 0 (9/23/2020  2:27 PM)  Maxi Score: 9 (9/23/2020  2:28 PM)

## 2020-09-23 NOTE — ANESTHESIA PREPROCEDURE EVALUATION
09/23/2020  Jackelyn Kendrick is a 69 y.o., female.      Anesthesia Evaluation    I have reviewed the Patient Summary Reports.    I have reviewed the Nursing Notes. I have reviewed the NPO Status.   I have reviewed the Medications.     Review of Systems  Anesthesia Hx:  Hx of Anesthetic complications  History of prior surgery of interest to airway management or planning: Denies Family Hx of Anesthesia complications.  Personal Hx of Anesthesia complications, Post-Operative Nausea/Vomiting   Social:  Non-Smoker, No Alcohol Use  Denies Tobacco Use. Denies Alcohol Use.   Hematology/Oncology:  Hematology Normal   Oncology Normal     EENT/Dental:  EENT/Dental Normal Denies Eye Symptoms   Denies ear symptoms  Denies Active Dental Problems    Cardiovascular:   Hypertension  Denies Deep Venous Thrombosis (DVT)  Hypertension    Pulmonary:  Pulmonary Normal  Denies Asthma.  Denies Chronic Obstructive Pulmonary Disease (COPD).  Denies Pulmonary Infection.    Renal/:  Renal/ Normal   Denies Renal Symptoms/Infections/Stones    Hepatic/GI:   GERD  Denies Hepatic/GI Symptoms    Musculoskeletal:   Arthritis   Denies Musculoskeletal General/Symptoms  Denies Bone Disorder    Neurological:   no Neuro Symptoms Denies Seizure Disorder  Denies Head Injury    Endocrine:   Hypothyroidism  Denies Diabetes  Denies Thyroid Disease   Denies Pituitary Disease    Dermatological:  Skin Normal    Psych:  Psychiatric Normal    Denies Anxiety Disorder.          Physical Exam  General:  Well nourished    Airway/Jaw/Neck:  Jaw/Neck Findings: Neck ROM: Normal ROM      Dental:  Dental Findings: In tact   Chest/Lungs:  Chest/Lungs Findings: Clear to auscultation, Normal Respiratory Rate     Heart/Vascular:  Heart Findings: Rate: Normal  Rhythm: Regular Rhythm        Mental Status:  Mental Status Findings:  Cooperative, Alert and Oriented          Anesthesia Assessment: Preoperative EQUATION    Planned Procedure: Procedure(s) (LRB):  REPAIR, ROTATOR CUFF, ARTHROSCOPIC (Right)  FIXATION, TENDON (Right)  Requested Anesthesia Type:General  Surgeon: Reginald Pickens MD  Service: Orthopedics  Known or anticipated Date of Surgery:9/23/2020    Surgeon notes: reviewed     Past Surgical History:   Procedure Laterality Date    BACK SURGERY      CHOLECYSTECTOMY      COLONOSCOPY  2007    diverticulosis    COLONOSCOPY N/A 9/3/2020    Procedure: COLONOSCOPY;  Surgeon: Alberta Henriquez MD;  [facility]  Service: Colon and Rectal;  Laterality: N/A;  pt requested this time-8/31-covid-uc [place]    DE QUERVAIN'S RELEASE Left 03/2017    HERNIA REPAIR      HYSTERECTOMY      NASAL SEPTUM SURGERY      SHOULDER SURGERY      TONSILLECTOMY         Electronic QUestionnaire Assessment completed via nurse interview with patient.        Triage considerations:     The patient has no apparent active cardiac condition (No unstable coronary Syndrome such as severe unstable angina or recent [<1 month] myocardial infarction, decompensated CHF, severe valvular   disease or significant arrhythmia)    Previous anesthesia records:Hx PONV    Last PCP note: within [facility]      Per Dr. Area 8/31/20:  Pt's labs and EKG have been reviewed.  Pt has been cleared for anesthesia and surgery      Subspecialty notes: Ortho     Past Medical History:   Diagnosis Date    Bronchitis     seasonal    Cataract     Diverticulitis     Dry eye syndrome     GERD (gastroesophageal reflux disease)     Hyperlipidemia     Hypertension     Hypothyroidism 7/19/2012    Obese     PONV (postoperative nausea and vomiting)     Thyroid disease      Other important co-morbidities: see hx       Tests already available:  No recent tests. EKG and labs     Vent. Rate : 071 BPM     Atrial Rate : 071 BPM      P-R Int : 126 ms          QRS Dur : 088 ms       QT Int : 414 ms        P-R-T Axes : 041 016 030 degrees      QTc Int : 449 ms     Normal sinus rhythm   Low voltage, precordial leads     When compared with ECG of 02-JUL-2014 13:35,   No significant change was found   Confirmed by DINAH HINOJOSA MD ([id number]) on 8/26/2020 9:45:39 AM             Instructions given. (See in Nurse's note)    Optimization:  Anesthesia Preop Clinic Assessment  Indicated    Medical Opinion Indicated       Sub-specialist consult indicated:   TBD       Plan:    Testing:  See Orders.   Pre-anesthesia  visit       Visit focus: possible regional anesthesia and/or nerve block      Consultation:Patient's PCP for a statement of optimization      Patient  has previously scheduled Medical Appointment:    Navigation: Tests Scheduled.              Consults scheduled.             Results will be tracked by Preop Clinic.                 Straight Line to surgery.               No tests, anesthesia preop clinic visit, or consult required.      Anesthesia Plan  Type of Anesthesia, risks & benefits discussed:  Anesthesia Type:  general, regional  Patient's Preference:   Intra-op Monitoring Plan: standard ASA monitors  Intra-op Monitoring Plan Comments:   Post Op Pain Control Plan: IV/PO Opioids PRN, multimodal analgesia and peripheral nerve block  Post Op Pain Control Plan Comments:   Induction:   IV  Beta Blocker:  Patient is not currently on a Beta-Blocker (No further documentation required).       Informed Consent: Patient understands risks and agrees with Anesthesia plan.  Questions answered. Anesthesia consent signed with patient.  ASA Score: 2     Day of Surgery Review of History & Physical:    H&P update referred to the surgeon.         Ready For Surgery From Anesthesia Perspective.

## 2020-09-23 NOTE — INTERVAL H&P NOTE
The patient has been examined and the H&P has been reviewed:    I concur with the findings and no changes have occurred since H&P was written.    Surgery risks, benefits and alternative options discussed and understood by patient/family.          Active Hospital Problems    Diagnosis  POA    Nontraumatic tear of right rotator cuff [M75.101]  Yes      Resolved Hospital Problems   No resolved problems to display.

## 2020-09-24 NOTE — ANESTHESIA POSTPROCEDURE EVALUATION
Anesthesia Post Evaluation    Patient: Jackelyn Kendrick    Procedure(s) Performed: Procedure(s) (LRB):  REPAIR, ROTATOR CUFF, ARTHROSCOPIC (Right)  FIXATION, TENDON (Right)    Final Anesthesia Type: general    Patient location during evaluation: PACU  Patient participation: Yes- Able to Participate  Level of consciousness: awake and alert  Post-procedure vital signs: reviewed and stable  Pain management: adequate  Airway patency: patent    PONV status at discharge: No PONV  Anesthetic complications: no      Cardiovascular status: blood pressure returned to baseline  Respiratory status: unassisted  Hydration status: euvolemic  Follow-up not needed.          Vitals Value Taken Time   /61 09/23/20 1448   Temp 36.7 °C (98 °F) 09/23/20 1445   Pulse 80 09/23/20 1456   Resp 24 09/23/20 1455   SpO2 92 % 09/23/20 1456   Vitals shown include unvalidated device data.      Event Time   Out of Recovery 15:00:00         Pain/Maxi Score: Pain Rating Prior to Med Admin: 0 (9/23/2020  2:27 PM)  Maxi Score: 9 (9/23/2020  2:28 PM)

## 2020-09-25 VITALS
HEART RATE: 90 BPM | DIASTOLIC BLOOD PRESSURE: 61 MMHG | SYSTOLIC BLOOD PRESSURE: 144 MMHG | HEIGHT: 61 IN | OXYGEN SATURATION: 95 % | WEIGHT: 146 LBS | BODY MASS INDEX: 27.56 KG/M2 | RESPIRATION RATE: 18 BRPM | TEMPERATURE: 98 F

## 2020-09-25 NOTE — PROGRESS NOTES
9/25/2020 1130    Patient called at home. Reports pain well controlled with On-Q. Patient denies signs of local anesthetic toxicity. PNC dressing remains clean, dry, and intact. All questions answered. Encouraged to call if any issues arise.

## 2020-09-25 NOTE — PROGRESS NOTES
Ms. Kendrick contacted at home to discuss on Q pump function and level of pain. She reports the pump appears to be infusing and that pain is controlled. Denies leakage of fluid at the catheter site.  Dressing intact. Reports some hand numbness but it has been improving. Addressed all questions.

## 2020-09-26 NOTE — CARE UPDATE
Spoke with patient today. Reports adequate pain control. PNC removed with no complications; blue tip intact. Questions answered and concerns addressed.     Pardeep Hensley MD  [facility]

## 2020-09-26 NOTE — ADDENDUM NOTE
Addendum  created 09/26/20 2967 by Pardeep Hensley MD    Clinical Note Signed, Intraprocedure Event edited

## 2020-09-28 ENCOUNTER — CLINICAL SUPPORT (OUTPATIENT)
Dept: REHABILITATION | Facility: HOSPITAL | Age: 70
End: 2020-09-28
Attending: ORTHOPAEDIC SURGERY
Payer: MEDICARE

## 2020-09-28 DIAGNOSIS — M25.611 DECREASED RIGHT SHOULDER RANGE OF MOTION: ICD-10-CM

## 2020-09-28 DIAGNOSIS — M25.519 SHOULDER PAIN, UNSPECIFIED CHRONICITY, UNSPECIFIED LATERALITY: ICD-10-CM

## 2020-09-28 DIAGNOSIS — M75.101 NONTRAUMATIC TEAR OF RIGHT ROTATOR CUFF, UNSPECIFIED TEAR EXTENT: ICD-10-CM

## 2020-09-28 PROCEDURE — 97161 PT EVAL LOW COMPLEX 20 MIN: CPT

## 2020-09-28 PROCEDURE — 97110 THERAPEUTIC EXERCISES: CPT

## 2020-09-28 NOTE — PLAN OF CARE
[facility]  Physical Therapy Initial Evaluation    Date: 9/28/2020   Name: Jackelyn Kendrick  Clinic Number: [id number]    Therapy Diagnosis:   Encounter Diagnoses   Name Primary?    Nontraumatic tear of right rotator cuff, unspecified tear extent     Decreased right shoulder range of motion     Shoulder pain, unspecified chronicity, unspecified laterality      Physician: Reginald Pickens MD    Physician Orders: PT Eval and Treat s/p R RTC repair  Medical Diagnosis from Referral: M75.101 (ICD-10-CM) - Nontraumatic tear of right rotator cuff, unspecified tear extent  Evaluation Date: 9/28/2020  Authorization Period Expiration: 9/9/21  Plan of Care Expiration: 12/28/20  Visit # / Visits authorized: 1/ 1    PROCEDURE:   1. Right shoulder open subpectoral biceps tenodesis  2. Right shoulder arthroscopic distal clavicle excision  3. Right shoulder arthroscopic subacromial decompression.   4. Right shoulder arthroscopic debridement labrum / rotator cuff    Time In: 11:20  Time Out: 12:00  Total Appointment Time (timed & untimed codes): 40 minutes    Precautions: Standard    Postop plan for the patient is to follow the biceps tenodesis protocol.     Subjective   Date of onset: DOS 9/23/20  History of current condition - Jackelyn Kendrick reports: she started having R shoulder about 4 months ago. After failing conservative treatment (PT and injection) she decided to undergo R biceps tenodesis by Dr. Pickens 9/23/20. Pt denies any complications following surgery other than nausea from pain medication. Pt reports she underwent L RTC repair about 5 years ago and was able to reach [facility] despite confirmed retear on MRI. Pt would like to get back to performing ADL's, gardening and cleaning pool at [facility].     Medical History:   Past Medical History:   Diagnosis Date    Bronchitis     seasonal    Cataract     Diverticulitis     Dry eye syndrome     GERD (gastroesophageal reflux disease)      Hyperlipidemia     Hypertension     Hypothyroidism 7/19/2012    Obese     PONV (postoperative nausea and vomiting)     Thyroid disease        Surgical History:   Jackelyn Kendrick  has a past surgical history that includes Colonoscopy (2007); Nasal septum surgery; Shoulder surgery; Hysterectomy; Cholecystectomy; Hernia repair; Tonsillectomy; Back surgery; De Quervain's release (Left, 03/2017); Colonoscopy (N/A, 9/3/2020); Arthroscopic repair of rotator cuff of shoulder (Right, 9/23/2020); and Fixation of tendon (Right, 9/23/2020).    Medications:   Jackelyn has a current medication list which includes the following prescription(s): acetaminophen/diphenhydramine, acetic acid-hydrocortisone, albuterol, amlodipine, aspirin, cetirizine, cholestyramine, diclofenac sodium, fluocinonide, fluticasone propionate, ketoconazole, levothyroxine, meloxicam, multi-vitamin, nitrofurantoin, omeprazole, oxybutynin, oxycodone-acetaminophen, promethazine, psyllium husk, restasis, rosuvastatin, tramadol, triamcinolone acetonide 0.1%, amlodipine, and omeprazole.    Allergies:   Review of patient's allergies indicates:   Allergen Reactions    Erythromycin (bulk) Nausea And Vomiting    Levaquin [levofloxacin]      Other reaction(s): Swelling        Imaging, MRI studies: 7/31/20: Impression:     1. High-grade partial thickness, partial width articular surface tear of the supraspinatus at the footplate.  2. Low-grade interstitial infraspinatus tear.  3. Tear through the superior labrum extending posterior to anterior (SLAP tear).  4. AC joint hypertrophic degenerative changes with moderate undersurface spurring.    Prior Therapy: PT following L RTC repair, and prior to surgery on R   Social History: Pt lives with her , has friend to help with don/doffing clothes  Occupation: retired nurse  Prior Level of Function: Pt independent with all ADLs, hobbies and participating in regular physical activity  Current Level of  Function: Pt limited with ADL's, hobbies and avoiding physical activity    Pain:  Current 5/10, worst 7/10, best 3/10   Location: { right shoulder(s)  Description: Aching, Dull, Sharp, Shooting and Variable  Aggravating Factors: Sitting, Standing, Laying, Coughing/Sneezing, Walking, Night Time, Morning, Extension and Getting out of bed/chair  Easing Factors: relaxation, ice, lying down and rest    Pts goals: gardening, cleaning pool,     Objective     Observation: Pt's dressings have been changed with no signs/ sx of infection or DVT    Posture: FHP, FSP, pt in shoulder sling with abduction pillow    Passive Range of Motion:   Shoulder right   Flexion 170   Abduction 170   ER at 20 75   ER at 90 100   IR at 90 45      Active Range of Motion:   Shoulder Right   Flexion 170   Abduction 170   ER at 0 60   IR (behind back) T7     Upper Extremity Strength:  Formal MMT not performed 2/2 POD#5 and increased pain.      Joint Mobility: grossly restricted at GH, elbow extension restricted as expected post-op.    Palpation:  TTP to shoulder as expected post-op.    Sensation: Intact but diminished 2/2 residual nerve block.    Limitation/Restriction for FOTO Shoulder Survey    Therapist reviewed FOTO scores for Jackelyn Kendrick on 9/28/2020.   FOTO documents entered into [facility] [employer] - see Media section.       TREATMENT   Treatment Time In: 11:40  Treatment Time Out: 12:00  Total Treatment time (time-based codes) separate from Evaluation: 20 minutes    Jackelyn Kendrick received therapeutic exercises to develop strength, endurance, ROM, flexibility, posture and core stabilization for 20 minutes including:    Pendulum hang 2x1 min  scap retraction 5 sec holds x20  Elbow AAROM x20  Wrist AROM x20    Home Exercises and Patient Education Provided    Education provided:   - posture, activity modification, post op precautions, roll of PT, POC, prognosis    Written Home Exercises Provided: yes.  Exercises were reviewed and Jackelyn KENDALL  Aroldo was able to demonstrate them prior to the end of the session.  Jackelyn Kendrick demonstrated good  understanding of the education provided.     See EMR under Patient Instructions for exercisesprovided 9/28/2020.    Assessment   Jackelyn is a 69 y.o. female referred to outpatient Physical Therapy with a medical diagnosis of s/p R biceps tenodesis. Pt presents with decreased strength, decreased ROM, decreased flexibility, faulty posture, and increased pain. Due to impairments, pt is unable to perform ADL's, participate in hobbies or physical activity at [facility].    Pt prognosis is Excellent.   Pt will benefit from skilled outpatient Physical Therapy to address the deficits stated above and in the chart below, provide pt/family education, and to maximize pt's level of independence.     Plan of care discussed with patient: Yes  Pt's spiritual, cultural and educational needs considered and patient is agreeable to the plan of care and goals as stated below:     Anticipated Barriers for therapy: n/a    Medical Necessity is demonstrated by the following  History  Co-morbidities and personal factors that may impact the plan of care Co-morbidities:   See medical chart    Personal Factors:   no deficits     low   Examination  Body Structures and Functions, activity limitations and participation restrictions that may impact the plan of care Body Regions:   lower extremities    Body Systems:    ROM  strength  balance  gait  transfers    Participation Restrictions:   covid-19    Activity limitations:   Learning and applying knowledge  no deficits    General Tasks and Commands  no deficits    Communication  no deficits    Mobility  lifting and carrying objects  fine hand use (grasping/picking up)    Self care  washing oneself (bathing, drying, washing hands)  dressing    Domestic Life  no deficits    Interactions/Relationships  no deficits    Life Areas  no deficits    Community and Social Life  no deficits         low    Clinical Presentation stable and uncomplicated low   Decision Making/ Complexity Score: low     Goals:  Short Term Goals: 3 weeks   1. Pt will be independent with HEP and report compliance at least 4 days/week  2. Pt will demonstrate PROM ER to at least 30 deg in scapular plane and flexion to at least 120 deg  3. Pt will be compliant with post-op precautions and understand activity modifications for ADL's    Long Term Goals: 12 weeks   1. Pt will demonstrate full active shoulder ROM compared to L UE.  2. Pt will be able to perform all ADL's at PLOF reporting no pain  3. Pt will be able to garden/ clean pool with sound mechanics reporting no pain    Plan   Plan of care Certification: 9/28/2020 to 12/28/20.    Outpatient Physical Therapy 2 times weekly for 12 weeks to include the following interventions: Cervical/Lumbar Traction, Electrical Stimulation TENS/NMES, Gait Training, Manual Therapy, Moist Heat/ Ice, Neuromuscular Re-ed, Patient Education, Self Care, Therapeutic Activites, Therapeutic Exercise and Dry Needling.     Dg Ivey, PT

## 2020-09-28 NOTE — OP NOTE
[facility]  Surgery Department  Operative Note    SUMMARY     Date of Procedure: 9/23/2020     Procedure: Procedure(s) (LRB):  REPAIR, ROTATOR CUFF, ARTHROSCOPIC (Right)  FIXATION, TENDON (Right)     Surgeon(s) and Role:     * Reginald Pickens MD - Primary    Assisting Surgeon:   MD Mick Dow PA-C Nicole Phegley    Pre-Operative Diagnosis: Nontraumatic tear of right rotator cuff, unspecified tear extent [M75.101]  Biceps tendinitis of right upper extremity [M75.21]  Arthritis of right acromioclavicular joint [M19.011]    Post-Operative Diagnosis: Post-Op Diagnosis Codes:     * Nontraumatic tear of right rotator cuff, unspecified tear extent [M75.101]     * Biceps tendinitis of right upper extremity [M75.21]     * Arthritis of right acromioclavicular joint [M19.011]    Anesthesia: General    Technical Procedures Used:     DATE OF SURGERY:  9/23/2020     PREOPERATIVE DIAGNOSES:   1. Right shoulder rotator cuff tear.   2. Right shoulder AC joint arthritis  3. Right shoulder labral tear / biceps tendinopathy    POSTOPERATIVE DIAGNOSES:   1. Right shoulder rotator cuff tear.   2. Right shoulder AC joint arthritis  3. Right shoulder labral tear / biceps tendinopathy    PROCEDURE:   1. Right shoulder open subpectoral biceps tenodesis  2. Right shoulder arthroscopic distal clavicle excision  3. Right shoulder arthroscopic subacromial decompression.   4. Right shoulder arthroscopic debridement labrum / rotator cuff    SURGEON: Reginald Pickens M.D.     ASSISTANTS:  MD Mick Dow PA-C Nicole Phegle    COMPLICATIONS: None.     POSITION: Beach chair.     ANESTHESIA: General endotracheal plus right upper extremity interscalene   block.     EXAMINATION UNDER ANESTHESIA: Right shoulder forward flexion 180 degrees,   abduction 120 degrees, full internal and external rotation   1+ anterior drawer, 1+ sulcus sign, 1+ posterior drawer.     ARTHROSCOPIC FINDINGS:   1.  Partial thickness, low grade articular surface supraspinatus / upper infraspinatus rotator cuff tear.   2. Small anterior acromial spur.   3. Arthritic distal clavicle  4. Intact glenohumeral cartilage surface  5. Biceps: tendinopathy  6. Subscapularis: intact  7. Labrum:  Degenerative SLAP1      INDICATIONS FOR PROCEDURE: The patient is a 69 y.o.  year-old female  who has pain in her right shoulder. MRI confirms a tear of her rotator cuff.  After risks and benefits of surgery were discussed, she elects to proceed with operative  intervention. All risks and benefits have been discussed including the risks of stiffness for recurrent instability, irreparability of the tear, damage to neurovascular structures, damage to cartilage and the risk of anesthesia including stroke and heart attack. The patient seemed to understand and wished to proceed.     DESCRIPTION OF PROCEDURE: The patient was brought in the room. After undergoing general endotracheal anesthesia and right upper extremity interscalene block, she was placed in a well-padded modified beachchair position. Perioperative antibiotics were given.  her right upper extremity was prepped and draped in usual sterile fashion including the use of a sterile Spider arm forrest.     After time-out was performed, the standard posterior portal and anterior superior portal were created. Diagnostic arthroscopy performed. The glenohumeral joint was entered. There was no chondromalacia noted in the glenoid or humeral head. There was significant fraying at the superior labrum. The anterior inferior labrum and posterior labrum were intact without evidence of tearing. The subscapularis had mild fraying.  Damaged subscap tissue was debrided down.  The remainder of the insertion was intact.    The supraspinatus tendon had an undersurface partial thickness tear.     DEBRIDEMENT: Oscillating shaver, straight and curved biters were used to debride a small flap of tissue off the superior  labrum. The biceps had tendinopathy and was released from it's origin on the labrum for planned tenodesis.    SUBACROMIAL DECOMPRESSION: The scope was taken out and redirected in the subacromial space. After excellent visualization was achieved, a lateral portal was created. The bursal tissue was cleaned off. The rotator cuff was identified and appeared intact on the bursal surface.  The undersurface of the acromion was cleaned off of soft tissues including releasing the CA ligament. A 5-mm kurtis was introduced through the posterior portal and decompression was completed using cutting block technique down to a type 1 configuration.     DISTAL CLAVICLE EXCISION:  The soft tissues were cleaned off of the undersurface of the AC joint with Mitek VAPR device. The arthritic aspect of the distal clavicle was visualized and excellent hemostasis was achieved.  A 5-mm kurtis was used to resect between 8-9 mm of bone from the distal aspect of the clavicle.  Careful attention was paid to resect adequate bone from the posterior and superior aspect of the AC joint and not to disrupt the superior aspect of the AC joint capsule.    OPEN SUBPECTORAL BICEPS TENODESIS: The scope was taken out of the joint.  A 3 cm incision was made in the axillary fold for our open subpec biceps tenodesis.  Excellent hemostasis was achieved.  Blunt dissection was taken down to the fascia.  The fascia was released.  A pointed Adarsh was placed laterally under the pec.  A blunt Darrian was placed medially to protect the neurovascular structures.  An Army-Navy was placed superiorly to complete exposure. Excellent visualization of the biceps groove and biceps tendon was achieved.  The tendon was brought into the wound with a right angle hemostat.  Frayed and tendinopathic aspect were debrided down.  An Arthrex Fiberloop suture started at the musculotendinous junction and was sutured several cm distally.  The excess tendon was removed and excellent control  of our biceps was achieved.  A guide wire and 7 mm  hole was drilled into the humerus in the sub-pec position.  Excess bone debris was removed.  An Arthrex 7x15 mm PEEK bio-tenodesis screw was placed along with the tendon into the drilled hole without difficulty.  Excellent purchase was achieved and the sutures were tied to provide a suture anchor effect as well.  The correct length-tension relationship was restored for the biceps as the muscle tendon junction rested directly deep to the pectoralis major.  All areas were irrigated.  The wound was closed with 2-0 vicryl and 4-0 subcuticular monocryl.      Portal sites were closed with 4-0 Monocryl, covered with Mastisol, Steri-Strips, Xeroform, 4 x 4 fluffs, ABD pads and tape. The patient was placed in a sling and pillow for protection, was extubated in the room, transferred to recovery room in stable condition accompanied by her physician.    There were no complications in the case. I was present, scrubbed and did perform all critical portions of the case.     Postop plan for the patient is to follow the biceps tenodesis protocol.         Reginald Pickens M.D.       Description of the Findings of the Procedure: above    Significant Surgical Tasks Conducted by the Assistant(s), if Applicable: none    Complications: No    Estimated Blood Loss (EBL): * No values recorded between 9/23/2020 12:57 PM and 9/23/2020  2:17 PM *           Implants:   Implant Name Type Inv. Item Serial No.  Lot No. LRB No. Used Action   ANCHOR FORKED TIP 7MM PEEI - [id number]  ANCHOR FORKED TIP 7MM PEEI  ARTHREX [id number] Right 1 Implanted       Specimens:   Specimen (12h ago, onward)    None                  Condition: Good    Disposition: PACU - hemodynamically stable.    Attestation: I was present and scrubbed for the entire procedure.

## 2020-09-29 ENCOUNTER — PATIENT MESSAGE (OUTPATIENT)
Dept: OTHER | Facility: OTHER | Age: 70
End: 2020-09-29

## 2020-10-01 NOTE — BRIEF OP NOTE
[facility]  Brief Operative Note    Surgery Date: 9/23/2020     Surgeon(s) and Role:     * Reginald Pickens MD - Primary    Assisting Surgeon: None    Pre-op Diagnosis:  Nontraumatic tear of right rotator cuff, unspecified tear extent [M75.101]  Biceps tendinitis of right upper extremity [M75.21]  Arthritis of right acromioclavicular joint [M19.011]    Post-op Diagnosis:  Post-Op Diagnosis Codes:     * Nontraumatic tear of right rotator cuff, unspecified tear extent [M75.101]     * Biceps tendinitis of right upper extremity [M75.21]     * Arthritis of right acromioclavicular joint [M19.011]    Procedure(s) (LRB):  REPAIR, ROTATOR CUFF, ARTHROSCOPIC (Right)  FIXATION, TENDON (Right)    Anesthesia: General    Description of the findings of the procedure(s): see op note             Specimens:   Specimen (12h ago, onward)    None            Discharge Note    OUTCOME: Patient tolerated treatment/procedure well without complication and is now ready for discharge.    DISPOSITION: Home or Self Care    FINAL DIAGNOSIS:  <principal problem not specified>    FOLLOWUP: In clinic    DISCHARGE INSTRUCTIONS:    Discharge Procedure Orders   Diet Adult Regular     Keep surgical extremity elevated     Ice to affected area     Lifting restrictions   Order Comments: No lifting over one pound to right upper extremity     Notify your health care provider if you experience any of the following:  temperature >100.4     Notify your health care provider if you experience any of the following:  persistent nausea and vomiting or diarrhea     Notify your health care provider if you experience any of the following:  severe uncontrolled pain     Notify your health care provider if you experience any of the following:  redness, tenderness, or signs of infection (pain, swelling, redness, odor or green/yellow discharge around incision site)     Notify your health care provider if you experience any of the following:   difficulty breathing or increased cough      Remove dressing in 72 hours     Shower on day dressing removed (No bath)

## 2020-10-06 ENCOUNTER — CLINICAL SUPPORT (OUTPATIENT)
Dept: REHABILITATION | Facility: HOSPITAL | Age: 70
End: 2020-10-06
Attending: ORTHOPAEDIC SURGERY
Payer: MEDICARE

## 2020-10-06 ENCOUNTER — NURSE TRIAGE (OUTPATIENT)
Dept: ADMINISTRATIVE | Facility: CLINIC | Age: 70
End: 2020-10-06

## 2020-10-06 DIAGNOSIS — M25.511 ACUTE PAIN OF RIGHT SHOULDER: ICD-10-CM

## 2020-10-06 DIAGNOSIS — M25.611 DECREASED RIGHT SHOULDER RANGE OF MOTION: ICD-10-CM

## 2020-10-06 PROCEDURE — 97140 MANUAL THERAPY 1/> REGIONS: CPT | Mod: PO

## 2020-10-06 PROCEDURE — 97110 THERAPEUTIC EXERCISES: CPT | Mod: PO

## 2020-10-06 NOTE — TELEPHONE ENCOUNTER
Pt contacted through Post Procedural Symptom Tracking. Denies any cough, fever, or difficulty breathing since procedure.  Pt instructed to call [facility]OC or contact provider with any changes of condition or concerns.     Reason for Disposition   [1] Follow-up call to recent contact AND [2] information only call, no triage required    Protocols used: INFORMATION ONLY CALL-A-

## 2020-10-06 NOTE — PROGRESS NOTES
"    Physical Therapy Daily Treatment Note     Name: Jackelyn Kendrick  Clinic Number: [id number]    Therapy Diagnosis:   Encounter Diagnoses   Name Primary?    Decreased right shoulder range of motion     Acute pain of right shoulder      Physician: Reginald Pickens MD    Visit Date: 10/6/2020    Physician Orders: PT Eval and Treat s/p R RTC repair  Medical Diagnosis from Referral: M75.101 (ICD-10-CM) - Nontraumatic tear of right rotator cuff, unspecified tear extent  Evaluation Date: 9/28/2020  Authorization Period Expiration: 9/9/21  Plan of Care Expiration: 12/28/20  Visit # / Visits authorized: 2/ 1      Time In: 245  Time Out: 325  Total Billable Time: 40 minutes  MT TE 2    Precautions: Standard per shoulder protocol for biceps tenodesis and RTC repair    Subjective     Pt reports: was aching coming out of arm sling today but that's normal.  She was compliant with home exercise program.  Response to previous treatment: increase tightness overall  Functional change: none at this time    Pain: 6/10  Location: right  shoulder      Objective     Jackelyn Kendrick received therapeutic exercises to develop strength, endurance and ROM for 25 minutes including:  Pendulum hang x 1 min  Pendulum self PROM with contralateral arm small amplitude 4-6" into flex/ext, Med/lat, CW/CCW  scap retraction 5 sec holds x20  Elbow AAROM x20  Wrist AROM x20 in all planes radial ulnar deviation, wrist flex/ext,  with cues to keep reps slow and controlled for supination/pronation,       Jackelyn Kendrick received the following manual therapy techniques: Myofacial release and Soft tissue Mobilization were applied to the: pect minor, long head biceps/triceps, teres minor for 10 minutes, including:    Jackelyn Kendrick received cold pack for 0 minutes to R shoulder.      Home Exercises Provided and Patient Education Provided     Education provided:   - HEP, pain management    Written Home Exercises Provided: Patient instructed " to cont prior HEP.  Exercises were reviewed and Jackelyn Kendrick was able to demonstrate them prior to the end of the session.  Jackelyn Kendrick demonstrated good  understanding of the education provided.     See EMR under Media for exercises provided prior visit.    Assessment     Pt with apprehension with motion and deferred CW and CCW motion due to pain. Pt tends to focus and hold shoulder in slight flexion while bent over for pendulums and tends to lead to increased pain and guarding.  All wrist exercises performed with elbow at 90 deg and shoulder neutral to limit pain and tension.    Jackelyn Kendrick is progressing well towards her goals.   Pt prognosis is Good.     Pt will continue to benefit from skilled outpatient physical therapy to address the deficits listed in the problem list box on initial evaluation, provide pt/family education and to maximize pt's level of independence in the home and community environment.     Pt's spiritual, cultural and educational needs considered and pt agreeable to plan of care and goals.     Anticipated barriers to physical therapy: apprehension    Goals:   Short Term Goals: 3 weeks   1. Pt will be independent with HEP and report compliance at least 4 days/week. Progressing 10/6/20  2. Pt will demonstrate PROM ER to at least 30 deg in scapular plane and flexion to at least 120 deg Progressing 10/6/20  3. Pt will be compliant with post-op precautions and understand activity modifications for ADL's     Long Term Goals: 12 weeks   1. Pt will demonstrate full active shoulder ROM compared to L UE.  2. Pt will be able to perform all ADL's at PLOF reporting no pain  3. Pt will be able to garden/ clean pool with sound mechanics reporting no pain      Plan     Cont with POC    Yelena Thompson PT

## 2020-10-08 ENCOUNTER — CLINICAL SUPPORT (OUTPATIENT)
Dept: REHABILITATION | Facility: HOSPITAL | Age: 70
End: 2020-10-08
Attending: ORTHOPAEDIC SURGERY
Payer: MEDICARE

## 2020-10-08 ENCOUNTER — OFFICE VISIT (OUTPATIENT)
Dept: SPORTS MEDICINE | Facility: CLINIC | Age: 70
End: 2020-10-08
Payer: MEDICARE

## 2020-10-08 VITALS
DIASTOLIC BLOOD PRESSURE: 71 MMHG | WEIGHT: 146 LBS | SYSTOLIC BLOOD PRESSURE: 116 MMHG | HEIGHT: 61 IN | BODY MASS INDEX: 27.56 KG/M2 | HEART RATE: 75 BPM

## 2020-10-08 DIAGNOSIS — M25.511 ACUTE PAIN OF RIGHT SHOULDER: ICD-10-CM

## 2020-10-08 DIAGNOSIS — Z98.890 S/P SHOULDER SURGERY: Primary | ICD-10-CM

## 2020-10-08 DIAGNOSIS — M25.611 DECREASED RIGHT SHOULDER RANGE OF MOTION: ICD-10-CM

## 2020-10-08 PROCEDURE — 99999 PR PBB SHADOW E&M-EST. PATIENT-LVL V: CPT | Mod: PBBFAC,,, | Performed by: PHYSICIAN ASSISTANT

## 2020-10-08 PROCEDURE — 99024 PR POST-OP FOLLOW-UP VISIT: ICD-10-PCS | Mod: POP,,, | Performed by: PHYSICIAN ASSISTANT

## 2020-10-08 PROCEDURE — 97110 THERAPEUTIC EXERCISES: CPT | Mod: PO,CQ

## 2020-10-08 PROCEDURE — 99024 POSTOP FOLLOW-UP VISIT: CPT | Mod: POP,,, | Performed by: PHYSICIAN ASSISTANT

## 2020-10-08 PROCEDURE — 97140 MANUAL THERAPY 1/> REGIONS: CPT | Mod: PO,CQ

## 2020-10-08 PROCEDURE — 99999 PR PBB SHADOW E&M-EST. PATIENT-LVL V: ICD-10-PCS | Mod: PBBFAC,,, | Performed by: PHYSICIAN ASSISTANT

## 2020-10-08 PROCEDURE — 99215 OFFICE O/P EST HI 40 MIN: CPT | Mod: PBBFAC | Performed by: PHYSICIAN ASSISTANT

## 2020-10-08 NOTE — PROGRESS NOTES
"CC: Right shoulder post op 2 weeks    Patient is here for her 2 week post op appointment s/p below and is doing well. Patient is doing PT at [facility] location and is progressing as expected. Patient is taking pain medication 2-3 x/day at night for sleep and on days she has physical therapy. she denies any chest pain, SOB, fevers, chills, nausea, vomiting, or drainage from incision sites.     DATE OF SURGERY:  9/23/2020      PREOPERATIVE DIAGNOSES:   1. Right shoulder rotator cuff tear.   2. Right shoulder AC joint arthritis  3. Right shoulder labral tear / biceps tendinopathy     POSTOPERATIVE DIAGNOSES:   1. Right shoulder rotator cuff tear.   2. Right shoulder AC joint arthritis  3. Right shoulder labral tear / biceps tendinopathy     PROCEDURE:   1. Right shoulder open subpectoral biceps tenodesis  2. Right shoulder arthroscopic distal clavicle excision  3. Right shoulder arthroscopic subacromial decompression.   4. Right shoulder arthroscopic debridement labrum / rotator cuff     SURGEON: Reginald Pickens M.D.   Pain well tolerated on pain medication  Sling in place  No issues reported    Review of Systems   Constitution: Negative. Negative for chills, fever and night sweats.    Cardiovascular: Negative for chest pain and syncope.   Respiratory: Negative for cough and shortness of breath.    Gastrointestinal: Negative for abdominal pain and bowel incontinence.      PE:    /71   Pulse 75   Ht 5' 1" (1.549 m)   Wt 66.2 kg (146 lb)   BMI 27.59 kg/m²      Right shoulder    Incision healed  No sign of infection  Swelling resolved  Compartments soft  Neurovascular status intact in extremity    PROM  Forward elevation 90 degrees  External rotation 15 degrees    Assessment:  2 weeks s/p right shoulder arthroscopic rotator cuff debridement, subacromial decompression, distal clavicle excision, and open subpectoral biceps tenodesis    Plan:  1. Continue PT   2. Pain medication as needed for PT; try to " wean off for next visit  3. Return to clinic in 4 weeks for 6 week post op follow up    All questions were answered. Instructed patient to call with questions or concerns in the interim.

## 2020-10-08 NOTE — PROGRESS NOTES
"    Physical Therapy Daily Treatment Note     Name: Jackelyn Kendrick  Clinic Number: [id number]    Therapy Diagnosis:   Encounter Diagnoses   Name Primary?    Decreased right shoulder range of motion     Acute pain of right shoulder      Physician: Reginald Pickens MD    Visit Date: 10/8/2020    Physician Orders: PT Eval and Treat s/p R RTC repair  Medical Diagnosis from Referral: M75.101 (ICD-10-CM) - Nontraumatic tear of right rotator cuff, unspecified tear extent  Evaluation Date: 9/28/2020  Authorization Period Expiration: 9/9/21  Plan of Care Expiration: 12/28/20  Visit # / Visits authorized: 3       Time In: 01:15 pm   Time Out: 0:54 pm  Total Billable Time: 39 minutes  MT TE 2    Precautions: Standard per shoulder protocol for biceps tenodesis and RTC repair    Subjective     Pt reports: she is a little sore currently.   She was compliant with home exercise program.  Response to previous treatment: increase tightness overall  Functional change: none at this time    Pain: 6/10  Location: right  shoulder      Objective     Jackelyn Kendrick received therapeutic exercises to develop strength, endurance and ROM for 25 minutes including:    Pendulum hang x 1 minutes  Pendulum self PROM with contralateral arm small amplitude 4-6" into flex/ext, Med/lat, CW/CCW  scap retraction 5 sec holds x20  Elbow AAROM x20  Wrist AROM x20 in all planes radial ulnar deviation, wrist flex/ext,  with cues to keep reps slow and controlled for supination/pronation      Jackelyn Kendrick received the following manual therapy techniques: Myofacial release and Soft tissue Mobilization were applied to the: pect minor, long head biceps/triceps, teres minor for 14 minutes, including:    Jackelyn Kendrick received cold pack for 0 minutes to R shoulder.      Home Exercises Provided and Patient Education Provided     Education provided:   - HEP, pain management    Written Home Exercises Provided: Patient instructed to cont prior " HEP.  Exercises were reviewed and Jackelyn Kendrick was able to demonstrate them prior to the end of the session.  Jackelyn Kendrick demonstrated good  understanding of the education provided.     See EMR under Media for exercises provided prior visit.    Assessment     Patient with apprehension and muscle guarding with pendulums with cueing to relax. Tolerated session well without reports of increased pain or symptoms.   Jackelyn Kendrick is progressing well towards her goals.   Pt prognosis is Good.     Pt will continue to benefit from skilled outpatient physical therapy to address the deficits listed in the problem list box on initial evaluation, provide pt/family education and to maximize pt's level of independence in the home and community environment.     Pt's spiritual, cultural and educational needs considered and pt agreeable to plan of care and goals.     Anticipated barriers to physical therapy: apprehension    Goals:   Short Term Goals: 3 weeks   1. Pt will be independent with HEP and report compliance at least 4 days/week. Progressing 10/6/20  2. Pt will demonstrate PROM ER to at least 30 deg in scapular plane and flexion to at least 120 deg Progressing 10/6/20  3. Pt will be compliant with post-op precautions and understand activity modifications for ADL's     Long Term Goals: 12 weeks   1. Pt will demonstrate full active shoulder ROM compared to L UE.  2. Pt will be able to perform all ADL's at PLOF reporting no pain  3. Pt will be able to garden/ clean pool with sound mechanics reporting no pain      Plan     Cont with DENISE Fu PTA

## 2020-10-09 ENCOUNTER — PATIENT MESSAGE (OUTPATIENT)
Dept: ADMINISTRATIVE | Facility: HOSPITAL | Age: 70
End: 2020-10-09

## 2020-10-13 ENCOUNTER — CLINICAL SUPPORT (OUTPATIENT)
Dept: REHABILITATION | Facility: HOSPITAL | Age: 70
End: 2020-10-13
Attending: ORTHOPAEDIC SURGERY
Payer: MEDICARE

## 2020-10-13 DIAGNOSIS — M25.611 DECREASED RIGHT SHOULDER RANGE OF MOTION: ICD-10-CM

## 2020-10-13 DIAGNOSIS — M25.511 ACUTE PAIN OF RIGHT SHOULDER: ICD-10-CM

## 2020-10-13 PROCEDURE — 97140 MANUAL THERAPY 1/> REGIONS: CPT | Mod: PO

## 2020-10-13 PROCEDURE — 97110 THERAPEUTIC EXERCISES: CPT | Mod: PO

## 2020-10-13 NOTE — PROGRESS NOTES
"    Physical Therapy Daily Treatment Note     Name: Jackelyn Kendrick  Clinic Number: [id number]    Therapy Diagnosis:   Encounter Diagnoses   Name Primary?    Decreased right shoulder range of motion     Acute pain of right shoulder      Physician: Reginald Pickens MD    Visit Date: 10/13/2020    Physician Orders: PT Eval and Treat s/p R RTC repair  Medical Diagnosis from Referral: M75.101 (ICD-10-CM) - Nontraumatic tear of right rotator cuff, unspecified tear extent  Evaluation Date: 9/28/2020  Authorization Period Expiration: 9/9/21  Plan of Care Expiration: 12/28/20  Visit # / Visits authorized: 3       Time In: 155  pm   Time Out: 233 pm  Total Billable Time: 38  minutes  MT2 TE 1    Precautions: Standard per shoulder protocol for biceps tenodesis and RTC repair    Subjective     Pt reports: woke up pain today and had bad few days  She was compliant with home exercise program.  Response to previous treatment: increase tightness overall  Functional change: none at this time    Pain: 4-5/10 pre-session and  1 /10 post session  Location: right  shoulder      Objective     Jackelyn Kendrick received therapeutic exercises to develop strength, endurance and ROM for 15 minutes including: mod cues to ensure PROM     Pendulum hang x 1 minutes  Pendulum self PROM with contralateral arm small amplitude 4-6" into flex/ext, Med/lat, CW/CCW 3 x 10 attempted upright and sitting but ended up in prone of EOM and PT assist for PROM  scap retraction 5 sec holds x20 NP  Elbow AAROM x20  Wrist AROM x20 in all planes radial ulnar deviation, wrist flex/ext,  with cues to keep reps slow and controlled for supination/pronation       Jackelyn Kendrick received the following manual therapy techniques: Myofacial release and Soft tissue Mobilization were applied to the: pect minor, long head biceps/triceps, teres minor, levator, supraspinatus.  for 23 minutes, including:    Jackelyn Kendrick received cold pack for 0 minutes to R " shoulder.      Home Exercises Provided and Patient Education Provided     Education provided:   - HEP, pain management    Written Home Exercises Provided: Patient instructed to cont prior HEP.  Exercises were reviewed and Jackelyn Kendrick was able to demonstrate them prior to the end of the session.  Jackelyn Kendrick demonstrated good  understanding of the education provided.     See EMR under Media for exercises provided prior visit.    Assessment     Patient with apprehension and muscle guarding with pendulums with mod cueing to relax as she attempts to assist motion more AAROM so not independent with HEP or compliat with PROM only at the shoulder but progressing.  Pt need cues to avoid activation and use of shoulder.  Pt with increased tone on arrival improved with session especially teres minor, levator and supraspinatus.  Pt sling was adjusted for support and position for relief.      Jackelyn Kendrick is progressing well towards her goals.   Pt prognosis is Good.     Pt will continue to benefit from skilled outpatient physical therapy to address the deficits listed in the problem list box on initial evaluation, provide pt/family education and to maximize pt's level of independence in the home and community environment.     Pt's spiritual, cultural and educational needs considered and pt agreeable to plan of care and goals.     Anticipated barriers to physical therapy: apprehension    Goals:   Short Term Goals: 3 weeks   1. Pt will be independent with HEP and report compliance at least 4 days/week. Progressing 10/13//20  2. Pt will demonstrate PROM ER to at least 30 deg in scapular plane and flexion to at least 120 deg Progressing 10/6/20  3. Pt will be compliant with post-op precautions and understand activity modifications for ADL's. Progressing 10/13/20     Long Term Goals: 12 weeks   1. Pt will demonstrate full active shoulder ROM compared to L UE.  2. Pt will be able to perform all ADL's at PLOF  reporting no pain  3. Pt will be able to garden/ clean pool with sound mechanics reporting no pain      Plan     Cont with POC    Yelena Thompson, PT

## 2020-10-21 ENCOUNTER — PATIENT MESSAGE (OUTPATIENT)
Dept: SPORTS MEDICINE | Facility: CLINIC | Age: 70
End: 2020-10-21

## 2020-10-21 DIAGNOSIS — M75.101 NONTRAUMATIC TEAR OF RIGHT ROTATOR CUFF, UNSPECIFIED TEAR EXTENT: ICD-10-CM

## 2020-10-21 RX ORDER — TRAMADOL HYDROCHLORIDE 50 MG/1
50 TABLET ORAL EVERY 6 HOURS PRN
Qty: 20 TABLET | Refills: 0 | Status: SHIPPED | OUTPATIENT
Start: 2020-10-21 | End: 2020-11-23 | Stop reason: SDUPTHER

## 2020-10-22 ENCOUNTER — CLINICAL SUPPORT (OUTPATIENT)
Dept: REHABILITATION | Facility: HOSPITAL | Age: 70
End: 2020-10-22
Attending: ORTHOPAEDIC SURGERY
Payer: MEDICARE

## 2020-10-22 DIAGNOSIS — M25.511 ACUTE PAIN OF RIGHT SHOULDER: ICD-10-CM

## 2020-10-22 DIAGNOSIS — M25.611 DECREASED RIGHT SHOULDER RANGE OF MOTION: ICD-10-CM

## 2020-10-22 PROCEDURE — 97140 MANUAL THERAPY 1/> REGIONS: CPT | Mod: PO

## 2020-10-22 NOTE — PROGRESS NOTES
"    Physical Therapy Daily Treatment Note     Name: Jackelyn Kendrick  Clinic Number: [id number]    Therapy Diagnosis:   Encounter Diagnoses   Name Primary?    Decreased right shoulder range of motion     Acute pain of right shoulder      Physician: Reginald Pickens MD    Visit Date: 10/22/2020    Physician Orders: PT Eval and Treat s/p R RTC repair  Medical Diagnosis from Referral: M75.101 (ICD-10-CM) - Nontraumatic tear of right rotator cuff, unspecified tear extent  Evaluation Date: 9/28/2020  Authorization Period Expiration: 9/9/21  Plan of Care Expiration: 12/28/20  Visit # / Visits authorized: 3       Time In: 107  pm   Time Out: 145 pm  Total Billable Time: 38  minutes  MT2     Precautions: Standard per shoulder protocol for biceps tenodesis and RTC repair    Subjective     Pt reports: reported that she lost her balance yesterday causing increased R shoulder pain.  She was compliant with home exercise program.  Response to previous treatment: increase tightness overall  Functional change: none at this time    Pain: 4/10 pre-session   Location: right  shoulder      Objective     Jackelyn Kendrick did not received therapeutic exercises to develop strength, endurance and ROM for 15 minutes including: mod cues to ensure PROM     Pendulum hang x 1 minutes  Pendulum self PROM with contralateral arm small amplitude 4-6" into flex/ext, Med/lat, CW/CCW 3 x 10 attempted upright and sitting but ended up in prone of EOM and PT assist for PROM  scap retraction 5 sec holds x20 NP  Elbow AAROM x20  Wrist AROM x20 in all planes radial ulnar deviation, wrist flex/ext,  with cues to keep reps slow and controlled for supination/pronation       Jackelyn Kendrick received the following manual therapy techniques: Passive mobilization and  Soft tissue Mobilization were applied to the: pect minor and R GH joint.  for 37 minutes, including:  R GH joint mobilization  R shoulder PROM  Jackelyn Kendrick received cold pack for " 0 minutes to R shoulder.      Home Exercises Provided and Patient Education Provided     Education provided:   - HEP, pain management    Written Home Exercises Provided: Patient instructed to cont prior HEP.  Exercises were reviewed and Jackelyn Kendrick was able to demonstrate them prior to the end of the session.  Jackelyn Kendrick demonstrated good  understanding of the education provided.     See EMR under Media for exercises provided prior visit.    Assessment     Patient with pain at end range of all R shoulder movements with PROM.  She exhibited increased PROM in the R shoulder following Tx.       Jackelyn Kendrick is progressing well towards her goals.   Pt prognosis is Good.     Pt will continue to benefit from skilled outpatient physical therapy to address the deficits listed in the problem list box on initial evaluation, provide pt/family education and to maximize pt's level of independence in the home and community environment.     Pt's spiritual, cultural and educational needs considered and pt agreeable to plan of care and goals.     Anticipated barriers to physical therapy: apprehension    Goals:   Short Term Goals: 3 weeks   1. Pt will be independent with HEP and report compliance at least 4 days/week. Progressing 10/13//20  2. Pt will demonstrate PROM ER to at least 30 deg in scapular plane and flexion to at least 120 deg Progressing 10/6/20  3. Pt will be compliant with post-op precautions and understand activity modifications for ADL's. Progressing 10/13/20     Long Term Goals: 12 weeks   1. Pt will demonstrate full active shoulder ROM compared to L UE.  2. Pt will be able to perform all ADL's at PLOF reporting no pain  3. Pt will be able to garden/ clean pool with sound mechanics reporting no pain      Plan     Cont with DENISE Frias, PT

## 2020-10-27 ENCOUNTER — CLINICAL SUPPORT (OUTPATIENT)
Dept: REHABILITATION | Facility: HOSPITAL | Age: 70
End: 2020-10-27
Attending: ORTHOPAEDIC SURGERY
Payer: MEDICARE

## 2020-10-27 DIAGNOSIS — M25.511 ACUTE PAIN OF RIGHT SHOULDER: ICD-10-CM

## 2020-10-27 DIAGNOSIS — M25.611 DECREASED RIGHT SHOULDER RANGE OF MOTION: ICD-10-CM

## 2020-10-27 PROCEDURE — 97140 MANUAL THERAPY 1/> REGIONS: CPT | Mod: PO

## 2020-10-27 NOTE — PROGRESS NOTES
"    Physical Therapy Daily Treatment Note     Name: Jackelyn Kendrick  Clinic Number: [id number]    Therapy Diagnosis:   Encounter Diagnoses   Name Primary?    Decreased right shoulder range of motion     Acute pain of right shoulder      Physician: Reginald Pickens MD    Visit Date: 10/27/2020    Physician Orders: PT Eval and Treat s/p R RTC repair  Medical Diagnosis from Referral: M75.101 (ICD-10-CM) - Nontraumatic tear of right rotator cuff, unspecified tear extent  Evaluation Date: 9/28/2020  Authorization Period Expiration: 9/9/21  Plan of Care Expiration: 12/28/20  Visit # / Visits authorized: 3       Time In: 100  pm   Time Out: 145 pm  Total Billable Time: 45  minutes  MT3     Precautions: Standard per shoulder protocol for biceps tenodesis and RTC repair    Surgery date 9/23/2020  Subjective     Pt reports: reports that her shoulder remains sore, but it is different than prior to surgery.  She was compliant with home exercise program.  Response to previous treatment: increase tightness overall  Functional change: none at this time    Pain: 4/10 pre-session   Location: right  shoulder      Objective     Jackelyn Kendrick  received therapeutic exercises to develop strength, endurance and ROM for 20 minutes including: mod cues to ensure PROM     Pendulum hang x 1 minutes  Pendulum self PROM with contralateral arm small amplitude 4-6" into flex/ext, Med/lat, CW/CCW 3 x 10 attempted upright and sitting but ended up in prone of EOM and PT assist for PROM  scap retraction 5 sec holds x20   Pt performed AA R shoulder flexion using the L UE x 10.    Not performed  Elbow AAROM x20  Wrist AROM x20 in all planes radial ulnar deviation, wrist flex/ext,  with cues to keep reps slow and controlled for supination/pronation       Jackelyn Kendrick received the following manual therapy techniques: Passive mobilization and  Soft tissue Mobilization were applied to the: pect minor and R GH joint.  for 25 minutes, " including:  R GH joint mobilization  R shoulder PROM into flexion in supine  AA R shoulder ER/IR 10 x 3      Jackelyn Kendrick did not receive cold pack for 0 minutes to R shoulder.      Home Exercises Provided and Patient Education Provided     Education provided:   - HEP, pain management    Written Home Exercises Provided: Patient instructed to cont prior HEP.  Exercises were reviewed and Jackelyn Kendrick was able to demonstrate them prior to the end of the session.  Jackelyn Kendrick demonstrated good  understanding of the education provided.     See EMR under Media for exercises provided prior visit.    Assessment     Patient with a painful arc with AA R shoulder flexion.  Continue to progress through RCR with biceps tenodesis protocol       Jackelyn Kendrick is progressing well towards her goals.   Pt prognosis is Good.     Pt will continue to benefit from skilled outpatient physical therapy to address the deficits listed in the problem list box on initial evaluation, provide pt/family education and to maximize pt's level of independence in the home and community environment.     Pt's spiritual, cultural and educational needs considered and pt agreeable to plan of care and goals.     Anticipated barriers to physical therapy: apprehension    Goals:   Short Term Goals: 3 weeks   1. Pt will be independent with HEP and report compliance at least 4 days/week. Progressing 10/13//20  2. Pt will demonstrate PROM ER to at least 30 deg in scapular plane and flexion to at least 120 deg Progressing 10/6/20  3. Pt will be compliant with post-op precautions and understand activity modifications for ADL's. Progressing 10/13/20     Long Term Goals: 12 weeks   1. Pt will demonstrate full active shoulder ROM compared to L UE.  2. Pt will be able to perform all ADL's at OF reporting no pain  3. Pt will be able to garden/ clean pool with sound mechanics reporting no pain      Plan     Cont with DENISE Frias  PT

## 2020-11-02 DIAGNOSIS — R10.9 ABDOMINAL PAIN, UNSPECIFIED ABDOMINAL LOCATION: ICD-10-CM

## 2020-11-02 DIAGNOSIS — K58.0 IRRITABLE BOWEL SYNDROME WITH DIARRHEA: ICD-10-CM

## 2020-11-03 NOTE — TELEPHONE ENCOUNTER
MA contacted pt to ask about a medication request that was sent over. Pt did not answer, pt  was given a message to tell pt to give the clinic a call back and MA provided number to clinic. Pt  verbalized understanding.

## 2020-11-04 ENCOUNTER — CLINICAL SUPPORT (OUTPATIENT)
Dept: REHABILITATION | Facility: HOSPITAL | Age: 70
End: 2020-11-04
Attending: ORTHOPAEDIC SURGERY
Payer: MEDICARE

## 2020-11-04 DIAGNOSIS — M25.511 ACUTE PAIN OF RIGHT SHOULDER: ICD-10-CM

## 2020-11-04 DIAGNOSIS — M25.611 DECREASED RIGHT SHOULDER RANGE OF MOTION: ICD-10-CM

## 2020-11-04 PROCEDURE — 97140 MANUAL THERAPY 1/> REGIONS: CPT | Mod: PO

## 2020-11-04 PROCEDURE — 97110 THERAPEUTIC EXERCISES: CPT | Mod: PO

## 2020-11-05 ENCOUNTER — OFFICE VISIT (OUTPATIENT)
Dept: SPORTS MEDICINE | Facility: CLINIC | Age: 70
End: 2020-11-05
Payer: MEDICARE

## 2020-11-05 VITALS
WEIGHT: 145 LBS | BODY MASS INDEX: 27.38 KG/M2 | HEART RATE: 82 BPM | DIASTOLIC BLOOD PRESSURE: 77 MMHG | SYSTOLIC BLOOD PRESSURE: 153 MMHG | HEIGHT: 61 IN

## 2020-11-05 DIAGNOSIS — Z98.890 S/P SHOULDER SURGERY: Primary | ICD-10-CM

## 2020-11-05 PROCEDURE — 99999 PR PBB SHADOW E&M-EST. PATIENT-LVL III: ICD-10-PCS | Mod: PBBFAC,,, | Performed by: ORTHOPAEDIC SURGERY

## 2020-11-05 PROCEDURE — 99024 PR POST-OP FOLLOW-UP VISIT: ICD-10-PCS | Mod: POP,,, | Performed by: ORTHOPAEDIC SURGERY

## 2020-11-05 PROCEDURE — 99999 PR PBB SHADOW E&M-EST. PATIENT-LVL III: CPT | Mod: PBBFAC,,, | Performed by: ORTHOPAEDIC SURGERY

## 2020-11-05 PROCEDURE — 99213 OFFICE O/P EST LOW 20 MIN: CPT | Mod: PBBFAC | Performed by: ORTHOPAEDIC SURGERY

## 2020-11-05 PROCEDURE — 99024 POSTOP FOLLOW-UP VISIT: CPT | Mod: POP,,, | Performed by: ORTHOPAEDIC SURGERY

## 2020-11-05 NOTE — PROGRESS NOTES
"CC: Right shoulder post op 6 weeks    Patient is here for her 2 week post op appointment s/p below and is doing well. Patient is doing PT at [facility] location and is progressing as expected. She states an increase in shoulder soreness due to an increase at physical therapy.   Patient is taking pain medication 2-3 x/day at night for sleep and on days she has physical therapy. she denies any chest pain, SOB, fevers, chills, nausea, vomiting, or drainage from incision sites.     DATE OF SURGERY:  9/23/2020      PREOPERATIVE DIAGNOSES:   1. Right shoulder rotator cuff tear.   2. Right shoulder AC joint arthritis  3. Right shoulder labral tear / biceps tendinopathy     POSTOPERATIVE DIAGNOSES:   1. Right shoulder rotator cuff tear.   2. Right shoulder AC joint arthritis  3. Right shoulder labral tear / biceps tendinopathy     PROCEDURE:   1. Right shoulder open subpectoral biceps tenodesis  2. Right shoulder arthroscopic distal clavicle excision  3. Right shoulder arthroscopic subacromial decompression.   4. Right shoulder arthroscopic debridement labrum / rotator cuff     SURGEON: Reginald Pickens M.D.   Pain well tolerated on pain medication  Sling in place  No issues reported    Review of Systems   Constitution: Negative. Negative for chills, fever and night sweats.    Cardiovascular: Negative for chest pain and syncope.   Respiratory: Negative for cough and shortness of breath.    Gastrointestinal: Negative for abdominal pain and bowel incontinence.      PE:    BP (!) 153/77   Pulse 82   Ht 5' 1" (1.549 m)   Wt 65.8 kg (145 lb)   BMI 27.40 kg/m²      Right shoulder    Incision healed  No sign of infection  Swelling resolved  Compartments soft  Neurovascular status intact in extremity    PROM  Forward elevation 90 degrees  External rotation 20 degrees    Assessment:  6 weeks s/p right shoulder arthroscopic rotator cuff debridement, subacromial decompression, distal clavicle excision, and open subpectoral " biceps tenodesis    Plan:  1. Continue PT   2. Discontinue Sling   3. Return to clinic in 6 weeks.     All questions were answered. Instructed patient to call with questions or concerns in the interim.

## 2020-11-05 NOTE — PROGRESS NOTES
"    Physical Therapy Daily Treatment Note     Name: Jackelny Kendrick  Clinic Number: [id number]    Therapy Diagnosis:   Encounter Diagnoses   Name Primary?    Decreased right shoulder range of motion     Acute pain of right shoulder      Physician: Reginald Pickens MD    Visit Date: 11/4/2020    Physician Orders: PT Eval and Treat s/p R RTC repair  Medical Diagnosis from Referral: M75.101 (ICD-10-CM) - Nontraumatic tear of right rotator cuff, unspecified tear extent  Evaluation Date: 9/28/2020  Authorization Period Expiration: 9/9/21  Plan of Care Expiration: 12/28/20  Visit # / Visits authorized: 3       Time In: 100  pm   Time Out: 145 pm  Total Billable Time: 45  minutes  MT2, TE 1     Precautions: Standard per shoulder protocol for biceps tenodesis and RTC repair    Surgery date 9/23/2020  Subjective     Pt reports: reports that she is tired of her R shoulder hurting.  She was compliant with home exercise program.  Response to previous treatment: increase tightness overall  Functional change: none at this time    Pain: 6/10 pre-session   Location: right  shoulder      Objective     Jackelyn Kendrick  received therapeutic exercises to develop strength, endurance and ROM for 20 minutes including: mod cues to ensure PROM     Pendulum hang x 1 minutes  Pendulum self PROM with contralateral arm small amplitude 4-6" into flex/ext, Med/lat, CW/CCW 3 x 10 attempted upright and sitting but ended up in prone of EOM and PT assist for PROM  scap retraction 5 sec holds x20   Pt performed AA R shoulder flexion using the L UE x 10.    Not performed  Elbow AAROM x20  Wrist AROM x20 in all planes radial ulnar deviation, wrist flex/ext,  with cues to keep reps slow and controlled for supination/pronation       Jackelyn Kendrick received the following manual therapy techniques: Passive mobilization and  Soft tissue Mobilization were applied to the: pect minor and R GH joint.  for 25 minutes, including:  R GH joint " mobilization  R shoulder PROM into flexion in supine  AA R shoulder Flexion and ER      Jackelyn Kendrick received cold pack for 10 minutes to R shoulder following Tx.      Home Exercises Provided and Patient Education Provided     Education provided:   - HEP, pain management    Written Home Exercises Provided: Patient instructed to cont prior HEP.  Exercises were reviewed and Jackelyn Kendrick was able to demonstrate them prior to the end of the session.  Jackelyn Kendrick demonstrated good  understanding of the education provided.     See EMR under Media for exercises provided prior visit.    Assessment     Patient's R shoulder pain remains constant.  PROM has improved into flexion, but abduction and ER remain limited.        Jackelyn Kendrick is progressing well towards her goals.   Pt prognosis is Good.     Pt will continue to benefit from skilled outpatient physical therapy to address the deficits listed in the problem list box on initial evaluation, provide pt/family education and to maximize pt's level of independence in the home and community environment.     Pt's spiritual, cultural and educational needs considered and pt agreeable to plan of care and goals.     Anticipated barriers to physical therapy: apprehension    Goals:   Short Term Goals: 3 weeks   1. Pt will be independent with HEP and report compliance at least 4 days/week. Progressing 10/13//20  2. Pt will demonstrate PROM ER to at least 30 deg in scapular plane and flexion to at least 120 deg Progressing 10/6/20  3. Pt will be compliant with post-op precautions and understand activity modifications for ADL's. Progressing 10/13/20     Long Term Goals: 12 weeks   1. Pt will demonstrate full active shoulder ROM compared to L UE.  2. Pt will be able to perform all ADL's at PLOF reporting no pain  3. Pt will be able to garden/ clean pool with sound mechanics reporting no pain      Plan     Cont with DENISE Frias, PT

## 2020-11-06 ENCOUNTER — CLINICAL SUPPORT (OUTPATIENT)
Dept: REHABILITATION | Facility: HOSPITAL | Age: 70
End: 2020-11-06
Attending: ORTHOPAEDIC SURGERY
Payer: MEDICARE

## 2020-11-06 DIAGNOSIS — M25.611 DECREASED RIGHT SHOULDER RANGE OF MOTION: ICD-10-CM

## 2020-11-06 DIAGNOSIS — M25.511 ACUTE PAIN OF RIGHT SHOULDER: ICD-10-CM

## 2020-11-06 PROCEDURE — 97110 THERAPEUTIC EXERCISES: CPT | Mod: PO,CQ

## 2020-11-06 PROCEDURE — 97140 MANUAL THERAPY 1/> REGIONS: CPT | Mod: PO,CQ

## 2020-11-06 RX ORDER — DICYCLOMINE HYDROCHLORIDE 10 MG/1
CAPSULE ORAL
Qty: 90 CAPSULE | Refills: 5 | OUTPATIENT
Start: 2020-11-06

## 2020-11-06 NOTE — PROGRESS NOTES
"    Physical Therapy Daily Treatment Note     Name: Jackelyn Kendrick  Clinic Number: [id number]    Therapy Diagnosis:   Encounter Diagnoses   Name Primary?    Decreased right shoulder range of motion     Acute pain of right shoulder      Physician: Reginald Pickens MD    Visit Date: 11/6/2020    Physician Orders: PT Eval and Treat s/p R RTC repair  Medical Diagnosis from Referral: M75.101 (ICD-10-CM) - Nontraumatic tear of right rotator cuff, unspecified tear extent  Evaluation Date: 9/28/2020  Authorization Period Expiration: 9/9/21  Plan of Care Expiration: 12/28/20  Visit # / Visits authorized: 37      Time In: 12:30  pm   Time Out: 145 pm  Total Billable Time: 40  minutes  MT2, TE 1     Precautions: Standard per shoulder protocol for biceps tenodesis and RTC repair    Surgery date 9/23/2020  Subjective     Pt reports: " I hurt"  She was compliant with home exercise program.  Response to previous treatment: increase tightness overall  Functional change: none at this time    Pain: 5 /10 pre-session   Location: right  shoulder      Objective     Jackelyn Kendrick  received therapeutic exercises to develop strength, endurance and ROM for 25 minutes including: mod cues to ensure PROM     Pendulum hang x 1 minutes  Pendulum self PROM with contralateral arm small amplitude 4-6" into flex/ext, Med/lat, CW/CCW 3 x 10 attempted upright and sitting but ended up in prone of EOM and PT assist for PROM  scap retraction 5 sec holds x20   Pt performed AA R shoulder flexion using the L UE x 10.    Not performed  Elbow AAROM x20  Wrist AROM x20 in all planes radial ulnar deviation, wrist flex/ext,  with cues to keep reps slow and controlled for supination/pronation       Jackelyn Kendrick received the following manual therapy techniques: Passive mobilization and  Soft tissue Mobilization were applied to the: pect minor and R GH joint.  for 15 minutes, including:  R GH joint mobilization  R shoulder PROM into flexion in " supine  AA R shoulder Flexion and ER      Jackelyn Kendrick received cold pack for 10 minutes to R shoulder following Tx.      Home Exercises Provided and Patient Education Provided     Education provided:   - HEP, pain management    Written Home Exercises Provided: Patient instructed to cont prior HEP.  Exercises were reviewed and Jackelyn Kendrick was able to demonstrate them prior to the end of the session.  Jackelyn Kendrick demonstrated good  understanding of the education provided.     See EMR under Media for exercises provided prior visit.    Assessment     Patient with fair tolerance for therapy session. Muscle guarding noted during manual techniques.   Jackelyn Kendrick is progressing well towards her goals.   Pt prognosis is Good.     Pt will continue to benefit from skilled outpatient physical therapy to address the deficits listed in the problem list box on initial evaluation, provide pt/family education and to maximize pt's level of independence in the home and community environment.     Pt's spiritual, cultural and educational needs considered and pt agreeable to plan of care and goals.     Anticipated barriers to physical therapy: apprehension    Goals:   Short Term Goals: 3 weeks   1. Pt will be independent with HEP and report compliance at least 4 days/week. Progressing 10/13//20  2. Pt will demonstrate PROM ER to at least 30 deg in scapular plane and flexion to at least 120 deg Progressing 10/6/20  3. Pt will be compliant with post-op precautions and understand activity modifications for ADL's. Progressing 10/13/20     Long Term Goals: 12 weeks   1. Pt will demonstrate full active shoulder ROM compared to L UE.  2. Pt will be able to perform all ADL's at PLOF reporting no pain  3. Pt will be able to garden/ clean pool with sound mechanics reporting no pain      Plan     Cont with POC    Lydia Fu, TETO

## 2020-11-11 ENCOUNTER — CLINICAL SUPPORT (OUTPATIENT)
Dept: REHABILITATION | Facility: HOSPITAL | Age: 70
End: 2020-11-11
Attending: ORTHOPAEDIC SURGERY
Payer: MEDICARE

## 2020-11-11 DIAGNOSIS — M25.611 DECREASED RIGHT SHOULDER RANGE OF MOTION: ICD-10-CM

## 2020-11-11 DIAGNOSIS — M25.511 ACUTE PAIN OF RIGHT SHOULDER: ICD-10-CM

## 2020-11-11 PROCEDURE — 97110 THERAPEUTIC EXERCISES: CPT | Mod: PO

## 2020-11-11 PROCEDURE — 97140 MANUAL THERAPY 1/> REGIONS: CPT | Mod: PO

## 2020-11-11 NOTE — PROGRESS NOTES
"    Physical Therapy Daily Treatment Note     Name: Jackelyn Kendrick  Clinic Number: [id number]    Therapy Diagnosis:   Encounter Diagnoses   Name Primary?    Decreased right shoulder range of motion     Acute pain of right shoulder      Physician: Reginald Pickens MD    Visit Date: 11/11/2020    Physician Orders: PT Eval and Treat s/p R RTC repair  Medical Diagnosis from Referral: M75.101 (ICD-10-CM) - Nontraumatic tear of right rotator cuff, unspecified tear extent  Evaluation Date: 9/28/2020  Authorization Period Expiration: 9/9/21  Plan of Care Expiration: 12/28/20  Visit # / Visits authorized: 37      Time In: 12:45  pm   Time Out: 1:30 pm  Total Billable Time: 45  minutes  MT2, TE 1     Precautions: Standard per shoulder protocol for biceps tenodesis and RTC repair    Surgery date 9/23/2020  Subjective     Pt reports: pt reported that Dr. Pickens asked her wear the sling when out of the house  She was compliant with home exercise program.  Response to previous treatment: increase tightness overall  Functional change: none at this time    Pain: 5 /10 pre-session   Location: right  shoulder      Objective     Jackelyn Kendrick  received therapeutic exercises to develop strength, endurance and ROM for 25 minutes including: mod cues to ensure PROM   B shoulder pro/retraction 10 x 3  B shoulder horizontal abduction/aduction  scap retraction 5 sec holds x20       Not performed  Pendulum hang x 1 minutes  Pendulum self PROM with contralateral arm small amplitude 4-6" into flex/ext, Med/lat, CW/CCW 3 x 10 attempted upright and sitting but ended up in prone of EOM and PT assist for PROM  Elbow AAROM x20  Wrist AROM x20 in all planes radial ulnar deviation, wrist flex/ext,  with cues to keep reps slow and controlled for supination/pronation       Jackelyn Kendrick received the following manual therapy techniques: Passive mobilization and  Soft tissue Mobilization were applied to the: pect minor and R GH " joint.  for 20 minutes, including:  R GH joint mobilization  R shoulder PROM into flexion in supine  AA R shoulder Flexion and ER      Jackelyn Kendrick did not receive cold pack for 10 minutes to R shoulder following Tx.      Home Exercises Provided and Patient Education Provided     Education provided:   - HEP, pain management    Written Home Exercises Provided: Patient instructed to cont prior HEP.  Exercises were reviewed and Jackelyn Kendrick was able to demonstrate them prior to the end of the session.  Jackelyn Kendrick demonstrated good  understanding of the education provided.     See EMR under Media for exercises provided prior visit.    Assessment     Patient tolerated her session with less overall discomfort.    Jackelyn Kendrick is progressing well towards her goals.   Pt prognosis is Good.     Pt will continue to benefit from skilled outpatient physical therapy to address the deficits listed in the problem list box on initial evaluation, provide pt/family education and to maximize pt's level of independence in the home and community environment.     Pt's spiritual, cultural and educational needs considered and pt agreeable to plan of care and goals.     Anticipated barriers to physical therapy: apprehension    Goals:   Short Term Goals: 3 weeks   1. Pt will be independent with HEP and report compliance at least 4 days/week. Progressing 10/13//20  2. Pt will demonstrate PROM ER to at least 30 deg in scapular plane and flexion to at least 120 deg Progressing 10/6/20  3. Pt will be compliant with post-op precautions and understand activity modifications for ADL's. Progressing 10/13/20     Long Term Goals: 12 weeks   1. Pt will demonstrate full active shoulder ROM compared to L UE.  2. Pt will be able to perform all ADL's at PLOF reporting no pain  3. Pt will be able to garden/ clean pool with sound mechanics reporting no pain      Plan     Cont with DENISE Frias, PT

## 2020-11-13 ENCOUNTER — CLINICAL SUPPORT (OUTPATIENT)
Dept: REHABILITATION | Facility: HOSPITAL | Age: 70
End: 2020-11-13
Attending: ORTHOPAEDIC SURGERY
Payer: MEDICARE

## 2020-11-13 DIAGNOSIS — M25.611 DECREASED RIGHT SHOULDER RANGE OF MOTION: ICD-10-CM

## 2020-11-13 DIAGNOSIS — M25.511 ACUTE PAIN OF RIGHT SHOULDER: ICD-10-CM

## 2020-11-13 PROCEDURE — 97110 THERAPEUTIC EXERCISES: CPT | Mod: PO

## 2020-11-13 PROCEDURE — 97140 MANUAL THERAPY 1/> REGIONS: CPT | Mod: PO

## 2020-11-13 NOTE — PROGRESS NOTES
"    Physical Therapy Daily Treatment Note     Name: Jackelyn Kendrick  Clinic Number: [id number]    Therapy Diagnosis:   Encounter Diagnoses   Name Primary?    Decreased right shoulder range of motion     Acute pain of right shoulder      Physician: Reginald Pickens MD    Visit Date: 11/13/2020    Physician Orders: PT Eval and Treat s/p R RTC repair  Medical Diagnosis from Referral: M75.101 (ICD-10-CM) - Nontraumatic tear of right rotator cuff, unspecified tear extent  Evaluation Date: 9/28/2020  Authorization Period Expiration: 9/9/21  Plan of Care Expiration: 12/28/20  Visit # / Visits authorized: 37      Time In: 1:30  pm   Time Out: 2:15 pm  Total Billable Time: 45  minutes  MT2, TE 1     Precautions: Standard per shoulder protocol for biceps tenodesis and RTC repair    Surgery date 9/23/2020  Subjective     Pt reports: pt reported that she continues to wear the sling when she goes out of the house.  She was compliant with home exercise program.  Response to previous treatment: increase tightness overall  Functional change: none at this time    Pain: 5 /10 pre-session   Location: right  shoulder      Objective     Jackelyn Kendrick  received therapeutic exercises to develop strength, endurance and ROM for 25 minutes including: mod cues to ensure PROM   B shoulder pro/retraction 20 x 3  B shoulder horizontal abduction/aduction  scap retraction 5 sec holds x20       Not performed  Pendulum hang x 1 minutes  Pendulum self PROM with contralateral arm small amplitude 4-6" into flex/ext, Med/lat, CW/CCW 3 x 10 attempted upright and sitting but ended up in prone of EOM and PT assist for PROM  Elbow AAROM x20  Wrist AROM x20 in all planes radial ulnar deviation, wrist flex/ext,  with cues to keep reps slow and controlled for supination/pronation       Jackelyn Kendrick received the following manual therapy techniques: Passive mobilization and  Soft tissue Mobilization were applied to the: pect minor and R GH " joint.  for 20 minutes, including:  R GH joint mobilization  R shoulder PROM into flexion in supine  AA R shoulder Flexion and ER      Jackelyn Kendrick did not receive cold pack for 10 minutes to R shoulder following Tx.      Home Exercises Provided and Patient Education Provided     Education provided:   - HEP, pain management    Written Home Exercises Provided: Patient instructed to cont prior HEP.  Exercises were reviewed and Jackelyn Kendrick was able to demonstrate them prior to the end of the session.  Jackelyn Kendrick demonstrated good  understanding of the education provided.     See EMR under Media for exercises provided prior visit.    Assessment     Patient tolerated her tx well today.   Jackelyn Kendrick is progressing well towards her goals.   Pt prognosis is Good.     Pt will continue to benefit from skilled outpatient physical therapy to address the deficits listed in the problem list box on initial evaluation, provide pt/family education and to maximize pt's level of independence in the home and community environment.     Pt's spiritual, cultural and educational needs considered and pt agreeable to plan of care and goals.     Anticipated barriers to physical therapy: apprehension    Goals:   Short Term Goals: 3 weeks   1. Pt will be independent with HEP and report compliance at least 4 days/week. Progressing 10/13//20  2. Pt will demonstrate PROM ER to at least 30 deg in scapular plane and flexion to at least 120 deg Progressing 10/6/20  3. Pt will be compliant with post-op precautions and understand activity modifications for ADL's. Progressing 10/13/20     Long Term Goals: 12 weeks   1. Pt will demonstrate full active shoulder ROM compared to L UE.  2. Pt will be able to perform all ADL's at PLOF reporting no pain  3. Pt will be able to garden/ clean pool with sound mechanics reporting no pain      Plan     Cont with DENISE Frias, PT

## 2020-11-18 ENCOUNTER — CLINICAL SUPPORT (OUTPATIENT)
Dept: REHABILITATION | Facility: HOSPITAL | Age: 70
End: 2020-11-18
Attending: ORTHOPAEDIC SURGERY
Payer: MEDICARE

## 2020-11-18 DIAGNOSIS — M25.511 ACUTE PAIN OF RIGHT SHOULDER: ICD-10-CM

## 2020-11-18 DIAGNOSIS — M25.611 DECREASED RIGHT SHOULDER RANGE OF MOTION: ICD-10-CM

## 2020-11-18 PROCEDURE — 97110 THERAPEUTIC EXERCISES: CPT | Mod: PO

## 2020-11-18 PROCEDURE — 97140 MANUAL THERAPY 1/> REGIONS: CPT | Mod: PO

## 2020-11-18 NOTE — PROGRESS NOTES
"    Physical Therapy Daily Treatment Note     Name: Jackelyn Kendrick  Clinic Number: [id number]    Therapy Diagnosis:   Encounter Diagnoses   Name Primary?    Decreased right shoulder range of motion     Acute pain of right shoulder      Physician: Reginald Pickens MD    Visit Date: 11/18/2020    Physician Orders: PT Eval and Treat s/p R RTC repair  Medical Diagnosis from Referral: M75.101 (ICD-10-CM) - Nontraumatic tear of right rotator cuff, unspecified tear extent  Evaluation Date: 9/28/2020  Authorization Period Expiration: 9/9/21  Plan of Care Expiration: 12/28/20  Visit # / Visits authorized: 37      Time In: 1:30  pm   Time Out: 2:15 pm  Total Billable Time: 45  minutes  MT2, TE 1     Precautions: Standard per shoulder protocol for biceps tenodesis and RTC repair    Surgery date 9/23/2020  Subjective     Pt reports: pt reported that she continues to wear the sling when she goes out of the house.  She was compliant with home exercise program.  Response to previous treatment: increase tightness overall  Functional change: none at this time    Pain: 5 /10 pre-session   Location: right  shoulder      Objective     Jackelyn Kendrick  received therapeutic exercises to develop strength, endurance and ROM for 20 minutes including: mod cues to ensure PROM   B shoulder pro/retraction 20 x 3  B shoulder horizontal abduction/aduction  scap retraction 5 sec holds x20       Not performed  Pendulum hang x 1 minutes  Pendulum self PROM with contralateral arm small amplitude 4-6" into flex/ext, Med/lat, CW/CCW 3 x 10 attempted upright and sitting but ended up in prone of EOM and PT assist for PROM  Elbow AAROM x20  Wrist AROM x20 in all planes radial ulnar deviation, wrist flex/ext,  with cues to keep reps slow and controlled for supination/pronation       Jackelyn Kendrick received the following manual therapy techniques: Passive mobilization and  Soft tissue Mobilization were applied to the: pect minor and R GH " joint.  for 25 minutes, including:  R GH joint mobilization  R shoulder PROM into flexion in supine 10 x 3  AA R shoulder Flexion and ER 10 x 3      Jackelyn Kendrick did not receive cold pack for 10 minutes to R shoulder following Tx.      Home Exercises Provided and Patient Education Provided     Education provided:   - HEP, pain management    Written Home Exercises Provided: Patient instructed to cont prior HEP.  Exercises were reviewed and Jackelyn Kendrick was able to demonstrate them prior to the end of the session.  Jackelyn Kendrick demonstrated good  understanding of the education provided.     See EMR under Media for exercises provided prior visit.    Assessment     Patient is 8 weeks post op today.  She is not at a point that she is be allowed to perform active R shoulder ROM activities.  Pt tolerated Tx well and continues to have limited R shoulder ROM in all directions.  Jackelyn Kendrick is progressing well towards her goals.   Pt prognosis is Good.     Pt will continue to benefit from skilled outpatient physical therapy to address the deficits listed in the problem list box on initial evaluation, provide pt/family education and to maximize pt's level of independence in the home and community environment.     Pt's spiritual, cultural and educational needs considered and pt agreeable to plan of care and goals.     Anticipated barriers to physical therapy: apprehension    Goals:   Short Term Goals: 3 weeks   1. Pt will be independent with HEP and report compliance at least 4 days/week. Progressing 10/13//20  2. Pt will demonstrate PROM ER to at least 30 deg in scapular plane and flexion to at least 120 deg Progressing 10/6/20  3. Pt will be compliant with post-op precautions and understand activity modifications for ADL's. Progressing 10/13/20     Long Term Goals: 12 weeks   1. Pt will demonstrate full active shoulder ROM compared to L UE.  2. Pt will be able to perform all ADL's at PLOF  reporting no pain  3. Pt will be able to garden/ clean pool with sound mechanics reporting no pain      Plan     Cont with POC    Fermin Frias, PT

## 2020-11-20 ENCOUNTER — PATIENT OUTREACH (OUTPATIENT)
Dept: ADMINISTRATIVE | Facility: OTHER | Age: 70
End: 2020-11-20

## 2020-11-20 ENCOUNTER — TELEPHONE (OUTPATIENT)
Dept: ORTHOPEDICS | Facility: CLINIC | Age: 70
End: 2020-11-20

## 2020-11-20 ENCOUNTER — CLINICAL SUPPORT (OUTPATIENT)
Dept: REHABILITATION | Facility: HOSPITAL | Age: 70
End: 2020-11-20
Attending: ORTHOPAEDIC SURGERY
Payer: MEDICARE

## 2020-11-20 DIAGNOSIS — R52 PAIN: Primary | ICD-10-CM

## 2020-11-20 DIAGNOSIS — Z12.31 BREAST CANCER SCREENING BY MAMMOGRAM: Primary | ICD-10-CM

## 2020-11-20 DIAGNOSIS — M25.611 DECREASED RIGHT SHOULDER RANGE OF MOTION: ICD-10-CM

## 2020-11-20 DIAGNOSIS — M25.511 ACUTE PAIN OF RIGHT SHOULDER: ICD-10-CM

## 2020-11-20 PROCEDURE — 97110 THERAPEUTIC EXERCISES: CPT | Mod: PO,CQ

## 2020-11-20 PROCEDURE — 97140 MANUAL THERAPY 1/> REGIONS: CPT | Mod: PO,CQ

## 2020-11-20 NOTE — PROGRESS NOTES
"    Physical Therapy Daily Treatment Note     Name: Jackelyn Kendrick  Clinic Number: [id number]    Therapy Diagnosis:   Encounter Diagnoses   Name Primary?    Decreased right shoulder range of motion     Acute pain of right shoulder      Physician: Reginald Pickens MD    Visit Date: 11/20/2020    Physician Orders: PT Eval and Treat s/p R RTC repair  Medical Diagnosis from Referral: M75.101 (ICD-10-CM) - Nontraumatic tear of right rotator cuff, unspecified tear extent  Evaluation Date: 9/28/2020  Authorization Period Expiration: 9/9/21  Plan of Care Expiration: 12/28/20  Visit # / Visits authorized: 37      Time In: 1:30  pm   Time Out: 2:15 pm  Total Billable Time: 45  minutes  MT2, TE 1     Precautions: Standard per shoulder protocol for biceps tenodesis and RTC repair    Surgery date 9/23/2020  Subjective     Pt reports: she is have shoulder pain and pain under her arm.   She was compliant with home exercise program.  Response to previous treatment: increase tightness overall  Functional change: none at this time    Pain: 5 /10 pre-session   Location: right  shoulder      Objective     Jackelyn Kendrick  received therapeutic exercises to develop strength, endurance and ROM for 30 minutes including:     Mod cues to ensure PROM   B shoulder pro/retraction 20 x 3  B shoulder horizontal abduction/aduction  scap retraction 5 sec holds x20   Table flexion: 20 x 5 second hold     Pendulum hang x 1 minutes  Pendulum self PROM with contralateral arm small amplitude 4-6" into flex/ext, Med/lat, CW/CCW 3 x 10 attempted upright and sitting but ended up in prone of EOM and PT assist for PROM  Elbow AAROM x20  Wrist AROM x20 in all planes radial ulnar deviation, wrist flex/ext,  with cues to keep reps slow and controlled for supination/pronation       Jackelyn Kendrick received the following manual therapy techniques: Passive mobilization and  Soft tissue Mobilization were applied to the: pect minor and R GH joint.  " for 10 minutes, including:  R GH joint mobilization  R shoulder PROM into flexion in supine 10 x 3  AA R shoulder Flexion and ER 10 x 3      Jackelyn Kendrick did not receive cold pack for 10 minutes to R shoulder following Tx.      Home Exercises Provided and Patient Education Provided     Education provided:   - HEP, pain management    Written Home Exercises Provided: Patient instructed to cont prior HEP.  Exercises were reviewed and Jackelyn Kendrick was able to demonstrate them prior to the end of the session.  Jackelyn Kendrick demonstrated good  understanding of the education provided.     See EMR under Media for exercises provided prior visit.    Assessment     Patient is 8 weeks post op today.  She is not at a point that she is be allowed to perform active R shoulder ROM activities.  Pt tolerated Tx well and continues to have limited R shoulder ROM in all directions.  Jackelyn Kendrick is progressing well towards her goals.   Pt prognosis is Good.     Pt will continue to benefit from skilled outpatient physical therapy to address the deficits listed in the problem list box on initial evaluation, provide pt/family education and to maximize pt's level of independence in the home and community environment.     Pt's spiritual, cultural and educational needs considered and pt agreeable to plan of care and goals.     Anticipated barriers to physical therapy: apprehension    Goals:   Short Term Goals: 3 weeks   1. Pt will be independent with HEP and report compliance at least 4 days/week. Progressing 10/13//20  2. Pt will demonstrate PROM ER to at least 30 deg in scapular plane and flexion to at least 120 deg Progressing 10/6/20  3. Pt will be compliant with post-op precautions and understand activity modifications for ADL's. Progressing 10/13/20     Long Term Goals: 12 weeks   1. Pt will demonstrate full active shoulder ROM compared to L UE.  2. Pt will be able to perform all ADL's at OF reporting no  pain  3. Pt will be able to garden/ clean pool with sound mechanics reporting no pain      Plan     Cont with POC    Lydia Fu, PTA

## 2020-11-20 NOTE — PROGRESS NOTES
Health Maintenance Due   Topic Date Due    Mammogram  08/27/2020     Updates were requested from care everywhere.  Chart was reviewed for overdue Proactive [facility] Encounters (RIZWAN) topics (CRS, Breast Cancer Screening, Eye exam)  Health Maintenance has been updated.  [employer] immunization registry triggered.  Immunizations were reconciled.  Mammogram w/CAD task appt sent to [facility]

## 2020-11-21 ENCOUNTER — PATIENT MESSAGE (OUTPATIENT)
Dept: SPORTS MEDICINE | Facility: CLINIC | Age: 70
End: 2020-11-21

## 2020-11-21 DIAGNOSIS — M75.101 NONTRAUMATIC TEAR OF RIGHT ROTATOR CUFF, UNSPECIFIED TEAR EXTENT: ICD-10-CM

## 2020-11-23 ENCOUNTER — OFFICE VISIT (OUTPATIENT)
Dept: ORTHOPEDICS | Facility: CLINIC | Age: 70
End: 2020-11-23
Payer: MEDICARE

## 2020-11-23 ENCOUNTER — HOSPITAL ENCOUNTER (OUTPATIENT)
Dept: RADIOLOGY | Facility: OTHER | Age: 70
Discharge: HOME | End: 2020-11-23
Attending: PHYSICIAN ASSISTANT
Payer: MEDICARE

## 2020-11-23 ENCOUNTER — CLINICAL SUPPORT (OUTPATIENT)
Dept: REHABILITATION | Facility: HOSPITAL | Age: 70
End: 2020-11-23
Attending: ORTHOPAEDIC SURGERY
Payer: MEDICARE

## 2020-11-23 VITALS
HEIGHT: 61 IN | SYSTOLIC BLOOD PRESSURE: 147 MMHG | HEART RATE: 76 BPM | DIASTOLIC BLOOD PRESSURE: 71 MMHG | BODY MASS INDEX: 27.38 KG/M2 | WEIGHT: 145 LBS

## 2020-11-23 DIAGNOSIS — R52 PAIN: ICD-10-CM

## 2020-11-23 DIAGNOSIS — M25.611 DECREASED RIGHT SHOULDER RANGE OF MOTION: ICD-10-CM

## 2020-11-23 DIAGNOSIS — M25.511 ACUTE PAIN OF RIGHT SHOULDER: ICD-10-CM

## 2020-11-23 DIAGNOSIS — M18.0 ARTHRITIS OF CARPOMETACARPAL (CMC) JOINT OF BOTH THUMBS: Primary | ICD-10-CM

## 2020-11-23 PROCEDURE — 97140 MANUAL THERAPY 1/> REGIONS: CPT | Mod: PO,CQ

## 2020-11-23 PROCEDURE — 97110 THERAPEUTIC EXERCISES: CPT | Mod: PO,CQ

## 2020-11-23 PROCEDURE — 99999 PR PBB SHADOW E&M-EST. PATIENT-LVL III: CPT | Mod: PBBFAC,,, | Performed by: PHYSICIAN ASSISTANT

## 2020-11-23 PROCEDURE — 73130 XR HAND COMPLETE 3 VIEWS BILATERAL: ICD-10-PCS | Mod: 26,50,, | Performed by: RADIOLOGY

## 2020-11-23 PROCEDURE — 73130 X-RAY EXAM OF HAND: CPT | Mod: 26,50,, | Performed by: RADIOLOGY

## 2020-11-23 PROCEDURE — 99213 OFFICE O/P EST LOW 20 MIN: CPT | Mod: S$PBB,,, | Performed by: PHYSICIAN ASSISTANT

## 2020-11-23 PROCEDURE — 99999 PR PBB SHADOW E&M-EST. PATIENT-LVL III: ICD-10-PCS | Mod: PBBFAC,,, | Performed by: PHYSICIAN ASSISTANT

## 2020-11-23 PROCEDURE — 99213 PR OFFICE/OUTPT VISIT, EST, LEVL III, 20-29 MIN: ICD-10-PCS | Mod: S$PBB,,, | Performed by: PHYSICIAN ASSISTANT

## 2020-11-23 PROCEDURE — 99213 OFFICE O/P EST LOW 20 MIN: CPT | Mod: PBBFAC,25 | Performed by: PHYSICIAN ASSISTANT

## 2020-11-23 PROCEDURE — 73130 X-RAY EXAM OF HAND: CPT | Mod: TC,50,FY

## 2020-11-23 RX ORDER — TRAMADOL HYDROCHLORIDE 50 MG/1
50 TABLET ORAL EVERY 6 HOURS PRN
Qty: 20 TABLET | Refills: 0 | Status: SHIPPED | OUTPATIENT
Start: 2020-11-23 | End: 2021-03-23

## 2020-11-23 NOTE — PROGRESS NOTES
Subjective:      Patient ID: Jackelyn Kendrick is a 69 y.o. female.    Chief Complaint: Pain of the Left Wrist and Pain of the Right Wrist      HPI  Jackelyn Kendrick is a 69 y.o. female presenting today for bl thumb pain. Onset months ago. Pain is greatest at the base of bilateral thumbs, L>R. She reports increased pain with use, especially opening jars, turning things. She reports she did previously experience intermittent numbness and tingling in the mornings but this has not been very bothersome recently.    Review of patient's allergies indicates:   Allergen Reactions    Erythromycin (bulk) Nausea And Vomiting    Levaquin [levofloxacin]      Other reaction(s): Swelling         Current Outpatient Medications   Medication Sig Dispense Refill    acetaminophen/diphenhydramine (TYLENOL PM ORAL) Take by mouth.      acetic acid-hydrocortisone (VOSOL-HC) otic solution 2-4 drops to affected ear twice a day 10 mL 2    albuterol 90 mcg/actuation inhaler Inhale 2 puffs into the lungs every 6 (six) hours as needed for Wheezing. 6.7 g 11    amLODIPine (NORVASC) 2.5 MG tablet TAKE ONE TABLET BY MOUTH EVERY DAY 30 tablet 11    aspirin 325 MG tablet Take 1 tablet (325 mg total) by mouth once daily. 21 tablet 0    fluocinonide (LIDEX) 0.05 % external solution APPLY TO SCALP ONCE DAILY AS NEEDED FOR FLARE 60 mL 3    fluticasone propionate (FLONASE) 50 mcg/actuation nasal spray 1 spray by Each Nostril route once daily.      ketoconazole (NIZORAL) 2 % shampoo APPLY TO DAMP SCALP EVERY OTHER DAY, LATHER AND LET SIT 5 MINUTES BEFORE RINSING 120 mL 5    levothyroxine (SYNTHROID) 75 MCG tablet Take 1 tablet (75 mcg total) by mouth once daily. 30 tablet 11    meloxicam (MOBIC) 7.5 MG tablet TAKE ONE TABLET BY MOUTH EVERY 12 HOURS 60 tablet 11    Multi-Vitamin tablet Take by mouth.  Tablet Oral       nitrofurantoin (MACRODANTIN) 100 MG capsule Take 1 capsule (100 mg total) by mouth every 12 (twelve) hours. 20 capsule 0     omeprazole (PRILOSEC) 40 MG capsule TAKE ONE CAPSULE BY MOUTH EVERY DAY 30 capsule 11    oxybutynin (DITROPAN-XL) 10 MG 24 hr tablet Take 1 tablet (10 mg total) by mouth once daily. 30 tablet 11    oxyCODONE-acetaminophen (PERCOCET)  mg per tablet Take 1 tablet by mouth every 6 (six) hours as needed for Pain. 28 tablet 0    promethazine (PHENERGAN) 25 MG tablet Take 1 tablet (25 mg total) by mouth every 6 (six) hours as needed for Nausea. 20 tablet 0    PSYLLIUM SEED, WITH SUGAR, (METAMUCIL ORAL) Take by mouth.      RESTASIS 0.05 % ophthalmic emulsion PLACE 1 DROP IN EACH EYE TWO TIMES A DAY 60 each 12    rosuvastatin (CRESTOR) 40 MG Tab Take 1 tablet (40 mg total) by mouth once daily. 90 tablet 3    traMADoL (ULTRAM) 50 mg tablet Take 1 tablet (50 mg total) by mouth every 6 (six) hours as needed for Pain. 20 tablet 0    triamcinolone acetonide 0.1% (KENALOG) 0.1 % cream AAA bid 60 g 3    cetirizine (ZYRTEC) 10 MG tablet Take 1 tablet (10 mg total) by mouth once daily.  0    cholestyramine (QUESTRAN) 4 gram packet Take 1 packet (4 g total) by mouth 2 (two) times daily. 180 packet 0    diclofenac sodium 1 % Gel Apply 2 g topically 4 (four) times daily. for 10 days 1 Tube 5     No current facility-administered medications for this visit.        Past Medical History:   Diagnosis Date    Bronchitis     seasonal    Cataract     Diverticulitis     Dry eye syndrome     GERD (gastroesophageal reflux disease)     Hyperlipidemia     Hypertension     Hypothyroidism 7/19/2012    Obese     PONV (postoperative nausea and vomiting)     Thyroid disease        Past Surgical History:   Procedure Laterality Date    ARTHROSCOPIC REPAIR OF ROTATOR CUFF OF SHOULDER Right 9/23/2020    Procedure: REPAIR, ROTATOR CUFF, ARTHROSCOPIC;  Surgeon: Reginald Pickens MD;  Location: [facility];  Service: Orthopedics;  Laterality: Right;  regional w/catheter (interscalene)    BACK SURGERY      CHOLECYSTECTOMY       "COLONOSCOPY  2007    diverticulosis    COLONOSCOPY N/A 9/3/2020    Procedure: COLONOSCOPY;  Surgeon: Alberta Henriquez MD;  Location: [facility] (4TH FLR);  Service: Colon and Rectal;  Laterality: N/A;  pt requested this time-8/31-covid- [place]    DE QUERVAIN'S RELEASE Left 03/2017    FIXATION OF TENDON Right 9/23/2020    Procedure: FIXATION, TENDON;  Surgeon: Reginald Pickens MD;  Location: [facility] OR;  Service: Orthopedics;  Laterality: Right;    HERNIA REPAIR      HYSTERECTOMY      NASAL SEPTUM SURGERY      SHOULDER SURGERY      TONSILLECTOMY         Review of Systems:  Constitutional: Negative for chills and fever.   Respiratory: Negative for cough and shortness of breath.    Gastrointestinal: Negative for nausea and vomiting.   Skin: Negative for rash.   Neurological: Negative for dizziness and headaches.   Psychiatric/Behavioral: Negative for depression.   MSK as in HPI       OBJECTIVE:     PHYSICAL EXAM:  BP (!) 147/71 (BP Location: Left arm, Patient Position: Sitting, BP Method: Large (Automatic))   Pulse 76   Ht 5' 1" (1.549 m)   Wt 65.8 kg (145 lb)   BMI 27.40 kg/m²     GEN:  NAD, well-developed, well-groomed.  NEURO: Awake, alert, and oriented. Normal attention and concentration.    PSYCH: Normal mood and affect. Behavior is normal.  HEENT: No cervical lymphadenopathy noted.  CARDIOVASCULAR: Radial pulses 2+ bilaterally. No LE edema noted.  PULMONARY: Breath sounds normal. No respiratory distress.  SKIN: Intact, no rashes.      MSK:   BUE:  Good active ROM of the wrist and fingers. ttp bl thumb CMC joints. ttp over the right dorsal wrist. AIN/PIN/Radial/Median/Ulnar Nerves assessed in isolation without deficit. Radial & Ulnar arteries palpated 2+. Capillary Refill <3s.    RADIOGRAPHS:  Xray bl hands 11/23/20  Impression:  Degenerative changes are most notable at the 5th DIP joints, more so on the right than the left.  Comments: I have personally reviewed the imaging and I agree with " the above radiologist's report.    ASSESSMENT/PLAN:       ICD-10-CM ICD-9-CM   1. Arthritis of carpometacarpal (CMC) joint of both thumbs  M18.0 716.94       Orders Placed This Encounter    Ambulatory referral/consult to Physical/Occupational Therapy     Plan:   Reviewed thumb CMC arthritis. Will proceed with OT, comfort cool bracing, paraffin, tumeric.   If numbness and tingling returns/ worsens, please inform us.  RTC if needed         The patient indicates understanding of these issues and agrees to the plan.    Katerin Lopez PA-C  [facility]   [facility]  [place], [place]

## 2020-11-23 NOTE — PROGRESS NOTES
"    Physical Therapy Daily Treatment Note     Name: Jackelyn Kendrick  Clinic Number: [id number]    Therapy Diagnosis:   Encounter Diagnoses   Name Primary?    Decreased right shoulder range of motion     Acute pain of right shoulder      Physician: Reginald Pickens MD    Visit Date: 11/23/2020    Physician Orders: PT Eval and Treat s/p R RTC repair  Medical Diagnosis from Referral: M75.101 (ICD-10-CM) - Nontraumatic tear of right rotator cuff, unspecified tear extent  Evaluation Date: 9/28/2020  Authorization Period Expiration: 9/9/21  Plan of Care Expiration: 12/28/20  Visit # / Visits authorized: 37      Time In: 1:00  pm   Time Out: 01:45 pm  Total Billable Time: 45  minutes  MT2, TE 1     Precautions: Standard per shoulder protocol for biceps tenodesis and RTC repair    Surgery date 9/23/2020  Subjective     Pt reports: she is have shoulder pain and pain under her arm.   She was compliant with home exercise program.  Response to previous treatment: increase tightness overall  Functional change: none at this time    Pain: 5 /10 pre-session   Location: right  shoulder      Objective     Jackelyn Kendrick  received therapeutic exercises to develop strength, endurance and ROM for 30 minutes including:     Pulleys flex / scap x 3 minutes   Mod cues to ensure PROM   B shoulder pro/retraction 20 x 3  B shoulder horizontal abduction/aduction  scap retraction 5 sec holds x20   Table flexion: 20 x 5 second hold     Pendulum hang x 1 minutes  Pendulum self PROM with contralateral arm small amplitude 4-6" into flex/ext, Med/lat, CW/CCW 3 x 10 attempted upright and sitting but ended up in prone of EOM and PT assist for PROM  Elbow AAROM x20  Wrist AROM x20 in all planes radial ulnar deviation, wrist flex/ext,  with cues to keep reps slow and controlled for supination/pronation       Jackelyn Kendrick received the following manual therapy techniques: Passive mobilization and  Soft tissue Mobilization were applied to " the: pect minor and R GH joint.  for 10 minutes, including:  R GH joint mobilization  R shoulder PROM into flexion in supine 10 x 3  AA R shoulder Flexion and ER 10 x 3      Jackelyn Kendrick did not receive cold pack for 10 minutes to R shoulder following Tx.      Home Exercises Provided and Patient Education Provided     Education provided:   - HEP, pain management    Written Home Exercises Provided: Patient instructed to cont prior HEP.  Exercises were reviewed and Jackelyn Kendrick was able to demonstrate them prior to the end of the session.  Jackelyn Kendrick demonstrated good  understanding of the education provided.     See EMR under Media for exercises provided prior visit.    Assessment     Patient is 8 weeks post op today.  She is not at a point that she is be allowed to perform active R shoulder ROM activities.  Pt tolerated Tx well and continues to have limited R shoulder ROM in all directions.  Jackelyn Kendrick is progressing well towards her goals.   Pt prognosis is Good.     Pt will continue to benefit from skilled outpatient physical therapy to address the deficits listed in the problem list box on initial evaluation, provide pt/family education and to maximize pt's level of independence in the home and community environment.     Pt's spiritual, cultural and educational needs considered and pt agreeable to plan of care and goals.     Anticipated barriers to physical therapy: apprehension    Goals:   Short Term Goals: 3 weeks   1. Pt will be independent with HEP and report compliance at least 4 days/week. Progressing 10/13//20  2. Pt will demonstrate PROM ER to at least 30 deg in scapular plane and flexion to at least 120 deg Progressing 10/6/20  3. Pt will be compliant with post-op precautions and understand activity modifications for ADL's. Progressing 10/13/20     Long Term Goals: 12 weeks   1. Pt will demonstrate full active shoulder ROM compared to L UE.  2. Pt will be able to perform  all ADL's at PLOF reporting no pain  3. Pt will be able to garden/ clean pool with sound mechanics reporting no pain      Plan     Cont with POC    Lydia Fu, PTA

## 2020-11-25 ENCOUNTER — CLINICAL SUPPORT (OUTPATIENT)
Dept: REHABILITATION | Facility: HOSPITAL | Age: 70
End: 2020-11-25
Attending: ORTHOPAEDIC SURGERY
Payer: MEDICARE

## 2020-11-25 DIAGNOSIS — M25.511 ACUTE PAIN OF RIGHT SHOULDER: ICD-10-CM

## 2020-11-25 DIAGNOSIS — M25.611 DECREASED RIGHT SHOULDER RANGE OF MOTION: ICD-10-CM

## 2020-11-25 PROCEDURE — 97110 THERAPEUTIC EXERCISES: CPT | Mod: PO,CQ

## 2020-11-25 PROCEDURE — 97140 MANUAL THERAPY 1/> REGIONS: CPT | Mod: PO,CQ

## 2020-11-25 NOTE — PROGRESS NOTES
"    Physical Therapy Daily Treatment Note     Name: Jackelyn Kendrick  Clinic Number: [id number]    Therapy Diagnosis:   Encounter Diagnoses   Name Primary?    Decreased right shoulder range of motion     Acute pain of right shoulder      Physician: Reginald Pickens MD    Visit Date: 11/25/2020    Physician Orders: PT Eval and Treat s/p R RTC repair  Medical Diagnosis from Referral: M75.101 (ICD-10-CM) - Nontraumatic tear of right rotator cuff, unspecified tear extent  Evaluation Date: 9/28/2020  Authorization Period Expiration: 9/9/21  Plan of Care Expiration: 12/28/20  Visit # / Visits authorized: 37      Time In: 1:00  pm   Time Out: 01:45 pm  Total Billable Time: 45  minutes  MT2, TE 1     Precautions: Standard per shoulder protocol for biceps tenodesis and RTC repair    Surgery date 9/23/2020  Subjective     Pt reports: she is have shoulder pain and pain under her arm.   She was compliant with home exercise program.  Response to previous treatment: increase tightness overall  Functional change: none at this time    Pain: 5 /10 pre-session   Location: right  shoulder      Objective     Jackelyn Kendrick  received therapeutic exercises to develop strength, endurance and ROM for 30 minutes including:     Pulleys flex / scap x 3 minutes   Mod cues to ensure PROM   B shoulder pro/retraction 20 x 3  B shoulder horizontal abduction/aduction  scap retraction 5 sec holds x20   Table flexion: 20 x 5 second hold   Theraband rows/ ext: 2 x 10 green     Pendulum hang x 1 minutes  Pendulum self PROM with contralateral arm small amplitude 4-6" into flex/ext, Med/lat, CW/CCW 3 x 10 attempted upright and sitting but ended up in prone of EOM and PT assist for PROM  Elbow AAROM x20  Wrist AROM x20 in all planes radial ulnar deviation, wrist flex/ext,  with cues to keep reps slow and controlled for supination/pronation       Jackelyn Kendrick received the following manual therapy techniques: Passive mobilization and  Soft " tissue Mobilization were applied to the: pect minor and R GH joint.  for 10 minutes, including:  R GH joint mobilization  R shoulder PROM into flexion in supine 10 x 3  AA R shoulder Flexion and ER 10 x 3      Jackelyn Kendrick did not receive cold pack for 10 minutes to R shoulder following Tx.      Home Exercises Provided and Patient Education Provided     Education provided:   - HEP, pain management    Written Home Exercises Provided: Patient instructed to cont prior HEP.  Exercises were reviewed and Jackelyn Kendrick was able to demonstrate them prior to the end of the session.  Jackelyn Kendrick demonstrated good  understanding of the education provided.     See EMR under Media for exercises provided prior visit.    Assessment     Patient  Tolerated therapy session fairly well.  She is not at a point that she is be allowed to perform active R shoulder ROM activities.  Pt tolerated Tx well and continues to have limited R shoulder ROM in all directions.  Jackelyn Kendrick is progressing well towards her goals.   Pt prognosis is Good.     Pt will continue to benefit from skilled outpatient physical therapy to address the deficits listed in the problem list box on initial evaluation, provide pt/family education and to maximize pt's level of independence in the home and community environment.     Pt's spiritual, cultural and educational needs considered and pt agreeable to plan of care and goals.     Anticipated barriers to physical therapy: apprehension    Goals:   Short Term Goals: 3 weeks   1. Pt will be independent with HEP and report compliance at least 4 days/week. Progressing 10/13//20  2. Pt will demonstrate PROM ER to at least 30 deg in scapular plane and flexion to at least 120 deg Progressing 10/6/20  3. Pt will be compliant with post-op precautions and understand activity modifications for ADL's. Progressing 10/13/20     Long Term Goals: 12 weeks   1. Pt will demonstrate full active shoulder ROM  compared to L UE.  2. Pt will be able to perform all ADL's at PLOF reporting no pain  3. Pt will be able to garden/ clean pool with sound mechanics reporting no pain      Plan     Cont with POC    Lydia Fu, PTA

## 2020-11-26 ENCOUNTER — PATIENT MESSAGE (OUTPATIENT)
Dept: ORTHOPEDICS | Facility: CLINIC | Age: 70
End: 2020-11-26

## 2020-11-26 DIAGNOSIS — M18.0 ARTHRITIS OF CARPOMETACARPAL (CMC) JOINT OF BOTH THUMBS: Primary | ICD-10-CM

## 2020-11-30 ENCOUNTER — PATIENT MESSAGE (OUTPATIENT)
Dept: ORTHOPEDICS | Facility: CLINIC | Age: 70
End: 2020-11-30

## 2020-11-30 RX ORDER — LIDOCAINE AND PRILOCAINE 25; 25 MG/G; MG/G
CREAM TOPICAL 2 TIMES DAILY PRN
Qty: 30 G | Refills: 2 | Status: SHIPPED | OUTPATIENT
Start: 2020-11-30 | End: 2021-11-01 | Stop reason: SDUPTHER

## 2020-11-30 RX ORDER — DICLOFENAC SODIUM 10 MG/G
2 GEL TOPICAL 2 TIMES DAILY PRN
Qty: 100 G | Refills: 2 | Status: SHIPPED | OUTPATIENT
Start: 2020-11-30 | End: 2021-03-23 | Stop reason: SDUPTHER

## 2020-11-30 RX ORDER — DICLOFENAC SODIUM 20 MG/G
2 SOLUTION TOPICAL 2 TIMES DAILY PRN
Qty: 112 G | Refills: 2 | Status: SHIPPED | OUTPATIENT
Start: 2020-11-30 | End: 2020-11-30

## 2020-12-02 ENCOUNTER — CLINICAL SUPPORT (OUTPATIENT)
Dept: REHABILITATION | Facility: HOSPITAL | Age: 70
End: 2020-12-02
Payer: MEDICARE

## 2020-12-02 DIAGNOSIS — M25.611 DECREASED RIGHT SHOULDER RANGE OF MOTION: ICD-10-CM

## 2020-12-02 DIAGNOSIS — M25.511 ACUTE PAIN OF RIGHT SHOULDER: ICD-10-CM

## 2020-12-02 PROCEDURE — 97110 THERAPEUTIC EXERCISES: CPT | Mod: PO

## 2020-12-02 PROCEDURE — 97140 MANUAL THERAPY 1/> REGIONS: CPT | Mod: PO

## 2020-12-02 NOTE — PROGRESS NOTES
"    Physical Therapy Daily Treatment Note     Name: Jackelyn Kendrick  Clinic Number: [id number]    Therapy Diagnosis:   Encounter Diagnoses   Name Primary?    Decreased right shoulder range of motion     Acute pain of right shoulder      Physician: Reginald Pickens MD    Visit Date: 12/2/2020    Physician Orders: PT Eval and Treat s/p R RTC repair  Medical Diagnosis from Referral: M75.101 (ICD-10-CM) - Nontraumatic tear of right rotator cuff, unspecified tear extent  Evaluation Date: 9/28/2020  Authorization Period Expiration: 9/9/21  Plan of Care Expiration: 12/28/20  Visit # / Visits authorized: 37      Time In: 12:45  pm   Time Out: 01:30 pm  Total Billable Time: 45 minutes  MT2, TE 1     Precautions: Standard per shoulder protocol for biceps tenodesis and RTC repair    Surgery date 9/23/2020  Subjective     Pt reports: she is have R shoulder pain and pain in B hands.  She stated taht she will see a hand therapist next week.   She was compliant with home exercise program.  Response to previous treatment: increase tightness overall  Functional change: none at this time    Pain: 5 /10 pre-session   Location: right  shoulder      Objective     Jackelyn Kendrick  received therapeutic exercises to develop strength, endurance and ROM for 15 minutes including:     Pulleys flex / scap x 4 minutes   Bannester slides flexion: 20 x 2 with 3 second hold      Mod cues to ensure PROM   B shoulder pro/retraction 20 x 3  B shoulder horizontal abduction/aduction  scap retraction 5 sec holds x 20    Theraband rows/ ext: 2 x 10 green   Pendulum hang x 1 minutes  Pendulum self PROM with contralateral arm small amplitude 4-6" into flex/ext, Med/lat, CW/CCW 3 x 10 attempted upright and sitting but ended up in prone of EOM and PT assist for PROM  Elbow AAROM x20  Wrist AROM x20 in all planes radial ulnar deviation, wrist flex/ext,  with cues to keep reps slow and controlled for supination/pronation       Jackelyn Kendrick " received the following manual therapy techniques: Passive mobilization and  Soft tissue Mobilization were applied to the: pect minor and R GH joint.  for 30 minutes, including:  R GH joint mobilization  R shoulder PROM into flexion in supine 10 x 2  Manually resisted R shoulder IR/ER 10 x 2    Not performed:  AA R shoulder Flexion and ER 10 x 3    Jackelyn Kendrick did not receive cold pack for 10 minutes to R shoulder following Tx.      Home Exercises Provided and Patient Education Provided     Education provided:   - HEP, pain management    Written Home Exercises Provided: Patient instructed to cont prior HEP.  Exercises were reviewed and Jackelyn Kendrick was able to demonstrate them prior to the end of the session.  Jackelyn Kendrick demonstrated good  understanding of the education provided.     See EMR under Media for exercises provided prior visit.    Assessment     Patient reported that her hands are becoming as great of a problem as her shoulders have been.  Pt is presently 11 weeks post R shoulder arthroscopic surgery to address rotator cuff tears and biceps tenodesis.  Jackelyn Kendrick is progressing well towards her goals.   Pt prognosis is Good.     Pt will continue to benefit from skilled outpatient physical therapy to address the deficits listed in the problem list box on initial evaluation, provide pt/family education and to maximize pt's level of independence in the home and community environment.     Pt's spiritual, cultural and educational needs considered and pt agreeable to plan of care and goals.     Anticipated barriers to physical therapy: apprehension    Goals:   Short Term Goals: 3 weeks   1. Pt will be independent with HEP and report compliance at least 4 days/week. Progressing 10/13//20  2. Pt will demonstrate PROM ER to at least 30 deg in scapular plane and flexion to at least 120 deg Progressing 10/6/20  3. Pt will be compliant with post-op precautions and understand activity  modifications for ADL's. Progressing 10/13/20     Long Term Goals: 12 weeks   1. Pt will demonstrate full active shoulder ROM compared to L UE.  2. Pt will be able to perform all ADL's at PLOF reporting no pain  3. Pt will be able to garden/ clean pool with sound mechanics reporting no pain      Plan     Cont with POC    Fermin Frias, PT

## 2020-12-03 ENCOUNTER — TELEPHONE (OUTPATIENT)
Dept: PRIMARY CARE CLINIC | Facility: CLINIC | Age: 70
End: 2020-12-03

## 2020-12-03 NOTE — TELEPHONE ENCOUNTER
----- Message from Karina Serrano MA sent at 12/3/2020 11:09 AM CST -----  Contact: [phone]  Hermes is calling today for his wife, and he would like the PCP to become .  Pt haven't seen doctor  before and the  didn't want to schedule an appt.  Please call to advise the pt.

## 2020-12-04 ENCOUNTER — CLINICAL SUPPORT (OUTPATIENT)
Dept: REHABILITATION | Facility: HOSPITAL | Age: 70
End: 2020-12-04
Payer: MEDICARE

## 2020-12-04 DIAGNOSIS — M25.611 DECREASED RIGHT SHOULDER RANGE OF MOTION: ICD-10-CM

## 2020-12-04 DIAGNOSIS — M25.511 ACUTE PAIN OF RIGHT SHOULDER: ICD-10-CM

## 2020-12-04 PROCEDURE — 97140 MANUAL THERAPY 1/> REGIONS: CPT | Mod: PO

## 2020-12-04 PROCEDURE — 97110 THERAPEUTIC EXERCISES: CPT | Mod: PO

## 2020-12-04 PROCEDURE — 97530 THERAPEUTIC ACTIVITIES: CPT | Mod: PO

## 2020-12-04 NOTE — PROGRESS NOTES
"    Physical Therapy Daily Treatment Note     Name: Jackelyn Kendrick  Clinic Number: [id number]    Therapy Diagnosis:   Encounter Diagnoses   Name Primary?    Decreased right shoulder range of motion     Acute pain of right shoulder      Physician: Reginald Pickens MD    Visit Date: 12/4/2020    Physician Orders: PT Eval and Treat s/p R RTC repair  Medical Diagnosis from Referral: M75.101 (ICD-10-CM) - Nontraumatic tear of right rotator cuff, unspecified tear extent  Evaluation Date: 9/28/2020  Authorization Period Expiration: 9/9/21  Plan of Care Expiration: 12/28/20  Visit # / Visits authorized: 37      Time In: 12:50  pm   Time Out: 01:40 pm  Total Billable Time: 50 minutes  MT2, TE 1     Precautions: Standard per shoulder protocol for biceps tenodesis and RTC repair    Surgery date 9/23/2020  Subjective     Pt reports: that she continues to have R shoulder pain and pain in B hands.    She was compliant with home exercise program.  Response to previous treatment: increase tightness overall  Functional change: none at this time    Pain: 5 /10 pre-session   Location: right  shoulder      Objective     Jackelyn Kendrick  received therapeutic exercises to develop strength, endurance and ROM for 30 minutes including:      B shoulder pro/retraction 20 x 3  B shoulder horizontal abduction/aduction  Pulleys flex / scap x 4 minutes   AA R shoulder flexion with assist form the MARIAH velásquez flexion: 20 x 2 with 3 second hold  Mod cues to ensure PROM   scap retraction 5 sec holds x 20    Theraband rows/ ext: 2 x 10 green   Pendulum hang x 1 minutes  Pendulum self PROM with contralateral arm small amplitude 4-6" into flex/ext, Med/lat, CW/CCW 3 x 10 attempted upright and sitting but ended up in prone of EOM and PT assist for PROM  Elbow AAROM x20  Wrist AROM x20 in all planes radial ulnar deviation, wrist flex/ext,  with cues to keep reps slow and controlled for supination/pronation       Jackelyn BGordy" Aroldo received the following manual therapy techniques: Passive mobilization and  Soft tissue Mobilization were applied to the: pect minor and R GH joint.  for 15 minutes, including:  R GH joint mobilization  R shoulder PROM into flexion in supine 10 x 2  Manually resisted R shoulder IR/ER 10 x 2    Not performed:  AA R shoulder Flexion and ER 10 x 3    Jcakelyn Kendrick did not receive cold pack for 10 minutes to R shoulder following Tx.      Home Exercises Provided and Patient Education Provided     Education provided:   - HEP, pain management    Written Home Exercises Provided: Patient instructed to cont prior HEP.  Exercises were reviewed and Jackelyn Kendrick was able to demonstrate them prior to the end of the session.  Jackelyn Kendrick demonstrated good  understanding of the education provided.     See EMR under Media for exercises provided prior visit.    Assessment     Pt is presently 12 weeks post R shoulder arthroscopic surgery to address rotator cuff tears and biceps tenodesis.  She able to begin AA and AROM through a pain free ROM.  Jackelyn Kendrick is progressing well towards her goals.   Pt prognosis is Good.     Pt will continue to benefit from skilled outpatient physical therapy to address the deficits listed in the problem list box on initial evaluation, provide pt/family education and to maximize pt's level of independence in the home and community environment.     Pt's spiritual, cultural and educational needs considered and pt agreeable to plan of care and goals.     Anticipated barriers to physical therapy: apprehension    Goals:   Short Term Goals: 3 weeks   1. Pt will be independent with HEP and report compliance at least 4 days/week. Progressing 10/13//20  2. Pt will demonstrate PROM ER to at least 30 deg in scapular plane and flexion to at least 120 deg Progressing 10/6/20  3. Pt will be compliant with post-op precautions and understand activity modifications for ADL's.  Progressing 10/13/20     Long Term Goals: 12 weeks   1. Pt will demonstrate full active shoulder ROM compared to L UE.  2. Pt will be able to perform all ADL's at PLOF reporting no pain  3. Pt will be able to garden/ clean pool with sound mechanics reporting no pain      Plan     Cont with POC    Fermin Frias, PT

## 2020-12-08 ENCOUNTER — CLINICAL SUPPORT (OUTPATIENT)
Dept: REHABILITATION | Facility: HOSPITAL | Age: 70
End: 2020-12-08
Payer: MEDICARE

## 2020-12-08 DIAGNOSIS — R52 PAIN: ICD-10-CM

## 2020-12-08 DIAGNOSIS — M18.0 ARTHRITIS OF CARPOMETACARPAL (CMC) JOINT OF BOTH THUMBS: ICD-10-CM

## 2020-12-08 DIAGNOSIS — R53.1 WEAKNESS: ICD-10-CM

## 2020-12-08 PROCEDURE — 97110 THERAPEUTIC EXERCISES: CPT | Mod: PO

## 2020-12-08 PROCEDURE — 97166 OT EVAL MOD COMPLEX 45 MIN: CPT | Mod: PO

## 2020-12-08 NOTE — PATIENT INSTRUCTIONS
"            Touch tip of thumb to nail tip of each finger in turn, making an "O" shape.  Repeat _15___ times. Do _5___ sessions per day. (RIGHT AND LEFT)          Actively bend right thumb across palm as far as possible. Hold _3__ seconds. Relax. Then pull thumb back into hitchhike position.  Repeat __15__ times per set.  Do __5__ sessions per day.( RIGHT AND LEFT HAND)            With palm on table, lift thumb up. Hold _3___ seconds. Relax and lower thumb.  Repeat _15___ times. Do _5___ sessions per day.( RIGHT HAND)           Radial Adduction/Abduction (Active)        Move thumb out to side. Move back alongside index finger.  Repeat 15 times. Do 5 sessions per day.(BOTH HANDS- pain-free)                  Keeping right fingertips straight, press putty toward base of palm.  Repeat  2  minutes . Do __2__ sessions per day.  Activity: Squeeze flour sifter, plastic squeeze bottles, turkey baster, juice from fruit.* every other day (RIGHT AND LEFT HAND)          Squeeze putty using thumb and all fingers.  Repeat  for 2 minutes . Do __2__ sessions per day. Every other day ( RIGHT HAND)      .            Squeeze between thumb and side of index  finger in turn.  Repeat  for 2 minutes .  Do __2_ sessions per day. Every other day (RIGHT HAND)       2. Putty Pinch:    Roll up the putty to create a small tubular section. Next, pinch the putty and repeat down the section with just your index finger,  middle finger, and your thumb.  Repeat . Try to avoid hyperextension of the thumb.   Repeat each direction  15 pinches,  2 times a day. Every other day (RIGHT HAND)          All exercises are done every other day; do not exceed this. About 2-3 times  A week.     - get thumb braces - cool comfort             Arm on table with thumb-up. Bend hand back at wrist. Hold __5__ seconds.  Alternate way: Use other hand to bring hand up, then let go.  Repeat __12__ times. Do __3__ sessions per day.          Regarding left hand:    DO NOT BEND " WRIST DOWN AND PINCH AT THE SAME TIME AS THIS MAY AGGRAVATED YOUR SYMPTOMS   AVOID REPETITIVE ACTIVITIES REQUIRING PINCHING AND PULLING WITH THE THUMB  DO NOT SUSTAIN PINCHING OR GRIPPING.  WEAR BRACE FOR 6 WEEKS TO REST THE TENDON  TAKE OFF BRACE WITH EXERCISES  DO THE WRIST AND THUMB EXERCISES AT DIFFERENT TIMES OF THE DAY     STOP ALL MOTIONS THAT CAUSE PAIN AND MODIFY THE TASK

## 2020-12-08 NOTE — PLAN OF CARE
[facility] Occupational Therapy  Hand and Wrist  Evaluation     Date: 12/8/2020  Name: Jackelyn Kendrick  Clinic Number: [id number]    Therapy Diagnosis:   Encounter Diagnoses   Name Primary?    Arthritis of carpometacarpal (CMC) joint of both thumbs     Weakness     Pain      Physician: Katerin Lopez PA-C    Physician Orders: Evaluate and treat ; 2x/wk x 6 wks , Modalities prn  Medical Diagnosis: Arthritis of carpometacarpal (CMC) joint of both thumbs  Evaluation Date: 12/8/2020  Insurance Authorization Expiration date : 11-  Plan of Care Certification Period: 1/15/2021  Date of Return to MD: not set date     Visit # / Visits authorized: 1 / 1  Time In: 115pmw  Time Out: 2 pm   Total Billable Time: 45 minutes    Precautions:  Standard    Subjective       Involved Side: (B) thumbs   Dominant Side: Right  Date of Onset: years   History of Current Condition/Mechanism of Injury:  insidious onset. Pain is greatest at the base of bilateral thumbs, L>R. She reports increased pain with use, especially opening jars, turning things. She reports she did previously experience intermittent numbness and tingling in the mornings but this has not been very bothersome recently. Per hand clinic on 11-  Imaging: Please see image report       Past Medical History/Physical Systems Review:   Jackelyn Kendrick  has a past medical history of Bronchitis, Cataract, Diverticulitis, Dry eye syndrome, GERD (gastroesophageal reflux disease), Hyperlipidemia, Hypertension, Hypothyroidism, Obese, PONV (postoperative nausea and vomiting), and Thyroid disease.    Jackelyn Kendrick  has a past surgical history that includes Colonoscopy (2007); Nasal septum surgery; Shoulder surgery; Hysterectomy; Cholecystectomy; Hernia repair; Tonsillectomy; Back surgery; De Quervain's release (Left, 03/2017); Colonoscopy (N/A, 9/3/2020); Arthroscopic repair of rotator cuff of shoulder (Right, 9/23/2020); and Fixation of  tendon (Right, 9/23/2020).    Jackelyn has a current medication list which includes the following prescription(s): acetaminophen/diphenhydramine, acetic acid-hydrocortisone, albuterol, amlodipine, aspirin, cetirizine, cholestyramine, diclofenac sodium, dicyclomine, fluocinonide, fluticasone propionate, ketoconazole, levothyroxine, lidocaine-prilocaine, meloxicam, multi-vitamin, nitrofurantoin, omeprazole, oxybutynin, oxycodone-acetaminophen, promethazine, psyllium husk, restasis, rosuvastatin, tramadol, triamcinolone acetonide 0.1%, amlodipine, meloxicam, meloxicam, and omeprazole.    Review of patient's allergies indicates:   Allergen Reactions    Erythromycin (bulk) Nausea And Vomiting    Levaquin [levofloxacin]      Other reaction(s): Swelling            Pain:  Functional Pain Scale Rating 0-10:   3/10 on current  3/10 at best  3/10 at worst  Location: Right  And left wrist/thumb Left worse than the right     Patient's Goals for Therapy:  to increase motion, reduce pain, return to normal function of hand     Occupation:  Retired   - folding clothes    Functional Limitations/Social History:    Previous functional status includes: Independent with all ADLs.     Current FunctionalStatus   Home/Living environment : lives with spouse       Limitation of Functional Status as follows:   ADLs/IADLs:     - has troubles with buttons, gripping  And  Opening jars      Objective       Observation:   Skin intact, arrived with (B) brace. Left brace is not correct brace, needs to transfer to longer wrist brace       Sensation: Median Nerve Distribution   12/8/2020 12/8/2020    Left Right   Monofilament Testing     Normal 1.65-2.83 Present Present   Diminished Light Touch 3.22-3.61 Present Present   Diminished Protective 3.84-4.31 Present Present   Loss of Protective 4.56-6.65 Present Present   Untestable >6.65 Present Present             Range of Motion:   Right Active   12/8/2020   THUMB                          MP Ext/Flex  0/33                         IP Ext/Flex full                         Malone ABD full                         Radial ABD 70                         Opposition Thumb  To SF P3     THUMB (LEFT)                          MP Ext/Flex 0/30                         IP Ext/Flex full                         Malone ABD 55                         Radial ABD 55                         Opposition Thumb to SF P2     Comments: patient had surgery on 9- in left shoulder and feels her  is poor due to this      Strength: (GUMARO Dynamometer in lbs.) Average 3 trials, Position II:     12/8/2020 12/8/2020   Rung 2  Right  Left   GUMARO # 2 17# 17#       Pinch Strength (Measured in psi)     12/8/2020 12/8/2020    Right Left   Key Pinch 4 psi 4 psi   3pt Pinch 3 psi 1 psi         Treatment     Treatment Time In/out  (separate from total time) : 10 minutes at the end of the hour     Home Exercise Program/Education:  Issued HEP (see patient instructions in EMR) and educated on modality use for pain management . Exercises were reviewed and Jackelyn Kendrick was able to demonstrate them prior to the end of the session.   Pt received a written copy of exercises to perform at home. Jackelyn Kendrick demonstrated good  understanding of the education provided.  Pt was advised to perform these exercises free of pain, and to stop performing them if pain occurs.    Patient/Family Education: role of OT, goals for OT, double booking , scheduling/cancellations - pt verbalized understanding. Discussed insurance limitations with patient.    Additional Education provided: role of CHT/OT, goals for CHT/OT, discussed insurance limitation with patient- patient verbalized understanding     Patient received paraffin bath to (B) hand(s) for 8 minutes to increase blood flow, circulation, pain management and for tissue elasticity prior to therex.       Assessment     This 69 y.o. female referred to Outpatient Occupational Therapy with diagnosis  of   Encounter Diagnoses   Name Primary?    Arthritis of carpometacarpal (CMC) joint of both thumbs     Weakness     Pain     presents with limitations as described in problem list. Patient can benefit from Occupational Therapy services for Iontophoresis, moist heat, AAROM, AROM, Theraputic exercises, home exercise program provied with written instructions, ice and strengthening and Orthosis, if deemed necessary . The following goals were discussed with the patient and she is in agreement with them as to be addressed in the treatment plan.     Problem List:   Decreased function of Right UE, Decreased function of Left UE, Decreased ROM, Increased pain and Decreased strength      Anticipated barriers to occupational therapy: not tested  Pt has no cultural, educational or language barriers to learning provided.        Profile and History Assessment of Occupational Performance Level of Clinical Decision Making Complexity Score   Occupational Profile:   Jackelyn Kendrick is a 69 y.o. female who lives with their family and is currently employed Jackelyn Kendrick has difficulty with  ADLs and IADLs as listed previously, which  affecting his/her daily functional abilities.      Comorbidities:    has a past medical history of Bronchitis, Cataract, Diverticulitis, Dry eye syndrome, GERD (gastroesophageal reflux disease), Hyperlipidemia, Hypertension, Hypothyroidism, Obese, PONV (postoperative nausea and vomiting), and Thyroid disease.    Medical and Therapy History Review:   Expanded               Performance Deficits    Physical:  Joint Mobility  Joint Stability   Strength  Pinch Strength  Gross Motor Coordination  Fine Motor Coordination  Pain    Cognitive:  No Deficits    Psychosocial:    No Deficits     Clinical Decision Making:  moderate    Assessment Process:  Problem-Focused Assessments    Modification/Need for Assistance:  Not Necessary    Intervention Selection:  Limited Treatment Options       low  Based on  PMHX, co morbidities , data from assessments and functional level of assistance required with task and clinical presentation directly impacting function.         The following goals were discussed with the patient and patient is in agreement with them as to be addressed in the treatment plan.     Goals:   STG: 3 and 4 weeks :  1) Patient to be IND with HEP and modalities for pain managment.  2) Patient will  Increase Active Range of motion 8-10 degrees in hand/wrist to increase functional hand use for ADLs/work/leisure activities.  3) Pt will wear cmc brace to reduce thumb pain     LTG:     Goals to be met in 6  weeks:    1) Patient to be IND with HEP and modalities for pain managment.  2) Patient will  Increase Active Range of motion 15-20 degrees in hand/wrist to increase functional hand use for ADLs/work/leisure activities.  3)Pt will increase  strength 10-20 lbs. to improve functional grasp for ADLs/work/leisure activities.   4) Pt will increase pinch strength in all 3 limited positions by 1-3 psi to assist with manipulation and fine motor task  5).  Pt will wear cmc brace to reduce thumb pain       Plan     Plan    Certification Period/Plan of care expiration: 12/8/2020 to 1/15/2021.    Outpatient Occupational Therapy 1 times weekly for 4 weeks to include the following interventions: Paraffin, Fluidotherapy, Manual therapy/joint mobilizations, Modalities for pain management, US 3 mhz, Therapeutic exercises/activities., Strengthening and Orthotic Fabrication/Fit/Training.      Courtney Kramer, MOT,  OTR/L, CHT  Occupational therapist, Certified Hand Therapist

## 2020-12-09 ENCOUNTER — CLINICAL SUPPORT (OUTPATIENT)
Dept: REHABILITATION | Facility: HOSPITAL | Age: 70
End: 2020-12-09
Payer: MEDICARE

## 2020-12-09 DIAGNOSIS — M25.611 DECREASED RIGHT SHOULDER RANGE OF MOTION: ICD-10-CM

## 2020-12-09 DIAGNOSIS — M25.511 ACUTE PAIN OF RIGHT SHOULDER: ICD-10-CM

## 2020-12-09 PROCEDURE — 97140 MANUAL THERAPY 1/> REGIONS: CPT | Mod: PO

## 2020-12-09 PROCEDURE — 97110 THERAPEUTIC EXERCISES: CPT | Mod: PO

## 2020-12-09 NOTE — PROGRESS NOTES
"    Physical Therapy Daily Treatment Note     Name: Jackelyn Kendrick  Clinic Number: [id number]    Therapy Diagnosis:   Encounter Diagnoses   Name Primary?    Decreased right shoulder range of motion     Acute pain of right shoulder      Physician: Reginald Pickens MD    Visit Date: 12/9/2020    Physician Orders: PT Eval and Treat s/p R RTC repair  Medical Diagnosis from Referral: M75.101 (ICD-10-CM) - Nontraumatic tear of right rotator cuff, unspecified tear extent  Evaluation Date: 9/28/2020  Authorization Period Expiration: 9/9/21  Plan of Care Expiration: 12/28/20  Visit # / Visits authorized: 37      Time In: 12:45  pm   Time Out: 01:30 pm  Total Billable Time: 45 minutes  MT2, TE 1     Precautions: Standard per shoulder protocol for biceps tenodesis and RTC repair    Surgery date 9/23/2020  Subjective     Pt reports: that she continues to have R shoulder pain.  She reported that hand therapy went well.  She was compliant with home exercise program.  Response to previous treatment: increase tightness overall  Functional change: none at this time    Pain: 5 /10 pre-session   Location: right  shoulder      Objective     Jackelyn Kendrick  received therapeutic exercises to develop strength, endurance and ROM for 30 minutes including:      B shoulder pro/retraction 20 x 3  B shoulder horizontal abduction/aduction  Pulleys flex / scap x 4 minutes   AA R shoulder flexion with assist form the MARIAH Dotson slides flexion: 20 x 2 with 3 second hold  Mod cues to ensure PROM   scap retraction 5 sec holds x 20    Theraband rows/ ext: 2 x 10 green   Pendulum hang x 1 minutes  Pendulum self PROM with contralateral arm small amplitude 4-6" into flex/ext, Med/lat, CW/CCW 3 x 10 attempted upright and sitting but ended up in prone of EOM and PT assist for PROM  Elbow AAROM x20  Wrist AROM x20 in all planes radial ulnar deviation, wrist flex/ext,  with cues to keep reps slow and controlled for supination/pronation "       Jackelyn Kendrick received the following manual therapy techniques: Passive mobilization and  Soft tissue Mobilization were applied to the: pect minor and R GH joint.  for 15 minutes, including:  R GH joint mobilization  R shoulder PROM into flexion in supine 10 x 2  Manually resisted R shoulder IR/ER 10 x 2    Not performed:  AA R shoulder Flexion and ER 10 x 3    Jackelyn Kendrick did not receive cold pack for 10 minutes to R shoulder following Tx.      Home Exercises Provided and Patient Education Provided     Education provided:   - HEP, pain management    Written Home Exercises Provided: Patient instructed to cont prior HEP.  Exercises were reviewed and Jackelyn Kendrick was able to demonstrate them prior to the end of the session.  Jackelyn Kendrick demonstrated good  understanding of the education provided.     See EMR under Media for exercises provided prior visit.    Assessment     Pt tolerated Tx today well with pain at end range of R shoulder flexion and external rotation.  Jackelyn Kendrick is progressing well towards her goals.   Pt prognosis is Good.     Pt will continue to benefit from skilled outpatient physical therapy to address the deficits listed in the problem list box on initial evaluation, provide pt/family education and to maximize pt's level of independence in the home and community environment.     Pt's spiritual, cultural and educational needs considered and pt agreeable to plan of care and goals.     Anticipated barriers to physical therapy: apprehension    Goals:   Short Term Goals: 3 weeks   1. Pt will be independent with HEP and report compliance at least 4 days/week. Progressing 10/13//20  2. Pt will demonstrate PROM ER to at least 30 deg in scapular plane and flexion to at least 120 deg Progressing 10/6/20  3. Pt will be compliant with post-op precautions and understand activity modifications for ADL's. Progressing 10/13/20     Long Term Goals: 12 weeks   1. Pt will  demonstrate full active shoulder ROM compared to L UE.  2. Pt will be able to perform all ADL's at PLOF reporting no pain  3. Pt will be able to garden/ clean pool with sound mechanics reporting no pain      Plan     Cont with POC    Fermin Frias, PT

## 2020-12-11 ENCOUNTER — PATIENT MESSAGE (OUTPATIENT)
Dept: OTHER | Facility: OTHER | Age: 70
End: 2020-12-11

## 2020-12-11 ENCOUNTER — CLINICAL SUPPORT (OUTPATIENT)
Dept: REHABILITATION | Facility: HOSPITAL | Age: 70
End: 2020-12-11
Payer: MEDICARE

## 2020-12-11 DIAGNOSIS — M25.611 DECREASED RIGHT SHOULDER RANGE OF MOTION: ICD-10-CM

## 2020-12-11 DIAGNOSIS — M25.511 ACUTE PAIN OF RIGHT SHOULDER: ICD-10-CM

## 2020-12-11 PROCEDURE — 97140 MANUAL THERAPY 1/> REGIONS: CPT | Mod: PO

## 2020-12-11 PROCEDURE — 97110 THERAPEUTIC EXERCISES: CPT | Mod: PO

## 2020-12-11 NOTE — PROGRESS NOTES
"    Physical Therapy Daily Treatment Note     Name: Jackelyn Kendrick  Clinic Number: [id number]    Therapy Diagnosis:   Encounter Diagnoses   Name Primary?    Decreased right shoulder range of motion     Acute pain of right shoulder      Physician: Reginald Pickens MD    Visit Date: 12/11/2020    Physician Orders: PT Eval and Treat s/p R RTC repair  Medical Diagnosis from Referral: M75.101 (ICD-10-CM) - Nontraumatic tear of right rotator cuff, unspecified tear extent  Evaluation Date: 9/28/2020  Authorization Period Expiration: 9/9/21  Plan of Care Expiration: 12/28/20  Visit # / Visits authorized: 37      Time In: 12:45  pm   Time Out: 01:30 pm  Total Billable Time: 45 minutes  MT1, TE 2     Precautions: Standard per shoulder protocol for biceps tenodesis and RTC repair    Surgery date 9/23/2020  Subjective     Pt reports: that her R and L shoulders are sore today.   She was compliant with home exercise program.  Response to previous treatment: increase tightness overall  Functional change: none at this time    Pain: 6 /10 pre-session   Location: right  shoulder      Objective     Jackelyn Kendrick  received therapeutic exercises to develop strength, endurance and ROM for 30 minutes including:      B shoulder pro/retraction 20 x 3 with the ball of the plinth  B shoulder horizontal abduction/aduction with the ball of the plinth   Pulleys flex / scap x 4 minutes   AA R shoulder flexion with dowel 10 x 3  AA R shoulder ER with dowel 10 x 3  Supine punch 10 x 3    Not performed today:  L side lying R shoulder ER 10 x 2 with 1#  L side lying R shoulder abduction 10 x 2 with 0#    Bannester slides flexion: 20 x 2 with 3 second hold  Mod cues to ensure PROM   scap retraction 5 sec holds x 20    Theraband rows/ ext: 2 x 10 green   Pendulum hang x 1 minutes  Pendulum self PROM with contralateral arm small amplitude 4-6" into flex/ext, Med/lat, CW/CCW 3 x 10 attempted upright and sitting but ended up in prone of EOM " and PT assist for PROM  Elbow AAROM x20  Wrist AROM x20 in all planes radial ulnar deviation, wrist flex/ext,  with cues to keep reps slow and controlled for supination/pronation       Jackelyn Kendrick received the following manual therapy techniques: Passive mobilization and  Soft tissue Mobilization were applied to the: pect minor and R GH joint.  for 15 minutes, including:  R GH joint mobilization   AA R shoulder flexion   AA R shoulder Flexion and ER 10 x 3  Not performed:      Jackelyn Kendrick did not receive cold pack for 10 minutes to R shoulder following Tx.      Home Exercises Provided and Patient Education Provided     Education provided:   - HEP, pain management    Written Home Exercises Provided: Patient instructed to cont prior HEP.  Exercises were reviewed and Jackelyn Kendrick was able to demonstrate them prior to the end of the session.  Jackelyn Kendrick demonstrated good  understanding of the education provided.     See EMR under Media for exercises provided prior visit.    Assessment     Pt is presently 12 weeks post R shoulder arthroscopic surgery to address rotator cuff tears and biceps tenodesis.  She tolerating AAROM and active resisted activities with her  R shoulder with little change in her pain level.  Jackelyn Kendrick is progressing well towards her goals.   Pt prognosis is Good.     Pt will continue to benefit from skilled outpatient physical therapy to address the deficits listed in the problem list box on initial evaluation, provide pt/family education and to maximize pt's level of independence in the home and community environment.     Pt's spiritual, cultural and educational needs considered and pt agreeable to plan of care and goals.     Anticipated barriers to physical therapy: apprehension    Goals:   Short Term Goals: 3 weeks   1. Pt will be independent with HEP and report compliance at least 4 days/week. Progressing 10/13//20  2. Pt will demonstrate PROM ER to at  least 30 deg in scapular plane and flexion to at least 120 deg Progressing 10/6/20  3. Pt will be compliant with post-op precautions and understand activity modifications for ADL's. Progressing 10/13/20     Long Term Goals: 12 weeks   1. Pt will demonstrate full active shoulder ROM compared to L UE.  2. Pt will be able to perform all ADL's at PLOF reporting no pain  3. Pt will be able to garden/ clean pool with sound mechanics reporting no pain      Plan     Cont with POC    Fermin Frias, PT

## 2020-12-13 ENCOUNTER — PATIENT MESSAGE (OUTPATIENT)
Dept: ADMINISTRATIVE | Facility: OTHER | Age: 70
End: 2020-12-13

## 2020-12-15 ENCOUNTER — CLINICAL SUPPORT (OUTPATIENT)
Dept: REHABILITATION | Facility: HOSPITAL | Age: 70
End: 2020-12-15
Payer: MEDICARE

## 2020-12-15 DIAGNOSIS — R53.1 WEAKNESS: ICD-10-CM

## 2020-12-15 DIAGNOSIS — R52 PAIN: ICD-10-CM

## 2020-12-15 PROCEDURE — 97035 APP MDLTY 1+ULTRASOUND EA 15: CPT | Mod: PO

## 2020-12-15 PROCEDURE — 97110 THERAPEUTIC EXERCISES: CPT | Mod: PO

## 2020-12-15 PROCEDURE — 97022 WHIRLPOOL THERAPY: CPT | Mod: PO

## 2020-12-15 NOTE — PROGRESS NOTES
Occupational Therapy Daily Treatment Note       Date: 12/15/2020  Name: Jackelyn Kendrick  Clinic Number: [id number]    Therapy Diagnosis:   Encounter Diagnoses   Name Primary?    Weakness     Pain      Physician: Katerin Lopez PA-C       Physician Orders: Evaluate and treat ; 2x/wk x 6 wks , Modalities prn  Medical Diagnosis: Arthritis of carpometacarpal (CMC) joint of both thumbs  Evaluation Date: 12/8/2020  Insurance Authorization Expiration date : 11-  Plan of Care Certification Period: 1/15/2021  Date of Return to MD: not set date      Visit # / Visits authorized:1/20  Time In: 11 am  Time Out: 11:50 pm   Total Billable Time: 50 minutes    Subjective     Pt reports: Pt arrived with new braces in (B) hands    she was compliant with home exercise program given last session.   Response to previous treatment: less pain in wrist since wearing brace       Pain: 3/10  Location: bilateral wrists      Objective    received the following supervised modalities after being cleared for contradictions for 0 minutes:     - omitted paraffin      Jackelyn Kendrick   received the following direct contact modalities after being cleared for contraindications for 8 minutes:    -Patient receives ultrasound  for pain control and remold scar tissue to increase tissue elasticity @ 20 duty cycle, 3.3 Mhz, applied to radial wrist , intensity = 0.6 w/cm2 for 8 minutes. ( 4 minutes each)               Patient received fluidotherapy to (B) hand(s) for 20 minutes to increase blood flow, circulation, desensitization, sensory re-education and for pain management. Performed the following exercises to increase functional performance  x 2 minutes each: with thumb abd with wrist and sup/pro    - 5 minute warm up  -thumb ABD  -thumb opposition  - wrist extension  - sup/pro       Home Exercises and Education Provided     Education provided:  - discussed insurance limitation  - Progress towards goals  - Pt also  instructed on importance of attention to postural alignment during rest and activity to decrease compensatory movement patterns.       Written Home Exercises Provided: Patient instructed to cont prior HEP.  Exercises were reviewed and Jackelyn Kendrick was able to demonstrate them prior to the end of the session.  Jackelyn Kendrick demonstrated good  understanding of the HEP provided.   .    See EMR under Patient Instructions for exercises provided prior visit.       Assessment     Pt would continue to benefit from skilled OT. Pt advised to not perform wrist and thumb function at same time to avoid pain in APL and EPB tendons . Wearing brace for sleep and during day    Jackelyn Kendrick is progressing well towards her goals and there are no updates to goals at this time. Pt prognosis is Good.       The patient demonstrated proper understanding  of each exercise.Pt required verbal and tactile cues for all  Thumb placement for wrist motions to avoid pain .  Pt continues to be limited in functional and leisurely pursuits. Pain limits patients participation in ADL's. Pt is not able to carryout necessary vocational tasks. Patient continues to requires cues and skilled supervision to complete HEP       Anticipated barriers to occupational therapy: tendonitis     Pt's spiritual, cultural and educational needs considered and pt agreeable to plan of care and goals.      Goals:   STG: 3 and 4 weeks :  1) Patient to be IND with HEP and modalities for pain managment.  2) Patient will  Increase Active Range of motion 8-10 degrees in hand/wrist to increase functional hand use for ADLs/work/leisure activities.  3) Pt will wear cmc brace to reduce thumb pain      LTG:      Goals to be met in 6  weeks:     1) Patient to be IND with HEP and modalities for pain managment.  2) Patient will  Increase Active Range of motion 15-20 degrees in hand/wrist to increase functional hand use for ADLs/work/leisure activities.  3)Pt will  increase  strength 10-20 lbs. to improve functional grasp for ADLs/work/leisure activities.   4) Pt will increase pinch strength in all 3 limited positions by 1-3 psi to assist with manipulation and fine motor task  5).  Pt will wear cmc brace to reduce thumb pain         Plan      Plan     Certification Period/Plan of care expiration: 12/8/2020 to 1/15/2021.     Outpatient Occupational Therapy 1 times weekly for 4 weeks to include the following interventions: Paraffin, Fluidotherapy, Manual therapy/joint mobilizations, Modalities for pain management, US 3 mhz, Therapeutic exercises/activities., Strengthening and Orthotic Fabrication/Fit/Training.        Courtney Kramer, MOT,  OTR/L, CHT  Occupational therapist, Certified Hand Therapist

## 2020-12-16 ENCOUNTER — CLINICAL SUPPORT (OUTPATIENT)
Dept: REHABILITATION | Facility: HOSPITAL | Age: 70
End: 2020-12-16
Payer: MEDICARE

## 2020-12-16 DIAGNOSIS — M25.611 DECREASED RIGHT SHOULDER RANGE OF MOTION: ICD-10-CM

## 2020-12-16 DIAGNOSIS — M25.511 ACUTE PAIN OF RIGHT SHOULDER: ICD-10-CM

## 2020-12-16 PROCEDURE — 97110 THERAPEUTIC EXERCISES: CPT | Mod: PO

## 2020-12-16 PROCEDURE — 97140 MANUAL THERAPY 1/> REGIONS: CPT | Mod: PO

## 2020-12-17 ENCOUNTER — OFFICE VISIT (OUTPATIENT)
Dept: SPORTS MEDICINE | Facility: CLINIC | Age: 70
End: 2020-12-17
Payer: MEDICARE

## 2020-12-17 VITALS
SYSTOLIC BLOOD PRESSURE: 151 MMHG | WEIGHT: 145 LBS | BODY MASS INDEX: 27.38 KG/M2 | HEIGHT: 61 IN | HEART RATE: 76 BPM | DIASTOLIC BLOOD PRESSURE: 73 MMHG

## 2020-12-17 DIAGNOSIS — Z98.890 S/P SHOULDER SURGERY: Primary | ICD-10-CM

## 2020-12-17 PROCEDURE — 20610 LARGE JOINT ASPIRATION/INJECTION: L SUBACROMIAL BURSA: ICD-10-PCS | Mod: 58,S$PBB,RT, | Performed by: ORTHOPAEDIC SURGERY

## 2020-12-17 PROCEDURE — 99999 PR PBB SHADOW E&M-EST. PATIENT-LVL V: ICD-10-PCS | Mod: PBBFAC,,, | Performed by: ORTHOPAEDIC SURGERY

## 2020-12-17 PROCEDURE — 99024 POSTOP FOLLOW-UP VISIT: CPT | Mod: POP,,, | Performed by: ORTHOPAEDIC SURGERY

## 2020-12-17 PROCEDURE — 99215 OFFICE O/P EST HI 40 MIN: CPT | Mod: PBBFAC,25 | Performed by: ORTHOPAEDIC SURGERY

## 2020-12-17 PROCEDURE — 99024 PR POST-OP FOLLOW-UP VISIT: ICD-10-PCS | Mod: POP,,, | Performed by: ORTHOPAEDIC SURGERY

## 2020-12-17 PROCEDURE — 99999 PR PBB SHADOW E&M-EST. PATIENT-LVL V: CPT | Mod: PBBFAC,,, | Performed by: ORTHOPAEDIC SURGERY

## 2020-12-17 PROCEDURE — 20610 DRAIN/INJ JOINT/BURSA W/O US: CPT | Mod: PBBFAC | Performed by: ORTHOPAEDIC SURGERY

## 2020-12-17 RX ORDER — TRIAMCINOLONE ACETONIDE 40 MG/ML
60 INJECTION, SUSPENSION INTRA-ARTICULAR; INTRAMUSCULAR
Status: DISCONTINUED | OUTPATIENT
Start: 2020-12-17 | End: 2020-12-17 | Stop reason: HOSPADM

## 2020-12-17 RX ADMIN — TRIAMCINOLONE ACETONIDE 60 MG: 40 INJECTION, SUSPENSION INTRA-ARTICULAR; INTRAMUSCULAR at 02:12

## 2020-12-17 NOTE — PROGRESS NOTES
"    Physical Therapy Daily Treatment Note     Name: Jackelyn Kendrick  Clinic Number: [id number]    Therapy Diagnosis:   Encounter Diagnoses   Name Primary?    Decreased right shoulder range of motion     Acute pain of right shoulder      Physician: Reginald Pickens MD    Visit Date: 12/16/2020    Physician Orders: PT Eval and Treat s/p R RTC repair  Medical Diagnosis from Referral: M75.101 (ICD-10-CM) - Nontraumatic tear of right rotator cuff, unspecified tear extent  Evaluation Date: 9/28/2020  Authorization Period Expiration: 9/9/21  Plan of Care Expiration: 12/28/20  Visit # / Visits authorized: 37      Time In: 12:45  pm   Time Out: 01:30 pm  Total Billable Time: 50 minutes  MT1, TE 2     Precautions: Standard per shoulder protocol for biceps tenodesis and RTC repair    Surgery date 9/23/2020  Subjective     Pt reports: that her R shoulder is feeling a little better today.   She was compliant with home exercise program.  Response to previous treatment: increase tightness overall  Functional change: none at this time    Pain: 5 /10 pre-session   Location: right  shoulder      Objective     Jackelyn Kendrick  received therapeutic exercises to develop strength, endurance and ROM for 30 minutes including:      B shoulder pro/retraction 20 x 3 with the ball of the plinth  B shoulder horizontal abduction/aduction with the ball of the plinth   Pulleys flex / scap x 4 minutes   AA R shoulder flexion with dowel 10 x 3  AA R shoulder ER with dowel 10 x 3    Not performed  Supine punch 10 x 3  L side lying R shoulder ER 10 x 2 with 1#  L side lying R shoulder abduction 10 x 2 with 0#    Not performed today:  Nila velásquez flexion: 20 x 2 with 3 second hold  Mod cues to ensure PROM   scap retraction 5 sec holds x 20    Theraband rows/ ext: 2 x 10 green   Pendulum hang x 1 minutes  Pendulum self PROM with contralateral arm small amplitude 4-6" into flex/ext, Med/lat, CW/CCW 3 x 10 attempted upright and sitting but " ended up in prone of EOM and PT assist for PROM  Elbow AAROM x20  Wrist AROM x20 in all planes radial ulnar deviation, wrist flex/ext,  with cues to keep reps slow and controlled for supination/pronation       Jackelyn Kendrick received the following manual therapy techniques: Passive mobilization and  Soft tissue Mobilization were applied to the: pect minor and R GH joint.  for 15 minutes, including:  R GH joint mobilization   AA R shoulder flexion     Not performed:  AA R shoulder Flexion and ER 10 x 3    Jackelyn Kendrick did not receive cold pack for 10 minutes to R shoulder following Tx.      Home Exercises Provided and Patient Education Provided     Education provided:   - HEP, pain management    Written Home Exercises Provided: Patient instructed to cont prior HEP.  Exercises were reviewed and Jackelyn Kendrick was able to demonstrate them prior to the end of the session.  Jackelyn Kendrick demonstrated good  understanding of the education provided.     See EMR under Media for exercises provided prior visit.    Assessment     Pt tolerated minimal resistance with R shoulder supine IR/ER today. Jackelyn Kendrick is progressing well towards her goals.   Pt prognosis is Good.     Pt will continue to benefit from skilled outpatient physical therapy to address the deficits listed in the problem list box on initial evaluation, provide pt/family education and to maximize pt's level of independence in the home and community environment.     Pt's spiritual, cultural and educational needs considered and pt agreeable to plan of care and goals.     Anticipated barriers to physical therapy: apprehension    Goals:   Short Term Goals: 3 weeks   1. Pt will be independent with HEP and report compliance at least 4 days/week. Progressing 10/13//20  2. Pt will demonstrate PROM ER to at least 30 deg in scapular plane and flexion to at least 120 deg Progressing 10/6/20  3. Pt will be compliant with post-op precautions and  understand activity modifications for ADL's. Progressing 10/13/20     Long Term Goals: 12 weeks   1. Pt will demonstrate full active shoulder ROM compared to L UE.  2. Pt will be able to perform all ADL's at PLOF reporting no pain  3. Pt will be able to garden/ clean pool with sound mechanics reporting no pain      Plan     Cont with POC    Fermin Frias, PT

## 2020-12-17 NOTE — PROCEDURES
Large Joint Aspiration/Injection: L subacromial bursa    Date/Time: 12/17/2020 2:15 PM  Performed by: Reginald Pickens MD  Authorized by: Reginald Pickens MD     Consent Done?:  Yes (Verbal)  Indications:  Pain  Site marked: the procedure site was marked    Timeout: prior to procedure the correct patient, procedure, and site was verified    Prep: patient was prepped and draped in usual sterile fashion      Details:  Needle Size:  22 G  Ultrasonic Guidance for needle placement?: No    Approach:  Posterior  Location:  Shoulder  Site:  L subacromial bursa  Medications:  60 mg triamcinolone acetonide 40 mg/mL  Patient tolerance:  Patient tolerated the procedure well with no immediate complications

## 2020-12-17 NOTE — PROGRESS NOTES
"CC: Right shoulder post op 3 months    Patient is here for her 3 month post op appointment s/p below and is doing well. Patient is doing PT at [facility] location and is progressing as expected. She says she is slowly getting better and her pain has decreased. She is requesting a CSI for the left shoulder today.      DATE OF SURGERY:  9/23/2020      PREOPERATIVE DIAGNOSES:   1. Right shoulder rotator cuff tear.   2. Right shoulder AC joint arthritis  3. Right shoulder labral tear / biceps tendinopathy     POSTOPERATIVE DIAGNOSES:   1. Right shoulder rotator cuff tear.   2. Right shoulder AC joint arthritis  3. Right shoulder labral tear / biceps tendinopathy     PROCEDURE:   1. Right shoulder open subpectoral biceps tenodesis  2. Right shoulder arthroscopic distal clavicle excision  3. Right shoulder arthroscopic subacromial decompression.   4. Right shoulder arthroscopic debridement labrum / rotator cuff     SURGEON: Reginald Pickens M.D.   Pain well tolerated on pain medication  Sling in place  No issues reported    Review of Systems   Constitution: Negative. Negative for chills, fever and night sweats.    Cardiovascular: Negative for chest pain and syncope.   Respiratory: Negative for cough and shortness of breath.    Gastrointestinal: Negative for abdominal pain and bowel incontinence.      PE:    BP (!) 151/73   Pulse 76   Ht 5' 1" (1.549 m)   Wt 65.8 kg (145 lb)   BMI 27.40 kg/m²      Right shoulder    Incision healed  Compartments soft  Neurovascular status intact in extremity    PROM  Forward elevation 100 degrees  External rotation 25 degrees    Assessment:  3 months s/p right shoulder arthroscopic rotator cuff debridement, subacromial decompression, distal clavicle excision, and open subpectoral biceps tenodesis    Plan:  1. Continue PT  2. Left shoulder SAB CSI  3. RTC 2-3 months    All questions were answered. Instructed patient to call with questions or concerns in the interim.     "

## 2020-12-18 ENCOUNTER — CLINICAL SUPPORT (OUTPATIENT)
Dept: REHABILITATION | Facility: HOSPITAL | Age: 70
End: 2020-12-18
Payer: MEDICARE

## 2020-12-18 DIAGNOSIS — M25.511 ACUTE PAIN OF RIGHT SHOULDER: ICD-10-CM

## 2020-12-18 DIAGNOSIS — M25.611 DECREASED RIGHT SHOULDER RANGE OF MOTION: ICD-10-CM

## 2020-12-18 PROCEDURE — 97110 THERAPEUTIC EXERCISES: CPT | Mod: PO

## 2020-12-18 PROCEDURE — 97140 MANUAL THERAPY 1/> REGIONS: CPT | Mod: PO

## 2020-12-18 NOTE — PROGRESS NOTES
Physical Therapy Daily Treatment Note     Name: Jackelyn Kendrick  Clinic Number: [id number]    Therapy Diagnosis:   Encounter Diagnoses   Name Primary?    Decreased right shoulder range of motion     Acute pain of right shoulder      Physician: Reginald Pickens MD    Visit Date: 12/18/2020    Physician Orders: PT Eval and Treat s/p R RTC repair  Medical Diagnosis from Referral: M75.101 (ICD-10-CM) - Nontraumatic tear of right rotator cuff, unspecified tear extent  Evaluation Date: 9/28/2020  Authorization Period Expiration: 9/9/21  Plan of Care Expiration: 12/28/20  Visit # / Visits authorized: 37      Time In: 12:45  pm   Time Out: 01:30 pm  Total Billable Time: 50 minutes  MT1, TE 2     Precautions: Standard per shoulder protocol for biceps tenodesis and RTC repair    Surgery date 9/23/2020  Subjective     Pt reports: that her shoulders are feeling great today.  She saw Dr Pickens yesterday and received an injection in her L shoulder.  She was compliant with home exercise program.  Response to previous treatment: increase tightness overall  Functional change: none at this time    Pain: 5 /10 pre-session   Location: right  shoulder      Objective     Jackelyn Kendrick  received therapeutic exercises to develop strength, endurance and ROM for 30 minutes including:      B shoulder pro/retraction 20 x 3 with the ball of the plinth  B shoulder horizontal abduction/aduction with the ball of the plinth   Pulleys flex / scap x 4 minutes   Supine punch 10 x 3 with 3#    Not performed  AA R shoulder flexion with dowel 10 x 3  AA R shoulder ER with dowel 10 x 3    L side lying R shoulder ER 10 x 2 with 1#  L side lying R shoulder abduction 10 x 2 with 0#    Not performed today:  Nila slides flexion: 20 x 2 with 3 second hold  Mod cues to ensure PROM   scap retraction 5 sec holds x 20    Theraband rows/ ext: 2 x 10 green   Pendulum hang x 1 minutes  Pendulum self PROM with contralateral arm small amplitude  "4-6" into flex/ext, Med/lat, CW/CCW 3 x 10 attempted upright and sitting but ended up in prone of EOM and PT assist for PROM  Elbow AAROM x20  Wrist AROM x20 in all planes radial ulnar deviation, wrist flex/ext,  with cues to keep reps slow and controlled for supination/pronation       Jackelyn Kendrick received the following manual therapy techniques: Passive mobilization and  Soft tissue Mobilization were applied to the: pect minor and R GH joint.  for 15 minutes, including:  R GH joint mobilization   AA R shoulder flexion     Not performed:  AA R shoulder Flexion and ER 10 x 3    Jackelyn Kendrick did not receive cold pack for 10 minutes to R shoulder following Tx.      Home Exercises Provided and Patient Education Provided     Education provided:   - HEP, pain management    Written Home Exercises Provided: Patient instructed to cont prior HEP.  Exercises were reviewed and Jackelyn Kendrick was able to demonstrate them prior to the end of the session.  Jackelyn Kendrick demonstrated good  understanding of the education provided.     See EMR under Media for exercises provided prior visit.    Assessment     Pt tolerated minimal resistance with R shoulder supine IR/ER today.     Jackelyn Kendrick is progressing well towards her goals.   Pt prognosis is Good.     Pt will continue to benefit from skilled outpatient physical therapy to address the deficits listed in the problem list box on initial evaluation, provide pt/family education and to maximize pt's level of independence in the home and community environment.     Pt's spiritual, cultural and educational needs considered and pt agreeable to plan of care and goals.     Anticipated barriers to physical therapy: apprehension    Goals:   Short Term Goals: 3 weeks   1. Pt will be independent with HEP and report compliance at least 4 days/week. Progressing 10/13//20  2. Pt will demonstrate PROM ER to at least 30 deg in scapular plane and flexion to at least " 120 deg Progressing 10/6/20  3. Pt will be compliant with post-op precautions and understand activity modifications for ADL's. Progressing 10/13/20     Long Term Goals: 12 weeks   1. Pt will demonstrate full active shoulder ROM compared to L UE.  2. Pt will be able to perform all ADL's at PLOF reporting no pain  3. Pt will be able to garden/ clean pool with sound mechanics reporting no pain      Plan     Cont with POC    Fermin Frias, PT

## 2020-12-22 ENCOUNTER — CLINICAL SUPPORT (OUTPATIENT)
Dept: REHABILITATION | Facility: HOSPITAL | Age: 70
End: 2020-12-22
Payer: MEDICARE

## 2020-12-22 DIAGNOSIS — M25.611 DECREASED RIGHT SHOULDER RANGE OF MOTION: ICD-10-CM

## 2020-12-22 DIAGNOSIS — M25.511 ACUTE PAIN OF RIGHT SHOULDER: ICD-10-CM

## 2020-12-22 PROCEDURE — 97110 THERAPEUTIC EXERCISES: CPT | Mod: PO

## 2020-12-22 PROCEDURE — 97140 MANUAL THERAPY 1/> REGIONS: CPT | Mod: PO

## 2020-12-22 NOTE — PROGRESS NOTES
Physical Therapy Daily Treatment Note     Name: Jackelyn Kendrick  Clinic Number: [id number]    Therapy Diagnosis:   Encounter Diagnoses   Name Primary?    Decreased right shoulder range of motion     Acute pain of right shoulder      Physician: Reginald Pickens MD    Visit Date: 12/22/2020    Physician Orders: PT Eval and Treat s/p R RTC repair  Medical Diagnosis from Referral: M75.101 (ICD-10-CM) - Nontraumatic tear of right rotator cuff, unspecified tear extent  Evaluation Date: 9/28/2020  Authorization Period Expiration: 9/9/21  Plan of Care Expiration: 12/28/20  Visit # / Visits authorized: 37      Time In: 12:05  pm   Time Out: 12:50 pm  Total Billable Time: 45 minutes  MT1, TE 2     Precautions: Standard per shoulder protocol for biceps tenodesis and RTC repair    Surgery date 9/23/2020  Subjective     Pt reports: that her R shoulder is still feeling better ,but her L shoulder is a little sore.  She was compliant with home exercise program.  Response to previous treatment: increase tightness overall  Functional change: none at this time    Pain: 5 /10 pre-session   Location: right  shoulder      Objective     Jackelyn Kendrick  received therapeutic exercises to develop strength, endurance and ROM for 30 minutes including:      B shoulder pro/retraction 20 x 3 with the ball of the plinth  B shoulder horizontal abduction/aduction with the ball of the plinth   Standing rows 15 x 2 with green TB  Standing B shoulder extension 15 x 2 with green TB  L side lying R shoulder ER 10 x 3 with 1#  L side lying R shoulder abduction 10 x 3 with 1#  Pulleys flex / scap x 4 minutes   Supine punch 10 x 3 with 4#    Not performed  AA R shoulder flexion with dowel 10 x 3  AA R shoulder ER with dowel 10 x 3      Not performed today:  Nila slides flexion: 20 x 2 with 3 second hold  Mod cues to ensure PROM   scap retraction 5 sec holds x 20    Theraband rows/ ext: 2 x 10 green   Pendulum hang x 1 minutes  Pendulum  "self PROM with contralateral arm small amplitude 4-6" into flex/ext, Med/lat, CW/CCW 3 x 10 attempted upright and sitting but ended up in prone of EOM and PT assist for PROM  Elbow AAROM x20  Wrist AROM x20 in all planes radial ulnar deviation, wrist flex/ext,  with cues to keep reps slow and controlled for supination/pronation       Jackelyn Kendrick received the following manual therapy techniques: Passive mobilization and  Soft tissue Mobilization were applied to the: pect minor and R GH joint.  for 15 minutes, including:  R GH joint mobilization   AA R shoulder flexion     Not performed:  AA R shoulder Flexion and ER 10 x 3    Jackelyn Kendrick did not receive cold pack for 10 minutes to R shoulder following Tx.      Home Exercises Provided and Patient Education Provided     Education provided:   - HEP, pain management    Written Home Exercises Provided: Patient instructed to cont prior HEP.  Exercises were reviewed and Jackelyn Kendrick was able to demonstrate them prior to the end of the session.  Jackelyn Kendrick demonstrated good  understanding of the education provided.     See EMR under Media for exercises provided prior visit.    Assessment     Pt was able to tolerate increased resistance with her exercises today.     Jackelyn Kendrick is progressing well towards her goals.   Pt prognosis is Good.     Pt will continue to benefit from skilled outpatient physical therapy to address the deficits listed in the problem list box on initial evaluation, provide pt/family education and to maximize pt's level of independence in the home and community environment.     Pt's spiritual, cultural and educational needs considered and pt agreeable to plan of care and goals.     Anticipated barriers to physical therapy: apprehension    Goals:   Short Term Goals: 3 weeks   1. Pt will be independent with HEP and report compliance at least 4 days/week. Progressing 10/13//20  2. Pt will demonstrate PROM ER to at " least 30 deg in scapular plane and flexion to at least 120 deg Progressing 10/6/20  3. Pt will be compliant with post-op precautions and understand activity modifications for ADL's. Progressing 10/13/20     Long Term Goals: 12 weeks   1. Pt will demonstrate full active shoulder ROM compared to L UE.  2. Pt will be able to perform all ADL's at PLOF reporting no pain  3. Pt will be able to garden/ clean pool with sound mechanics reporting no pain      Plan     Cont with POC    Fermin Frias, PT

## 2020-12-23 ENCOUNTER — PATIENT MESSAGE (OUTPATIENT)
Dept: PRIMARY CARE CLINIC | Facility: CLINIC | Age: 70
End: 2020-12-23

## 2020-12-23 RX ORDER — SULFAMETHOXAZOLE AND TRIMETHOPRIM 800; 160 MG/1; MG/1
1 TABLET ORAL 2 TIMES DAILY
Qty: 14 TABLET | Refills: 0 | Status: SHIPPED | OUTPATIENT
Start: 2020-12-23 | End: 2020-12-30

## 2020-12-24 ENCOUNTER — CLINICAL SUPPORT (OUTPATIENT)
Dept: REHABILITATION | Facility: HOSPITAL | Age: 70
End: 2020-12-24
Payer: MEDICARE

## 2020-12-24 DIAGNOSIS — M25.611 DECREASED RIGHT SHOULDER RANGE OF MOTION: ICD-10-CM

## 2020-12-24 DIAGNOSIS — M25.511 ACUTE PAIN OF RIGHT SHOULDER: ICD-10-CM

## 2020-12-24 PROCEDURE — 97140 MANUAL THERAPY 1/> REGIONS: CPT | Mod: PO

## 2020-12-24 PROCEDURE — 97110 THERAPEUTIC EXERCISES: CPT | Mod: PO

## 2020-12-24 NOTE — PROGRESS NOTES
Physical Therapy Daily Treatment Note     Name: Jackelyn Kendrick  Clinic Number: [id number]    Therapy Diagnosis:   Encounter Diagnoses   Name Primary?    Decreased right shoulder range of motion     Acute pain of right shoulder      Physician: Foreign Masters, *    Visit Date: 12/24/2020    Physician Orders: PT Eval and Treat s/p R RTC repair  Medical Diagnosis from Referral: M75.101 (ICD-10-CM) - Nontraumatic tear of right rotator cuff, unspecified tear extent  Evaluation Date: 9/28/2020  Authorization Period Expiration: 9/9/21  Plan of Care Expiration: 12/28/20  Visit # / Visits authorized: 38      Time In: 9:30  pm   Time Out: 10:15 pm  Total Billable Time: 45 minutes  MT2, TE 1     Precautions: Standard per shoulder protocol for biceps tenodesis and RTC repair    Surgery date 9/23/2020  Subjective     Pt reports: that her R shoulder feeling OK today.  She was compliant with home exercise program.  Response to previous treatment: increase tightness overall  Functional change: none at this time    Pain: 5 /10 pre-session   Location: right  shoulder      Objective     Jackelyn Kendrick  received therapeutic exercises to develop strength, endurance and ROM for 10 minutes including:      B shoulder pro/retraction 20 x 3 with the ball of the plinth  B shoulder horizontal abduction/aduction with the ball of the plinth     Not performed  Standing rows 15 x 2 with green TB  Standing B shoulder extension 15 x 2 with green TB  L side lying R shoulder ER 10 x 3 with 1#  L side lying R shoulder abduction 10 x 3 with 1#  Pulleys flex / scap x 4 minutes   Supine punch 10 x 3 with 4#  AA R shoulder flexion with dowel 10 x 3  AA R shoulder ER with dowel 10 x 3  Bannester slides flexion: 20 x 2 with 3 second hold        Jackelyn Kendrick received the following manual therapy techniques: Passive mobilization and  Soft tissue Mobilization were applied to the: pect minor and R GH joint.  for 35 minutes, including:  R  GH joint mobilization   AA R shoulder flexion   Manually resisted R shoulder IR/ER 10 x 3   AA R shoulder flexion 10 x 3        Jackelyn Kendrick did not receive cold pack for 10 minutes to R shoulder following Tx.      Home Exercises Provided and Patient Education Provided     Education provided:   - HEP, pain management    Written Home Exercises Provided: Patient instructed to cont prior HEP.  Exercises were reviewed and Jackelyn Kendrick was able to demonstrate them prior to the end of the session.  Jackelyn Kendrick demonstrated good  understanding of the education provided.     See EMR under Media for exercises provided prior visit.    Assessment     Pt was able to tolerate all Tx activities well today.  AROM into R shoulder flexion was improved following manually resisted IR/ER and AA flexion.     Jackelyn Kendrick is progressing well towards her goals.   Pt prognosis is Good.     Pt will continue to benefit from skilled outpatient physical therapy to address the deficits listed in the problem list box on initial evaluation, provide pt/family education and to maximize pt's level of independence in the home and community environment.     Pt's spiritual, cultural and educational needs considered and pt agreeable to plan of care and goals.     Anticipated barriers to physical therapy: apprehension    Goals:   Short Term Goals: 3 weeks   1. Pt will be independent with HEP and report compliance at least 4 days/week. Progressing 10/13//20  2. Pt will demonstrate PROM ER to at least 30 deg in scapular plane and flexion to at least 120 deg Progressing 10/6/20  3. Pt will be compliant with post-op precautions and understand activity modifications for ADL's. Progressing 10/13/20     Long Term Goals: 12 weeks   1. Pt will demonstrate full active shoulder ROM compared to L UE.  2. Pt will be able to perform all ADL's at [facility] reporting no pain  3. Pt will be able to garden/ clean pool with sound mechanics reporting  no pain      Plan     Cont with POC    Fermin Frias, PT

## 2020-12-29 ENCOUNTER — CLINICAL SUPPORT (OUTPATIENT)
Dept: REHABILITATION | Facility: HOSPITAL | Age: 70
End: 2020-12-29
Payer: MEDICARE

## 2020-12-29 DIAGNOSIS — M25.611 DECREASED RIGHT SHOULDER RANGE OF MOTION: ICD-10-CM

## 2020-12-29 DIAGNOSIS — M25.511 ACUTE PAIN OF RIGHT SHOULDER: ICD-10-CM

## 2020-12-29 PROCEDURE — 97530 THERAPEUTIC ACTIVITIES: CPT | Mod: PO

## 2020-12-29 PROCEDURE — 97140 MANUAL THERAPY 1/> REGIONS: CPT | Mod: PO

## 2020-12-29 NOTE — PROGRESS NOTES
Physical Therapy Daily Treatment Note     Name: Jackelyn Kendrick  Clinic Number: [id number]    Therapy Diagnosis:   Encounter Diagnoses   Name Primary?    Decreased right shoulder range of motion     Acute pain of right shoulder      Physician: Foreign Masters, *    Visit Date: 12/29/2020    Physician Orders: PT Eval and Treat s/p R RTC repair  Medical Diagnosis from Referral: M75.101 (ICD-10-CM) - Nontraumatic tear of right rotator cuff, unspecified tear extent  Evaluation Date: 9/28/2020  Authorization Period Expiration: 9/9/21  Plan of Care Expiration: 2/21/2020  Visit # / Visits authorized: 38      Time In: 12:00  pm   Time Out: 12:45 pm  Total Billable Time: 45 minutes  MT2, TE 1     Precautions: Standard per shoulder protocol for biceps tenodesis and RTC repair    Surgery date 9/23/2020  Subjective     Pt reports: that her R shoulder feeling OK today.  She was compliant with home exercise program.  Response to previous treatment: increase tightness overall  Functional change: none at this time    Pain: 5 /10 pre-session   Location: right  shoulder      Objective         Posture: Pt with rounded shoulders bilaterally      Passive Range of Motion:   Shoulder Left Right   Flexion nt 160   Abduction nt 90   ER at 0 nt nt   ER at 90 nt 35   IR nt 40        Upper Extremity Strength   (L) UE (R) UE   Shoulder flexion: nt 3/5   Shoulder Abduction: nt 2/5   Shoulder ER nt 3+/5   Shoulder IR nt 4/5   Lower Trap nt nt   Middle Trap nt nt   Rhomboids nt nt       Joint Mobility: R GH joint hypomobility    Palpation: no palpable tenderness    Sensation: intact  Jackelyn Kendrick  received therapeutic exercises to develop strength, endurance and ROM for 5 minutes including:    UBE x 4 min - 2 min forward and 2 back  B shoulder pro/retraction 20 x 3 with the ball of the plinth  B shoulder horizontal abduction/aduction with the ball of the plinth     Not performed  Standing rows 15 x 2 with green TB  Standing B  shoulder extension 15 x 2 with green TB  L side lying R shoulder ER 10 x 3 with 1#  L side lying R shoulder abduction 10 x 3 with 1#  Pulleys flex / scap x 4 minutes   Supine punch 10 x 3 with 4#  AA R shoulder flexion with dowel 10 x 3  AA R shoulder ER with dowel 10 x 3  Bannester slides flexion: 20 x 2 with 3 second hold        Jackelyn Kendrick received the following manual therapy techniques: Passive mobilization and  Soft tissue Mobilization were applied to the: pect minor and R GH joint.  for 30 minutes, including:  R GH joint mobilization into inferior glide and posterior glide  AA R shoulder flexion   Manually resisted R shoulder IR/ER 10 x 3     Jackelyn Kendrick did not receive cold pack for 10 minutes to R shoulder following Tx.      Home Exercises Provided and Patient Education Provided     Education provided:   - HEP, pain management    Written Home Exercises Provided: Patient instructed to cont prior HEP.  Exercises were reviewed and Jackelyn Kendrick was able to demonstrate them prior to the end of the session.  Jackelyn Kendrick demonstrated good  understanding of the education provided.     See EMR under Media for exercises provided prior visit.    Assessment     Pt reports that she is able to reach out with her R UE, but is not able to  very much weight.  Hre R shoulder flexion ROM is improving, but she remains limited in abduction, internal and external rotation.        Jackelyn Kendrick is progressing well towards her goals.   Pt prognosis is Good.     Pt will continue to benefit from skilled outpatient physical therapy to address the deficits listed in the problem list box on initial evaluation, provide pt/family education and to maximize pt's level of independence in the home and community environment.     Pt's spiritual, cultural and educational needs considered and pt agreeable to plan of care and goals.     Anticipated barriers to physical therapy: apprehension    Goals:    Short Term Goals: 3 weeks   1. Pt will be independent with HEP and report compliance at least 4 days/week. MET 12/29/20  2. Pt will demonstrate PROM ER to at least 30 deg in scapular plane and flexion to at least 120 deg. Met 12/29/2020  3. Pt will be compliant with post-op precautions and understand activity modifications for ADL's. Progressing 10/13/20     Long Term Goals: 12 weeks   1. Pt will demonstrate full active shoulder ROM compared to L UE. Progressing 12/29/2020  2. Pt will be able to perform all ADL's at [facility] reporting no pain  Progressing 12/29/2020  3. Pt will be able to garden/ clean pool with sound mechanics reporting no pain  Progressing 12/29/2020      Plan     Pt to continue physical therapy treatments 2x/week for 8 weeks to further improve her R shoulder ROM and R shoulder girdle strength and R UE function.    Fermin Frias, PT

## 2020-12-30 NOTE — PLAN OF CARE
Physical Therapy Daily Treatment Note     Name: Jackelyn Kendrick  Clinic Number: [id number]    Therapy Diagnosis:   Encounter Diagnoses   Name Primary?    Decreased right shoulder range of motion     Acute pain of right shoulder      Physician: Foreign Masters, *    Visit Date: 12/29/2020    Physician Orders: PT Eval and Treat s/p R RTC repair  Medical Diagnosis from Referral: M75.101 (ICD-10-CM) - Nontraumatic tear of right rotator cuff, unspecified tear extent  Evaluation Date: 9/28/2020  Authorization Period Expiration: 9/9/21  Plan of Care Expiration: 2/21/2020  Visit # / Visits authorized: 38      Time In: 12:00  pm   Time Out: 12:45 pm  Total Billable Time: 45 minutes  MT2, TE 1     Precautions: Standard per shoulder protocol for biceps tenodesis and RTC repair    Surgery date 9/23/2020  Subjective     Pt reports: that her R shoulder feeling OK today.  She was compliant with home exercise program.  Response to previous treatment: increase tightness overall  Functional change: none at this time    Pain: 5 /10 pre-session   Location: right  shoulder      Objective         Posture: Pt with rounded shoulders bilaterally      Passive Range of Motion:   Shoulder Left Right   Flexion nt 160   Abduction nt 90   ER at 0 nt nt   ER at 90 nt 35   IR nt 40        Upper Extremity Strength   (L) UE (R) UE   Shoulder flexion: nt 3/5   Shoulder Abduction: nt 2/5   Shoulder ER nt 3+/5   Shoulder IR nt 4/5   Lower Trap nt nt   Middle Trap nt nt   Rhomboids nt nt       Joint Mobility: R GH joint hypomobility    Palpation: no palpable tenderness    Sensation: intact  Jackelyn Kendrick  received therapeutic exercises to develop strength, endurance and ROM for 5 minutes including:    UBE x 4 min - 2 min forward and 2 back  B shoulder pro/retraction 20 x 3 with the ball of the plinth  B shoulder horizontal abduction/aduction with the ball of the plinth     Not performed  Standing rows 15 x 2 with green TB  Standing B  shoulder extension 15 x 2 with green TB  L side lying R shoulder ER 10 x 3 with 1#  L side lying R shoulder abduction 10 x 3 with 1#  Pulleys flex / scap x 4 minutes   Supine punch 10 x 3 with 4#  AA R shoulder flexion with dowel 10 x 3  AA R shoulder ER with dowel 10 x 3  Bannester slides flexion: 20 x 2 with 3 second hold        Jackelyn Kendrick received the following manual therapy techniques: Passive mobilization and  Soft tissue Mobilization were applied to the: pect minor and R GH joint.  for 30 minutes, including:  R GH joint mobilization into inferior glide and posterior glide  AA R shoulder flexion   Manually resisted R shoulder IR/ER 10 x 3     Jackelyn Kendrick did not receive cold pack for 10 minutes to R shoulder following Tx.      Home Exercises Provided and Patient Education Provided     Education provided:   - HEP, pain management    Written Home Exercises Provided: Patient instructed to cont prior HEP.  Exercises were reviewed and Jackelyn Kendrick was able to demonstrate them prior to the end of the session.  Jackelyn Kendrick demonstrated good  understanding of the education provided.     See EMR under Media for exercises provided prior visit.    Assessment     Pt reports that she is able to reach out with her R UE, but is not able to  very much weight.  Hre R shoulder flexion ROM is improving, but she remains limited in abduction, internal and external rotation.        Jaceklyn Kendrick is progressing well towards her goals.   Pt prognosis is Good.     Pt will continue to benefit from skilled outpatient physical therapy to address the deficits listed in the problem list box on initial evaluation, provide pt/family education and to maximize pt's level of independence in the home and community environment.     Pt's spiritual, cultural and educational needs considered and pt agreeable to plan of care and goals.     Anticipated barriers to physical therapy: apprehension    Goals:    Short Term Goals: 3 weeks   1. Pt will be independent with HEP and report compliance at least 4 days/week. MET 12/29/20  2. Pt will demonstrate PROM ER to at least 30 deg in scapular plane and flexion to at least 120 deg. Met 12/29/2020  3. Pt will be compliant with post-op precautions and understand activity modifications for ADL's. Progressing 10/13/20     Long Term Goals: 12 weeks   1. Pt will demonstrate full active shoulder ROM compared to L UE. Progressing 12/29/2020  2. Pt will be able to perform all ADL's at [facility] reporting no pain  Progressing 12/29/2020  3. Pt will be able to garden/ clean pool with sound mechanics reporting no pain  Progressing 12/29/2020      Plan     Pt to continue physical therapy treatments 2x/week for 8 weeks to further improve her R shoulder ROM and R shoulder girdle strength and R UE function.    Fermin Frias, PT

## 2021-01-04 ENCOUNTER — CLINICAL SUPPORT (OUTPATIENT)
Dept: REHABILITATION | Facility: HOSPITAL | Age: 71
End: 2021-01-04
Attending: ORTHOPAEDIC SURGERY
Payer: MEDICARE

## 2021-01-04 DIAGNOSIS — R52 PAIN: ICD-10-CM

## 2021-01-04 DIAGNOSIS — R53.1 WEAKNESS: ICD-10-CM

## 2021-01-04 PROCEDURE — 97022 WHIRLPOOL THERAPY: CPT | Mod: PO

## 2021-01-04 PROCEDURE — 97110 THERAPEUTIC EXERCISES: CPT | Mod: PO

## 2021-01-07 ENCOUNTER — CLINICAL SUPPORT (OUTPATIENT)
Dept: REHABILITATION | Facility: HOSPITAL | Age: 71
End: 2021-01-07
Attending: ORTHOPAEDIC SURGERY
Payer: MEDICARE

## 2021-01-07 DIAGNOSIS — M25.611 DECREASED RIGHT SHOULDER RANGE OF MOTION: ICD-10-CM

## 2021-01-07 DIAGNOSIS — M25.511 ACUTE PAIN OF RIGHT SHOULDER: ICD-10-CM

## 2021-01-07 PROCEDURE — 97110 THERAPEUTIC EXERCISES: CPT | Mod: PO

## 2021-01-07 PROCEDURE — 97140 MANUAL THERAPY 1/> REGIONS: CPT | Mod: PO

## 2021-01-13 ENCOUNTER — CLINICAL SUPPORT (OUTPATIENT)
Dept: REHABILITATION | Facility: HOSPITAL | Age: 71
End: 2021-01-13
Attending: ORTHOPAEDIC SURGERY
Payer: MEDICARE

## 2021-01-13 DIAGNOSIS — R52 PAIN: ICD-10-CM

## 2021-01-13 DIAGNOSIS — M25.511 ACUTE PAIN OF RIGHT SHOULDER: ICD-10-CM

## 2021-01-13 DIAGNOSIS — R53.1 WEAKNESS: ICD-10-CM

## 2021-01-13 DIAGNOSIS — M25.611 DECREASED RIGHT SHOULDER RANGE OF MOTION: ICD-10-CM

## 2021-01-13 PROCEDURE — 97110 THERAPEUTIC EXERCISES: CPT | Mod: PO,CQ

## 2021-01-13 PROCEDURE — 97140 MANUAL THERAPY 1/> REGIONS: CPT | Mod: PO

## 2021-01-13 PROCEDURE — 97140 MANUAL THERAPY 1/> REGIONS: CPT | Mod: PO,CQ

## 2021-01-13 PROCEDURE — 97110 THERAPEUTIC EXERCISES: CPT | Mod: PO

## 2021-01-13 PROCEDURE — 97018 PARAFFIN BATH THERAPY: CPT | Mod: PO,59

## 2021-01-19 ENCOUNTER — CLINICAL SUPPORT (OUTPATIENT)
Dept: REHABILITATION | Facility: HOSPITAL | Age: 71
End: 2021-01-19
Attending: ORTHOPAEDIC SURGERY
Payer: MEDICARE

## 2021-01-19 DIAGNOSIS — M25.511 ACUTE PAIN OF RIGHT SHOULDER: ICD-10-CM

## 2021-01-19 DIAGNOSIS — R53.1 WEAKNESS: ICD-10-CM

## 2021-01-19 DIAGNOSIS — M25.611 DECREASED RIGHT SHOULDER RANGE OF MOTION: ICD-10-CM

## 2021-01-19 DIAGNOSIS — R52 PAIN: ICD-10-CM

## 2021-01-19 PROCEDURE — 97022 WHIRLPOOL THERAPY: CPT | Mod: PO

## 2021-01-19 PROCEDURE — 97110 THERAPEUTIC EXERCISES: CPT | Mod: PO

## 2021-01-19 PROCEDURE — 97140 MANUAL THERAPY 1/> REGIONS: CPT | Mod: PO

## 2021-01-21 ENCOUNTER — CLINICAL SUPPORT (OUTPATIENT)
Dept: REHABILITATION | Facility: HOSPITAL | Age: 71
End: 2021-01-21
Attending: ORTHOPAEDIC SURGERY
Payer: MEDICARE

## 2021-01-21 DIAGNOSIS — R52 PAIN: ICD-10-CM

## 2021-01-21 DIAGNOSIS — M25.611 DECREASED RIGHT SHOULDER RANGE OF MOTION: ICD-10-CM

## 2021-01-21 DIAGNOSIS — R53.1 WEAKNESS: ICD-10-CM

## 2021-01-21 DIAGNOSIS — M25.511 ACUTE PAIN OF RIGHT SHOULDER: ICD-10-CM

## 2021-01-21 PROCEDURE — 97110 THERAPEUTIC EXERCISES: CPT | Mod: PO

## 2021-01-21 PROCEDURE — 97022 WHIRLPOOL THERAPY: CPT | Mod: PO

## 2021-01-21 PROCEDURE — 97140 MANUAL THERAPY 1/> REGIONS: CPT | Mod: PO

## 2021-01-22 ENCOUNTER — PATIENT MESSAGE (OUTPATIENT)
Dept: ADMINISTRATIVE | Facility: OTHER | Age: 71
End: 2021-01-22

## 2021-01-26 ENCOUNTER — CLINICAL SUPPORT (OUTPATIENT)
Dept: REHABILITATION | Facility: HOSPITAL | Age: 71
End: 2021-01-26
Attending: ORTHOPAEDIC SURGERY
Payer: MEDICARE

## 2021-01-26 DIAGNOSIS — R52 PAIN: ICD-10-CM

## 2021-01-26 DIAGNOSIS — M25.611 DECREASED RIGHT SHOULDER RANGE OF MOTION: ICD-10-CM

## 2021-01-26 DIAGNOSIS — M25.511 ACUTE PAIN OF RIGHT SHOULDER: ICD-10-CM

## 2021-01-26 DIAGNOSIS — R53.1 WEAKNESS: ICD-10-CM

## 2021-01-26 PROCEDURE — 97110 THERAPEUTIC EXERCISES: CPT | Mod: PO

## 2021-01-26 PROCEDURE — 97022 WHIRLPOOL THERAPY: CPT | Mod: PO

## 2021-01-26 PROCEDURE — 97035 APP MDLTY 1+ULTRASOUND EA 15: CPT | Mod: PO

## 2021-01-26 PROCEDURE — 97140 MANUAL THERAPY 1/> REGIONS: CPT | Mod: PO

## 2021-01-28 ENCOUNTER — CLINICAL SUPPORT (OUTPATIENT)
Dept: REHABILITATION | Facility: HOSPITAL | Age: 71
End: 2021-01-28
Attending: ORTHOPAEDIC SURGERY
Payer: MEDICARE

## 2021-01-28 DIAGNOSIS — R52 PAIN: ICD-10-CM

## 2021-01-28 DIAGNOSIS — R53.1 WEAKNESS: ICD-10-CM

## 2021-01-28 PROCEDURE — 97110 THERAPEUTIC EXERCISES: CPT | Mod: PO

## 2021-01-29 ENCOUNTER — CLINICAL SUPPORT (OUTPATIENT)
Dept: REHABILITATION | Facility: HOSPITAL | Age: 71
End: 2021-01-29
Attending: ORTHOPAEDIC SURGERY
Payer: MEDICARE

## 2021-01-29 DIAGNOSIS — M25.511 ACUTE PAIN OF RIGHT SHOULDER: ICD-10-CM

## 2021-01-29 DIAGNOSIS — M25.611 DECREASED RIGHT SHOULDER RANGE OF MOTION: ICD-10-CM

## 2021-01-29 PROCEDURE — 97140 MANUAL THERAPY 1/> REGIONS: CPT | Mod: PO

## 2021-01-29 PROCEDURE — 97110 THERAPEUTIC EXERCISES: CPT | Mod: PO

## 2021-02-03 ENCOUNTER — HOSPITAL ENCOUNTER (OUTPATIENT)
Dept: RADIOLOGY | Facility: HOSPITAL | Age: 71
Discharge: HOME | End: 2021-02-03
Attending: FAMILY MEDICINE
Payer: MEDICARE

## 2021-02-03 DIAGNOSIS — Z12.31 BREAST CANCER SCREENING BY MAMMOGRAM: ICD-10-CM

## 2021-02-03 PROCEDURE — 77067 SCR MAMMO BI INCL CAD: CPT | Mod: TC,PO

## 2021-02-03 PROCEDURE — 77067 SCR MAMMO BI INCL CAD: CPT | Mod: 26,,, | Performed by: RADIOLOGY

## 2021-02-03 PROCEDURE — 77067 MAMMO DIGITAL SCREENING BILAT WITH TOMO: ICD-10-PCS | Mod: 26,,, | Performed by: RADIOLOGY

## 2021-02-03 PROCEDURE — 77063 MAMMO DIGITAL SCREENING BILAT WITH TOMO: ICD-10-PCS | Mod: 26,,, | Performed by: RADIOLOGY

## 2021-02-03 PROCEDURE — 77063 BREAST TOMOSYNTHESIS BI: CPT | Mod: 26,,, | Performed by: RADIOLOGY

## 2021-02-11 ENCOUNTER — CLINICAL SUPPORT (OUTPATIENT)
Dept: REHABILITATION | Facility: HOSPITAL | Age: 71
End: 2021-02-11
Attending: ORTHOPAEDIC SURGERY
Payer: MEDICARE

## 2021-02-11 DIAGNOSIS — M25.611 DECREASED RIGHT SHOULDER RANGE OF MOTION: ICD-10-CM

## 2021-02-11 DIAGNOSIS — M25.511 ACUTE PAIN OF RIGHT SHOULDER: ICD-10-CM

## 2021-02-11 PROCEDURE — 97110 THERAPEUTIC EXERCISES: CPT | Mod: PO

## 2021-02-11 PROCEDURE — 97140 MANUAL THERAPY 1/> REGIONS: CPT | Mod: PO

## 2021-02-19 ENCOUNTER — CLINICAL SUPPORT (OUTPATIENT)
Dept: REHABILITATION | Facility: HOSPITAL | Age: 71
End: 2021-02-19
Attending: ORTHOPAEDIC SURGERY
Payer: MEDICARE

## 2021-02-19 DIAGNOSIS — M25.611 DECREASED RIGHT SHOULDER RANGE OF MOTION: ICD-10-CM

## 2021-02-19 DIAGNOSIS — M25.511 ACUTE PAIN OF RIGHT SHOULDER: ICD-10-CM

## 2021-02-19 PROCEDURE — 97129 THER IVNTJ 1ST 15 MIN: CPT | Mod: PO

## 2021-02-19 PROCEDURE — 97130 THER IVNTJ EA ADDL 15 MIN: CPT | Mod: PO

## 2021-02-19 PROCEDURE — 97140 MANUAL THERAPY 1/> REGIONS: CPT | Mod: PO

## 2021-02-23 ENCOUNTER — CLINICAL SUPPORT (OUTPATIENT)
Dept: REHABILITATION | Facility: HOSPITAL | Age: 71
End: 2021-02-23
Attending: ORTHOPAEDIC SURGERY
Payer: MEDICARE

## 2021-02-23 DIAGNOSIS — M25.611 POST-TRAUMATIC STIFFNESS OF RIGHT SHOULDER JOINT: ICD-10-CM

## 2021-02-23 PROCEDURE — 97110 THERAPEUTIC EXERCISES: CPT | Mod: PO

## 2021-02-23 PROCEDURE — 97140 MANUAL THERAPY 1/> REGIONS: CPT | Mod: PO

## 2021-02-26 ENCOUNTER — CLINICAL SUPPORT (OUTPATIENT)
Dept: REHABILITATION | Facility: HOSPITAL | Age: 71
End: 2021-02-26
Attending: ORTHOPAEDIC SURGERY
Payer: MEDICARE

## 2021-02-26 DIAGNOSIS — M25.611 DECREASED RIGHT SHOULDER RANGE OF MOTION: ICD-10-CM

## 2021-02-26 DIAGNOSIS — M25.511 ACUTE PAIN OF RIGHT SHOULDER: ICD-10-CM

## 2021-02-26 PROCEDURE — 97140 MANUAL THERAPY 1/> REGIONS: CPT | Mod: PO

## 2021-02-26 PROCEDURE — 97110 THERAPEUTIC EXERCISES: CPT | Mod: PO

## 2021-03-02 ENCOUNTER — CLINICAL SUPPORT (OUTPATIENT)
Dept: REHABILITATION | Facility: HOSPITAL | Age: 71
End: 2021-03-02
Attending: ORTHOPAEDIC SURGERY
Payer: MEDICARE

## 2021-03-02 DIAGNOSIS — M25.511 ACUTE PAIN OF RIGHT SHOULDER: ICD-10-CM

## 2021-03-02 DIAGNOSIS — M25.611 DECREASED RIGHT SHOULDER RANGE OF MOTION: ICD-10-CM

## 2021-03-02 PROCEDURE — 97140 MANUAL THERAPY 1/> REGIONS: CPT | Mod: PO

## 2021-03-02 PROCEDURE — 97110 THERAPEUTIC EXERCISES: CPT | Mod: PO

## 2021-03-04 ENCOUNTER — PES CALL (OUTPATIENT)
Dept: ADMINISTRATIVE | Facility: CLINIC | Age: 71
End: 2021-03-04

## 2021-03-09 PROBLEM — Z12.11 COLON CANCER SCREENING: Status: RESOLVED | Noted: 2020-09-03 | Resolved: 2021-03-09

## 2021-03-10 ENCOUNTER — OFFICE VISIT (OUTPATIENT)
Dept: INTERNAL MEDICINE | Facility: CLINIC | Age: 71
End: 2021-03-10
Payer: MEDICARE

## 2021-03-10 VITALS
DIASTOLIC BLOOD PRESSURE: 62 MMHG | SYSTOLIC BLOOD PRESSURE: 118 MMHG | HEART RATE: 72 BPM | HEIGHT: 61 IN | BODY MASS INDEX: 27.75 KG/M2 | WEIGHT: 147 LBS | OXYGEN SATURATION: 99 % | RESPIRATION RATE: 15 BRPM

## 2021-03-10 DIAGNOSIS — H61.21 IMPACTED CERUMEN OF RIGHT EAR: ICD-10-CM

## 2021-03-10 DIAGNOSIS — R52 PAIN: ICD-10-CM

## 2021-03-10 DIAGNOSIS — M25.611 POST-TRAUMATIC STIFFNESS OF RIGHT SHOULDER JOINT: ICD-10-CM

## 2021-03-10 DIAGNOSIS — G89.29 CHRONIC RIGHT SHOULDER PAIN: ICD-10-CM

## 2021-03-10 DIAGNOSIS — E66.3 OVERWEIGHT (BMI 25.0-29.9): ICD-10-CM

## 2021-03-10 DIAGNOSIS — N39.46 URINARY INCONTINENCE, MIXED: ICD-10-CM

## 2021-03-10 DIAGNOSIS — R35.0 URINARY FREQUENCY: ICD-10-CM

## 2021-03-10 DIAGNOSIS — N81.6 RECTOCELE: ICD-10-CM

## 2021-03-10 DIAGNOSIS — H91.91 DECREASED HEARING OF RIGHT EAR: ICD-10-CM

## 2021-03-10 DIAGNOSIS — E03.9 HYPOTHYROIDISM, UNSPECIFIED TYPE: ICD-10-CM

## 2021-03-10 DIAGNOSIS — K21.9 GASTROESOPHAGEAL REFLUX DISEASE, UNSPECIFIED WHETHER ESOPHAGITIS PRESENT: Primary | ICD-10-CM

## 2021-03-10 DIAGNOSIS — M25.511 RIGHT SHOULDER PAIN, UNSPECIFIED CHRONICITY: ICD-10-CM

## 2021-03-10 DIAGNOSIS — R26.9 ABNORMALITY OF GAIT AND MOBILITY: ICD-10-CM

## 2021-03-10 DIAGNOSIS — M65.4 DE QUERVAIN'S DISEASE (TENOSYNOVITIS): ICD-10-CM

## 2021-03-10 DIAGNOSIS — N99.3 VAGINAL VAULT PROLAPSE, POSTHYSTERECTOMY: ICD-10-CM

## 2021-03-10 DIAGNOSIS — H25.10 NUCLEAR SCLEROSIS, UNSPECIFIED LATERALITY: ICD-10-CM

## 2021-03-10 DIAGNOSIS — R53.1 WEAKNESS: ICD-10-CM

## 2021-03-10 DIAGNOSIS — Z00.00 ENCOUNTER FOR PREVENTIVE HEALTH EXAMINATION: ICD-10-CM

## 2021-03-10 DIAGNOSIS — I10 ESSENTIAL HYPERTENSION: ICD-10-CM

## 2021-03-10 DIAGNOSIS — H04.123 DRY EYE SYNDROME OF BOTH EYES: ICD-10-CM

## 2021-03-10 DIAGNOSIS — E78.5 HYPERLIPIDEMIA, UNSPECIFIED HYPERLIPIDEMIA TYPE: ICD-10-CM

## 2021-03-10 DIAGNOSIS — M25.511 CHRONIC RIGHT SHOULDER PAIN: ICD-10-CM

## 2021-03-10 DIAGNOSIS — M75.41 IMPINGEMENT SYNDROME OF RIGHT SHOULDER: ICD-10-CM

## 2021-03-10 DIAGNOSIS — M25.611 DECREASED RIGHT SHOULDER RANGE OF MOTION: ICD-10-CM

## 2021-03-10 DIAGNOSIS — M15.9 PRIMARY OSTEOARTHRITIS INVOLVING MULTIPLE JOINTS: ICD-10-CM

## 2021-03-10 DIAGNOSIS — I70.0 ATHEROSCLEROSIS OF AORTA: ICD-10-CM

## 2021-03-10 DIAGNOSIS — M75.101 NONTRAUMATIC TEAR OF RIGHT ROTATOR CUFF, UNSPECIFIED TEAR EXTENT: ICD-10-CM

## 2021-03-10 PROCEDURE — 99215 OFFICE O/P EST HI 40 MIN: CPT | Mod: PBBFAC,PO | Performed by: NURSE PRACTITIONER

## 2021-03-10 PROCEDURE — 99999 PR PBB SHADOW E&M-EST. PATIENT-LVL V: CPT | Mod: PBBFAC,,, | Performed by: NURSE PRACTITIONER

## 2021-03-10 PROCEDURE — G0439 PPPS, SUBSEQ VISIT: HCPCS | Mod: ,,, | Performed by: NURSE PRACTITIONER

## 2021-03-10 PROCEDURE — G0439 PR MEDICARE ANNUAL WELLNESS SUBSEQUENT VISIT: ICD-10-PCS | Mod: ,,, | Performed by: NURSE PRACTITIONER

## 2021-03-10 PROCEDURE — 99999 PR PBB SHADOW E&M-EST. PATIENT-LVL V: ICD-10-PCS | Mod: PBBFAC,,, | Performed by: NURSE PRACTITIONER

## 2021-03-13 ENCOUNTER — LAB VISIT (OUTPATIENT)
Dept: LAB | Facility: HOSPITAL | Age: 71
End: 2021-03-13
Payer: MEDICARE

## 2021-03-13 ENCOUNTER — TELEPHONE (OUTPATIENT)
Dept: INTERNAL MEDICINE | Facility: CLINIC | Age: 71
End: 2021-03-13

## 2021-03-13 DIAGNOSIS — R35.0 URINARY FREQUENCY: ICD-10-CM

## 2021-03-13 LAB
BACTERIA #/AREA URNS AUTO: NORMAL /HPF
BILIRUB UR QL STRIP: NEGATIVE
BILIRUB UR QL STRIP: NEGATIVE
CLARITY UR REFRACT.AUTO: CLEAR
CLARITY UR REFRACT.AUTO: CLEAR
COLOR UR AUTO: ABNORMAL
COLOR UR AUTO: YELLOW
GLUCOSE UR QL STRIP: NEGATIVE
GLUCOSE UR QL STRIP: NEGATIVE
HGB UR QL STRIP: ABNORMAL
HGB UR QL STRIP: ABNORMAL
KETONES UR QL STRIP: NEGATIVE
KETONES UR QL STRIP: NEGATIVE
LEUKOCYTE ESTERASE UR QL STRIP: ABNORMAL
LEUKOCYTE ESTERASE UR QL STRIP: ABNORMAL
MICROSCOPIC COMMENT: NORMAL
MICROSCOPIC COMMENT: NORMAL
NITRITE UR QL STRIP: NEGATIVE
NITRITE UR QL STRIP: NEGATIVE
PH UR STRIP: 6 [PH] (ref 5–8)
PH UR STRIP: 6 [PH] (ref 5–8)
PROT UR QL STRIP: NEGATIVE
PROT UR QL STRIP: NEGATIVE
RBC #/AREA URNS AUTO: 0 /HPF (ref 0–4)
RBC #/AREA URNS AUTO: 0 /HPF (ref 0–4)
SP GR UR STRIP: 1 (ref 1–1.03)
SP GR UR STRIP: 1 (ref 1–1.03)
SQUAMOUS #/AREA URNS AUTO: 2 /HPF
SQUAMOUS #/AREA URNS AUTO: 2 /HPF
URN SPEC COLLECT METH UR: ABNORMAL
URN SPEC COLLECT METH UR: ABNORMAL
WBC #/AREA URNS AUTO: 1 /HPF (ref 0–5)
WBC #/AREA URNS AUTO: 1 /HPF (ref 0–5)

## 2021-03-13 PROCEDURE — 81001 URINALYSIS AUTO W/SCOPE: CPT | Mod: 91 | Performed by: NURSE PRACTITIONER

## 2021-03-13 RX ORDER — SULFAMETHOXAZOLE AND TRIMETHOPRIM 800; 160 MG/1; MG/1
1 TABLET ORAL 2 TIMES DAILY
Qty: 14 TABLET | Refills: 0 | Status: SHIPPED | OUTPATIENT
Start: 2021-03-13 | End: 2021-03-20

## 2021-03-23 ENCOUNTER — OFFICE VISIT (OUTPATIENT)
Dept: PRIMARY CARE CLINIC | Facility: CLINIC | Age: 71
End: 2021-03-23
Payer: MEDICARE

## 2021-03-23 VITALS
OXYGEN SATURATION: 98 % | HEART RATE: 64 BPM | SYSTOLIC BLOOD PRESSURE: 116 MMHG | WEIGHT: 153 LBS | HEIGHT: 61 IN | DIASTOLIC BLOOD PRESSURE: 60 MMHG | BODY MASS INDEX: 28.89 KG/M2

## 2021-03-23 DIAGNOSIS — L21.9 SEBORRHEIC DERMATITIS: ICD-10-CM

## 2021-03-23 DIAGNOSIS — E66.3 OVERWEIGHT (BMI 25.0-29.9): ICD-10-CM

## 2021-03-23 DIAGNOSIS — N81.6 RECTOCELE: ICD-10-CM

## 2021-03-23 DIAGNOSIS — M75.101 NONTRAUMATIC TEAR OF RIGHT ROTATOR CUFF, UNSPECIFIED TEAR EXTENT: ICD-10-CM

## 2021-03-23 DIAGNOSIS — I70.0 ATHEROSCLEROSIS OF AORTA: ICD-10-CM

## 2021-03-23 DIAGNOSIS — M18.0 ARTHRITIS OF CARPOMETACARPAL (CMC) JOINT OF BOTH THUMBS: ICD-10-CM

## 2021-03-23 DIAGNOSIS — M65.4 DE QUERVAIN'S DISEASE (TENOSYNOVITIS): ICD-10-CM

## 2021-03-23 DIAGNOSIS — K21.9 GASTROESOPHAGEAL REFLUX DISEASE, UNSPECIFIED WHETHER ESOPHAGITIS PRESENT: ICD-10-CM

## 2021-03-23 DIAGNOSIS — N39.46 URINARY INCONTINENCE, MIXED: ICD-10-CM

## 2021-03-23 DIAGNOSIS — M75.41 IMPINGEMENT SYNDROME OF RIGHT SHOULDER: ICD-10-CM

## 2021-03-23 DIAGNOSIS — R10.9 ABDOMINAL PAIN, UNSPECIFIED ABDOMINAL LOCATION: ICD-10-CM

## 2021-03-23 DIAGNOSIS — I10 ESSENTIAL HYPERTENSION: ICD-10-CM

## 2021-03-23 DIAGNOSIS — Z76.89 ESTABLISHING CARE WITH NEW DOCTOR, ENCOUNTER FOR: Primary | ICD-10-CM

## 2021-03-23 DIAGNOSIS — M15.9 PRIMARY OSTEOARTHRITIS INVOLVING MULTIPLE JOINTS: ICD-10-CM

## 2021-03-23 DIAGNOSIS — K58.0 IRRITABLE BOWEL SYNDROME WITH DIARRHEA: ICD-10-CM

## 2021-03-23 PROBLEM — R53.1 WEAKNESS: Status: RESOLVED | Noted: 2020-12-08 | Resolved: 2021-03-23

## 2021-03-23 PROBLEM — M25.611 DECREASED RIGHT SHOULDER RANGE OF MOTION: Status: RESOLVED | Noted: 2020-09-28 | Resolved: 2021-03-23

## 2021-03-23 PROBLEM — H04.123 DRY EYE SYNDROME OF BOTH EYES: Status: RESOLVED | Noted: 2019-01-31 | Resolved: 2021-03-23

## 2021-03-23 PROBLEM — M25.611 POST-TRAUMATIC STIFFNESS OF RIGHT SHOULDER JOINT: Status: RESOLVED | Noted: 2020-05-22 | Resolved: 2021-03-23

## 2021-03-23 PROBLEM — R52 PAIN: Status: RESOLVED | Noted: 2020-12-08 | Resolved: 2021-03-23

## 2021-03-23 PROBLEM — M25.511 RIGHT SHOULDER PAIN: Status: RESOLVED | Noted: 2018-08-01 | Resolved: 2021-03-23

## 2021-03-23 PROBLEM — M25.519 SHOULDER PAIN: Status: RESOLVED | Noted: 2020-09-28 | Resolved: 2021-03-23

## 2021-03-23 PROCEDURE — 99214 OFFICE O/P EST MOD 30 MIN: CPT | Mod: PBBFAC,PN | Performed by: FAMILY MEDICINE

## 2021-03-23 PROCEDURE — 99999 PR PBB SHADOW E&M-EST. PATIENT-LVL IV: CPT | Mod: PBBFAC,,, | Performed by: FAMILY MEDICINE

## 2021-03-23 PROCEDURE — 99214 PR OFFICE/OUTPT VISIT, EST, LEVL IV, 30-39 MIN: ICD-10-PCS | Mod: S$PBB,,, | Performed by: FAMILY MEDICINE

## 2021-03-23 PROCEDURE — 99999 PR PBB SHADOW E&M-EST. PATIENT-LVL IV: ICD-10-PCS | Mod: PBBFAC,,, | Performed by: FAMILY MEDICINE

## 2021-03-23 PROCEDURE — 99214 OFFICE O/P EST MOD 30 MIN: CPT | Mod: S$PBB,,, | Performed by: FAMILY MEDICINE

## 2021-03-23 RX ORDER — DICLOFENAC SODIUM 10 MG/G
2 GEL TOPICAL 2 TIMES DAILY PRN
Qty: 100 G | Refills: 11 | Status: SHIPPED | OUTPATIENT
Start: 2021-03-23

## 2021-03-23 RX ORDER — PROMETHAZINE HYDROCHLORIDE 25 MG/1
25 TABLET ORAL EVERY 6 HOURS PRN
Qty: 30 TABLET | Refills: 5 | Status: SHIPPED | OUTPATIENT
Start: 2021-03-23 | End: 2022-11-10

## 2021-03-23 RX ORDER — LEVOTHYROXINE SODIUM 75 UG/1
75 TABLET ORAL DAILY
Qty: 30 TABLET | Refills: 11 | Status: SHIPPED | OUTPATIENT
Start: 2021-03-23 | End: 2021-11-01 | Stop reason: SDUPTHER

## 2021-03-23 RX ORDER — KETOCONAZOLE 20 MG/ML
SHAMPOO, SUSPENSION TOPICAL
Qty: 120 ML | Refills: 11 | Status: SHIPPED | OUTPATIENT
Start: 2021-03-25 | End: 2021-11-01 | Stop reason: SDUPTHER

## 2021-03-23 RX ORDER — DICYCLOMINE HYDROCHLORIDE 20 MG/1
20 TABLET ORAL
Qty: 120 TABLET | Refills: 11 | Status: SHIPPED | OUTPATIENT
Start: 2021-03-23 | End: 2021-04-22

## 2021-03-23 RX ORDER — ROSUVASTATIN CALCIUM 40 MG/1
40 TABLET, COATED ORAL DAILY
Qty: 30 TABLET | Refills: 11 | Status: SHIPPED | OUTPATIENT
Start: 2021-03-23 | End: 2021-10-04 | Stop reason: SDUPTHER

## 2021-03-23 RX ORDER — OMEPRAZOLE 40 MG/1
40 CAPSULE, DELAYED RELEASE ORAL DAILY
Qty: 30 CAPSULE | Refills: 11 | Status: SHIPPED | OUTPATIENT
Start: 2021-03-23 | End: 2021-11-01 | Stop reason: SDUPTHER

## 2021-03-23 RX ORDER — DIPHENHYDRAMINE HCL 25 MG
25 TABLET ORAL NIGHTLY PRN
COMMUNITY
End: 2022-11-10

## 2021-03-23 RX ORDER — FLUOCINONIDE TOPICAL SOLUTION USP, 0.05% 0.5 MG/ML
SOLUTION TOPICAL DAILY
Qty: 60 ML | Refills: 11 | Status: SHIPPED | OUTPATIENT
Start: 2021-03-23 | End: 2021-11-01 | Stop reason: SDUPTHER

## 2021-03-23 RX ORDER — OXYBUTYNIN CHLORIDE 10 MG/1
10 TABLET, EXTENDED RELEASE ORAL DAILY
Qty: 30 TABLET | Refills: 11 | Status: SHIPPED | OUTPATIENT
Start: 2021-03-23 | End: 2021-11-01 | Stop reason: SDUPTHER

## 2021-03-23 RX ORDER — AMLODIPINE BESYLATE 2.5 MG/1
2.5 TABLET ORAL DAILY
Qty: 30 TABLET | Refills: 11 | Status: SHIPPED | OUTPATIENT
Start: 2021-03-23 | End: 2021-07-30 | Stop reason: SDUPTHER

## 2021-03-23 RX ORDER — MELOXICAM 7.5 MG/1
TABLET ORAL
Qty: 60 TABLET | Refills: 11 | Status: SHIPPED | OUTPATIENT
Start: 2021-03-23 | End: 2021-11-01

## 2021-03-24 ENCOUNTER — CLINICAL SUPPORT (OUTPATIENT)
Dept: REHABILITATION | Facility: HOSPITAL | Age: 71
End: 2021-03-24
Attending: ORTHOPAEDIC SURGERY
Payer: MEDICARE

## 2021-03-24 DIAGNOSIS — G89.29 CHRONIC RIGHT SHOULDER PAIN: Primary | ICD-10-CM

## 2021-03-24 DIAGNOSIS — M25.511 CHRONIC RIGHT SHOULDER PAIN: Primary | ICD-10-CM

## 2021-03-24 PROCEDURE — 97110 THERAPEUTIC EXERCISES: CPT | Mod: PO

## 2021-03-24 PROCEDURE — 97140 MANUAL THERAPY 1/> REGIONS: CPT | Mod: PO

## 2021-03-25 ENCOUNTER — OFFICE VISIT (OUTPATIENT)
Dept: OPTOMETRY | Facility: CLINIC | Age: 71
End: 2021-03-25
Payer: MEDICARE

## 2021-03-25 DIAGNOSIS — H52.03 HYPEROPIA WITH PRESBYOPIA, BILATERAL: ICD-10-CM

## 2021-03-25 DIAGNOSIS — H04.123 DRY EYES, BILATERAL: Primary | ICD-10-CM

## 2021-03-25 DIAGNOSIS — H25.13 NUCLEAR SCLEROSIS, BILATERAL: ICD-10-CM

## 2021-03-25 DIAGNOSIS — H52.4 HYPEROPIA WITH PRESBYOPIA, BILATERAL: ICD-10-CM

## 2021-03-25 DIAGNOSIS — Z13.5 SCREENING FOR GLAUCOMA: ICD-10-CM

## 2021-03-25 PROCEDURE — 92014 PR EYE EXAM, EST PATIENT,COMPREHESV: ICD-10-PCS | Mod: S$PBB,,, | Performed by: OPTOMETRIST

## 2021-03-25 PROCEDURE — 92014 COMPRE OPH EXAM EST PT 1/>: CPT | Mod: S$PBB,,, | Performed by: OPTOMETRIST

## 2021-03-25 PROCEDURE — 92015 DETERMINE REFRACTIVE STATE: CPT | Mod: ,,, | Performed by: OPTOMETRIST

## 2021-03-25 PROCEDURE — 99213 OFFICE O/P EST LOW 20 MIN: CPT | Mod: PBBFAC,PO | Performed by: OPTOMETRIST

## 2021-03-25 PROCEDURE — 99999 PR PBB SHADOW E&M-EST. PATIENT-LVL III: ICD-10-PCS | Mod: PBBFAC,,, | Performed by: OPTOMETRIST

## 2021-03-25 PROCEDURE — 99999 PR PBB SHADOW E&M-EST. PATIENT-LVL III: CPT | Mod: PBBFAC,,, | Performed by: OPTOMETRIST

## 2021-03-25 PROCEDURE — 92015 PR REFRACTION: ICD-10-PCS | Mod: ,,, | Performed by: OPTOMETRIST

## 2021-03-26 ENCOUNTER — PATIENT MESSAGE (OUTPATIENT)
Dept: OPHTHALMOLOGY | Facility: CLINIC | Age: 71
End: 2021-03-26

## 2021-04-01 ENCOUNTER — CLINICAL SUPPORT (OUTPATIENT)
Dept: REHABILITATION | Facility: HOSPITAL | Age: 71
End: 2021-04-01
Attending: ORTHOPAEDIC SURGERY
Payer: MEDICARE

## 2021-04-01 DIAGNOSIS — M25.511 CHRONIC RIGHT SHOULDER PAIN: Primary | ICD-10-CM

## 2021-04-01 DIAGNOSIS — G89.29 CHRONIC RIGHT SHOULDER PAIN: Primary | ICD-10-CM

## 2021-04-01 PROCEDURE — 97140 MANUAL THERAPY 1/> REGIONS: CPT | Mod: PO

## 2021-04-01 PROCEDURE — 97110 THERAPEUTIC EXERCISES: CPT | Mod: PO

## 2021-04-06 ENCOUNTER — CLINICAL SUPPORT (OUTPATIENT)
Dept: REHABILITATION | Facility: HOSPITAL | Age: 71
End: 2021-04-06
Attending: ORTHOPAEDIC SURGERY
Payer: MEDICARE

## 2021-04-06 DIAGNOSIS — G89.29 CHRONIC RIGHT SHOULDER PAIN: Primary | ICD-10-CM

## 2021-04-06 DIAGNOSIS — M25.511 CHRONIC RIGHT SHOULDER PAIN: Primary | ICD-10-CM

## 2021-04-06 PROCEDURE — 97110 THERAPEUTIC EXERCISES: CPT | Mod: PO

## 2021-04-06 PROCEDURE — 97140 MANUAL THERAPY 1/> REGIONS: CPT | Mod: PO

## 2021-04-13 ENCOUNTER — CLINICAL SUPPORT (OUTPATIENT)
Dept: REHABILITATION | Facility: HOSPITAL | Age: 71
End: 2021-04-13
Attending: ORTHOPAEDIC SURGERY
Payer: MEDICARE

## 2021-04-13 DIAGNOSIS — M25.511 CHRONIC RIGHT SHOULDER PAIN: Primary | ICD-10-CM

## 2021-04-13 DIAGNOSIS — G89.29 CHRONIC RIGHT SHOULDER PAIN: Primary | ICD-10-CM

## 2021-04-13 PROCEDURE — 97110 THERAPEUTIC EXERCISES: CPT | Mod: PO

## 2021-04-13 PROCEDURE — 97140 MANUAL THERAPY 1/> REGIONS: CPT | Mod: PO

## 2021-04-15 ENCOUNTER — CLINICAL SUPPORT (OUTPATIENT)
Dept: REHABILITATION | Facility: HOSPITAL | Age: 71
End: 2021-04-15
Attending: ORTHOPAEDIC SURGERY
Payer: MEDICARE

## 2021-04-15 DIAGNOSIS — G89.29 CHRONIC RIGHT SHOULDER PAIN: Primary | ICD-10-CM

## 2021-04-15 DIAGNOSIS — M25.511 CHRONIC RIGHT SHOULDER PAIN: Primary | ICD-10-CM

## 2021-04-15 PROCEDURE — 97110 THERAPEUTIC EXERCISES: CPT | Mod: PO

## 2021-04-15 PROCEDURE — 97140 MANUAL THERAPY 1/> REGIONS: CPT | Mod: PO

## 2021-04-20 ENCOUNTER — CLINICAL SUPPORT (OUTPATIENT)
Dept: REHABILITATION | Facility: HOSPITAL | Age: 71
End: 2021-04-20
Attending: ORTHOPAEDIC SURGERY
Payer: MEDICARE

## 2021-04-20 DIAGNOSIS — G89.29 CHRONIC RIGHT SHOULDER PAIN: Primary | ICD-10-CM

## 2021-04-20 DIAGNOSIS — M25.511 CHRONIC RIGHT SHOULDER PAIN: Primary | ICD-10-CM

## 2021-04-20 PROCEDURE — 97140 MANUAL THERAPY 1/> REGIONS: CPT | Mod: PO

## 2021-04-20 PROCEDURE — 97110 THERAPEUTIC EXERCISES: CPT | Mod: PO

## 2021-04-22 ENCOUNTER — CLINICAL SUPPORT (OUTPATIENT)
Dept: REHABILITATION | Facility: HOSPITAL | Age: 71
End: 2021-04-22
Attending: ORTHOPAEDIC SURGERY
Payer: MEDICARE

## 2021-04-22 DIAGNOSIS — G89.29 CHRONIC RIGHT SHOULDER PAIN: Primary | ICD-10-CM

## 2021-04-22 DIAGNOSIS — M25.511 CHRONIC RIGHT SHOULDER PAIN: Primary | ICD-10-CM

## 2021-04-22 PROCEDURE — 97110 THERAPEUTIC EXERCISES: CPT | Mod: PO

## 2021-04-22 PROCEDURE — 97140 MANUAL THERAPY 1/> REGIONS: CPT | Mod: PO

## 2021-04-27 ENCOUNTER — CLINICAL SUPPORT (OUTPATIENT)
Dept: REHABILITATION | Facility: HOSPITAL | Age: 71
End: 2021-04-27
Attending: ORTHOPAEDIC SURGERY
Payer: MEDICARE

## 2021-04-27 DIAGNOSIS — M25.511 CHRONIC RIGHT SHOULDER PAIN: Primary | ICD-10-CM

## 2021-04-27 DIAGNOSIS — G89.29 CHRONIC RIGHT SHOULDER PAIN: Primary | ICD-10-CM

## 2021-04-27 PROCEDURE — 97530 THERAPEUTIC ACTIVITIES: CPT | Mod: PO

## 2021-04-27 PROCEDURE — 97140 MANUAL THERAPY 1/> REGIONS: CPT | Mod: PO

## 2021-04-27 PROCEDURE — 97110 THERAPEUTIC EXERCISES: CPT | Mod: PO

## 2021-05-04 ENCOUNTER — CLINICAL SUPPORT (OUTPATIENT)
Dept: AUDIOLOGY | Facility: CLINIC | Age: 71
End: 2021-05-04
Payer: MEDICARE

## 2021-05-04 ENCOUNTER — OFFICE VISIT (OUTPATIENT)
Dept: OTOLARYNGOLOGY | Facility: CLINIC | Age: 71
End: 2021-05-04
Payer: MEDICARE

## 2021-05-04 VITALS — WEIGHT: 151.25 LBS | BODY MASS INDEX: 28.58 KG/M2

## 2021-05-04 DIAGNOSIS — H90.41 SENSORINEURAL HEARING LOSS (SNHL) OF RIGHT EAR WITH UNRESTRICTED HEARING OF LEFT EAR: ICD-10-CM

## 2021-05-04 DIAGNOSIS — H61.21 IMPACTED CERUMEN OF RIGHT EAR: ICD-10-CM

## 2021-05-04 DIAGNOSIS — H91.91 DECREASED HEARING OF RIGHT EAR: ICD-10-CM

## 2021-05-04 DIAGNOSIS — H90.A21 SENSORINEURAL HEARING LOSS (SNHL) OF RIGHT EAR WITH RESTRICTED HEARING OF LEFT EAR: Primary | ICD-10-CM

## 2021-05-04 PROCEDURE — 99214 OFFICE O/P EST MOD 30 MIN: CPT | Mod: PBBFAC,27,25 | Performed by: OTOLARYNGOLOGY

## 2021-05-04 PROCEDURE — 99204 OFFICE O/P NEW MOD 45 MIN: CPT | Mod: S$PBB,,, | Performed by: OTOLARYNGOLOGY

## 2021-05-04 PROCEDURE — 99204 PR OFFICE/OUTPT VISIT, NEW, LEVL IV, 45-59 MIN: ICD-10-PCS | Mod: S$PBB,,, | Performed by: OTOLARYNGOLOGY

## 2021-05-04 PROCEDURE — 99999 PR PBB SHADOW E&M-EST. PATIENT-LVL IV: CPT | Mod: PBBFAC,,, | Performed by: OTOLARYNGOLOGY

## 2021-05-04 PROCEDURE — 99999 PR PBB SHADOW E&M-EST. PATIENT-LVL IV: ICD-10-PCS | Mod: PBBFAC,,, | Performed by: OTOLARYNGOLOGY

## 2021-05-04 PROCEDURE — 99212 OFFICE O/P EST SF 10 MIN: CPT | Mod: PBBFAC,25 | Performed by: AUDIOLOGIST

## 2021-05-04 PROCEDURE — 92557 COMPREHENSIVE HEARING TEST: CPT | Mod: PBBFAC | Performed by: AUDIOLOGIST

## 2021-05-04 PROCEDURE — 99999 PR PBB SHADOW E&M-EST. PATIENT-LVL II: CPT | Mod: PBBFAC,,, | Performed by: AUDIOLOGIST

## 2021-05-04 PROCEDURE — 92567 TYMPANOMETRY: CPT | Mod: PBBFAC | Performed by: AUDIOLOGIST

## 2021-05-04 PROCEDURE — 99999 PR PBB SHADOW E&M-EST. PATIENT-LVL II: ICD-10-PCS | Mod: PBBFAC,,, | Performed by: AUDIOLOGIST

## 2021-05-14 ENCOUNTER — HOSPITAL ENCOUNTER (OUTPATIENT)
Dept: RADIOLOGY | Facility: HOSPITAL | Age: 71
Discharge: HOME | End: 2021-05-14
Attending: OTOLARYNGOLOGY
Payer: MEDICARE

## 2021-05-14 ENCOUNTER — PATIENT MESSAGE (OUTPATIENT)
Dept: OTOLARYNGOLOGY | Facility: CLINIC | Age: 71
End: 2021-05-14

## 2021-05-14 DIAGNOSIS — H90.41 SENSORINEURAL HEARING LOSS (SNHL) OF RIGHT EAR WITH UNRESTRICTED HEARING OF LEFT EAR: ICD-10-CM

## 2021-05-14 RX ORDER — DIAZEPAM 10 MG/1
10 TABLET ORAL ONCE
Qty: 2 TABLET | Refills: 0 | Status: SHIPPED | OUTPATIENT
Start: 2021-05-14 | End: 2021-06-11 | Stop reason: ALTCHOICE

## 2021-06-07 ENCOUNTER — PATIENT MESSAGE (OUTPATIENT)
Dept: OTOLARYNGOLOGY | Facility: CLINIC | Age: 71
End: 2021-06-07

## 2021-06-07 ENCOUNTER — HOSPITAL ENCOUNTER (OUTPATIENT)
Dept: RADIOLOGY | Facility: HOSPITAL | Age: 71
Discharge: HOME | End: 2021-06-07
Attending: OTOLARYNGOLOGY
Payer: MEDICARE

## 2021-06-07 LAB
CREAT SERPL-MCNC: 0.9 MG/DL (ref 0.5–1.4)
SAMPLE: NORMAL

## 2021-06-07 PROCEDURE — 25500020 PHARM REV CODE 255: Performed by: OTOLARYNGOLOGY

## 2021-06-07 PROCEDURE — 70553 MRI BRAIN STEM W/O & W/DYE: CPT | Mod: 26,,, | Performed by: RADIOLOGY

## 2021-06-07 PROCEDURE — 70553 MRI IAC/TEMPORAL BONES W W/O CONTRAST: ICD-10-PCS | Mod: 26,,, | Performed by: RADIOLOGY

## 2021-06-07 PROCEDURE — 70553 MRI BRAIN STEM W/O & W/DYE: CPT | Mod: TC

## 2021-06-07 PROCEDURE — A9585 GADOBUTROL INJECTION: HCPCS | Performed by: OTOLARYNGOLOGY

## 2021-06-07 RX ORDER — GADOBUTROL 604.72 MG/ML
6.5 INJECTION INTRAVENOUS
Status: COMPLETED | OUTPATIENT
Start: 2021-06-07 | End: 2021-06-07

## 2021-06-07 RX ADMIN — GADOBUTROL 6.5 ML: 604.72 INJECTION INTRAVENOUS at 01:06

## 2021-06-11 ENCOUNTER — OFFICE VISIT (OUTPATIENT)
Dept: OPHTHALMOLOGY | Facility: CLINIC | Age: 71
End: 2021-06-11
Payer: MEDICARE

## 2021-06-11 DIAGNOSIS — H52.4 HYPEROPIA WITH PRESBYOPIA OF BOTH EYES: ICD-10-CM

## 2021-06-11 DIAGNOSIS — H57.03 SMALL PUPILS: ICD-10-CM

## 2021-06-11 DIAGNOSIS — H25.13 NUCLEAR SCLEROTIC CATARACT OF BOTH EYES: Primary | ICD-10-CM

## 2021-06-11 DIAGNOSIS — H04.123 DRY EYE SYNDROME, BILATERAL: ICD-10-CM

## 2021-06-11 DIAGNOSIS — H52.03 HYPEROPIA WITH PRESBYOPIA OF BOTH EYES: ICD-10-CM

## 2021-06-11 DIAGNOSIS — H16.223 KERATOCONJUNCTIVITIS SICCA NOT SPECIFIED AS SJOGREN'S, BILATERAL: ICD-10-CM

## 2021-06-11 DIAGNOSIS — H25.043 POSTERIOR SUBCAPSULAR AGE-RELATED CATARACT OF BOTH EYES: ICD-10-CM

## 2021-06-11 PROCEDURE — 92014 COMPRE OPH EXAM EST PT 1/>: CPT | Mod: S$PBB,,, | Performed by: OPHTHALMOLOGY

## 2021-06-11 PROCEDURE — 99999 PR PBB SHADOW E&M-EST. PATIENT-LVL III: CPT | Mod: PBBFAC,,, | Performed by: OPHTHALMOLOGY

## 2021-06-11 PROCEDURE — 99999 PR PBB SHADOW E&M-EST. PATIENT-LVL III: ICD-10-PCS | Mod: PBBFAC,,, | Performed by: OPHTHALMOLOGY

## 2021-06-11 PROCEDURE — 92014 PR EYE EXAM, EST PATIENT,COMPREHESV: ICD-10-PCS | Mod: S$PBB,,, | Performed by: OPHTHALMOLOGY

## 2021-06-11 PROCEDURE — 99213 OFFICE O/P EST LOW 20 MIN: CPT | Mod: PBBFAC | Performed by: OPHTHALMOLOGY

## 2021-06-16 ENCOUNTER — TELEPHONE (OUTPATIENT)
Dept: OPHTHALMOLOGY | Facility: CLINIC | Age: 71
End: 2021-06-16

## 2021-06-16 DIAGNOSIS — H25.043 POSTERIOR SUBCAPSULAR AGE-RELATED CATARACT OF BOTH EYES: Primary | ICD-10-CM

## 2021-06-17 ENCOUNTER — TELEPHONE (OUTPATIENT)
Dept: PRIMARY CARE CLINIC | Facility: CLINIC | Age: 71
End: 2021-06-17

## 2021-07-16 ENCOUNTER — OFFICE VISIT (OUTPATIENT)
Dept: PRIMARY CARE CLINIC | Facility: CLINIC | Age: 71
End: 2021-07-16
Payer: MEDICARE

## 2021-07-16 VITALS
DIASTOLIC BLOOD PRESSURE: 60 MMHG | HEIGHT: 61 IN | OXYGEN SATURATION: 96 % | SYSTOLIC BLOOD PRESSURE: 134 MMHG | TEMPERATURE: 98 F | HEART RATE: 71 BPM | WEIGHT: 153 LBS | BODY MASS INDEX: 28.89 KG/M2

## 2021-07-16 DIAGNOSIS — Z01.818 PREOP EXAMINATION: Primary | ICD-10-CM

## 2021-07-16 DIAGNOSIS — I70.0 ATHEROSCLEROSIS OF AORTA: ICD-10-CM

## 2021-07-16 DIAGNOSIS — E66.3 OVERWEIGHT (BMI 25.0-29.9): ICD-10-CM

## 2021-07-16 DIAGNOSIS — E03.9 ACQUIRED HYPOTHYROIDISM: ICD-10-CM

## 2021-07-16 DIAGNOSIS — E78.2 MIXED HYPERLIPIDEMIA: ICD-10-CM

## 2021-07-16 DIAGNOSIS — R60.0 LOWER EXTREMITY EDEMA: ICD-10-CM

## 2021-07-16 DIAGNOSIS — I10 ESSENTIAL HYPERTENSION: ICD-10-CM

## 2021-07-16 PROCEDURE — 99215 OFFICE O/P EST HI 40 MIN: CPT | Mod: PBBFAC,PN | Performed by: FAMILY MEDICINE

## 2021-07-16 PROCEDURE — 99214 OFFICE O/P EST MOD 30 MIN: CPT | Mod: S$PBB,,, | Performed by: FAMILY MEDICINE

## 2021-07-16 PROCEDURE — 99999 PR PBB SHADOW E&M-EST. PATIENT-LVL V: CPT | Mod: PBBFAC,,, | Performed by: FAMILY MEDICINE

## 2021-07-16 PROCEDURE — 99214 PR OFFICE/OUTPT VISIT, EST, LEVL IV, 30-39 MIN: ICD-10-PCS | Mod: S$PBB,,, | Performed by: FAMILY MEDICINE

## 2021-07-16 PROCEDURE — 99999 PR PBB SHADOW E&M-EST. PATIENT-LVL V: ICD-10-PCS | Mod: PBBFAC,,, | Performed by: FAMILY MEDICINE

## 2021-07-19 ENCOUNTER — PATIENT MESSAGE (OUTPATIENT)
Dept: PRIMARY CARE CLINIC | Facility: CLINIC | Age: 71
End: 2021-07-19

## 2021-07-19 PROBLEM — R60.0 LOWER EXTREMITY EDEMA: Status: ACTIVE | Noted: 2021-07-19

## 2021-07-30 ENCOUNTER — OFFICE VISIT (OUTPATIENT)
Dept: OPHTHALMOLOGY | Facility: CLINIC | Age: 71
End: 2021-07-30
Payer: MEDICARE

## 2021-07-30 DIAGNOSIS — H52.4 HYPEROPIA WITH PRESBYOPIA OF BOTH EYES: ICD-10-CM

## 2021-07-30 DIAGNOSIS — H04.123 DRY EYE SYNDROME, BILATERAL: ICD-10-CM

## 2021-07-30 DIAGNOSIS — H25.042 POSTERIOR SUBCAPSULAR AGE-RELATED CATARACT, LEFT EYE: Primary | ICD-10-CM

## 2021-07-30 DIAGNOSIS — H25.12 NUCLEAR SCLEROTIC CATARACT OF LEFT EYE: ICD-10-CM

## 2021-07-30 DIAGNOSIS — Z01.818 PRE-OP TESTING: ICD-10-CM

## 2021-07-30 DIAGNOSIS — H57.03 SMALL PUPILS: ICD-10-CM

## 2021-07-30 DIAGNOSIS — H16.223 KERATOCONJUNCTIVITIS SICCA NOT SPECIFIED AS SJOGREN'S, BILATERAL: ICD-10-CM

## 2021-07-30 DIAGNOSIS — H25.11 NUCLEAR SCLEROTIC CATARACT OF RIGHT EYE: ICD-10-CM

## 2021-07-30 DIAGNOSIS — H52.03 HYPEROPIA WITH PRESBYOPIA OF BOTH EYES: ICD-10-CM

## 2021-07-30 PROCEDURE — 99213 OFFICE O/P EST LOW 20 MIN: CPT | Mod: PBBFAC | Performed by: OPHTHALMOLOGY

## 2021-07-30 PROCEDURE — 99499 UNLISTED E&M SERVICE: CPT | Mod: S$PBB,,, | Performed by: OPHTHALMOLOGY

## 2021-07-30 PROCEDURE — 99999 PR PBB SHADOW E&M-EST. PATIENT-LVL III: ICD-10-PCS | Mod: PBBFAC,,, | Performed by: OPHTHALMOLOGY

## 2021-07-30 PROCEDURE — 92136 OPHTHALMIC BIOMETRY: CPT | Mod: PBBFAC | Performed by: OPHTHALMOLOGY

## 2021-07-30 PROCEDURE — 92136 IOL MASTER - OU - BOTH EYES: ICD-10-PCS | Mod: 26,S$PBB,LT, | Performed by: OPHTHALMOLOGY

## 2021-07-30 PROCEDURE — 99999 PR PBB SHADOW E&M-EST. PATIENT-LVL III: CPT | Mod: PBBFAC,,, | Performed by: OPHTHALMOLOGY

## 2021-07-30 PROCEDURE — 99499 NO LOS: ICD-10-PCS | Mod: S$PBB,,, | Performed by: OPHTHALMOLOGY

## 2021-08-04 RX ORDER — MOXIFLOXACIN 5 MG/ML
1 SOLUTION/ DROPS OPHTHALMIC
Status: CANCELLED | OUTPATIENT
Start: 2021-08-04

## 2021-08-04 RX ORDER — PHENYLEPHRINE HYDROCHLORIDE 100 MG/ML
1 SOLUTION/ DROPS OPHTHALMIC
Status: CANCELLED | OUTPATIENT
Start: 2021-08-04

## 2021-08-04 RX ORDER — SODIUM CHLORIDE 0.9 % (FLUSH) 0.9 %
10 SYRINGE (ML) INJECTION
Status: DISCONTINUED | OUTPATIENT
Start: 2021-08-04 | End: 2021-10-29

## 2021-08-04 RX ORDER — KETOROLAC TROMETHAMINE 5 MG/ML
1 SOLUTION OPHTHALMIC
Status: CANCELLED | OUTPATIENT
Start: 2021-08-04

## 2021-08-04 RX ORDER — TROPICAMIDE 10 MG/ML
1 SOLUTION/ DROPS OPHTHALMIC
Status: CANCELLED | OUTPATIENT
Start: 2021-08-04

## 2021-08-05 ENCOUNTER — TELEPHONE (OUTPATIENT)
Dept: OPHTHALMOLOGY | Facility: CLINIC | Age: 71
End: 2021-08-05

## 2021-08-05 ENCOUNTER — HOSPITAL ENCOUNTER (OUTPATIENT)
Dept: CARDIOLOGY | Facility: HOSPITAL | Age: 71
Discharge: HOME | End: 2021-08-05
Attending: FAMILY MEDICINE
Payer: MEDICARE

## 2021-08-05 VITALS
HEIGHT: 61 IN | SYSTOLIC BLOOD PRESSURE: 132 MMHG | BODY MASS INDEX: 28.89 KG/M2 | DIASTOLIC BLOOD PRESSURE: 66 MMHG | HEART RATE: 66 BPM | WEIGHT: 153 LBS

## 2021-08-05 DIAGNOSIS — R60.0 LOWER EXTREMITY EDEMA: ICD-10-CM

## 2021-08-05 LAB
ASCENDING AORTA: 3.51 CM
AV INDEX (PROSTH): 0.86
AV MEAN GRADIENT: 6 MMHG
AV PEAK GRADIENT: 11 MMHG
AV VALVE AREA: 2.14 CM2
AV VELOCITY RATIO: 0.72
BSA FOR ECHO PROCEDURE: 1.73 M2
CV ECHO LV RWT: 0.38 CM
DOP CALC AO PEAK VEL: 1.69 M/S
DOP CALC AO VTI: 34.76 CM
DOP CALC LVOT AREA: 2.5 CM2
DOP CALC LVOT DIAMETER: 1.78 CM
DOP CALC LVOT PEAK VEL: 1.21 M/S
DOP CALC LVOT STROKE VOLUME: 74.24 CM3
DOP CALCLVOT PEAK VEL VTI: 29.85 CM
E WAVE DECELERATION TIME: 250 MSEC
E/A RATIO: 1.04
E/E' RATIO: 8.71 M/S
ECHO LV POSTERIOR WALL: 0.76 CM (ref 0.6–1.1)
FRACTIONAL SHORTENING: 49 % (ref 28–44)
INTERVENTRICULAR SEPTUM: 0.76 CM (ref 0.6–1.1)
IVRT: 82.78 MSEC
LA MAJOR: 5.07 CM
LA MINOR: 5.36 CM
LA WIDTH: 3.38 CM
LEFT ATRIUM SIZE: 3.42 CM
LEFT ATRIUM VOLUME INDEX MOD: 21.2 ML/M2
LEFT ATRIUM VOLUME INDEX: 30.3 ML/M2
LEFT ATRIUM VOLUME MOD: 35.83 CM3
LEFT ATRIUM VOLUME: 51.2 CM3
LEFT INTERNAL DIMENSION IN SYSTOLE: 2.02 CM (ref 2.1–4)
LEFT VENTRICLE DIASTOLIC VOLUME INDEX: 40.63 ML/M2
LEFT VENTRICLE DIASTOLIC VOLUME: 68.66 ML
LEFT VENTRICLE MASS INDEX: 51 G/M2
LEFT VENTRICLE SYSTOLIC VOLUME INDEX: 7.7 ML/M2
LEFT VENTRICLE SYSTOLIC VOLUME: 13 ML
LEFT VENTRICULAR INTERNAL DIMENSION IN DIASTOLE: 3.97 CM (ref 3.5–6)
LEFT VENTRICULAR MASS: 86.21 G
LV LATERAL E/E' RATIO: 7.4 M/S
LV SEPTAL E/E' RATIO: 10.57 M/S
MV PEAK A VEL: 0.71 M/S
MV PEAK E VEL: 0.74 M/S
MV STENOSIS PRESSURE HALF TIME: 72.5 MS
MV VALVE AREA P 1/2 METHOD: 3.03 CM2
PISA TR MAX VEL: 2.95 M/S
PULM VEIN S/D RATIO: 1.46
PV PEAK D VEL: 0.37 M/S
PV PEAK S VEL: 0.54 M/S
RA MAJOR: 4.92 CM
RA PRESSURE: 3 MMHG
RA WIDTH: 2.78 CM
RIGHT VENTRICULAR END-DIASTOLIC DIMENSION: 2.62 CM
SINUS: 3.28 CM
STJ: 3.02 CM
TDI LATERAL: 0.1 M/S
TDI SEPTAL: 0.07 M/S
TDI: 0.09 M/S
TR MAX PG: 35 MMHG
TRICUSPID ANNULAR PLANE SYSTOLIC EXCURSION: 2.28 CM
TV REST PULMONARY ARTERY PRESSURE: 38 MMHG

## 2021-08-05 PROCEDURE — 93306 ECHO (CUPID ONLY): ICD-10-PCS | Mod: 26,,, | Performed by: INTERNAL MEDICINE

## 2021-08-05 PROCEDURE — 93306 TTE W/DOPPLER COMPLETE: CPT

## 2021-08-05 PROCEDURE — 93306 TTE W/DOPPLER COMPLETE: CPT | Mod: 26,,, | Performed by: INTERNAL MEDICINE

## 2021-08-06 ENCOUNTER — TELEPHONE (OUTPATIENT)
Dept: PRIMARY CARE CLINIC | Facility: CLINIC | Age: 71
End: 2021-08-06

## 2021-08-06 DIAGNOSIS — Q21.12 PFO (PATENT FORAMEN OVALE): Primary | ICD-10-CM

## 2021-08-25 ENCOUNTER — TELEPHONE (OUTPATIENT)
Dept: CARDIOLOGY | Facility: CLINIC | Age: 71
End: 2021-08-25

## 2021-08-25 DIAGNOSIS — R00.2 PALPITATIONS: Primary | ICD-10-CM

## 2021-08-26 ENCOUNTER — OFFICE VISIT (OUTPATIENT)
Dept: CARDIOLOGY | Facility: CLINIC | Age: 71
End: 2021-08-26
Payer: MEDICARE

## 2021-08-26 VITALS
DIASTOLIC BLOOD PRESSURE: 60 MMHG | HEIGHT: 61 IN | SYSTOLIC BLOOD PRESSURE: 140 MMHG | OXYGEN SATURATION: 98 % | BODY MASS INDEX: 29.13 KG/M2 | WEIGHT: 154.31 LBS | HEART RATE: 82 BPM

## 2021-08-26 DIAGNOSIS — Q21.12 PFO (PATENT FORAMEN OVALE): ICD-10-CM

## 2021-08-26 DIAGNOSIS — I10 ESSENTIAL HYPERTENSION: Primary | ICD-10-CM

## 2021-08-26 DIAGNOSIS — I70.0 ATHEROSCLEROSIS OF AORTA: ICD-10-CM

## 2021-08-26 PROCEDURE — 99203 OFFICE O/P NEW LOW 30 MIN: CPT | Mod: S$PBB,,, | Performed by: INTERNAL MEDICINE

## 2021-08-26 PROCEDURE — 99999 PR PBB SHADOW E&M-EST. PATIENT-LVL V: ICD-10-PCS | Mod: PBBFAC,,, | Performed by: INTERNAL MEDICINE

## 2021-08-26 PROCEDURE — 99203 PR OFFICE/OUTPT VISIT, NEW, LEVL III, 30-44 MIN: ICD-10-PCS | Mod: S$PBB,,, | Performed by: INTERNAL MEDICINE

## 2021-08-26 PROCEDURE — 99999 PR PBB SHADOW E&M-EST. PATIENT-LVL V: CPT | Mod: PBBFAC,,, | Performed by: INTERNAL MEDICINE

## 2021-08-26 PROCEDURE — 99215 OFFICE O/P EST HI 40 MIN: CPT | Mod: PBBFAC,PN | Performed by: INTERNAL MEDICINE

## 2021-08-26 RX ORDER — LOSARTAN POTASSIUM 100 MG/1
100 TABLET ORAL DAILY
Qty: 30 TABLET | Refills: 3 | Status: SHIPPED | OUTPATIENT
Start: 2021-08-26 | End: 2021-11-01 | Stop reason: SDUPTHER

## 2021-10-04 RX ORDER — ROSUVASTATIN CALCIUM 40 MG/1
40 TABLET, COATED ORAL DAILY
Qty: 30 TABLET | Refills: 1 | Status: SHIPPED | OUTPATIENT
Start: 2021-10-04 | End: 2021-11-01 | Stop reason: SDUPTHER

## 2021-10-17 ENCOUNTER — LAB VISIT (OUTPATIENT)
Dept: SPORTS MEDICINE | Facility: CLINIC | Age: 71
End: 2021-10-17
Payer: MEDICARE

## 2021-10-17 DIAGNOSIS — Z01.818 PRE-OP TESTING: ICD-10-CM

## 2021-10-17 PROCEDURE — U0003 INFECTIOUS AGENT DETECTION BY NUCLEIC ACID (DNA OR RNA); SEVERE ACUTE RESPIRATORY SYNDROME CORONAVIRUS 2 (SARS-COV-2) (CORONAVIRUS DISEASE [COVID-19]), AMPLIFIED PROBE TECHNIQUE, MAKING USE OF HIGH THROUGHPUT TECHNOLOGIES AS DESCRIBED BY CMS-2020-01-R: HCPCS | Performed by: OPHTHALMOLOGY

## 2021-10-17 PROCEDURE — U0005 INFEC AGEN DETEC AMPLI PROBE: HCPCS | Performed by: OPHTHALMOLOGY

## 2021-10-18 LAB
SARS-COV-2 RNA RESP QL NAA+PROBE: NOT DETECTED
SARS-COV-2- CYCLE NUMBER: NORMAL

## 2021-10-19 ENCOUNTER — TELEPHONE (OUTPATIENT)
Dept: OPHTHALMOLOGY | Facility: CLINIC | Age: 71
End: 2021-10-19

## 2021-10-19 RX ORDER — AMLODIPINE BESYLATE 2.5 MG/1
2.5 TABLET ORAL DAILY
COMMUNITY
Start: 2021-08-01 | End: 2021-10-19 | Stop reason: CLARIF

## 2021-10-19 RX ORDER — DICYCLOMINE HYDROCHLORIDE 20 MG/1
20 TABLET ORAL 4 TIMES DAILY
COMMUNITY
Start: 2021-10-01 | End: 2021-11-01 | Stop reason: SDUPTHER

## 2021-10-20 ENCOUNTER — HOSPITAL ENCOUNTER (OUTPATIENT)
Facility: HOSPITAL | Age: 71
Discharge: HOME | End: 2021-10-20
Attending: OPHTHALMOLOGY | Admitting: OPHTHALMOLOGY
Payer: MEDICARE

## 2021-10-20 ENCOUNTER — ANESTHESIA (OUTPATIENT)
Dept: SURGERY | Facility: HOSPITAL | Age: 71
End: 2021-10-20
Payer: MEDICARE

## 2021-10-20 ENCOUNTER — ANESTHESIA EVENT (OUTPATIENT)
Dept: SURGERY | Facility: HOSPITAL | Age: 71
End: 2021-10-20
Payer: MEDICARE

## 2021-10-20 VITALS
TEMPERATURE: 98 F | OXYGEN SATURATION: 95 % | BODY MASS INDEX: 28.32 KG/M2 | HEIGHT: 61 IN | SYSTOLIC BLOOD PRESSURE: 155 MMHG | HEART RATE: 62 BPM | DIASTOLIC BLOOD PRESSURE: 64 MMHG | RESPIRATION RATE: 18 BRPM | WEIGHT: 150 LBS

## 2021-10-20 DIAGNOSIS — H57.03 SMALL PUPILS: ICD-10-CM

## 2021-10-20 DIAGNOSIS — H25.12 NUCLEAR SCLEROTIC CATARACT OF LEFT EYE: ICD-10-CM

## 2021-10-20 DIAGNOSIS — H25.042 POSTERIOR SUBCAPSULAR AGE-RELATED CATARACT, LEFT EYE: Primary | ICD-10-CM

## 2021-10-20 PROCEDURE — D9220A PRA ANESTHESIA: ICD-10-PCS | Mod: CRNA,,, | Performed by: NURSE ANESTHETIST, CERTIFIED REGISTERED

## 2021-10-20 PROCEDURE — 63600175 PHARM REV CODE 636 W HCPCS: Performed by: NURSE ANESTHETIST, CERTIFIED REGISTERED

## 2021-10-20 PROCEDURE — 25000003 PHARM REV CODE 250: Performed by: OPHTHALMOLOGY

## 2021-10-20 PROCEDURE — 71000015 HC POSTOP RECOV 1ST HR: Performed by: OPHTHALMOLOGY

## 2021-10-20 PROCEDURE — V2632 POST CHMBR INTRAOCULAR LENS: HCPCS | Performed by: OPHTHALMOLOGY

## 2021-10-20 PROCEDURE — 71000044 HC DOSC ROUTINE RECOVERY FIRST HOUR: Performed by: OPHTHALMOLOGY

## 2021-10-20 PROCEDURE — 36000707: Performed by: OPHTHALMOLOGY

## 2021-10-20 PROCEDURE — 99499 NO LOS: ICD-10-PCS | Mod: ,,, | Performed by: OPHTHALMOLOGY

## 2021-10-20 PROCEDURE — 36000706: Performed by: OPHTHALMOLOGY

## 2021-10-20 PROCEDURE — 66982 XCAPSL CTRC RMVL CPLX WO ECP: CPT | Mod: LT,,, | Performed by: OPHTHALMOLOGY

## 2021-10-20 PROCEDURE — 37000009 HC ANESTHESIA EA ADD 15 MINS: Performed by: OPHTHALMOLOGY

## 2021-10-20 PROCEDURE — 37000008 HC ANESTHESIA 1ST 15 MINUTES: Performed by: OPHTHALMOLOGY

## 2021-10-20 PROCEDURE — 99499 UNLISTED E&M SERVICE: CPT | Mod: ,,, | Performed by: OPHTHALMOLOGY

## 2021-10-20 PROCEDURE — D9220A PRA ANESTHESIA: Mod: CRNA,,, | Performed by: NURSE ANESTHETIST, CERTIFIED REGISTERED

## 2021-10-20 PROCEDURE — D9220A PRA ANESTHESIA: Mod: ANES,,, | Performed by: ANESTHESIOLOGY

## 2021-10-20 PROCEDURE — 25000003 PHARM REV CODE 250: Performed by: NURSE ANESTHETIST, CERTIFIED REGISTERED

## 2021-10-20 PROCEDURE — 25000003 PHARM REV CODE 250

## 2021-10-20 PROCEDURE — 66982 PR REMOVAL, CATARACT, W/INSRT INTRAOC LENS, W/O ENDO CYCLO, CMPLX: ICD-10-PCS | Mod: LT,,, | Performed by: OPHTHALMOLOGY

## 2021-10-20 PROCEDURE — 63600175 PHARM REV CODE 636 W HCPCS: Performed by: OPHTHALMOLOGY

## 2021-10-20 PROCEDURE — D9220A PRA ANESTHESIA: ICD-10-PCS | Mod: ANES,,, | Performed by: ANESTHESIOLOGY

## 2021-10-20 DEVICE — LENS EYHANCE +21.5D: Type: IMPLANTABLE DEVICE | Site: EYE | Status: FUNCTIONAL

## 2021-10-20 RX ORDER — MIDAZOLAM HYDROCHLORIDE 1 MG/ML
INJECTION, SOLUTION INTRAMUSCULAR; INTRAVENOUS
Status: DISCONTINUED | OUTPATIENT
Start: 2021-10-20 | End: 2021-10-20

## 2021-10-20 RX ORDER — MOXIFLOXACIN 5 MG/ML
SOLUTION/ DROPS OPHTHALMIC
Status: DISCONTINUED | OUTPATIENT
Start: 2021-10-20 | End: 2021-10-20 | Stop reason: HOSPADM

## 2021-10-20 RX ORDER — SODIUM CHLORIDE 0.9 % (FLUSH) 0.9 %
3 SYRINGE (ML) INJECTION
Status: DISCONTINUED | OUTPATIENT
Start: 2021-10-20 | End: 2021-10-20 | Stop reason: HOSPADM

## 2021-10-20 RX ORDER — MOXIFLOXACIN 5 MG/ML
1 SOLUTION/ DROPS OPHTHALMIC
Status: DISCONTINUED | OUTPATIENT
Start: 2021-10-20 | End: 2021-10-20 | Stop reason: HOSPADM

## 2021-10-20 RX ORDER — PHENYLEPHRINE HYDROCHLORIDE 100 MG/ML
1 SOLUTION/ DROPS OPHTHALMIC
Status: DISCONTINUED | OUTPATIENT
Start: 2021-10-20 | End: 2021-10-20 | Stop reason: HOSPADM

## 2021-10-20 RX ORDER — KETOROLAC TROMETHAMINE 5 MG/ML
1 SOLUTION OPHTHALMIC
Status: DISCONTINUED | OUTPATIENT
Start: 2021-10-20 | End: 2021-10-20 | Stop reason: HOSPADM

## 2021-10-20 RX ORDER — HYDROMORPHONE HYDROCHLORIDE 1 MG/ML
0.2 INJECTION, SOLUTION INTRAMUSCULAR; INTRAVENOUS; SUBCUTANEOUS EVERY 5 MIN PRN
Status: DISCONTINUED | OUTPATIENT
Start: 2021-10-20 | End: 2021-10-20 | Stop reason: HOSPADM

## 2021-10-20 RX ORDER — ACETAZOLAMIDE 500 MG/1
500 CAPSULE, EXTENDED RELEASE ORAL ONCE
Status: DISCONTINUED | OUTPATIENT
Start: 2021-10-20 | End: 2021-10-20 | Stop reason: HOSPADM

## 2021-10-20 RX ORDER — LIDOCAINE HYDROCHLORIDE 10 MG/ML
INJECTION, SOLUTION EPIDURAL; INFILTRATION; INTRACAUDAL; PERINEURAL
Status: DISCONTINUED | OUTPATIENT
Start: 2021-10-20 | End: 2021-10-20 | Stop reason: HOSPADM

## 2021-10-20 RX ORDER — SODIUM CHLORIDE 9 MG/ML
INJECTION, SOLUTION INTRAVENOUS CONTINUOUS
Status: DISCONTINUED | OUTPATIENT
Start: 2021-10-20 | End: 2021-10-20 | Stop reason: HOSPADM

## 2021-10-20 RX ORDER — ACETAMINOPHEN 325 MG/1
650 TABLET ORAL EVERY 6 HOURS PRN
Status: DISCONTINUED | OUTPATIENT
Start: 2021-10-20 | End: 2021-10-20 | Stop reason: HOSPADM

## 2021-10-20 RX ORDER — MOXIFLOXACIN 5 MG/ML
SOLUTION/ DROPS OPHTHALMIC
Status: DISCONTINUED
Start: 2021-10-20 | End: 2021-10-20 | Stop reason: HOSPADM

## 2021-10-20 RX ORDER — FENTANYL CITRATE 50 UG/ML
50 INJECTION, SOLUTION INTRAMUSCULAR; INTRAVENOUS EVERY 5 MIN PRN
Status: DISCONTINUED | OUTPATIENT
Start: 2021-10-20 | End: 2021-10-20 | Stop reason: HOSPADM

## 2021-10-20 RX ORDER — LIDOCAINE HYDROCHLORIDE 20 MG/ML
JELLY TOPICAL
Status: DISCONTINUED | OUTPATIENT
Start: 2021-10-20 | End: 2021-10-20 | Stop reason: HOSPADM

## 2021-10-20 RX ORDER — TROPICAMIDE 10 MG/ML
1 SOLUTION/ DROPS OPHTHALMIC
Status: DISCONTINUED | OUTPATIENT
Start: 2021-10-20 | End: 2021-10-20 | Stop reason: HOSPADM

## 2021-10-20 RX ORDER — PREDNISOLONE ACETATE 10 MG/ML
SUSPENSION/ DROPS OPHTHALMIC
Status: DISCONTINUED | OUTPATIENT
Start: 2021-10-20 | End: 2021-10-20 | Stop reason: HOSPADM

## 2021-10-20 RX ORDER — FENTANYL CITRATE 50 UG/ML
INJECTION, SOLUTION INTRAMUSCULAR; INTRAVENOUS
Status: DISCONTINUED | OUTPATIENT
Start: 2021-10-20 | End: 2021-10-20

## 2021-10-20 RX ADMIN — FENTANYL CITRATE 25 MCG: 50 INJECTION, SOLUTION INTRAMUSCULAR; INTRAVENOUS at 10:10

## 2021-10-20 RX ADMIN — KETOROLAC TROMETHAMINE 1 DROP: 5 SOLUTION/ DROPS OPHTHALMIC at 09:10

## 2021-10-20 RX ADMIN — PHENYLEPHRINE HYDROCHLORIDE 1 DROP: 100 SOLUTION/ DROPS OPHTHALMIC at 09:10

## 2021-10-20 RX ADMIN — TROPICAMIDE 1 DROP: 10 SOLUTION/ DROPS OPHTHALMIC at 09:10

## 2021-10-20 RX ADMIN — SODIUM CHLORIDE: 0.9 INJECTION, SOLUTION INTRAVENOUS at 10:10

## 2021-10-20 RX ADMIN — MIDAZOLAM HYDROCHLORIDE 1 MG: 1 INJECTION, SOLUTION INTRAMUSCULAR; INTRAVENOUS at 10:10

## 2021-10-20 RX ADMIN — MOXIFLOXACIN 1 DROP: 5 SOLUTION/ DROPS OPHTHALMIC at 10:10

## 2021-10-21 ENCOUNTER — OFFICE VISIT (OUTPATIENT)
Dept: OPHTHALMOLOGY | Facility: CLINIC | Age: 71
End: 2021-10-21
Payer: MEDICARE

## 2021-10-21 DIAGNOSIS — H25.11 NUCLEAR SCLEROTIC CATARACT OF RIGHT EYE: Primary | ICD-10-CM

## 2021-10-21 DIAGNOSIS — H52.03 HYPEROPIA WITH PRESBYOPIA OF BOTH EYES: ICD-10-CM

## 2021-10-21 DIAGNOSIS — H52.4 HYPEROPIA WITH PRESBYOPIA OF BOTH EYES: ICD-10-CM

## 2021-10-21 DIAGNOSIS — H04.123 DRY EYE SYNDROME, BILATERAL: ICD-10-CM

## 2021-10-21 DIAGNOSIS — H16.223 KERATOCONJUNCTIVITIS SICCA NOT SPECIFIED AS SJOGREN'S, BILATERAL: ICD-10-CM

## 2021-10-21 DIAGNOSIS — H57.03 SMALL PUPILS: ICD-10-CM

## 2021-10-21 PROCEDURE — 99024 PR POST-OP FOLLOW-UP VISIT: ICD-10-PCS | Mod: POP,,, | Performed by: OPHTHALMOLOGY

## 2021-10-21 PROCEDURE — 99024 POSTOP FOLLOW-UP VISIT: CPT | Mod: POP,,, | Performed by: OPHTHALMOLOGY

## 2021-10-21 PROCEDURE — 99212 OFFICE O/P EST SF 10 MIN: CPT | Mod: PBBFAC | Performed by: OPHTHALMOLOGY

## 2021-10-21 PROCEDURE — 99999 PR PBB SHADOW E&M-EST. PATIENT-LVL II: CPT | Mod: PBBFAC,,, | Performed by: OPHTHALMOLOGY

## 2021-10-21 PROCEDURE — 99999 PR PBB SHADOW E&M-EST. PATIENT-LVL II: ICD-10-PCS | Mod: PBBFAC,,, | Performed by: OPHTHALMOLOGY

## 2021-10-22 ENCOUNTER — PATIENT MESSAGE (OUTPATIENT)
Dept: OPHTHALMOLOGY | Facility: CLINIC | Age: 71
End: 2021-10-22
Payer: MEDICARE

## 2021-10-29 ENCOUNTER — OFFICE VISIT (OUTPATIENT)
Dept: OPHTHALMOLOGY | Facility: CLINIC | Age: 71
End: 2021-10-29
Payer: MEDICARE

## 2021-10-29 DIAGNOSIS — H04.123 DRY EYE SYNDROME, BILATERAL: ICD-10-CM

## 2021-10-29 DIAGNOSIS — H25.11 NUCLEAR SCLEROTIC CATARACT OF RIGHT EYE: ICD-10-CM

## 2021-10-29 DIAGNOSIS — Z48.810 POSTOPERATIVE CARE FOR CATARACT: Primary | ICD-10-CM

## 2021-10-29 DIAGNOSIS — H16.223 KERATOCONJUNCTIVITIS SICCA NOT SPECIFIED AS SJOGREN'S, BILATERAL: ICD-10-CM

## 2021-10-29 DIAGNOSIS — H52.4 HYPEROPIA WITH PRESBYOPIA OF BOTH EYES: ICD-10-CM

## 2021-10-29 DIAGNOSIS — Z98.49 POSTOPERATIVE CARE FOR CATARACT: Primary | ICD-10-CM

## 2021-10-29 DIAGNOSIS — H57.03 SMALL PUPILS: ICD-10-CM

## 2021-10-29 DIAGNOSIS — H52.03 HYPEROPIA WITH PRESBYOPIA OF BOTH EYES: ICD-10-CM

## 2021-10-29 PROCEDURE — 99999 PR PBB SHADOW E&M-EST. PATIENT-LVL III: ICD-10-PCS | Mod: PBBFAC,,, | Performed by: OPHTHALMOLOGY

## 2021-10-29 PROCEDURE — 99999 PR PBB SHADOW E&M-EST. PATIENT-LVL III: CPT | Mod: PBBFAC,,, | Performed by: OPHTHALMOLOGY

## 2021-10-29 PROCEDURE — 99024 PR POST-OP FOLLOW-UP VISIT: ICD-10-PCS | Mod: POP,,, | Performed by: OPHTHALMOLOGY

## 2021-10-29 PROCEDURE — 99213 OFFICE O/P EST LOW 20 MIN: CPT | Mod: PBBFAC | Performed by: OPHTHALMOLOGY

## 2021-10-29 PROCEDURE — 99024 POSTOP FOLLOW-UP VISIT: CPT | Mod: POP,,, | Performed by: OPHTHALMOLOGY

## 2021-10-29 RX ORDER — MOXIFLOXACIN 5 MG/ML
1 SOLUTION/ DROPS OPHTHALMIC 4 TIMES DAILY
COMMUNITY
Start: 2021-10-21 | End: 2021-10-30

## 2021-10-29 RX ORDER — PREDNISOLONE ACETATE 10 MG/ML
1 SUSPENSION/ DROPS OPHTHALMIC 4 TIMES DAILY
COMMUNITY
Start: 2021-10-21 | End: 2021-10-29 | Stop reason: SDUPTHER

## 2021-10-29 RX ORDER — PREDNISOLONE ACETATE 10 MG/ML
1 SUSPENSION/ DROPS OPHTHALMIC 3 TIMES DAILY
Qty: 10 ML | Refills: 0 | Status: ON HOLD | OUTPATIENT
Start: 2021-10-29 | End: 2021-12-29 | Stop reason: CLARIF

## 2021-11-01 ENCOUNTER — OFFICE VISIT (OUTPATIENT)
Dept: PRIMARY CARE CLINIC | Facility: CLINIC | Age: 71
End: 2021-11-01
Payer: MEDICARE

## 2021-11-01 VITALS
HEIGHT: 61 IN | HEART RATE: 70 BPM | WEIGHT: 151.25 LBS | SYSTOLIC BLOOD PRESSURE: 138 MMHG | OXYGEN SATURATION: 96 % | BODY MASS INDEX: 28.56 KG/M2 | TEMPERATURE: 98 F | DIASTOLIC BLOOD PRESSURE: 78 MMHG

## 2021-11-01 DIAGNOSIS — L98.9 SKIN ABNORMALITY: ICD-10-CM

## 2021-11-01 DIAGNOSIS — I10 ESSENTIAL HYPERTENSION: ICD-10-CM

## 2021-11-01 DIAGNOSIS — E66.3 OVERWEIGHT (BMI 25.0-29.9): ICD-10-CM

## 2021-11-01 DIAGNOSIS — R60.0 LOWER EXTREMITY EDEMA: ICD-10-CM

## 2021-11-01 DIAGNOSIS — L21.9 SEBORRHEIC DERMATITIS: ICD-10-CM

## 2021-11-01 DIAGNOSIS — Z00.00 WELL ADULT EXAM: Primary | ICD-10-CM

## 2021-11-01 DIAGNOSIS — N39.46 URINARY INCONTINENCE, MIXED: ICD-10-CM

## 2021-11-01 DIAGNOSIS — E78.2 MIXED HYPERLIPIDEMIA: ICD-10-CM

## 2021-11-01 DIAGNOSIS — K21.9 GASTROESOPHAGEAL REFLUX DISEASE, UNSPECIFIED WHETHER ESOPHAGITIS PRESENT: ICD-10-CM

## 2021-11-01 DIAGNOSIS — E03.9 ACQUIRED HYPOTHYROIDISM: ICD-10-CM

## 2021-11-01 DIAGNOSIS — Q21.12 PFO (PATENT FORAMEN OVALE): ICD-10-CM

## 2021-11-01 DIAGNOSIS — M67.40 GANGLION CYST: ICD-10-CM

## 2021-11-01 DIAGNOSIS — I70.0 ATHEROSCLEROSIS OF AORTA: ICD-10-CM

## 2021-11-01 DIAGNOSIS — M15.9 PRIMARY OSTEOARTHRITIS INVOLVING MULTIPLE JOINTS: ICD-10-CM

## 2021-11-01 DIAGNOSIS — I87.2 VENOUS INSUFFICIENCY OF BOTH LOWER EXTREMITIES: ICD-10-CM

## 2021-11-01 DIAGNOSIS — M18.0 ARTHRITIS OF CARPOMETACARPAL (CMC) JOINT OF BOTH THUMBS: ICD-10-CM

## 2021-11-01 PROCEDURE — 99999 PR PBB SHADOW E&M-EST. PATIENT-LVL V: ICD-10-PCS | Mod: PBBFAC,,, | Performed by: FAMILY MEDICINE

## 2021-11-01 PROCEDURE — 99215 OFFICE O/P EST HI 40 MIN: CPT | Mod: PBBFAC,PN | Performed by: FAMILY MEDICINE

## 2021-11-01 PROCEDURE — 99397 PR PREVENTIVE VISIT,EST,65 & OVER: ICD-10-PCS | Mod: S$PBB,,, | Performed by: FAMILY MEDICINE

## 2021-11-01 PROCEDURE — 99999 PR PBB SHADOW E&M-EST. PATIENT-LVL V: CPT | Mod: PBBFAC,,, | Performed by: FAMILY MEDICINE

## 2021-11-01 PROCEDURE — 99397 PER PM REEVAL EST PAT 65+ YR: CPT | Mod: S$PBB,,, | Performed by: FAMILY MEDICINE

## 2021-11-01 RX ORDER — LOSARTAN POTASSIUM 100 MG/1
100 TABLET ORAL DAILY
Qty: 30 TABLET | Refills: 11 | Status: SHIPPED | OUTPATIENT
Start: 2021-11-01 | End: 2022-11-01

## 2021-11-01 RX ORDER — OMEPRAZOLE 40 MG/1
40 CAPSULE, DELAYED RELEASE ORAL DAILY
Qty: 30 CAPSULE | Refills: 11 | Status: SHIPPED | OUTPATIENT
Start: 2021-11-01 | End: 2022-11-01

## 2021-11-01 RX ORDER — CICLOPIROX 80 MG/ML
SOLUTION TOPICAL NIGHTLY
Qty: 6.6 ML | Refills: 11 | Status: SHIPPED | OUTPATIENT
Start: 2021-11-01 | End: 2022-11-10

## 2021-11-01 RX ORDER — CELECOXIB 200 MG/1
200 CAPSULE ORAL DAILY
Qty: 30 CAPSULE | Refills: 11 | Status: SHIPPED | OUTPATIENT
Start: 2021-11-01 | End: 2022-11-10 | Stop reason: SDUPTHER

## 2021-11-01 RX ORDER — LIDOCAINE AND PRILOCAINE 25; 25 MG/G; MG/G
CREAM TOPICAL 2 TIMES DAILY PRN
Qty: 30 G | Refills: 2 | Status: SHIPPED | OUTPATIENT
Start: 2021-11-01

## 2021-11-01 RX ORDER — KETOCONAZOLE 20 MG/ML
SHAMPOO, SUSPENSION TOPICAL
Qty: 120 ML | Refills: 11 | Status: SHIPPED | OUTPATIENT
Start: 2021-11-01

## 2021-11-01 RX ORDER — DICYCLOMINE HYDROCHLORIDE 20 MG/1
20 TABLET ORAL
Qty: 120 TABLET | Refills: 11 | Status: SHIPPED | OUTPATIENT
Start: 2021-11-01 | End: 2023-04-03

## 2021-11-01 RX ORDER — LEVOTHYROXINE SODIUM 75 UG/1
75 TABLET ORAL DAILY
Qty: 30 TABLET | Refills: 11 | Status: SHIPPED | OUTPATIENT
Start: 2021-11-01 | End: 2022-11-03

## 2021-11-01 RX ORDER — OXYBUTYNIN CHLORIDE 10 MG/1
10 TABLET, EXTENDED RELEASE ORAL DAILY
Qty: 30 TABLET | Refills: 11 | Status: SHIPPED | OUTPATIENT
Start: 2021-11-01 | End: 2022-11-10 | Stop reason: SDUPTHER

## 2021-11-01 RX ORDER — ROSUVASTATIN CALCIUM 40 MG/1
40 TABLET, COATED ORAL DAILY
Qty: 30 TABLET | Refills: 11 | Status: SHIPPED | OUTPATIENT
Start: 2021-11-01 | End: 2022-11-03

## 2021-11-01 RX ORDER — FLUOCINONIDE TOPICAL SOLUTION USP, 0.05% 0.5 MG/ML
SOLUTION TOPICAL DAILY
Qty: 60 ML | Refills: 11 | Status: SHIPPED | OUTPATIENT
Start: 2021-11-01

## 2021-11-03 ENCOUNTER — TELEPHONE (OUTPATIENT)
Dept: OPHTHALMOLOGY | Facility: CLINIC | Age: 71
End: 2021-11-03
Payer: MEDICARE

## 2021-11-03 DIAGNOSIS — H25.11 NUCLEAR SCLEROTIC CATARACT OF RIGHT EYE: Primary | ICD-10-CM

## 2021-11-10 ENCOUNTER — HOSPITAL ENCOUNTER (OUTPATIENT)
Dept: RADIOLOGY | Facility: HOSPITAL | Age: 71
Discharge: HOME | End: 2021-11-10
Attending: ORTHOPAEDIC SURGERY
Payer: MEDICARE

## 2021-11-10 ENCOUNTER — OFFICE VISIT (OUTPATIENT)
Dept: ORTHOPEDICS | Facility: CLINIC | Age: 71
End: 2021-11-10
Payer: MEDICARE

## 2021-11-10 DIAGNOSIS — M72.0 DUPUYTREN'S DISEASE OF PALM OF LEFT HAND: ICD-10-CM

## 2021-11-10 DIAGNOSIS — M18.0 PRIMARY OSTEOARTHRITIS OF BOTH FIRST CARPOMETACARPAL JOINTS: ICD-10-CM

## 2021-11-10 DIAGNOSIS — Z01.818 PREOP TESTING: ICD-10-CM

## 2021-11-10 DIAGNOSIS — M65.311 TRIGGER FINGER OF RIGHT THUMB: ICD-10-CM

## 2021-11-10 DIAGNOSIS — M65.312 TRIGGER FINGER OF LEFT THUMB: Primary | ICD-10-CM

## 2021-11-10 DIAGNOSIS — M79.642 CHRONIC PAIN OF LEFT HAND: ICD-10-CM

## 2021-11-10 DIAGNOSIS — R52 PAIN: Primary | ICD-10-CM

## 2021-11-10 DIAGNOSIS — R52 PAIN: ICD-10-CM

## 2021-11-10 DIAGNOSIS — G89.29 CHRONIC PAIN OF LEFT HAND: ICD-10-CM

## 2021-11-10 PROCEDURE — 99214 PR OFFICE/OUTPT VISIT, EST, LEVL IV, 30-39 MIN: ICD-10-PCS | Mod: S$PBB,,, | Performed by: ORTHOPAEDIC SURGERY

## 2021-11-10 PROCEDURE — 99999 PR PBB SHADOW E&M-EST. PATIENT-LVL V: CPT | Mod: PBBFAC,,, | Performed by: ORTHOPAEDIC SURGERY

## 2021-11-10 PROCEDURE — 73130 X-RAY EXAM OF HAND: CPT | Mod: TC,50,PO

## 2021-11-10 PROCEDURE — 99215 OFFICE O/P EST HI 40 MIN: CPT | Mod: PBBFAC,PO | Performed by: ORTHOPAEDIC SURGERY

## 2021-11-10 PROCEDURE — 99214 OFFICE O/P EST MOD 30 MIN: CPT | Mod: S$PBB,,, | Performed by: ORTHOPAEDIC SURGERY

## 2021-11-10 PROCEDURE — 73130 X-RAY EXAM OF HAND: CPT | Mod: 26,50,, | Performed by: RADIOLOGY

## 2021-11-10 PROCEDURE — 73130 XR HAND COMPLETE 3 VIEWS BILATERAL: ICD-10-PCS | Mod: 26,50,, | Performed by: RADIOLOGY

## 2021-11-10 PROCEDURE — 99999 PR PBB SHADOW E&M-EST. PATIENT-LVL V: ICD-10-PCS | Mod: PBBFAC,,, | Performed by: ORTHOPAEDIC SURGERY

## 2021-11-22 ENCOUNTER — ANESTHESIA EVENT (OUTPATIENT)
Dept: SURGERY | Facility: HOSPITAL | Age: 71
End: 2021-11-22
Payer: MEDICARE

## 2021-11-29 ENCOUNTER — PATIENT MESSAGE (OUTPATIENT)
Dept: ADMINISTRATIVE | Facility: OTHER | Age: 71
End: 2021-11-29
Payer: MEDICARE

## 2021-12-01 ENCOUNTER — TELEPHONE (OUTPATIENT)
Dept: ORTHOPEDICS | Facility: CLINIC | Age: 71
End: 2021-12-01
Payer: MEDICARE

## 2021-12-01 RX ORDER — TRAMADOL HYDROCHLORIDE 50 MG/1
50 TABLET ORAL EVERY 6 HOURS PRN
Qty: 20 TABLET | Refills: 0 | Status: SHIPPED | OUTPATIENT
Start: 2021-12-01 | End: 2022-11-10

## 2021-12-02 ENCOUNTER — ANESTHESIA (OUTPATIENT)
Dept: SURGERY | Facility: HOSPITAL | Age: 71
End: 2021-12-02
Payer: MEDICARE

## 2021-12-02 ENCOUNTER — HOSPITAL ENCOUNTER (OUTPATIENT)
Facility: HOSPITAL | Age: 71
Discharge: HOME | End: 2021-12-02
Attending: ORTHOPAEDIC SURGERY | Admitting: ORTHOPAEDIC SURGERY
Payer: MEDICARE

## 2021-12-02 VITALS
TEMPERATURE: 98 F | DIASTOLIC BLOOD PRESSURE: 68 MMHG | OXYGEN SATURATION: 96 % | HEART RATE: 60 BPM | BODY MASS INDEX: 27.94 KG/M2 | RESPIRATION RATE: 18 BRPM | HEIGHT: 61 IN | WEIGHT: 148 LBS | SYSTOLIC BLOOD PRESSURE: 141 MMHG

## 2021-12-02 DIAGNOSIS — M65.312 TRIGGER FINGER OF LEFT THUMB: Primary | ICD-10-CM

## 2021-12-02 DIAGNOSIS — M65.30 TRIGGER FINGER OF LEFT HAND: ICD-10-CM

## 2021-12-02 PROCEDURE — 71000015 HC POSTOP RECOV 1ST HR: Performed by: ORTHOPAEDIC SURGERY

## 2021-12-02 PROCEDURE — 94761 N-INVAS EAR/PLS OXIMETRY MLT: CPT

## 2021-12-02 PROCEDURE — 36000706: Performed by: ORTHOPAEDIC SURGERY

## 2021-12-02 PROCEDURE — 25000003 PHARM REV CODE 250: Performed by: ORTHOPAEDIC SURGERY

## 2021-12-02 PROCEDURE — 25000003 PHARM REV CODE 250: Performed by: NURSE ANESTHETIST, CERTIFIED REGISTERED

## 2021-12-02 PROCEDURE — 26055 INCISE FINGER TENDON SHEATH: CPT | Mod: LT,,, | Performed by: ORTHOPAEDIC SURGERY

## 2021-12-02 PROCEDURE — 25000003 PHARM REV CODE 250: Performed by: STUDENT IN AN ORGANIZED HEALTH CARE EDUCATION/TRAINING PROGRAM

## 2021-12-02 PROCEDURE — 37000008 HC ANESTHESIA 1ST 15 MINUTES: Performed by: ORTHOPAEDIC SURGERY

## 2021-12-02 PROCEDURE — D9220A PRA ANESTHESIA: Mod: CRNA,,, | Performed by: NURSE ANESTHETIST, CERTIFIED REGISTERED

## 2021-12-02 PROCEDURE — 37000009 HC ANESTHESIA EA ADD 15 MINS: Performed by: ORTHOPAEDIC SURGERY

## 2021-12-02 PROCEDURE — 26055 PR INCISE FINGER TENDON SHEATH: ICD-10-PCS | Mod: LT,,, | Performed by: ORTHOPAEDIC SURGERY

## 2021-12-02 PROCEDURE — 20550 PR INJECT TENDON SHEATH/LIGAMENT: ICD-10-PCS | Mod: 59,51,RT, | Performed by: ORTHOPAEDIC SURGERY

## 2021-12-02 PROCEDURE — D9220A PRA ANESTHESIA: Mod: ANES,,, | Performed by: ANESTHESIOLOGY

## 2021-12-02 PROCEDURE — D9220A PRA ANESTHESIA: ICD-10-PCS | Mod: CRNA,,, | Performed by: NURSE ANESTHETIST, CERTIFIED REGISTERED

## 2021-12-02 PROCEDURE — 25000003 PHARM REV CODE 250: Performed by: SURGERY

## 2021-12-02 PROCEDURE — 71000033 HC RECOVERY, INTIAL HOUR: Performed by: ORTHOPAEDIC SURGERY

## 2021-12-02 PROCEDURE — 63600175 PHARM REV CODE 636 W HCPCS: Performed by: NURSE ANESTHETIST, CERTIFIED REGISTERED

## 2021-12-02 PROCEDURE — 20550 NJX 1 TENDON SHEATH/LIGAMENT: CPT | Mod: 59,51,RT, | Performed by: ORTHOPAEDIC SURGERY

## 2021-12-02 PROCEDURE — 63600175 PHARM REV CODE 636 W HCPCS: Performed by: ORTHOPAEDIC SURGERY

## 2021-12-02 PROCEDURE — 99900035 HC TECH TIME PER 15 MIN (STAT)

## 2021-12-02 PROCEDURE — D9220A PRA ANESTHESIA: ICD-10-PCS | Mod: ANES,,, | Performed by: ANESTHESIOLOGY

## 2021-12-02 PROCEDURE — 36000707: Performed by: ORTHOPAEDIC SURGERY

## 2021-12-02 RX ORDER — METHYLPREDNISOLONE ACETATE 40 MG/ML
INJECTION, SUSPENSION INTRA-ARTICULAR; INTRALESIONAL; INTRAMUSCULAR; SOFT TISSUE
Status: DISCONTINUED | OUTPATIENT
Start: 2021-12-02 | End: 2021-12-02 | Stop reason: HOSPADM

## 2021-12-02 RX ORDER — ACETAMINOPHEN 500 MG
1000 TABLET ORAL
Status: COMPLETED | OUTPATIENT
Start: 2021-12-02 | End: 2021-12-02

## 2021-12-02 RX ORDER — SODIUM CHLORIDE 0.9 % (FLUSH) 0.9 %
3 SYRINGE (ML) INJECTION
Status: DISCONTINUED | OUTPATIENT
Start: 2021-12-02 | End: 2021-12-02 | Stop reason: HOSPADM

## 2021-12-02 RX ORDER — CEFAZOLIN SODIUM 1 G/3ML
2 INJECTION, POWDER, FOR SOLUTION INTRAMUSCULAR; INTRAVENOUS
Status: DISCONTINUED | OUTPATIENT
Start: 2021-12-02 | End: 2021-12-02 | Stop reason: HOSPADM

## 2021-12-02 RX ORDER — LIDOCAINE HYDROCHLORIDE 10 MG/ML
INJECTION INFILTRATION; PERINEURAL
Status: DISCONTINUED | OUTPATIENT
Start: 2021-12-02 | End: 2021-12-02 | Stop reason: HOSPADM

## 2021-12-02 RX ORDER — CELECOXIB 200 MG/1
400 CAPSULE ORAL ONCE
Status: DISCONTINUED | OUTPATIENT
Start: 2021-12-02 | End: 2022-11-10

## 2021-12-02 RX ORDER — MIDAZOLAM HYDROCHLORIDE 1 MG/ML
INJECTION INTRAMUSCULAR; INTRAVENOUS
Status: DISCONTINUED | OUTPATIENT
Start: 2021-12-02 | End: 2021-12-02

## 2021-12-02 RX ORDER — LIDOCAINE HYDROCHLORIDE 10 MG/ML
INJECTION, SOLUTION INTRAVENOUS
Status: DISCONTINUED | OUTPATIENT
Start: 2021-12-02 | End: 2021-12-02

## 2021-12-02 RX ORDER — ONDANSETRON 2 MG/ML
INJECTION INTRAMUSCULAR; INTRAVENOUS
Status: DISCONTINUED | OUTPATIENT
Start: 2021-12-02 | End: 2021-12-02

## 2021-12-02 RX ORDER — BUPIVACAINE HYDROCHLORIDE 2.5 MG/ML
INJECTION, SOLUTION EPIDURAL; INFILTRATION; INTRACAUDAL
Status: DISCONTINUED | OUTPATIENT
Start: 2021-12-02 | End: 2021-12-02 | Stop reason: HOSPADM

## 2021-12-02 RX ORDER — MUPIROCIN 20 MG/G
OINTMENT TOPICAL
Status: DISCONTINUED | OUTPATIENT
Start: 2021-12-02 | End: 2021-12-02 | Stop reason: HOSPADM

## 2021-12-02 RX ORDER — FENTANYL CITRATE 50 UG/ML
INJECTION, SOLUTION INTRAMUSCULAR; INTRAVENOUS
Status: DISCONTINUED | OUTPATIENT
Start: 2021-12-02 | End: 2021-12-02

## 2021-12-02 RX ORDER — SODIUM CHLORIDE 9 MG/ML
INJECTION, SOLUTION INTRAVENOUS CONTINUOUS
Status: DISCONTINUED | OUTPATIENT
Start: 2021-12-02 | End: 2021-12-02 | Stop reason: HOSPADM

## 2021-12-02 RX ORDER — BACITRACIN ZINC 500 UNIT/G
OINTMENT (GRAM) TOPICAL
Status: DISCONTINUED | OUTPATIENT
Start: 2021-12-02 | End: 2021-12-02 | Stop reason: HOSPADM

## 2021-12-02 RX ORDER — CEFAZOLIN SODIUM 1 G/3ML
INJECTION, POWDER, FOR SOLUTION INTRAMUSCULAR; INTRAVENOUS
Status: DISCONTINUED | OUTPATIENT
Start: 2021-12-02 | End: 2021-12-02

## 2021-12-02 RX ORDER — ONDANSETRON 2 MG/ML
4 INJECTION INTRAMUSCULAR; INTRAVENOUS ONCE AS NEEDED
Status: DISCONTINUED | OUTPATIENT
Start: 2021-12-02 | End: 2021-12-02 | Stop reason: HOSPADM

## 2021-12-02 RX ORDER — PROPOFOL 10 MG/ML
VIAL (ML) INTRAVENOUS CONTINUOUS PRN
Status: DISCONTINUED | OUTPATIENT
Start: 2021-12-02 | End: 2021-12-02

## 2021-12-02 RX ORDER — LIDOCAINE HYDROCHLORIDE 10 MG/ML
1 INJECTION, SOLUTION EPIDURAL; INFILTRATION; INTRACAUDAL; PERINEURAL ONCE
Status: DISCONTINUED | OUTPATIENT
Start: 2021-12-02 | End: 2022-11-10

## 2021-12-02 RX ORDER — PROPOFOL 10 MG/ML
VIAL (ML) INTRAVENOUS
Status: DISCONTINUED | OUTPATIENT
Start: 2021-12-02 | End: 2021-12-02

## 2021-12-02 RX ADMIN — MIDAZOLAM HYDROCHLORIDE 2 MG: 1 INJECTION, SOLUTION INTRAMUSCULAR; INTRAVENOUS at 06:12

## 2021-12-02 RX ADMIN — CEFAZOLIN 2 G: 330 INJECTION, POWDER, FOR SOLUTION INTRAMUSCULAR; INTRAVENOUS at 07:12

## 2021-12-02 RX ADMIN — ONDANSETRON 4 MG: 2 INJECTION, SOLUTION INTRAMUSCULAR; INTRAVENOUS at 07:12

## 2021-12-02 RX ADMIN — ACETAMINOPHEN 1000 MG: 500 TABLET ORAL at 06:12

## 2021-12-02 RX ADMIN — FENTANYL CITRATE 50 MCG: 50 INJECTION, SOLUTION INTRAMUSCULAR; INTRAVENOUS at 07:12

## 2021-12-02 RX ADMIN — PROPOFOL 100 MCG/KG/MIN: 10 INJECTION, EMULSION INTRAVENOUS at 07:12

## 2021-12-02 RX ADMIN — LIDOCAINE HYDROCHLORIDE 100 MG: 10 INJECTION, SOLUTION INTRAVENOUS at 07:12

## 2021-12-02 RX ADMIN — MUPIROCIN: 20 OINTMENT TOPICAL at 06:12

## 2021-12-02 RX ADMIN — PROPOFOL 20 MG: 10 INJECTION, EMULSION INTRAVENOUS at 07:12

## 2021-12-02 RX ADMIN — SODIUM CHLORIDE: 9 INJECTION, SOLUTION INTRAVENOUS at 07:12

## 2021-12-02 RX ADMIN — SODIUM CHLORIDE: 0.9 INJECTION, SOLUTION INTRAVENOUS at 06:12

## 2021-12-10 ENCOUNTER — OFFICE VISIT (OUTPATIENT)
Dept: OPHTHALMOLOGY | Facility: CLINIC | Age: 71
End: 2021-12-10
Payer: MEDICARE

## 2021-12-10 DIAGNOSIS — H25.11 NUCLEAR SCLEROTIC CATARACT OF RIGHT EYE: Primary | ICD-10-CM

## 2021-12-10 DIAGNOSIS — H25.041 POSTERIOR SUBCAPSULAR AGE-RELATED CATARACT, RIGHT EYE: ICD-10-CM

## 2021-12-10 DIAGNOSIS — Z98.49 POSTOPERATIVE CARE FOR CATARACT: ICD-10-CM

## 2021-12-10 DIAGNOSIS — Z48.810 POSTOPERATIVE CARE FOR CATARACT: ICD-10-CM

## 2021-12-10 DIAGNOSIS — H04.123 DRY EYE SYNDROME, BILATERAL: ICD-10-CM

## 2021-12-10 DIAGNOSIS — H57.03 SMALL PUPILS: ICD-10-CM

## 2021-12-10 PROCEDURE — 99213 OFFICE O/P EST LOW 20 MIN: CPT | Mod: PBBFAC | Performed by: OPHTHALMOLOGY

## 2021-12-10 PROCEDURE — 99999 PR PBB SHADOW E&M-EST. PATIENT-LVL III: CPT | Mod: PBBFAC,,, | Performed by: OPHTHALMOLOGY

## 2021-12-10 PROCEDURE — 92136 IOL MASTER - OD - RIGHT EYE: ICD-10-PCS | Mod: 26,S$PBB,RT, | Performed by: OPHTHALMOLOGY

## 2021-12-10 PROCEDURE — 99999 PR PBB SHADOW E&M-EST. PATIENT-LVL III: ICD-10-PCS | Mod: PBBFAC,,, | Performed by: OPHTHALMOLOGY

## 2021-12-10 PROCEDURE — 99499 UNLISTED E&M SERVICE: CPT | Mod: S$PBB,,, | Performed by: OPHTHALMOLOGY

## 2021-12-10 PROCEDURE — 92136 OPHTHALMIC BIOMETRY: CPT | Mod: PBBFAC,RT | Performed by: OPHTHALMOLOGY

## 2021-12-10 PROCEDURE — 99499 NO LOS: ICD-10-PCS | Mod: S$PBB,,, | Performed by: OPHTHALMOLOGY

## 2021-12-10 RX ORDER — TROPICAMIDE 10 MG/ML
1 SOLUTION/ DROPS OPHTHALMIC
Status: CANCELLED | OUTPATIENT
Start: 2021-12-10

## 2021-12-10 RX ORDER — SODIUM CHLORIDE 0.9 % (FLUSH) 0.9 %
10 SYRINGE (ML) INJECTION
Status: DISCONTINUED | OUTPATIENT
Start: 2021-12-10 | End: 2022-11-10

## 2021-12-10 RX ORDER — KETOROLAC TROMETHAMINE 5 MG/ML
1 SOLUTION OPHTHALMIC
Status: CANCELLED | OUTPATIENT
Start: 2021-12-10

## 2021-12-10 RX ORDER — MOXIFLOXACIN 5 MG/ML
1 SOLUTION/ DROPS OPHTHALMIC
Status: CANCELLED | OUTPATIENT
Start: 2021-12-10

## 2021-12-10 RX ORDER — PHENYLEPHRINE HYDROCHLORIDE 100 MG/ML
1 SOLUTION/ DROPS OPHTHALMIC
Status: CANCELLED | OUTPATIENT
Start: 2021-12-10

## 2021-12-10 NOTE — H&P (VIEW-ONLY)
Pre-operative History and Physical  Ophthalmology Service    CC: Visually significant cataract right eye    HPI:   Patient is a 70 y.o. female presents with an eye problem Visually significant cataract right eye requiring surgery as noted in the most recent ophthalmology progress note.    Past Medical History:   Diagnosis Date    Arthritis     Bronchitis     seasonal    Cataract     Diverticulitis     Dry eye syndrome     GERD (gastroesophageal reflux disease)     Hyperlipidemia     Hypertension     Hypothyroidism 7/19/2012    Obese     PONV (postoperative nausea and vomiting)     Thyroid disease        Past Surgical History:   Procedure Laterality Date    ARTHROSCOPIC REPAIR OF ROTATOR CUFF OF SHOULDER Right 9/23/2020    Procedure: REPAIR, ROTATOR CUFF, ARTHROSCOPIC;  Surgeon: Reginald Pickens MD;  Location: [facility] OR;  Service: Orthopedics;  Laterality: Right;  regional w/catheter (interscalene)    BACK SURGERY      CATARACT EXTRACTION W/  INTRAOCULAR LENS IMPLANT Left 10/20/2021        CHOLECYSTECTOMY      COLONOSCOPY  2007    diverticulosis    COLONOSCOPY N/A 9/3/2020    Procedure: COLONOSCOPY;  Surgeon: Alberta Henriquez MD;  Location: [facility] (4TH FLR);  Service: Colon and Rectal;  Laterality: N/A;  pt requested this time-8/31-covid- [place]    DE QUERVAIN'S RELEASE Left 03/2017    FIXATION OF TENDON Right 9/23/2020    Procedure: FIXATION, TENDON;  Surgeon: Reginald Pickens MD;  Location: [facility] OR;  Service: Orthopedics;  Laterality: Right;    HERNIA REPAIR      HYSTERECTOMY      INJECTION OF STEROID Right 12/2/2021    Procedure: INJECTION, STEROID;  Surgeon: Suzy Dominguez MD;  Location: [facility] OR;  Service: Orthopedics;  Laterality: Right;    INTRAOCULAR PROSTHESES INSERTION Left 10/20/2021    Procedure: INSERTION, IOL PROSTHESIS;  Surgeon: Pippa Ashby MD;  Location: [facility] OR 1ST FLR;  Service: Ophthalmology;  Laterality: Left;    NASAL SEPTUM  SURGERY      PHACOEMULSIFICATION OF CATARACT Left 10/20/2021    Procedure: PHACOEMULSIFICATION, CATARACT/ COMPLEX- PHACO / IOL - OS SMALL PUPIL-OLE RING AND TRYPAN BLUE;  Surgeon: Pippa Ashby MD;  Location: [address];  Service: Ophthalmology;  Laterality: Left;    SHOULDER SURGERY      TONSILLECTOMY      TRIGGER FINGER RELEASE Left 12/2/2021    Procedure: RELEASE, TRIGGER FINGER;  Surgeon: Suzy Dominguez MD;  Location: [facility];  Service: Orthopedics;  Laterality: Left;       Family History   Problem Relation Age of Onset    Cataracts Mother     Breast cancer Mother     Diabetes Mother     Hypertension Mother     Heart failure Mother     Heart disease Mother     Cataracts Father     Hypertension Father     Stroke Father     No Known Problems Daughter     No Known Problems Son     Diabetes Paternal Grandmother     Hyperlipidemia Sister     Arthritis Sister     Hyperlipidemia Brother     Arthritis Brother     No Known Problems Son     Amblyopia Neg Hx     Blindness Neg Hx     Glaucoma Neg Hx     Macular degeneration Neg Hx     Retinal detachment Neg Hx     Strabismus Neg Hx     Ovarian cancer Neg Hx     Melanoma Neg Hx     Celiac disease Neg Hx     Colon cancer Neg Hx     Colon polyps Neg Hx     Esophageal cancer Neg Hx     Liver cancer Neg Hx     Liver disease Neg Hx     Rectal cancer Neg Hx     Stomach cancer Neg Hx        Social History     Socioeconomic History    Marital status:    Tobacco Use    Smoking status: Never Smoker    Smokeless tobacco: Never Used   Substance and Sexual Activity    Alcohol use: No     Comment: rare on a special occasion    Drug use: No    Sexual activity: Never   Social History Narrative    , 3 children, nonsmoker ,     Social Determinants of Health     Financial Resource Strain: Low Risk     Difficulty of Paying Living Expenses: Not hard at all   Food Insecurity: No Food Insecurity    Worried About Running Out  of Food in the Last Year: Never true    Ran Out of Food in the Last Year: Never true   Transportation Needs: No Transportation Needs    Lack of Transportation (Medical): No    Lack of Transportation (Non-Medical): No   Physical Activity: Inactive    Days of Exercise per Week: 0 days    Minutes of Exercise per Session: 0 min   Stress: Stress Concern Present    Feeling of Stress : To some extent   Social Connections: Unknown    Frequency of Communication with Friends and Family: Twice a week    Frequency of Social Gatherings with Friends and Family: Once a week    Active Member of Clubs or Organizations: Yes    Attends Club or Organization Meetings: More than 4 times per year    Marital Status:    Housing Stability: Low Risk     Unable to Pay for Housing in the Last Year: No    Number of Places Lived in the Last Year: 1    Unstable Housing in the Last Year: No       Current Outpatient Medications   Medication Sig Dispense Refill    albuterol 90 mcg/actuation inhaler Inhale 2 puffs into the lungs every 6 (six) hours as needed for Wheezing. 6.7 g 11    celecoxib (CELEBREX) 200 MG capsule Take 1 capsule (200 mg total) by mouth once daily. Do not take more than 15 consecutive days. Take w food and water 30 capsule 11    ciclopirox (PENLAC) 8 % Soln Apply topically nightly. Apply greater than 8 hr before washing; clean toenail with alcohol Q 7 days.  Maximum therapy of 48 weeks 6.6 mL 11    diclofenac sodium (VOLTAREN) 1 % Gel Apply 2 g topically 2 (two) times daily as needed (musculoskeletal pain). 100 g 11    dicyclomine (BENTYL) 20 mg tablet Take 1 tablet (20 mg total) by mouth 4 (four) times daily before meals and nightly. 120 tablet 11    diphenhydrAMINE (SOMINEX) 25 mg tablet Take 25 mg by mouth nightly as needed.      fluocinonide (LIDEX) 0.05 % external solution Apply topically once daily. 60 mL 11    ketoconazole (NIZORAL) 2 % shampoo Apply topically twice a week. 120 mL 11     levothyroxine (SYNTHROID) 75 MCG tablet Take 1 tablet (75 mcg total) by mouth once daily. In am on empty stomach 30 tablet 11    LIDOcaine-prilocaine (EMLA) cream Apply topically 2 (two) times daily as needed. To hands. 30 g 2    losartan (COZAAR) 100 MG tablet Take 1 tablet (100 mg total) by mouth once daily. 30 tablet 11    MAGNESIUM ORAL Take 1 tablet by mouth once daily.      Multi-Vitamin tablet Take 1 tablet by mouth once daily.       omeprazole (PRILOSEC) 40 MG capsule Take 1 capsule (40 mg total) by mouth once daily. 30 capsule 11    oxybutynin (DITROPAN-XL) 10 MG 24 hr tablet Take 1 tablet (10 mg total) by mouth once daily. 30 tablet 11    prednisoLONE acetate (PRED FORTE) 1 % DrpS Place 1 drop into the left eye 3 (three) times daily. 3 x day for 2 weeks, then 2 x day for 2 weeks, then 1 x day for 2 weeks then STOP 10 mL 0    promethazine (PHENERGAN) 25 MG tablet Take 1 tablet (25 mg total) by mouth every 6 (six) hours as needed for Nausea. 30 tablet 5    PSYLLIUM SEED, WITH SUGAR, (METAMUCIL ORAL) Take by mouth as needed.       RESTASIS 0.05 % ophthalmic emulsion PLACE 1 DROP IN EACH EYE TWO TIMES A DAY 60 each 12    rosuvastatin (CRESTOR) 40 MG Tab Take 1 tablet (40 mg total) by mouth once daily. 30 tablet 11    traMADoL (ULTRAM) 50 mg tablet Take 1 tablet (50 mg total) by mouth every 6 (six) hours as needed for Pain. 20 tablet 0    triamcinolone acetonide 0.1% (KENALOG) 0.1 % cream AAA bid 60 g 3     No current facility-administered medications for this visit.     Facility-Administered Medications Ordered in Other Visits   Medication Dose Route Frequency Provider Last Rate Last Admin    celecoxib capsule 400 mg  400 mg Oral Once Fabian Fagan MD        LIDOcaine (PF) 10 mg/ml (1%) injection 10 mg  1 mL Intradermal Once Fabian Fagan MD           Review of patient's allergies indicates:   Allergen Reactions    Levaquin [levofloxacin] Swelling and Edema    Erythromycin (bulk)  Nausea And Vomiting         Review of systems:  Gen: denies fevers, chills, nausea, vomitting, weight changes  HEENT: Denies discharge or coughing/sneezing. +blurry vision Visually significant cataract right eye  Resp: Denies SOB  CV: Denies CP or palpitations  Abd: Denies tenderness, diarrhea, constipation, nausea  Ext:  Denies pain or edema    Physical Exam  BP: Vital signs stable  General: No apparent distress  HEENT: Normocephalic, atraumatic, Visually significant cataract right eye OD  Lungs: Adequate respirations, no respiratory distress  Heart: Pulses intact  Abdomen: Soft, no distention  Rectal/pelvic: Deferred    Assessment  Patient is a 70 y.o. female with eye problem Visually significant cataract right eye   - See previous notes for indications for surgery   - Risks/benefits/alternatives of the procedure including, but not limited to scarring, bleeding, infection, loss or decreased vision, and/or need for possible repeat surgery discussed with the patient and/or family, and Informed consent obtained prior to surgery and the patient/family voiced good understanding, witnessed, and placed in chart.  - Will proceed with cataract surgery with intraocular lens implant right eye with trypan blue, complex  - Plan for local/MAC  - see status of aspirin and other blood thinners in progress note from today

## 2021-12-13 ENCOUNTER — OFFICE VISIT (OUTPATIENT)
Dept: ORTHOPEDICS | Facility: CLINIC | Age: 71
End: 2021-12-13
Payer: MEDICARE

## 2021-12-13 VITALS
SYSTOLIC BLOOD PRESSURE: 110 MMHG | HEART RATE: 61 BPM | WEIGHT: 147.94 LBS | RESPIRATION RATE: 18 BRPM | HEIGHT: 61 IN | DIASTOLIC BLOOD PRESSURE: 67 MMHG | BODY MASS INDEX: 27.93 KG/M2

## 2021-12-13 DIAGNOSIS — M65.312 TRIGGER FINGER OF LEFT THUMB: Primary | ICD-10-CM

## 2021-12-13 DIAGNOSIS — M65.311 TRIGGER FINGER OF RIGHT THUMB: ICD-10-CM

## 2021-12-13 PROCEDURE — 99024 PR POST-OP FOLLOW-UP VISIT: ICD-10-PCS | Mod: POP,,, | Performed by: PHYSICIAN ASSISTANT

## 2021-12-13 PROCEDURE — 99999 PR PBB SHADOW E&M-EST. PATIENT-LVL III: CPT | Mod: PBBFAC,,, | Performed by: PHYSICIAN ASSISTANT

## 2021-12-13 PROCEDURE — 99024 POSTOP FOLLOW-UP VISIT: CPT | Mod: POP,,, | Performed by: PHYSICIAN ASSISTANT

## 2021-12-13 PROCEDURE — 99999 PR PBB SHADOW E&M-EST. PATIENT-LVL III: ICD-10-PCS | Mod: PBBFAC,,, | Performed by: PHYSICIAN ASSISTANT

## 2021-12-13 PROCEDURE — 99213 OFFICE O/P EST LOW 20 MIN: CPT | Mod: PBBFAC | Performed by: PHYSICIAN ASSISTANT

## 2021-12-27 ENCOUNTER — PATIENT MESSAGE (OUTPATIENT)
Dept: SURGERY | Facility: HOSPITAL | Age: 71
End: 2021-12-27
Payer: MEDICARE

## 2021-12-28 ENCOUNTER — TELEPHONE (OUTPATIENT)
Dept: OPHTHALMOLOGY | Facility: CLINIC | Age: 71
End: 2021-12-28
Payer: MEDICARE

## 2021-12-28 ENCOUNTER — ANESTHESIA EVENT (OUTPATIENT)
Dept: SURGERY | Facility: HOSPITAL | Age: 71
End: 2021-12-28
Payer: MEDICARE

## 2021-12-29 ENCOUNTER — ANESTHESIA (OUTPATIENT)
Dept: SURGERY | Facility: HOSPITAL | Age: 71
End: 2021-12-29
Payer: MEDICARE

## 2021-12-29 ENCOUNTER — HOSPITAL ENCOUNTER (OUTPATIENT)
Facility: HOSPITAL | Age: 71
Discharge: HOME | End: 2021-12-29
Attending: OPHTHALMOLOGY | Admitting: OPHTHALMOLOGY
Payer: MEDICARE

## 2021-12-29 VITALS
HEIGHT: 61 IN | SYSTOLIC BLOOD PRESSURE: 142 MMHG | HEART RATE: 59 BPM | TEMPERATURE: 98 F | WEIGHT: 143 LBS | BODY MASS INDEX: 27 KG/M2 | OXYGEN SATURATION: 94 % | RESPIRATION RATE: 18 BRPM | DIASTOLIC BLOOD PRESSURE: 64 MMHG

## 2021-12-29 DIAGNOSIS — H25.11 NUCLEAR SCLEROTIC CATARACT OF RIGHT EYE: Primary | ICD-10-CM

## 2021-12-29 DIAGNOSIS — Z48.810 POSTOPERATIVE CARE FOR CATARACT: ICD-10-CM

## 2021-12-29 DIAGNOSIS — H25.041 POSTERIOR SUBCAPSULAR AGE-RELATED CATARACT, RIGHT EYE: ICD-10-CM

## 2021-12-29 DIAGNOSIS — Z98.49 POSTOPERATIVE CARE FOR CATARACT: ICD-10-CM

## 2021-12-29 PROCEDURE — 66982 PR REMOVAL, CATARACT, W/INSRT INTRAOC LENS, W/O ENDO CYCLO, CMPLX: ICD-10-PCS | Mod: 79,RT,, | Performed by: OPHTHALMOLOGY

## 2021-12-29 PROCEDURE — 25000003 PHARM REV CODE 250

## 2021-12-29 PROCEDURE — D9220A PRA ANESTHESIA: Mod: CRNA,,, | Performed by: NURSE ANESTHETIST, CERTIFIED REGISTERED

## 2021-12-29 PROCEDURE — 63600175 PHARM REV CODE 636 W HCPCS: Performed by: OPHTHALMOLOGY

## 2021-12-29 PROCEDURE — D9220A PRA ANESTHESIA: Mod: ANES,,, | Performed by: ANESTHESIOLOGY

## 2021-12-29 PROCEDURE — 99499 NO LOS: ICD-10-PCS | Mod: ,,, | Performed by: OPHTHALMOLOGY

## 2021-12-29 PROCEDURE — 25000003 PHARM REV CODE 250: Performed by: OPHTHALMOLOGY

## 2021-12-29 PROCEDURE — 25000003 PHARM REV CODE 250: Performed by: NURSE ANESTHETIST, CERTIFIED REGISTERED

## 2021-12-29 PROCEDURE — 71000015 HC POSTOP RECOV 1ST HR: Performed by: OPHTHALMOLOGY

## 2021-12-29 PROCEDURE — D9220A PRA ANESTHESIA: ICD-10-PCS | Mod: ANES,,, | Performed by: ANESTHESIOLOGY

## 2021-12-29 PROCEDURE — V2632 POST CHMBR INTRAOCULAR LENS: HCPCS | Performed by: OPHTHALMOLOGY

## 2021-12-29 PROCEDURE — 63600175 PHARM REV CODE 636 W HCPCS: Performed by: NURSE ANESTHETIST, CERTIFIED REGISTERED

## 2021-12-29 PROCEDURE — 37000009 HC ANESTHESIA EA ADD 15 MINS: Performed by: OPHTHALMOLOGY

## 2021-12-29 PROCEDURE — 36000707: Performed by: OPHTHALMOLOGY

## 2021-12-29 PROCEDURE — 37000008 HC ANESTHESIA 1ST 15 MINUTES: Performed by: OPHTHALMOLOGY

## 2021-12-29 PROCEDURE — 71000044 HC DOSC ROUTINE RECOVERY FIRST HOUR: Performed by: OPHTHALMOLOGY

## 2021-12-29 PROCEDURE — 99499 UNLISTED E&M SERVICE: CPT | Mod: ,,, | Performed by: OPHTHALMOLOGY

## 2021-12-29 PROCEDURE — D9220A PRA ANESTHESIA: ICD-10-PCS | Mod: CRNA,,, | Performed by: NURSE ANESTHETIST, CERTIFIED REGISTERED

## 2021-12-29 PROCEDURE — 36000706: Performed by: OPHTHALMOLOGY

## 2021-12-29 PROCEDURE — 66982 XCAPSL CTRC RMVL CPLX WO ECP: CPT | Mod: 79,RT,, | Performed by: OPHTHALMOLOGY

## 2021-12-29 DEVICE — LENS EYHANCE +22.0D: Type: IMPLANTABLE DEVICE | Site: EYE | Status: FUNCTIONAL

## 2021-12-29 RX ORDER — TROPICAMIDE 10 MG/ML
1 SOLUTION/ DROPS OPHTHALMIC
Status: DISCONTINUED | OUTPATIENT
Start: 2021-12-29 | End: 2021-12-29 | Stop reason: HOSPADM

## 2021-12-29 RX ORDER — MOXIFLOXACIN 5 MG/ML
1 SOLUTION/ DROPS OPHTHALMIC
Status: DISCONTINUED | OUTPATIENT
Start: 2021-12-29 | End: 2021-12-29 | Stop reason: HOSPADM

## 2021-12-29 RX ORDER — LIDOCAINE HYDROCHLORIDE 20 MG/ML
JELLY TOPICAL
Status: DISCONTINUED
Start: 2021-12-29 | End: 2021-12-29 | Stop reason: HOSPADM

## 2021-12-29 RX ORDER — LIDOCAINE HYDROCHLORIDE 10 MG/ML
INJECTION, SOLUTION EPIDURAL; INFILTRATION; INTRACAUDAL; PERINEURAL
Status: DISCONTINUED
Start: 2021-12-29 | End: 2021-12-29 | Stop reason: HOSPADM

## 2021-12-29 RX ORDER — LIDOCAINE HYDROCHLORIDE 40 MG/ML
INJECTION, SOLUTION RETROBULBAR
Status: DISCONTINUED | OUTPATIENT
Start: 2021-12-29 | End: 2021-12-29 | Stop reason: HOSPADM

## 2021-12-29 RX ORDER — PHENYLEPHRINE HYDROCHLORIDE 100 MG/ML
1 SOLUTION/ DROPS OPHTHALMIC
Status: DISCONTINUED | OUTPATIENT
Start: 2021-12-29 | End: 2021-12-29 | Stop reason: HOSPADM

## 2021-12-29 RX ORDER — MIDAZOLAM HYDROCHLORIDE 1 MG/ML
INJECTION, SOLUTION INTRAMUSCULAR; INTRAVENOUS
Status: DISCONTINUED | OUTPATIENT
Start: 2021-12-29 | End: 2021-12-29

## 2021-12-29 RX ORDER — FENTANYL CITRATE 50 UG/ML
25 INJECTION, SOLUTION INTRAMUSCULAR; INTRAVENOUS EVERY 5 MIN PRN
Status: DISCONTINUED | OUTPATIENT
Start: 2021-12-29 | End: 2021-12-29 | Stop reason: HOSPADM

## 2021-12-29 RX ORDER — MOXIFLOXACIN 5 MG/ML
SOLUTION/ DROPS OPHTHALMIC
Status: DISCONTINUED | OUTPATIENT
Start: 2021-12-29 | End: 2021-12-29 | Stop reason: HOSPADM

## 2021-12-29 RX ORDER — SODIUM CHLORIDE 0.9 % (FLUSH) 0.9 %
10 SYRINGE (ML) INJECTION
Status: DISCONTINUED | OUTPATIENT
Start: 2021-12-29 | End: 2021-12-29 | Stop reason: HOSPADM

## 2021-12-29 RX ORDER — LIDOCAINE HYDROCHLORIDE 20 MG/ML
JELLY TOPICAL
Status: DISCONTINUED | OUTPATIENT
Start: 2021-12-29 | End: 2021-12-29 | Stop reason: HOSPADM

## 2021-12-29 RX ORDER — ACETAMINOPHEN 325 MG/1
650 TABLET ORAL EVERY 6 HOURS PRN
Status: DISCONTINUED | OUTPATIENT
Start: 2021-12-29 | End: 2021-12-29 | Stop reason: HOSPADM

## 2021-12-29 RX ORDER — ACETAZOLAMIDE 500 MG/1
500 CAPSULE, EXTENDED RELEASE ORAL ONCE
Status: COMPLETED | OUTPATIENT
Start: 2021-12-29 | End: 2021-12-29

## 2021-12-29 RX ORDER — PREDNISOLONE ACETATE 10 MG/ML
SUSPENSION/ DROPS OPHTHALMIC
Status: DISCONTINUED
Start: 2021-12-29 | End: 2021-12-29 | Stop reason: HOSPADM

## 2021-12-29 RX ORDER — KETOROLAC TROMETHAMINE 5 MG/ML
1 SOLUTION OPHTHALMIC
Status: DISCONTINUED | OUTPATIENT
Start: 2021-12-29 | End: 2021-12-29 | Stop reason: HOSPADM

## 2021-12-29 RX ORDER — LIDOCAINE HYDROCHLORIDE 40 MG/ML
INJECTION, SOLUTION RETROBULBAR
Status: DISCONTINUED
Start: 2021-12-29 | End: 2021-12-29 | Stop reason: HOSPADM

## 2021-12-29 RX ADMIN — MIDAZOLAM 1 MG: 1 INJECTION INTRAMUSCULAR; INTRAVENOUS at 08:12

## 2021-12-29 RX ADMIN — MOXIFLOXACIN 1 DROP: 5 SOLUTION/ DROPS OPHTHALMIC at 07:12

## 2021-12-29 RX ADMIN — KETOROLAC TROMETHAMINE 1 DROP: 5 SOLUTION/ DROPS OPHTHALMIC at 07:12

## 2021-12-29 RX ADMIN — TROPICAMIDE 1 DROP: 10 SOLUTION/ DROPS OPHTHALMIC at 07:12

## 2021-12-29 RX ADMIN — SODIUM CHLORIDE: 0.9 INJECTION, SOLUTION INTRAVENOUS at 07:12

## 2021-12-29 RX ADMIN — PHENYLEPHRINE HYDROCHLORIDE 1 DROP: 100 SOLUTION/ DROPS OPHTHALMIC at 07:12

## 2021-12-29 RX ADMIN — ACETAZOLAMIDE 500 MG: 500 CAPSULE, EXTENDED RELEASE ORAL at 09:12

## 2021-12-29 RX ADMIN — MIDAZOLAM 2 MG: 1 INJECTION INTRAMUSCULAR; INTRAVENOUS at 07:12

## 2021-12-29 NOTE — DISCHARGE INSTRUCTIONS
Home Care Instructions  CATARACT SURGERY      ACTIVITY LEVEL: DO NOT REMOVE EYE SHIELD TODAY  If you received sedation or an anesthetic, you may feel sleepy for several hours. Rest until you are more awake. Gradually resume your normal activities. Normal activity will cause no undue risk to your eye. The white part of your eye might be red - this is nothing to worry about. Wear your old glasses or sunglasses that were given to you for protection during the day.    RESTRICTIONS - for the next 7 days  · Do not lift anything over 10 pounds.  · When bending, bend at the knees not the waist.  · Do not rub the eye.  · Do not get water in the eye.  · Do not sleep on the side that had surgery.  · Protect your eye at bedtime with the shield provided.    DIET: At home, continue with liquids. If there is no nausea, you may eat a soft diet and gradually resume your normal diet. Limit alcohol intake for 24 hours.    MEDICATIONS: You may takeTylenol every 6hours for pain/headache.     WHEN TO CALL THE DOCTOR:  · Redness that increases significantly  · Pain not relieved by Tylenol    RETURN APPOINTMENT:  You will need to see . Bring your eye kit with you on your follow-up visit.     FOR EMERGENCIES:  If any unusual problems or difficulties occur, contact Dr. Ashby at the Clinic office at [phone]  After hours #[phone]      Patient Education       General Anesthesia Discharge Instructions   About this topic   You may need general anesthesia if you need to be asleep during a procedure. Your doctor will use drugs to block the signals that go from your nerves to your brain. Doctors give general anesthesia during a surgery or procedure to:  · Allow you to sleep  · Help your body be still  · Relax your muscles  · Help you to relax and be pain free  · Keep you from remembering the surgery  · Let the doctor manage your airway, breathing, and blood flow  The doctor or nurse anesthetist gives general anesthesia by a  shot into your vein. Sometimes, you may breathe in a gas through a mask placed over your face.  What care is needed at home?   · Ask your doctor what you need to do when you go home. Make sure you ask questions if you do not understand what the doctor says.  · Your doctor may give you drugs to prevent or treat an upset stomach from the anesthetic. Take them as ordered.  · If your throat is sore, suck on ice chips or popsicles to ease throat pain.  · Put 2 to 3 pillows under your head and back when you lie down to help you breathe easier.  · For the first 24 to 48 hours:  ? Do not operate heavy or dangerous machinery.  ? Do not make major decisions or sign important papers. You may not be able to think clearly.  ? Avoid beer, wine, or mixed drinks.  · You are at a higher risk of falling for at least 24 hours after general anesthesia.  ? Take extra care when you get up.  ? Do not change positions quickly.  ? Do not rush when you need to go to the bathroom or to answer the phone.  ? Ask for help if you feel unsteady when you try to walk.  ? Wear shoes with non-slip soles and low heels.  What follow-up care is needed?   · Your doctor may ask you to come back to the office to check on your progress. Be sure to keep these visits.  · If you have stitches that do not dissolve or staples, you will need to have them removed. Your doctor will want to do this in 1 to 2 weeks. If the doctor used skin glue, the glue will fall off on its own.  What drugs may be needed?   The doctor may order drugs to:  · Help with pain  · Treat an upset stomach or throwing up  Will physical activity be limited?   · You will not be allowed to drive right away after the procedure. Ask a family member or a friend to drive you home.  · Avoid trying to get out of bed without help until you are sure of your balance.  · You may have to limit your activity. Talk to your doctor about if you need to limit how much you lift or limit exercise after your  procedure.  What changes to diet are needed?   Start with a light diet when you are fully awake. This includes things that are easy to swallow like soups, pudding, jello, toast, and eggs. Slowly progress to your normal diet.  What problems could happen?   · Low blood pressure  · Breathing problems  · Upset stomach or throwing up  · Dizziness  · Blood clots  · Infection  When do I need to call the doctor?   · Trouble breathing  · Upset stomach or throwing up more than 3 times in the next 2 days  · Dizziness  Teach Back: Helping You Understand   The Teach Back Method helps you understand the information we are giving you. After you talk with the staff, tell them in your own words what you learned. This helps to make sure the staff has described each thing clearly. It also helps to explain things that may have been confusing. Before going home, make sure you can do these:  · I can tell you about my procedure.  · I can tell you if I need to follow up with my doctor.  · I can tell you what is good for me to eat and drink the next day.  · I can tell you what I would do if I have trouble breathing, an upset stomach, or dizziness.  Where can I learn more?   [employer] [place] [employer]  https://www.nigms.nih.gov/education/pages/factsheet_Anesthesia.aspx   NHS Choices  http://www.nhs.uk/conditions/Anaesthetic-general/Pages/Definition.aspx   Last Reviewed Date   2020-04-22  Consumer Information Use and Disclaimer   This information is not specific medical advice and does not replace information you receive from your health care provider. This is only a brief summary of general information. It does NOT include all information about conditions, illnesses, injuries, tests, procedures, treatments, therapies, discharge instructions or life-style choices that may apply to you. You must talk with your health care provider for complete information about your health and treatment options. This information should not  be used to decide whether or not to accept your health care providers advice, instructions or recommendations. Only your health care provider has the knowledge and training to provide advice that is right for you.  Copyright   Copyright © 2021 [employer] and its affiliates and/or licensors. All rights reserved.

## 2021-12-29 NOTE — DISCHARGE SUMMARY
[facility] - Surgery (1st Fl)  Discharge Note  Short Stay    Procedure(s) (LRB):  PHACOEMULSIFICATION, CATARACT (Right)  INSERTION, IOL PROSTHESIS (Right)    OUTCOME: Patient tolerated treatment/procedure well without complication and is now ready for discharge.    DISPOSITION: Home or Self Care    FINAL DIAGNOSIS:  Nuclear sclerotic cataract of right eye    FOLLOWUP: In clinic    DISCHARGE INSTRUCTIONS:  No discharge procedures on file.     TIME SPENT ON DISCHARGE: 10 minutes

## 2021-12-29 NOTE — OP NOTE
DATE OF PROCEDURE: 12/29/2021    PREOPERATIVE DIAGNOSIS: mixed nuclear and posterior subcapsular Cataract// Nuclear sclerotic cataract of right eye OD.     POSTOPERATIVE DIAGNOSIS: mixed nuclear and posterior subcapsular Cataract// Nuclear sclerotic cataract of right eyeOD, status post procedure.     PROCEDURE PERFORMED: Cataract extraction with trypan blue and  phacoemulsification, posterior   chamber intraocular lens placement.     SURGEON: Pippa Ashby M.D.     ASSISTANT: evan cain md     COMPLICATIONS: None.     BLOOD LOSS: Less than 5 mL.     ANESTHESIA: Local MAC    IMPLANT DATA:   1. DIB00 , power 22.0 diopters, serial #    PROCEDURE IN DETAIL: After informed consent including risks, benefits,   alternatives, the patient was brought to the operating room table, placed in   supine position. Monitored anesthesia care was used throughout the entire   procedure. Once adequate anesthesia was confirmed, the patient was then prepped   and draped in usual sterile fashion for intraocular surgery. Wire speculum was   used to hold the eyelids apart and the procedure was initiated by making   a partial thickness corneal wound with a ger blade temporally.   A supersharp blade was then used to make a second entry into the eye via a paracentesis incision.  Intracameral lidocaine followed by trypan blue, followed by  Viscoat were placed in the anterior chamber.   A 2.4 mm blade was then used to make to complete the clear corneal   incision temporally. A cystotome was used to make a tear in   the anterior capsular flap, which was continued around with Utrata forceps for   continuous curvilinear capsulorrhexis. BSS on a Crowley cannula was then used for   hydrodissection and hydrodelineation of lens. The lens was noted to spin   freely in the bag. Phacoemulsification handpiece was then used to remove the   lens in a divide-and-conquer manner. Irrigation aspiration handpiece removed   the remaining cortical material.  Provisc was placed in the eye followed by the   lens as mentioned above without any complications. Once the lens was in proper   position, irrigation aspiration handpiece was used to remove the remaining Provisc. The   wounds were then hydrated with BSS and noted to be watertight. The eye had a   good physiological pressure and the lid speculum was removed under the   microscope without any shallowing. The eye was then covered with a collagen   shield soaked in Pred Forte and Vigamox and turned over to Anesthesia in stable   condition after placement of patch and shield.

## 2021-12-30 ENCOUNTER — OFFICE VISIT (OUTPATIENT)
Dept: OPHTHALMOLOGY | Facility: CLINIC | Age: 71
End: 2021-12-30
Payer: MEDICARE

## 2021-12-30 DIAGNOSIS — Z48.810 POSTOPERATIVE CARE FOR CATARACT: Primary | ICD-10-CM

## 2021-12-30 DIAGNOSIS — Z98.49 POSTOPERATIVE CARE FOR CATARACT: Primary | ICD-10-CM

## 2021-12-30 DIAGNOSIS — Z96.1 PSEUDOPHAKIA, BOTH EYES: ICD-10-CM

## 2021-12-30 DIAGNOSIS — H04.123 DRY EYE SYNDROME, BILATERAL: ICD-10-CM

## 2021-12-30 PROCEDURE — 99999 PR PBB SHADOW E&M-EST. PATIENT-LVL III: ICD-10-PCS | Mod: PBBFAC,,, | Performed by: OPHTHALMOLOGY

## 2021-12-30 PROCEDURE — 99999 PR PBB SHADOW E&M-EST. PATIENT-LVL III: CPT | Mod: PBBFAC,,, | Performed by: OPHTHALMOLOGY

## 2021-12-30 PROCEDURE — 99213 OFFICE O/P EST LOW 20 MIN: CPT | Mod: PBBFAC | Performed by: OPHTHALMOLOGY

## 2021-12-30 PROCEDURE — 99024 POSTOP FOLLOW-UP VISIT: CPT | Mod: POP,,, | Performed by: OPHTHALMOLOGY

## 2021-12-30 PROCEDURE — 99024 PR POST-OP FOLLOW-UP VISIT: ICD-10-PCS | Mod: POP,,, | Performed by: OPHTHALMOLOGY

## 2022-01-04 ENCOUNTER — OFFICE VISIT (OUTPATIENT)
Dept: OPHTHALMOLOGY | Facility: CLINIC | Age: 72
End: 2022-01-04
Payer: MEDICARE

## 2022-01-04 DIAGNOSIS — Z96.1 PSEUDOPHAKIA, BOTH EYES: ICD-10-CM

## 2022-01-04 DIAGNOSIS — Z98.49 POSTOPERATIVE CARE FOR CATARACT: Primary | ICD-10-CM

## 2022-01-04 DIAGNOSIS — Z48.810 POSTOPERATIVE CARE FOR CATARACT: Primary | ICD-10-CM

## 2022-01-04 DIAGNOSIS — H04.123 DRY EYE SYNDROME, BILATERAL: ICD-10-CM

## 2022-01-04 PROCEDURE — 99024 PR POST-OP FOLLOW-UP VISIT: ICD-10-PCS | Mod: POP,,, | Performed by: OPHTHALMOLOGY

## 2022-01-04 PROCEDURE — 99999 PR PBB SHADOW E&M-EST. PATIENT-LVL I: CPT | Mod: PBBFAC,,, | Performed by: OPHTHALMOLOGY

## 2022-01-04 PROCEDURE — 99024 POSTOP FOLLOW-UP VISIT: CPT | Mod: POP,,, | Performed by: OPHTHALMOLOGY

## 2022-01-04 PROCEDURE — 99999 PR PBB SHADOW E&M-EST. PATIENT-LVL I: ICD-10-PCS | Mod: PBBFAC,,, | Performed by: OPHTHALMOLOGY

## 2022-01-04 PROCEDURE — 99211 OFF/OP EST MAY X REQ PHY/QHP: CPT | Mod: PBBFAC | Performed by: OPHTHALMOLOGY

## 2022-01-04 RX ORDER — PREDNISOLONE ACETATE 10 MG/ML
1 SUSPENSION/ DROPS OPHTHALMIC 4 TIMES DAILY
Qty: 10 ML | Refills: 1 | Status: SHIPPED | OUTPATIENT
Start: 2022-01-04 | End: 2022-08-01

## 2022-01-04 NOTE — PROGRESS NOTES
HPI     DLS: 12/10/2021  S/P trypan blue and  phacoemulsification, posterior   chamber intraocular lens placement. 12/29/2021    1. NS/PSC OU   2. KCS/OCTAVIO   3. Hyperopia / Presbyopia     Restasis BID OU  Pred Acetate QID OD   Vigamox QID OD    Last edited by Belle Gonzáles on 1/4/2022 10:23 AM. (History)            Assessment /Plan     For exam results, see Encounter Report.    Postoperative care for cataract    Pseudophakia, both eyes    Dry eye syndrome, bilateral        Pts  is a long time glaucoma  pt of mine     Pt referred from [facility] for eval of cataracts ou - unable to improve vision with glasses     Dry eyes   On restasis and AT's and ointment - has seen Lu in past     IOL calc -   OS  DIB00 - 21.5  AC IOL 18.0    Phaco / IOL OS Date: 10/20/2021- DIB00 21.5  POW # 7 - phaco/IOL   Doing well  Pred Acetate- done      Phaco / IOL OD Date: 12/29/2021 - DIB00 22.0  POW # 1 - phaco/IOL   Doing well  Cont Pred Acetate QID taper over the next four weeks  Cont vigamox QID until the bottle runs out  Shield at night  Glasses day  No lifting, no bending, no eye rubbing    F/U 3-4 weeks, AR/MR OU

## 2022-01-05 ENCOUNTER — OFFICE VISIT (OUTPATIENT)
Dept: ORTHOPEDICS | Facility: CLINIC | Age: 72
End: 2022-01-05
Payer: MEDICARE

## 2022-01-05 VITALS — BODY MASS INDEX: 27.01 KG/M2 | WEIGHT: 143.06 LBS | HEIGHT: 61 IN

## 2022-01-05 DIAGNOSIS — M65.312 TRIGGER FINGER OF LEFT THUMB: Primary | ICD-10-CM

## 2022-01-05 DIAGNOSIS — M65.311 TRIGGER FINGER OF RIGHT THUMB: ICD-10-CM

## 2022-01-05 PROCEDURE — 99999 PR PBB SHADOW E&M-EST. PATIENT-LVL IV: CPT | Mod: PBBFAC,,, | Performed by: ORTHOPAEDIC SURGERY

## 2022-01-05 PROCEDURE — 99024 PR POST-OP FOLLOW-UP VISIT: ICD-10-PCS | Mod: POP,,, | Performed by: ORTHOPAEDIC SURGERY

## 2022-01-05 PROCEDURE — 99024 POSTOP FOLLOW-UP VISIT: CPT | Mod: POP,,, | Performed by: ORTHOPAEDIC SURGERY

## 2022-01-05 PROCEDURE — 99214 OFFICE O/P EST MOD 30 MIN: CPT | Mod: PBBFAC,PO | Performed by: ORTHOPAEDIC SURGERY

## 2022-01-05 PROCEDURE — 99999 PR PBB SHADOW E&M-EST. PATIENT-LVL IV: ICD-10-PCS | Mod: PBBFAC,,, | Performed by: ORTHOPAEDIC SURGERY

## 2022-01-09 NOTE — PROGRESS NOTES
Jackelyn Kendrick presents for post-operative evaluation of   Encounter Diagnoses   Name Primary?    Trigger finger of left thumb Yes    Trigger finger of right thumb    The patient is now 4 weeks s/p left trigger thumb release and right trigger thumb injection.  Overall the patient reports doing well.  She reports no pain and catching in bilateral thumbs      PE:    AA&O x 4.  NAD  HEENT:  NCAT, sclera nonicteric  Lungs:  Respirations are equal and unlabored.  CV:  2+ bilateral upper and lower extremity pulses.  MSK: The incision is well healed.  Full wrist and finger motion.  Neurovascularly intact and has 5/5 thenar and intrinsic musculature strength.        A/P: Status post above, doing well  1) Continue with range of motion and strengthening  2) F/U 6 weeks  3) Call with any questions/concerns in the interim        Suzy Dominguez MD     Please be aware that this note has been generated with the assistance of [employer] voice-to-text.  Please excuse any spelling or grammatical errors.

## 2022-01-26 NOTE — PROGRESS NOTES
HPI     DLS: 1/04/2022    Pt here for post phaco w/IOL OD- 12/29/2021  Pt states no eye pain or discomfort.     Meds;   Restasis BID OU    1. NS/PSC OU   2. KCS/OCTAVIO   3. Hyperopia / Presbyopia     Last edited by Elisabeth Trinidad on 1/27/2022 11:36 AM. (History)              Assessment /Plan     For exam results, see Encounter Report.    Postoperative care for cataract    Dry eye syndrome, bilateral    Keratoconjunctivitis sicca not specified as Sjogren's, bilateral    PCO (posterior capsular opacification), bilateral    Pseudophakia, both eyes      Pts  is a long time glaucoma  pt of mine     Pt referred from [facility] for eval of cataracts ou - unable to improve vision with glasses     Dry eyes   On restasis and AT's and ointment - has seen Lu in past     IOL calc -   OS  DIB00 - 21.5  AC IOL 18.0    Phaco / IOL OS Date: 10/20/2021- DIB00 21.5  POM 3 - phaco/IOL   Doing well  Pred Acetate- done      Phaco / IOL OD Date: 12/29/2021 - DIB00 22.0  POW # 4 - phaco/IOL   Doing well  Off steroids   VA uncorrected is 20/25     Minimal PCO - ou - monitor     Minimal distance correction - ok to use OTC readers or any old pair of bifocals that she has   If wants a new pair of bifocals can see Dr ETHAN Gutierrez - old pt of his

## 2022-01-27 ENCOUNTER — OFFICE VISIT (OUTPATIENT)
Dept: OPHTHALMOLOGY | Facility: CLINIC | Age: 72
End: 2022-01-27
Payer: MEDICARE

## 2022-01-27 DIAGNOSIS — Z96.1 PSEUDOPHAKIA, BOTH EYES: ICD-10-CM

## 2022-01-27 DIAGNOSIS — H04.123 DRY EYE SYNDROME, BILATERAL: ICD-10-CM

## 2022-01-27 DIAGNOSIS — H26.493 PCO (POSTERIOR CAPSULAR OPACIFICATION), BILATERAL: ICD-10-CM

## 2022-01-27 DIAGNOSIS — H16.223 KERATOCONJUNCTIVITIS SICCA NOT SPECIFIED AS SJOGREN'S, BILATERAL: ICD-10-CM

## 2022-01-27 DIAGNOSIS — Z48.810 POSTOPERATIVE CARE FOR CATARACT: Primary | ICD-10-CM

## 2022-01-27 DIAGNOSIS — Z98.49 POSTOPERATIVE CARE FOR CATARACT: Primary | ICD-10-CM

## 2022-01-27 PROCEDURE — 99024 PR POST-OP FOLLOW-UP VISIT: ICD-10-PCS | Mod: POP,,, | Performed by: OPHTHALMOLOGY

## 2022-01-27 PROCEDURE — 99024 POSTOP FOLLOW-UP VISIT: CPT | Mod: POP,,, | Performed by: OPHTHALMOLOGY

## 2022-01-27 PROCEDURE — 99999 PR PBB SHADOW E&M-EST. PATIENT-LVL III: CPT | Mod: PBBFAC,,, | Performed by: OPHTHALMOLOGY

## 2022-01-27 PROCEDURE — 99999 PR PBB SHADOW E&M-EST. PATIENT-LVL III: ICD-10-PCS | Mod: PBBFAC,,, | Performed by: OPHTHALMOLOGY

## 2022-01-27 PROCEDURE — 99213 OFFICE O/P EST LOW 20 MIN: CPT | Mod: PBBFAC | Performed by: OPHTHALMOLOGY

## 2022-02-03 ENCOUNTER — OFFICE VISIT (OUTPATIENT)
Dept: DERMATOLOGY | Facility: CLINIC | Age: 72
End: 2022-02-03
Payer: MEDICARE

## 2022-02-03 ENCOUNTER — PES CALL (OUTPATIENT)
Dept: ADMINISTRATIVE | Facility: CLINIC | Age: 72
End: 2022-02-03
Payer: MEDICARE

## 2022-02-03 DIAGNOSIS — D48.5 NEOPLASM OF UNCERTAIN BEHAVIOR OF SKIN: ICD-10-CM

## 2022-02-03 PROCEDURE — 11102 PR TANGENTIAL BIOPSY, SKIN, SINGLE LESION: ICD-10-PCS | Mod: S$PBB,,, | Performed by: DERMATOLOGY

## 2022-02-03 PROCEDURE — 99499 NO LOS: ICD-10-PCS | Mod: S$PBB,,, | Performed by: DERMATOLOGY

## 2022-02-03 PROCEDURE — 11102 TANGNTL BX SKIN SINGLE LES: CPT | Mod: S$PBB,,, | Performed by: DERMATOLOGY

## 2022-02-03 PROCEDURE — 88305 TISSUE EXAM BY PATHOLOGIST: ICD-10-PCS | Mod: 26,,, | Performed by: PATHOLOGY

## 2022-02-03 PROCEDURE — 99214 OFFICE O/P EST MOD 30 MIN: CPT | Mod: PBBFAC,PO,25 | Performed by: DERMATOLOGY

## 2022-02-03 PROCEDURE — 99999 PR PBB SHADOW E&M-EST. PATIENT-LVL IV: ICD-10-PCS | Mod: PBBFAC,,, | Performed by: DERMATOLOGY

## 2022-02-03 PROCEDURE — 11102 TANGNTL BX SKIN SINGLE LES: CPT | Mod: PBBFAC,PO | Performed by: DERMATOLOGY

## 2022-02-03 PROCEDURE — 88305 TISSUE EXAM BY PATHOLOGIST: CPT | Mod: 26,,, | Performed by: PATHOLOGY

## 2022-02-03 PROCEDURE — 99499 UNLISTED E&M SERVICE: CPT | Mod: S$PBB,,, | Performed by: DERMATOLOGY

## 2022-02-03 PROCEDURE — 99999 PR PBB SHADOW E&M-EST. PATIENT-LVL IV: CPT | Mod: PBBFAC,,, | Performed by: DERMATOLOGY

## 2022-02-03 PROCEDURE — 88305 TISSUE EXAM BY PATHOLOGIST: CPT | Performed by: PATHOLOGY

## 2022-02-03 NOTE — PROGRESS NOTES
Subjective:       Patient ID:  Jackelyn Kendrick is a 71 y.o. female who presents for   Chief Complaint   Patient presents with    Lesion     nose     Lesion - Initial  Affected locations: nose  Signs / symptoms: scabs and peeling  Severity: mild  Timing: constant  Aggravated by: picking and friction  Relieving factors/Treatments tried: OTC antibiotic cream  Improvement on treatment: no relief        Review of Systems   Skin: Negative for daily sunscreen use, activity-related sunscreen use and tendency to form keloidal scars.   Hematologic/Lymphatic: Does not bruise/bleed easily.        Objective:    Physical Exam   Constitutional: She appears well-developed and well-nourished. No distress.   Neurological: She is alert and oriented to person, place, and time. She is not disoriented.   Psychiatric: She has a normal mood and affect.   Skin:   Areas Examined (abnormalities noted in diagram):   Head / Face Inspection Performed                  Diagram Legend     Erythematous scaling macule/papule c/w actinic keratosis       Vascular papule c/w angioma      Pigmented verrucoid papule/plaque c/w seborrheic keratosis      Yellow umbilicated papule c/w sebaceous hyperplasia      Irregularly shaped tan macule c/w lentigo     1-2 mm smooth white papules consistent with Milia      Movable subcutaneous cyst with punctum c/w epidermal inclusion cyst      Subcutaneous movable cyst c/w pilar cyst      Firm pink to brown papule c/w dermatofibroma      Pedunculated fleshy papule(s) c/w skin tag(s)      Evenly pigmented macule c/w junctional nevus     Mildly variegated pigmented, slightly irregular-bordered macule c/w mildly atypical nevus      Flesh colored to evenly pigmented papule c/w intradermal nevus       Pink pearly papule/plaque c/w basal cell carcinoma      Erythematous hyperkeratotic cursted plaque c/w SCC      Surgical scar with no sign of skin cancer recurrence      Open and closed comedones      Inflammatory papules  and pustules      Verrucoid papule consistent consistent with wart     Erythematous eczematous patches and plaques     Dystrophic onycholytic nail with subungual debris c/w onychomycosis     Umbilicated papule    Erythematous-base heme-crusted tan verrucoid plaque consistent with inflamed seborrheic keratosis     Erythematous Silvery Scaling Plaque c/w Psoriasis     See annotation      Assessment / Plan:      Pathology Orders:     Normal Orders This Visit    Specimen to Pathology, Dermatology     Questions:    Procedure Type: Dermatology and skin neoplasms    Number of Specimens: 1    ------------------------: -------------------------    Spec 1 Procedure: Biopsy    Spec 1 Clinical Impression: r/o BCC v other    Spec 1 Source: right ala    Release to patient:         Neoplasm of uncertain behavior of skin  Shave biopsy procedure note:    Shave biopsy performed after verbal consent including risk of infection, scar, recurrence, need for additional treatment of site. Area prepped with alcohol, anesthetized with approximately 1.0cc of 1% lidocaine with epinephrine. Lesional tissue shaved with razor blade. Hemostasis achieved with application of aluminum chloride followed by hyfrecation. No complications. Dressing applied. Wound care explained.    If biopsy positive for malignancy, refer to Dr. Green for Mohs surgery consultation.  -     Ambulatory referral/consult to Dermatology  -     Specimen to Pathology, Dermatology             Follow up for prn bx report.

## 2022-02-03 NOTE — PATIENT INSTRUCTIONS
Shave Biopsy Wound Care    Your doctor has performed a shave biopsy today.  A band aid and vaseline ointment has been placed over the site.  This should remain in place for NO LONGER THAN 48 hours.  It is fine to remove the bandaid after 24 hours, if the area is no longer bleeding. It is recommended that you keep the area dry (do not wet)) for the first 24 hours.  After 24 hours, wash the area with warm soap and water and apply Vaseline jelly.  Many patients prefer to use Neosporin or Bacitracin ointment.  This is acceptable; however, know that you can develop an allergy to this medication even if you have used it safely for years.  It is important to keep the area moist.  Letting it dry out and get air slows healing time, and will worsen the scar.        If you notice increasing redness, tenderness, pain, or yellow drainage at the biopsy site, please notify your doctor.  These are signs of an infection.    If your biopsy site is bleeding, apply firm pressure for 15 minutes straight.  Repeat for another 15 minutes, if it is still bleeding.   If the surgical site continues to bleed, then please contact your doctor.      For [facility] users:   You will receive your biopsy results in [facility] as soon as they are available. Please be assured that your physician/provider will review your results and will then determine what further treatment, evaluation, or planning is required. You should be contacted by your physician's/provider's office within 5 business days of receiving your results; If not, please reach out to directly. This is one more way [employer] is putting you first.     [facility] [place] [address]/ [phone] FAX/ [email or web address]

## 2022-02-09 LAB
FINAL PATHOLOGIC DIAGNOSIS: NORMAL
GROSS: NORMAL
Lab: NORMAL
MICROSCOPIC EXAM: NORMAL

## 2022-02-09 NOTE — PROGRESS NOTES
Please call patient to schedule a Mohs consultation.     . Skin, right ala, shave biopsy:   - BASAL CELL CARCINOMA.   - THE TUMOR EXTENDS TO THE DEEP BIOPSY MARGIN

## 2022-02-16 ENCOUNTER — OFFICE VISIT (OUTPATIENT)
Dept: ORTHOPEDICS | Facility: CLINIC | Age: 72
End: 2022-02-16
Payer: MEDICARE

## 2022-02-16 VITALS — WEIGHT: 148.56 LBS | HEIGHT: 61 IN | BODY MASS INDEX: 28.05 KG/M2

## 2022-02-16 DIAGNOSIS — M72.0 DUPUYTREN'S DISEASE OF PALM OF RIGHT HAND: ICD-10-CM

## 2022-02-16 DIAGNOSIS — M72.0 DUPUYTREN'S DISEASE OF PALM OF LEFT HAND: ICD-10-CM

## 2022-02-16 DIAGNOSIS — M65.312 TRIGGER FINGER OF LEFT THUMB: Primary | ICD-10-CM

## 2022-02-16 DIAGNOSIS — M65.311 TRIGGER FINGER OF RIGHT THUMB: ICD-10-CM

## 2022-02-16 PROCEDURE — 99024 PR POST-OP FOLLOW-UP VISIT: ICD-10-PCS | Mod: S$PBB,POP,, | Performed by: ORTHOPAEDIC SURGERY

## 2022-02-16 PROCEDURE — 99214 OFFICE O/P EST MOD 30 MIN: CPT | Mod: PBBFAC,PO | Performed by: ORTHOPAEDIC SURGERY

## 2022-02-16 PROCEDURE — 99999 PR PBB SHADOW E&M-EST. PATIENT-LVL IV: ICD-10-PCS | Mod: PBBFAC,,, | Performed by: ORTHOPAEDIC SURGERY

## 2022-02-16 PROCEDURE — 99999 PR PBB SHADOW E&M-EST. PATIENT-LVL IV: CPT | Mod: PBBFAC,,, | Performed by: ORTHOPAEDIC SURGERY

## 2022-02-16 PROCEDURE — 99024 POSTOP FOLLOW-UP VISIT: CPT | Mod: S$PBB,POP,, | Performed by: ORTHOPAEDIC SURGERY

## 2022-02-16 NOTE — PROGRESS NOTES
Jackelyn Kendrick presents for post-operative evaluation of   Encounter Diagnoses   Name Primary?    Trigger finger of left thumb Yes    Trigger finger of right thumb     Dupuytren's disease of palm of left hand     Dupuytren's disease of palm of right hand    The patient is now 10 weeks s/p   Release, Trigger Finger - Left    Injection, Steroid - Right      Overall the patient reports doing well. 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      PE:    AA&O x 4.  NAD  HEENT:  NCAT, sclera nonicteric  Lungs:  Respirations are equal and unlabored.  CV:  2+ bilateral upper and lower extremity pulses.  MSK: The incision is well healed.  Full wrist and finger motion.  Neurovascularly intact and has 5/5 thenar and intrinsic musculature strength.  Left palm ring finger dupuytrens nodule without cord, right palm dupuytrens cord thumb webspace      A/P: Status post above, doing well; Dupuytren's disease bilateral hands  1) Continue with range of motion and strengthening  2) F/U 6 months for Dupuytrens; discussed options for treatment  3) Call with any questions/concerns in the interim        Suzy Dominguez MD     Please be aware that this note has been generated with the assistance of [employer] voice-to-text.  Please excuse any spelling or grammatical errors.

## 2022-03-02 ENCOUNTER — PATIENT MESSAGE (OUTPATIENT)
Dept: ADMINISTRATIVE | Facility: OTHER | Age: 72
End: 2022-03-02
Payer: MEDICARE

## 2022-03-24 ENCOUNTER — PROCEDURE VISIT (OUTPATIENT)
Dept: DERMATOLOGY | Facility: CLINIC | Age: 72
End: 2022-03-24
Payer: MEDICARE

## 2022-03-24 VITALS
BODY MASS INDEX: 27.94 KG/M2 | SYSTOLIC BLOOD PRESSURE: 114 MMHG | HEIGHT: 61 IN | HEART RATE: 59 BPM | WEIGHT: 148 LBS | DIASTOLIC BLOOD PRESSURE: 72 MMHG

## 2022-03-24 DIAGNOSIS — C44.311 BASAL CELL CARCINOMA OF RIGHT ALA NASI: Primary | ICD-10-CM

## 2022-03-24 PROCEDURE — 17311 MOHS 1 STAGE H/N/HF/G: CPT | Mod: S$PBB,,, | Performed by: DERMATOLOGY

## 2022-03-24 PROCEDURE — 17311 MOHS 1 STAGE H/N/HF/G: CPT | Mod: PBBFAC | Performed by: DERMATOLOGY

## 2022-03-24 PROCEDURE — 17311: ICD-10-PCS | Mod: S$PBB,,, | Performed by: DERMATOLOGY

## 2022-03-24 PROCEDURE — 99499 NO LOS: ICD-10-PCS | Mod: S$PBB,,, | Performed by: DERMATOLOGY

## 2022-03-24 PROCEDURE — 99499 UNLISTED E&M SERVICE: CPT | Mod: S$PBB,,, | Performed by: DERMATOLOGY

## 2022-03-24 NOTE — PROGRESS NOTES
"PROCEDURE: Mohs' Micrographic Surgery    INDICATION: Location in mask areas of face including central face, nose, eyelids, eyebrows, lips, chin, preauricular, temple, and ear. Biopsy-proven skin cancer of cosmetically and functionally important areas, including head, neck, genital, hand, foot, or areas known for having difficulty in healing, such as the lower anterior legs. Tumor with ill-defined borders.    REFERRING PROVIDER: Romi Díaz M.D.    CASE NUMBER:     ANESTHETIC: 2 cc 0.5% Lidocaine with Epi 1:200,000 mixed 1:1 with 0.5% Bupivacaine    SURGICAL PREP: Hibiclens    SURGEON: Yenny Green MD    ASSISTANTS: Reina Otero, Surg Tech    PREOPERATIVE DIAGNOSIS: basal cell carcinoma    POSTOPERATIVE DIAGNOSIS: basal cell carcinoma    PATHOLOGIC DIAGNOSIS: basal cell carcinoma    HISTOLOGY OF SPECIMENS IN FIRST STAGE:   Tumor Type: No tumor seen.    STAGES OF MOHS' SURGERY PERFORMED: 1    TUMOR-FREE PLANE ACHIEVED: Yes    HEMOSTASIS: electrocoagulation     SPECIMENS: 2    LOCATION: right ala (inferior). Location verified with Dr. Díaz's clinical photograph. Patient also verified location with hand held mirror.    INITIAL LESION SIZE: 0.3 x 0.3 cm    FINAL DEFECT SIZE: 0.4 x 0.4 cm    WOUND REPAIR/DISPOSITION: When the tumor was completely removed, repair options were discussed with the patient, and it was decided to let the wound heal by second intention. The patient tolerated the procedure well and will consider delayed reconstruction or repair if necessary.    The area was cleaned and dressed appropriately, and the patient was given wound care instructions, as well as an appointment for follow-up evaluation in one month.     Vitals:    03/24/22 0723 03/24/22 0949   BP: 126/76 114/72   BP Location: Left arm Left arm   Patient Position: Sitting Sitting   BP Method: Small (Automatic) Small (Automatic)   Pulse: 74 (!) 59   Weight: 67.1 kg (148 lb)    Height: 5' 1" (1.549 m)          "

## 2022-05-02 ENCOUNTER — OFFICE VISIT (OUTPATIENT)
Dept: DERMATOLOGY | Facility: CLINIC | Age: 72
End: 2022-05-02
Payer: MEDICARE

## 2022-05-02 DIAGNOSIS — C44.311 BASAL CELL CARCINOMA OF RIGHT ALA NASI: Primary | ICD-10-CM

## 2022-05-02 PROCEDURE — 99212 OFFICE O/P EST SF 10 MIN: CPT | Mod: S$PBB,,, | Performed by: DERMATOLOGY

## 2022-05-02 PROCEDURE — 99999 PR PBB SHADOW E&M-EST. PATIENT-LVL III: ICD-10-PCS | Mod: PBBFAC,,, | Performed by: DERMATOLOGY

## 2022-05-02 PROCEDURE — 99213 OFFICE O/P EST LOW 20 MIN: CPT | Mod: PBBFAC | Performed by: DERMATOLOGY

## 2022-05-02 PROCEDURE — 99999 PR PBB SHADOW E&M-EST. PATIENT-LVL III: CPT | Mod: PBBFAC,,, | Performed by: DERMATOLOGY

## 2022-05-02 PROCEDURE — 99212 PR OFFICE/OUTPT VISIT, EST, LEVL II, 10-19 MIN: ICD-10-PCS | Mod: S$PBB,,, | Performed by: DERMATOLOGY

## 2022-05-02 NOTE — PROGRESS NOTES
71 y.o. female patient is here for wound check after surgery.    Patient reports no problems.    WOUND PE:  The R ala wound is well healed with 100% re-epithelialization.    IMPRESSION:  Healing operative site from Mohs' surgery, BCC R ala s/p Mohs with 2nd intention healing, postop week #5, now all healed in    PLAN:  d/c wound care  Keep moist with aquaphor x 1 more week  Daily SPF.  Regular skin checks.    RTC:  In 3-6 months with Romi Díaz M.D. for skin check or sooner if new concern arises.

## 2022-06-15 ENCOUNTER — PATIENT MESSAGE (OUTPATIENT)
Dept: PRIMARY CARE CLINIC | Facility: CLINIC | Age: 72
End: 2022-06-15
Payer: MEDICARE

## 2022-06-17 ENCOUNTER — OFFICE VISIT (OUTPATIENT)
Dept: PRIMARY CARE CLINIC | Facility: CLINIC | Age: 72
End: 2022-06-17
Payer: MEDICARE

## 2022-06-17 VITALS
HEART RATE: 71 BPM | HEIGHT: 61 IN | TEMPERATURE: 99 F | DIASTOLIC BLOOD PRESSURE: 78 MMHG | SYSTOLIC BLOOD PRESSURE: 120 MMHG | BODY MASS INDEX: 28.18 KG/M2 | OXYGEN SATURATION: 97 % | WEIGHT: 149.25 LBS

## 2022-06-17 DIAGNOSIS — N30.00 ACUTE CYSTITIS WITHOUT HEMATURIA: Primary | ICD-10-CM

## 2022-06-17 LAB
BILIRUB SERPL-MCNC: ABNORMAL MG/DL
BLOOD URINE, POC: 250
CLARITY, POC UA: CLEAR
COLOR, POC UA: YELLOW
GLUCOSE UR QL STRIP: NORMAL
KETONES UR QL STRIP: ABNORMAL
LEUKOCYTE ESTERASE URINE, POC: ABNORMAL
NITRITE, POC UA: ABNORMAL
PH, POC UA: 6
PROTEIN, POC: 30
SPECIFIC GRAVITY, POC UA: 1.01
UROBILINOGEN, POC UA: NORMAL

## 2022-06-17 PROCEDURE — 81002 URINALYSIS NONAUTO W/O SCOPE: CPT | Mod: PBBFAC,PN | Performed by: FAMILY MEDICINE

## 2022-06-17 PROCEDURE — 99215 OFFICE O/P EST HI 40 MIN: CPT | Mod: PBBFAC,PN | Performed by: FAMILY MEDICINE

## 2022-06-17 PROCEDURE — 87086 URINE CULTURE/COLONY COUNT: CPT | Performed by: FAMILY MEDICINE

## 2022-06-17 PROCEDURE — 87186 SC STD MICRODIL/AGAR DIL: CPT | Performed by: FAMILY MEDICINE

## 2022-06-17 PROCEDURE — 87088 URINE BACTERIA CULTURE: CPT | Performed by: FAMILY MEDICINE

## 2022-06-17 PROCEDURE — 99999 PR PBB SHADOW E&M-EST. PATIENT-LVL V: CPT | Mod: PBBFAC,,, | Performed by: FAMILY MEDICINE

## 2022-06-17 PROCEDURE — 99213 PR OFFICE/OUTPT VISIT, EST, LEVL III, 20-29 MIN: ICD-10-PCS | Mod: S$PBB,,, | Performed by: FAMILY MEDICINE

## 2022-06-17 PROCEDURE — 99213 OFFICE O/P EST LOW 20 MIN: CPT | Mod: S$PBB,,, | Performed by: FAMILY MEDICINE

## 2022-06-17 PROCEDURE — 99999 PR PBB SHADOW E&M-EST. PATIENT-LVL V: ICD-10-PCS | Mod: PBBFAC,,, | Performed by: FAMILY MEDICINE

## 2022-06-17 PROCEDURE — 87077 CULTURE AEROBIC IDENTIFY: CPT | Performed by: FAMILY MEDICINE

## 2022-06-17 RX ORDER — CEPHALEXIN 250 MG/1
250 CAPSULE ORAL EVERY 8 HOURS
Qty: 21 CAPSULE | Refills: 0 | Status: SHIPPED | OUTPATIENT
Start: 2022-06-17 | End: 2022-06-21

## 2022-06-17 RX ORDER — PHENAZOPYRIDINE HYDROCHLORIDE 200 MG/1
200 TABLET, FILM COATED ORAL 3 TIMES DAILY PRN
Qty: 21 TABLET | Refills: 0 | Status: SHIPPED | OUTPATIENT
Start: 2022-06-17 | End: 2022-06-27

## 2022-06-19 NOTE — PROGRESS NOTES
SUBJECTIVE: Jackelyn Kendrick is a 71 y.o. female who complains of urinary frequency, urgency and dysuria x 3 days, without flank pain, fever, chills, or abnormal vaginal discharge or bleeding.     OBJECTIVE: Appears well, in no apparent distress.  Vital signs are normal. The abdomen is soft without tenderness, guarding, mass, rebound or organomegaly. No CVA tenderness or inguinal adenopathy noted. Urine dipstick shows positive for nitrates. Blood and leukocytes Micro exam: not done.     ASSESSMENT: UTI uncomplicated without evidence of pyelonephritis    PLAN: Treatment per orders -  1. Acute cystitis without hematuria  - POCT URINE DIPSTICK WITHOUT MICROSCOPE  - CULTURE, URINE  - cephALEXin (KEFLEX) 250 MG capsule; Take 1 capsule (250 mg total) by mouth every 8 (eight) hours.  Dispense: 21 capsule; Refill: 0  - phenazopyridine (PYRIDIUM) 200 MG tablet; Take 1 tablet (200 mg total) by mouth 3 (three) times daily as needed for Pain.  Dispense: 21 tablet; Refill: 0  - phenazopyridine (PYRIDIUM) 200 MG tablet; Take 1 tablet (200 mg total) by mouth 3 (three) times daily as needed for Pain.  Dispense: 21 tablet; Refill: 0    Will contact pt with results when available

## 2022-06-20 LAB — BACTERIA UR CULT: ABNORMAL

## 2022-06-21 ENCOUNTER — PATIENT MESSAGE (OUTPATIENT)
Dept: PRIMARY CARE CLINIC | Facility: CLINIC | Age: 72
End: 2022-06-21
Payer: MEDICARE

## 2022-06-21 ENCOUNTER — TELEPHONE (OUTPATIENT)
Dept: PRIMARY CARE CLINIC | Facility: CLINIC | Age: 72
End: 2022-06-21
Payer: MEDICARE

## 2022-06-21 DIAGNOSIS — N30.01 ACUTE CYSTITIS WITH HEMATURIA: Primary | ICD-10-CM

## 2022-06-21 RX ORDER — CEFIXIME 100 MG/5ML
400 POWDER, FOR SUSPENSION ORAL EVERY 12 HOURS
Qty: 400 ML | Refills: 0 | Status: SHIPPED | OUTPATIENT
Start: 2022-06-21 | End: 2022-07-20

## 2022-06-21 NOTE — TELEPHONE ENCOUNTER
----- Message from Kayla Smith sent at 6/21/2022 12:38 PM CDT -----  Contact: Lynnette [phone]  Lynnette called and stated that no one has the cefixime (SUPRAX) 100 mg/5 mL suspension and wanted to know if there was another option.     Please call

## 2022-06-21 NOTE — TELEPHONE ENCOUNTER
Spoke with pharmacist at [facility] pharmacy.  The medication is not in stock but will be available for patient  tomorrow.

## 2022-07-04 ENCOUNTER — PATIENT MESSAGE (OUTPATIENT)
Dept: PRIMARY CARE CLINIC | Facility: CLINIC | Age: 72
End: 2022-07-04
Payer: MEDICARE

## 2022-07-04 DIAGNOSIS — N39.0 URINARY TRACT INFECTION WITHOUT HEMATURIA, SITE UNSPECIFIED: Primary | ICD-10-CM

## 2022-07-05 RX ORDER — AMOXICILLIN AND CLAVULANATE POTASSIUM 500; 125 MG/1; MG/1
1 TABLET, FILM COATED ORAL 2 TIMES DAILY
Qty: 14 TABLET | Refills: 0 | Status: SHIPPED | OUTPATIENT
Start: 2022-07-05 | End: 2022-07-20

## 2022-07-06 ENCOUNTER — PATIENT MESSAGE (OUTPATIENT)
Dept: PRIMARY CARE CLINIC | Facility: CLINIC | Age: 72
End: 2022-07-06
Payer: MEDICARE

## 2022-07-06 NOTE — TELEPHONE ENCOUNTER
LOV 06/17/2022 UTI with Dr. Davis    I advised patient, she would need to be seen but refuses to come in.  Will you prescribe another antibiotic?

## 2022-07-13 ENCOUNTER — PES CALL (OUTPATIENT)
Dept: ADMINISTRATIVE | Facility: CLINIC | Age: 72
End: 2022-07-13
Payer: MEDICARE

## 2022-07-18 ENCOUNTER — PATIENT MESSAGE (OUTPATIENT)
Dept: PRIMARY CARE CLINIC | Facility: CLINIC | Age: 72
End: 2022-07-18
Payer: MEDICARE

## 2022-07-19 ENCOUNTER — PATIENT MESSAGE (OUTPATIENT)
Dept: RESEARCH | Facility: CLINIC | Age: 72
End: 2022-07-19
Payer: MEDICARE

## 2022-07-20 ENCOUNTER — OFFICE VISIT (OUTPATIENT)
Dept: INTERNAL MEDICINE | Facility: CLINIC | Age: 72
End: 2022-07-20
Payer: MEDICARE

## 2022-07-20 VITALS
BODY MASS INDEX: 27.85 KG/M2 | HEART RATE: 89 BPM | SYSTOLIC BLOOD PRESSURE: 130 MMHG | DIASTOLIC BLOOD PRESSURE: 72 MMHG | WEIGHT: 147.5 LBS | OXYGEN SATURATION: 99 % | HEIGHT: 61 IN

## 2022-07-20 DIAGNOSIS — R30.0 DYSURIA: ICD-10-CM

## 2022-07-20 DIAGNOSIS — N39.0 RECURRENT UTI: Primary | ICD-10-CM

## 2022-07-20 DIAGNOSIS — N30.80 CYSTITIS DUE TO PSEUDOMONAS: ICD-10-CM

## 2022-07-20 DIAGNOSIS — B96.5 CYSTITIS DUE TO PSEUDOMONAS: ICD-10-CM

## 2022-07-20 PROCEDURE — 99999 PR PBB SHADOW E&M-EST. PATIENT-LVL V: CPT | Mod: PBBFAC,,, | Performed by: STUDENT IN AN ORGANIZED HEALTH CARE EDUCATION/TRAINING PROGRAM

## 2022-07-20 PROCEDURE — 99214 PR OFFICE/OUTPT VISIT, EST, LEVL IV, 30-39 MIN: ICD-10-PCS | Mod: S$PBB,,, | Performed by: STUDENT IN AN ORGANIZED HEALTH CARE EDUCATION/TRAINING PROGRAM

## 2022-07-20 PROCEDURE — 87077 CULTURE AEROBIC IDENTIFY: CPT | Performed by: STUDENT IN AN ORGANIZED HEALTH CARE EDUCATION/TRAINING PROGRAM

## 2022-07-20 PROCEDURE — 99215 OFFICE O/P EST HI 40 MIN: CPT | Mod: PBBFAC | Performed by: STUDENT IN AN ORGANIZED HEALTH CARE EDUCATION/TRAINING PROGRAM

## 2022-07-20 PROCEDURE — 87186 SC STD MICRODIL/AGAR DIL: CPT | Performed by: STUDENT IN AN ORGANIZED HEALTH CARE EDUCATION/TRAINING PROGRAM

## 2022-07-20 PROCEDURE — 87088 URINE BACTERIA CULTURE: CPT | Performed by: STUDENT IN AN ORGANIZED HEALTH CARE EDUCATION/TRAINING PROGRAM

## 2022-07-20 PROCEDURE — 99214 OFFICE O/P EST MOD 30 MIN: CPT | Mod: S$PBB,,, | Performed by: STUDENT IN AN ORGANIZED HEALTH CARE EDUCATION/TRAINING PROGRAM

## 2022-07-20 PROCEDURE — 99999 PR PBB SHADOW E&M-EST. PATIENT-LVL V: ICD-10-PCS | Mod: PBBFAC,,, | Performed by: STUDENT IN AN ORGANIZED HEALTH CARE EDUCATION/TRAINING PROGRAM

## 2022-07-20 PROCEDURE — 87086 URINE CULTURE/COLONY COUNT: CPT | Performed by: STUDENT IN AN ORGANIZED HEALTH CARE EDUCATION/TRAINING PROGRAM

## 2022-07-20 RX ORDER — GRANULES FOR ORAL 3 G/1
3 POWDER ORAL ONCE
Qty: 3 G | Refills: 0 | Status: SHIPPED | OUTPATIENT
Start: 2022-07-20 | End: 2022-07-20

## 2022-07-20 NOTE — PROGRESS NOTES
INTERNAL MEDICINE URGENT CARE VISIT NOTE        Assessment/Plan     1. Dysuria  2. Recurrent UTI  - Ambulatory referral/consult to Urology; Future  - Urine culture  - fosfomycin (MONUROL) 3 gram Pack; Take 3 g by mouth once. for 1 dose  Dispense: 3 g; Refill: 0  - Urinalysis, Reflex to Urine Culture Urine, Clean Catch; Future    3. Cystitis due to Pseudomonas  - fosfomycin (MONUROL) 3 gram Pack; Take 3 g by mouth once. for 1 dose  Dispense: 3 g; Refill: 0  - Urinalysis, Reflex to Urine Culture Urine, Clean Catch; Future      Patient with persistent UTI symptoms. Hx of complications in the past requiring urology referral. No fever, chills, flank pain, no vital sign abnormalities, will plan on simple cystitis, I suspect was not covered by previous abx. Will need to get fosfomycin susceptibilities added to labs. Will get repeat UA in 1 week after treatment.     Health Maintenance reviewed and discussed with patient.   RTC in PRN, sooner if needed.    Subjective:     Chief Complaint: Cystitis       Patient ID: Jackelyn Kendrick is a 71 y.o. female with GERD, hypothyroidism, HLD, obesity, HTN, venous insufficiency who is here to discuss UTI concerns.       Urinary Tract Infection   This is a recurrent problem. The current episode started in the past 7 days. The problem occurs every urination. The problem has been waxing and waning. The quality of the pain is described as burning. There has been no fever. She is not sexually active. There is no history of pyelonephritis. Associated symptoms include frequency and urgency. Pertinent negatives include no hematuria, hesitancy or constipation. Associated symptoms comments: Recent IBS flare, cloudy urine. Treatments tried: pyridium. The treatment provided no relief. Her past medical history is significant for hypertension and recurrent UTIs. There is no history of diabetes mellitus, kidney stones or STD.     In June, had UTI with pseudomonas. Was given keflex, symptoms worsen.  Tried cefixime, no improvement. Did improve on augmentin. Symptoms resolved and she finished the antibiotics on July 12th, 1 week ago. She reports the symptoms did completely resolve, but now she reports the symptoms recurred on Sunday, 7/17.     Past Medical History:  Past Medical History:   Diagnosis Date    Arthritis     Basal cell carcinoma     Bronchitis     seasonal    Cataract     Diverticulitis     Dry eye syndrome     GERD (gastroesophageal reflux disease)     Hyperlipidemia     Hypertension     Hypothyroidism 07/19/2012    Obese     PONV (postoperative nausea and vomiting)     Thyroid disease        Home Medications:  Prior to Admission medications    Medication Sig Start Date End Date Taking? Authorizing Provider   albuterol 90 mcg/actuation inhaler Inhale 2 puffs into the lungs every 6 (six) hours as needed for Wheezing. 8/2/17   Everett Frank MD   amoxicillin-clavulanate 500-125mg (AUGMENTIN) 500-125 mg Tab Take 1 tablet (500 mg total) by mouth 2 (two) times daily. 7/5/22   Sowmya Limon MD   cefixime (SUPRAX) 100 mg/5 mL suspension Take 20 mLs (400 mg total) by mouth every 12 (twelve) hours. 6/21/22   Sowmya Limon MD   celecoxib (CELEBREX) 200 MG capsule Take 1 capsule (200 mg total) by mouth once daily. Do not take more than 15 consecutive days. Take w food and water 11/1/21   Fabian Luna MD   ciclopirox (PENLAC) 8 % Soln Apply topically nightly. Apply greater than 8 hr before washing; clean toenail with alcohol Q 7 days.  Maximum therapy of 48 weeks 11/1/21   Fabian Luna MD   diclofenac sodium (VOLTAREN) 1 % Gel Apply 2 g topically 2 (two) times daily as needed (musculoskeletal pain). 3/23/21   Fabian Luna MD   dicyclomine (BENTYL) 20 mg tablet Take 1 tablet (20 mg total) by mouth 4 (four) times daily before meals and nightly. 11/1/21   Fabian Luna MD   diphenhydrAMINE (SOMINEX) 25 mg tablet Take 25 mg by mouth nightly as needed.     Historical Provider   fluocinonide (LIDEX) 0.05 % external solution Apply topically once daily. 11/1/21   Fabian Luna MD   ketoconazole (NIZORAL) 2 % shampoo Apply topically twice a week. 11/1/21   Fabian Luna MD   levothyroxine (SYNTHROID) 75 MCG tablet Take 1 tablet (75 mcg total) by mouth once daily. In am on empty stomach 11/1/21   Fabian Luna MD   LIDOcaine-prilocaine (EMLA) cream Apply topically 2 (two) times daily as needed. To hands. 11/1/21   Fabian Luna MD   losartan (COZAAR) 100 MG tablet Take 1 tablet (100 mg total) by mouth once daily. 11/1/21 11/1/22  Fabian Luna MD   MAGNESIUM ORAL Take 1 tablet by mouth once daily.    Historical Provider   Multi-Vitamin tablet Take 1 tablet by mouth once daily.     Historical Provider   omeprazole (PRILOSEC) 40 MG capsule Take 1 capsule (40 mg total) by mouth once daily. 11/1/21   Fabian Luna MD   oxybutynin (DITROPAN-XL) 10 MG 24 hr tablet Take 1 tablet (10 mg total) by mouth once daily. 11/1/21   Fabian Luna MD   prednisoLONE acetate (PRED FORTE) 1 % DrpS Place 1 drop into the right eye 4 (four) times daily. 1/4/22   Tim Avelar MD   promethazine (PHENERGAN) 25 MG tablet Take 1 tablet (25 mg total) by mouth every 6 (six) hours as needed for Nausea. 3/23/21   Fabian Luna MD   PSYLLIUM SEED, WITH SUGAR, (METAMUCIL ORAL) Take by mouth as needed.     Historical Provider   RESTASIS 0.05 % ophthalmic emulsion PLACE 1 DROP IN EACH EYE TWO TIMES A DAY 12/1/20   Matthias Gutierrez, GREGORIO   rosuvastatin (CRESTOR) 40 MG Tab Take 1 tablet (40 mg total) by mouth once daily. 11/1/21   Fabian Luna MD   traMADoL (ULTRAM) 50 mg tablet Take 1 tablet (50 mg total) by mouth every 6 (six) hours as needed for Pain. 12/1/21   Cori Willson MD   triamcinolone acetonide 0.1% (KENALOG) 0.1 % cream AAA bid 6/12/18   Mercedes Baumann MD       Allergies:  Review of patient's allergies indicates:   Allergen Reactions    Levaquin [levofloxacin]  "Swelling and Edema    Erythromycin (bulk) Nausea And Vomiting       Social History:  Social History     Tobacco Use    Smoking status: Never Smoker    Smokeless tobacco: Never Used   Substance Use Topics    Alcohol use: No     Comment: rare on a special occasion    Drug use: No        Review of Systems   Gastrointestinal: Negative for constipation.   Genitourinary: Positive for frequency and urgency. Negative for hematuria and hesitancy.         Health Maintenance:     Health Maintenance Due   Topic Date Due    Mammogram  02/03/2022    COVID-19 Vaccine (3 - Booster) 05/07/2022       Objective:   /72 (BP Location: Left arm, Patient Position: Sitting, BP Method: Medium (Manual))   Pulse 89   Ht 5' 1" (1.549 m)   Wt 66.9 kg (147 lb 7.8 oz)   SpO2 99%   BMI 27.87 kg/m²        General: AAO x3, no apparent distress  HEENT: PERRL, OP clear  Neck: Supple   CV: irregular rhythm, soft systolic murmur   Pulm: Lungs CTAB, no crackles, no wheezes  Abd: s/NT/ND +BS  Extremities: appears warm and well-perfused  Skin: no rashes, lesions, or tears    Labs:     Lab Results   Component Value Date    WBC 4.29 11/11/2021    HGB 12.2 11/11/2021    HCT 38.8 11/11/2021     11/11/2021    CHOL 148 08/28/2020    TRIG 94 08/28/2020    HDL 58 08/28/2020    ALT 20 08/28/2020    AST 28 08/28/2020     11/11/2021    K 4.2 11/11/2021     11/11/2021    CREATININE 1.0 11/11/2021    BUN 20 11/11/2021    CO2 26 11/11/2021    TSH 1.858 08/28/2020      CMP  Sodium   Date Value Ref Range Status   11/11/2021 140 136 - 145 mmol/L Final     Potassium   Date Value Ref Range Status   11/11/2021 4.2 3.5 - 5.1 mmol/L Final     Chloride   Date Value Ref Range Status   11/11/2021 106 95 - 110 mmol/L Final     CO2   Date Value Ref Range Status   11/11/2021 26 23 - 29 mmol/L Final     Glucose   Date Value Ref Range Status   11/11/2021 101 70 - 110 mg/dL Final     BUN   Date Value Ref Range Status   11/11/2021 20 8 - 23 mg/dL Final "     Creatinine   Date Value Ref Range Status   11/11/2021 1.0 0.5 - 1.4 mg/dL Final     Calcium   Date Value Ref Range Status   11/11/2021 8.6 (L) 8.7 - 10.5 mg/dL Final     Total Protein   Date Value Ref Range Status   08/28/2020 7.1 6.0 - 8.4 g/dL Final     Albumin   Date Value Ref Range Status   08/28/2020 3.6 3.5 - 5.2 g/dL Final     Total Bilirubin   Date Value Ref Range Status   08/28/2020 0.2 0.1 - 1.0 mg/dL Final     Comment:     For infants and newborns, interpretation of results should be based  on gestational age, weight and in agreement with clinical  observations.  Premature Infant recommended reference ranges:  Up to 24 hours.............<8.0 mg/dL  Up to 48 hours............<12.0 mg/dL  3-5 days..................<15.0 mg/dL  6-29 days.................<15.0 mg/dL       Alkaline Phosphatase   Date Value Ref Range Status   08/28/2020 55 55 - 135 U/L Final     AST   Date Value Ref Range Status   08/28/2020 28 10 - 40 U/L Final     ALT   Date Value Ref Range Status   08/28/2020 20 10 - 44 U/L Final     Anion Gap   Date Value Ref Range Status   11/11/2021 8 8 - 16 mmol/L Final     eGFR if    Date Value Ref Range Status   11/11/2021 >60.0 >60 mL/min/1.73 m^2 Final     eGFR if non    Date Value Ref Range Status   11/11/2021 57.2 (A) >60 mL/min/1.73 m^2 Final     Comment:     Calculation used to obtain the estimated glomerular filtration  rate (eGFR) is the CKD-EPI equation.        No results found for: LABA1C, HGBA1C          Amarilys Moore MD   [facility]  Department of Internal Medicine   07/20/2022

## 2022-07-23 LAB — BACTERIA UR CULT: ABNORMAL

## 2022-07-25 ENCOUNTER — PATIENT MESSAGE (OUTPATIENT)
Dept: INTERNAL MEDICINE | Facility: CLINIC | Age: 72
End: 2022-07-25
Payer: MEDICARE

## 2022-07-25 ENCOUNTER — LAB VISIT (OUTPATIENT)
Dept: LAB | Facility: HOSPITAL | Age: 72
End: 2022-07-25
Attending: STUDENT IN AN ORGANIZED HEALTH CARE EDUCATION/TRAINING PROGRAM
Payer: MEDICARE

## 2022-07-25 DIAGNOSIS — R30.0 DYSURIA: ICD-10-CM

## 2022-07-25 DIAGNOSIS — B96.5 CYSTITIS DUE TO PSEUDOMONAS: ICD-10-CM

## 2022-07-25 DIAGNOSIS — N30.80 CYSTITIS DUE TO PSEUDOMONAS: ICD-10-CM

## 2022-07-25 DIAGNOSIS — N39.0 RECURRENT UTI: ICD-10-CM

## 2022-07-25 DIAGNOSIS — N39.0 RECURRENT UTI: Primary | ICD-10-CM

## 2022-07-25 LAB
BACTERIA #/AREA URNS AUTO: ABNORMAL /HPF
BILIRUB UR QL STRIP: NEGATIVE
CLARITY UR REFRACT.AUTO: ABNORMAL
COLOR UR AUTO: YELLOW
GLUCOSE UR QL STRIP: NEGATIVE
HGB UR QL STRIP: ABNORMAL
KETONES UR QL STRIP: NEGATIVE
LEUKOCYTE ESTERASE UR QL STRIP: ABNORMAL
MICROSCOPIC COMMENT: ABNORMAL
NITRITE UR QL STRIP: NEGATIVE
PH UR STRIP: 6 [PH] (ref 5–8)
PROT UR QL STRIP: NEGATIVE
RBC #/AREA URNS AUTO: 0 /HPF (ref 0–4)
SP GR UR STRIP: 1 (ref 1–1.03)
URN SPEC COLLECT METH UR: ABNORMAL
WBC #/AREA URNS AUTO: >100 /HPF (ref 0–5)
WBC CLUMPS UR QL AUTO: ABNORMAL

## 2022-07-25 PROCEDURE — 87088 URINE BACTERIA CULTURE: CPT | Performed by: STUDENT IN AN ORGANIZED HEALTH CARE EDUCATION/TRAINING PROGRAM

## 2022-07-25 PROCEDURE — 87077 CULTURE AEROBIC IDENTIFY: CPT | Performed by: STUDENT IN AN ORGANIZED HEALTH CARE EDUCATION/TRAINING PROGRAM

## 2022-07-25 PROCEDURE — 81001 URINALYSIS AUTO W/SCOPE: CPT | Performed by: STUDENT IN AN ORGANIZED HEALTH CARE EDUCATION/TRAINING PROGRAM

## 2022-07-25 PROCEDURE — 87186 SC STD MICRODIL/AGAR DIL: CPT | Performed by: STUDENT IN AN ORGANIZED HEALTH CARE EDUCATION/TRAINING PROGRAM

## 2022-07-25 PROCEDURE — 87086 URINE CULTURE/COLONY COUNT: CPT | Performed by: STUDENT IN AN ORGANIZED HEALTH CARE EDUCATION/TRAINING PROGRAM

## 2022-07-25 RX ORDER — AMOXICILLIN AND CLAVULANATE POTASSIUM 875; 125 MG/1; MG/1
1 TABLET, FILM COATED ORAL 2 TIMES DAILY
Qty: 20 TABLET | Refills: 0 | Status: SHIPPED | OUTPATIENT
Start: 2022-07-25 | End: 2022-08-04

## 2022-07-25 NOTE — TELEPHONE ENCOUNTER
Patient having recurrent UTI symptoms. Culture from last micro was klebsiella. One from last month was pseudomonas. She reports hx of hysterectomy with bladder lift procedure in the past. I suspect rectovaginal fistula or other anatomical etiology.     Rx for augmentin 875-125 mg BID x 10 days for complicated UTI     She has appt with urology on Thursday.

## 2022-07-27 LAB — BACTERIA UR CULT: ABNORMAL

## 2022-07-28 ENCOUNTER — OFFICE VISIT (OUTPATIENT)
Dept: UROLOGY | Facility: CLINIC | Age: 72
End: 2022-07-28
Payer: MEDICARE

## 2022-07-28 VITALS
HEIGHT: 61 IN | HEART RATE: 87 BPM | DIASTOLIC BLOOD PRESSURE: 70 MMHG | BODY MASS INDEX: 27.64 KG/M2 | SYSTOLIC BLOOD PRESSURE: 142 MMHG | WEIGHT: 146.38 LBS

## 2022-07-28 DIAGNOSIS — N39.0 RECURRENT UTI: ICD-10-CM

## 2022-07-28 PROCEDURE — 99999 PR PBB SHADOW E&M-EST. PATIENT-LVL IV: ICD-10-PCS | Mod: PBBFAC,,, | Performed by: UROLOGY

## 2022-07-28 PROCEDURE — 99204 OFFICE O/P NEW MOD 45 MIN: CPT | Mod: S$PBB,,, | Performed by: UROLOGY

## 2022-07-28 PROCEDURE — 99999 PR PBB SHADOW E&M-EST. PATIENT-LVL IV: CPT | Mod: PBBFAC,,, | Performed by: UROLOGY

## 2022-07-28 PROCEDURE — 99214 OFFICE O/P EST MOD 30 MIN: CPT | Mod: PBBFAC | Performed by: UROLOGY

## 2022-07-28 PROCEDURE — 99204 PR OFFICE/OUTPT VISIT, NEW, LEVL IV, 45-59 MIN: ICD-10-PCS | Mod: S$PBB,,, | Performed by: UROLOGY

## 2022-07-28 RX ORDER — ESTRADIOL 0.1 MG/G
.5-1 CREAM VAGINAL
Qty: 42.5 G | Refills: 3 | Status: SHIPPED | OUTPATIENT
Start: 2022-07-28 | End: 2024-03-05

## 2022-07-28 NOTE — PROGRESS NOTES
[facility]      New Recurrent Urinary Tract Infection Note    7/28/2022    Referred by:  Amarilys Moore MD    HPI: Jackelyn Kendrick is a very pleasant 71 y.o. female who has not previously been seen by an [facility] specialist in our department referred for evaluation of recurrent urinary tract infections. She reports that frequent infections began 6 months ago. These episodes are marked by symptoms including suprapubic pain, frequency and urgency.  Her symptoms typically resolve with antibiotic therapy. The frequency of these episodes is typically monthly with approximately 5 infections over the last 6 months.  Urine cultures were available for review and demonstrate organisms including Pseudomonas.    Independent of episodes of infection, she denies symptoms of irritative voiding including dysuria, frequency, urgency and incontinence. She denies symptoms of obstructive voiding including decreased stream, hesitancy, intermittency, post void dribbling and sense of incomplete emptying. Bladder scan PVR was 0 mL.  Her history includes prior pelvic surgery (hysterectomy (for benign disease)) and no notation of urolithiasis, hematuria, prior pelvic surgery, previous prolapse or incontinence procedures or neurological symptoms/diagnoses. She reports no urinary incontinence.       Previous evaluation for her infections have included no upper or lower tract evaluation. Previous treatments for her recurrent infections have included antibiotics for each infection.     A review of 10+ systems was conducted with pertinent positive and negative findings documented in HPI with all other systems reviewed and negative.    Past medical, family, surgical and social history reviewed as documented in chart with pertinent positive medical, family, surgical and social history detailed in HPI.    Exam Findings:    Const: no acute distress, conversant and alert  Eyes: anicteric, extraocular muscles intact  ENMT:  normocephalic, Nl oral membranes  Cardio: no cyanosis, nl cap refill  Pulm: no tachypnea; no resp distress  Musc: no laceration, no tenderness  Neuro: alert; oriented x 3  Skin: warm, dry; no petichiae  Psych: no anxiety; normal speech       Assessment/Plan:    Recurrent Urinary Tract Infection (new, addt'l workup): Her history suggests recurrent symptomatic infections, confirmed with urine cultures.  Given her history, I have recommended upper tract evaluation with Renal U/S  followed by cystoscopy. . We can perform independent flow rate and repeat PVR next visit to further screen for any voiding dysfunction. In the meantime, during her evaluation we can begin measures to prevent further infections.     1. Schedule office cystoscopy  2. Renal U/S and KUB  3. D-mannose 1000 mg bid   4. Treatment of vaginal atrophy with topical vaginal estrogen cream.

## 2022-08-01 ENCOUNTER — OFFICE VISIT (OUTPATIENT)
Dept: PRIMARY CARE CLINIC | Facility: CLINIC | Age: 72
End: 2022-08-01
Payer: MEDICARE

## 2022-08-01 VITALS
DIASTOLIC BLOOD PRESSURE: 62 MMHG | HEIGHT: 61 IN | WEIGHT: 143.75 LBS | TEMPERATURE: 100 F | SYSTOLIC BLOOD PRESSURE: 130 MMHG | HEART RATE: 99 BPM | OXYGEN SATURATION: 98 % | BODY MASS INDEX: 27.14 KG/M2

## 2022-08-01 DIAGNOSIS — M62.830 SPASM OF THORACIC BACK MUSCLE: Primary | ICD-10-CM

## 2022-08-01 DIAGNOSIS — N39.46 URINARY INCONTINENCE, MIXED: ICD-10-CM

## 2022-08-01 DIAGNOSIS — I10 ESSENTIAL HYPERTENSION: ICD-10-CM

## 2022-08-01 DIAGNOSIS — E03.9 ACQUIRED HYPOTHYROIDISM: ICD-10-CM

## 2022-08-01 DIAGNOSIS — R60.0 LOWER EXTREMITY EDEMA: ICD-10-CM

## 2022-08-01 DIAGNOSIS — M62.838 MUSCLE SPASM: ICD-10-CM

## 2022-08-01 DIAGNOSIS — Z00.00 WELL ADULT EXAM: ICD-10-CM

## 2022-08-01 DIAGNOSIS — Q21.12 PFO (PATENT FORAMEN OVALE): ICD-10-CM

## 2022-08-01 DIAGNOSIS — I87.2 VENOUS INSUFFICIENCY OF BOTH LOWER EXTREMITIES: ICD-10-CM

## 2022-08-01 DIAGNOSIS — M15.9 PRIMARY OSTEOARTHRITIS INVOLVING MULTIPLE JOINTS: ICD-10-CM

## 2022-08-01 DIAGNOSIS — L21.9 SEBORRHEIC DERMATITIS: ICD-10-CM

## 2022-08-01 DIAGNOSIS — E78.2 MIXED HYPERLIPIDEMIA: ICD-10-CM

## 2022-08-01 DIAGNOSIS — I70.0 ATHEROSCLEROSIS OF AORTA: ICD-10-CM

## 2022-08-01 DIAGNOSIS — E66.3 OVERWEIGHT (BMI 25.0-29.9): ICD-10-CM

## 2022-08-01 DIAGNOSIS — K21.9 GASTROESOPHAGEAL REFLUX DISEASE, UNSPECIFIED WHETHER ESOPHAGITIS PRESENT: ICD-10-CM

## 2022-08-01 PROCEDURE — 99213 OFFICE O/P EST LOW 20 MIN: CPT | Mod: S$PBB,,, | Performed by: FAMILY MEDICINE

## 2022-08-01 PROCEDURE — 99213 PR OFFICE/OUTPT VISIT, EST, LEVL III, 20-29 MIN: ICD-10-PCS | Mod: S$PBB,,, | Performed by: FAMILY MEDICINE

## 2022-08-01 PROCEDURE — 99215 OFFICE O/P EST HI 40 MIN: CPT | Mod: PBBFAC,PN | Performed by: FAMILY MEDICINE

## 2022-08-01 PROCEDURE — 99999 PR PBB SHADOW E&M-EST. PATIENT-LVL V: ICD-10-PCS | Mod: PBBFAC,,, | Performed by: FAMILY MEDICINE

## 2022-08-01 PROCEDURE — 96372 THER/PROPH/DIAG INJ SC/IM: CPT | Mod: PBBFAC,PN

## 2022-08-01 PROCEDURE — 99999 PR PBB SHADOW E&M-EST. PATIENT-LVL V: CPT | Mod: PBBFAC,,, | Performed by: FAMILY MEDICINE

## 2022-08-01 RX ORDER — KETOROLAC TROMETHAMINE 30 MG/ML
30 INJECTION, SOLUTION INTRAMUSCULAR; INTRAVENOUS
Status: COMPLETED | OUTPATIENT
Start: 2022-08-01 | End: 2022-08-01

## 2022-08-01 RX ORDER — HYDROCODONE BITARTRATE AND ACETAMINOPHEN 7.5; 325 MG/1; MG/1
1 TABLET ORAL EVERY 6 HOURS PRN
Qty: 28 TABLET | Refills: 0 | Status: SHIPPED | OUTPATIENT
Start: 2022-08-01 | End: 2023-11-27

## 2022-08-01 RX ORDER — METHYLPREDNISOLONE 4 MG/1
TABLET ORAL
Qty: 1 TABLET | Refills: 0 | Status: SHIPPED | OUTPATIENT
Start: 2022-08-01 | End: 2022-08-22

## 2022-08-01 RX ORDER — METHOCARBAMOL 500 MG/1
500 TABLET, FILM COATED ORAL 3 TIMES DAILY
Qty: 30 TABLET | Refills: 0 | Status: SHIPPED | OUTPATIENT
Start: 2022-08-01 | End: 2022-11-10

## 2022-08-01 RX ORDER — CYCLOBENZAPRINE HCL 10 MG
10 TABLET ORAL NIGHTLY
Qty: 30 TABLET | Refills: 0 | Status: SHIPPED | OUTPATIENT
Start: 2022-08-01 | End: 2022-11-10

## 2022-08-01 RX ADMIN — KETOROLAC TROMETHAMINE 30 MG: 30 INJECTION, SOLUTION INTRAMUSCULAR at 04:08

## 2022-08-01 NOTE — PROGRESS NOTES
"/62   Pulse 99   Temp 99.6 °F (37.6 °C) (Oral)   Ht 5' 1" (1.549 m)   Wt 65.2 kg (143 lb 11.8 oz)   SpO2 98%   BMI 27.16 kg/m²     Chief Complaint: Spasms      HPI  Jackelyn Kendrick is a 71 y.o. female here for    5 day ho upper back pain    No injury. Has had this before. Difficulty moving in certain positions. Difficulty sleeping. Not better w otc meds. No  Dysuria. No nvd. No fc    Patient queried and denies any further complaints    SURGICAL AND MEDICAL HISTORY: updated and reviewed.  ALLERGIES updated and reviewed.  Review of patient's allergies indicates:   Allergen Reactions    Levaquin [levofloxacin] Swelling and Edema    Erythromycin (bulk) Nausea And Vomiting     CURRENT OUTPATIENT MEDICATIONS updated and reviewed    Current Outpatient Medications:     albuterol 90 mcg/actuation inhaler, Inhale 2 puffs into the lungs every 6 (six) hours as needed for Wheezing., Disp: 6.7 g, Rfl: 11    amoxicillin-clavulanate 875-125mg (AUGMENTIN) 875-125 mg per tablet, Take 1 tablet by mouth 2 (two) times daily. for 10 days, Disp: 20 tablet, Rfl: 0    celecoxib (CELEBREX) 200 MG capsule, Take 1 capsule (200 mg total) by mouth once daily. Do not take more than 15 consecutive days. Take w food and water, Disp: 30 capsule, Rfl: 11    ciclopirox (PENLAC) 8 % Soln, Apply topically nightly. Apply greater than 8 hr before washing; clean toenail with alcohol Q 7 days.  Maximum therapy of 48 weeks, Disp: 6.6 mL, Rfl: 11    diclofenac sodium (VOLTAREN) 1 % Gel, Apply 2 g topically 2 (two) times daily as needed (musculoskeletal pain)., Disp: 100 g, Rfl: 11    dicyclomine (BENTYL) 20 mg tablet, Take 1 tablet (20 mg total) by mouth 4 (four) times daily before meals and nightly., Disp: 120 tablet, Rfl: 11    diphenhydrAMINE (SOMINEX) 25 mg tablet, Take 25 mg by mouth nightly as needed., Disp: , Rfl:     estradioL (ESTRACE) 0.01 % (0.1 mg/gram) vaginal cream, Place 0.5-1 g vaginally twice a week., Disp: 42.5 g, " Rfl: 3    fluocinonide (LIDEX) 0.05 % external solution, Apply topically once daily., Disp: 60 mL, Rfl: 11    ketoconazole (NIZORAL) 2 % shampoo, Apply topically twice a week., Disp: 120 mL, Rfl: 11    levothyroxine (SYNTHROID) 75 MCG tablet, Take 1 tablet (75 mcg total) by mouth once daily. In am on empty stomach, Disp: 30 tablet, Rfl: 11    LIDOcaine-prilocaine (EMLA) cream, Apply topically 2 (two) times daily as needed. To hands., Disp: 30 g, Rfl: 2    losartan (COZAAR) 100 MG tablet, Take 1 tablet (100 mg total) by mouth once daily., Disp: 30 tablet, Rfl: 11    MAGNESIUM ORAL, Take 1 tablet by mouth once daily., Disp: , Rfl:     Multi-Vitamin tablet, Take 1 tablet by mouth once daily. , Disp: , Rfl:     omeprazole (PRILOSEC) 40 MG capsule, Take 1 capsule (40 mg total) by mouth once daily., Disp: 30 capsule, Rfl: 11    oxybutynin (DITROPAN-XL) 10 MG 24 hr tablet, Take 1 tablet (10 mg total) by mouth once daily., Disp: 30 tablet, Rfl: 11    promethazine (PHENERGAN) 25 MG tablet, Take 1 tablet (25 mg total) by mouth every 6 (six) hours as needed for Nausea., Disp: 30 tablet, Rfl: 5    PSYLLIUM SEED, WITH SUGAR, (METAMUCIL ORAL), Take by mouth as needed. , Disp: , Rfl:     RESTASIS 0.05 % ophthalmic emulsion, PLACE 1 DROP IN EACH EYE TWO TIMES A DAY, Disp: 60 each, Rfl: 12    rosuvastatin (CRESTOR) 40 MG Tab, Take 1 tablet (40 mg total) by mouth once daily., Disp: 30 tablet, Rfl: 11    traMADoL (ULTRAM) 50 mg tablet, Take 1 tablet (50 mg total) by mouth every 6 (six) hours as needed for Pain., Disp: 20 tablet, Rfl: 0    triamcinolone acetonide 0.1% (KENALOG) 0.1 % cream, AAA bid, Disp: 60 g, Rfl: 3    cyclobenzaprine (FLEXERIL) 10 MG tablet, Take 1 tablet (10 mg total) by mouth nightly. As needed for muscle spasms, Disp: 30 tablet, Rfl: 0    HYDROcodone-acetaminophen (NORCO) 7.5-325 mg per tablet, Take 1 tablet by mouth every 6 (six) hours as needed for Pain., Disp: 28 tablet, Rfl: 0     methocarbamoL (ROBAXIN) 500 MG Tab, Take 1 tablet (500 mg total) by mouth 3 (three) times daily. w meals for 3-10 days for muscle spasms, Disp: 30 tablet, Rfl: 0    methylPREDNISolone (MEDROL DOSEPACK) 4 mg tablet, use as directed, Disp: 1 tablet, Rfl: 0    Current Facility-Administered Medications:     sodium chloride 0.9% flush 10 mL, 10 mL, Intravenous, PRN, Pippa Ashby MD    Facility-Administered Medications Ordered in Other Visits:     celecoxib capsule 400 mg, 400 mg, Oral, Once, Fabian Fagan MD    LIDOcaine (PF) 10 mg/ml (1%) injection 10 mg, 1 mL, Intradermal, Once, Fabian Fagan MD    Review of Systems   Constitutional: Negative for activity change, appetite change, chills, diaphoresis, fatigue, fever and unexpected weight change.   HENT: Negative for congestion, ear discharge, ear pain, facial swelling, hearing loss, nosebleeds, postnasal drip, rhinorrhea, sinus pressure, sneezing, sore throat, tinnitus, trouble swallowing and voice change.    Eyes: Negative for photophobia, pain, discharge, redness, itching and visual disturbance.   Respiratory: Negative for cough, chest tightness, shortness of breath and wheezing.    Cardiovascular: Negative for chest pain, palpitations and leg swelling.   Gastrointestinal: Negative for abdominal distention, abdominal pain, anal bleeding, blood in stool, constipation, diarrhea, nausea, rectal pain and vomiting.   Endocrine: Negative for cold intolerance, heat intolerance, polydipsia, polyphagia and polyuria.   Genitourinary: Negative for difficulty urinating, dysuria and flank pain.   Musculoskeletal: Positive for back pain. Negative for arthralgias, joint swelling, myalgias and neck pain.   Skin: Negative for rash.   Neurological: Negative for dizziness, tremors, seizures, syncope, speech difficulty, weakness, light-headedness, numbness and headaches.   Psychiatric/Behavioral: Negative for behavioral problems, confusion, decreased  "concentration, dysphoric mood, sleep disturbance and suicidal ideas. The patient is not nervous/anxious and is not hyperactive.        /62   Pulse 99   Temp 99.6 °F (37.6 °C) (Oral)   Ht 5' 1" (1.549 m)   Wt 65.2 kg (143 lb 11.8 oz)   SpO2 98%   BMI 27.16 kg/m²   Physical Exam  Vitals and nursing note reviewed.   Constitutional:       General: She is not in acute distress.     Appearance: Normal appearance. She is well-developed. She is not ill-appearing or toxic-appearing.   HENT:      Head: Normocephalic and atraumatic.      Right Ear: Tympanic membrane, ear canal and external ear normal.      Left Ear: Tympanic membrane, ear canal and external ear normal.      Nose: Nose normal.      Mouth/Throat:      Lips: Pink.      Mouth: Mucous membranes are moist.      Pharynx: No oropharyngeal exudate or posterior oropharyngeal erythema.   Eyes:      General: No scleral icterus.        Right eye: No discharge.         Left eye: No discharge.      Extraocular Movements: Extraocular movements intact.      Conjunctiva/sclera: Conjunctivae normal.   Neck:      Vascular: No carotid bruit.   Cardiovascular:      Rate and Rhythm: Normal rate and regular rhythm.      Pulses: Normal pulses.      Heart sounds: Normal heart sounds. No murmur heard.  Pulmonary:      Effort: Pulmonary effort is normal. No respiratory distress.      Breath sounds: Normal breath sounds. No wheezing or rales.   Abdominal:      General: Bowel sounds are normal. There is no distension.      Palpations: Abdomen is soft. There is no mass.      Tenderness: There is no abdominal tenderness. There is no right CVA tenderness, left CVA tenderness, guarding or rebound.      Hernia: No hernia is present.   Musculoskeletal:      Cervical back: Normal range of motion and neck supple. No rigidity or tenderness.      Thoracic back: Spasms and tenderness present. No swelling, deformity, signs of trauma, lacerations or bony tenderness. Normal range of motion. " No scoliosis.   Lymphadenopathy:      Cervical: No cervical adenopathy.   Skin:     General: Skin is warm and dry.   Neurological:      General: No focal deficit present.      Mental Status: She is alert. Mental status is at baseline.   Psychiatric:         Mood and Affect: Mood normal.         Behavior: Behavior normal. Behavior is cooperative.       Jackelyn was seen today for spasms.    Diagnoses and all orders for this visit:    Spasm of thoracic back muscle    Seborrheic dermatitis    Venous insufficiency of both lower extremities    PFO (patent foramen ovale)    Mixed hyperlipidemia    Essential hypertension    Atherosclerosis of aorta    Urinary incontinence, mixed    Overweight (BMI 25.0-29.9)    Acquired hypothyroidism    Gastroesophageal reflux disease, unspecified whether esophagitis present    Primary osteoarthritis involving multiple joints    Lower extremity edema    Well adult exam    Muscle spasm    Other orders  -     ketorolac injection 30 mg  -     methylPREDNISolone (MEDROL DOSEPACK) 4 mg tablet; use as directed  -     cyclobenzaprine (FLEXERIL) 10 MG tablet; Take 1 tablet (10 mg total) by mouth nightly. As needed for muscle spasms  -     methocarbamoL (ROBAXIN) 500 MG Tab; Take 1 tablet (500 mg total) by mouth 3 (three) times daily. w meals for 3-10 days for muscle spasms  -     HYDROcodone-acetaminophen (NORCO) 7.5-325 mg per tablet; Take 1 tablet by mouth every 6 (six) hours as needed for Pain.      No follow-ups on file.        Fabian Luna MD        ===========================================

## 2022-08-01 NOTE — PROGRESS NOTES
Two patient identifiers verified.  Allergies reviewed.  Ketorolac 30 mg IM administered to left dorsalgluteal per MD order.  Patient tolerated injection well; no redness, bleeding, or bruising noted to injection site.  Patient instructed to remain in clinic setting for 15 minutes.  Verbalizes understanding.

## 2022-08-03 PROBLEM — M25.511 RIGHT SHOULDER PAIN: Status: RESOLVED | Noted: 2018-08-01 | Resolved: 2022-08-03

## 2022-08-09 ENCOUNTER — HOSPITAL ENCOUNTER (OUTPATIENT)
Dept: RADIOLOGY | Facility: HOSPITAL | Age: 72
Discharge: HOME | End: 2022-08-09
Attending: UROLOGY
Payer: MEDICARE

## 2022-08-09 DIAGNOSIS — N39.0 RECURRENT UTI: ICD-10-CM

## 2022-08-09 PROCEDURE — 76770 US RETROPERITONEAL COMPLETE: ICD-10-PCS | Mod: 26,,, | Performed by: RADIOLOGY

## 2022-08-09 PROCEDURE — 76770 US EXAM ABDO BACK WALL COMP: CPT | Mod: TC

## 2022-08-09 PROCEDURE — 76770 US EXAM ABDO BACK WALL COMP: CPT | Mod: 26,,, | Performed by: RADIOLOGY

## 2022-08-11 ENCOUNTER — PROCEDURE VISIT (OUTPATIENT)
Dept: UROLOGY | Facility: CLINIC | Age: 72
End: 2022-08-11
Payer: MEDICARE

## 2022-08-11 VITALS
SYSTOLIC BLOOD PRESSURE: 134 MMHG | DIASTOLIC BLOOD PRESSURE: 61 MMHG | RESPIRATION RATE: 16 BRPM | HEART RATE: 99 BPM | TEMPERATURE: 98 F | HEIGHT: 61 IN | BODY MASS INDEX: 27.26 KG/M2 | WEIGHT: 144.38 LBS

## 2022-08-11 DIAGNOSIS — N39.0 RECURRENT UTI: Primary | ICD-10-CM

## 2022-08-11 PROCEDURE — 52000 CYSTOURETHROSCOPY: CPT | Mod: S$PBB,,, | Performed by: UROLOGY

## 2022-08-11 PROCEDURE — 52000 CYSTOURETHROSCOPY: CPT | Mod: PBBFAC | Performed by: UROLOGY

## 2022-08-11 PROCEDURE — 52000 PR CYSTOURETHROSCOPY: ICD-10-PCS | Mod: S$PBB,,, | Performed by: UROLOGY

## 2022-08-11 RX ORDER — LIDOCAINE HYDROCHLORIDE 20 MG/ML
JELLY TOPICAL
Status: COMPLETED | OUTPATIENT
Start: 2022-08-11 | End: 2022-08-11

## 2022-08-11 RX ADMIN — LIDOCAINE HYDROCHLORIDE: 20 JELLY TOPICAL at 12:08

## 2022-08-11 NOTE — PATIENT INSTRUCTIONS
What to Expect After a Cystoscopy  For the next 24-48 hours, you may feel a mild burning when you urinate. This burning is normal and expected. Drink plenty of water to dilute the urine to help relieve the burning sensation. You may also see a small amount of blood in your urine after the procedure.    Unless you are already taking antibiotics, you may be given an antibiotic after the test to prevent infection.    Signs and Symptoms to Report  Call the [facility] at [phone] if you develop any of the following:  Fever of 101 degrees or higher  Chills or persistent bleeding  Inability to urinate

## 2022-08-11 NOTE — PROCEDURES
Office Cystourethroscopy Note    Date: 8/11/2022   Referring Provider: Rylan John MD     Reason for Cystoscopy: Recurrent UTI    Upper Tract Evaluation:   Renal U/S: Normal upper urinary tract; with the exception of possible non-obstructing small left renal calculus. If present, would be likely to pass spontaneously.     Procedure Details: Informed consent was obtained and she was sterily prepped and 1% lidocaine jelly was injected per urethra. A flexible cystoscope was inserted into the bladder via the urethra. There was no evidence of stricture, stenosis, lesions, other obstruction or diverticulum. Significant findings included a normal unobstructed urethra only. Cystoscopic examination of the bladder revealed orthotopically positioned, normal bilateral ureteral orifices with clear yellow urine effluxing from each orifice. All mucosal surfaces were examined with no apparent stones, tumors, foreign bodies, erythema, trabeculation, diverticula or ulcers. The procedure was concluded without complications. The patient was not administered a post-procedure antibiotic.     Specimens: No Specimens    Findings: Normal bladder and urethra    Other Findings:  PVR - 0 mL    Pelvic Exam:  Vaginal Mucosa: mild atrophy     Assesment and Plan:   Recurrent Urinary Tract Infections: No primary bladder pathology identified today to explain her recurrent urinary tract infections. We have recommended continued treatment discussed at last clinic visit. She should continue topical vaginal estrogen cream and D-mannose. No need for urologic intervention.

## 2022-08-18 ENCOUNTER — PATIENT MESSAGE (OUTPATIENT)
Dept: PRIMARY CARE CLINIC | Facility: CLINIC | Age: 72
End: 2022-08-18
Payer: MEDICARE

## 2022-08-19 ENCOUNTER — OFFICE VISIT (OUTPATIENT)
Dept: INTERNAL MEDICINE | Facility: CLINIC | Age: 72
End: 2022-08-19
Payer: MEDICARE

## 2022-08-19 ENCOUNTER — LAB VISIT (OUTPATIENT)
Dept: LAB | Facility: HOSPITAL | Age: 72
End: 2022-08-19
Attending: INTERNAL MEDICINE
Payer: MEDICARE

## 2022-08-19 DIAGNOSIS — N39.0 RECURRENT UTI: Primary | ICD-10-CM

## 2022-08-19 DIAGNOSIS — N39.0 RECURRENT UTI: ICD-10-CM

## 2022-08-19 LAB
BACTERIA #/AREA URNS AUTO: ABNORMAL /HPF
BILIRUB UR QL STRIP: NEGATIVE
CLARITY UR REFRACT.AUTO: ABNORMAL
COLOR UR AUTO: YELLOW
GLUCOSE UR QL STRIP: NEGATIVE
HGB UR QL STRIP: ABNORMAL
HYALINE CASTS UR QL AUTO: 0 /LPF
KETONES UR QL STRIP: NEGATIVE
LEUKOCYTE ESTERASE UR QL STRIP: ABNORMAL
MICROSCOPIC COMMENT: ABNORMAL
NITRITE UR QL STRIP: NEGATIVE
PH UR STRIP: 6 [PH] (ref 5–8)
PROT UR QL STRIP: ABNORMAL
RBC #/AREA URNS AUTO: 0 /HPF (ref 0–4)
SP GR UR STRIP: 1.01 (ref 1–1.03)
SQUAMOUS #/AREA URNS AUTO: 2 /HPF
URN SPEC COLLECT METH UR: ABNORMAL
WBC #/AREA URNS AUTO: >100 /HPF (ref 0–5)
WBC CLUMPS UR QL AUTO: ABNORMAL

## 2022-08-19 PROCEDURE — 87088 URINE BACTERIA CULTURE: CPT | Performed by: INTERNAL MEDICINE

## 2022-08-19 PROCEDURE — 81001 URINALYSIS AUTO W/SCOPE: CPT | Performed by: INTERNAL MEDICINE

## 2022-08-19 PROCEDURE — 99213 OFFICE O/P EST LOW 20 MIN: CPT | Mod: S$PBB,,, | Performed by: INTERNAL MEDICINE

## 2022-08-19 PROCEDURE — 87077 CULTURE AEROBIC IDENTIFY: CPT | Performed by: INTERNAL MEDICINE

## 2022-08-19 PROCEDURE — 87086 URINE CULTURE/COLONY COUNT: CPT | Performed by: INTERNAL MEDICINE

## 2022-08-19 PROCEDURE — 99999 PR PBB SHADOW E&M-EST. PATIENT-LVL V: ICD-10-PCS | Mod: PBBFAC,,, | Performed by: INTERNAL MEDICINE

## 2022-08-19 PROCEDURE — 99213 PR OFFICE/OUTPT VISIT, EST, LEVL III, 20-29 MIN: ICD-10-PCS | Mod: S$PBB,,, | Performed by: INTERNAL MEDICINE

## 2022-08-19 PROCEDURE — 99215 OFFICE O/P EST HI 40 MIN: CPT | Mod: PBBFAC | Performed by: INTERNAL MEDICINE

## 2022-08-19 PROCEDURE — 99999 PR PBB SHADOW E&M-EST. PATIENT-LVL V: CPT | Mod: PBBFAC,,, | Performed by: INTERNAL MEDICINE

## 2022-08-19 PROCEDURE — 87186 SC STD MICRODIL/AGAR DIL: CPT | Performed by: INTERNAL MEDICINE

## 2022-08-19 RX ORDER — SULFAMETHOXAZOLE AND TRIMETHOPRIM 800; 160 MG/1; MG/1
1 TABLET ORAL 2 TIMES DAILY
Qty: 14 TABLET | Refills: 0 | Status: SHIPPED | OUTPATIENT
Start: 2022-08-19 | End: 2022-08-26

## 2022-08-21 VITALS
TEMPERATURE: 99 F | SYSTOLIC BLOOD PRESSURE: 138 MMHG | WEIGHT: 144.81 LBS | OXYGEN SATURATION: 96 % | DIASTOLIC BLOOD PRESSURE: 68 MMHG | HEIGHT: 61 IN | BODY MASS INDEX: 27.34 KG/M2 | HEART RATE: 62 BPM

## 2022-08-21 NOTE — PROGRESS NOTES
Subjective:       Patient ID: Jackelyn Kendrick is a 71 y.o. female.    Chief Complaint: Cystitis    HPI  She complains of dysuria and urinary frequency.  She has problems with recurrent urinary tract infections.  No fever, chills, night sweats.  No nausea, vomiting  Review of Systems   Constitutional: Negative for chills, fatigue, fever and unexpected weight change.   Respiratory: Negative for chest tightness and shortness of breath.    Cardiovascular: Negative for chest pain and palpitations.   Gastrointestinal: Negative for abdominal pain and blood in stool.   Neurological: Negative for dizziness, syncope, numbness and headaches.       Objective:      Physical Exam  HENT:      Right Ear: External ear normal.      Left Ear: External ear normal.      Nose: Nose normal.      Mouth/Throat:      Mouth: Mucous membranes are moist.      Pharynx: Oropharynx is clear.   Eyes:      Pupils: Pupils are equal, round, and reactive to light.   Cardiovascular:      Rate and Rhythm: Normal rate and regular rhythm.      Heart sounds: No murmur heard.  Pulmonary:      Breath sounds: Normal breath sounds.   Chest:   Breasts:      Right: No axillary adenopathy.      Left: No axillary adenopathy.       Abdominal:      General: There is no distension.      Palpations: There is no hepatomegaly or splenomegaly.      Tenderness: There is no abdominal tenderness.   Musculoskeletal:      Cervical back: Normal range of motion.   Lymphadenopathy:      Cervical: No cervical adenopathy.      Upper Body:      Right upper body: No axillary adenopathy.      Left upper body: No axillary adenopathy.   Neurological:      Cranial Nerves: No cranial nerve deficit.      Sensory: No sensory deficit.      Motor: Motor function is intact.      Deep Tendon Reflexes: Reflexes are normal and symmetric.         Assessment/Plan       Assessment and plan: Urinary tract infection:  Urine sent for urinalysis and culture.  Bactrim DS 1 p.o. b.i.d. x7 days.  Call  if no relief.  She was here for urgent care only appointment.  She will follow-up with her primary care physician for a physical

## 2022-08-22 LAB — BACTERIA UR CULT: ABNORMAL

## 2022-08-30 DIAGNOSIS — M79.641 RIGHT HAND PAIN: Primary | ICD-10-CM

## 2022-08-31 ENCOUNTER — HOSPITAL ENCOUNTER (OUTPATIENT)
Dept: RADIOLOGY | Facility: HOSPITAL | Age: 72
Discharge: HOME | End: 2022-08-31
Attending: ORTHOPAEDIC SURGERY
Payer: MEDICARE

## 2022-08-31 ENCOUNTER — OFFICE VISIT (OUTPATIENT)
Dept: ORTHOPEDICS | Facility: CLINIC | Age: 72
End: 2022-08-31
Payer: MEDICARE

## 2022-08-31 VITALS — HEIGHT: 61 IN | WEIGHT: 142.63 LBS | BODY MASS INDEX: 26.93 KG/M2

## 2022-08-31 DIAGNOSIS — M18.0 PRIMARY OSTEOARTHRITIS OF BOTH FIRST CARPOMETACARPAL JOINTS: ICD-10-CM

## 2022-08-31 DIAGNOSIS — M19.042 PRIMARY OSTEOARTHRITIS OF BOTH HANDS: ICD-10-CM

## 2022-08-31 DIAGNOSIS — M79.641 RIGHT HAND PAIN: ICD-10-CM

## 2022-08-31 DIAGNOSIS — M65.311 TRIGGER FINGER OF RIGHT THUMB: ICD-10-CM

## 2022-08-31 DIAGNOSIS — M65.312 TRIGGER FINGER OF LEFT THUMB: Primary | ICD-10-CM

## 2022-08-31 DIAGNOSIS — Z98.890 S/P TRIGGER FINGER RELEASE: ICD-10-CM

## 2022-08-31 DIAGNOSIS — M65.4 TENOSYNOVITIS, DE QUERVAIN: Primary | ICD-10-CM

## 2022-08-31 DIAGNOSIS — M19.041 PRIMARY OSTEOARTHRITIS OF BOTH HANDS: ICD-10-CM

## 2022-08-31 DIAGNOSIS — M72.0 DUPUYTREN'S DISEASE OF PALM OF RIGHT HAND: ICD-10-CM

## 2022-08-31 DIAGNOSIS — M65.312 TRIGGER FINGER OF LEFT THUMB: ICD-10-CM

## 2022-08-31 PROCEDURE — 99214 OFFICE O/P EST MOD 30 MIN: CPT | Mod: S$PBB,25,, | Performed by: ORTHOPAEDIC SURGERY

## 2022-08-31 PROCEDURE — 73130 XR HAND COMPLETE 3 VIEWS BILATERAL: ICD-10-PCS | Mod: 26,50,, | Performed by: RADIOLOGY

## 2022-08-31 PROCEDURE — 20600 DRAIN/INJ JOINT/BURSA W/O US: CPT | Mod: PBBFAC,PO,RT | Performed by: ORTHOPAEDIC SURGERY

## 2022-08-31 PROCEDURE — 73130 X-RAY EXAM OF HAND: CPT | Mod: TC,50,PO

## 2022-08-31 PROCEDURE — 20600 SMALL JOINT ASPIRATION/INJECTION: R LONG PIP: ICD-10-PCS | Mod: S$PBB,51,RT, | Performed by: ORTHOPAEDIC SURGERY

## 2022-08-31 PROCEDURE — 99214 PR OFFICE/OUTPT VISIT, EST, LEVL IV, 30-39 MIN: ICD-10-PCS | Mod: S$PBB,25,, | Performed by: ORTHOPAEDIC SURGERY

## 2022-08-31 PROCEDURE — 99214 OFFICE O/P EST MOD 30 MIN: CPT | Mod: PBBFAC,PO | Performed by: ORTHOPAEDIC SURGERY

## 2022-08-31 PROCEDURE — 73130 X-RAY EXAM OF HAND: CPT | Mod: 26,50,, | Performed by: RADIOLOGY

## 2022-08-31 PROCEDURE — 99999 PR PBB SHADOW E&M-EST. PATIENT-LVL IV: CPT | Mod: PBBFAC,,, | Performed by: ORTHOPAEDIC SURGERY

## 2022-08-31 PROCEDURE — 20550 NJX 1 TENDON SHEATH/LIGAMENT: CPT | Mod: PBBFAC,59,PO,RT | Performed by: ORTHOPAEDIC SURGERY

## 2022-08-31 PROCEDURE — 99999 PR PBB SHADOW E&M-EST. PATIENT-LVL IV: ICD-10-PCS | Mod: PBBFAC,,, | Performed by: ORTHOPAEDIC SURGERY

## 2022-08-31 PROCEDURE — 20550 TENDON SHEATH: ICD-10-PCS | Mod: S$PBB,59,RT, | Performed by: ORTHOPAEDIC SURGERY

## 2022-08-31 RX ORDER — METHYLPREDNISOLONE ACETATE 40 MG/ML
40 INJECTION, SUSPENSION INTRA-ARTICULAR; INTRALESIONAL; INTRAMUSCULAR; SOFT TISSUE
Status: DISCONTINUED | OUTPATIENT
Start: 2022-08-31 | End: 2022-08-31 | Stop reason: HOSPADM

## 2022-08-31 RX ADMIN — METHYLPREDNISOLONE ACETATE 40 MG: 40 INJECTION, SUSPENSION INTRALESIONAL; INTRAMUSCULAR; INTRASYNOVIAL; SOFT TISSUE at 09:08

## 2022-08-31 NOTE — PROCEDURES
Tendon Sheath    Date/Time: 8/31/2022 9:15 AM  Performed by: Suzy Dominguez MD  Authorized by: Suzy Dominguez MD     Consent Done?:  Yes (Verbal)  Indications:  Pain  Prep: patient was prepped and draped in usual sterile fashion      Local anesthesia used?: Yes    Anesthesia:  Local infiltration  Local anesthetic:  Lidocaine 1% without epinephrine  Anesthetic total (ml):  1    Location:  Wrist  Site:  R first doral compartment  Needle size:  25 G  Approach:  Radial  Medications:  40 mg methylPREDNISolone acetate 40 mg/mL  Patient tolerance:  Patient tolerated the procedure well with no immediate complications  Small Joint Aspiration/Injection: R long PIP    Date/Time: 8/31/2022 9:15 AM  Performed by: Suzy Dominguez MD  Authorized by: Suzy Dominguez MD     Consent Done?:  Yes (Verbal)  Indications:  Pain  Timeout: prior to procedure the correct patient, procedure, and site was verified    Prep: patient was prepped and draped in usual sterile fashion      Local anesthesia used?: Yes    Anesthesia:  Local infiltration  Local anesthetic:  Lidocaine 1% without epinephrine  Location:  Long finger  Site:  R long PIP  Needle size:  25 G  Medications:  40 mg methylPREDNISolone acetate 40 mg/mL  Patient tolerance:  Patient tolerated the procedure well with no immediate complications

## 2022-08-31 NOTE — PROGRESS NOTES
"Jackelynysabel Kendrick presents for follow up evaluation of   Encounter Diagnoses   Name Primary?    Trigger finger of right thumb     Dupuytren's disease of palm of right hand     S/P trigger finger release     Tenosynovitis, de Quervain Yes    Primary osteoarthritis of both hands     Primary osteoarthritis of both first carpometacarpal joints      Patient is 9m s/p left trigger finger release and doing very well, no pain.    She is here today with a new pain of her right wrist and right long finger pain. She reports that it started a couple of weeks ago. She has tried bracing and it helps her thumb pain but not her finger pain.  She feels pain in the finger with gripping and reports decreased ROM in the finger. She also feels that the left palm nodule has grown, no pain in left palm.    Vitals:    08/31/22 0922   Weight: 64.7 kg (142 lb 10.2 oz)   Height: 5' 1" (1.549 m)   PainSc:   3         PE:    AA&O x 4.  NAD  HEENT:  NCAT, sclera nonicteric  Lungs:  Respirations are equal and unlabored.  CV:  2+ bilateral upper and lower extremity pulses.  MSK:  Neurovascularly intact bilaterally.  5/5 thenar and intrinsic musculature strength.  ROM right long PIP joint 0-90 degrees otherwise, full range of motion hands, wrists and elbows. + Finkelstein's test, tender to palpation right 1st dorsal extensor compartment.  Left palm ring finger dupuytrens nodules without cord, right palm dupuytrens cord thumb webspace    X-rays AP, lateral and oblique bilateral hands taken today are independently reviewed by me and shows Eaton stage III basilar thumb arthritis, right long finger PIP osteoarthritis with ulnar deviation.       A/P: Right de Quervain's tendonitis, bilateral hand osteoarthritis, bilateral Dupuytren's disease    1) We have discussed the natural history of hand arthritis and de Quervain's tendonitis including treatment options such as splinting, oral and topical anti-inflammatories, cortisone injections and " surgery.    -I have offered her a selective injection. I have explained the risks, benefits, and alternatives of the procedure in detail.  The patient voices understanding and all questions have been answered. The patient agrees to proceed as planned. So after a sterile prep of the skin in the normal fashion the right 1st dorsal extensor compartment and right long finger PIP joint were injected using a 25 gauge needle with a combination of 1cc 1% plain lidocaine and 40 mg of methylprednisolone.  The patient is cautioned and immediate relief of pain is secondary to the local anesthetic and will be temporary.  After the anesthetic wears off there may be a increase in pain that may last for a few hours or a few days and they should use ice to help alleviate this flair up of pain. Patient tolerated the procedure well.    - F/U as needed for any worsening of symptoms  - Call with any questions/concerns in the interim         Suzy Dominguez MD    Please be aware that this note has been generated with the assistance of MModal voice-to-text.  Please excuse any spelling or grammatical errors.

## 2022-09-26 ENCOUNTER — OFFICE VISIT (OUTPATIENT)
Dept: INFECTIOUS DISEASES | Facility: CLINIC | Age: 72
End: 2022-09-26
Payer: MEDICARE

## 2022-09-26 VITALS
TEMPERATURE: 98 F | HEART RATE: 78 BPM | DIASTOLIC BLOOD PRESSURE: 66 MMHG | BODY MASS INDEX: 26.76 KG/M2 | WEIGHT: 141.75 LBS | HEIGHT: 61 IN | SYSTOLIC BLOOD PRESSURE: 129 MMHG

## 2022-09-26 DIAGNOSIS — N39.0 RECURRENT UTI: ICD-10-CM

## 2022-09-26 PROCEDURE — 99999 PR PBB SHADOW E&M-EST. PATIENT-LVL III: CPT | Mod: PBBFAC,,, | Performed by: PHYSICIAN ASSISTANT

## 2022-09-26 PROCEDURE — 99214 PR OFFICE/OUTPT VISIT, EST, LEVL IV, 30-39 MIN: ICD-10-PCS | Mod: S$PBB,,, | Performed by: PHYSICIAN ASSISTANT

## 2022-09-26 PROCEDURE — 99999 PR PBB SHADOW E&M-EST. PATIENT-LVL III: ICD-10-PCS | Mod: PBBFAC,,, | Performed by: PHYSICIAN ASSISTANT

## 2022-09-26 PROCEDURE — 99213 OFFICE O/P EST LOW 20 MIN: CPT | Mod: PBBFAC | Performed by: PHYSICIAN ASSISTANT

## 2022-09-26 PROCEDURE — 99214 OFFICE O/P EST MOD 30 MIN: CPT | Mod: S$PBB,,, | Performed by: PHYSICIAN ASSISTANT

## 2022-09-26 NOTE — PROGRESS NOTES
"Subjective:       Patient ID: Jackelyn Kendrick is a 71 y.o. female.    Chief Complaint: Urinary Tract Infection    HPI    Ms. Kendrick is a 70 y/o  with IBS female presents to ID for recurrent UTIs. Pt is currently taking d-mannose supplements and using estrogen cream. Saw urology two-three weeks ago. Had ultrasound of kidneys. Results unremarkable. Had a cystoscopy and results  were unremarkable. Pt reports onset of symptom starts off with "pressure". Denies having burning with urination but reports discomfort. Pt has nocturia and frequency with urgency. Denies having any fever chills or night sweats. Denies hospitalization. Pt took multiple abx since 6/2022. Denies having any relief until taking bactrim. Has no urinary complaints at this time.     Review of Systems   Constitutional:  Negative for activity change, appetite change, chills, diaphoresis, fatigue and fever.   Respiratory:  Negative for cough and shortness of breath.    Gastrointestinal:  Negative for abdominal pain, diarrhea, nausea and vomiting.   Genitourinary:  Negative for dysuria.   Musculoskeletal:  Negative for arthralgias, back pain and joint swelling.   Integumentary:  Negative for rash.   Neurological:  Negative for dizziness and headaches.   Psychiatric/Behavioral:  The patient is nervous/anxious.        Objective:      Physical Exam  Vitals and nursing note reviewed.   Constitutional:       General: She is not in acute distress.     Appearance: She is well-developed. She is not diaphoretic.   HENT:      Head: Normocephalic and atraumatic.   Eyes:      Pupils: Pupils are equal, round, and reactive to light.   Cardiovascular:      Rate and Rhythm: Normal rate and regular rhythm.      Heart sounds: Normal heart sounds. No murmur heard.    No friction rub. No gallop.   Pulmonary:      Effort: Pulmonary effort is normal. No respiratory distress.      Breath sounds: Normal breath sounds. No wheezing or rales.   Chest:      Chest wall: No " tenderness.   Abdominal:      General: Bowel sounds are normal. There is no distension.      Palpations: Abdomen is soft. There is no mass.      Tenderness: There is no abdominal tenderness. There is no guarding or rebound.      Hernia: No hernia is present.   Musculoskeletal:         General: No tenderness or deformity. Normal range of motion.      Cervical back: Normal range of motion and neck supple.   Skin:     General: Skin is warm and dry.      Coloration: Skin is not pale.      Findings: No erythema.   Neurological:      Mental Status: She is alert and oriented to person, place, and time.      Cranial Nerves: No cranial nerve deficit.      Coordination: Coordination normal.   Psychiatric:         Behavior: Behavior normal.         Thought Content: Thought content normal.         Assessment:       Problem List Items Addressed This Visit    None  Visit Diagnoses       Recurrent UTI                  Plan:         Avoid checking ua/urine cultures if asymptomatic   Continue d mannose and cranberry tablets. Continue estrogen cream   F/u as needed      >35 minutes of total time spent on the encounter, which includes face to face time and non-face to face time preparing to see the patient (eg, review of tests), Obtaining and/or reviewing separately obtained history, Documenting clinical information in the electronic or other health record, Independently interpreting results (not separately reported) and communicating results to the patient/family/caregiver, or Care coordination (not separately reported).

## 2022-10-27 ENCOUNTER — PATIENT MESSAGE (OUTPATIENT)
Dept: PRIMARY CARE CLINIC | Facility: CLINIC | Age: 72
End: 2022-10-27
Payer: MEDICARE

## 2022-10-28 ENCOUNTER — OFFICE VISIT (OUTPATIENT)
Dept: INTERNAL MEDICINE | Facility: CLINIC | Age: 72
End: 2022-10-28
Payer: MEDICARE

## 2022-10-28 VITALS
DIASTOLIC BLOOD PRESSURE: 72 MMHG | TEMPERATURE: 99 F | RESPIRATION RATE: 18 BRPM | HEIGHT: 61 IN | HEART RATE: 84 BPM | SYSTOLIC BLOOD PRESSURE: 132 MMHG | BODY MASS INDEX: 26.35 KG/M2 | WEIGHT: 139.56 LBS

## 2022-10-28 DIAGNOSIS — N30.01 ACUTE CYSTITIS WITH HEMATURIA: Primary | ICD-10-CM

## 2022-10-28 LAB
BILIRUB SERPL-MCNC: ABNORMAL MG/DL
BLOOD, POC UA: ABNORMAL
GLUCOSE UR QL STRIP: ABNORMAL
KETONES UR QL STRIP: ABNORMAL
LEUKOCYTE ESTERASE URINE, POC: ABNORMAL
NITRITE, POC UA: ABNORMAL
PH, POC UA: ABNORMAL
PROTEIN, POC: ABNORMAL
SPECIFIC GRAVITY, POC UA: ABNORMAL
UROBILINOGEN, POC UA: ABNORMAL

## 2022-10-28 PROCEDURE — 99215 OFFICE O/P EST HI 40 MIN: CPT | Mod: PBBFAC,PO | Performed by: NURSE PRACTITIONER

## 2022-10-28 PROCEDURE — 99999 PR PBB SHADOW E&M-EST. PATIENT-LVL V: ICD-10-PCS | Mod: PBBFAC,,, | Performed by: NURSE PRACTITIONER

## 2022-10-28 PROCEDURE — 87077 CULTURE AEROBIC IDENTIFY: CPT | Performed by: NURSE PRACTITIONER

## 2022-10-28 PROCEDURE — 87086 URINE CULTURE/COLONY COUNT: CPT | Performed by: NURSE PRACTITIONER

## 2022-10-28 PROCEDURE — 87186 SC STD MICRODIL/AGAR DIL: CPT | Performed by: NURSE PRACTITIONER

## 2022-10-28 PROCEDURE — 81003 URINALYSIS AUTO W/O SCOPE: CPT | Mod: PBBFAC,PO | Performed by: NURSE PRACTITIONER

## 2022-10-28 PROCEDURE — 99213 PR OFFICE/OUTPT VISIT, EST, LEVL III, 20-29 MIN: ICD-10-PCS | Mod: S$PBB,,, | Performed by: NURSE PRACTITIONER

## 2022-10-28 PROCEDURE — 99213 OFFICE O/P EST LOW 20 MIN: CPT | Mod: S$PBB,,, | Performed by: NURSE PRACTITIONER

## 2022-10-28 PROCEDURE — 99999 PR PBB SHADOW E&M-EST. PATIENT-LVL V: CPT | Mod: PBBFAC,,, | Performed by: NURSE PRACTITIONER

## 2022-10-28 PROCEDURE — 87088 URINE BACTERIA CULTURE: CPT | Performed by: NURSE PRACTITIONER

## 2022-10-28 RX ORDER — SULFAMETHOXAZOLE AND TRIMETHOPRIM 800; 160 MG/1; MG/1
1 TABLET ORAL 2 TIMES DAILY
Qty: 14 TABLET | Refills: 0 | Status: SHIPPED | OUTPATIENT
Start: 2022-10-28 | End: 2022-11-04 | Stop reason: SDUPTHER

## 2022-10-28 NOTE — PROGRESS NOTES
Subjective:       Patient ID: Jackelyn Kendrick is a 71 y.o. female.    Chief Complaint: Cystitis, Urinary Frequency (Pt state 5-6 x's per night ), Urinary Urgency, and Abdominal Pain (Lower abdominal pressure)      Patient is a 71 y.o. female who traditionally follows with Fabian Luna MD presenting today for:    Urinary Tract Infection  Patient complains of frequency, suprapubic pressure, and urgency. She has had symptoms for 4 days. Patient denies fever. Patient does have a history of recurrent UTI. Most recent UTI in August of 2022. Was treated successfully with Bactrim.     Review of patient's allergies indicates:   Allergen Reactions    Levaquin [levofloxacin] Swelling and Edema    Erythromycin (bulk) Nausea And Vomiting       Medication List with Changes/Refills   New Medications    SULFAMETHOXAZOLE-TRIMETHOPRIM 800-160MG (BACTRIM DS) 800-160 MG TAB    Take 1 tablet by mouth 2 (two) times daily. for 7 days   Current Medications    ALBUTEROL 90 MCG/ACTUATION INHALER    Inhale 2 puffs into the lungs every 6 (six) hours as needed for Wheezing.    CELECOXIB (CELEBREX) 200 MG CAPSULE    Take 1 capsule (200 mg total) by mouth once daily. Do not take more than 15 consecutive days. Take w food and water    CICLOPIROX (PENLAC) 8 % SOLN    Apply topically nightly. Apply greater than 8 hr before washing; clean toenail with alcohol Q 7 days.  Maximum therapy of 48 weeks    CRANBERRY FRUIT EXTRACT (CRANBERRY EXTRACT ORAL)    Take by mouth.    CYCLOBENZAPRINE (FLEXERIL) 10 MG TABLET    Take 1 tablet (10 mg total) by mouth nightly. As needed for muscle spasms    DICLOFENAC SODIUM (VOLTAREN) 1 % GEL    Apply 2 g topically 2 (two) times daily as needed (musculoskeletal pain).    DICYCLOMINE (BENTYL) 20 MG TABLET    Take 1 tablet (20 mg total) by mouth 4 (four) times daily before meals and nightly.    DIPHENHYDRAMINE (SOMINEX) 25 MG TABLET    Take 25 mg by mouth nightly as needed.    ESTRADIOL (ESTRACE) 0.01 % (0.1  MG/GRAM) VAGINAL CREAM    Place 0.5-1 g vaginally twice a week.    FLUOCINONIDE (LIDEX) 0.05 % EXTERNAL SOLUTION    Apply topically once daily.    HYDROCODONE-ACETAMINOPHEN (NORCO) 7.5-325 MG PER TABLET    Take 1 tablet by mouth every 6 (six) hours as needed for Pain.    KETOCONAZOLE (NIZORAL) 2 % SHAMPOO    Apply topically twice a week.    LEVOTHYROXINE (SYNTHROID) 75 MCG TABLET    Take 1 tablet (75 mcg total) by mouth once daily. In am on empty stomach    LIDOCAINE-PRILOCAINE (EMLA) CREAM    Apply topically 2 (two) times daily as needed. To hands.    LOSARTAN (COZAAR) 100 MG TABLET    Take 1 tablet (100 mg total) by mouth once daily.    MAGNESIUM ORAL    Take 1 tablet by mouth once daily.    METHOCARBAMOL (ROBAXIN) 500 MG TAB    Take 1 tablet (500 mg total) by mouth 3 (three) times daily. w meals for 3-10 days for muscle spasms    MULTI-VITAMIN TABLET    Take 1 tablet by mouth once daily.     OMEPRAZOLE (PRILOSEC) 40 MG CAPSULE    Take 1 capsule (40 mg total) by mouth once daily.    OXYBUTYNIN (DITROPAN-XL) 10 MG 24 HR TABLET    Take 1 tablet (10 mg total) by mouth once daily.    PROMETHAZINE (PHENERGAN) 25 MG TABLET    Take 1 tablet (25 mg total) by mouth every 6 (six) hours as needed for Nausea.    PSYLLIUM SEED, WITH SUGAR, (METAMUCIL ORAL)    Take by mouth as needed.     RESTASIS 0.05 % OPHTHALMIC EMULSION    PLACE 1 DROP IN EACH EYE TWO TIMES A DAY    ROSUVASTATIN (CRESTOR) 40 MG TAB    Take 1 tablet (40 mg total) by mouth once daily.    TRAMADOL (ULTRAM) 50 MG TABLET    Take 1 tablet (50 mg total) by mouth every 6 (six) hours as needed for Pain.    TRIAMCINOLONE ACETONIDE 0.1% (KENALOG) 0.1 % CREAM    AAA bid    UNABLE TO FIND    100 mg 2 (two) times a day. D Mannose     Medical, social and surgical history has been reviewed with the patient.      Review of Systems   Constitutional:  Negative for chills and fever.   Gastrointestinal:  Positive for abdominal pain.   Genitourinary:  Positive for frequency and  "urgency. Negative for dysuria.     Objective:   /72 (BP Location: Right arm, Patient Position: Sitting, BP Method: Medium (Manual))   Pulse 84   Temp 98.7 °F (37.1 °C) (Oral)   Resp 18   Ht 5' 1" (1.549 m)   Wt 63.3 kg (139 lb 8.8 oz)   BMI 26.37 kg/m²     Physical Exam  Vitals reviewed.   Constitutional:       Appearance: Normal appearance.   HENT:      Head: Normocephalic and atraumatic.   Cardiovascular:      Rate and Rhythm: Normal rate and regular rhythm.      Heart sounds: Normal heart sounds. No murmur heard.  Pulmonary:      Effort: Pulmonary effort is normal.      Breath sounds: Normal breath sounds. No wheezing.   Abdominal:      General: Bowel sounds are normal.      Palpations: Abdomen is soft.      Tenderness: There is abdominal tenderness in the suprapubic area.   Skin:     General: Skin is warm and dry.   Neurological:      Mental Status: She is alert and oriented to person, place, and time.     Last Labs:  Glucose   Date Value Ref Range Status   11/11/2021 101 70 - 110 mg/dL Final   08/28/2020 87 70 - 110 mg/dL Final     BUN   Date Value Ref Range Status   11/11/2021 20 8 - 23 mg/dL Final   08/28/2020 12 8 - 23 mg/dL Final     Creatinine   Date Value Ref Range Status   11/11/2021 1.0 0.5 - 1.4 mg/dL Final   08/28/2020 0.8 0.5 - 1.4 mg/dL Final     Cholesterol   Date Value Ref Range Status   08/28/2020 148 120 - 199 mg/dL Final     Comment:     The National Cholesterol Education Program (NCEP) has set the  following guidelines (reference ranges) for Cholesterol:  Optimal.....................<200 mg/dL  Borderline High.............200-239 mg/dL  High........................> or = 240 mg/dL     08/12/2019 153 120 - 199 mg/dL Final     Comment:     The National Cholesterol Education Program (NCEP) has set the  following guidelines (reference ranges) for Cholesterol:  Optimal.....................<200 mg/dL  Borderline High.............200-239 mg/dL  High........................> or = 240 " mg/dL       No results found for: HGBA1C  Hemoglobin   Date Value Ref Range Status   11/11/2021 12.2 12.0 - 16.0 g/dL Final   08/28/2020 12.3 12.0 - 16.0 g/dL Final     Hematocrit   Date Value Ref Range Status   11/11/2021 38.8 37.0 - 48.5 % Final   08/28/2020 39.8 37.0 - 48.5 % Final       I have reviewed the following:     Details / Date    [x]   Labs     []   Micro     []   Pathology     []   Imaging     []   Cardiology Procedures     []   Other        Assessment and Plan:     1. Acute cystitis with hematuria  - POCT URINALYSIS  - CULTURE, URINE  - sulfamethoxazole-trimethoprim 800-160mg (BACTRIM DS) 800-160 mg Tab; Take 1 tablet by mouth 2 (two) times daily. for 7 days  Dispense: 14 tablet; Refill: 0

## 2022-10-31 ENCOUNTER — PATIENT MESSAGE (OUTPATIENT)
Dept: INTERNAL MEDICINE | Facility: CLINIC | Age: 72
End: 2022-10-31
Payer: MEDICARE

## 2022-10-31 DIAGNOSIS — N30.01 ACUTE CYSTITIS WITH HEMATURIA: ICD-10-CM

## 2022-10-31 LAB — BACTERIA UR CULT: ABNORMAL

## 2022-11-04 ENCOUNTER — PATIENT MESSAGE (OUTPATIENT)
Dept: INTERNAL MEDICINE | Facility: CLINIC | Age: 72
End: 2022-11-04
Payer: MEDICARE

## 2022-11-04 RX ORDER — SULFAMETHOXAZOLE AND TRIMETHOPRIM 800; 160 MG/1; MG/1
1 TABLET ORAL 2 TIMES DAILY
Qty: 6 TABLET | Refills: 0 | Status: SHIPPED | OUTPATIENT
Start: 2022-11-04 | End: 2022-11-07

## 2022-11-08 ENCOUNTER — TELEPHONE (OUTPATIENT)
Dept: SPORTS MEDICINE | Facility: CLINIC | Age: 72
End: 2022-11-08
Payer: MEDICARE

## 2022-11-08 DIAGNOSIS — M25.512 BILATERAL SHOULDER PAIN, UNSPECIFIED CHRONICITY: Primary | ICD-10-CM

## 2022-11-08 DIAGNOSIS — M25.511 BILATERAL SHOULDER PAIN, UNSPECIFIED CHRONICITY: Primary | ICD-10-CM

## 2022-11-10 ENCOUNTER — OFFICE VISIT (OUTPATIENT)
Dept: PRIMARY CARE CLINIC | Facility: CLINIC | Age: 72
End: 2022-11-10
Payer: MEDICARE

## 2022-11-10 VITALS
WEIGHT: 136.69 LBS | OXYGEN SATURATION: 99 % | SYSTOLIC BLOOD PRESSURE: 136 MMHG | DIASTOLIC BLOOD PRESSURE: 70 MMHG | TEMPERATURE: 100 F | BODY MASS INDEX: 25.81 KG/M2 | HEIGHT: 61 IN | HEART RATE: 83 BPM

## 2022-11-10 DIAGNOSIS — I87.2 VENOUS INSUFFICIENCY OF BOTH LOWER EXTREMITIES: ICD-10-CM

## 2022-11-10 DIAGNOSIS — Z00.00 GENERAL MEDICAL EXAM: Primary | ICD-10-CM

## 2022-11-10 DIAGNOSIS — Z12.31 ENCOUNTER FOR SCREENING MAMMOGRAM FOR MALIGNANT NEOPLASM OF BREAST: ICD-10-CM

## 2022-11-10 DIAGNOSIS — I70.0 ATHEROSCLEROSIS OF AORTA: ICD-10-CM

## 2022-11-10 DIAGNOSIS — I10 ESSENTIAL HYPERTENSION: ICD-10-CM

## 2022-11-10 DIAGNOSIS — L21.9 SEBORRHEIC DERMATITIS: ICD-10-CM

## 2022-11-10 DIAGNOSIS — E03.9 ACQUIRED HYPOTHYROIDISM: ICD-10-CM

## 2022-11-10 DIAGNOSIS — J40 BRONCHITIS: ICD-10-CM

## 2022-11-10 DIAGNOSIS — K21.9 GASTROESOPHAGEAL REFLUX DISEASE, UNSPECIFIED WHETHER ESOPHAGITIS PRESENT: ICD-10-CM

## 2022-11-10 DIAGNOSIS — E78.2 MIXED HYPERLIPIDEMIA: ICD-10-CM

## 2022-11-10 DIAGNOSIS — R68.89 OTHER GENERAL SYMPTOMS AND SIGNS: ICD-10-CM

## 2022-11-10 DIAGNOSIS — Z98.890 S/P TRIGGER FINGER RELEASE: ICD-10-CM

## 2022-11-10 DIAGNOSIS — R79.9 ABNORMAL FINDING OF BLOOD CHEMISTRY, UNSPECIFIED: ICD-10-CM

## 2022-11-10 PROCEDURE — 99999 PR PBB SHADOW E&M-EST. PATIENT-LVL IV: CPT | Mod: PBBFAC,,, | Performed by: FAMILY MEDICINE

## 2022-11-10 PROCEDURE — 99214 OFFICE O/P EST MOD 30 MIN: CPT | Mod: PBBFAC,PN | Performed by: FAMILY MEDICINE

## 2022-11-10 PROCEDURE — 99999 PR PBB SHADOW E&M-EST. PATIENT-LVL IV: ICD-10-PCS | Mod: PBBFAC,,, | Performed by: FAMILY MEDICINE

## 2022-11-10 PROCEDURE — 99214 PR OFFICE/OUTPT VISIT, EST, LEVL IV, 30-39 MIN: ICD-10-PCS | Mod: S$PBB,,, | Performed by: FAMILY MEDICINE

## 2022-11-10 PROCEDURE — 99214 OFFICE O/P EST MOD 30 MIN: CPT | Mod: S$PBB,,, | Performed by: FAMILY MEDICINE

## 2022-11-10 RX ORDER — OMEPRAZOLE 40 MG/1
40 CAPSULE, DELAYED RELEASE ORAL DAILY
Qty: 30 CAPSULE | Refills: 11 | Status: SHIPPED | OUTPATIENT
Start: 2022-11-10 | End: 2023-12-01

## 2022-11-10 RX ORDER — ROSUVASTATIN CALCIUM 40 MG/1
40 TABLET, COATED ORAL DAILY
Qty: 30 TABLET | Refills: 11 | Status: SHIPPED | OUTPATIENT
Start: 2022-11-10 | End: 2023-02-12

## 2022-11-10 RX ORDER — LEVOTHYROXINE SODIUM 75 UG/1
75 TABLET ORAL DAILY
Qty: 30 TABLET | Refills: 11 | Status: SHIPPED | OUTPATIENT
Start: 2022-11-10 | End: 2023-02-12

## 2022-11-10 RX ORDER — MEMANTINE HYDROCHLORIDE 5 MG/1
5 TABLET ORAL 2 TIMES DAILY
Qty: 60 TABLET | Refills: 11 | Status: SHIPPED | OUTPATIENT
Start: 2022-11-10 | End: 2023-12-01

## 2022-11-10 RX ORDER — TRAZODONE HYDROCHLORIDE 50 MG/1
TABLET ORAL
Qty: 30 TABLET | Refills: 11 | Status: SHIPPED | OUTPATIENT
Start: 2022-11-10 | End: 2023-12-01

## 2022-11-10 RX ORDER — LOSARTAN POTASSIUM 100 MG/1
100 TABLET ORAL DAILY
Qty: 30 TABLET | Refills: 11 | Status: SHIPPED | OUTPATIENT
Start: 2022-11-10 | End: 2023-12-01

## 2022-11-10 RX ORDER — OXYBUTYNIN CHLORIDE 10 MG/1
10 TABLET, EXTENDED RELEASE ORAL DAILY
Qty: 30 TABLET | Refills: 11 | Status: SHIPPED | OUTPATIENT
Start: 2022-11-10 | End: 2023-12-01

## 2022-11-10 RX ORDER — ALBUTEROL SULFATE 90 UG/1
2 AEROSOL, METERED RESPIRATORY (INHALATION) EVERY 6 HOURS PRN
Qty: 6.7 G | Refills: 11 | Status: SHIPPED | OUTPATIENT
Start: 2022-11-10 | End: 2023-12-07 | Stop reason: SDUPTHER

## 2022-11-10 RX ORDER — CELECOXIB 200 MG/1
200 CAPSULE ORAL DAILY
Qty: 30 CAPSULE | Refills: 11 | Status: SHIPPED | OUTPATIENT
Start: 2022-11-10 | End: 2023-12-01

## 2022-11-10 NOTE — PROGRESS NOTES
"/70 (BP Location: Right arm, Patient Position: Sitting, BP Method: Medium (Manual))   Pulse 83   Temp 99.6 °F (37.6 °C) (Oral)   Ht 5' 1" (1.549 m)   Wt 62 kg (136 lb 11 oz)   SpO2 99%   BMI 25.83 kg/m²     Chief Complaint: No chief complaint on file.        Jackelyn Kendrick is a 71 y.o. female here for    HPI    Annual Exam.  Health maintenance reviewed with pt in detail inc screening studies such as lab studies and vaccines    Patient queried and denies any further complaints    SURGICAL AND MEDICAL HISTORY: updated and reviewed.  ALLERGIES updated and reviewed.  Review of patient's allergies indicates:   Allergen Reactions    Levaquin [levofloxacin] Swelling and Edema    Erythromycin (bulk) Nausea And Vomiting     CURRENT OUTPATIENT MEDICATIONS updated and reviewed    Current Outpatient Medications:     cranberry fruit extract (CRANBERRY EXTRACT ORAL), Take by mouth., Disp: , Rfl:     diclofenac sodium (VOLTAREN) 1 % Gel, Apply 2 g topically 2 (two) times daily as needed (musculoskeletal pain)., Disp: 100 g, Rfl: 11    dicyclomine (BENTYL) 20 mg tablet, Take 1 tablet (20 mg total) by mouth 4 (four) times daily before meals and nightly., Disp: 120 tablet, Rfl: 11    estradioL (ESTRACE) 0.01 % (0.1 mg/gram) vaginal cream, Place 0.5-1 g vaginally twice a week., Disp: 42.5 g, Rfl: 3    fluocinonide (LIDEX) 0.05 % external solution, Apply topically once daily., Disp: 60 mL, Rfl: 11    HYDROcodone-acetaminophen (NORCO) 7.5-325 mg per tablet, Take 1 tablet by mouth every 6 (six) hours as needed for Pain., Disp: 28 tablet, Rfl: 0    ketoconazole (NIZORAL) 2 % shampoo, Apply topically twice a week., Disp: 120 mL, Rfl: 11    LIDOcaine-prilocaine (EMLA) cream, Apply topically 2 (two) times daily as needed. To hands., Disp: 30 g, Rfl: 2    MAGNESIUM ORAL, Take 1 tablet by mouth once daily., Disp: , Rfl:     Multi-Vitamin tablet, Take 1 tablet by mouth once daily. , Disp: , Rfl:     RESTASIS 0.05 % ophthalmic " emulsion, PLACE 1 DROP IN EACH EYE TWO TIMES A DAY, Disp: 60 each, Rfl: 12    triamcinolone acetonide 0.1% (KENALOG) 0.1 % cream, AAA bid, Disp: 60 g, Rfl: 3    albuterol (PROVENTIL/VENTOLIN HFA) 90 mcg/actuation inhaler, Inhale 2 puffs into the lungs every 6 (six) hours as needed for Wheezing., Disp: 6.7 g, Rfl: 11    celecoxib (CELEBREX) 200 MG capsule, Take 1 capsule (200 mg total) by mouth once daily. Do not take more than 15 consecutive days. Take w food and water, Disp: 30 capsule, Rfl: 11    levothyroxine (SYNTHROID) 75 MCG tablet, Take 1 tablet (75 mcg total) by mouth once daily., Disp: 30 tablet, Rfl: 11    linaCLOtide (LINZESS) 145 mcg Cap capsule, Take 1 capsule (145 mcg total) by mouth before breakfast. For irritable bowel syndrome, Disp: 30 capsule, Rfl: 11    losartan (COZAAR) 100 MG tablet, Take 1 tablet (100 mg total) by mouth once daily., Disp: 30 tablet, Rfl: 11    memantine (NAMENDA) 5 MG Tab, Take 1 tablet (5 mg total) by mouth 2 (two) times daily., Disp: 60 tablet, Rfl: 11    omeprazole (PRILOSEC) 40 MG capsule, Take 1 capsule (40 mg total) by mouth once daily., Disp: 30 capsule, Rfl: 11    oxybutynin (DITROPAN-XL) 10 MG 24 hr tablet, Take 1 tablet (10 mg total) by mouth once daily., Disp: 30 tablet, Rfl: 11    PSYLLIUM SEED, WITH SUGAR, (METAMUCIL ORAL), Take by mouth as needed. , Disp: , Rfl:     rosuvastatin (CRESTOR) 40 MG Tab, Take 1 tablet (40 mg total) by mouth once daily., Disp: 30 tablet, Rfl: 11    traZODone (DESYREL) 50 MG tablet, 1/2 tab up to 2 tabs po qhs prn insomnia, Disp: 30 tablet, Rfl: 11    Review of Systems   Constitutional:  Negative for activity change, appetite change, chills, diaphoresis, fatigue, fever and unexpected weight change.   HENT:  Negative for congestion, ear discharge, ear pain, facial swelling, hearing loss, nosebleeds, postnasal drip, rhinorrhea, sinus pressure, sneezing, sore throat, tinnitus, trouble swallowing and voice change.    Eyes:  Negative for  "photophobia, pain, discharge, redness, itching and visual disturbance.   Respiratory:  Negative for cough, chest tightness, shortness of breath and wheezing.    Cardiovascular:  Negative for chest pain, palpitations and leg swelling.   Gastrointestinal:  Negative for abdominal distention, abdominal pain, anal bleeding, blood in stool, constipation, diarrhea, nausea, rectal pain and vomiting.   Endocrine: Negative for cold intolerance, heat intolerance, polydipsia, polyphagia and polyuria.   Genitourinary:  Negative for difficulty urinating, dysuria and flank pain.   Musculoskeletal:  Negative for arthralgias, back pain, joint swelling, myalgias and neck pain.   Skin:  Negative for rash.   Neurological:  Negative for dizziness, tremors, seizures, syncope, speech difficulty, weakness, light-headedness, numbness and headaches.   Psychiatric/Behavioral:  Negative for behavioral problems, confusion, decreased concentration, dysphoric mood, sleep disturbance and suicidal ideas. The patient is not nervous/anxious and is not hyperactive.      /70 (BP Location: Right arm, Patient Position: Sitting, BP Method: Medium (Manual))   Pulse 83   Temp 99.6 °F (37.6 °C) (Oral)   Ht 5' 1" (1.549 m)   Wt 62 kg (136 lb 11 oz)   SpO2 99%   BMI 25.83 kg/m²   Physical Exam  Vitals and nursing note reviewed.   Constitutional:       General: She is not in acute distress.     Appearance: Normal appearance. She is well-developed. She is not ill-appearing or toxic-appearing.   HENT:      Head: Normocephalic and atraumatic.      Right Ear: Tympanic membrane, ear canal and external ear normal.      Left Ear: Tympanic membrane, ear canal and external ear normal.      Nose: Nose normal.      Mouth/Throat:      Lips: Pink.      Mouth: Mucous membranes are moist.      Pharynx: No oropharyngeal exudate or posterior oropharyngeal erythema.   Eyes:      General: No scleral icterus.        Right eye: No discharge.         Left eye: No " discharge.      Extraocular Movements: Extraocular movements intact.      Conjunctiva/sclera: Conjunctivae normal.   Cardiovascular:      Rate and Rhythm: Normal rate and regular rhythm.      Pulses: Normal pulses.      Heart sounds: Normal heart sounds. No murmur heard.  Pulmonary:      Effort: Pulmonary effort is normal. No respiratory distress.      Breath sounds: Normal breath sounds. No wheezing or rales.   Abdominal:      General: Bowel sounds are normal. There is no distension.      Palpations: Abdomen is soft. There is no mass.      Tenderness: There is no abdominal tenderness. There is no right CVA tenderness, left CVA tenderness, guarding or rebound.      Hernia: No hernia is present.   Musculoskeletal:      Cervical back: Normal range of motion and neck supple. No rigidity or tenderness.   Lymphadenopathy:      Cervical: No cervical adenopathy.   Skin:     General: Skin is warm and dry.   Neurological:      General: No focal deficit present.      Mental Status: She is alert. Mental status is at baseline.   Psychiatric:         Mood and Affect: Mood normal.         Behavior: Behavior normal. Behavior is cooperative.     Diagnoses and all orders for this visit:    General medical exam  -     CBC Without Differential; Future  -     Comprehensive Metabolic Panel; Future  -     Lipid Panel; Future  -     TSH; Future  -     Hemoglobin A1C; Future    Mixed hyperlipidemia    Atherosclerosis of aorta    Acquired hypothyroidism    Encounter for screening mammogram for malignant neoplasm of breast  -     Mammo Digital Screening Bilat w/ Eb; Future    Essential hypertension  -     losartan (COZAAR) 100 MG tablet; Take 1 tablet (100 mg total) by mouth once daily.    Seborrheic dermatitis    Bronchitis  -     albuterol (PROVENTIL/VENTOLIN HFA) 90 mcg/actuation inhaler; Inhale 2 puffs into the lungs every 6 (six) hours as needed for Wheezing.    Abnormal finding of blood chemistry, unspecified  -     CBC Without  Differential; Future  -     Lipid Panel; Future  -     Hemoglobin A1C; Future    Other general symptoms and signs  -     TSH; Future    Venous insufficiency of both lower extremities    Gastroesophageal reflux disease, unspecified whether esophagitis present    S/P trigger finger release    Other orders  -     rosuvastatin (CRESTOR) 40 MG Tab; Take 1 tablet (40 mg total) by mouth once daily.  -     traZODone (DESYREL) 50 MG tablet; 1/2 tab up to 2 tabs po qhs prn insomnia  -     levothyroxine (SYNTHROID) 75 MCG tablet; Take 1 tablet (75 mcg total) by mouth once daily.  -     omeprazole (PRILOSEC) 40 MG capsule; Take 1 capsule (40 mg total) by mouth once daily.  -     oxybutynin (DITROPAN-XL) 10 MG 24 hr tablet; Take 1 tablet (10 mg total) by mouth once daily.  -     celecoxib (CELEBREX) 200 MG capsule; Take 1 capsule (200 mg total) by mouth once daily. Do not take more than 15 consecutive days. Take w food and water  -     linaCLOtide (LINZESS) 145 mcg Cap capsule; Take 1 capsule (145 mcg total) by mouth before breakfast. For irritable bowel syndrome  -     memantine (NAMENDA) 5 MG Tab; Take 1 tablet (5 mg total) by mouth 2 (two) times daily.      Reviewed old pertinent medical records available.     All lab results personally reviewed and interpreted by me including last year, if available.        Fabian Luna MD    ========

## 2022-11-14 ENCOUNTER — LAB VISIT (OUTPATIENT)
Dept: LAB | Facility: HOSPITAL | Age: 72
End: 2022-11-14
Attending: FAMILY MEDICINE
Payer: MEDICARE

## 2022-11-14 DIAGNOSIS — R68.89 OTHER GENERAL SYMPTOMS AND SIGNS: ICD-10-CM

## 2022-11-14 DIAGNOSIS — Z00.00 GENERAL MEDICAL EXAM: ICD-10-CM

## 2022-11-14 DIAGNOSIS — R79.9 ABNORMAL FINDING OF BLOOD CHEMISTRY, UNSPECIFIED: ICD-10-CM

## 2022-11-14 LAB
ALBUMIN SERPL BCP-MCNC: 3.9 G/DL (ref 3.5–5.2)
ALP SERPL-CCNC: 52 U/L (ref 55–135)
ALT SERPL W/O P-5'-P-CCNC: 33 U/L (ref 10–44)
ANION GAP SERPL CALC-SCNC: 11 MMOL/L (ref 8–16)
AST SERPL-CCNC: 37 U/L (ref 10–40)
BILIRUB SERPL-MCNC: 0.4 MG/DL (ref 0.1–1)
BUN SERPL-MCNC: 18 MG/DL (ref 8–23)
CALCIUM SERPL-MCNC: 9.5 MG/DL (ref 8.7–10.5)
CHLORIDE SERPL-SCNC: 104 MMOL/L (ref 95–110)
CHOLEST SERPL-MCNC: 163 MG/DL (ref 120–199)
CHOLEST/HDLC SERPL: 2.9 {RATIO} (ref 2–5)
CO2 SERPL-SCNC: 25 MMOL/L (ref 23–29)
CREAT SERPL-MCNC: 0.8 MG/DL (ref 0.5–1.4)
ERYTHROCYTE [DISTWIDTH] IN BLOOD BY AUTOMATED COUNT: 13 % (ref 11.5–14.5)
EST. GFR  (NO RACE VARIABLE): >60 ML/MIN/1.73 M^2
ESTIMATED AVG GLUCOSE: 111 MG/DL (ref 68–131)
GLUCOSE SERPL-MCNC: 88 MG/DL (ref 70–110)
HBA1C MFR BLD: 5.5 % (ref 4–5.6)
HCT VFR BLD AUTO: 35.7 % (ref 37–48.5)
HDLC SERPL-MCNC: 56 MG/DL (ref 40–75)
HDLC SERPL: 34.4 % (ref 20–50)
HGB BLD-MCNC: 11.2 G/DL (ref 12–16)
LDLC SERPL CALC-MCNC: 88.8 MG/DL (ref 63–159)
MCH RBC QN AUTO: 29.3 PG (ref 27–31)
MCHC RBC AUTO-ENTMCNC: 31.4 G/DL (ref 32–36)
MCV RBC AUTO: 94 FL (ref 82–98)
NONHDLC SERPL-MCNC: 107 MG/DL
PLATELET # BLD AUTO: 247 K/UL (ref 150–450)
PMV BLD AUTO: 11.6 FL (ref 9.2–12.9)
POTASSIUM SERPL-SCNC: 4.1 MMOL/L (ref 3.5–5.1)
PROT SERPL-MCNC: 7 G/DL (ref 6–8.4)
RBC # BLD AUTO: 3.82 M/UL (ref 4–5.4)
SODIUM SERPL-SCNC: 140 MMOL/L (ref 136–145)
TRIGL SERPL-MCNC: 91 MG/DL (ref 30–150)
TSH SERPL DL<=0.005 MIU/L-ACNC: 1.81 UIU/ML (ref 0.4–4)
WBC # BLD AUTO: 6.52 K/UL (ref 3.9–12.7)

## 2022-11-14 PROCEDURE — 80061 LIPID PANEL: CPT | Performed by: FAMILY MEDICINE

## 2022-11-14 PROCEDURE — 80053 COMPREHEN METABOLIC PANEL: CPT | Performed by: FAMILY MEDICINE

## 2022-11-14 PROCEDURE — 85027 COMPLETE CBC AUTOMATED: CPT | Performed by: FAMILY MEDICINE

## 2022-11-14 PROCEDURE — 84443 ASSAY THYROID STIM HORMONE: CPT | Performed by: FAMILY MEDICINE

## 2022-11-14 PROCEDURE — 83036 HEMOGLOBIN GLYCOSYLATED A1C: CPT | Performed by: FAMILY MEDICINE

## 2022-11-14 PROCEDURE — 36415 COLL VENOUS BLD VENIPUNCTURE: CPT | Mod: PO | Performed by: FAMILY MEDICINE

## 2022-11-16 PROBLEM — M65.311 TRIGGER FINGER OF RIGHT THUMB: Status: RESOLVED | Noted: 2022-08-31 | Resolved: 2022-11-16

## 2022-11-16 PROBLEM — M65.312 TRIGGER FINGER OF LEFT THUMB: Status: RESOLVED | Noted: 2021-12-02 | Resolved: 2022-11-16

## 2022-11-22 ENCOUNTER — OFFICE VISIT (OUTPATIENT)
Dept: INTERNAL MEDICINE | Facility: CLINIC | Age: 72
End: 2022-11-22
Payer: MEDICARE

## 2022-11-22 VITALS
HEIGHT: 61 IN | SYSTOLIC BLOOD PRESSURE: 138 MMHG | OXYGEN SATURATION: 96 % | BODY MASS INDEX: 25.68 KG/M2 | WEIGHT: 136 LBS | HEART RATE: 84 BPM | DIASTOLIC BLOOD PRESSURE: 80 MMHG

## 2022-11-22 DIAGNOSIS — M62.838 MUSCLE SPASMS OF NECK: Primary | ICD-10-CM

## 2022-11-22 PROCEDURE — 99999 PR PBB SHADOW E&M-EST. PATIENT-LVL IV: CPT | Mod: PBBFAC,,, | Performed by: FAMILY MEDICINE

## 2022-11-22 PROCEDURE — 99214 OFFICE O/P EST MOD 30 MIN: CPT | Mod: PBBFAC,25 | Performed by: FAMILY MEDICINE

## 2022-11-22 PROCEDURE — 96372 THER/PROPH/DIAG INJ SC/IM: CPT | Mod: PBBFAC

## 2022-11-22 PROCEDURE — 99999 PR PBB SHADOW E&M-EST. PATIENT-LVL IV: ICD-10-PCS | Mod: PBBFAC,,, | Performed by: FAMILY MEDICINE

## 2022-11-22 PROCEDURE — 99213 OFFICE O/P EST LOW 20 MIN: CPT | Mod: S$PBB,,, | Performed by: FAMILY MEDICINE

## 2022-11-22 PROCEDURE — 99213 PR OFFICE/OUTPT VISIT, EST, LEVL III, 20-29 MIN: ICD-10-PCS | Mod: S$PBB,,, | Performed by: FAMILY MEDICINE

## 2022-11-22 RX ORDER — METHYLPREDNISOLONE 4 MG/1
TABLET ORAL
Qty: 21 EACH | Refills: 0 | Status: SHIPPED | OUTPATIENT
Start: 2022-11-22 | End: 2022-11-29

## 2022-11-22 RX ORDER — CYCLOBENZAPRINE HCL 10 MG
10 TABLET ORAL NIGHTLY
Qty: 30 TABLET | Refills: 0 | Status: SHIPPED | OUTPATIENT
Start: 2022-11-22 | End: 2023-03-06

## 2022-11-22 RX ORDER — KETOROLAC TROMETHAMINE 30 MG/ML
30 INJECTION, SOLUTION INTRAMUSCULAR; INTRAVENOUS
Status: DISCONTINUED | OUTPATIENT
Start: 2022-11-22 | End: 2022-11-22

## 2022-11-22 RX ORDER — METHOCARBAMOL 500 MG/1
500 TABLET, FILM COATED ORAL 3 TIMES DAILY
Qty: 30 TABLET | Refills: 0 | Status: SHIPPED | OUTPATIENT
Start: 2022-11-22 | End: 2023-03-06

## 2022-11-22 RX ORDER — KETOROLAC TROMETHAMINE 30 MG/ML
30 INJECTION, SOLUTION INTRAMUSCULAR; INTRAVENOUS
Status: COMPLETED | OUTPATIENT
Start: 2022-11-22 | End: 2022-11-22

## 2022-11-22 RX ADMIN — KETOROLAC TROMETHAMINE 30 MG: 30 INJECTION, SOLUTION INTRAMUSCULAR; INTRAVENOUS at 04:11

## 2022-11-22 NOTE — PROGRESS NOTES
Subjective:       Patient ID: Jackelyn Kendrick is a 71 y.o. female. PCP Fabian Luna MD     Chief Complaint: Neck Pain    Patient is here for bad neck spasms for over a week.  Has something like this mor ein her upper back and shoulder in Aug and PCP treated with toradol 30mg, medrol dose pack and muscle relaxants with benefits  Has PT already scheduled by her ortho doc for her shoulder, she does not think it worth another for her neck      Review of patient's allergies indicates:   Allergen Reactions    Levaquin [levofloxacin] Swelling and Edema    Erythromycin (bulk) Nausea And Vomiting       Social History     Tobacco Use    Smoking status: Never    Smokeless tobacco: Never   Substance Use Topics    Alcohol use: No     Comment: rare on a special occasion    Drug use: No       Family History   Problem Relation Age of Onset    Cataracts Mother     Breast cancer Mother     Diabetes Mother     Hypertension Mother     Heart failure Mother     Heart disease Mother     Cataracts Father     Hypertension Father     Stroke Father     No Known Problems Daughter     No Known Problems Son     Diabetes Paternal Grandmother     Hyperlipidemia Sister     Arthritis Sister     Hyperlipidemia Brother     Arthritis Brother     No Known Problems Son     Amblyopia Neg Hx     Blindness Neg Hx     Glaucoma Neg Hx     Macular degeneration Neg Hx     Retinal detachment Neg Hx     Strabismus Neg Hx     Ovarian cancer Neg Hx     Melanoma Neg Hx     Celiac disease Neg Hx     Colon cancer Neg Hx     Colon polyps Neg Hx     Esophageal cancer Neg Hx     Liver cancer Neg Hx     Liver disease Neg Hx     Rectal cancer Neg Hx     Stomach cancer Neg Hx        Past Surgical History:   Procedure Laterality Date    ARTHROSCOPIC REPAIR OF ROTATOR CUFF OF SHOULDER Right 9/23/2020    Procedure: REPAIR, ROTATOR CUFF, ARTHROSCOPIC;  Surgeon: Reginald Pickens MD;  Location: [facility];  Service: Orthopedics;  Laterality: Right;  regional w/catheter  (interscalene)    BACK SURGERY      CATARACT EXTRACTION W/  INTRAOCULAR LENS IMPLANT Left 10/20/2021        CHOLECYSTECTOMY      COLONOSCOPY  2007    diverticulosis    COLONOSCOPY N/A 9/3/2020    Procedure: COLONOSCOPY;  Surgeon: Alberta Henriquez MD;  Location: [facility] ENDO (4TH FLR);  Service: Colon and Rectal;  Laterality: N/A;  pt requested this time-8/31-covid-uc [place]-tb    CYSTOSCOPY      DE QUERVAIN'S RELEASE Left 03/2017    FIXATION OF TENDON Right 9/23/2020    Procedure: FIXATION, TENDON;  Surgeon: Reginald Pickens MD;  Location: [facility] OR;  Service: Orthopedics;  Laterality: Right;    HERNIA REPAIR      HYSTERECTOMY      INJECTION OF STEROID Right 12/2/2021    Procedure: INJECTION, STEROID;  Surgeon: Suzy Dominguez MD;  Location: [facility] OR;  Service: Orthopedics;  Laterality: Right;    INTRAOCULAR PROSTHESES INSERTION Left 10/20/2021    Procedure: INSERTION, IOL PROSTHESIS;  Surgeon: Pippa Ashby MD;  Location: [facility] OR 1ST FLR;  Service: Ophthalmology;  Laterality: Left;    INTRAOCULAR PROSTHESES INSERTION Right 12/29/2021    Procedure: INSERTION, IOL PROSTHESIS;  Surgeon: Pippa Ashby MD;  Location: [facility] OR 1ST FLR;  Service: Ophthalmology;  Laterality: Right;    NASAL SEPTUM SURGERY      PHACOEMULSIFICATION OF CATARACT Left 10/20/2021    Procedure: PHACOEMULSIFICATION, CATARACT/ COMPLEX- PHACO / IOL - OS SMALL PUPIL-OLE RING AND TRYPAN BLUE;  Surgeon: Pippa Ashby MD;  Location: [facility] OR 1ST FLR;  Service: Ophthalmology;  Laterality: Left;    PHACOEMULSIFICATION OF CATARACT Right 12/29/2021    Procedure: PHACOEMULSIFICATION, CATARACT;  Surgeon: Pippa Ashby MD;  Location: [facility] OR 1ST FLR;  Service: Ophthalmology;  Laterality: Right;    SHOULDER SURGERY      TONSILLECTOMY      TRIGGER FINGER RELEASE Left 12/2/2021    Procedure: RELEASE, TRIGGER FINGER;  Surgeon: Suzy Dominguez MD;  Location: [facility] OR;  Service: Orthopedics;  Laterality: Left;       Patient  Active Problem List   Diagnosis    GERD (gastroesophageal reflux disease)    Acquired hypothyroidism    Mixed hyperlipidemia    Primary osteoarthritis involving multiple joints    Nuclear sclerosis - Both Eyes    Urinary incontinence, mixed    Vaginal vault prolapse, posthysterectomy    Rectocele    De Quervain's disease (tenosynovitis)    Essential hypertension    Atherosclerosis of aorta    Impingement syndrome of right shoulder    Nontraumatic tear of right rotator cuff    Lower extremity edema    PFO (patent foramen ovale)    Posterior subcapsular age-related cataract, left eye    Arthritis of carpometacarpal (CMC) joint of both thumbs    Ganglion cyst    Seborrheic dermatitis    Venous insufficiency of both lower extremities    Nuclear sclerotic cataract of right eye    Dupuytren's disease of palm of right hand    S/P trigger finger release       Current Outpatient Medications on File Prior to Visit   Medication Sig Dispense Refill    albuterol (PROVENTIL/VENTOLIN HFA) 90 mcg/actuation inhaler Inhale 2 puffs into the lungs every 6 (six) hours as needed for Wheezing. 6.7 g 11    celecoxib (CELEBREX) 200 MG capsule Take 1 capsule (200 mg total) by mouth once daily. Do not take more than 15 consecutive days. Take w food and water 30 capsule 11    cranberry fruit extract (CRANBERRY EXTRACT ORAL) Take by mouth.      diclofenac sodium (VOLTAREN) 1 % Gel Apply 2 g topically 2 (two) times daily as needed (musculoskeletal pain). 100 g 11    dicyclomine (BENTYL) 20 mg tablet Take 1 tablet (20 mg total) by mouth 4 (four) times daily before meals and nightly. 120 tablet 11    estradioL (ESTRACE) 0.01 % (0.1 mg/gram) vaginal cream Place 0.5-1 g vaginally twice a week. 42.5 g 3    fluocinonide (LIDEX) 0.05 % external solution Apply topically once daily. 60 mL 11    HYDROcodone-acetaminophen (NORCO) 7.5-325 mg per tablet Take 1 tablet by mouth every 6 (six) hours as needed for Pain. 28 tablet 0    ketoconazole (NIZORAL) 2 %  shampoo Apply topically twice a week. 120 mL 11    levothyroxine (SYNTHROID) 75 MCG tablet Take 1 tablet (75 mcg total) by mouth once daily. 30 tablet 11    LIDOcaine-prilocaine (EMLA) cream Apply topically 2 (two) times daily as needed. To hands. 30 g 2    linaCLOtide (LINZESS) 145 mcg Cap capsule Take 1 capsule (145 mcg total) by mouth before breakfast. For irritable bowel syndrome 30 capsule 11    losartan (COZAAR) 100 MG tablet Take 1 tablet (100 mg total) by mouth once daily. 30 tablet 11    MAGNESIUM ORAL Take 1 tablet by mouth once daily.      memantine (NAMENDA) 5 MG Tab Take 1 tablet (5 mg total) by mouth 2 (two) times daily. 60 tablet 11    Multi-Vitamin tablet Take 1 tablet by mouth once daily.       omeprazole (PRILOSEC) 40 MG capsule Take 1 capsule (40 mg total) by mouth once daily. 30 capsule 11    oxybutynin (DITROPAN-XL) 10 MG 24 hr tablet Take 1 tablet (10 mg total) by mouth once daily. 30 tablet 11    PSYLLIUM SEED, WITH SUGAR, (METAMUCIL ORAL) Take by mouth as needed.       RESTASIS 0.05 % ophthalmic emulsion PLACE 1 DROP IN EACH EYE TWO TIMES A DAY 60 each 12    rosuvastatin (CRESTOR) 40 MG Tab Take 1 tablet (40 mg total) by mouth once daily. 30 tablet 11    traZODone (DESYREL) 50 MG tablet 1/2 tab up to 2 tabs po qhs prn insomnia 30 tablet 11    triamcinolone acetonide 0.1% (KENALOG) 0.1 % cream AAA bid 60 g 3     No current facility-administered medications on file prior to visit.           Review of Systems   Constitutional:  Negative for chills and fever.   HENT:  Negative for ear pain.    Eyes:  Negative for pain.   Respiratory:  Negative for chest tightness.    Cardiovascular:  Negative for chest pain.   Gastrointestinal:  Negative for abdominal pain.   Genitourinary:  Negative for flank pain.   Musculoskeletal:  Positive for myalgias. Negative for gait problem.   Neurological:  Negative for syncope.   Psychiatric/Behavioral:  Negative for behavioral problems.      Objective:    /80  "(BP Location: Right arm, Patient Position: Sitting, BP Method: Medium (Manual))   Pulse 84   Ht 5' 1" (1.549 m)   Wt 61.7 kg (136 lb)   SpO2 96%   BMI 25.70 kg/m²     Physical Exam  Vitals and nursing note reviewed.   Constitutional:       Appearance: She is well-developed.   HENT:      Head: Normocephalic and atraumatic.   Neck:        Comments: Area of pain, spasm, and tenderness. Skin appears normal. Head is tilted about 5-10 degrees to the left. Mucles are tighr  Cardiovascular:      Rate and Rhythm: Normal rate.      Heart sounds: Normal heart sounds.   Pulmonary:      Effort: No respiratory distress.      Breath sounds: Normal breath sounds. No wheezing or rales.   Abdominal:      Palpations: Abdomen is soft.   Musculoskeletal:         General: Tenderness present.      Cervical back: Tenderness present.   Skin:     General: Skin is dry.   Neurological:      Mental Status: She is alert.   Psychiatric:         Behavior: Behavior normal.       Assessment:       1. Muscle spasms of neck          Plan:       Jackelyn was seen today for neck pain.    Diagnoses and all orders for this visit:    Muscle spasms of neck  -     ketorolac injection 30 mg  -     methylPREDNISolone (MEDROL DOSEPACK) 4 mg tablet; use as directed  -     methocarbamoL (ROBAXIN) 500 MG Tab; Take 1 tablet (500 mg total) by mouth 3 (three) times daily. w meals for 3-10 days for muscle spasms  -     cyclobenzaprine (FLEXERIL) 10 MG tablet; Take 1 tablet (10 mg total) by mouth nightly. As needed for muscle spasms    F/u with PCP prn  Cc: PCP          "

## 2022-11-29 ENCOUNTER — PATIENT MESSAGE (OUTPATIENT)
Dept: PRIMARY CARE CLINIC | Facility: CLINIC | Age: 72
End: 2022-11-29

## 2022-11-29 ENCOUNTER — OFFICE VISIT (OUTPATIENT)
Dept: PRIMARY CARE CLINIC | Facility: CLINIC | Age: 72
End: 2022-11-29
Payer: MEDICARE

## 2022-11-29 VITALS
HEART RATE: 83 BPM | WEIGHT: 138.88 LBS | HEIGHT: 61 IN | BODY MASS INDEX: 26.22 KG/M2 | DIASTOLIC BLOOD PRESSURE: 70 MMHG | SYSTOLIC BLOOD PRESSURE: 134 MMHG | OXYGEN SATURATION: 96 %

## 2022-11-29 DIAGNOSIS — N39.0 RECURRENT UTI: ICD-10-CM

## 2022-11-29 DIAGNOSIS — N30.01 ACUTE CYSTITIS WITH HEMATURIA: ICD-10-CM

## 2022-11-29 DIAGNOSIS — R30.0 DYSURIA: Primary | ICD-10-CM

## 2022-11-29 DIAGNOSIS — K58.2 IRRITABLE BOWEL SYNDROME WITH BOTH CONSTIPATION AND DIARRHEA: ICD-10-CM

## 2022-11-29 LAB
BACTERIA #/AREA URNS AUTO: ABNORMAL /HPF
BILIRUB SERPL-MCNC: ABNORMAL MG/DL
BILIRUB UR QL STRIP: NEGATIVE
BLOOD URINE, POC: ABNORMAL
CLARITY UR REFRACT.AUTO: ABNORMAL
CLARITY, POC UA: ABNORMAL
COLOR UR AUTO: YELLOW
COLOR, POC UA: YELLOW
GLUCOSE UR QL STRIP: ABNORMAL
GLUCOSE UR QL STRIP: NEGATIVE
HGB UR QL STRIP: ABNORMAL
HYALINE CASTS UR QL AUTO: 0 /LPF
KETONES UR QL STRIP: ABNORMAL
KETONES UR QL STRIP: NEGATIVE
LEUKOCYTE ESTERASE UR QL STRIP: ABNORMAL
LEUKOCYTE ESTERASE URINE, POC: ABNORMAL
MICROSCOPIC COMMENT: ABNORMAL
NITRITE UR QL STRIP: NEGATIVE
NITRITE, POC UA: ABNORMAL
NON-SQ EPI CELLS #/AREA URNS AUTO: <1 /HPF
PH UR STRIP: 7 [PH] (ref 5–8)
PH, POC UA: 8
PROT UR QL STRIP: ABNORMAL
PROTEIN, POC: ABNORMAL
RBC #/AREA URNS AUTO: 68 /HPF (ref 0–4)
SP GR UR STRIP: 1.01 (ref 1–1.03)
SPECIFIC GRAVITY, POC UA: 1
SQUAMOUS #/AREA URNS AUTO: 3 /HPF
URN SPEC COLLECT METH UR: ABNORMAL
UROBILINOGEN, POC UA: ABNORMAL
WBC #/AREA URNS AUTO: >100 /HPF (ref 0–5)
WBC CLUMPS UR QL AUTO: ABNORMAL

## 2022-11-29 PROCEDURE — 87088 URINE BACTERIA CULTURE: CPT | Performed by: FAMILY MEDICINE

## 2022-11-29 PROCEDURE — 87086 URINE CULTURE/COLONY COUNT: CPT | Performed by: FAMILY MEDICINE

## 2022-11-29 PROCEDURE — 99214 OFFICE O/P EST MOD 30 MIN: CPT | Mod: S$PBB,,, | Performed by: FAMILY MEDICINE

## 2022-11-29 PROCEDURE — 87077 CULTURE AEROBIC IDENTIFY: CPT | Performed by: FAMILY MEDICINE

## 2022-11-29 PROCEDURE — 87186 SC STD MICRODIL/AGAR DIL: CPT | Performed by: FAMILY MEDICINE

## 2022-11-29 PROCEDURE — 99214 PR OFFICE/OUTPT VISIT, EST, LEVL IV, 30-39 MIN: ICD-10-PCS | Mod: S$PBB,,, | Performed by: FAMILY MEDICINE

## 2022-11-29 PROCEDURE — 96372 THER/PROPH/DIAG INJ SC/IM: CPT | Mod: PBBFAC,PN

## 2022-11-29 PROCEDURE — 81002 URINALYSIS NONAUTO W/O SCOPE: CPT | Mod: PBBFAC,PN | Performed by: FAMILY MEDICINE

## 2022-11-29 PROCEDURE — 99215 OFFICE O/P EST HI 40 MIN: CPT | Mod: PBBFAC,PN | Performed by: FAMILY MEDICINE

## 2022-11-29 PROCEDURE — 81001 URINALYSIS AUTO W/SCOPE: CPT | Performed by: FAMILY MEDICINE

## 2022-11-29 PROCEDURE — 99999 PR PBB SHADOW E&M-EST. PATIENT-LVL V: CPT | Mod: PBBFAC,,, | Performed by: FAMILY MEDICINE

## 2022-11-29 PROCEDURE — 99999 PR PBB SHADOW E&M-EST. PATIENT-LVL V: ICD-10-PCS | Mod: PBBFAC,,, | Performed by: FAMILY MEDICINE

## 2022-11-29 RX ORDER — AMOXICILLIN AND CLAVULANATE POTASSIUM 500; 125 MG/1; MG/1
1 TABLET, FILM COATED ORAL 2 TIMES DAILY
Qty: 20 TABLET | Refills: 0 | Status: SHIPPED | OUTPATIENT
Start: 2022-11-29 | End: 2022-12-09

## 2022-11-29 RX ORDER — FLUCONAZOLE 150 MG/1
150 TABLET ORAL DAILY
Qty: 1 TABLET | Refills: 0 | Status: SHIPPED | OUTPATIENT
Start: 2022-11-29 | End: 2022-11-30

## 2022-11-29 RX ORDER — PHENAZOPYRIDINE HYDROCHLORIDE 200 MG/1
200 TABLET, FILM COATED ORAL 3 TIMES DAILY PRN
Qty: 6 TABLET | Refills: 0 | Status: SHIPPED | OUTPATIENT
Start: 2022-11-29 | End: 2022-12-01

## 2022-11-29 RX ORDER — CEFTRIAXONE 500 MG/1
500 INJECTION, POWDER, FOR SOLUTION INTRAMUSCULAR; INTRAVENOUS ONCE
Status: COMPLETED | OUTPATIENT
Start: 2022-11-29 | End: 2022-11-29

## 2022-11-29 RX ADMIN — CEFTRIAXONE SODIUM 500 MG: 500 INJECTION, POWDER, FOR SOLUTION INTRAMUSCULAR; INTRAVENOUS at 11:11

## 2022-11-29 NOTE — PROGRESS NOTES
Two patient identifiers verified.    Allergies reviewed.    Rocephin injection administered IM to RUOQ per MD order.    Patient tolerated injection well; no redness, bleeding, or bruising noted to injection site.    Patient instructed to remain in clinic setting for 15 minutes. Verbalizes understanding.

## 2022-12-01 LAB — BACTERIA UR CULT: ABNORMAL

## 2022-12-07 PROBLEM — K58.2 IRRITABLE BOWEL SYNDROME WITH BOTH CONSTIPATION AND DIARRHEA: Status: ACTIVE | Noted: 2022-12-07

## 2022-12-07 NOTE — PROGRESS NOTES
"    /70 (BP Location: Left arm, Patient Position: Sitting, BP Method: Medium (Manual))   Pulse 83   Ht 5' 1" (1.549 m)   Wt 63 kg (138 lb 14.2 oz)   SpO2 96%   BMI 26.24 kg/m²       ===========    Chief Complaint: No chief complaint on file.        Jackelyn Kendrick is a 71 y.o. female here for    HPI    Recurrent UTIs.  Saw Urology in July and she states she was told to not come back but did have workup including renal ultrasound and cystoscopy without concerning findings.  Frequent UTI started about a year ago.  About 7 over the past year.  Urine cultures demonstrate organisms including Pseudomonas     No fevers chills.  No flank pain.  No nausea vomiting diarrhea        Patient queried and denies any further complaints    SURGICAL AND MEDICAL HISTORY: updated and reviewed.  ALLERGIES updated and reviewed.  Review of patient's allergies indicates:   Allergen Reactions    Levaquin [levofloxacin] Swelling and Edema    Erythromycin (bulk) Nausea And Vomiting     CURRENT OUTPATIENT MEDICATIONS updated and reviewed    Current Outpatient Medications:     albuterol (PROVENTIL/VENTOLIN HFA) 90 mcg/actuation inhaler, Inhale 2 puffs into the lungs every 6 (six) hours as needed for Wheezing., Disp: 6.7 g, Rfl: 11    celecoxib (CELEBREX) 200 MG capsule, Take 1 capsule (200 mg total) by mouth once daily. Do not take more than 15 consecutive days. Take w food and water, Disp: 30 capsule, Rfl: 11    cranberry fruit extract (CRANBERRY EXTRACT ORAL), Take by mouth., Disp: , Rfl:     cyclobenzaprine (FLEXERIL) 10 MG tablet, Take 1 tablet (10 mg total) by mouth nightly. As needed for muscle spasms, Disp: 30 tablet, Rfl: 0    diclofenac sodium (VOLTAREN) 1 % Gel, Apply 2 g topically 2 (two) times daily as needed (musculoskeletal pain)., Disp: 100 g, Rfl: 11    dicyclomine (BENTYL) 20 mg tablet, Take 1 tablet (20 mg total) by mouth 4 (four) times daily before meals and nightly., Disp: 120 tablet, Rfl: 11    estradioL " (ESTRACE) 0.01 % (0.1 mg/gram) vaginal cream, Place 0.5-1 g vaginally twice a week., Disp: 42.5 g, Rfl: 3    fluocinonide (LIDEX) 0.05 % external solution, Apply topically once daily., Disp: 60 mL, Rfl: 11    HYDROcodone-acetaminophen (NORCO) 7.5-325 mg per tablet, Take 1 tablet by mouth every 6 (six) hours as needed for Pain., Disp: 28 tablet, Rfl: 0    ketoconazole (NIZORAL) 2 % shampoo, Apply topically twice a week., Disp: 120 mL, Rfl: 11    levothyroxine (SYNTHROID) 75 MCG tablet, Take 1 tablet (75 mcg total) by mouth once daily., Disp: 30 tablet, Rfl: 11    LIDOcaine-prilocaine (EMLA) cream, Apply topically 2 (two) times daily as needed. To hands., Disp: 30 g, Rfl: 2    losartan (COZAAR) 100 MG tablet, Take 1 tablet (100 mg total) by mouth once daily., Disp: 30 tablet, Rfl: 11    MAGNESIUM ORAL, Take 1 tablet by mouth once daily., Disp: , Rfl:     memantine (NAMENDA) 5 MG Tab, Take 1 tablet (5 mg total) by mouth 2 (two) times daily., Disp: 60 tablet, Rfl: 11    methocarbamoL (ROBAXIN) 500 MG Tab, Take 1 tablet (500 mg total) by mouth 3 (three) times daily. w meals for 3-10 days for muscle spasms, Disp: 30 tablet, Rfl: 0    Multi-Vitamin tablet, Take 1 tablet by mouth once daily. , Disp: , Rfl:     omeprazole (PRILOSEC) 40 MG capsule, Take 1 capsule (40 mg total) by mouth once daily., Disp: 30 capsule, Rfl: 11    oxybutynin (DITROPAN-XL) 10 MG 24 hr tablet, Take 1 tablet (10 mg total) by mouth once daily., Disp: 30 tablet, Rfl: 11    PSYLLIUM SEED, WITH SUGAR, (METAMUCIL ORAL), Take by mouth as needed. , Disp: , Rfl:     rosuvastatin (CRESTOR) 40 MG Tab, Take 1 tablet (40 mg total) by mouth once daily., Disp: 30 tablet, Rfl: 11    traZODone (DESYREL) 50 MG tablet, 1/2 tab up to 2 tabs po qhs prn insomnia, Disp: 30 tablet, Rfl: 11    triamcinolone acetonide 0.1% (KENALOG) 0.1 % cream, AAA bid, Disp: 60 g, Rfl: 3    amoxicillin-clavulanate 500-125mg (AUGMENTIN) 500-125 mg Tab, Take 1 tablet (500 mg total) by mouth  "2 (two) times daily. for 10 days, Disp: 20 tablet, Rfl: 0    RESTASIS 0.05 % ophthalmic emulsion, INSTILL ONE DROP IN EACH EYE TWO TIMES A DAY, Disp: 60 each, Rfl: 0    Review of Systems   Constitutional:  Negative for activity change, appetite change, chills, diaphoresis, fatigue, fever and unexpected weight change.   HENT:  Negative for congestion, ear discharge, ear pain, facial swelling, hearing loss, nosebleeds, postnasal drip, rhinorrhea, sinus pressure, sneezing, sore throat, tinnitus, trouble swallowing and voice change.    Eyes:  Negative for photophobia, pain, discharge, redness, itching and visual disturbance.   Respiratory:  Negative for cough, chest tightness, shortness of breath and wheezing.    Cardiovascular:  Negative for chest pain, palpitations and leg swelling.   Gastrointestinal:  Negative for abdominal distention, abdominal pain, anal bleeding, blood in stool, constipation, diarrhea, nausea, rectal pain and vomiting.   Endocrine: Negative for cold intolerance, heat intolerance, polydipsia, polyphagia and polyuria.   Genitourinary:  Negative for difficulty urinating, dysuria and flank pain.   Musculoskeletal:  Negative for arthralgias, back pain, joint swelling, myalgias and neck pain.   Skin:  Negative for rash.   Neurological:  Negative for dizziness, tremors, seizures, syncope, speech difficulty, weakness, light-headedness, numbness and headaches.   Psychiatric/Behavioral:  Negative for behavioral problems, confusion, decreased concentration, dysphoric mood, sleep disturbance and suicidal ideas. The patient is not nervous/anxious and is not hyperactive.      /70 (BP Location: Left arm, Patient Position: Sitting, BP Method: Medium (Manual))   Pulse 83   Ht 5' 1" (1.549 m)   Wt 63 kg (138 lb 14.2 oz)   SpO2 96%   BMI 26.24 kg/m²   Physical Exam    Diagnoses and all orders for this visit:    Dysuria  -     POCT URINE DIPSTICK WITHOUT MICROSCOPE    Acute cystitis with hematuria  -     " Urinalysis, Reflex to Urine Culture Urine, Clean Catch    Recurrent UTI    Irritable bowel syndrome with both constipation and diarrhea  -     Ambulatory referral/consult to Gastroenterology; Future    Other orders  -     cefTRIAXone injection 500 mg  -     amoxicillin-clavulanate 500-125mg (AUGMENTIN) 500-125 mg Tab; Take 1 tablet (500 mg total) by mouth 2 (two) times daily. for 10 days  -     fluconazole (DIFLUCAN) 150 MG Tab; Take 1 tablet (150 mg total) by mouth once daily. As needed for yeast vaginitis for 1 day  -     phenazopyridine (PYRIDIUM) 200 MG tablet; Take 1 tablet (200 mg total) by mouth 3 (three) times daily as needed (urinary pain).  -     Urinalysis Microscopic  -     Urine culture    Educated about asymptomatic bacteruria--in other words, colonization of bacteria-- in elderly patients and that this is not actively treated.      However, she has had significant symptoms as she is having today.  Urinalysis.  Follow urine culture.  If positive, repeat in 3 weeks to try to assure clearance.      If continues with symptomatic bacteriuria then will consider referral to Infectious Disease.    Reviewed old pertinent medical records available.     All lab results personally reviewed and interpreted by me including last year, if available.    Est  Time spent in the evaluation and management of this patient exceeded 30min and greater than 50% of this time was in face-to-face time with the patient.    Total time including the following:  -preparing to see the patient (e.g., obtaining and/or reviewing old records such as old primary care notes, specialists notes, hospital notes, review of laboratory tests, radiographic and/or cardiology studies)  -orders which can include medications, laboratory studies, radiographic studies, procedures, referrals etcetera   --communicating with the patient and/or family member/caregiver regarding a detailed explanation of the treatment/care plan  -arranging possible referrals  and follow-up plan  -documentation of the visit in the electronic health record        Fabian Luna MD              As of April 1, 2021, the federal Cures Act was passed which requires that all doctors progress notes, lab results, radiology reports, etc be released IMMEDIATELY to the patient in the patient portal. That means that the results are released to you at the EXACT same time they are released to me. Therefore, with all of the patients that I have I am not able to reply to each patient exactly when the results come in. Therefore, there will be a delay from when you see the results to when I see them and have time to send an interpretation for you. Also, I only see these results when I am on the computer at work. So if the results come in over the weekend or holiday or after 5 pm of a workday, I will not see them until the next business day. As you can tell, this is a challenge for us as physicians to give every patient the quick response they hope for; so please be patient and thank you for understanding.

## 2022-12-09 ENCOUNTER — CLINICAL SUPPORT (OUTPATIENT)
Dept: REHABILITATION | Facility: HOSPITAL | Age: 72
End: 2022-12-09
Attending: ORTHOPAEDIC SURGERY
Payer: MEDICARE

## 2022-12-09 DIAGNOSIS — M25.511 BILATERAL SHOULDER PAIN, UNSPECIFIED CHRONICITY: ICD-10-CM

## 2022-12-09 DIAGNOSIS — R29.898 DECREASED ROM OF NECK: ICD-10-CM

## 2022-12-09 DIAGNOSIS — M62.81 MUSCLE WEAKNESS OF LEFT UPPER EXTREMITY: ICD-10-CM

## 2022-12-09 DIAGNOSIS — M25.512 BILATERAL SHOULDER PAIN, UNSPECIFIED CHRONICITY: ICD-10-CM

## 2022-12-09 PROCEDURE — 97161 PT EVAL LOW COMPLEX 20 MIN: CPT | Mod: PO

## 2022-12-09 NOTE — PLAN OF CARE
[facility]   Physical Therapy Initial Evaluation     Date: 12/9/2022   Name: Jackelyn Kendrick  Clinic Number: [id number]    Therapy Diagnosis:   Encounter Diagnoses   Name Primary?    Bilateral shoulder pain, unspecified chronicity     Muscle weakness of left upper extremity     Decreased ROM of neck      Physician: Reginald Pickens MD    Physician Orders: PT Eval and Treat   Medical Diagnosis from Referral: Bilateral shoulder pain, unspecified chronicity [M25.511, M25.512]  Evaluation Date: 12/9/2022  Authorization Period Expiration: 11/8/23  Plan of Care Expiration: 3/30/23  Progress Note Due: 1/9/23  Visit # / Visits authorized: 1/ 1   FOTO: 1/3    Precautions: Standard     Time In: 11:15  Time Out: 12:00  Total Appointment Time (timed & untimed codes): 45 minutes      SUBJECTIVE     Date of onset: November    History of current condition - Jackelyn Kendrick reports: has a long history of shoulder issues and notes that she has another tear of the rotator cuff on the left that needs a repair. However, the neck had a flare up in November that left her unable to move her neck. She notes that she took robaxin and flexeril which was helpful for a little while but the pain has started coming back so she started ding stretches for the neck but is still having some difficulty. She does endorse numbness and tingling in the left hand.     Falls: no    Imaging, none:     Prior Therapy: yes  Social History:  lives with their spouse  Occupation: retired  Prior Level of Function: independent  Current Level of Function: the shoulder constantly aches so she can do most things but she never knows what will cause her to get a bad spasm. The spasms are very sharp.     Pain:  Current 4/10, worst 10/10 - momentary pain , best 2/10   Location: left shoulder  and neck   Description: always aching; intermittent sharp spasms  Aggravating Factors: not really sure; random movements  Easing Factors:  nothing    Patients goals: relieve shoulder pain;      Medical History:   Past Medical History:   Diagnosis Date    Arthritis     Basal cell carcinoma     Bronchitis     seasonal    Cataract     Diverticulitis     Dry eye syndrome     GERD (gastroesophageal reflux disease)     Hyperlipidemia     Hypertension     Hypothyroidism 07/19/2012    Obese     PONV (postoperative nausea and vomiting)     Thyroid disease        Surgical History:   Jackelyn Kendrick  has a past surgical history that includes Colonoscopy (2007); Nasal septum surgery; Shoulder surgery; Hysterectomy; Cholecystectomy; Hernia repair; Tonsillectomy; Back surgery; De Quervain's release (Left, 03/2017); Colonoscopy (N/A, 9/3/2020); Arthroscopic repair of rotator cuff of shoulder (Right, 9/23/2020); Fixation of tendon (Right, 9/23/2020); Phacoemulsification of cataract (Left, 10/20/2021); Intraocular prosthesis insertion (Left, 10/20/2021); Cataract extraction w/  intraocular lens implant (Left, 10/20/2021); Trigger finger release (Left, 12/2/2021); Injection of steroid (Right, 12/2/2021); Phacoemulsification of cataract (Right, 12/29/2021); Intraocular prosthesis insertion (Right, 12/29/2021); and Cystoscopy.    Medications:   Jackelyn has a current medication list which includes the following prescription(s): albuterol, amoxicillin-clavulanate 500-125mg, celecoxib, cranberry fruit extract, cyclobenzaprine, diclofenac sodium, dicyclomine, estradiol, fluocinonide, hydrocodone-acetaminophen, ketoconazole, levothyroxine, lidocaine-prilocaine, losartan, magnesium, memantine, methocarbamol, multi-vitamin, omeprazole, oxybutynin, psyllium husk, restasis, rosuvastatin, trazodone, and triamcinolone acetonide 0.1%.    Allergies:   Review of patient's allergies indicates:   Allergen Reactions    Levaquin [levofloxacin] Swelling and Edema    Erythromycin (bulk) Nausea And Vomiting          OBJECTIVE     Observation: rounded shoulder bilaterally; mild winging of  scapulas bilaterally     Cervical extension is limited and painful  Cervical side bending is significantly limited and painful bilaterally  Cervical rotation is approx 80% limited bilaterally with pain     Normal cervical spine joint mobility with some pain recreated with CPA at c6-t1    Passive Range of Motion:   Shoulder Right Left   Flexion 165 145 with pain   Abduction  with pain   ER at 0 NT NT    ER at 90 NT 58 with pain    IR NT To table no pain      Active Range of Motion:   Shoulder Right Left   Flexion 156 120 with pain   Abduction 168 90 with pain    ER at 0 NT NT   ER at 90 88 60 with pain    IR To table To table no pain    Reach behind head yes To top of head   Reach behind back  yes yes     Strength:  Shoulder Right Left   Flexion 4-/5 4-/5 with pain   Abduction 4-/5 4--5 with pain   ER 4/5 4-/5 with pain   IR 4/5 4-/5       Special Tests:   Right Left   Empty Can Test NT +   Drop Arm test NT -   Subscaputlaris Lift Off NT - but pain   O'farooq's test NT pain   Hawkin's Kenndy NT +   Neer's Test NT +   Anterior Apprehension test NT -   Sulcus Sign NT NT       Joint Mobility: normal mobility of right glenohumeral joint  Normal mobility of left glenohumeral joint however anterior to posterior glide is slightly painful     Palpation: TTP supraspinatus, upper trap, medial border of scapula, bicep tendon, and teres minor/major and latissimus at lateral border of scapula    Sensation: NT    Flexibility:   Lat: R mod limitation ; L mod limitation   Pec Minor: R mod limitation ; L mod limitation         Limitation/Restriction for FOTO shoulder Survey    Therapist reviewed FOTO scores for Jackelyn Kendrick on 12/9/2022.   FOTO documents entered into EPIC - see Media section.    Limitation Score: 50%         TREATMENT     Total Treatment time (time-based codes) separate from Evaluation: 0 minutes      Jackelyn Kendrick received the treatments listed below:      therapeutic exercises to develop strength,  endurance, ROM, flexibility, posture, and core stabilization for 0 minutes including:      manual therapy techniques: Joint mobilizations and Soft tissue Mobilization were applied for 0 minutes, including:      neuromuscular re-education activities to improve: Balance and Posture for 0 minutes. The following activities were included:          PATIENT EDUCATION AND HOME EXERCISES     Education provided:   - education on condition    Written Home Exercises Provided: not provided today. Exercises were reviewed and Jackelyn Kendrick was able to demonstrate them prior to the end of the session.  Jackelyn Kendrick demonstrated good  understanding of the education provided. See EMR under Patient Instructions for exercises provided during therapy sessions.    ASSESSMENT     Jackelyn is a 71 y.o. female referred to outpatient Physical Therapy with a medical diagnosis of Bilateral shoulder pain, unspecified chronicity [M25.511, M25.512]. Patient presents with chronic shoulder and neck pains with recent exacerbation with no known mechanism of injury. Objective findings today include pain and limitation of both shoulder and neck range of motion, limitations in shoulder strength, positive special tests, and pain with segmental joint mobility of the cervical spine. She will benefit from skilled PT to address these deficits to improve driving ability and overall functionality for improved quality of life.     Patient prognosis is Good.   Patient will benefit from skilled outpatient Physical Therapy to address the deficits stated above and in the chart below, provide patient /family education, and to maximize patientt's level of independence.     Plan of care discussed with patient: Yes  Patient's spiritual, cultural and educational needs considered and patient is agreeable to the plan of care and goals as stated below:     Anticipated Barriers for therapy: none    Medical Necessity is demonstrated by the  following  History  Co-morbidities and personal factors that may impact the plan of care Co-morbidities:   Past Medical History:   Diagnosis Date    Arthritis     Basal cell carcinoma     Bronchitis     seasonal    Cataract     Diverticulitis     Dry eye syndrome     GERD (gastroesophageal reflux disease)     Hyperlipidemia     Hypertension     Hypothyroidism 07/19/2012    Obese     PONV (postoperative nausea and vomiting)     Thyroid disease        Personal Factors:   age     low   Examination  Body Structures and Functions, activity limitations and participation restrictions that may impact the plan of care Body Regions:   neck  back  upper extremities    Body Systems:    gross symmetry  ROM  strength  gross coordinated movement  transfers    Participation Restrictions:   Driving, overhead arm use    Activity limitations:   Learning and applying knowledge  no deficits    General Tasks and Commands  no deficits    Communication  no deficits    Mobility  lifting and carrying objects    Self care  dressing    Domestic Life  shopping  cooking  doing house work (cleaning house, washing dishes, laundry)  assisting others    Interactions/Relationships  no deficits    Life Areas  no deficits    Community and Social Life  community life  recreation and leisure         low   Clinical Presentation stable and uncomplicated low   Decision Making/ Complexity Score: low     Goals:  GOALS: Short Term Goals:  4 weeks  1.Report decreased left shoulder and neck pain < / =  4/10  to increase tolerance for driving  2. Increase PROM by 5-10 degrees   3. Increased strength by 1/3 MMT grade in BUE to increase tolerance for ADL and work activities.  4. Pt to tolerate HEP to improve ROM and independence with ADL's    Long Term Goals: 8 weeks  1.Report decreased left shoulder and neck pain  < / =  2 /10  to increase tolerance for overhead activities  2.Increase AROM by 5-10 degrees where limited  3.Increase strength to >/= 4/5 in BUE to  increase tolerance for ADL and work activities.  4. Pt goal: Pt to be able to reach back of her head with left hand to improve ability to wash hair.   5. Pt will have improved gcode of CJ (20-40% limited) on FOTO shoulder in order to demonstrate true functional improvement.     PLAN   Plan of care Certification: 12/9/2022 to 3/30/23.    Outpatient Physical Therapy 2 times weekly for 10 weeks to include the following interventions: Cervical/Lumbar Traction, Electrical Stimulation  , Gait Training, Manual Therapy, Moist Heat/ Ice, Neuromuscular Re-ed, Patient Education, Therapeutic Activities, Therapeutic Exercise, and Ultrasound.     Nelida Maloney, PT      I CERTIFY THE NEED FOR THESE SERVICES FURNISHED UNDER THIS PLAN OF TREATMENT AND WHILE UNDER MY CARE   Physician's comments:     Physician's Signature: ___________________________________________________

## 2022-12-09 NOTE — PROGRESS NOTES
Nose actively bleeding at this time. Dr Zepeda informed.    Please see plan of care for initial eval    Nelida Maloney, PT, DPT

## 2022-12-21 ENCOUNTER — PATIENT MESSAGE (OUTPATIENT)
Dept: PRIMARY CARE CLINIC | Facility: CLINIC | Age: 72
End: 2022-12-21
Payer: MEDICARE

## 2022-12-22 ENCOUNTER — OFFICE VISIT (OUTPATIENT)
Dept: INTERNAL MEDICINE | Facility: CLINIC | Age: 72
End: 2022-12-22
Payer: MEDICARE

## 2022-12-22 VITALS
BODY MASS INDEX: 26.68 KG/M2 | WEIGHT: 141.31 LBS | OXYGEN SATURATION: 95 % | SYSTOLIC BLOOD PRESSURE: 132 MMHG | HEART RATE: 100 BPM | DIASTOLIC BLOOD PRESSURE: 64 MMHG | HEIGHT: 61 IN

## 2022-12-22 DIAGNOSIS — N39.0 RECURRENT UTI: ICD-10-CM

## 2022-12-22 DIAGNOSIS — N30.01 ACUTE CYSTITIS WITH HEMATURIA: Primary | ICD-10-CM

## 2022-12-22 DIAGNOSIS — N39.46 URINARY INCONTINENCE, MIXED: ICD-10-CM

## 2022-12-22 DIAGNOSIS — E78.2 MIXED HYPERLIPIDEMIA: ICD-10-CM

## 2022-12-22 DIAGNOSIS — I10 ESSENTIAL HYPERTENSION: ICD-10-CM

## 2022-12-22 LAB
BACTERIA #/AREA URNS AUTO: ABNORMAL /HPF
BILIRUB SERPL-MCNC: NEGATIVE MG/DL
BILIRUB UR QL STRIP: NEGATIVE
BLOOD URINE, POC: NORMAL
CLARITY UR REFRACT.AUTO: ABNORMAL
CLARITY, POC UA: NORMAL
COLOR UR AUTO: YELLOW
COLOR, POC UA: NORMAL
GLUCOSE UR QL STRIP: NEGATIVE
GLUCOSE UR QL STRIP: NORMAL
HGB UR QL STRIP: ABNORMAL
KETONES UR QL STRIP: NEGATIVE
KETONES UR QL STRIP: NEGATIVE
LEUKOCYTE ESTERASE UR QL STRIP: ABNORMAL
LEUKOCYTE ESTERASE URINE, POC: NORMAL
MICROSCOPIC COMMENT: ABNORMAL
NITRITE UR QL STRIP: NEGATIVE
NITRITE, POC UA: POSITIVE
PH UR STRIP: 6 [PH] (ref 5–8)
PH, POC UA: 6
PROT UR QL STRIP: NEGATIVE
PROTEIN, POC: NORMAL
RBC #/AREA URNS AUTO: 14 /HPF (ref 0–4)
SP GR UR STRIP: 1.01 (ref 1–1.03)
SPECIFIC GRAVITY, POC UA: 1.01
SQUAMOUS #/AREA URNS AUTO: 1 /HPF
URN SPEC COLLECT METH UR: ABNORMAL
UROBILINOGEN, POC UA: NORMAL
WBC #/AREA URNS AUTO: >100 /HPF (ref 0–5)

## 2022-12-22 PROCEDURE — 87088 URINE BACTERIA CULTURE: CPT | Performed by: NURSE PRACTITIONER

## 2022-12-22 PROCEDURE — 81002 URINALYSIS NONAUTO W/O SCOPE: CPT | Mod: PBBFAC,59 | Performed by: NURSE PRACTITIONER

## 2022-12-22 PROCEDURE — 99215 OFFICE O/P EST HI 40 MIN: CPT | Mod: PBBFAC | Performed by: NURSE PRACTITIONER

## 2022-12-22 PROCEDURE — 99214 PR OFFICE/OUTPT VISIT, EST, LEVL IV, 30-39 MIN: ICD-10-PCS | Mod: S$PBB,,, | Performed by: NURSE PRACTITIONER

## 2022-12-22 PROCEDURE — 99214 OFFICE O/P EST MOD 30 MIN: CPT | Mod: S$PBB,,, | Performed by: NURSE PRACTITIONER

## 2022-12-22 PROCEDURE — 99999 PR PBB SHADOW E&M-EST. PATIENT-LVL V: ICD-10-PCS | Mod: PBBFAC,,, | Performed by: NURSE PRACTITIONER

## 2022-12-22 PROCEDURE — 87086 URINE CULTURE/COLONY COUNT: CPT | Performed by: NURSE PRACTITIONER

## 2022-12-22 PROCEDURE — 99999 PR PBB SHADOW E&M-EST. PATIENT-LVL V: CPT | Mod: PBBFAC,,, | Performed by: NURSE PRACTITIONER

## 2022-12-22 PROCEDURE — 81001 URINALYSIS AUTO W/SCOPE: CPT | Performed by: NURSE PRACTITIONER

## 2022-12-22 RX ORDER — AMOXICILLIN AND CLAVULANATE POTASSIUM 875; 125 MG/1; MG/1
1 TABLET, FILM COATED ORAL 2 TIMES DAILY
Qty: 20 TABLET | Refills: 0 | Status: SHIPPED | OUTPATIENT
Start: 2022-12-22 | End: 2023-11-27

## 2022-12-22 NOTE — PROGRESS NOTES
INTERNAL MEDICINE URGENT CARE NOTE    CHIEF COMPLAINT     Chief Complaint   Patient presents with    Cystitis       HPI     Jackelyn Kendrick is a 71 y.o. female with HLD, HTN, venous insufficiency, vaginal vault prolapse s/p hysterectomy, urinary incontinence, hypothyroidism, IBS, gerd,  OA who presents for an urgent visit today.    She is an established pt of Dr Luna     Here with c/o bladder infection: urinary frequency, bladder pressure, starting 4 days ago   No fever or chills.   No nausea or vomiting   No change in lower back pain     This is her third in less than 6 months   Last 11/29/2022 -  Klebsiella pneumoniae - pan sensitive and treated with augmentin - pt is allergic to levaquin     7/2022- Klebsiella Pneumoniae - resistant to amp/sul and macrobid   Treated with fosfomycin (MONUROL) 3 gram Pack, then bactrim x 2 rounds , referred to urology seen by Alvaro Tavares office cystoscopy  Renal U/S and KUB  D-mannose 1000 mg bid   Treatment of vaginal atrophy with topical vaginal estrogen cream.  Advised everything was normal and f/u with priamry care for UTI symptoms     Referred to ID 9/26/2022- advised to cont dmannose, cranberry juice, and estrogen cream         Past Medical History:  Past Medical History:   Diagnosis Date    Arthritis     Basal cell carcinoma     Bronchitis     seasonal    Cataract     Diverticulitis     Dry eye syndrome     GERD (gastroesophageal reflux disease)     Hyperlipidemia     Hypertension     Hypothyroidism 07/19/2012    Obese     PONV (postoperative nausea and vomiting)     Thyroid disease        Home Medications:  Prior to Admission medications    Medication Sig Start Date End Date Taking? Authorizing Provider   albuterol (PROVENTIL/VENTOLIN HFA) 90 mcg/actuation inhaler Inhale 2 puffs into the lungs every 6 (six) hours as needed for Wheezing. 11/10/22  Yes Fabian Luna MD   celecoxib (CELEBREX) 200 MG capsule Take 1 capsule (200 mg total) by mouth once daily.  Do not take more than 15 consecutive days. Take w food and water 11/10/22  Yes Fabian Luna MD   cranberry fruit extract (CRANBERRY EXTRACT ORAL) Take by mouth.   Yes Historical Provider   cyclobenzaprine (FLEXERIL) 10 MG tablet Take 1 tablet (10 mg total) by mouth nightly. As needed for muscle spasms 11/22/22  Yes Ramy London MD   diclofenac sodium (VOLTAREN) 1 % Gel Apply 2 g topically 2 (two) times daily as needed (musculoskeletal pain). 3/23/21  Yes Fabian Luna MD   dicyclomine (BENTYL) 20 mg tablet Take 1 tablet (20 mg total) by mouth 4 (four) times daily before meals and nightly. 11/1/21  Yes Fabian Luna MD   estradioL (ESTRACE) 0.01 % (0.1 mg/gram) vaginal cream Place 0.5-1 g vaginally twice a week. 7/28/22 7/28/23 Yes Rylan John MD   fluocinonide (LIDEX) 0.05 % external solution Apply topically once daily. 11/1/21  Yes Fabian Luna MD   HYDROcodone-acetaminophen (NORCO) 7.5-325 mg per tablet Take 1 tablet by mouth every 6 (six) hours as needed for Pain. 8/1/22  Yes Fabian Luna MD   ketoconazole (NIZORAL) 2 % shampoo Apply topically twice a week. 11/1/21  Yes Fabian Luna MD   levothyroxine (SYNTHROID) 75 MCG tablet Take 1 tablet (75 mcg total) by mouth once daily. 11/10/22  Yes Fabian Luna MD   LIDOcaine-prilocaine (EMLA) cream Apply topically 2 (two) times daily as needed. To hands. 11/1/21  Yes Fabian Luna MD   losartan (COZAAR) 100 MG tablet Take 1 tablet (100 mg total) by mouth once daily. 11/10/22  Yes Fabian Luna MD   MAGNESIUM ORAL Take 1 tablet by mouth once daily.   Yes Historical Provider   memantine (NAMENDA) 5 MG Tab Take 1 tablet (5 mg total) by mouth 2 (two) times daily. 11/10/22 11/10/23 Yes Fabian Luna MD   methocarbamoL (ROBAXIN) 500 MG Tab Take 1 tablet (500 mg total) by mouth 3 (three) times daily. w meals for 3-10 days for muscle spasms 11/22/22  Yes Ramy London MD   Multi-Vitamin tablet Take 1 tablet by  "mouth once daily.    Yes Historical Provider   omeprazole (PRILOSEC) 40 MG capsule Take 1 capsule (40 mg total) by mouth once daily. 11/10/22  Yes Fabian Luna MD   oxybutynin (DITROPAN-XL) 10 MG 24 hr tablet Take 1 tablet (10 mg total) by mouth once daily. 11/10/22  Yes Fabian Luna MD   PSYLLIUM SEED, WITH SUGAR, (METAMUCIL ORAL) Take by mouth as needed.    Yes Historical Provider   RESTASIS 0.05 % ophthalmic emulsion INSTILL ONE DROP IN EACH EYE TWO TIMES A DAY 12/1/22  Yes Matthias Gutierrez, OD   rosuvastatin (CRESTOR) 40 MG Tab Take 1 tablet (40 mg total) by mouth once daily. 11/10/22  Yes Fabian Luna MD   traZODone (DESYREL) 50 MG tablet 1/2 tab up to 2 tabs po qhs prn insomnia 11/10/22  Yes Fabian Luna MD   triamcinolone acetonide 0.1% (KENALOG) 0.1 % cream AAA bid 6/12/18  Yes Mercedes Baumann MD       Review of Systems:  Review of Systems   Constitutional:  Negative for chills and fever.   Respiratory:  Negative for cough, shortness of breath and wheezing.    Cardiovascular:  Negative for chest pain and palpitations.   Gastrointestinal:  Negative for abdominal pain, constipation, diarrhea, nausea and vomiting.   Genitourinary:  Positive for frequency and urgency. Negative for dysuria and flank pain.   Skin:  Negative for rash.     Health Maintainence:   Immunizations:  Health Maintenance         Date Due Completion Date    COVID-19 Vaccine (3 - Booster) 02/01/2022 12/7/2021    Mammogram 02/03/2022 2/3/2021    DEXA Scan 08/23/2023 8/23/2019    High Dose Statin 11/29/2023 11/29/2022    Colorectal Cancer Screening 09/03/2027 9/3/2020    Lipid Panel 11/14/2027 11/14/2022    TETANUS VACCINE 08/19/2029 8/19/2019             PHYSICAL EXAM     /64 (BP Location: Right arm, Patient Position: Sitting, BP Method: Medium (Manual))   Pulse 100   Ht 5' 1" (1.549 m)   Wt 64.1 kg (141 lb 5 oz)   SpO2 95%   BMI 26.70 kg/m²     Physical Exam  Constitutional:       Appearance: She is well-developed. "   HENT:      Head: Normocephalic and atraumatic.   Eyes:      Pupils: Pupils are equal, round, and reactive to light.   Cardiovascular:      Rate and Rhythm: Normal rate and regular rhythm.   Pulmonary:      Effort: Pulmonary effort is normal.   Neurological:      Mental Status: She is alert and oriented to person, place, and time.       LABS     Lab Results   Component Value Date    HGBA1C 5.5 11/14/2022     CMP  Sodium   Date Value Ref Range Status   11/14/2022 140 136 - 145 mmol/L Final     Potassium   Date Value Ref Range Status   11/14/2022 4.1 3.5 - 5.1 mmol/L Final     Chloride   Date Value Ref Range Status   11/14/2022 104 95 - 110 mmol/L Final     CO2   Date Value Ref Range Status   11/14/2022 25 23 - 29 mmol/L Final     Glucose   Date Value Ref Range Status   11/14/2022 88 70 - 110 mg/dL Final     BUN   Date Value Ref Range Status   11/14/2022 18 8 - 23 mg/dL Final     Creatinine   Date Value Ref Range Status   11/14/2022 0.8 0.5 - 1.4 mg/dL Final     Calcium   Date Value Ref Range Status   11/14/2022 9.5 8.7 - 10.5 mg/dL Final     Total Protein   Date Value Ref Range Status   11/14/2022 7.0 6.0 - 8.4 g/dL Final     Albumin   Date Value Ref Range Status   11/14/2022 3.9 3.5 - 5.2 g/dL Final     Total Bilirubin   Date Value Ref Range Status   11/14/2022 0.4 0.1 - 1.0 mg/dL Final     Comment:     For infants and newborns, interpretation of results should be based  on gestational age, weight and in agreement with clinical  observations.    Premature Infant recommended reference ranges:  Up to 24 hours.............<8.0 mg/dL  Up to 48 hours............<12.0 mg/dL  3-5 days..................<15.0 mg/dL  6-29 days.................<15.0 mg/dL       Alkaline Phosphatase   Date Value Ref Range Status   11/14/2022 52 (L) 55 - 135 U/L Final     AST   Date Value Ref Range Status   11/14/2022 37 10 - 40 U/L Final     ALT   Date Value Ref Range Status   11/14/2022 33 10 - 44 U/L Final     Anion Gap   Date Value Ref  Range Status   11/14/2022 11 8 - 16 mmol/L Final     eGFR if    Date Value Ref Range Status   11/11/2021 >60.0 >60 mL/min/1.73 m^2 Final     eGFR if non    Date Value Ref Range Status   11/11/2021 57.2 (A) >60 mL/min/1.73 m^2 Final     Comment:     Calculation used to obtain the estimated glomerular filtration  rate (eGFR) is the CKD-EPI equation.        Lab Results   Component Value Date    WBC 6.52 11/14/2022    HGB 11.2 (L) 11/14/2022    HCT 35.7 (L) 11/14/2022    MCV 94 11/14/2022     11/14/2022     Lab Results   Component Value Date    CHOL 163 11/14/2022    CHOL 148 08/28/2020    CHOL 153 08/12/2019     Lab Results   Component Value Date    HDL 56 11/14/2022    HDL 58 08/28/2020    HDL 50 08/12/2019     Lab Results   Component Value Date    LDLCALC 88.8 11/14/2022    LDLCALC 71.2 08/28/2020    LDLCALC 76.6 08/12/2019     Lab Results   Component Value Date    TRIG 91 11/14/2022    TRIG 94 08/28/2020    TRIG 132 08/12/2019     Lab Results   Component Value Date    CHOLHDL 34.4 11/14/2022    CHOLHDL 39.2 08/28/2020    CHOLHDL 32.7 08/12/2019     Lab Results   Component Value Date    TSH 1.809 11/14/2022       ASSESSMENT/PLAN     Jackelyn Kendrick is a 71 y.o. female     Acute cystitis with hematuria- poct u/a with +UTI findings. Will send for u/a and C&S. Start augmentin   -     POCT URINE DIPSTICK WITHOUT MICROSCOPE  -     Urinalysis, Reflex to Urine Culture Urine, Clean Catch  -     amoxicillin-clavulanate 875-125mg (AUGMENTIN) 875-125 mg per tablet; Take 1 tablet by mouth 2 (two) times daily.  Dispense: 20 tablet; Refill: 0    Urinary incontinence, mixed  -     POCT URINE DIPSTICK WITHOUT MICROSCOPE  -     Urinalysis, Reflex to Urine Culture Urine, Clean Catch  -     amoxicillin-clavulanate 875-125mg (AUGMENTIN) 875-125 mg per tablet; Take 1 tablet by mouth 2 (two) times daily.  Dispense: 20 tablet; Refill: 0    Recurrent UTI- advised to follow up with urology   -      POCT URINE DIPSTICK WITHOUT MICROSCOPE  -     Urinalysis, Reflex to Urine Culture Urine, Clean Catch  -     amoxicillin-clavulanate 875-125mg (AUGMENTIN) 875-125 mg per tablet; Take 1 tablet by mouth 2 (two) times daily.  Dispense: 20 tablet; Refill: 0    Essential hypertension    Mixed hyperlipidemia      Follow up with PCP     Patient education provided from Eugenie. Patient was counseled on when and how to seek emergent care.       Susan MORTON, FIOR, FNP-c   Department of Internal Medicine - [facility]  9:07 AM

## 2022-12-24 LAB — BACTERIA UR CULT: ABNORMAL

## 2022-12-27 ENCOUNTER — CLINICAL SUPPORT (OUTPATIENT)
Dept: REHABILITATION | Facility: HOSPITAL | Age: 72
End: 2022-12-27
Payer: MEDICARE

## 2022-12-27 DIAGNOSIS — R29.898 DECREASED ROM OF NECK: ICD-10-CM

## 2022-12-27 DIAGNOSIS — M25.511 BILATERAL SHOULDER PAIN, UNSPECIFIED CHRONICITY: Primary | ICD-10-CM

## 2022-12-27 DIAGNOSIS — M25.512 BILATERAL SHOULDER PAIN, UNSPECIFIED CHRONICITY: Primary | ICD-10-CM

## 2022-12-27 DIAGNOSIS — M62.81 MUSCLE WEAKNESS OF LEFT UPPER EXTREMITY: ICD-10-CM

## 2022-12-27 PROCEDURE — 97140 MANUAL THERAPY 1/> REGIONS: CPT | Mod: PO

## 2022-12-27 PROCEDURE — 97110 THERAPEUTIC EXERCISES: CPT | Mod: PO

## 2022-12-27 NOTE — PROGRESS NOTES
"  [facility]   Physical Therapy Treatment Note     Name: Jackelyn Kendrick  Clinic Number: [id number]    Therapy Diagnosis:   Encounter Diagnoses   Name Primary?    Bilateral shoulder pain, unspecified chronicity Yes    Muscle weakness of left upper extremity     Decreased ROM of neck      Physician: Reginald Pickens MD    Visit Date: 12/27/2022    Physician Orders: PT Eval and Treat   Medical Diagnosis from Referral: Bilateral shoulder pain, unspecified chronicity [M25.511, M25.512]  Evaluation Date: 12/9/2022  Authorization Period Expiration: 12/31/22  Plan of Care Expiration: 3/30/23  Progress Note Due: 1/9/23  Visit # / Visits authorized: 1/ 1   FOTO: 1/3  PTA Visit #: 0/5     Precautions: Standard     Time In: 12:15  Time Out: 1:02  Total Billable Time: 47 minutes      SUBJECTIVE     Pt reports: feeling about the same since initial eval.  She was not provided with home exercise program.  Response to previous treatment: IE performed   Functional change: ongoing    Pain: 3/10  Location: left neck  and shoulder        OBJECTIVE     Objective measures taken at progress report unless specified otherwise.       TREATMENT       Jackelyn Kendrick received the treatments listed below:       therapeutic exercises to develop strength, endurance, ROM, flexibility, posture, and core stabilization for 37 minutes including:  Cervical rotations 10x5"  Chin tucks 2x10x3" (may do better in seated position)  Shoulder AAROM into flexion 2x10x3"  Seated scap retractions 3x10x3"  Cervical isometrics with hand 10x5" each direction  Standing shoulder shrugs with arms at sides 15x3"      Consider:  Shoulder rows with orange theraband 2x10  Shoulder extensions with orange theraband 2x10     manual therapy techniques: Joint mobilizations and Soft tissue Mobilization were applied for 10 minutes, including:  Suboccipital release  STM levator scaps and upper traps  PROM cervical sidebending and rotation    "   neuromuscular re-education activities to improve: Balance and Posture for 0 minutes. The following activities were included:      PATIENT EDUCATION AND HOME EXERCISES     Home Exercises Provided and Patient Education Provided     Education provided:   - education on condition     Written Home Exercises Provided: not provided today. Exercises were reviewed and Jackelyn Kendrick was able to demonstrate them prior to the end of the session.  Jackelyn Kendrick demonstrated good  understanding of the education provided. See EMR under Patient Instructions for exercises provided during therapy sessions.    ASSESSMENT     Ms. Hayden reports doing about the same as initial eval. We added several new exercises today and will assess response prior to issuing home exercise program. She requires verbal cues for proper form but is able to complete independently afterwards although she feels she is not doing them correctly. We will continue to progress periscapular, cervical, and upper extremity strength along with shoulder range of motion as tolerated by patient. She does report that manual therapy was helpful today.     Jackelyn Kendrick Is progressing towards her goals.   Pt prognosis is Fair.     Pt will continue to benefit from skilled outpatient physical therapy to address the deficits listed in the problem list box on initial evaluation, provide pt/family education and to maximize pt's level of independence in the home and community environment.     Pt's spiritual, cultural and educational needs considered and pt agreeable to plan of care and goals.     Anticipated barriers to physical therapy: none    GOALS:   Short Term Goals:  4 weeks  1.Report decreased left shoulder and neck pain < / =  4/10  to increase tolerance for driving  2. Increase PROM by 5-10 degrees   3. Increased strength by 1/3 MMT grade in BUE to increase tolerance for ADL and work activities.  4. Pt to tolerate HEP to improve ROM and independence  with ADL's     Long Term Goals: 8 weeks  1.Report decreased left shoulder and neck pain  < / =  2 /10  to increase tolerance for overhead activities  2.Increase AROM by 5-10 degrees where limited  3.Increase strength to >/= 4/5 in BUE to increase tolerance for ADL and work activities.  4. Pt goal: Pt to be able to reach back of her head with left hand to improve ability to wash hair.   5. Pt will have improved gcode of CJ (20-40% limited) on FOTO shoulder in order to demonstrate true functional improvement.     PLAN     Plan of care Certification: 12/9/2022 to 3/30/23.     Outpatient Physical Therapy 2 times weekly for 10 weeks to include the following interventions: Cervical/Lumbar Traction, Electrical Stimulation  , Gait Training, Manual Therapy, Moist Heat/ Ice, Neuromuscular Re-ed, Patient Education, Therapeutic Activities, Therapeutic Exercise, and Ultrasound.     Nelida Maloney, PT

## 2023-01-03 ENCOUNTER — CLINICAL SUPPORT (OUTPATIENT)
Dept: REHABILITATION | Facility: HOSPITAL | Age: 73
End: 2023-01-03
Payer: MEDICARE

## 2023-01-03 DIAGNOSIS — M25.511 BILATERAL SHOULDER PAIN, UNSPECIFIED CHRONICITY: Primary | ICD-10-CM

## 2023-01-03 DIAGNOSIS — M25.512 BILATERAL SHOULDER PAIN, UNSPECIFIED CHRONICITY: Primary | ICD-10-CM

## 2023-01-03 DIAGNOSIS — M62.81 MUSCLE WEAKNESS OF LEFT UPPER EXTREMITY: ICD-10-CM

## 2023-01-03 DIAGNOSIS — R29.898 DECREASED ROM OF NECK: ICD-10-CM

## 2023-01-03 PROCEDURE — 97110 THERAPEUTIC EXERCISES: CPT | Mod: PO,CQ

## 2023-01-03 PROCEDURE — 97140 MANUAL THERAPY 1/> REGIONS: CPT | Mod: PO,CQ

## 2023-01-03 NOTE — PROGRESS NOTES
"  [facility]   Physical Therapy Treatment Note     Name: Jackelyn Kendrick  Clinic Number: [id number]    Therapy Diagnosis:   Encounter Diagnoses   Name Primary?    Bilateral shoulder pain, unspecified chronicity Yes    Muscle weakness of left upper extremity     Decreased ROM of neck        Physician: Reginald Pickens MD    Visit Date: 1/3/2023    Physician Orders: PT Eval and Treat   Medical Diagnosis from Referral: Bilateral shoulder pain, unspecified chronicity [M25.511, M25.512]  Evaluation Date: 12/9/2022  Authorization Period Expiration: 12/31/23  Plan of Care Expiration: 3/30/23  Progress Note Due: 1/9/23  Visit # / Visits authorized: 1/ 20   FOTO: 1/3  PTA Visit #: 1/5     Precautions: Standard     Time In: 2:00 pm  Time Out: 2:45 pm  Total Billable Time: 45 minutes      SUBJECTIVE     Pt reports: she had a some soreness after last session, but it's gotten better  She was not provided with home exercise program.  Response to previous treatment: soreness in bilateral shoulders R>L  Functional change: ongoing    Pain: 2/10  Location: left neck  and shoulder        OBJECTIVE     Objective measures taken at progress report unless specified otherwise.       TREATMENT       Jackelyn Kendrick received the treatments listed below:       therapeutic exercises to develop strength, endurance, ROM, flexibility, posture, and core stabilization for 35 minutes including:  Cervical rotations 10x5"  Chin tucks 2x10x3" (may do better in seated position)  Shoulder AAROM into flexion 2x10x3"  Seated scap retractions 3x10x3"  Cervical isometrics with hand 10x5" each direction  Standing shoulder shrugs with arms at sides 15x3"  Shoulder rows with orange theraband 2x10      Consider:    Shoulder extensions with orange theraband 2x10     manual therapy techniques: Joint mobilizations and Soft tissue Mobilization were applied for 10 minutes, including:  Suboccipital release  STM levator scaps and " upper traps  PROM cervical sidebending and rotation      neuromuscular re-education activities to improve: Balance and Posture for 0 minutes. The following activities were included:      PATIENT EDUCATION AND HOME EXERCISES     Home Exercises Provided and Patient Education Provided     Education provided:   - education on condition     Written Home Exercises Provided: not provided today. Exercises were reviewed and Jackelyn Kendrick was able to demonstrate them prior to the end of the session.  Jackelyn Kendrick demonstrated good  understanding of the education provided. See EMR under Patient Instructions for exercises provided during therapy sessions.    ASSESSMENT     Ms. Hayden presents to treatment with soreness in bilateral shoulders. She reports increased soreness following last session that has since decreased. She continues to require cueing for proper technique. Rows with theraband was added today with no increase in pain reported. Continue to progress as tolerated.     Jackelyn Kendrick Is progressing towards her goals.   Pt prognosis is Fair.     Pt will continue to benefit from skilled outpatient physical therapy to address the deficits listed in the problem list box on initial evaluation, provide pt/family education and to maximize pt's level of independence in the home and community environment.     Pt's spiritual, cultural and educational needs considered and pt agreeable to plan of care and goals.     Anticipated barriers to physical therapy: none    GOALS:   Short Term Goals:  4 weeks  1.Report decreased left shoulder and neck pain < / =  4/10  to increase tolerance for driving  2. Increase PROM by 5-10 degrees   3. Increased strength by 1/3 MMT grade in BUE to increase tolerance for ADL and work activities.  4. Pt to tolerate HEP to improve ROM and independence with ADL's     Long Term Goals: 8 weeks  1.Report decreased left shoulder and neck pain  < / =  2 /10  to increase tolerance for  overhead activities  2.Increase AROM by 5-10 degrees where limited  3.Increase strength to >/= 4/5 in BUE to increase tolerance for ADL and work activities.  4. Pt goal: Pt to be able to reach back of her head with left hand to improve ability to wash hair.   5. Pt will have improved gcode of CJ (20-40% limited) on FOTO shoulder in order to demonstrate true functional improvement.     PLAN     Plan of care Certification: 12/9/2022 to 3/30/23.     Outpatient Physical Therapy 2 times weekly for 10 weeks to include the following interventions: Cervical/Lumbar Traction, Electrical Stimulation  , Gait Training, Manual Therapy, Moist Heat/ Ice, Neuromuscular Re-ed, Patient Education, Therapeutic Activities, Therapeutic Exercise, and Ultrasound.     Alberta Moscoso, PTA

## 2023-01-04 ENCOUNTER — PATIENT MESSAGE (OUTPATIENT)
Dept: PRIMARY CARE CLINIC | Facility: CLINIC | Age: 73
End: 2023-01-04
Payer: MEDICARE

## 2023-01-05 ENCOUNTER — OFFICE VISIT (OUTPATIENT)
Dept: URGENT CARE | Facility: CLINIC | Age: 73
End: 2023-01-05
Payer: MEDICARE

## 2023-01-05 VITALS
RESPIRATION RATE: 16 BRPM | DIASTOLIC BLOOD PRESSURE: 71 MMHG | OXYGEN SATURATION: 94 % | TEMPERATURE: 99 F | HEIGHT: 61 IN | WEIGHT: 141 LBS | BODY MASS INDEX: 26.62 KG/M2 | HEART RATE: 91 BPM | SYSTOLIC BLOOD PRESSURE: 122 MMHG

## 2023-01-05 DIAGNOSIS — R80.9 PROTEINURIA, UNSPECIFIED TYPE: ICD-10-CM

## 2023-01-05 DIAGNOSIS — R30.0 DYSURIA: Primary | ICD-10-CM

## 2023-01-05 LAB
BILIRUB UR QL STRIP: NEGATIVE
GLUCOSE UR QL STRIP: NEGATIVE
KETONES UR QL STRIP: NEGATIVE
LEUKOCYTE ESTERASE UR QL STRIP: NEGATIVE
PH, POC UA: 5.5
POC BLOOD, URINE: POSITIVE
POC NITRATES, URINE: NEGATIVE
PROT UR QL STRIP: POSITIVE
SP GR UR STRIP: 1 (ref 1–1.03)
UROBILINOGEN UR STRIP-ACNC: NORMAL (ref 0.1–1.1)

## 2023-01-05 PROCEDURE — 99213 PR OFFICE/OUTPT VISIT, EST, LEVL III, 20-29 MIN: ICD-10-PCS | Mod: S$GLB,,, | Performed by: NURSE PRACTITIONER

## 2023-01-05 PROCEDURE — 87086 URINE CULTURE/COLONY COUNT: CPT | Performed by: NURSE PRACTITIONER

## 2023-01-05 PROCEDURE — 87088 URINE BACTERIA CULTURE: CPT | Performed by: NURSE PRACTITIONER

## 2023-01-05 PROCEDURE — 87186 SC STD MICRODIL/AGAR DIL: CPT | Performed by: NURSE PRACTITIONER

## 2023-01-05 PROCEDURE — 99213 OFFICE O/P EST LOW 20 MIN: CPT | Mod: S$GLB,,, | Performed by: NURSE PRACTITIONER

## 2023-01-05 PROCEDURE — 81003 URINALYSIS AUTO W/O SCOPE: CPT | Mod: QW,S$GLB,, | Performed by: NURSE PRACTITIONER

## 2023-01-05 PROCEDURE — 87077 CULTURE AEROBIC IDENTIFY: CPT | Performed by: NURSE PRACTITIONER

## 2023-01-05 PROCEDURE — 81003 POCT URINALYSIS, DIPSTICK, AUTOMATED, W/O SCOPE: ICD-10-PCS | Mod: QW,S$GLB,, | Performed by: NURSE PRACTITIONER

## 2023-01-05 RX ORDER — SULFAMETHOXAZOLE AND TRIMETHOPRIM 800; 160 MG/1; MG/1
1 TABLET ORAL 2 TIMES DAILY
Qty: 20 TABLET | Refills: 0 | Status: SHIPPED | OUTPATIENT
Start: 2023-01-05 | End: 2023-01-15

## 2023-01-05 NOTE — PROGRESS NOTES
"Subjective:       Patient ID: Jackeyln Kendrick is a 72 y.o. female.    Vitals:  height is 5' 1" (1.549 m) and weight is 64 kg (141 lb). Her oral temperature is 98.8 °F (37.1 °C). Her blood pressure is 122/71 and her pulse is 91. Her respiration is 16 and oxygen saturation is 94% (abnormal).     Chief Complaint: Cystitis    This is a 72 y.o. female who presents today with a chief complaint of dysuria x 2 days. Pt reports that she has urinary frequency, cloudy, and urgency. No new back pain. No abdominal pain besides pressure when voiding. Denies fever. Has h/o UTIs. Often gets constipated.     Urinary Tract Infection   This is a recurrent problem. The current episode started in the past 7 days. The problem occurs every urination. The problem has been unchanged. The pain is at a severity of 0/10. The patient is experiencing no pain. There has been no fever. She is Not sexually active. There is No history of pyelonephritis. Associated symptoms include frequency and urgency. She has tried nothing for the symptoms. The treatment provided no relief. Her past medical history is significant for recurrent UTIs.     Genitourinary:  Positive for frequency and urgency.     Objective:      Physical Exam   Constitutional: She is oriented to person, place, and time.  Non-toxic appearance. She does not appear ill. No distress.   HENT:   Head: Normocephalic.   Nose: Nose normal.   Mouth/Throat: Mucous membranes are moist.   Neck: Neck supple. No neck rigidity present.   Cardiovascular: Normal rate.   Pulmonary/Chest: Effort normal.   Abdominal: Normal appearance. She exhibits no distension. Soft. flat abdomen There is no abdominal tenderness. There is no guarding, no left CVA tenderness and no right CVA tenderness.   Musculoskeletal: Normal range of motion.         General: Normal range of motion.   Neurological: She is alert and oriented to person, place, and time.   Skin: Skin is warm, dry and not diaphoretic.   Psychiatric: Her " behavior is normal. Mood normal.   Nursing note and vitals reviewed.      Results for orders placed or performed in visit on 01/05/23   POCT Urinalysis, Dipstick, Automated, W/O Scope   Result Value Ref Range    POC Blood, Urine Positive (A) Negative    POC Bilirubin, Urine Negative Negative    POC Urobilinogen, Urine Normal 0.1 - 1.1    POC Ketones, Urine Negative Negative    POC Protein, Urine Positive (A) Negative    POC Nitrates, Urine Negative Negative    POC Glucose, Urine Negative Negative    pH, UA 5.5     POC Specific Gravity, Urine 1.005 1.003 - 1.029    POC Leukocytes, Urine Negative Negative      Assessment:       1. Dysuria    2. Proteinuria, unspecified type            Plan:         Dysuria  -     POCT Urinalysis, Dipstick, Automated, W/O Scope  -     CULTURE, URINE  -     sulfamethoxazole-trimethoprim 800-160mg (BACTRIM DS) 800-160 mg Tab; Take 1 tablet by mouth 2 (two) times daily. for 10 days  Dispense: 20 tablet; Refill: 0  -     Ambulatory referral/consult to Urology    Proteinuria, unspecified type  -     sulfamethoxazole-trimethoprim 800-160mg (BACTRIM DS) 800-160 mg Tab; Take 1 tablet by mouth 2 (two) times daily. for 10 days  Dispense: 20 tablet; Refill: 0  -     Ambulatory referral/consult to Urology

## 2023-01-07 LAB — BACTERIA UR CULT: ABNORMAL

## 2023-01-09 ENCOUNTER — CLINICAL SUPPORT (OUTPATIENT)
Dept: REHABILITATION | Facility: HOSPITAL | Age: 73
End: 2023-01-09
Payer: MEDICARE

## 2023-01-09 ENCOUNTER — TELEPHONE (OUTPATIENT)
Dept: URGENT CARE | Facility: CLINIC | Age: 73
End: 2023-01-09
Payer: MEDICARE

## 2023-01-09 DIAGNOSIS — M25.511 BILATERAL SHOULDER PAIN, UNSPECIFIED CHRONICITY: Primary | ICD-10-CM

## 2023-01-09 DIAGNOSIS — M25.512 BILATERAL SHOULDER PAIN, UNSPECIFIED CHRONICITY: Primary | ICD-10-CM

## 2023-01-09 DIAGNOSIS — R29.898 DECREASED ROM OF NECK: ICD-10-CM

## 2023-01-09 DIAGNOSIS — M62.81 MUSCLE WEAKNESS OF LEFT UPPER EXTREMITY: ICD-10-CM

## 2023-01-09 PROCEDURE — 97110 THERAPEUTIC EXERCISES: CPT | Mod: PO,CQ

## 2023-01-09 PROCEDURE — 97140 MANUAL THERAPY 1/> REGIONS: CPT | Mod: PO,CQ

## 2023-01-09 NOTE — PROGRESS NOTES
"  [facility]   Physical Therapy Treatment Note     Name: Jackelyn Kendrick  Clinic Number: [id number]    Therapy Diagnosis:   Encounter Diagnoses   Name Primary?    Bilateral shoulder pain, unspecified chronicity Yes    Muscle weakness of left upper extremity     Decreased ROM of neck        Physician: Reginald Pickens MD    Visit Date: 1/9/2023    Physician Orders: PT Eval and Treat   Medical Diagnosis from Referral: Bilateral shoulder pain, unspecified chronicity [M25.511, M25.512]  Evaluation Date: 12/9/2022  Authorization Period Expiration: 12/31/23  Plan of Care Expiration: 3/30/23  Progress Note Due: 1/9/23  Visit # / Visits authorized: 2/ 20   FOTO: 2/3  PTA Visit #: 2/5     Precautions: Standard     Time In: 1:00 pm  Time Out: 1:45 pm  Total Billable Time: 45 minutes      SUBJECTIVE     Pt reports: her neck feels better  She was not provided with home exercise program.  Response to previous treatment: soreness in bilateral shoulders R>L  Functional change: ongoing    Pain: 2/10  Location: left neck  and shoulder        OBJECTIVE     Objective measures taken at progress report unless specified otherwise.       TREATMENT       Jackelyn Kendrick received the treatments listed below:       therapeutic exercises to develop strength, endurance, ROM, flexibility, posture, and core stabilization for 35 minutes including:    Cervical rotations 10x5"  Chin tucks 2x10x3" (may do better in seated position)  Shoulder AAROM into flexion 2x10x3"  Seated scap retractions 3x10x3"  Cervical isometrics with hand 10x5" each direction  Standing shoulder shrugs with arms at sides 15x3"  Shoulder rows with orange theraband 2x10  +Shoulder extensions with orange theraband 2x10     manual therapy techniques: Joint mobilizations and Soft tissue Mobilization were applied for 10 minutes, including:  Suboccipital release  STM levator scaps and upper traps  PROM cervical sidebending and rotation   "   neuromuscular re-education activities to improve: Balance and Posture for 0 minutes. The following activities were included:      PATIENT EDUCATION AND HOME EXERCISES     Home Exercises Provided and Patient Education Provided     Education provided:   - education on condition     Written Home Exercises Provided: not provided today. Exercises were reviewed and Jackelyn Kendrick was able to demonstrate them prior to the end of the session.  Jackelyn Kendrick demonstrated good  understanding of the education provided. See EMR under Patient Instructions for exercises provided during therapy sessions.    ASSESSMENT     Ms. Hayden tolerated session well with no issues or reports of increased pain. Shoulder extension with theraband was added today with no pain reported. Pt with reports of tenderness with manual therapy. Pt was provided with HEP and red TB and educated on importance of being compliant with HEP in order to maximize therapy benefit. Continue to progress as tolerated.    Jackelyn Kendrick Is progressing towards her goals.   Pt prognosis is Fair.     Pt will continue to benefit from skilled outpatient physical therapy to address the deficits listed in the problem list box on initial evaluation, provide pt/family education and to maximize pt's level of independence in the home and community environment.     Pt's spiritual, cultural and educational needs considered and pt agreeable to plan of care and goals.     Anticipated barriers to physical therapy: none    GOALS:   Short Term Goals:  4 weeks  1.Report decreased left shoulder and neck pain < / =  4/10  to increase tolerance for driving  2. Increase PROM by 5-10 degrees   3. Increased strength by 1/3 MMT grade in BUE to increase tolerance for ADL and work activities.  4. Pt to tolerate HEP to improve ROM and independence with ADL's     Long Term Goals: 8 weeks  1.Report decreased left shoulder and neck pain  < / =  2 /10  to increase tolerance for  overhead activities  2.Increase AROM by 5-10 degrees where limited  3.Increase strength to >/= 4/5 in BUE to increase tolerance for ADL and work activities.  4. Pt goal: Pt to be able to reach back of her head with left hand to improve ability to wash hair.   5. Pt will have improved gcode of CJ (20-40% limited) on FOTO shoulder in order to demonstrate true functional improvement.     PLAN     Plan of care Certification: 12/9/2022 to 3/30/23.     Outpatient Physical Therapy 2 times weekly for 10 weeks to include the following interventions: Cervical/Lumbar Traction, Electrical Stimulation  , Gait Training, Manual Therapy, Moist Heat/ Ice, Neuromuscular Re-ed, Patient Education, Therapeutic Activities, Therapeutic Exercise, and Ultrasound.     Bonnie Paredes, PTA

## 2023-01-10 ENCOUNTER — HOSPITAL ENCOUNTER (OUTPATIENT)
Dept: RADIOLOGY | Facility: HOSPITAL | Age: 73
Discharge: HOME | End: 2023-01-10
Attending: FAMILY MEDICINE
Payer: MEDICARE

## 2023-01-10 VITALS — WEIGHT: 141 LBS | BODY MASS INDEX: 26.62 KG/M2 | HEIGHT: 61 IN

## 2023-01-10 DIAGNOSIS — Z12.31 ENCOUNTER FOR SCREENING MAMMOGRAM FOR MALIGNANT NEOPLASM OF BREAST: ICD-10-CM

## 2023-01-10 PROCEDURE — 77067 SCR MAMMO BI INCL CAD: CPT | Mod: 26,,, | Performed by: INTERNAL MEDICINE

## 2023-01-10 PROCEDURE — 77063 MAMMO DIGITAL SCREENING BILAT WITH TOMO: ICD-10-PCS | Mod: 26,,, | Performed by: INTERNAL MEDICINE

## 2023-01-10 PROCEDURE — 77063 BREAST TOMOSYNTHESIS BI: CPT | Mod: 26,,, | Performed by: INTERNAL MEDICINE

## 2023-01-10 PROCEDURE — 77067 MAMMO DIGITAL SCREENING BILAT WITH TOMO: ICD-10-PCS | Mod: 26,,, | Performed by: INTERNAL MEDICINE

## 2023-01-10 PROCEDURE — 77067 SCR MAMMO BI INCL CAD: CPT | Mod: TC,PO

## 2023-01-11 ENCOUNTER — CLINICAL SUPPORT (OUTPATIENT)
Dept: REHABILITATION | Facility: HOSPITAL | Age: 73
End: 2023-01-11
Attending: FAMILY MEDICINE
Payer: MEDICARE

## 2023-01-11 DIAGNOSIS — M62.81 MUSCLE WEAKNESS OF LEFT UPPER EXTREMITY: ICD-10-CM

## 2023-01-11 DIAGNOSIS — R29.898 DECREASED ROM OF NECK: ICD-10-CM

## 2023-01-11 DIAGNOSIS — M25.512 BILATERAL SHOULDER PAIN, UNSPECIFIED CHRONICITY: Primary | ICD-10-CM

## 2023-01-11 DIAGNOSIS — M25.511 BILATERAL SHOULDER PAIN, UNSPECIFIED CHRONICITY: Primary | ICD-10-CM

## 2023-01-11 PROCEDURE — 97530 THERAPEUTIC ACTIVITIES: CPT | Mod: PO

## 2023-01-11 PROCEDURE — 97110 THERAPEUTIC EXERCISES: CPT | Mod: PO

## 2023-01-11 NOTE — PROGRESS NOTES
[facility]   Physical Therapy Treatment Note     Name: Jackelyn Kendrick  Clinic Number: [id number]    Therapy Diagnosis:   Encounter Diagnoses   Name Primary?    Bilateral shoulder pain, unspecified chronicity Yes    Muscle weakness of left upper extremity     Decreased ROM of neck          Physician: Reginald Pickens MD    Visit Date: 1/11/2023    Physician Orders: PT Eval and Treat   Medical Diagnosis from Referral: Bilateral shoulder pain, unspecified chronicity [M25.511, M25.512]  Evaluation Date: 12/9/2022  Authorization Period Expiration: 12/31/23  Plan of Care Expiration: 3/30/23  Progress Note Due: 1/9/23  Visit # / Visits authorized: 2/ 20   FOTO: 2/3  PTA Visit #: 2/5     Precautions: Standard     Time In: 1:15 pm  Time Out: 1:45 pm  Total Billable Time: 45 minutes      SUBJECTIVE     Pt reports: that her neck is better, but still hurts with certain movements.  She continues to to have pain in the L scapular region into her L UE.  She was not provided with home exercise program.  Response to previous treatment: soreness in bilateral shoulders R>L  Functional change: ongoing    Pain: 2/10  Location: left neck  and shoulder        OBJECTIVE     Objective measures taken at progress report unless specified otherwise.     Observation: rounded shoulder bilaterally; mild winging of scapulas bilaterally    R side of pelvis elevated    Cervical extension is limited and painful  Cervical side bending is significantly limited and painful bilaterally  Cervical rotation is approx 80% limited bilaterally with pain      Cx spine side glides limited and painful C2-7 bilaterally       Passive Range of Motion:   Shoulder Right Left   Flexion 165 130 with pain   Abduction 125 75 with pain   ER at 0 NT NT    ER at 90 80 52 at 45 deg of abduction with pain    IR 60 To abdomin at 45 deg of ab      Active Range of Motion:  - not tested 1/11/2023  Shoulder Right Left   Flexion 156 120 with pain  "  Abduction 168 90 with pain    ER at 0 NT NT   ER at 90 88 60 with pain    IR To table To table no pain    Reach behind head yes To top of head   Reach behind back  yes yes      Strength:  Shoulder Right Left   Flexion 4-/5 4-/5 with pain   Abduction 4-/5* 4--5 with pain   ER 4/5 4/5 with pain   IR 4+/5 4+/5         Special Tests: not tested 1/11/2023    Right Left   Empty Can Test NT +   Drop Arm test NT -   Subscaputlaris Lift Off NT - but pain   O'farooq's test NT pain   Hawkin's Kenndy NT +   Neer's Test NT +   Anterior Apprehension test NT -   Sulcus Sign NT NT         Joint Mobility: normal mobility of right glenohumeral joint  Normal mobility of left glenohumeral joint however anterior to posterior glide is slightly painful      Palpation: TTP supraspinatus, upper trap, medial border of scapula, bicep tendon, and teres minor/major and latissimus at lateral border of scapula     Sensation: NT     Flexibility:   Lat: R mod limitation ; L mod limitation              Pec Minor: R mod limitation ; L mod limitation            Limitation/Restriction for FOTO shoulder Survey     Therapist reviewed FOTO scores for Jackelyn Kendrick on 12/9/2022.   FOTO documents entered into [employer] - see Media section.     Limitation Score: 50%         TREATMENT   Re-evaluation = 30   Jackelyn Kendrick received the treatments listed below:       therapeutic exercises to develop strength, endurance, ROM, flexibility, posture, and core stabilization for 08 minutes including:    Cervical rotations 10x5"  Chin tucks 2x10x3" (may do better in seated position)  Shoulder AAROM into flexion 2x10x3"  Seated scap retractions 3x10x3"  Cervical isometrics with hand 10x5" each direction  Standing shoulder shrugs with arms at sides 15x3"  Shoulder rows with orange theraband 2x10  Shoulder extensions with orange theraband 2x10     manual therapy techniques: Joint mobilizations and Soft tissue Mobilization were applied for 10 minutes, " including:  Suboccipital release  STM levator scaps and upper traps  PROM cervical sidebending and rotation     neuromuscular re-education activities to improve: Balance and Posture for 0 minutes. The following activities were included:      PATIENT EDUCATION AND HOME EXERCISES     Home Exercises Provided and Patient Education Provided     Education provided:   - education on condition     Written Home Exercises Provided: not provided today. Exercises were reviewed and Jackelyn Kendrick was able to demonstrate them prior to the end of the session.  Jackelyn Kendrick demonstrated good  understanding of the education provided. See EMR under Patient Instructions for exercises provided during therapy sessions.    ASSESSMENT     Ms. Hayden with greater limitation in L shoulder abduction today than at her initial evaluation.  Her Cx spine ROM has improved, but she continues to report pain with certain head and neck movements.  Pt with limited joint mobility in the L mid thoracic costo-vertebral joints. Continue to progress as tolerated.    Jackelyn Kendrick Is progressing towards her goals.   Pt prognosis is Fair.     Pt will continue to benefit from skilled outpatient physical therapy to address the deficits listed in the problem list box on initial evaluation, provide pt/family education and to maximize pt's level of independence in the home and community environment.     Pt's spiritual, cultural and educational needs considered and pt agreeable to plan of care and goals.     Anticipated barriers to physical therapy: none    GOALS:   Short Term Goals:  4 weeks  1.Report decreased left shoulder and neck pain < / =  4/10  to increase tolerance for driving  2. Increase PROM by 5-10 degrees   3. Increased strength by 1/3 MMT grade in BUE to increase tolerance for ADL and work activities.  4. Pt to tolerate HEP to improve ROM and independence with ADL's     Long Term Goals: 8 weeks  1.Report decreased left shoulder  and neck pain  < / =  2 /10  to increase tolerance for overhead activities  2.Increase AROM by 5-10 degrees where limited  3.Increase strength to >/= 4/5 in BUE to increase tolerance for ADL and work activities.  4. Pt goal: Pt to be able to reach back of her head with left hand to improve ability to wash hair.   5. Pt will have improved gcode of CJ (20-40% limited) on FOTO shoulder in order to demonstrate true functional improvement.     PLAN     Plan of care Certification: 12/9/2022 to 3/30/23.     Outpatient Physical Therapy 2 times weekly for 10 weeks to include the following interventions: Cervical/Lumbar Traction, Electrical Stimulation  , Gait Training, Manual Therapy, Moist Heat/ Ice, Neuromuscular Re-ed, Patient Education, Therapeutic Activities, Therapeutic Exercise, and Ultrasound.     Fermin Frias, PT

## 2023-01-17 ENCOUNTER — OFFICE VISIT (OUTPATIENT)
Dept: UROLOGY | Facility: CLINIC | Age: 73
End: 2023-01-17
Payer: MEDICARE

## 2023-01-17 ENCOUNTER — CLINICAL SUPPORT (OUTPATIENT)
Dept: REHABILITATION | Facility: HOSPITAL | Age: 73
End: 2023-01-17
Payer: MEDICARE

## 2023-01-17 VITALS
HEIGHT: 61 IN | WEIGHT: 131 LBS | DIASTOLIC BLOOD PRESSURE: 71 MMHG | HEART RATE: 90 BPM | BODY MASS INDEX: 24.73 KG/M2 | SYSTOLIC BLOOD PRESSURE: 146 MMHG

## 2023-01-17 DIAGNOSIS — M62.81 MUSCLE WEAKNESS OF LEFT UPPER EXTREMITY: ICD-10-CM

## 2023-01-17 DIAGNOSIS — N39.0 RECURRENT UTI: Primary | ICD-10-CM

## 2023-01-17 DIAGNOSIS — R29.898 DECREASED ROM OF NECK: ICD-10-CM

## 2023-01-17 DIAGNOSIS — M25.512 BILATERAL SHOULDER PAIN, UNSPECIFIED CHRONICITY: Primary | ICD-10-CM

## 2023-01-17 DIAGNOSIS — M25.511 BILATERAL SHOULDER PAIN, UNSPECIFIED CHRONICITY: Primary | ICD-10-CM

## 2023-01-17 LAB — POC RESIDUAL URINE VOLUME: 95 ML (ref 0–100)

## 2023-01-17 PROCEDURE — 99214 OFFICE O/P EST MOD 30 MIN: CPT | Mod: S$PBB,,,

## 2023-01-17 PROCEDURE — 97110 THERAPEUTIC EXERCISES: CPT | Mod: PO

## 2023-01-17 PROCEDURE — 97140 MANUAL THERAPY 1/> REGIONS: CPT | Mod: PO

## 2023-01-17 PROCEDURE — 99999 PR PBB SHADOW E&M-EST. PATIENT-LVL V: CPT | Mod: PBBFAC,,,

## 2023-01-17 PROCEDURE — 99999 PR PBB SHADOW E&M-EST. PATIENT-LVL V: ICD-10-PCS | Mod: PBBFAC,,,

## 2023-01-17 PROCEDURE — 99215 OFFICE O/P EST HI 40 MIN: CPT | Mod: PBBFAC

## 2023-01-17 PROCEDURE — 99214 PR OFFICE/OUTPT VISIT, EST, LEVL IV, 30-39 MIN: ICD-10-PCS | Mod: S$PBB,,,

## 2023-01-17 PROCEDURE — 51798 US URINE CAPACITY MEASURE: CPT | Mod: PBBFAC

## 2023-01-17 RX ORDER — NITROFURANTOIN MACROCRYSTALS 50 MG/1
50 CAPSULE ORAL EVERY MORNING
Qty: 30 CAPSULE | Refills: 11 | Status: SHIPPED | OUTPATIENT
Start: 2023-01-17 | End: 2023-10-30 | Stop reason: SDUPTHER

## 2023-01-17 NOTE — PROGRESS NOTES
"  [facility]   Physical Therapy Treatment Note     Name: Jackelyn Kendrick  Clinic Number: [id number]    Therapy Diagnosis:   Encounter Diagnoses   Name Primary?    Bilateral shoulder pain, unspecified chronicity Yes    Muscle weakness of left upper extremity     Decreased ROM of neck          Physician: Reginald Pickens MD    Visit Date: 1/17/2023    Physician Orders: PT Eval and Treat   Medical Diagnosis from Referral: Bilateral shoulder pain, unspecified chronicity [M25.511, M25.512]  Evaluation Date: 12/9/2022  Authorization Period Expiration: 12/31/23  Plan of Care Expiration: 3/30/23  Progress Note Due: 1/9/23  Visit # / Visits authorized: 2/ 20   FOTO: 2/3  PTA Visit #: 2/5     Precautions: Standard     Time In: 2:05 pm  Time Out: 2:45 pm  Total Billable Time: 40 minutes      SUBJECTIVE     Pt reports: that had pain for two days following her last Tx, but is better today.  She was not provided with home exercise program.  Response to previous treatment: soreness in bilateral shoulders R>L  Functional change: ongoing    Pain: 2/10  Location: left neck  and shoulder        OBJECTIVE     Objective measures taken at progress report unless specified otherwise.     Observation: rounded shoulder bilaterally; mild winging of scapulas bilaterally    R side of pelvis elevated       TREATMENT      Jackelyn Kendrick received the treatments listed below:       therapeutic exercises to develop strength, endurance, ROM, flexibility, posture, and core stabilization for 27 minutes including:  Open book x 10 B  Seated trunk rotation 10 x 2  Cervical rotations 10x5"  Chin tucks 2x10x3" (may do better in seated position)  Shoulder AAROM into flexion 2x10x3"  Seated scap retractions 3x10x3"  Cervical isometrics with hand 10x5" each direction  Standing shoulder shrugs with arms at sides 15x3"  Shoulder rows with orange theraband 2x10  Shoulder extensions with orange theraband 2x10     manual " therapy techniques: Joint mobilizations and Soft tissue Mobilization were applied for 23 minutes, including:  Suboccipital release  STM levator scaps and upper traps  Passive mobilization to the thoracic spine and rib cage   Soft tissue mobilization B thoracic paraspinals  PROM cervical sidebending and rotation     neuromuscular re-education activities to improve: Balance and Posture for 0 minutes. The following activities were included:      PATIENT EDUCATION AND HOME EXERCISES     Home Exercises Provided and Patient Education Provided     Education provided:   - education on condition     Written Home Exercises Provided: not provided today. Exercises were reviewed and Jackelyn Kendrick was able to demonstrate them prior to the end of the session.  Jackelyn Kendrick demonstrated good  understanding of the education provided. See EMR under Patient Instructions for exercises provided during therapy sessions.    ASSESSMENT     Ms. Hayden with noted hypomobility in her thoracic spine and rib cage.  Her greatest limitation is in the L costovertebral joint in the mid to upper thoracic spine.  Pt will benefit from joint mobilization in the thoracic region followed up with active thoracic stabilization exercises.   Continue to progress as tolerated.    Jackelyn Kendrick Is progressing towards her goals.   Pt prognosis is Fair.     Pt will continue to benefit from skilled outpatient physical therapy to address the deficits listed in the problem list box on initial evaluation, provide pt/family education and to maximize pt's level of independence in the home and community environment.     Pt's spiritual, cultural and educational needs considered and pt agreeable to plan of care and goals.     Anticipated barriers to physical therapy: none    GOALS:   Short Term Goals:  4 weeks  1.Report decreased left shoulder and neck pain < / =  4/10  to increase tolerance for driving  2. Increase PROM by 5-10 degrees   3. Increased  strength by 1/3 MMT grade in BUE to increase tolerance for ADL and work activities.  4. Pt to tolerate HEP to improve ROM and independence with ADL's     Long Term Goals: 8 weeks  1.Report decreased left shoulder and neck pain  < / =  2 /10  to increase tolerance for overhead activities  2.Increase AROM by 5-10 degrees where limited  3.Increase strength to >/= 4/5 in BUE to increase tolerance for ADL and work activities.  4. Pt goal: Pt to be able to reach back of her head with left hand to improve ability to wash hair.   5. Pt will have improved gcode of CJ (20-40% limited) on FOTO shoulder in order to demonstrate true functional improvement.     PLAN     Plan of care Certification: 12/9/2022 to 3/30/23.     Outpatient Physical Therapy 2 times weekly for 10 weeks to include the following interventions: Cervical/Lumbar Traction, Electrical Stimulation  , Gait Training, Manual Therapy, Moist Heat/ Ice, Neuromuscular Re-ed, Patient Education, Therapeutic Activities, Therapeutic Exercise, and Ultrasound.     Fermin Frias, PT

## 2023-01-17 NOTE — PROGRESS NOTES
CHIEF COMPLAINT:  Recurrent UTI    HISTORY OF PRESENTING ILLINESS:  Jackelyn Kendrick is a 72 y.o. female established to urology. She was last seen in our department by Dr. John in clinic 7/28/22, a renal US was performed 8/9/22 and cystoscopy performed 8/11/22 - imaging and cysto WNL. She is here today after completing 10 days of Bactrim and would like to have her urine checked for signs of infection. She does not have any urinary symptoms today. UA dip in office without signs of infection.       REVIEW OF SYSTEMS:  Review of Systems   Constitutional:  Negative for chills and fever.   HENT:  Negative for congestion and sore throat.    Respiratory:  Negative for cough and shortness of breath.    Cardiovascular:  Negative for chest pain and palpitations.   Gastrointestinal:  Negative for nausea and vomiting.   Genitourinary:  Negative for dysuria, flank pain, frequency, hematuria and urgency.   Neurological:  Negative for dizziness and headaches.       PATIENT HISTORY:  Past Medical History:   Diagnosis Date    Arthritis     Basal cell carcinoma     Bronchitis     seasonal    Cataract     Diverticulitis     Dry eye syndrome     GERD (gastroesophageal reflux disease)     Hyperlipidemia     Hypertension     Hypothyroidism 07/19/2012    Obese     PONV (postoperative nausea and vomiting)     Thyroid disease        Past Surgical History:   Procedure Laterality Date    ARTHROSCOPIC REPAIR OF ROTATOR CUFF OF SHOULDER Right 9/23/2020    Procedure: REPAIR, ROTATOR CUFF, ARTHROSCOPIC;  Surgeon: Reginald Pickens MD;  Location: [facility];  Service: Orthopedics;  Laterality: Right;  regional w/catheter (interscalene)    BACK SURGERY      CATARACT EXTRACTION W/  INTRAOCULAR LENS IMPLANT Left 10/20/2021        CHOLECYSTECTOMY      COLONOSCOPY  2007    diverticulosis    COLONOSCOPY N/A 9/3/2020    Procedure: COLONOSCOPY;  Surgeon: Alberta Henriquez MD;  Location: [address]);  Service: Colon and  Rectal;  Laterality: N/A;  pt requested this time-8/31-covid-uc [place]-[facility]    CYSTOSCOPY      DE QUERVAIN'S RELEASE Left 03/2017    FIXATION OF TENDON Right 9/23/2020    Procedure: FIXATION, TENDON;  Surgeon: Reginald Pickens MD;  Location: [facility] OR;  Service: Orthopedics;  Laterality: Right;    HERNIA REPAIR      HYSTERECTOMY      INJECTION OF STEROID Right 12/2/2021    Procedure: INJECTION, STEROID;  Surgeon: Suzy Dominguez MD;  Location: [facility] OR;  Service: Orthopedics;  Laterality: Right;    INTRAOCULAR PROSTHESES INSERTION Left 10/20/2021    Procedure: INSERTION, IOL PROSTHESIS;  Surgeon: Pippa Ashby MD;  Location: [facility] OR 1ST FLR;  Service: Ophthalmology;  Laterality: Left;    INTRAOCULAR PROSTHESES INSERTION Right 12/29/2021    Procedure: INSERTION, IOL PROSTHESIS;  Surgeon: Pippa Ashby MD;  Location: [facility] OR 1ST FLR;  Service: Ophthalmology;  Laterality: Right;    NASAL SEPTUM SURGERY      PHACOEMULSIFICATION OF CATARACT Left 10/20/2021    Procedure: PHACOEMULSIFICATION, CATARACT/ COMPLEX- PHACO / IOL - OS SMALL PUPIL-OLE RING AND TRYPAN BLUE;  Surgeon: Pippa Ashby MD;  Location: [facility] OR 1ST FLR;  Service: Ophthalmology;  Laterality: Left;    PHACOEMULSIFICATION OF CATARACT Right 12/29/2021    Procedure: PHACOEMULSIFICATION, CATARACT;  Surgeon: Pippa Ashby MD;  Location: [facility] OR 1ST FLR;  Service: Ophthalmology;  Laterality: Right;    SHOULDER SURGERY      TONSILLECTOMY      TRIGGER FINGER RELEASE Left 12/2/2021    Procedure: RELEASE, TRIGGER FINGER;  Surgeon: Suzy Dominguez MD;  Location: [facility] OR;  Service: Orthopedics;  Laterality: Left;       Family History   Problem Relation Age of Onset    Cataracts Mother     Breast cancer Mother     Diabetes Mother     Hypertension Mother     Heart failure Mother     Heart disease Mother     Cataracts Father     Hypertension Father     Stroke Father     No Known Problems Daughter     No Known Problems Son     Diabetes Paternal  Grandmother     Hyperlipidemia Sister     Arthritis Sister     Hyperlipidemia Brother     Arthritis Brother     No Known Problems Son     Amblyopia Neg Hx     Blindness Neg Hx     Glaucoma Neg Hx     Macular degeneration Neg Hx     Retinal detachment Neg Hx     Strabismus Neg Hx     Ovarian cancer Neg Hx     Melanoma Neg Hx     Celiac disease Neg Hx     Colon cancer Neg Hx     Colon polyps Neg Hx     Esophageal cancer Neg Hx     Liver cancer Neg Hx     Liver disease Neg Hx     Rectal cancer Neg Hx     Stomach cancer Neg Hx        Social History     Socioeconomic History    Marital status:    Tobacco Use    Smoking status: Never    Smokeless tobacco: Never   Substance and Sexual Activity    Alcohol use: No     Comment: rare on a special occasion    Drug use: No    Sexual activity: Never   Social History Narrative    , 3 children, nonsmoker ,     Social Determinants of Health     Financial Resource Strain: Low Risk     Difficulty of Paying Living Expenses: Not hard at all   Food Insecurity: No Food Insecurity    Worried About Running Out of Food in the Last Year: Never true    Ran Out of Food in the Last Year: Never true   Transportation Needs: No Transportation Needs    Lack of Transportation (Medical): No    Lack of Transportation (Non-Medical): No   Physical Activity: Inactive    Days of Exercise per Week: 0 days    Minutes of Exercise per Session: 0 min   Stress: Stress Concern Present    Feeling of Stress : To some extent   Social Connections: Unknown    Frequency of Communication with Friends and Family: Once a week    Frequency of Social Gatherings with Friends and Family: Once a week    Active Member of Clubs or Organizations: Yes    Attends Club or Organization Meetings: More than 4 times per year    Marital Status:    Housing Stability: Low Risk     Unable to Pay for Housing in the Last Year: No    Number of Places Lived in the Last Year: 1    Unstable Housing in the Last Year: No        Allergies:  Levaquin [levofloxacin] and Erythromycin (bulk)    Medications:    Current Outpatient Medications:     albuterol (PROVENTIL/VENTOLIN HFA) 90 mcg/actuation inhaler, Inhale 2 puffs into the lungs every 6 (six) hours as needed for Wheezing., Disp: 6.7 g, Rfl: 11    amoxicillin-clavulanate 875-125mg (AUGMENTIN) 875-125 mg per tablet, Take 1 tablet by mouth 2 (two) times daily. (Patient not taking: Reported on 1/5/2023), Disp: 20 tablet, Rfl: 0    celecoxib (CELEBREX) 200 MG capsule, Take 1 capsule (200 mg total) by mouth once daily. Do not take more than 15 consecutive days. Take w food and water, Disp: 30 capsule, Rfl: 11    cranberry fruit extract (CRANBERRY EXTRACT ORAL), Take by mouth., Disp: , Rfl:     cyclobenzaprine (FLEXERIL) 10 MG tablet, Take 1 tablet (10 mg total) by mouth nightly. As needed for muscle spasms, Disp: 30 tablet, Rfl: 0    diclofenac sodium (VOLTAREN) 1 % Gel, Apply 2 g topically 2 (two) times daily as needed (musculoskeletal pain)., Disp: 100 g, Rfl: 11    dicyclomine (BENTYL) 20 mg tablet, Take 1 tablet (20 mg total) by mouth 4 (four) times daily before meals and nightly., Disp: 120 tablet, Rfl: 11    estradioL (ESTRACE) 0.01 % (0.1 mg/gram) vaginal cream, Place 0.5-1 g vaginally twice a week., Disp: 42.5 g, Rfl: 3    fluocinonide (LIDEX) 0.05 % external solution, Apply topically once daily., Disp: 60 mL, Rfl: 11    HYDROcodone-acetaminophen (NORCO) 7.5-325 mg per tablet, Take 1 tablet by mouth every 6 (six) hours as needed for Pain., Disp: 28 tablet, Rfl: 0    ketoconazole (NIZORAL) 2 % shampoo, Apply topically twice a week., Disp: 120 mL, Rfl: 11    levothyroxine (SYNTHROID) 75 MCG tablet, Take 1 tablet (75 mcg total) by mouth once daily., Disp: 30 tablet, Rfl: 11    LIDOcaine-prilocaine (EMLA) cream, Apply topically 2 (two) times daily as needed. To hands., Disp: 30 g, Rfl: 2    losartan (COZAAR) 100 MG tablet, Take 1 tablet (100 mg total) by mouth once daily., Disp: 30  tablet, Rfl: 11    MAGNESIUM ORAL, Take 1 tablet by mouth once daily., Disp: , Rfl:     memantine (NAMENDA) 5 MG Tab, Take 1 tablet (5 mg total) by mouth 2 (two) times daily., Disp: 60 tablet, Rfl: 11    methocarbamoL (ROBAXIN) 500 MG Tab, Take 1 tablet (500 mg total) by mouth 3 (three) times daily. w meals for 3-10 days for muscle spasms, Disp: 30 tablet, Rfl: 0    Multi-Vitamin tablet, Take 1 tablet by mouth once daily. , Disp: , Rfl:     nitrofurantoin (MACRODANTIN) 50 MG capsule, Take 1 capsule (50 mg total) by mouth every morning., Disp: 30 capsule, Rfl: 11    omeprazole (PRILOSEC) 40 MG capsule, Take 1 capsule (40 mg total) by mouth once daily., Disp: 30 capsule, Rfl: 11    oxybutynin (DITROPAN-XL) 10 MG 24 hr tablet, Take 1 tablet (10 mg total) by mouth once daily., Disp: 30 tablet, Rfl: 11    PSYLLIUM SEED, WITH SUGAR, (METAMUCIL ORAL), Take by mouth as needed. , Disp: , Rfl:     RESTASIS 0.05 % ophthalmic emulsion, INSTILL ONE DROP IN EACH EYE TWO TIMES A DAY, Disp: 60 each, Rfl: 0    rosuvastatin (CRESTOR) 40 MG Tab, Take 1 tablet (40 mg total) by mouth once daily., Disp: 30 tablet, Rfl: 11    traZODone (DESYREL) 50 MG tablet, 1/2 tab up to 2 tabs po qhs prn insomnia, Disp: 30 tablet, Rfl: 11    triamcinolone acetonide 0.1% (KENALOG) 0.1 % cream, AAA bid, Disp: 60 g, Rfl: 3    PHYSICAL EXAMINATION:  Physical Exam  Constitutional:       Appearance: Normal appearance.   HENT:      Head: Normocephalic and atraumatic.      Right Ear: External ear normal.      Left Ear: External ear normal.   Pulmonary:      Effort: Pulmonary effort is normal. No respiratory distress.   Neurological:      General: No focal deficit present.      Mental Status: She is alert and oriented to person, place, and time.   Psychiatric:         Mood and Affect: Mood normal.         Behavior: Behavior normal.       LABS:  UA dip: no abnormalities   PVR 95 ml       IMPRESSION:  Encounter Diagnoses   Name Primary?    Recurrent UTI Yes          Assessment:       1. Recurrent UTI          Plan:   - Begin 50 mg macrodantin daily for UTI prophylaxis - teaching information attached to AVS. Renal function assessed from 11/2022  - CMP in 3 months prior to follow up appointment  - Continue D-Mannose and estrace cream  - Continue UTI precautions     Follow up in 3 months with CMP prior to apt or sooner for any s/s of UTI (will need cath sample)    I spent 30 minutes with the patient of which more than half was spent in direct consultation with the patient in regards to our treatment and plan.  We addressed the office findings and recent labs.   Education and recommendations of today's plan of care including home remedies and needed follow up with PCP.   We discussed the chief complaint; reviewed the LUTS and the possible contributory factors.   Recommended lifestyle modifications with proper, healthy diet, good hydration if no fluid restrictions; reducing bladder irritants.   Benefits of regular exercise.

## 2023-01-19 ENCOUNTER — CLINICAL SUPPORT (OUTPATIENT)
Dept: REHABILITATION | Facility: HOSPITAL | Age: 73
End: 2023-01-19
Payer: MEDICARE

## 2023-01-19 DIAGNOSIS — M62.81 MUSCLE WEAKNESS OF LEFT UPPER EXTREMITY: ICD-10-CM

## 2023-01-19 DIAGNOSIS — M25.512 BILATERAL SHOULDER PAIN, UNSPECIFIED CHRONICITY: Primary | ICD-10-CM

## 2023-01-19 DIAGNOSIS — R29.898 DECREASED ROM OF NECK: ICD-10-CM

## 2023-01-19 DIAGNOSIS — M25.511 BILATERAL SHOULDER PAIN, UNSPECIFIED CHRONICITY: Primary | ICD-10-CM

## 2023-01-19 PROCEDURE — 97140 MANUAL THERAPY 1/> REGIONS: CPT | Mod: PO,CQ

## 2023-01-19 PROCEDURE — 97110 THERAPEUTIC EXERCISES: CPT | Mod: PO,CQ

## 2023-01-19 NOTE — PROGRESS NOTES
"  [facility]   Physical Therapy Treatment Note     Name: Jackelyn Kendrick  Clinic Number: [id number]    Therapy Diagnosis:   Encounter Diagnoses   Name Primary?    Bilateral shoulder pain, unspecified chronicity Yes    Muscle weakness of left upper extremity     Decreased ROM of neck          Physician: Reginald Pickens MD    Visit Date: 1/19/2023    Physician Orders: PT Eval and Treat   Medical Diagnosis from Referral: Bilateral shoulder pain, unspecified chronicity [M25.511, M25.512]  Evaluation Date: 12/9/2022  Authorization Period Expiration: 12/31/23  Plan of Care Expiration: 3/30/23  Progress Note Due: 1/9/23  Visit # / Visits authorized: 5/ 20   FOTO: 2/3  PTA Visit #: 1/5     Precautions: Standard     Time In: 9:30 am  Time Out: 10:13 am  Total Billable Time: 43 minutes      SUBJECTIVE     Pt reports: the pain is all down the left side of her neck and shoulder  She was not provided with home exercise program.  Response to previous treatment: soreness in bilateral shoulders R>L  Functional change: ongoing    Pain: 2/10  Location: left neck  and shoulder        OBJECTIVE     Objective measures taken at progress report unless specified otherwise.        TREATMENT   Re-evaluation = 30   Jaceklyn Kendrick received the treatments listed below:       therapeutic exercises to develop strength, endurance, ROM, flexibility, posture, and core stabilization for 20 minutes including:  Open book x 10 B  Seated trunk rotation 10 x 2  Cervical rotations 10x5"  Chin tucks 2x10x3" (may do better in seated position)  Shoulder AAROM into flexion 2x10x3"  Seated scap retractions 3x10x3"  Cervical isometrics with hand 10x5" each direction  Standing shoulder shrugs with arms at sides 15x3"  Shoulder rows with orange theraband 2x10  Shoulder extensions with orange theraband 2x10     manual therapy techniques: Joint mobilizations and Soft tissue Mobilization were applied for 23 minutes, " including:  Suboccipital release  STM levator scaps and upper traps  PROM cervical sidebending and rotation     neuromuscular re-education activities to improve: Balance and Posture for 0 minutes. The following activities were included:      PATIENT EDUCATION AND HOME EXERCISES     Home Exercises Provided and Patient Education Provided     Education provided:   - education on condition     Written Home Exercises Provided: not provided today. Exercises were reviewed and Jackelyn Kendrick was able to demonstrate them prior to the end of the session.  Jackelyn Kendrick demonstrated good  understanding of the education provided. See EMR under Patient Instructions for exercises provided during therapy sessions.    ASSESSMENT     Ms. Hayden presents with reports of soreness since last session, but stated soreness usually decreases after a couple days. Favorable response to manual therapy interventions. Fair tolerance to exercises with cueing required for proper technique to remain in range of motion that does not increase pain. Continue to progress as tolerated.     Jackelyn Kendrick Is progressing towards her goals.   Pt prognosis is Fair.     Pt will continue to benefit from skilled outpatient physical therapy to address the deficits listed in the problem list box on initial evaluation, provide pt/family education and to maximize pt's level of independence in the home and community environment.     Pt's spiritual, cultural and educational needs considered and pt agreeable to plan of care and goals.     Anticipated barriers to physical therapy: none    GOALS:   Short Term Goals:  4 weeks  1.Report decreased left shoulder and neck pain < / =  4/10  to increase tolerance for driving  2. Increase PROM by 5-10 degrees   3. Increased strength by 1/3 MMT grade in BUE to increase tolerance for ADL and work activities.  4. Pt to tolerate HEP to improve ROM and independence with ADL's     Long Term Goals: 8 weeks  1.Report  decreased left shoulder and neck pain  < / =  2 /10  to increase tolerance for overhead activities  2.Increase AROM by 5-10 degrees where limited  3.Increase strength to >/= 4/5 in BUE to increase tolerance for ADL and work activities.  4. Pt goal: Pt to be able to reach back of her head with left hand to improve ability to wash hair.   5. Pt will have improved gcode of CJ (20-40% limited) on FOTO shoulder in order to demonstrate true functional improvement.     PLAN     Plan of care Certification: 12/9/2022 to 3/30/23.     Outpatient Physical Therapy 2 times weekly for 10 weeks to include the following interventions: Cervical/Lumbar Traction, Electrical Stimulation  , Gait Training, Manual Therapy, Moist Heat/ Ice, Neuromuscular Re-ed, Patient Education, Therapeutic Activities, Therapeutic Exercise, and Ultrasound.     Alberta Moscoso, PTA

## 2023-01-23 ENCOUNTER — CLINICAL SUPPORT (OUTPATIENT)
Dept: REHABILITATION | Facility: HOSPITAL | Age: 73
End: 2023-01-23
Payer: MEDICARE

## 2023-01-23 DIAGNOSIS — M62.81 MUSCLE WEAKNESS OF LEFT UPPER EXTREMITY: ICD-10-CM

## 2023-01-23 DIAGNOSIS — R29.898 DECREASED ROM OF NECK: ICD-10-CM

## 2023-01-23 DIAGNOSIS — M25.512 BILATERAL SHOULDER PAIN, UNSPECIFIED CHRONICITY: Primary | ICD-10-CM

## 2023-01-23 DIAGNOSIS — M25.511 BILATERAL SHOULDER PAIN, UNSPECIFIED CHRONICITY: Primary | ICD-10-CM

## 2023-01-23 PROCEDURE — 97110 THERAPEUTIC EXERCISES: CPT | Mod: PO

## 2023-01-23 NOTE — PROGRESS NOTES
"  [facility]   Physical Therapy Treatment Note     Name: Jackelyn Kendrick  Clinic Number: [id number]    Therapy Diagnosis:   Encounter Diagnoses   Name Primary?    Bilateral shoulder pain, unspecified chronicity Yes    Muscle weakness of left upper extremity     Decreased ROM of neck            Physician: Reginald Pickens MD    Visit Date: 1/23/2023    Physician Orders: PT Eval and Treat   Medical Diagnosis from Referral: Bilateral shoulder pain, unspecified chronicity [M25.511, M25.512]  Evaluation Date: 12/9/2022  Authorization Period Expiration: 12/31/23  Plan of Care Expiration: 3/30/23  Progress Note Due: 2/17/23  Visit # / Visits authorized: 6/ 20   FOTO: 2/3 last completed on 1/9/2023    PTA Visit #: 0/5     Precautions: Standard     Time In: 12:20  Time Out: 1:00  Total Billable Time: 40 minutes      SUBJECTIVE     Pt reports: the neck is feeling better but the left shoulder is still hurting a bit  She was not provided with home exercise program.  Response to previous treatment: soreness in bilateral shoulders R>L  Functional change: ongoing    Pain: 2/10  Location: left neck  and shoulder        OBJECTIVE     Objective measures taken at progress report unless specified otherwise.        TREATMENT   Re-evaluation = 30   Jackelyn Kendrick received the treatments listed below:       therapeutic exercises to develop strength, endurance, ROM, flexibility, posture, and core stabilization for 35 minutes including:  Open book x 10 B  +pec stretch over towel roll x3 min  +thoracic extension stretch over towel roll x2 min  Seated trunk rotation 10 x 2  Cervical rotations 10x5"  Chin tucks 2x10x3" (may do better in seated position)  Shoulder AROM into flexion 2x10x3"  Seated scap retractions 3x10x3"  Cervical isometrics with hand 10x5" each direction  Standing shoulder shrugs with arms at sides 15x3"  Shoulder rows with orange theraband 2x10  Shoulder extensions with orange theraband " 2x10  serratus slides with pillow case x20     manual therapy techniques: Joint mobilizations and Soft tissue Mobilization were applied for 5 minutes, including:  Suboccipital release  STM levator scaps and upper traps  PROM cervical sidebending and rotation     neuromuscular re-education activities to improve: Balance and Posture for 0 minutes. The following activities were included:      PATIENT EDUCATION AND HOME EXERCISES     Home Exercises Provided and Patient Education Provided     Education provided:   - education on condition     Written Home Exercises Provided: not provided today. Exercises were reviewed and Jackelyn Kendrick was able to demonstrate them prior to the end of the session.  Jackelyn Kendrick demonstrated good  understanding of the education provided. See EMR under Patient Instructions for exercises provided during therapy sessions.    ASSESSMENT     Ms. Hayden reporting improvement in neck pain however shoulder pain is lingering. She is able to complete exercises today but has some difficulty with holding left arm up to complete cervical isometrics. All other exercises in bold above performed without discomfort. Continue to progress as tolerated.     Jackelyn Kendrick Is progressing towards her goals.   Pt prognosis is Fair.     Pt will continue to benefit from skilled outpatient physical therapy to address the deficits listed in the problem list box on initial evaluation, provide pt/family education and to maximize pt's level of independence in the home and community environment.     Pt's spiritual, cultural and educational needs considered and pt agreeable to plan of care and goals.     Anticipated barriers to physical therapy: none    GOALS:   Short Term Goals:  4 weeks  1.Report decreased left shoulder and neck pain < / =  4/10  to increase tolerance for driving  2. Increase PROM by 5-10 degrees   3. Increased strength by 1/3 MMT grade in BUE to increase tolerance for ADL and work  activities.  4. Pt to tolerate HEP to improve ROM and independence with ADL's     Long Term Goals: 8 weeks  1.Report decreased left shoulder and neck pain  < / =  2 /10  to increase tolerance for overhead activities  2.Increase AROM by 5-10 degrees where limited  3.Increase strength to >/= 4/5 in BUE to increase tolerance for ADL and work activities.  4. Pt goal: Pt to be able to reach back of her head with left hand to improve ability to wash hair.   5. Pt will have improved gcode of CJ (20-40% limited) on FOTO shoulder in order to demonstrate true functional improvement.     PLAN     Plan of care Certification: 12/9/2022 to 3/30/23.     Outpatient Physical Therapy 2 times weekly for 10 weeks to include the following interventions: Cervical/Lumbar Traction, Electrical Stimulation  , Gait Training, Manual Therapy, Moist Heat/ Ice, Neuromuscular Re-ed, Patient Education, Therapeutic Activities, Therapeutic Exercise, and Ultrasound.     Nelida Maloney, PT

## 2023-01-26 ENCOUNTER — CLINICAL SUPPORT (OUTPATIENT)
Dept: REHABILITATION | Facility: HOSPITAL | Age: 73
End: 2023-01-26
Payer: MEDICARE

## 2023-01-26 DIAGNOSIS — R29.898 DECREASED ROM OF NECK: ICD-10-CM

## 2023-01-26 DIAGNOSIS — M62.81 MUSCLE WEAKNESS OF LEFT UPPER EXTREMITY: ICD-10-CM

## 2023-01-26 DIAGNOSIS — M25.512 BILATERAL SHOULDER PAIN, UNSPECIFIED CHRONICITY: Primary | ICD-10-CM

## 2023-01-26 DIAGNOSIS — M25.511 BILATERAL SHOULDER PAIN, UNSPECIFIED CHRONICITY: Primary | ICD-10-CM

## 2023-01-26 PROCEDURE — 97110 THERAPEUTIC EXERCISES: CPT | Mod: PO

## 2023-01-26 PROCEDURE — 97140 MANUAL THERAPY 1/> REGIONS: CPT | Mod: PO

## 2023-01-26 NOTE — PROGRESS NOTES
"  [facility]   Physical Therapy Treatment Note     Name: Jackelyn Kendrick  Clinic Number: [id number]    Therapy Diagnosis:   Encounter Diagnoses   Name Primary?    Bilateral shoulder pain, unspecified chronicity Yes    Muscle weakness of left upper extremity     Decreased ROM of neck          Physician: Reginald Pickens MD    Visit Date: 1/26/2023    Physician Orders: PT Eval and Treat   Medical Diagnosis from Referral: Bilateral shoulder pain, unspecified chronicity [M25.511, M25.512]  Evaluation Date: 12/9/2022  Authorization Period Expiration: 12/31/23  Plan of Care Expiration: 3/30/23  Progress Note Due: 2/17/23  Visit # / Visits authorized: 7/ 20   FOTO: 2/3 last completed on 1/9/2023    PTA Visit #: 0/5     Precautions: Standard     Time In: 9:30  Time Out: 10:15  Total Billable Time: 45 minutes      SUBJECTIVE     Pt reports: neck is feeling good but the shoulder is bothering her a bit today   She was not provided with home exercise program.  Response to previous treatment: soreness in bilateral shoulders R>L  Functional change: ongoing    Pain: 2/10  Location: bilateral neck  and shoulder        OBJECTIVE     Objective measures taken at progress report unless specified otherwise.        TREATMENT      Jackelyn Kendrick received the treatments listed below:       therapeutic exercises to develop strength, endurance, ROM, flexibility, posture, and core stabilization for 37 minutes including:  Open book x 10 B  +pec stretch over towel roll x3 min  +thoracic extension stretch over towel roll x2 min  Seated trunk rotation 10 x 2  Cervical rotations 10x5"  Chin tucks 2x10x3" (may do better in seated position)  Shoulder AROM into flexion 2x10x3"  Seated scap retractions 3x10x3"  Cervical isometrics with hand 10x5" each direction  Standing shoulder shrugs with arms at sides 15x3"  Shoulder rows with orange theraband 2x10  Shoulder extensions with orange theraband 2x10  serratus " slides with pillow case x20     manual therapy techniques: Joint mobilizations and Soft tissue Mobilization were applied for 8 minutes, including:  Suboccipital release  STM levator scaps and upper traps  PROM cervical sidebending and rotation     neuromuscular re-education activities to improve: Balance and Posture for 0 minutes. The following activities were included:      PATIENT EDUCATION AND HOME EXERCISES     Home Exercises Provided and Patient Education Provided     Education provided:   - education on condition     Written Home Exercises Provided: not provided today. Exercises were reviewed and Jackelyn Kendrick was able to demonstrate them prior to the end of the session.  Jackelyn Kendrick demonstrated good  understanding of the education provided. See EMR under Patient Instructions for exercises provided during therapy sessions.    ASSESSMENT     Ms. Hayden is reporting overall improvement in condition with less neck pain although the shoulder still bothers her. She is able to tolerate session very well and has positive response to manual therapy.     Jackelyn Kendrick Is progressing towards her goals.   Pt prognosis is Fair.     Pt will continue to benefit from skilled outpatient physical therapy to address the deficits listed in the problem list box on initial evaluation, provide pt/family education and to maximize pt's level of independence in the home and community environment.     Pt's spiritual, cultural and educational needs considered and pt agreeable to plan of care and goals.     Anticipated barriers to physical therapy: none    GOALS:   Short Term Goals:  4 weeks  1.Report decreased left shoulder and neck pain < / =  4/10  to increase tolerance for driving  2. Increase PROM by 5-10 degrees   3. Increased strength by 1/3 MMT grade in BUE to increase tolerance for ADL and work activities.  4. Pt to tolerate HEP to improve ROM and independence with ADL's     Long Term Goals: 8  weeks  1.Report decreased left shoulder and neck pain  < / =  2 /10  to increase tolerance for overhead activities  2.Increase AROM by 5-10 degrees where limited  3.Increase strength to >/= 4/5 in BUE to increase tolerance for ADL and work activities.  4. Pt goal: Pt to be able to reach back of her head with left hand to improve ability to wash hair.   5. Pt will have improved gcode of CJ (20-40% limited) on FOTO shoulder in order to demonstrate true functional improvement.     PLAN     Plan of care Certification: 12/9/2022 to 3/30/23.     Outpatient Physical Therapy 2 times weekly for 10 weeks to include the following interventions: Cervical/Lumbar Traction, Electrical Stimulation  , Gait Training, Manual Therapy, Moist Heat/ Ice, Neuromuscular Re-ed, Patient Education, Therapeutic Activities, Therapeutic Exercise, and Ultrasound.     Nelida Maloney, PT

## 2023-01-30 ENCOUNTER — CLINICAL SUPPORT (OUTPATIENT)
Dept: REHABILITATION | Facility: HOSPITAL | Age: 73
End: 2023-01-30
Payer: MEDICARE

## 2023-01-30 DIAGNOSIS — M25.511 BILATERAL SHOULDER PAIN, UNSPECIFIED CHRONICITY: Primary | ICD-10-CM

## 2023-01-30 DIAGNOSIS — M62.81 MUSCLE WEAKNESS OF LEFT UPPER EXTREMITY: ICD-10-CM

## 2023-01-30 DIAGNOSIS — R29.898 DECREASED ROM OF NECK: ICD-10-CM

## 2023-01-30 DIAGNOSIS — M25.512 BILATERAL SHOULDER PAIN, UNSPECIFIED CHRONICITY: Primary | ICD-10-CM

## 2023-01-30 PROCEDURE — 97110 THERAPEUTIC EXERCISES: CPT | Mod: PO

## 2023-01-30 NOTE — PROGRESS NOTES
"  [facility]   Physical Therapy Treatment Note     Name: Jackelyn Kendrick  Clinic Number: [id number]    Therapy Diagnosis:   Encounter Diagnoses   Name Primary?    Bilateral shoulder pain, unspecified chronicity Yes    Muscle weakness of left upper extremity     Decreased ROM of neck            Physician: Reginald Pickens MD    Visit Date: 1/30/2023    Physician Orders: PT Eval and Treat   Medical Diagnosis from Referral: Bilateral shoulder pain, unspecified chronicity [M25.511, M25.512]  Evaluation Date: 12/9/2022  Authorization Period Expiration: 12/31/23  Plan of Care Expiration: 3/30/23  Progress Note Due: 2/17/23  Visit # / Visits authorized: 8/ 20   FOTO: 2/3 last completed on 1/9/2023    PTA Visit #: 0/5     Precautions: Standard     Time In: 9:16  Time Out: 10:03  Total Billable Time: 47 minutes      SUBJECTIVE     Pt reports: neck is doing much better. Most pain is in the posterior shoulder  She was not provided with home exercise program.  Response to previous treatment: soreness in bilateral shoulders R>L  Functional change: ongoing    Pain: 2/10  Location: bilateral neck  and shoulder        OBJECTIVE     Objective measures taken at progress report unless specified otherwise.        TREATMENT      Jackelyn Kendrick received the treatments listed below:       therapeutic exercises to develop strength, endurance, ROM, flexibility, posture, and core stabilization for 38 minutes including:  Open book x 10 B  pec stretch over towel roll x3 min  thoracic extension stretch over towel roll x2 min  Seated trunk rotation 10 x 2  Cervical rotations 10x5"  Chin tucks 2x10x3" (may do better in seated position)  Shoulder AROM into flexion 2x10x3"  Seated scap retractions 3x10x3"  Cervical isometrics with hand 10x5" each direction  Standing shoulder shrugs with arms at sides 15x3"  Shoulder rows with orange theraband 2x10  Shoulder extensions with orange theraband 2x10  Shoulder " external rotation with yellow theraband 2x10 L   Shoulder internal rotation with yellow theraband 2x10 L  serratus slides with pillow case x20     manual therapy techniques: Joint mobilizations and Soft tissue Mobilization were applied for 9 minutes, including:  Suboccipital release  STM levator scaps and upper traps  PROM cervical sidebending and rotation  L glenohumeral long axis distraction  L scapular mobilizations and rhomboid STM       neuromuscular re-education activities to improve: Balance and Posture for 0 minutes. The following activities were included:      PATIENT EDUCATION AND HOME EXERCISES     Home Exercises Provided and Patient Education Provided     Education provided:   - education on condition     Written Home Exercises Provided: not provided today. Exercises were reviewed and Jackelyn Kendrick was able to demonstrate them prior to the end of the session.  Jackelyn Kendrick demonstrated good  understanding of the education provided. See EMR under Patient Instructions for exercises provided during therapy sessions.    ASSESSMENT     Ms. Hayden with continued improvement in neck pain however posterior shoulder/scapular pain is lingering at this time. Continued with periscapular strengthening with positive response with only some discomfort reported with L shoulder external rotation. She also had positive response to manual therapy to the left scapula and periscapular musculature reporting decrease in symptoms following. Continue to progress as tolerated.     Jackelyn Kendrick Is progressing towards her goals.   Pt prognosis is Fair.     Pt will continue to benefit from skilled outpatient physical therapy to address the deficits listed in the problem list box on initial evaluation, provide pt/family education and to maximize pt's level of independence in the home and community environment.     Pt's spiritual, cultural and educational needs considered and pt agreeable to plan of care and  goals.     Anticipated barriers to physical therapy: none    GOALS:   Short Term Goals:  4 weeks  1.Report decreased left shoulder and neck pain < / =  4/10  to increase tolerance for driving  2. Increase PROM by 5-10 degrees   3. Increased strength by 1/3 MMT grade in BUE to increase tolerance for ADL and work activities.  4. Pt to tolerate HEP to improve ROM and independence with ADL's     Long Term Goals: 8 weeks  1.Report decreased left shoulder and neck pain  < / =  2 /10  to increase tolerance for overhead activities  2.Increase AROM by 5-10 degrees where limited  3.Increase strength to >/= 4/5 in BUE to increase tolerance for ADL and work activities.  4. Pt goal: Pt to be able to reach back of her head with left hand to improve ability to wash hair.   5. Pt will have improved gcode of CJ (20-40% limited) on FOTO shoulder in order to demonstrate true functional improvement.     PLAN     Plan of care Certification: 12/9/2022 to 3/30/23.     Outpatient Physical Therapy 2 times weekly for 10 weeks to include the following interventions: Cervical/Lumbar Traction, Electrical Stimulation  , Gait Training, Manual Therapy, Moist Heat/ Ice, Neuromuscular Re-ed, Patient Education, Therapeutic Activities, Therapeutic Exercise, and Ultrasound.     Nelida Maloney, PT

## 2023-02-01 ENCOUNTER — CLINICAL SUPPORT (OUTPATIENT)
Dept: REHABILITATION | Facility: HOSPITAL | Age: 73
End: 2023-02-01
Payer: MEDICARE

## 2023-02-01 DIAGNOSIS — M62.81 MUSCLE WEAKNESS OF LEFT UPPER EXTREMITY: ICD-10-CM

## 2023-02-01 DIAGNOSIS — M25.512 BILATERAL SHOULDER PAIN, UNSPECIFIED CHRONICITY: Primary | ICD-10-CM

## 2023-02-01 DIAGNOSIS — M25.511 BILATERAL SHOULDER PAIN, UNSPECIFIED CHRONICITY: Primary | ICD-10-CM

## 2023-02-01 DIAGNOSIS — R29.898 DECREASED ROM OF NECK: ICD-10-CM

## 2023-02-01 PROCEDURE — 97140 MANUAL THERAPY 1/> REGIONS: CPT | Mod: PO

## 2023-02-01 PROCEDURE — 97110 THERAPEUTIC EXERCISES: CPT | Mod: PO

## 2023-02-01 NOTE — PROGRESS NOTES
"  [facility]   Physical Therapy Treatment Note     Name: Jackelyn Kendrick  Clinic Number: [id number]    Therapy Diagnosis:   Encounter Diagnoses   Name Primary?    Bilateral shoulder pain, unspecified chronicity Yes    Muscle weakness of left upper extremity     Decreased ROM of neck            Physician: Reginald Pickens MD    Visit Date: 2/1/2023    Physician Orders: PT Eval and Treat   Medical Diagnosis from Referral: Bilateral shoulder pain, unspecified chronicity [M25.511, M25.512]  Evaluation Date: 12/9/2022  Authorization Period Expiration: 12/31/23  Plan of Care Expiration: 3/30/23  Progress Note Due: 2/17/23  Visit # / Visits authorized: 8/ 20   FOTO: 2/3 last completed on 1/9/2023    PTA Visit #: 0/5     Precautions: Standard     Time In: 9:16  Time Out: 10:03  Total Billable Time: 47 minutes      SUBJECTIVE     Pt reports: that most of her pain is in her L upper arm more so than her neck today.  She was not provided with home exercise program.  Response to previous treatment: soreness in bilateral shoulders R>L  Functional change: ongoing    Pain: 2/10  Location: bilateral neck  and shoulder        OBJECTIVE     Objective measures taken at progress report unless specified otherwise.        TREATMENT      Jackelyn Kendrick received the treatments listed below:       therapeutic exercises to develop strength, endurance, ROM, flexibility, posture, and core stabilization for 15 minutes including:  Open book x 10 B  pec stretch over towel roll x3 min  thoracic extension stretch over towel roll x2 min  Seated trunk rotation 10 x 2  Cervical rotations 10x5"  Chin tucks 2x10x3" (may do better in seated position)  Shoulder AROM into flexion 2x10x3"  Seated scap retractions 3x10x3"  Cervical isometrics with hand 10x5" each direction  Standing shoulder shrugs with arms at sides 15x3"  Shoulder rows with orange theraband 2x10  Shoulder extensions with orange theraband 2x10  Shoulder " external rotation with yellow theraband 2x10 L   Shoulder internal rotation with yellow theraband 2x10 L  serratus slides with pillow case x20     manual therapy techniques: Joint mobilizations and Soft tissue Mobilization were applied for 30minutes, including:  Suboccipital release  STM levator scaps and upper traps  PROM cervical sidebending and rotation  L glenohumeral long axis distraction  L scapular mobilizations and rhomboid STM  Soft tissue mobilization to B thoracic paraspinals   Passive mobilization to the thoracic spine and rib cage.       neuromuscular re-education activities to improve: Balance and Posture for 0 minutes. The following activities were included:      PATIENT EDUCATION AND HOME EXERCISES     Home Exercises Provided and Patient Education Provided     Education provided:   - education on condition     Written Home Exercises Provided: not provided today. Exercises were reviewed and Jackelyn Kendrick was able to demonstrate them prior to the end of the session.  Jackelyn Kendrick demonstrated good  understanding of the education provided. See EMR under Patient Instructions for exercises provided during therapy sessions.    ASSESSMENT     Ms. Hayden reprots greater discomfort in her L upper arm than in her Cx spine.  Hypomobility in the thoracic spine and L mid thoracic costo-vertebral joints may account for a portion of her problem.  She reports discomfort with passive mobilization of the thoracic region, but will continue to benefit from this along with thoracic spine stabilization exercises. Pt tolerated today's Tx with some reported discomfort in her thoracic spine and rib cage.    Jackelyn Kendrick Is progressing towards her goals.   Pt prognosis is Fair.     Pt will continue to benefit from skilled outpatient physical therapy to address the deficits listed in the problem list box on initial evaluation, provide pt/family education and to maximize pt's level of independence in the  home and community environment.     Pt's spiritual, cultural and educational needs considered and pt agreeable to plan of care and goals.     Anticipated barriers to physical therapy: none    GOALS:   Short Term Goals:  4 weeks  1.Report decreased left shoulder and neck pain < / =  4/10  to increase tolerance for driving  2. Increase PROM by 5-10 degrees   3. Increased strength by 1/3 MMT grade in BUE to increase tolerance for ADL and work activities.  4. Pt to tolerate HEP to improve ROM and independence with ADL's     Long Term Goals: 8 weeks  1.Report decreased left shoulder and neck pain  < / =  2 /10  to increase tolerance for overhead activities  2.Increase AROM by 5-10 degrees where limited  3.Increase strength to >/= 4/5 in BUE to increase tolerance for ADL and work activities.  4. Pt goal: Pt to be able to reach back of her head with left hand to improve ability to wash hair.   5. Pt will have improved gcode of CJ (20-40% limited) on FOTO shoulder in order to demonstrate true functional improvement.     PLAN     Plan of care Certification: 12/9/2022 to 3/30/23.     Outpatient Physical Therapy 2 times weekly for 10 weeks to include the following interventions: Cervical/Lumbar Traction, Electrical Stimulation  , Gait Training, Manual Therapy, Moist Heat/ Ice, Neuromuscular Re-ed, Patient Education, Therapeutic Activities, Therapeutic Exercise, and Ultrasound.     Fermin Frias, PT

## 2023-02-03 ENCOUNTER — PES CALL (OUTPATIENT)
Dept: ADMINISTRATIVE | Facility: CLINIC | Age: 73
End: 2023-02-03
Payer: MEDICARE

## 2023-02-14 ENCOUNTER — OFFICE VISIT (OUTPATIENT)
Dept: GASTROENTEROLOGY | Facility: CLINIC | Age: 73
End: 2023-02-14
Payer: MEDICARE

## 2023-02-14 ENCOUNTER — LAB VISIT (OUTPATIENT)
Dept: LAB | Facility: HOSPITAL | Age: 73
End: 2023-02-14
Attending: INTERNAL MEDICINE
Payer: MEDICARE

## 2023-02-14 VITALS
HEART RATE: 79 BPM | SYSTOLIC BLOOD PRESSURE: 128 MMHG | WEIGHT: 137.81 LBS | DIASTOLIC BLOOD PRESSURE: 71 MMHG | BODY MASS INDEX: 26.02 KG/M2 | HEIGHT: 61 IN

## 2023-02-14 DIAGNOSIS — R15.2 INCONTINENCE OF FECES WITH FECAL URGENCY: ICD-10-CM

## 2023-02-14 DIAGNOSIS — K21.9 GASTROESOPHAGEAL REFLUX DISEASE, UNSPECIFIED WHETHER ESOPHAGITIS PRESENT: ICD-10-CM

## 2023-02-14 DIAGNOSIS — R15.9 INCONTINENCE OF FECES WITH FECAL URGENCY: ICD-10-CM

## 2023-02-14 DIAGNOSIS — K57.30 DIVERTICULOSIS OF COLON: ICD-10-CM

## 2023-02-14 DIAGNOSIS — K58.2 IRRITABLE BOWEL SYNDROME WITH BOTH CONSTIPATION AND DIARRHEA: ICD-10-CM

## 2023-02-14 DIAGNOSIS — K58.2 IRRITABLE BOWEL SYNDROME WITH BOTH CONSTIPATION AND DIARRHEA: Primary | ICD-10-CM

## 2023-02-14 PROCEDURE — 99214 PR OFFICE/OUTPT VISIT, EST, LEVL IV, 30-39 MIN: ICD-10-PCS | Mod: S$PBB,,, | Performed by: INTERNAL MEDICINE

## 2023-02-14 PROCEDURE — 99215 OFFICE O/P EST HI 40 MIN: CPT | Mod: PBBFAC | Performed by: INTERNAL MEDICINE

## 2023-02-14 PROCEDURE — 36415 COLL VENOUS BLD VENIPUNCTURE: CPT | Performed by: INTERNAL MEDICINE

## 2023-02-14 PROCEDURE — 99999 PR PBB SHADOW E&M-EST. PATIENT-LVL V: CPT | Mod: PBBFAC,,, | Performed by: INTERNAL MEDICINE

## 2023-02-14 PROCEDURE — 99999 PR PBB SHADOW E&M-EST. PATIENT-LVL V: ICD-10-PCS | Mod: PBBFAC,,, | Performed by: INTERNAL MEDICINE

## 2023-02-14 PROCEDURE — 99214 OFFICE O/P EST MOD 30 MIN: CPT | Mod: S$PBB,,, | Performed by: INTERNAL MEDICINE

## 2023-02-14 PROCEDURE — 86364 TISS TRNSGLTMNASE EA IG CLAS: CPT | Performed by: INTERNAL MEDICINE

## 2023-02-14 NOTE — PROGRESS NOTES
[facility] Established Patient Visit    Reason for Visit:    Chief Complaint   Patient presents with    Constipation    Diarrhea    Abdominal Cramping       PCP: Fabian Luna      HPI:  Jackelyn Kendrick is a 72 y.o. female here for follow-up of irritable bowel syndrome.  This is her 1st visit with me.  I reviewed her records in detail.  She last saw a nurse practitioner in March of 2020 for similar symptoms.  She reports being diagnosed with irritable bowel syndrome more than 15 years ago.  She has alternating constipation and diarrhea, which she feels is about evenly split.  Her stools are watery and liquid at times, but then small hard pellets at other times.  She has abdominal pain and discomfort that is associated with changes in the bowel patterns.  She uses Imodium for diarrhea, which helps.  She reports episodes of incontinence when she has diarrhea.  This is associated with fecal urgency, and has required her to wear pads since it is unpredictable.  Diarrhea is also associated with recurrent urinary tract infections for which she is been seeing Urology.  She is on prophylactic therapy for this.  She uses 1 capsule of Metamucil fiber twice daily.  She does not drink much water.  She tried align probiotic, but this was not much help.  She also tried Linzess 145 mcg daily.  She took it for about a week, but it caused worsened diarrhea which prompted her to stop taking it.  She tried Bentyl without relief.    She also has a history of GERD which has been generally controlled well with use of Prilosec every morning and Pepcid every evening.  She has breakthrough at night about once per month that is resolved with applesauce.  She reports no changes in her reflux symptoms.  She denies swallowing difficulty.      Endoscopic history:   Colonoscopy 09/03/2020 was to the cecum with good bowel preparation.  Diverticulosis was seen in the sigmoid colon.  Otherwise, the exam was unremarkable.   Random biopsies of colon were normal.    Colonoscopy 10/28/2014 for screening purposes.  This was complete with intubation of the terminal ileum and excellent bowel preparation.  Sigmoid, descending, and splenic flexure diverticulosis seen.  Otherwise, the exam was unremarkable.  Terminal ileum was normal.  Repeat recommended in 10 years.    Colonoscopy 2007 with sigmoid colon diverticulosis        ROS:  Constitutional: No fevers, chills, No weight loss, normal appetite  ENT: No congestion, rhinorrhea, or chronic sinus problems  CV: No chest pain or palpitations  Pulm: No cough, No shortness of breath  Ophtho: No vision changes or pain  GI: see HPI  Derm: No rash or lesions  Heme: No lymphadenopathy, No bruising  MSK: No arthritis or joint swelling  : No dysuria, No frequent urination  Endo: No hot or cold intolerance  Neuro: No syncope, No seizure  Psych: No anxiety, No depression        PMHX:  has a past medical history of Arthritis, Basal cell carcinoma, Bronchitis, Cataract, Diverticulitis, Dry eye syndrome, GERD (gastroesophageal reflux disease), Hyperlipidemia, Hypertension, Hypothyroidism (07/19/2012), Obese, PONV (postoperative nausea and vomiting), and Thyroid disease.    PSHX:  has a past surgical history that includes Colonoscopy (2007); Nasal septum surgery; Shoulder surgery; Hysterectomy; Cholecystectomy; Hernia repair; Tonsillectomy; Back surgery; De Quervain's release (Left, 03/2017); Colonoscopy (N/A, 9/3/2020); Arthroscopic repair of rotator cuff of shoulder (Right, 9/23/2020); Fixation of tendon (Right, 9/23/2020); Phacoemulsification of cataract (Left, 10/20/2021); Intraocular prosthesis insertion (Left, 10/20/2021); Cataract extraction w/  intraocular lens implant (Left, 10/20/2021); Trigger finger release (Left, 12/2/2021); Injection of steroid (Right, 12/2/2021); Phacoemulsification of cataract (Right, 12/29/2021); Intraocular prosthesis insertion (Right, 12/29/2021); and Cystoscopy.    The  patient's social and family histories were reviewed by me and updated in the appropriate section of the electronic medical record.    Review of patient's allergies indicates:   Allergen Reactions    Levaquin [levofloxacin] Swelling and Edema    Erythromycin (bulk) Nausea And Vomiting       Prior to Admission medications    Medication Sig Start Date End Date Taking? Authorizing Provider   albuterol (PROVENTIL/VENTOLIN HFA) 90 mcg/actuation inhaler Inhale 2 puffs into the lungs every 6 (six) hours as needed for Wheezing. 11/10/22  Yes Fabian Luna MD   amoxicillin-clavulanate 875-125mg (AUGMENTIN) 875-125 mg per tablet Take 1 tablet by mouth 2 (two) times daily. 12/22/22  Yes Susan Falcon NP   celecoxib (CELEBREX) 200 MG capsule Take 1 capsule (200 mg total) by mouth once daily. Do not take more than 15 consecutive days. Take w food and water 11/10/22  Yes Fabian Luna MD   cranberry fruit extract (CRANBERRY EXTRACT ORAL) Take by mouth.   Yes Historical Provider   cyclobenzaprine (FLEXERIL) 10 MG tablet Take 1 tablet (10 mg total) by mouth nightly. As needed for muscle spasms 11/22/22  Yes Ramy London MD   diclofenac sodium (VOLTAREN) 1 % Gel Apply 2 g topically 2 (two) times daily as needed (musculoskeletal pain). 3/23/21  Yes Fabian Luna MD   dicyclomine (BENTYL) 20 mg tablet Take 1 tablet (20 mg total) by mouth 4 (four) times daily before meals and nightly. 11/1/21  Yes Fabian Luna MD   estradioL (ESTRACE) 0.01 % (0.1 mg/gram) vaginal cream Place 0.5-1 g vaginally twice a week. 7/28/22 7/28/23 Yes Rylan John MD   fluocinonide (LIDEX) 0.05 % external solution Apply topically once daily. 11/1/21  Yes Fabian Luna MD   HYDROcodone-acetaminophen (NORCO) 7.5-325 mg per tablet Take 1 tablet by mouth every 6 (six) hours as needed for Pain. 8/1/22  Yes Fabian Luna MD   ketoconazole (NIZORAL) 2 % shampoo Apply topically twice a week. 11/1/21  Yes Fabian Luna,  "MD   levothyroxine (SYNTHROID) 75 MCG tablet TAKE ONE TABLET BY MOUTH EVERY DAY ON AN EMPTY STOMACH 2/12/23  Yes Fabian Luna MD   LIDOcaine-prilocaine (EMLA) cream Apply topically 2 (two) times daily as needed. To hands. 11/1/21  Yes Fabian Luna MD   losartan (COZAAR) 100 MG tablet Take 1 tablet (100 mg total) by mouth once daily. 11/10/22  Yes Fabian Luna MD   MAGNESIUM ORAL Take 1 tablet by mouth once daily.   Yes Historical Provider   memantine (NAMENDA) 5 MG Tab Take 1 tablet (5 mg total) by mouth 2 (two) times daily. 11/10/22 11/10/23 Yes Fabian Luna MD   methocarbamoL (ROBAXIN) 500 MG Tab Take 1 tablet (500 mg total) by mouth 3 (three) times daily. w meals for 3-10 days for muscle spasms 11/22/22  Yes Ramy London MD   Multi-Vitamin tablet Take 1 tablet by mouth once daily.    Yes Historical Provider   nitrofurantoin (MACRODANTIN) 50 MG capsule Take 1 capsule (50 mg total) by mouth every morning. 1/17/23 1/17/24 Yes Ashley Hoskins, CARI   omeprazole (PRILOSEC) 40 MG capsule Take 1 capsule (40 mg total) by mouth once daily. 11/10/22  Yes Fabian Luna MD   oxybutynin (DITROPAN-XL) 10 MG 24 hr tablet Take 1 tablet (10 mg total) by mouth once daily. 11/10/22  Yes Fabian Luna MD   PSYLLIUM SEED, WITH SUGAR, (METAMUCIL ORAL) Take by mouth as needed.    Yes Historical Provider   RESTASIS 0.05 % ophthalmic emulsion INSTILL ONE DROP IN EACH EYE TWO TIMES A DAY 12/1/22  Yes Matthias Gutierrez, OD   rosuvastatin (CRESTOR) 40 MG Tab TAKE ONE TABLET BY MOUTH EVERY DAY 2/12/23  Yes Fabian Luna MD   traZODone (DESYREL) 50 MG tablet 1/2 tab up to 2 tabs po qhs prn insomnia 11/10/22  Yes Fabian Luna MD   triamcinolone acetonide 0.1% (KENALOG) 0.1 % cream AAA bid 6/12/18  Yes Mercedes Baumann MD         Objective Findings:  Vital Signs:  /71 (BP Location: Right arm, Patient Position: Sitting, BP Method: Medium (Automatic))   Pulse 79   Ht 5' 1" (1.549 m)   Wt 62.5 kg (137 lb " 12.6 oz)   BMI 26.03 kg/m²  Body mass index is 26.03 kg/m².      Physical Exam:  General Appearance:  Well appearing in no acute distress, appears stated age  Head:  Normocephalic, atraumatic  Eyes:  No scleral icterus or pallor, EOMI  Extremities:  No clubbing, cyanosis, or edema  Skin:  No rash  Neurologic:  CN II-XII intact        Labs:  Lab Results   Component Value Date    WBC 6.52 11/14/2022    HGB 11.2 (L) 11/14/2022    HCT 35.7 (L) 11/14/2022    MCV 94 11/14/2022    RDW 13.0 11/14/2022     11/14/2022    GRAN 1.9 11/11/2021    GRAN 45.1 11/11/2021    LYMPH 1.7 11/11/2021    LYMPH 39.9 11/11/2021    MONO 0.4 11/11/2021    MONO 9.6 11/11/2021    EOS 0.1 11/11/2021    BASO 0.05 11/11/2021     Lab Results   Component Value Date     11/14/2022    K 4.1 11/14/2022     11/14/2022    CO2 25 11/14/2022    GLU 88 11/14/2022    BUN 18 11/14/2022    CREATININE 0.8 11/14/2022    CALCIUM 9.5 11/14/2022    PROT 7.0 11/14/2022    ALBUMIN 3.9 11/14/2022    BILITOT 0.4 11/14/2022    ALKPHOS 52 (L) 11/14/2022    AST 37 11/14/2022    ALT 33 11/14/2022                       Assessment:  Jackelyn Kendrick is a 72 y.o. female here with:  1. Irritable bowel syndrome with both constipation and diarrhea    2. Gastroesophageal reflux disease, unspecified whether esophagitis present    3. Diverticulosis of colon    4. Incontinence of feces with fecal urgency      Longstanding of irritable bowel syndrome with alternating constipation diarrhea.  She is a mixed pattern at this time.  She is on a low dose of Metamucil with not much improvement.  She tried align probiotic without improvement.  Bentyl is also no help.  Linzess worsen symptoms of diarrhea prompting her to stop taking it.  She describes episode of incontinence.  Prior colonoscopy procedures without significant findings other than diverticulosis.  Random biopsies performed in September 2020 were normal.    History of GERD with symptoms controlled on use of  Prilosec every morning and Pepcid every evening.  She has rare breakthrough symptoms.      Recommendations:  1. I will screen her for celiac disease  2. I will arrange for an anorectal manometry.  Discussed with her in clinic, and obtained written informed consent.  3. She will increase fiber intake to 2 Metamucil capsules twice daily.  I advised her to take each dose with a full glass of water, at least 8 oz.  4. She may also try a peppermint oil preparation such as IBgard or sarita's tummy tamers.  5. Follow-up screening colonoscopy recommended at 10 years after her last due to her being average risk.  She has never had a precancerous polyp removed, and has no family history of colon cancer.  Health maintenance adjusted.        Follow-up pending above      Order summary:  Orders Placed This Encounter    Celiac Disease Panel    Case Request Endoscopy: MANOMETRY, ANORECTAL           David Roper MD

## 2023-02-14 NOTE — PATIENT INSTRUCTIONS
Increase fiber intake to 2 capsules of Metamucil twice daily.  Take each dose with a full glass of water (at least 8 oz).    You can also try a peppermint oil preparation for symptom control.  Examples are IBgard or Honey's Tummy Tamers.

## 2023-02-15 ENCOUNTER — CLINICAL SUPPORT (OUTPATIENT)
Dept: REHABILITATION | Facility: HOSPITAL | Age: 73
End: 2023-02-15
Payer: MEDICARE

## 2023-02-15 DIAGNOSIS — M25.511 BILATERAL SHOULDER PAIN, UNSPECIFIED CHRONICITY: Primary | ICD-10-CM

## 2023-02-15 DIAGNOSIS — M25.512 BILATERAL SHOULDER PAIN, UNSPECIFIED CHRONICITY: Primary | ICD-10-CM

## 2023-02-15 DIAGNOSIS — R29.898 DECREASED ROM OF NECK: ICD-10-CM

## 2023-02-15 DIAGNOSIS — M62.81 MUSCLE WEAKNESS OF LEFT UPPER EXTREMITY: ICD-10-CM

## 2023-02-15 PROCEDURE — 97530 THERAPEUTIC ACTIVITIES: CPT | Mod: PO

## 2023-02-15 PROCEDURE — 97140 MANUAL THERAPY 1/> REGIONS: CPT | Mod: PO

## 2023-02-15 NOTE — PROGRESS NOTES
[facility]   Physical Therapy Treatment Note     Name: Jackelyn Kendrick  Clinic Number: [id number]    Therapy Diagnosis:   Encounter Diagnoses   Name Primary?    Bilateral shoulder pain, unspecified chronicity Yes    Muscle weakness of left upper extremity     Decreased ROM of neck              Physician: Reginald Pickens MD    Visit Date: 2/15/2023    Physician Orders: PT Eval and Treat   Medical Diagnosis from Referral: Bilateral shoulder pain, unspecified chronicity [M25.511, M25.512]  Evaluation Date: 12/9/2022  Authorization Period Expiration: 12/31/23  Plan of Care Expiration: 3/30/23  Progress Note Due: 3/17/23  Visit # / Visits authorized: 9/ 20   FOTO: 3/3 last completed on 2/15/2023    PTA Visit #: 0/5     Precautions: Standard     Time In: 8:50  Time Out: 9:30  Total Billable Time: 40 minutes      SUBJECTIVE     Pt reports: that she stepped off the curb into the grass and strained her low back two weeks ago.  She also reported missing her last Tx because she was in too much pain from her lower back.  She was not provided with home exercise program.  Response to previous treatment: soreness in bilateral shoulders R>L  Functional change: ongoing    Pain: 2/10  Location: bilateral neck  and shoulder        OBJECTIVE     Objective measures taken at progress report unless specified otherwise.   Observation: rounded shoulder bilaterally; mild winging of scapulas bilaterally    R side of pelvis elevated  Cervical spine flexion is 100%  Cervical spine extension is 80% and tight  Cervical spine side bending is significantly limited (20%) and painful bilaterally  Cervical spine rotation is approx 80% limited bilaterally with pain      Cx spine side glides limited and painful C2-7 bilaterally        Passive Range of Motion:   Shoulder Right Left   Flexion 170 120 with pain   Abduction 136 75 with pain   ER at 0 NT NT    ER at 90 90 62 at 45 deg of abduction with pain    IR  To  "abdomin at 45 deg of ab         Strength:  Shoulder Right Left   Flexion 4-/5 4-/5 with pain   Abduction 4-/5* 4--5 with pain   ER 4/5 4+/5 with pain   IR 4+/5 4+/5               Joint Mobility: normal mobility of right glenohumeral joint  Normal mobility of left glenohumeral joint however anterior to posterior glide is slightly painful      Palpation: L intra scapular region is tender to palpation.   Sensation: intact     Flexibility: NT             Limitation/Restriction for FOTO shoulder Survey     Therapist reviewed FOTO scores for Jackelyn Kendrick on 2/15/2023.   FOTO documents entered into [employer] - see Media section.     Limitation Score: 37%           TREATMENT   Re-evaluation   Jackelyn Kendrick received the treatments listed below:       therapeutic exercises to develop strength, endurance, ROM, flexibility, posture, and core stabilization for 00 minutes including:  Open book x 10 B  pec stretch over towel roll x3 min  thoracic extension stretch over towel roll x2 min  Seated trunk rotation 10 x 2  Cervical rotations 10x5"  Chin tucks 2x10x3" (may do better in seated position)  Shoulder AROM into flexion 2x10x3"  Seated scap retractions 3x10x3"  Cervical isometrics with hand 10x5" each direction  Standing shoulder shrugs with arms at sides 15x3"  Shoulder rows with orange theraband 2x10  Shoulder extensions with orange theraband 2x10  Shoulder external rotation with yellow theraband 2x10 L   Shoulder internal rotation with yellow theraband 2x10 L  serratus slides with pillow case x20     manual therapy techniques: Joint mobilizations and Soft tissue Mobilization were applied for 17 minutes, including:  Suboccipital release  STM levator scaps and upper traps  PROM cervical sidebending and rotation  L glenohumeral long axis distraction  L scapular mobilizations and rhomboid STM  Soft tissue mobilization to B thoracic paraspinals   Passive mobilization to the thoracic spine and rib cage.       neuromuscular " re-education activities to improve: Balance and Posture for 0 minutes. The following activities were included:      PATIENT EDUCATION AND HOME EXERCISES     Home Exercises Provided and Patient Education Provided     Education provided:   - education on condition     Written Home Exercises Provided: not provided today. Exercises were reviewed and Jackelyn Kendrick was able to demonstrate them prior to the end of the session.  Jackelyn Kendrick demonstrated good  understanding of the education provided. See EMR under Patient Instructions for exercises provided during therapy sessions.    ASSESSMENT     Ms. Hayden reports that her loser back is feeling better this week.  She has greater joint mobility in the thoracic spine and L side of her rib cage, but little change in her L shoulder ROM.  She reports Sx into her L shoulder and upper arm with passive mobilization of her thoracic spine and rib cage medial to her scapulae.  I continue to believe that thoracic spine and L costovertebral joint hypomobility play a roll in her L UE Sx.  Pt tolerated today's Tx with some reported discomfort in her thoracic spine and rib cage on the L.      Jackelyn Kendrick Is progressing towards her goals.   Pt prognosis is Fair.     Pt will continue to benefit from skilled outpatient physical therapy to address the deficits listed in the problem list box on initial evaluation, provide pt/family education and to maximize pt's level of independence in the home and community environment.     Pt's spiritual, cultural and educational needs considered and pt agreeable to plan of care and goals.     Anticipated barriers to physical therapy: none    GOALS:   Short Term Goals:  4 weeks  1.Report decreased left shoulder and neck pain < / =  4/10  to increase tolerance for driving  2. Increase PROM by 5-10 degrees   3. Increased strength by 1/3 MMT grade in BUE to increase tolerance for ADL and work activities.  4. Pt to tolerate HEP to  improve ROM and independence with ADL's     Long Term Goals: 8 weeks  1.Report decreased left shoulder and neck pain  < / =  2 /10  to increase tolerance for overhead activities  2.Increase AROM by 5-10 degrees where limited  3.Increase strength to >/= 4/5 in BUE to increase tolerance for ADL and work activities.  4. Pt goal: Pt to be able to reach back of her head with left hand to improve ability to wash hair.   5. Pt will have improved gcode of CJ (20-40% limited) on FOTO shoulder in order to demonstrate true functional improvement.     PLAN     Plan of care Certification: 12/9/2022 to 3/30/23.     Outpatient Physical Therapy 2 times weekly for 10 weeks to include the following interventions: Cervical/Lumbar Traction, Electrical Stimulation  , Gait Training, Manual Therapy, Moist Heat/ Ice, Neuromuscular Re-ed, Patient Education, Therapeutic Activities, Therapeutic Exercise, and Ultrasound.     Fermin Frias, PT

## 2023-02-16 ENCOUNTER — OFFICE VISIT (OUTPATIENT)
Dept: SPORTS MEDICINE | Facility: CLINIC | Age: 73
End: 2023-02-16
Payer: MEDICARE

## 2023-02-16 ENCOUNTER — HOSPITAL ENCOUNTER (OUTPATIENT)
Dept: RADIOLOGY | Facility: HOSPITAL | Age: 73
Discharge: HOME | End: 2023-02-16
Attending: STUDENT IN AN ORGANIZED HEALTH CARE EDUCATION/TRAINING PROGRAM
Payer: MEDICARE

## 2023-02-16 VITALS
DIASTOLIC BLOOD PRESSURE: 79 MMHG | WEIGHT: 137 LBS | HEART RATE: 88 BPM | BODY MASS INDEX: 25.86 KG/M2 | HEIGHT: 61 IN | SYSTOLIC BLOOD PRESSURE: 157 MMHG

## 2023-02-16 DIAGNOSIS — M25.512 ACUTE PAIN OF LEFT SHOULDER: Primary | ICD-10-CM

## 2023-02-16 DIAGNOSIS — M25.512 ACUTE PAIN OF LEFT SHOULDER: ICD-10-CM

## 2023-02-16 PROCEDURE — 99214 OFFICE O/P EST MOD 30 MIN: CPT | Mod: S$PBB,,, | Performed by: ORTHOPAEDIC SURGERY

## 2023-02-16 PROCEDURE — 99214 PR OFFICE/OUTPT VISIT, EST, LEVL IV, 30-39 MIN: ICD-10-PCS | Mod: S$PBB,,, | Performed by: ORTHOPAEDIC SURGERY

## 2023-02-16 PROCEDURE — 73030 XR SHOULDER COMPLETE 2 OR MORE VIEWS LEFT: ICD-10-PCS | Mod: 26,LT,, | Performed by: RADIOLOGY

## 2023-02-16 PROCEDURE — 99214 OFFICE O/P EST MOD 30 MIN: CPT | Mod: PBBFAC | Performed by: ORTHOPAEDIC SURGERY

## 2023-02-16 PROCEDURE — 73030 X-RAY EXAM OF SHOULDER: CPT | Mod: 26,LT,, | Performed by: RADIOLOGY

## 2023-02-16 PROCEDURE — 99999 PR PBB SHADOW E&M-EST. PATIENT-LVL IV: ICD-10-PCS | Mod: PBBFAC,,, | Performed by: ORTHOPAEDIC SURGERY

## 2023-02-16 PROCEDURE — 99999 PR PBB SHADOW E&M-EST. PATIENT-LVL IV: CPT | Mod: PBBFAC,,, | Performed by: ORTHOPAEDIC SURGERY

## 2023-02-16 PROCEDURE — 73030 X-RAY EXAM OF SHOULDER: CPT | Mod: TC,LT

## 2023-02-16 NOTE — PROGRESS NOTES
CC: LEFT shoulder pain     72 y.o. Female who is a prior patient of mine. History of left shoulder rotator cuff repair by Dr. Serrano over 30 years ago. RHD. Reports left shoulder pain for 2 months, no discreet injury. Pain localizes over trapezius with occasional radiation to left elbow. Pain associated with activity and rest. Worse with reaching over head. Reports pain at night. For treatment, she has tried rest, activity modification, NSAIDs, and physical therapy for 8 weeks.        Past Medical History:   Diagnosis Date    Arthritis     Basal cell carcinoma     Bronchitis     seasonal    Cataract     Diverticulitis     Dry eye syndrome     GERD (gastroesophageal reflux disease)     Hyperlipidemia     Hypertension     Hypothyroidism 07/19/2012    Obese     PONV (postoperative nausea and vomiting)     Thyroid disease        Past Surgical History:   Procedure Laterality Date    ARTHROSCOPIC REPAIR OF ROTATOR CUFF OF SHOULDER Right 9/23/2020    Procedure: REPAIR, ROTATOR CUFF, ARTHROSCOPIC;  Surgeon: Reginald Pickens MD;  Location: [facility] OR;  Service: Orthopedics;  Laterality: Right;  regional w/catheter (interscalene)    BACK SURGERY      CATARACT EXTRACTION W/  INTRAOCULAR LENS IMPLANT Left 10/20/2021        CHOLECYSTECTOMY      COLONOSCOPY  2007    diverticulosis    COLONOSCOPY N/A 9/3/2020    Procedure: COLONOSCOPY;  Surgeon: Alberta Henriquez MD;  Location: [facility] (4TH FLR);  Service: Colon and Rectal;  Laterality: N/A;  pt requested this time-8/31-covid- [place]    CYSTOSCOPY      DE QUERVAIN'S RELEASE Left 03/2017    FIXATION OF TENDON Right 9/23/2020    Procedure: FIXATION, TENDON;  Surgeon: Reginald Pickens MD;  Location: [facility] OR;  Service: Orthopedics;  Laterality: Right;    HERNIA REPAIR      HYSTERECTOMY      INJECTION OF STEROID Right 12/2/2021    Procedure: INJECTION, STEROID;  Surgeon: Suzy Dominguez MD;  Location: [facility] OR;  Service: Orthopedics;  Laterality: Right;     INTRAOCULAR PROSTHESES INSERTION Left 10/20/2021    Procedure: INSERTION, IOL PROSTHESIS;  Surgeon: Pippa Ashby MD;  Location: [facility] OR [address] [place] [address];  Service: Ophthalmology;  Laterality: Left;    INTRAOCULAR PROSTHESES INSERTION Right 12/29/2021    Procedure: INSERTION, IOL PROSTHESIS;  Surgeon: Pippa Ashby MD;  Location: [facility] OR [address] [place] [address];  Service: Ophthalmology;  Laterality: Right;    NASAL SEPTUM SURGERY      PHACOEMULSIFICATION OF CATARACT Left 10/20/2021    Procedure: PHACOEMULSIFICATION, CATARACT/ COMPLEX- PHACO / IOL - OS SMALL PUPIL-OLE RING AND TRYPAN BLUE;  Surgeon: Pippa Ashby MD;  Location: [facility] OR [address] [place] [address];  Service: Ophthalmology;  Laterality: Left;    PHACOEMULSIFICATION OF CATARACT Right 12/29/2021    Procedure: PHACOEMULSIFICATION, CATARACT;  Surgeon: Pippa Ashby MD;  Location: [facility] OR [address] [place] [address];  Service: Ophthalmology;  Laterality: Right;    SHOULDER SURGERY      TONSILLECTOMY      TRIGGER FINGER RELEASE Left 12/2/2021    Procedure: RELEASE, TRIGGER FINGER;  Surgeon: Suzy Dominguez MD;  Location: [facility];  Service: Orthopedics;  Laterality: Left;       Family History   Problem Relation Age of Onset    Cataracts Mother     Breast cancer Mother     Diabetes Mother     Hypertension Mother     Heart failure Mother     Heart disease Mother     Cataracts Father     Hypertension Father     Stroke Father     No Known Problems Daughter     No Known Problems Son     Diabetes Paternal Grandmother     Hyperlipidemia Sister     Arthritis Sister     Hyperlipidemia Brother     Arthritis Brother     No Known Problems Son     Amblyopia Neg Hx     Blindness Neg Hx     Glaucoma Neg Hx     Macular degeneration Neg Hx     Retinal detachment Neg Hx     Strabismus Neg Hx     Ovarian cancer Neg Hx     Melanoma Neg Hx     Celiac disease Neg Hx     Colon cancer Neg Hx     Colon polyps Neg Hx     Esophageal cancer Neg Hx     Liver cancer Neg Hx     Liver disease Neg Hx     Rectal cancer Neg Hx      Stomach cancer Neg Hx          Current Outpatient Medications:     albuterol (PROVENTIL/VENTOLIN HFA) 90 mcg/actuation inhaler, Inhale 2 puffs into the lungs every 6 (six) hours as needed for Wheezing., Disp: 6.7 g, Rfl: 11    amoxicillin-clavulanate 875-125mg (AUGMENTIN) 875-125 mg per tablet, Take 1 tablet by mouth 2 (two) times daily., Disp: 20 tablet, Rfl: 0    celecoxib (CELEBREX) 200 MG capsule, Take 1 capsule (200 mg total) by mouth once daily. Do not take more than 15 consecutive days. Take w food and water, Disp: 30 capsule, Rfl: 11    cranberry fruit extract (CRANBERRY EXTRACT ORAL), Take by mouth., Disp: , Rfl:     cyclobenzaprine (FLEXERIL) 10 MG tablet, Take 1 tablet (10 mg total) by mouth nightly. As needed for muscle spasms, Disp: 30 tablet, Rfl: 0    diclofenac sodium (VOLTAREN) 1 % Gel, Apply 2 g topically 2 (two) times daily as needed (musculoskeletal pain)., Disp: 100 g, Rfl: 11    dicyclomine (BENTYL) 20 mg tablet, Take 1 tablet (20 mg total) by mouth 4 (four) times daily before meals and nightly., Disp: 120 tablet, Rfl: 11    estradioL (ESTRACE) 0.01 % (0.1 mg/gram) vaginal cream, Place 0.5-1 g vaginally twice a week., Disp: 42.5 g, Rfl: 3    fluocinonide (LIDEX) 0.05 % external solution, Apply topically once daily., Disp: 60 mL, Rfl: 11    HYDROcodone-acetaminophen (NORCO) 7.5-325 mg per tablet, Take 1 tablet by mouth every 6 (six) hours as needed for Pain., Disp: 28 tablet, Rfl: 0    ketoconazole (NIZORAL) 2 % shampoo, Apply topically twice a week., Disp: 120 mL, Rfl: 11    levothyroxine (SYNTHROID) 75 MCG tablet, TAKE ONE TABLET BY MOUTH EVERY DAY ON AN EMPTY STOMACH, Disp: 90 tablet, Rfl: 3    LIDOcaine-prilocaine (EMLA) cream, Apply topically 2 (two) times daily as needed. To hands., Disp: 30 g, Rfl: 2    losartan (COZAAR) 100 MG tablet, Take 1 tablet (100 mg total) by mouth once daily., Disp: 30 tablet, Rfl: 11    MAGNESIUM ORAL, Take 1 tablet by mouth once daily., Disp: , Rfl:      memantine (NAMENDA) 5 MG Tab, Take 1 tablet (5 mg total) by mouth 2 (two) times daily., Disp: 60 tablet, Rfl: 11    methocarbamoL (ROBAXIN) 500 MG Tab, Take 1 tablet (500 mg total) by mouth 3 (three) times daily. w meals for 3-10 days for muscle spasms, Disp: 30 tablet, Rfl: 0    Multi-Vitamin tablet, Take 1 tablet by mouth once daily. , Disp: , Rfl:     nitrofurantoin (MACRODANTIN) 50 MG capsule, Take 1 capsule (50 mg total) by mouth every morning., Disp: 30 capsule, Rfl: 11    omeprazole (PRILOSEC) 40 MG capsule, Take 1 capsule (40 mg total) by mouth once daily., Disp: 30 capsule, Rfl: 11    oxybutynin (DITROPAN-XL) 10 MG 24 hr tablet, Take 1 tablet (10 mg total) by mouth once daily., Disp: 30 tablet, Rfl: 11    PSYLLIUM SEED, WITH SUGAR, (METAMUCIL ORAL), Take by mouth as needed. , Disp: , Rfl:     RESTASIS 0.05 % ophthalmic emulsion, INSTILL ONE DROP IN EACH EYE TWO TIMES A DAY, Disp: 60 each, Rfl: 0    rosuvastatin (CRESTOR) 40 MG Tab, TAKE ONE TABLET BY MOUTH EVERY DAY, Disp: 90 tablet, Rfl: 3    traZODone (DESYREL) 50 MG tablet, 1/2 tab up to 2 tabs po qhs prn insomnia, Disp: 30 tablet, Rfl: 11    triamcinolone acetonide 0.1% (KENALOG) 0.1 % cream, AAA bid, Disp: 60 g, Rfl: 3    Review of patient's allergies indicates:   Allergen Reactions    Levaquin [levofloxacin] Swelling and Edema    Erythromycin (bulk) Nausea And Vomiting          REVIEW OF SYSTEMS:  Constitution: Negative. Negative for chills, fever and night sweats.   HENT: Negative for congestion and headaches.    Eyes: Negative for blurred vision, left vision loss and right vision loss.   Cardiovascular: Negative for chest pain and syncope.   Respiratory: Negative for cough and shortness of breath.    Endocrine: Negative for polydipsia, polyphagia and polyuria.   Hematologic/Lymphatic: Negative for bleeding problem. Does not bruise/bleed easily.   Skin: Negative for dry skin, itching and rash.   Musculoskeletal: Negative for falls.  Positive for left  "shoulder pain and muscle weakness.   Gastrointestinal: Negative for abdominal pain and bowel incontinence.   Genitourinary: Negative for bladder incontinence and nocturia.   Neurological: Negative for disturbances in coordination, loss of balance and seizures.   Psychiatric/Behavioral: Negative for depression. The patient does not have insomnia.    Allergic/Immunologic: Negative for hives and persistent infections.      PHYSICAL EXAMINATION:  Vitals:  BP (!) 157/79   Pulse 88   Ht 5' 1" (1.549 m)   Wt 62.1 kg (137 lb)   BMI 25.89 kg/m²    General: The patient is alert and oriented x 3.  Mood is pleasant.  Observation of ears, eyes and nose reveal no gross abnormalities.  No labored breathing observed.  Gait is coordinated. Patient can toe walk and heel walk without difficulty.      LEFT Shoulder / Upper Extremity Exam    OBSERVATION:     Swelling  none  Deformity  none   Discoloration  none   Scapular winging none   Scars   none  Atrophy  none    TENDERNESS / CREPITUS (T/C):          T/C      T/C   Clavicle   -/-  SUPRAspinatus    +/-     AC Jt.    -/-  INFRAspinatus  -/-    SC Jt.    -/-  Deltoid    -/-      G. Tuberosity  -/-  LH BICEP groove  -/-   Acromion:  -/-  Midline Neck   -/-     Scapular Spine -/-  Trapezium   +/-   SMA Scapula  -/-  GH jt. line - post  -/-     Scapulothoracic  -/-         ROM: (* = with pain)  Right shoulder   Left shoulder        AROM (PROM)   AROM (PROM)   FE    170° (175°)     160° (175°)     ER at 0°    60°  (65°)    65°  (65°)   ER at 90° ABD  90°  (90°)    90°  (90°)   IR at 90°  ABD   NA  (40°)     NA  (40°)      IR (spine level)   T10     T10    STRENGTH: (* = with pain) Right shoulder   Left shoulder    SCAPTION   5/5    4/5    IR    5/5    5/5   ER    5/5    5/5   BICEPS   5/5    5/5   Deltoid    5/5    5/5     SIGNS:  Painful side       NEER   +    OYEES  neg    BLACK   +    SPEEDS  neg     DROP ARM   -   BELLY PRESS neg   Superior escape none "    LIFT-OFF  neg   X-Body ADD    neg    MOVING VALGUS neg        STABILITY TESTING    Right shoulder   Left shoulder    Translation     Anterior  up face     up face    Posterior  up face    up face    Sulcus   < 10mm    < 10 mm     Signs   Apprehension   neg      neg       Relocation   no change     no change      Jerk test  neg     neg    EXTREMITY NEURO-VASCULAR EXAM:    Sensation grossly intact to light touch all dermatomal regions.    DTR 2+ Biceps, Triceps, BR and Negative Janias sign   Grossly intact motor function at Elbow, Wrist and Hand   Distal pulses radial and ulnar 2+, brisk cap refill, symmetric.      NECK:  Painless FROM and spinous processes non-tender. Negative Spurlings sign.      OTHER FINDINGS:      XRAYS:  Xrays including AP, Outlet and Axillary Lateral of Left shoulder are ordered / images reviewed by me:   No fracture dislocation or other pathology   Acromion type 2   Proximal migration of humeral head: None   GH arthritis: None          ASSESSMENT:   Left shoulder pain, possible retear of rotator cuff:  1. Possible rotator cuff tear    2.    3.    PLAN:      1. MRI to rule out rotator cuff retear  2. Follow up in clinic to discuss MRI results    All questions were answered, patient will contact us for questions or concerns in the interim.

## 2023-02-17 ENCOUNTER — CLINICAL SUPPORT (OUTPATIENT)
Dept: REHABILITATION | Facility: HOSPITAL | Age: 73
End: 2023-02-17
Attending: FAMILY MEDICINE
Payer: MEDICARE

## 2023-02-17 DIAGNOSIS — R29.898 DECREASED ROM OF NECK: ICD-10-CM

## 2023-02-17 DIAGNOSIS — M25.512 BILATERAL SHOULDER PAIN, UNSPECIFIED CHRONICITY: Primary | ICD-10-CM

## 2023-02-17 DIAGNOSIS — M62.81 MUSCLE WEAKNESS OF LEFT UPPER EXTREMITY: ICD-10-CM

## 2023-02-17 DIAGNOSIS — M25.511 BILATERAL SHOULDER PAIN, UNSPECIFIED CHRONICITY: Primary | ICD-10-CM

## 2023-02-17 LAB
GLIADIN PEPTIDE IGA SER-ACNC: 1.5 U/ML
GLIADIN PEPTIDE IGG SER-ACNC: 1.6 U/ML
IGA SERPL-MCNC: 224 MG/DL (ref 70–400)
TTG IGA SER-ACNC: 0.4 U/ML
TTG IGG SER-ACNC: <0.6 U/ML

## 2023-02-17 PROCEDURE — 97110 THERAPEUTIC EXERCISES: CPT | Mod: PO

## 2023-02-17 NOTE — PROGRESS NOTES
[facility]   Physical Therapy Treatment Note     Name: Jackelyn Kendrick  Clinic Number: [id number]    Therapy Diagnosis:   Encounter Diagnoses   Name Primary?    Bilateral shoulder pain, unspecified chronicity Yes    Muscle weakness of left upper extremity     Decreased ROM of neck          Physician: Reginald Pickens MD    Visit Date: 2/17/2023    Physician Orders: PT Eval and Treat   Medical Diagnosis from Referral: Bilateral shoulder pain, unspecified chronicity [M25.511, M25.512]  Evaluation Date: 12/9/2022  Authorization Period Expiration: 12/31/23  Plan of Care Expiration: 3/30/23  Progress Note Due: 3/17/23  Visit # / Visits authorized: 11/ 20   FOTO: 3/3 last completed on 2/15/2023    PTA Visit #: 0/5     Precautions: Standard     Time In: 11:15  Time Out: 12:00  Total Billable Time: 45 minutes      SUBJECTIVE     Pt reports: pain is primarily in the shoulder now. She is getting an MRI for this. The neck is doing well although there is a little pain left.   She was not provided with home exercise program.  Response to previous treatment: soreness in bilateral shoulders R>L  Functional change: ongoing    Pain: 4/10  Location: bilateral neck  and shoulder        OBJECTIVE     Objective measures taken at progress report unless specified otherwise.   Observation: rounded shoulder bilaterally; mild winging of scapulas bilaterally    R side of pelvis elevated  Cervical spine flexion is 100%  Cervical spine extension is 80% and tight  Cervical spine side bending is significantly limited (20%) and painful bilaterally  Cervical spine rotation is approx 80% limited bilaterally with pain      Cx spine side glides limited and painful C2-7 bilaterally        Passive Range of Motion:   Shoulder Right Left   Flexion 170 120 with pain   Abduction 136 75 with pain   ER at 0 NT NT    ER at 90 90 62 at 45 deg of abduction with pain    IR  To abdomin at 45 deg of ab         Strength:  Shoulder  "Right Left   Flexion 4-/5 4-/5 with pain   Abduction 4-/5* 4--5 with pain   ER 4/5 4+/5 with pain   IR 4+/5 4+/5               Joint Mobility: normal mobility of right glenohumeral joint  Normal mobility of left glenohumeral joint however anterior to posterior glide is slightly painful      Palpation: L intra scapular region is tender to palpation.   Sensation: intact     Flexibility: NT             Limitation/Restriction for FOTO shoulder Survey     Therapist reviewed FOTO scores for Jackelyn Kendrick on 2/15/2023.   FOTO documents entered into [employer] - see Media section.     Limitation Score: 37%           TREATMENT      Jackelyn Kendrick received the treatments listed below:       therapeutic exercises to develop strength, endurance, ROM, flexibility, posture, and core stabilization for 45 minutes including:  Open book x 10 B  pec stretch over towel roll x3 min  thoracic extension stretch over towel roll x2 min  Seated trunk rotation 10 x 2  Shoulder AROM into flexion 2x10x3"  Seated scap retractions 3x10x3"  +Seated bilateral shoulder external rotation with yellow theraband 3x8  Standing shoulder shrugs with arms at sides 15x3"  Shoulder rows with red theraband 2x10  Shoulder extensions with red theraband 2x10  Shoulder external rotation with yellow theraband 2x10 L   Shoulder internal rotation with yellow theraband 2x10 L  serratus slides with pillow case x20       D/c exercises:  Cervical rotations 10x5"  Chin tucks 2x10x3" (may do better in seated position)  Cervical isometrics with hand 10x5" each direction      manual therapy techniques: Joint mobilizations and Soft tissue Mobilization were applied for 0 minutes, including:  Suboccipital release  STM levator scaps and upper traps  PROM cervical sidebending and rotation  L glenohumeral long axis distraction  L scapular mobilizations and rhomboid STM  Soft tissue mobilization to B thoracic paraspinals   Passive mobilization to the thoracic spine and rib " cage.       neuromuscular re-education activities to improve: Balance and Posture for 0 minutes. The following activities were included:      PATIENT EDUCATION AND HOME EXERCISES     Home Exercises Provided and Patient Education Provided     Education provided:   - education on condition     Written Home Exercises Provided: not provided today. Exercises were reviewed and Jackelyn Kendrick was able to demonstrate them prior to the end of the session.  Jackelyn Kendrick demonstrated good  understanding of the education provided. See EMR under Patient Instructions for exercises provided during therapy sessions.    ASSESSMENT     Ms. Hayden reports that her loser back is feeling better this week.  She has greater joint mobility in the thoracic spine and L side of her rib cage, but little change in her L shoulder ROM.  She reports Sx into her L shoulder and upper arm with passive mobilization of her thoracic spine and rib cage medial to her scapulae.  I continue to believe that thoracic spine and L costovertebral joint hypomobility play a roll in her L UE Sx.  Pt tolerated today's Tx with some reported discomfort in her thoracic spine and rib cage on the L.      Ms. Hayden with reports of improved neck pain however shoulder pain is lingering and she will be getting and MRI for this. No manual therapy performed today as we used session for shoulder strengthening with very good tolerance by patient. Today was her last scheduled visit so she will return to the doctor for further workup.     Jackelyn Kendrick Is progressing towards her goals.   Pt prognosis is Fair.     Pt will continue to benefit from skilled outpatient physical therapy to address the deficits listed in the problem list box on initial evaluation, provide pt/family education and to maximize pt's level of independence in the home and community environment.     Pt's spiritual, cultural and educational needs considered and pt agreeable to plan of care  and goals.     Anticipated barriers to physical therapy: none    GOALS:   Short Term Goals:  4 weeks  1.Report decreased left shoulder and neck pain < / =  4/10  to increase tolerance for driving  2. Increase PROM by 5-10 degrees   3. Increased strength by 1/3 MMT grade in BUE to increase tolerance for ADL and work activities.  4. Pt to tolerate HEP to improve ROM and independence with ADL's     Long Term Goals: 8 weeks  1.Report decreased left shoulder and neck pain  < / =  2 /10  to increase tolerance for overhead activities  2.Increase AROM by 5-10 degrees where limited  3.Increase strength to >/= 4/5 in BUE to increase tolerance for ADL and work activities.  4. Pt goal: Pt to be able to reach back of her head with left hand to improve ability to wash hair.   5. Pt will have improved gcode of CJ (20-40% limited) on FOTO shoulder in order to demonstrate true functional improvement.     PLAN     Plan of care Certification: 12/9/2022 to 3/30/23.     Outpatient Physical Therapy 2 times weekly for 10 weeks to include the following interventions: Cervical/Lumbar Traction, Electrical Stimulation  , Gait Training, Manual Therapy, Moist Heat/ Ice, Neuromuscular Re-ed, Patient Education, Therapeutic Activities, Therapeutic Exercise, and Ultrasound.     Nelida Maloney, PT

## 2023-02-27 ENCOUNTER — HOSPITAL ENCOUNTER (OUTPATIENT)
Dept: RADIOLOGY | Facility: HOSPITAL | Age: 73
Discharge: HOME | End: 2023-02-27
Attending: STUDENT IN AN ORGANIZED HEALTH CARE EDUCATION/TRAINING PROGRAM
Payer: MEDICARE

## 2023-02-27 DIAGNOSIS — M25.512 ACUTE PAIN OF LEFT SHOULDER: ICD-10-CM

## 2023-02-27 PROCEDURE — 73221 MRI JOINT UPR EXTREM W/O DYE: CPT | Mod: TC,LT

## 2023-02-27 PROCEDURE — 73221 MRI SHOULDER WITHOUT CONTRAST LEFT: ICD-10-PCS | Mod: 26,LT,, | Performed by: RADIOLOGY

## 2023-02-27 PROCEDURE — 73221 MRI JOINT UPR EXTREM W/O DYE: CPT | Mod: 26,LT,, | Performed by: RADIOLOGY

## 2023-03-02 ENCOUNTER — TELEPHONE (OUTPATIENT)
Dept: ORTHOPEDICS | Facility: CLINIC | Age: 73
End: 2023-03-02
Payer: MEDICARE

## 2023-03-02 ENCOUNTER — OFFICE VISIT (OUTPATIENT)
Dept: SPORTS MEDICINE | Facility: CLINIC | Age: 73
End: 2023-03-02
Payer: MEDICARE

## 2023-03-02 VITALS
WEIGHT: 138 LBS | HEIGHT: 61 IN | BODY MASS INDEX: 26.06 KG/M2 | SYSTOLIC BLOOD PRESSURE: 133 MMHG | DIASTOLIC BLOOD PRESSURE: 68 MMHG | HEART RATE: 75 BPM

## 2023-03-02 DIAGNOSIS — M25.512 ACUTE PAIN OF LEFT SHOULDER: Primary | ICD-10-CM

## 2023-03-02 DIAGNOSIS — M54.9 BACK PAIN: ICD-10-CM

## 2023-03-02 DIAGNOSIS — M51.36 DDD (DEGENERATIVE DISC DISEASE), LUMBAR: Primary | ICD-10-CM

## 2023-03-02 PROCEDURE — 20610 LARGE JOINT ASPIRATION/INJECTION: L SUBACROMIAL BURSA: ICD-10-PCS | Mod: S$PBB,LT,, | Performed by: ORTHOPAEDIC SURGERY

## 2023-03-02 PROCEDURE — 99214 OFFICE O/P EST MOD 30 MIN: CPT | Mod: 25,S$PBB,, | Performed by: ORTHOPAEDIC SURGERY

## 2023-03-02 PROCEDURE — 99999 PR PBB SHADOW E&M-EST. PATIENT-LVL V: CPT | Mod: PBBFAC,,, | Performed by: ORTHOPAEDIC SURGERY

## 2023-03-02 PROCEDURE — 20610 DRAIN/INJ JOINT/BURSA W/O US: CPT | Mod: PBBFAC,LT | Performed by: ORTHOPAEDIC SURGERY

## 2023-03-02 PROCEDURE — 99215 OFFICE O/P EST HI 40 MIN: CPT | Mod: PBBFAC,25 | Performed by: ORTHOPAEDIC SURGERY

## 2023-03-02 PROCEDURE — 99999 PR PBB SHADOW E&M-EST. PATIENT-LVL V: ICD-10-PCS | Mod: PBBFAC,,, | Performed by: ORTHOPAEDIC SURGERY

## 2023-03-02 PROCEDURE — 99214 PR OFFICE/OUTPT VISIT, EST, LEVL IV, 30-39 MIN: ICD-10-PCS | Mod: 25,S$PBB,, | Performed by: ORTHOPAEDIC SURGERY

## 2023-03-02 RX ORDER — TRIAMCINOLONE ACETONIDE 40 MG/ML
60 INJECTION, SUSPENSION INTRA-ARTICULAR; INTRAMUSCULAR
Status: DISCONTINUED | OUTPATIENT
Start: 2023-03-02 | End: 2023-03-02 | Stop reason: HOSPADM

## 2023-03-02 RX ADMIN — TRIAMCINOLONE ACETONIDE 60 MG: 40 INJECTION, SUSPENSION INTRA-ARTICULAR; INTRAMUSCULAR at 11:03

## 2023-03-02 NOTE — PROCEDURES
Large Joint Aspiration/Injection: L subacromial bursa    Date/Time: 3/2/2023 11:00 AM  Performed by: Reginald Pickens MD  Authorized by: Reginald Pickens MD     Consent Done?:  Yes (Verbal)  Indications:  Pain  Site marked: the procedure site was marked    Timeout: prior to procedure the correct patient, procedure, and site was verified    Prep: patient was prepped and draped in usual sterile fashion      Details:  Needle Size:  22 G  Ultrasonic Guidance for needle placement?: No    Approach:  Posterior  Location:  Shoulder  Site:  L subacromial bursa  Medications:  60 mg triamcinolone acetonide 40 mg/mL  Patient tolerance:  Patient tolerated the procedure well with no immediate complications

## 2023-03-02 NOTE — TELEPHONE ENCOUNTER
----- Message from Berkley Paz sent at 3/2/2023  3:09 PM CST -----  Contact: pt  Pt calling again to scheduled for x-ray appt       Confirmed patient's contact info below:  Contact Name: Jackelyn Aroldo  Phone Number: [phone]

## 2023-03-02 NOTE — PROGRESS NOTES
CC: LEFT shoulder pain    72 y.o.female presents for MRI review of left shoulder.  Concern for rotator cuff re-tear. Patient does not report any new incidents or injuries since their last appointment. Pain and symptoms remain unchanged since his last appointment. Here today to discuss treatment options.       Hx (2/23/23):   72 y.o. Female who is a prior patient of mine. History of left shoulder rotator cuff repair by me in 2010, she has a right RCR by Dr. Serrano in 2004. RHD. Reports left shoulder pain for 2 months, no discreet injury. Pain localizes over trapezius with occasional radiation to left elbow. Pain associated with activity and rest. Worse with reaching over head. Reports pain at night. For treatment, she has tried rest, activity modification, NSAIDs, and physical therapy for 8 weeks.      Past Medical History:   Diagnosis Date    Arthritis     Basal cell carcinoma     Bronchitis     seasonal    Cataract     Diverticulitis     Dry eye syndrome     GERD (gastroesophageal reflux disease)     Hyperlipidemia     Hypertension     Hypothyroidism 07/19/2012    Obese     PONV (postoperative nausea and vomiting)     Thyroid disease        Past Surgical History:   Procedure Laterality Date    ARTHROSCOPIC REPAIR OF ROTATOR CUFF OF SHOULDER Right 9/23/2020    Procedure: REPAIR, ROTATOR CUFF, ARTHROSCOPIC;  Surgeon: Reginald Pickens MD;  Location: [facility];  Service: Orthopedics;  Laterality: Right;  regional w/catheter (interscalene)    BACK SURGERY      CATARACT EXTRACTION W/  INTRAOCULAR LENS IMPLANT Left 10/20/2021        CHOLECYSTECTOMY      COLONOSCOPY  2007    diverticulosis    COLONOSCOPY N/A 9/3/2020    Procedure: COLONOSCOPY;  Surgeon: Alberta Henriquez MD;  Location: [facility] ([address] [place] [address]);  Service: Colon and Rectal;  Laterality: N/A;  pt requested this time-8/31-covid-uc [place]-tb    CYSTOSCOPY      DE QUERVAIN'S RELEASE Left 03/2017    FIXATION OF TENDON Right 9/23/2020    Procedure:  FIXATION, TENDON;  Surgeon: Reginald Pickens MD;  Location: [facility] OR;  Service: Orthopedics;  Laterality: Right;    HERNIA REPAIR      HYSTERECTOMY      INJECTION OF STEROID Right 12/2/2021    Procedure: INJECTION, STEROID;  Surgeon: Suzy Dominguez MD;  Location: [facility] OR;  Service: Orthopedics;  Laterality: Right;    INTRAOCULAR PROSTHESES INSERTION Left 10/20/2021    Procedure: INSERTION, IOL PROSTHESIS;  Surgeon: Pippa Ashby MD;  [facility];  Service: Ophthalmology;  Laterality: Left;    INTRAOCULAR PROSTHESES INSERTION Right 12/29/2021    Procedure: INSERTION, IOL PROSTHESIS;  Surgeon: Pippa Ashby MD;  [facility];  Service: Ophthalmology;  Laterality: Right;    NASAL SEPTUM SURGERY      PHACOEMULSIFICATION OF CATARACT Left 10/20/2021    Procedure: PHACOEMULSIFICATION, CATARACT/ COMPLEX- PHACO / IOL - OS SMALL PUPIL-OLE RING AND TRYPAN BLUE;  Surgeon: Pippa Ashby MD;  Location: [facility] OR [address] [place] [address];  Service: Ophthalmology;  Laterality: Left;    PHACOEMULSIFICATION OF CATARACT Right 12/29/2021    Procedure: PHACOEMULSIFICATION, CATARACT;  Surgeon: Pippa Ashby MD;  [facility];  Service: Ophthalmology;  Laterality: Right;    SHOULDER SURGERY      TONSILLECTOMY      TRIGGER FINGER RELEASE Left 12/2/2021    Procedure: RELEASE, TRIGGER FINGER;  Surgeon: Suzy Dominguez MD;  Location: [facility] OR;  Service: Orthopedics;  Laterality: Left;       Family History   Problem Relation Age of Onset    Cataracts Mother     Breast cancer Mother     Diabetes Mother     Hypertension Mother     Heart failure Mother     Heart disease Mother     Cataracts Father     Hypertension Father     Stroke Father     No Known Problems Daughter     No Known Problems Son     Diabetes Paternal Grandmother     Hyperlipidemia Sister     Arthritis Sister     Hyperlipidemia Brother     Arthritis Brother     No Known Problems Son     Amblyopia Neg Hx     Blindness Neg Hx      Glaucoma Neg Hx     Macular degeneration Neg Hx     Retinal detachment Neg Hx     Strabismus Neg Hx     Ovarian cancer Neg Hx     Melanoma Neg Hx     Celiac disease Neg Hx     Colon cancer Neg Hx     Colon polyps Neg Hx     Esophageal cancer Neg Hx     Liver cancer Neg Hx     Liver disease Neg Hx     Rectal cancer Neg Hx     Stomach cancer Neg Hx          Current Outpatient Medications:     albuterol (PROVENTIL/VENTOLIN HFA) 90 mcg/actuation inhaler, Inhale 2 puffs into the lungs every 6 (six) hours as needed for Wheezing., Disp: 6.7 g, Rfl: 11    amoxicillin-clavulanate 875-125mg (AUGMENTIN) 875-125 mg per tablet, Take 1 tablet by mouth 2 (two) times daily., Disp: 20 tablet, Rfl: 0    celecoxib (CELEBREX) 200 MG capsule, Take 1 capsule (200 mg total) by mouth once daily. Do not take more than 15 consecutive days. Take w food and water, Disp: 30 capsule, Rfl: 11    cranberry fruit extract (CRANBERRY EXTRACT ORAL), Take by mouth., Disp: , Rfl:     cyclobenzaprine (FLEXERIL) 10 MG tablet, Take 1 tablet (10 mg total) by mouth nightly. As needed for muscle spasms, Disp: 30 tablet, Rfl: 0    diclofenac sodium (VOLTAREN) 1 % Gel, Apply 2 g topically 2 (two) times daily as needed (musculoskeletal pain)., Disp: 100 g, Rfl: 11    dicyclomine (BENTYL) 20 mg tablet, Take 1 tablet (20 mg total) by mouth 4 (four) times daily before meals and nightly., Disp: 120 tablet, Rfl: 11    estradioL (ESTRACE) 0.01 % (0.1 mg/gram) vaginal cream, Place 0.5-1 g vaginally twice a week., Disp: 42.5 g, Rfl: 3    fluocinonide (LIDEX) 0.05 % external solution, Apply topically once daily., Disp: 60 mL, Rfl: 11    HYDROcodone-acetaminophen (NORCO) 7.5-325 mg per tablet, Take 1 tablet by mouth every 6 (six) hours as needed for Pain., Disp: 28 tablet, Rfl: 0    ketoconazole (NIZORAL) 2 % shampoo, Apply topically twice a week., Disp: 120 mL, Rfl: 11    levothyroxine (SYNTHROID) 75 MCG tablet, TAKE ONE TABLET BY MOUTH EVERY DAY ON AN EMPTY STOMACH,  Disp: 90 tablet, Rfl: 3    LIDOcaine-prilocaine (EMLA) cream, Apply topically 2 (two) times daily as needed. To hands., Disp: 30 g, Rfl: 2    losartan (COZAAR) 100 MG tablet, Take 1 tablet (100 mg total) by mouth once daily., Disp: 30 tablet, Rfl: 11    MAGNESIUM ORAL, Take 1 tablet by mouth once daily., Disp: , Rfl:     memantine (NAMENDA) 5 MG Tab, Take 1 tablet (5 mg total) by mouth 2 (two) times daily., Disp: 60 tablet, Rfl: 11    methocarbamoL (ROBAXIN) 500 MG Tab, Take 1 tablet (500 mg total) by mouth 3 (three) times daily. w meals for 3-10 days for muscle spasms, Disp: 30 tablet, Rfl: 0    Multi-Vitamin tablet, Take 1 tablet by mouth once daily. , Disp: , Rfl:     nitrofurantoin (MACRODANTIN) 50 MG capsule, Take 1 capsule (50 mg total) by mouth every morning., Disp: 30 capsule, Rfl: 11    omeprazole (PRILOSEC) 40 MG capsule, Take 1 capsule (40 mg total) by mouth once daily., Disp: 30 capsule, Rfl: 11    oxybutynin (DITROPAN-XL) 10 MG 24 hr tablet, Take 1 tablet (10 mg total) by mouth once daily., Disp: 30 tablet, Rfl: 11    PSYLLIUM SEED, WITH SUGAR, (METAMUCIL ORAL), Take by mouth as needed. , Disp: , Rfl:     RESTASIS 0.05 % ophthalmic emulsion, INSTILL ONE DROP IN EACH EYE TWO TIMES A DAY, Disp: 60 each, Rfl: 0    rosuvastatin (CRESTOR) 40 MG Tab, TAKE ONE TABLET BY MOUTH EVERY DAY, Disp: 90 tablet, Rfl: 3    traZODone (DESYREL) 50 MG tablet, 1/2 tab up to 2 tabs po qhs prn insomnia, Disp: 30 tablet, Rfl: 11    triamcinolone acetonide 0.1% (KENALOG) 0.1 % cream, AAA bid, Disp: 60 g, Rfl: 3    Review of patient's allergies indicates:   Allergen Reactions    Levaquin [levofloxacin] Swelling and Edema    Erythromycin (bulk) Nausea And Vomiting          REVIEW OF SYSTEMS:  Constitution: Negative. Negative for chills, fever and night sweats.   HENT: Negative for congestion and headaches.    Eyes: Negative for blurred vision, left vision loss and right vision loss.   Cardiovascular: Negative for chest pain and  "syncope.   Respiratory: Negative for cough and shortness of breath.    Endocrine: Negative for polydipsia, polyphagia and polyuria.   Hematologic/Lymphatic: Negative for bleeding problem. Does not bruise/bleed easily.   Skin: Negative for dry skin, itching and rash.   Musculoskeletal: Negative for falls.  Positive for left shoulder pain and muscle weakness.   Gastrointestinal: Negative for abdominal pain and bowel incontinence.   Genitourinary: Negative for bladder incontinence and nocturia.   Neurological: Negative for disturbances in coordination, loss of balance and seizures.   Psychiatric/Behavioral: Negative for depression. The patient does not have insomnia.    Allergic/Immunologic: Negative for hives and persistent infections.      PHYSICAL EXAMINATION:  Vitals:  /68   Pulse 75   Ht 5' 1" (1.549 m)   Wt 62.6 kg (138 lb)   BMI 26.07 kg/m²    General: The patient is alert and oriented x 3.  Mood is pleasant.  Observation of ears, eyes and nose reveal no gross abnormalities.  No labored breathing observed.  Gait is coordinated. Patient can toe walk and heel walk without difficulty.      LEFT Shoulder / Upper Extremity Exam    OBSERVATION:     Swelling  none  Deformity  none   Discoloration  none   Scapular winging none   Scars   none  Atrophy  none    TENDERNESS / CREPITUS (T/C):          T/C      T/C   Clavicle   -/-  SUPRAspinatus    +/-     AC Jt.    -/-  INFRAspinatus  -/-    SC Jt.    -/-  Deltoid    -/-      G. Tuberosity  -/-  LH BICEP groove  -/-   Acromion:  -/-  Midline Neck   -/-     Scapular Spine -/-  Trapezium   +/-   SMA Scapula  -/-  GH jt. line - post  -/-     Scapulothoracic  -/-         ROM: (* = with pain)  Right shoulder   Left shoulder        AROM (PROM)   AROM (PROM)   FE    170° (175°)     160° (175°)     ER at 0°    60°  (65°)    65°  (65°)   ER at 90° ABD  90°  (90°)    90°  (90°)   IR at 90°  ABD   NA  (40°)     NA  (40°)      IR (spine level)   T10 "     T10    STRENGTH: (* = with pain) Right shoulder   Left shoulder    SCAPTION   5/5    4/5    IR    5/5    5/5   ER    5/5    5/5   BICEPS   5/5    5/5   Deltoid    5/5    5/5     SIGNS:  Painful side       NEER   +    OYEES  neg    BLACK   +    SPEEDS  neg     DROP ARM   -   BELLY PRESS neg   Superior escape none    LIFT-OFF  neg   X-Body ADD    neg    MOVING VALGUS neg        STABILITY TESTING    Right shoulder   Left shoulder    Translation     Anterior  up face     up face    Posterior  up face    up face    Sulcus   < 10mm    < 10 mm     Signs   Apprehension   neg      neg       Relocation   no change     no change      Jerk test  neg     neg    EXTREMITY NEURO-VASCULAR EXAM:    Sensation grossly intact to light touch all dermatomal regions.    DTR 2+ Biceps, Triceps, BR and Negative Janias sign   Grossly intact motor function at Elbow, Wrist and Hand   Distal pulses radial and ulnar 2+, brisk cap refill, symmetric.      NECK:  Painless FROM and spinous processes non-tender. Negative Spurlings sign.      OTHER FINDINGS:      XRAYS:  Xrays including AP, Outlet and Axillary Lateral of Left shoulder are ordered / images reviewed by me:   No fracture dislocation or other pathology   Acromion type 2   Proximal migration of humeral head: None   GH arthritis: None    MRI left shoulder -  Impression:     1. Operative change of rotator cuff repair.  Partial-thickness undersurface tear of the infraspinatus with interstitial delamination and subcentimeter intramuscular cyst.  2. Circumferential degenerative labral fraying.  3. Diminutive caliber biceps tendon, likely postoperative.  4. Mild AC joint arthrosis.  5. Glenohumeral osteoarthritis.      ASSESSMENT:   Left shoulder pain, possible retear of rotator cuff:  1. Possible rotator cuff tear    2.    3.    PLAN:    CSI left shoulder  Back and spine   F/u PRN       All questions were answered, patient will contact us for questions or concerns in the  interim.

## 2023-03-06 ENCOUNTER — OFFICE VISIT (OUTPATIENT)
Dept: ORTHOPEDICS | Facility: CLINIC | Age: 73
End: 2023-03-06
Payer: MEDICARE

## 2023-03-06 ENCOUNTER — HOSPITAL ENCOUNTER (OUTPATIENT)
Dept: RADIOLOGY | Facility: HOSPITAL | Age: 73
Discharge: HOME | End: 2023-03-06
Attending: ORTHOPAEDIC SURGERY
Payer: MEDICARE

## 2023-03-06 VITALS — BODY MASS INDEX: 26.33 KG/M2 | HEIGHT: 61 IN | WEIGHT: 139.44 LBS

## 2023-03-06 DIAGNOSIS — M51.36 DDD (DEGENERATIVE DISC DISEASE), LUMBAR: ICD-10-CM

## 2023-03-06 DIAGNOSIS — M47.816 LUMBAR SPONDYLOSIS: Primary | ICD-10-CM

## 2023-03-06 PROCEDURE — 99204 OFFICE O/P NEW MOD 45 MIN: CPT | Mod: S$PBB,,, | Performed by: ORTHOPAEDIC SURGERY

## 2023-03-06 PROCEDURE — 72110 X-RAY EXAM L-2 SPINE 4/>VWS: CPT | Mod: TC

## 2023-03-06 PROCEDURE — 99999 PR PBB SHADOW E&M-EST. PATIENT-LVL IV: ICD-10-PCS | Mod: PBBFAC,,, | Performed by: ORTHOPAEDIC SURGERY

## 2023-03-06 PROCEDURE — 99214 OFFICE O/P EST MOD 30 MIN: CPT | Mod: PBBFAC | Performed by: ORTHOPAEDIC SURGERY

## 2023-03-06 PROCEDURE — 99204 PR OFFICE/OUTPT VISIT, NEW, LEVL IV, 45-59 MIN: ICD-10-PCS | Mod: S$PBB,,, | Performed by: ORTHOPAEDIC SURGERY

## 2023-03-06 PROCEDURE — 99999 PR PBB SHADOW E&M-EST. PATIENT-LVL IV: CPT | Mod: PBBFAC,,, | Performed by: ORTHOPAEDIC SURGERY

## 2023-03-06 PROCEDURE — 72110 XR LUMBAR SPINE AP AND LAT WITH FLEX/EXT: ICD-10-PCS | Mod: 26,,, | Performed by: RADIOLOGY

## 2023-03-06 PROCEDURE — 72110 X-RAY EXAM L-2 SPINE 4/>VWS: CPT | Mod: 26,,, | Performed by: RADIOLOGY

## 2023-03-06 RX ORDER — METHOCARBAMOL 500 MG/1
500 TABLET, FILM COATED ORAL 3 TIMES DAILY
Qty: 60 TABLET | Refills: 1 | Status: SHIPPED | OUTPATIENT
Start: 2023-03-06 | End: 2023-11-27

## 2023-03-06 NOTE — PROGRESS NOTES
DATE: 3/6/2023  PATIENT: Jackelyn Kendrick    Attending Physician: Norman Thakur M.D.    CHIEF COMPLAINT: LBP and RLE pain    HISTORY:  Jackelyn Kendrick is a 72 y.o. female w/ a hx of lumbar discectomies x2 in the 1990s presents for initial evaluation of low back and right leg pain (Back - 3, Leg - 3). The pain has been present for 10+ years. The patient describes the pain as dull and it radiates posterolaterally down RLE through the buttock to the mid-thigh.  The pain is worse with activity and improved by rest. There is no associated numbness and tingling. There is no subjective weakness. Prior treatments have included meds (celebrex, flexeril, robaxin, norco), KALEE b4 surgery, but no PT.    The Patient denies myelopathic symptoms such as handwriting changes or difficulty with buttons/coins/keys. Denies perineal paresthesias, bowel/bladder dysfunction.    The patient does not smoke, have DM or endorse IVDU. The patient is not on any blood thinners and does not take chronic narcotics.she is a retired nurse.    PAST MEDICAL/SURGICAL HISTORY:  Past Medical History:   Diagnosis Date    Arthritis     Basal cell carcinoma     Bronchitis     seasonal    Cataract     Diverticulitis     Dry eye syndrome     GERD (gastroesophageal reflux disease)     Hyperlipidemia     Hypertension     Hypothyroidism 07/19/2012    Obese     PONV (postoperative nausea and vomiting)     Thyroid disease      Past Surgical History:   Procedure Laterality Date    ARTHROSCOPIC REPAIR OF ROTATOR CUFF OF SHOULDER Right 9/23/2020    Procedure: REPAIR, ROTATOR CUFF, ARTHROSCOPIC;  Surgeon: Reginald Pickens MD;  Location: [facility];  Service: Orthopedics;  Laterality: Right;  regional w/catheter (interscalene)    BACK SURGERY      CATARACT EXTRACTION W/  INTRAOCULAR LENS IMPLANT Left 10/20/2021        CHOLECYSTECTOMY      COLONOSCOPY  2007    diverticulosis    COLONOSCOPY N/A 9/3/2020    Procedure: COLONOSCOPY;  Surgeon: Alberta MELGOZA  MD [place];  Location: [facility] ENDO (4TH FLR);  Service: Colon and Rectal;  Laterality: N/A;  pt requested this time-8/31-covid-uc [place]-tb    CYSTOSCOPY      DE QUERVAIN'S RELEASE Left 03/2017    FIXATION OF TENDON Right 9/23/2020    Procedure: FIXATION, TENDON;  Surgeon: Reginald Pickens MD;  Location: [facility] OR;  Service: Orthopedics;  Laterality: Right;    HERNIA REPAIR      HYSTERECTOMY      INJECTION OF STEROID Right 12/2/2021    Procedure: INJECTION, STEROID;  Surgeon: Suzy Dominguez MD;  Location: [facility] OR;  Service: Orthopedics;  Laterality: Right;    INTRAOCULAR PROSTHESES INSERTION Left 10/20/2021    Procedure: INSERTION, IOL PROSTHESIS;  Surgeon: Pippa Ashby MD;  Location: [facility] OR 1ST FLR;  Service: Ophthalmology;  Laterality: Left;    INTRAOCULAR PROSTHESES INSERTION Right 12/29/2021    Procedure: INSERTION, IOL PROSTHESIS;  Surgeon: Pippa Ashby MD;  Location: [facility] OR 1ST FLR;  Service: Ophthalmology;  Laterality: Right;    NASAL SEPTUM SURGERY      PHACOEMULSIFICATION OF CATARACT Left 10/20/2021    Procedure: PHACOEMULSIFICATION, CATARACT/ COMPLEX- PHACO / IOL - OS SMALL PUPIL-OLE RING AND TRYPAN BLUE;  Surgeon: Pippa Ashby MD;  Location: [facility] OR 1ST FLR;  Service: Ophthalmology;  Laterality: Left;    PHACOEMULSIFICATION OF CATARACT Right 12/29/2021    Procedure: PHACOEMULSIFICATION, CATARACT;  Surgeon: Pippa Ashby MD;  Location: [facility] OR 1ST FLR;  Service: Ophthalmology;  Laterality: Right;    SHOULDER SURGERY      TONSILLECTOMY      TRIGGER FINGER RELEASE Left 12/2/2021    Procedure: RELEASE, TRIGGER FINGER;  Surgeon: Suzy Dominguez MD;  Location: [facility] OR;  Service: Orthopedics;  Laterality: Left;       Current Medications:   Current Outpatient Medications:     albuterol (PROVENTIL/VENTOLIN HFA) 90 mcg/actuation inhaler, Inhale 2 puffs into the lungs every 6 (six) hours as needed for Wheezing., Disp: 6.7 g, Rfl: 11    amoxicillin-clavulanate 875-125mg  (AUGMENTIN) 875-125 mg per tablet, Take 1 tablet by mouth 2 (two) times daily., Disp: 20 tablet, Rfl: 0    celecoxib (CELEBREX) 200 MG capsule, Take 1 capsule (200 mg total) by mouth once daily. Do not take more than 15 consecutive days. Take w food and water, Disp: 30 capsule, Rfl: 11    cranberry fruit extract (CRANBERRY EXTRACT ORAL), Take by mouth., Disp: , Rfl:     diclofenac sodium (VOLTAREN) 1 % Gel, Apply 2 g topically 2 (two) times daily as needed (musculoskeletal pain)., Disp: 100 g, Rfl: 11    dicyclomine (BENTYL) 20 mg tablet, Take 1 tablet (20 mg total) by mouth 4 (four) times daily before meals and nightly., Disp: 120 tablet, Rfl: 11    estradioL (ESTRACE) 0.01 % (0.1 mg/gram) vaginal cream, Place 0.5-1 g vaginally twice a week., Disp: 42.5 g, Rfl: 3    fluocinonide (LIDEX) 0.05 % external solution, Apply topically once daily., Disp: 60 mL, Rfl: 11    HYDROcodone-acetaminophen (NORCO) 7.5-325 mg per tablet, Take 1 tablet by mouth every 6 (six) hours as needed for Pain., Disp: 28 tablet, Rfl: 0    ketoconazole (NIZORAL) 2 % shampoo, Apply topically twice a week., Disp: 120 mL, Rfl: 11    levothyroxine (SYNTHROID) 75 MCG tablet, TAKE ONE TABLET BY MOUTH EVERY DAY ON AN EMPTY STOMACH, Disp: 90 tablet, Rfl: 3    LIDOcaine-prilocaine (EMLA) cream, Apply topically 2 (two) times daily as needed. To hands., Disp: 30 g, Rfl: 2    losartan (COZAAR) 100 MG tablet, Take 1 tablet (100 mg total) by mouth once daily., Disp: 30 tablet, Rfl: 11    MAGNESIUM ORAL, Take 1 tablet by mouth once daily., Disp: , Rfl:     memantine (NAMENDA) 5 MG Tab, Take 1 tablet (5 mg total) by mouth 2 (two) times daily., Disp: 60 tablet, Rfl: 11    Multi-Vitamin tablet, Take 1 tablet by mouth once daily. , Disp: , Rfl:     nitrofurantoin (MACRODANTIN) 50 MG capsule, Take 1 capsule (50 mg total) by mouth every morning., Disp: 30 capsule, Rfl: 11    omeprazole (PRILOSEC) 40 MG capsule, Take 1 capsule (40 mg total) by mouth once daily.,  Disp: 30 capsule, Rfl: 11    oxybutynin (DITROPAN-XL) 10 MG 24 hr tablet, Take 1 tablet (10 mg total) by mouth once daily., Disp: 30 tablet, Rfl: 11    PSYLLIUM SEED, WITH SUGAR, (METAMUCIL ORAL), Take by mouth as needed. , Disp: , Rfl:     RESTASIS 0.05 % ophthalmic emulsion, INSTILL ONE DROP IN EACH EYE TWO TIMES A DAY, Disp: 60 each, Rfl: 0    rosuvastatin (CRESTOR) 40 MG Tab, TAKE ONE TABLET BY MOUTH EVERY DAY, Disp: 90 tablet, Rfl: 3    traZODone (DESYREL) 50 MG tablet, 1/2 tab up to 2 tabs po qhs prn insomnia, Disp: 30 tablet, Rfl: 11    triamcinolone acetonide 0.1% (KENALOG) 0.1 % cream, AAA bid, Disp: 60 g, Rfl: 3    methocarbamoL (ROBAXIN) 500 MG Tab, Take 1 tablet (500 mg total) by mouth 3 (three) times daily., Disp: 60 tablet, Rfl: 1    Social History:   Social History     Socioeconomic History    Marital status:    Tobacco Use    Smoking status: Never    Smokeless tobacco: Never   Substance and Sexual Activity    Alcohol use: No     Comment: rare on a special occasion    Drug use: No    Sexual activity: Never   Social History Narrative    , 3 children, nonsmoker ,     Social Determinants of Health     Financial Resource Strain: Low Risk     Difficulty of Paying Living Expenses: Not hard at all   Food Insecurity: No Food Insecurity    Worried About Running Out of Food in the Last Year: Never true    Ran Out of Food in the Last Year: Never true   Transportation Needs: No Transportation Needs    Lack of Transportation (Medical): No    Lack of Transportation (Non-Medical): No   Physical Activity: Inactive    Days of Exercise per Week: 0 days    Minutes of Exercise per Session: 0 min   Stress: Stress Concern Present    Feeling of Stress : To some extent   Social Connections: Unknown    Frequency of Communication with Friends and Family: Once a week    Frequency of Social Gatherings with Friends and Family: Once a week    Active Member of Clubs or Organizations: Yes    Attends Club or  "Organization Meetings: More than 4 times per year    Marital Status:    Housing Stability: Low Risk     Unable to Pay for Housing in the Last Year: No    Number of Places Lived in the Last Year: 1    Unstable Housing in the Last Year: No       REVIEW OF SYSTEMS:  Constitution: Negative. Negative for chills, fever and night sweats.   Cardiovascular: Negative for chest pain and syncope.   Respiratory: Negative for cough and shortness of breath.   Gastrointestinal: See HPI. Negative for nausea/vomiting. Negative for abdominal pain.  Genitourinary: See HPI. Negative for discoloration or dysuria.  Hematologic/Lymphatic: negative for bleeding/clotting disorders.   Musculoskeletal: Negative for falls and muscle weakness.   Neurological: See HPI. no history of seizures. no history of cranial surgery or shunts.  Neurological: See HPI. No seizures.   Endocrine: Negative for polydipsia, polyphagia and polyuria.   Allergic/Immunologic: Negative for hives and persistent infections.     EXAM:  Ht 5' 1" (1.549 m)   Wt 63.3 kg (139 lb 7.1 oz)   BMI 26.35 kg/m²     PHYSICAL EXAMINATION:    General: The patient is a 72 y.o. female in no apparent distress, the patient is orientatied to person, place and time.  Psych: Normal mood and affect  HEENT: Vision grossly intact, hearing intact to the spoken word.  Lungs: Respirations unlabored.  Gait: Normal station and gait, no difficulty with toe or heel walk.   Skin: Dorsal lumbar skin negative for rashes, lesions, hairy patches and surgical scars. There is lower lumbar tenderness to palpation.  Range of motion: Lumbar range of motion is acceptable.  Spinal Balance: Global saggital and coronal spinal balance acceptable, no significant for scoliosis and kyphosis.  Musculoskeletal: No pain with the range of motion of the bilateral hips. No trochanteric tenderness to palpation.  Vascular: Bilateral lower extremities warm and well perfused, Dorsalis pedis pulses 2+ " bilaterally.  Neurological: Normal strength and tone in all major motor groups in the bilateral lower extremities. Normal sensation to light touch in the L2-S1 dermatomes bilaterally.  Deep tendon reflexes symmetric 2+ in the bilateral lower extremities.  Negative Babinski bilaterally. Straight leg raise negative bilaterally.    IMAGING:   Today I independently reviewed the following images and my interpretations are as follows:    AP, Lat and Flex/Ex  upright L-spine demonstrate degen scoliosis. Lumbar spondylosis and DDD without listhesis.      DEXA in 2019 showed lumbar T-score of 0.8.    Body mass index is 26.35 kg/m².  Hemoglobin A1C   Date Value Ref Range Status   11/14/2022 5.5 4.0 - 5.6 % Final     Comment:     ADA Screening Guidelines:  5.7-6.4%  Consistent with prediabetes  >or=6.5%  Consistent with diabetes    High levels of fetal hemoglobin interfere with the HbA1C  assay. Heterozygous hemoglobin variants (HbS, HgC, etc)do  not significantly interfere with this assay.   However, presence of multiple variants may affect accuracy.         ASSESSMENT/PLAN:    Jackelyn was seen today for pain.    Diagnoses and all orders for this visit:    Lumbar spondylosis  -     methocarbamoL (ROBAXIN) 500 MG Tab; Take 1 tablet (500 mg total) by mouth 3 (three) times daily.  -     Ambulatory referral/consult to Physical/Occupational Therapy; Future    DDD (degenerative disc disease), lumbar      Follow up if symptoms worsen or fail to improve.    Patient has lumbar spondylosis and RLE radiculopathy. I discussed the natural history of their diagnoses as well as surgical and nonsurgical treatment options. I educated the patient on the importance of core/back strengthening, correct posture, bending/lifting ergonomics, and low-impact aerobic exercises (walking, elliptical, and aquatherapy). I prescribed robaxin. Continue medications. I will refer the patient to PT for core/back strengthening. Patient will follow up PRN. Next  step is a lumbar MRI.    Norman Thakur MD  Orthopaedic Spine Surgeon  Department of Orthopaedic Surgery  [phone]

## 2023-03-10 ENCOUNTER — CLINICAL SUPPORT (OUTPATIENT)
Dept: REHABILITATION | Facility: HOSPITAL | Age: 73
End: 2023-03-10
Attending: ORTHOPAEDIC SURGERY
Payer: MEDICARE

## 2023-03-10 DIAGNOSIS — M47.816 LUMBAR SPONDYLOSIS: ICD-10-CM

## 2023-03-10 DIAGNOSIS — M54.50 CHRONIC BILATERAL LOW BACK PAIN, UNSPECIFIED WHETHER SCIATICA PRESENT: ICD-10-CM

## 2023-03-10 DIAGNOSIS — G89.29 CHRONIC BILATERAL LOW BACK PAIN, UNSPECIFIED WHETHER SCIATICA PRESENT: ICD-10-CM

## 2023-03-10 PROCEDURE — 97161 PT EVAL LOW COMPLEX 20 MIN: CPT | Mod: PO

## 2023-03-10 PROCEDURE — 97110 THERAPEUTIC EXERCISES: CPT | Mod: PO

## 2023-03-10 NOTE — PROGRESS NOTES
[facility]   Physical Therapy Initial Evaluation       Name: Jackelyn Kendrick  Clinic Number: [id number]    Therapy Diagnosis:   Encounter Diagnoses   Name Primary?    Lumbar spondylosis     Chronic bilateral low back pain, unspecified whether sciatica present         Physician: Norman Thakur MD    Physician Orders: PT Eval and Treat   Medical Diagnosis from Referral: Lumbar spondylosis  Evaluation Date: 3/10/2023  Authorization Period Expiration: 3/5/2024  Plan of Care Expiration: 5/10/2023  Progress Note Due: 4/10/2023  Visit # / Visits authorized: 1/ 1   FOTO: 1/3    Precautions: Standard and cancer     Time In: 2:30  Time Out: 3:15  Total Appointment Time (timed & untimed codes): 45 minutes      SUBJECTIVE     Date of onset: Pt reports that her back flared up 2/3/23 and put her in bed 4 days. Pain occurred stepping off the driveway with one foot into the grass. The pain was on the L side of her back and standing increases her pain.  On the 5th day she was feeling a little better.  Transferring from it to stand also increases her pain.    History of current condition - Jackelyn Kendrick reports: Chronic back pain on the R side of her back.  She has been limiting lifting and prolonged standing - 15 to 20 minutes.    Falls: none    Imaging: X-Ray:     Prior Therapy: yes  Social History: Pt lives with her  in a single story home   Occupation: retired  Prior Level of Function: chronic R side lower back pain with limitations as above  Current Level of Function: increased pain with sit to stand    Pain:  Current 5/10, worst 9/10, best 4/10   Location: bilateral back  L side is new  Description: Aching and Sharp  Aggravating Factors: sit to stand, standing  Easing Factors: pain medication, heating pad, and rest    Patients goals: to get her pain to a manageable level and be able to do some gardening.     Medical History:   Past Medical History:   Diagnosis Date    Arthritis      Basal cell carcinoma     Bronchitis     seasonal    Cataract     Diverticulitis     Dry eye syndrome     GERD (gastroesophageal reflux disease)     Hyperlipidemia     Hypertension     Hypothyroidism 07/19/2012    Obese     PONV (postoperative nausea and vomiting)     Thyroid disease        Surgical History:   Jackelyn Kendrick  has a past surgical history that includes Colonoscopy (2007); Nasal septum surgery; Shoulder surgery; Hysterectomy; Cholecystectomy; Hernia repair; Tonsillectomy; Back surgery; De Quervain's release (Left, 03/2017); Colonoscopy (N/A, 9/3/2020); Arthroscopic repair of rotator cuff of shoulder (Right, 9/23/2020); Fixation of tendon (Right, 9/23/2020); Phacoemulsification of cataract (Left, 10/20/2021); Intraocular prosthesis insertion (Left, 10/20/2021); Cataract extraction w/  intraocular lens implant (Left, 10/20/2021); Trigger finger release (Left, 12/2/2021); Injection of steroid (Right, 12/2/2021); Phacoemulsification of cataract (Right, 12/29/2021); Intraocular prosthesis insertion (Right, 12/29/2021); and Cystoscopy.    Medications:   Jackelyn has a current medication list which includes the following prescription(s): albuterol, amoxicillin-clavulanate 875-125mg, celecoxib, cranberry fruit extract, diclofenac sodium, dicyclomine, estradiol, fluocinonide, hydrocodone-acetaminophen, ketoconazole, levothyroxine, lidocaine-prilocaine, losartan, magnesium, memantine, methocarbamol, multi-vitamin, nitrofurantoin, omeprazole, oxybutynin, psyllium husk, restasis, rosuvastatin, trazodone, and triamcinolone acetonide 0.1%.    Allergies:   Review of patient's allergies indicates:   Allergen Reactions    Levaquin [levofloxacin] Swelling and Edema    Erythromycin (bulk) Nausea And Vomiting          OBJECTIVE     Observation: Pt had difficulty moving from sit to  the waiting room, but was able to ambulate into the clinic independently    Posture:  R side of pelvis elevated     Lumbar Range of  Motion: Too painful to test   Degrees Pain   Flexion nt   nt        Extension nt   nt        Left Side Bending nt nt        Right Side Bending nt nt        Left rotation   nt nt        Right Rotation   nt nt             Lower Extremity Strength too painful to test  Right LE  Left LE    Knee extension: nt Knee extension: nt   Knee flexion: nt Knee flexion: nt   Hip flexion: nt Hip flexion: nt   Hip extension:  nt Hip extension: nt   Hip abduction: nt Hip abduction: nt   Hip adduction: nt Hip adduction nt   Ankle dorsiflexion: nt Ankle dorsiflexion: nt   Ankle plantarflexion: nt Ankle plantarflexion: nt         Special Tests:  -Repeated Flexion: nt  -Repeated Ext: nt  -Piriformis Test: nt  -Prone Instability Test: nt  -Bridge Test: nt  -OH Squat: nt    DTR:      Neuro Dynamic Testing:    Sciatic nerve:      SLR: R = nt     L = nt       Femoral Nerve:    Femoral nerve test: nt      Joint Mobility: L SI joint hypomobile    Palpation: tender to palpation over L PSIS    Sensation: intact    Flexibility:    Ely's test: R = nt degrees ; L = nt degrees   Popliteal Angle: R = 75 degrees ; L = 60 degrees   Lidia's test: R = nt ; L = nt   Silverio test: R = nt ; L = nt       Limitation/Restriction for FOTO  Survey    Therapist reviewed FOTO scores for Jackelyn Kendrick on 3/10/2023.   FOTO documents entered into [employer] - see Media section.    Limitation Score: %         TREATMENT     Total Treatment time (time-based codes) separate from Evaluation: 8 minutes      Jackelyn KENDALLGordy Kendrick received the treatments listed below:      therapeutic exercises to develop core stabilization for 08 minutes including:  Abdominal bracing  Gluteal sets      PATIENT EDUCATION AND HOME EXERCISES     Education provided:   - Role of Physical Therapy    Written Home Exercises Provided: yes. Exercises were reviewed and Jackelyn Kendrick was able to demonstrate them prior to the end of the session.  Jackelyn Kendrick demonstrated fair  understanding of  the education provided. See EMR under Patient Instructions for exercises provided during therapy sessions.    ASSESSMENT     Jackelyn is a 72 y.o. female referred to outpatient Physical Therapy with a medical diagnosis of Lumbar spondylosis. Patient presents with significant pain in her low back and a scoliotic posture with R side of pelvis elevated.  Pt reports significant pain in the L side of her back today.  She is having trouble finding a comfortable position.  She is able to contract her abdominal muscles to give some support to her lumbar spine, but is not able to engage her gluteal muscles adequately to give much support to her back.    Patient prognosis is Good.   Patient will benefit from skilled outpatient Physical Therapy to address the deficits stated above and in the chart below, provide patient /family education, and to maximize patientt's level of independence.     Plan of care discussed with patient: Yes  Patient's spiritual, cultural and educational needs considered and patient is agreeable to the plan of care and goals as stated below:     Anticipated Barriers for therapy: scheduling    Medical Necessity is demonstrated by the following  History  Co-morbidities and personal factors that may impact the plan of care Co-morbidities:   advanced age, history of cancer, HTN, and prior lumbar surgery    Personal Factors:   age     low   Examination  Body Structures and Functions, activity limitations and participation restrictions that may impact the plan of care Body Regions:   back  lower extremities  trunk    Body Systems:    gross symmetry  ROM  strength  gait  transfers    Participation Restrictions:   Sit to stand transfers and standing    Activity limitations:   Learning and applying knowledge  no deficits    General Tasks and Commands  no deficits    Communication  no deficits    Mobility  lifting and carrying objects  walking  moving around using equipment (WC)    Self care  washing oneself  (bathing, drying, washing hands)  caring for body parts (brushing teeth, shaving, grooming)  toileting  dressing    Domestic Life  shopping  cooking  doing house work (cleaning house, washing dishes, laundry)    Interactions/Relationships  no deficits    Life Areas  employment    Community and Social Life  community life  recreation and leisure         low   Clinical Presentation evolving clinical presentation with changing clinical characteristics moderate   Decision Making/ Complexity Score: low     Goals:  Short Term Goals: 4 weeks   Pt will be instructed in an exercise program to address functional deficits related to her lower back Sx  Pt will report decreased low back pain to </= 6/10 at worst with standing and walking  Pt will be compliant with her Physical Therapy treatments  Improve B hamstring flexibility by >/= 5 degrees  Pt will improve her ability to engage her gluteal and abdominal muscles to assist in supporting her lumbar spine.    Long Term Goals: 8 weeks   Pt will be independent in a HEP to assist in managing her low back Sx.  Pt will report decreased low back pain to </= 3/10 at worst with standing and walking  Pt will be compliant with her HEP  Improve B hamstring flexibility by >/= 10 degrees  Pt will report improved functional ability through an improved score on the FOTO lumbar spine survey.    PLAN   Plan of care Certification: 3/10/2023 to 5/10/2023.    Outpatient Physical Therapy 2 times weekly for 8 weeks to include the following interventions: Cervical/Lumbar Traction, Electrical Stimulation as indicated , Gait Training, Manual Therapy, Moist Heat/ Ice, Neuromuscular Re-ed, Orthotic Management and Training, Patient Education, Self Care, Therapeutic Activities, Therapeutic Exercise, and Dry needling .     Fermin Frias, PT      I CERTIFY THE NEED FOR THESE SERVICES FURNISHED UNDER THIS PLAN OF TREATMENT AND WHILE UNDER MY CARE   Physician's comments:     Physician's Signature:  ___________________________________________________

## 2023-03-13 ENCOUNTER — OFFICE VISIT (OUTPATIENT)
Dept: INTERNAL MEDICINE | Facility: CLINIC | Age: 73
End: 2023-03-13
Payer: MEDICARE

## 2023-03-13 VITALS
HEART RATE: 79 BPM | RESPIRATION RATE: 15 BRPM | WEIGHT: 138.75 LBS | OXYGEN SATURATION: 96 % | HEIGHT: 61 IN | SYSTOLIC BLOOD PRESSURE: 130 MMHG | DIASTOLIC BLOOD PRESSURE: 72 MMHG | BODY MASS INDEX: 26.2 KG/M2

## 2023-03-13 DIAGNOSIS — Z13.820 SCREENING FOR OSTEOPOROSIS: ICD-10-CM

## 2023-03-13 DIAGNOSIS — I10 ESSENTIAL HYPERTENSION: ICD-10-CM

## 2023-03-13 DIAGNOSIS — Z78.0 MENOPAUSE: ICD-10-CM

## 2023-03-13 DIAGNOSIS — K21.9 GASTROESOPHAGEAL REFLUX DISEASE, UNSPECIFIED WHETHER ESOPHAGITIS PRESENT: ICD-10-CM

## 2023-03-13 DIAGNOSIS — Z00.00 ENCOUNTER FOR PREVENTIVE HEALTH EXAMINATION: Primary | ICD-10-CM

## 2023-03-13 DIAGNOSIS — E03.9 ACQUIRED HYPOTHYROIDISM: ICD-10-CM

## 2023-03-13 DIAGNOSIS — R26.9 ABNORMALITY OF GAIT AND MOBILITY: ICD-10-CM

## 2023-03-13 DIAGNOSIS — I70.0 ATHEROSCLEROSIS OF AORTA: ICD-10-CM

## 2023-03-13 DIAGNOSIS — N39.46 URINARY INCONTINENCE, MIXED: ICD-10-CM

## 2023-03-13 DIAGNOSIS — Z13.5 SCREENING FOR EYE CONDITION: ICD-10-CM

## 2023-03-13 DIAGNOSIS — E78.2 MIXED HYPERLIPIDEMIA: ICD-10-CM

## 2023-03-13 DIAGNOSIS — M15.9 PRIMARY OSTEOARTHRITIS INVOLVING MULTIPLE JOINTS: ICD-10-CM

## 2023-03-13 PROBLEM — R29.898 DECREASED ROM OF NECK: Status: RESOLVED | Noted: 2022-12-09 | Resolved: 2023-03-13

## 2023-03-13 PROBLEM — M62.81 MUSCLE WEAKNESS OF LEFT UPPER EXTREMITY: Status: RESOLVED | Noted: 2022-12-09 | Resolved: 2023-03-13

## 2023-03-13 PROBLEM — L21.9 SEBORRHEIC DERMATITIS: Status: RESOLVED | Noted: 2021-11-01 | Resolved: 2023-03-13

## 2023-03-13 PROCEDURE — 99215 OFFICE O/P EST HI 40 MIN: CPT | Mod: PBBFAC,PO | Performed by: NURSE PRACTITIONER

## 2023-03-13 PROCEDURE — 99999 PR PBB SHADOW E&M-EST. PATIENT-LVL V: CPT | Mod: PBBFAC,,,

## 2023-03-13 PROCEDURE — G0439 PR MEDICARE ANNUAL WELLNESS SUBSEQUENT VISIT: ICD-10-PCS | Mod: ,,, | Performed by: INTERNAL MEDICINE

## 2023-03-13 PROCEDURE — 99999 PR PBB SHADOW E&M-EST. PATIENT-LVL V: ICD-10-PCS | Mod: PBBFAC,,,

## 2023-03-13 PROCEDURE — G0439 PPPS, SUBSEQ VISIT: HCPCS | Mod: ,,, | Performed by: INTERNAL MEDICINE

## 2023-03-13 NOTE — PROGRESS NOTES
"Jackelyn Kendrick presented for a  Medicare AWV and comprehensive Health Risk Assessment today. The following components were reviewed and updated:    Medical history  Family History  Social history  Allergies and Current Medications  Health Risk Assessment  Health Maintenance  Care Team     ** See Completed Assessments for Annual Wellness Visit within the encounter summary.**       The following assessments were completed:  Living Situation  CAGE  Depression Screening  Timed Get Up and Go  Whisper Test  Cognitive Function Screening  Nutrition Screening  ADL Screening  PAQ Screening      Vitals:    03/13/23 1429   BP: 130/72   BP Location: Left arm   Patient Position: Sitting   Pulse: 79   Resp: 15   SpO2: 96%   Weight: 62.9 kg (138 lb 12.5 oz)   Height: 5' 1" (1.549 m)     Body mass index is 26.22 kg/m².  Physical Exam  Constitutional:       Comments: Younger in appearance than age   HENT:      Right Ear: There is no impacted cerumen.      Left Ear: There is no impacted cerumen.   Eyes:      General: No scleral icterus.  Cardiovascular:      Rate and Rhythm: Normal rate and regular rhythm.   Pulmonary:      Effort: Pulmonary effort is normal.      Breath sounds: Normal breath sounds.   Abdominal:      Palpations: Abdomen is soft.   Musculoskeletal:         General: No swelling. Normal range of motion.   Skin:     General: Skin is warm and dry.   Neurological:      Mental Status: She is alert and oriented to person, place, and time.   Psychiatric:         Mood and Affect: Mood normal.         Behavior: Behavior normal.         Thought Content: Thought content normal.         Judgment: Judgment normal.          Medication review & Opioid screening perform during rooming section. Norco on list. Not using.  Review for substance use disorder screening performed during rooming section. No substance abuse noted on screening.      Diagnoses and health risks identified today and associated recommendations/orders:    1. Mixed " hyperlipidemia  Stable on regimen & followed by PCP    2. Primary osteoarthritis involving multiple joints  Stable on regimen & followed by orthopedics    3. Urinary incontinence, mixed  Stable on regimen & followed by urology    4. Gastroesophageal reflux disease, unspecified whether esophagitis present  Stable on regimen & followed by PCP    5. Acquired hypothyroidism  Stable on regimen & followed by PCP    6. Atherosclerosis of aorta  Stable on regimen & followed by PCP    7. Essential hypertension  Stable on regimen & followed by PCP    8. Abnormality of gait and mobility  Stable on regimen & followed by PCP    9. Encounter for preventive health examination  Here for Health Risk Assessment/Annual Wellness Visit.  Health maintenance reviewed and updated. Follow up in one year.      10. Screening for osteoporosis  - DXA Bone Density Axial Skeleton 1 or more sites; Future    11. Menopause   DXA Bone Density Axial Skeleton 1 or more sites; Future    12. Screening for eye condition  - Ambulatory referral/consult to Ophthalmology; Future      Provided Jackelyn with a 5-10 year written screening schedule and personal prevention plan. Recommendations were developed using the USPSTF age appropriate recommendations. Education, counseling, and referrals were provided as needed. After Visit Summary printed and given to patient which includes a list of additional screenings\tests needed. DXA ordered. Receiving PT for back. Audiology referral to check hearing.  Carol Ann Santiago NP I offered to discuss advanced care planning, including how to pick a person who would make decisions for you if you were unable to make them for yourself, called a health care power of , and what kind of decisions you might make such as use of life sustaining treatments such as ventilators and tube feeding when faced with a life limiting illness recorded on a living will that they will need to know. (How you want to be cared for as you near  the end of your natural life)     X Patient is interested in learning more about how to make advanced directives.  I provided them paperwork and offered to discuss this with them.

## 2023-03-13 NOTE — PATIENT INSTRUCTIONS
Counseling and Referral of Other Preventative  (Italic type indicates deductible and co-insurance are waived)    Patient Name: Jackelyn Kendrick  Today's Date: 3/13/2023    Health Maintenance         Date Due Completion Date    COVID-19 Vaccine (3 - Booster) declined 12/7/2021    DEXA Scan ordered 8/23/2019    Mammogram 01/10/2024 1/10/2023    High Dose Statin 03/06/2024 3/6/2023    Lipid Panel 11/14/2023 11/14/2022    TETANUS VACCINE 08/19/2029 8/19/2019    Colorectal Cancer Screening    Prevention of falls- handouts 09/03/2030 9/3/2020          No orders of the defined types were placed in this encounter.    The following information is provided to all patients.  This information is to help you find resources for any of the problems found today that may be affecting your health:                Living healthy guide: www.[facility].[place].gov      Understanding Diabetes: www.diabetes.org      Eating healthy: www.cdc.gov/healthyweight      [facility] home safety checklist: www.cdc.gov/steadi/patient.html      Agency on Aging: www.goea.[place].[facility]      Alcoholics anonymous (AA): www.aa.org      Physical Activity: www.gerri.nih.gov/ce2fknv      Tobacco use: www.quitwithusla.org

## 2023-03-16 ENCOUNTER — CLINICAL SUPPORT (OUTPATIENT)
Dept: REHABILITATION | Facility: HOSPITAL | Age: 73
End: 2023-03-16
Attending: ORTHOPAEDIC SURGERY
Payer: MEDICARE

## 2023-03-16 DIAGNOSIS — M54.50 CHRONIC BILATERAL LOW BACK PAIN, UNSPECIFIED WHETHER SCIATICA PRESENT: Primary | ICD-10-CM

## 2023-03-16 DIAGNOSIS — G89.29 CHRONIC BILATERAL LOW BACK PAIN, UNSPECIFIED WHETHER SCIATICA PRESENT: Primary | ICD-10-CM

## 2023-03-16 PROCEDURE — 97530 THERAPEUTIC ACTIVITIES: CPT | Mod: PO

## 2023-03-16 PROCEDURE — 97110 THERAPEUTIC EXERCISES: CPT | Mod: PO

## 2023-03-16 NOTE — PROGRESS NOTES
[facility]   Physical Therapy Treatment Note     Name: Jackelyn Kendrick  Clinic Number: [id number]    Therapy Diagnosis:   Encounter Diagnosis   Name Primary?    Chronic bilateral low back pain, unspecified whether sciatica present Yes     Physician: Reginald Pickens MD    Visit Date: 3/16/2023      Physician Orders: PT Eval and Treat   Medical Diagnosis from Referral: Lumbar spondylosis  Evaluation Date: 3/10/2023  Authorization Period Expiration: 3/5/2024  Plan of Care Expiration: 5/10/2023  Progress Note Due: 4/10/2023  Visit # / Visits authorized: 1/ 1   FOTO: 1/3     Precautions: Standard and cancer   PTA Visit #: 0/5     Time In: 1:15  Time Out: 2:00  Total Billable Time: 45 minutes    SUBJECTIVE     Pt reports: that she is feeling better today.  She received an injection in the L shoulder prior to her last PT visit tSx.hat may have also helped her back   She was compliant with home exercise program.  Response to previous treatment: pt was sore following Tx  Functional change: pt is doing better - at her base line level of pain    Pain: 2/10  Location: right back      OBJECTIVE     Objective Measures updated at progress report unless specified.     Observation: Pt had difficulty moving from sit to  the waiting room, but was able to ambulate into the clinic independently    Posture:  R side of pelvis elevated     Lumbar Range of Motion: Too painful to test   % Pain   Flexion nt   nt        Extension nt   nt        Left Side Bending 50 yes        Right Side Bending 50 yes        Left rotation   75 Yes on the R        Right Rotation   90 Pull on L             Lower Extremity Strength too painful to test  Right LE  Left LE    Knee extension: 4/5 Knee extension: 4+/5   Knee flexion: 3/5 Knee flexion: 3+/5   Hip flexion: 2/5* Hip flexion: nt   Hip extension:  nt Hip extension: nt   Hip abduction: nt Hip abduction: nt   Hip adduction: nt Hip adduction nt   Ankle dorsiflexion:  5/5 Ankle dorsiflexion: 5/5   Ankle plantarflexion: 4/5 Ankle plantarflexion: 4/5         Special Tests:  -Repeated Flexion: nt  -Repeated Ext: nt  -Piriformis Test: nt  -Prone Instability Test: nt  -Bridge Test: nt  -OH Squat: nt    DTR:      Neuro Dynamic Testing:    Sciatic nerve:      SLR: R = nt     L = nt       Femoral Nerve:    Femoral nerve test: nt      Joint Mobility: L SI joint hypomobile    Palpation: tender to palpation over L PSIS    Sensation: intact    Flexibility:    Ely's test: R = nt degrees ; L = nt degrees   Popliteal Angle: R = 75 degrees ; L = 60 degrees   Lidia's test: R = nt ; L = nt   Silverio test: R = nt ; L = nt       Limitation/Restriction for FOTO  Survey    Therapist reviewed FOTO scores for Jackelyn Kendrick on 3/10/2023.   FOTO documents entered into [employer] - see Media section.    Limitation Score: %         Treatment   Objective measures = 25 min  Jackelyn Kendrick received the treatments listed below:      therapeutic exercises to develop core stabilization for 20 minutes including:  Abdominal bracing 10 x 2 with 5 sec hold  Gluteal sets in standing 10 x 2 with 5 sec hold  Hook lying B hip abduction with a green band with gluteal sets 10 x 2 with 5 second hold  Hook lying B hip adduction with a ball with gluteal sets 10 x 2 with 5 second hold        Patient Education and Home Exercises     Home Exercises Provided and Patient Education Provided     Education provided:   - hook lying B hip abduction and adduction     Written Home Exercises Provided: yes. Exercises were reviewed and Jackelyn Kendrick was able to demonstrate them prior to the end of the session.  Jackelyn Kendrick demonstrated good  understanding of the education provided. See EMR under Patient Instructions for exercises provided during therapy sessions    ASSESSMENT     Ms. Kendrick presents with a decrease in her low back pain today.  She may have gotten some relief from the injection she received in her shoulder  last week.  She was better able to tolerate an evaluation of her lower back and B lower extremities.  Progress core stabilization exercise with emphasis on gluteal engagement to help support her lumbar spine.    Jackelyn Kendrick Is progressing well towards her goals.   Pt prognosis is Good.     Pt will continue to benefit from skilled outpatient physical therapy to address the deficits listed in the problem list box on initial evaluation, provide pt/family education and to maximize pt's level of independence in the home and community environment.     Pt's spiritual, cultural and educational needs considered and pt agreeable to plan of care and goals.     Anticipated barriers to physical therapy: none    Goals:   Short Term Goals: 4 weeks   Pt will be instructed in an exercise program to address functional deficits related to her lower back Sx  Pt will report decreased low back pain to </= 6/10 at worst with standing and walking  Pt will be compliant with her Physical Therapy treatments  Improve B hamstring flexibility by >/= 5 degrees  Pt will improve her ability to engage her gluteal and abdominal muscles to assist in supporting her lumbar spine.     Long Term Goals: 8 weeks   Pt will be independent in a HEP to assist in managing her low back Sx.  Pt will report decreased low back pain to </= 3/10 at worst with standing and walking  Pt will be compliant with her HEP  Improve B hamstring flexibility by >/= 10 degrees  Pt will report improved functional ability through an improved score on the FOTO lumbar spine survey.  PLAN     Progress physical therapy treatments to assist Pt with managing her lower back Sx.    Fermin Frias, PT

## 2023-03-21 ENCOUNTER — CLINICAL SUPPORT (OUTPATIENT)
Dept: REHABILITATION | Facility: HOSPITAL | Age: 73
End: 2023-03-21
Attending: ORTHOPAEDIC SURGERY
Payer: MEDICARE

## 2023-03-21 DIAGNOSIS — M54.50 CHRONIC BILATERAL LOW BACK PAIN, UNSPECIFIED WHETHER SCIATICA PRESENT: Primary | ICD-10-CM

## 2023-03-21 DIAGNOSIS — G89.29 CHRONIC BILATERAL LOW BACK PAIN, UNSPECIFIED WHETHER SCIATICA PRESENT: Primary | ICD-10-CM

## 2023-03-21 PROCEDURE — 97110 THERAPEUTIC EXERCISES: CPT | Mod: PO,CQ

## 2023-03-21 PROCEDURE — 97140 MANUAL THERAPY 1/> REGIONS: CPT | Mod: PO,CQ

## 2023-03-21 NOTE — PROGRESS NOTES
[facility]   Physical Therapy Treatment Note     Name: Jackelyn Kendrick  Clinic Number: [id number]    Therapy Diagnosis:   Encounter Diagnosis   Name Primary?    Chronic bilateral low back pain, unspecified whether sciatica present Yes       Physician: Reginald Pickens MD    Visit Date: 3/21/2023      Physician Orders: PT Eval and Treat   Medical Diagnosis from Referral: Lumbar spondylosis  Evaluation Date: 3/10/2023  Authorization Period Expiration: 3/5/2024  Plan of Care Expiration: 5/10/2023  Progress Note Due: 4/10/2023  Visit # / Visits authorized: 12/ 20  FOTO: 1/3     Precautions: Standard and cancer   PTA Visit #: 1/5     Time In: 2:00 pm  Time Out: 2:45 pm  Total Billable Time: 45 minutes    SUBJECTIVE     Pt reports: she went to the parade Sunday and she's hurting from it  She was compliant with home exercise program.  Response to previous treatment: mild soreness  Functional change: pt is doing better - at her base line level of pain    Pain: 4/10  Location: right back      OBJECTIVE     Objective Measures updated at progress report unless specified.     Treatment     Jackelyn Kendrick received the treatments listed below:      therapeutic exercises to develop core stabilization for 35 minutes including:  Abdominal bracing 10 x 2 with 5 sec hold  Gluteal sets in supine 10 x 2 with 5 sec hold  Hook lying B hip abduction with a green band with gluteal sets 10 x 2 with 5 second hold  Hook lying B hip adduction with a ball with gluteal sets 10 x 2 with 5 second hold  Lower trunk rotation (small amplitude) x13 (stopped after 13 due to increased pain)  Bilateral knee to chest with LE on green physioball (shortened range of motion) 2x10    manual therapy techniques: Soft tissue Mobilization were applied to the: low back for 10 minutes, including:  STM to R lumbar paraspinals and glutes using hypervolt        Patient Education and Home Exercises     Home Exercises Provided and  Patient Education Provided     Education provided:   - hook lying B hip abduction and adduction     Written Home Exercises Provided: yes. Exercises were reviewed and Jackelyn Kendrick was able to demonstrate them prior to the end of the session.  Jackelyn Kendrick demonstrated good  understanding of the education provided. See EMR under Patient Instructions for exercises provided during therapy sessions    ASSESSMENT     Ms. Kendrick presents to treatment with slight increase in pain following standing at the parade Sunday then preparing all the vegetables she caught. She continues to have difficulty with gluteal engagement, but was able to activate in supine with legs extended to perform isometrics following verbal and tactile cueing.  Exercises were progressed today, and patient was instructed to perform in range of motion that does not increase pain. LTR was stopped after 13 repetitions due to pain. She was able to complete all reps of BKTC with modified ROM with no increased in pain. Progress core stabilization exercise with emphasis on gluteal engagement to help support her lumbar spine as tolerated.     Jackelyn Kendrick Is progressing well towards her goals.   Pt prognosis is Good.     Pt will continue to benefit from skilled outpatient physical therapy to address the deficits listed in the problem list box on initial evaluation, provide pt/family education and to maximize pt's level of independence in the home and community environment.     Pt's spiritual, cultural and educational needs considered and pt agreeable to plan of care and goals.     Anticipated barriers to physical therapy: none    Goals:   Short Term Goals: 4 weeks   Pt will be instructed in an exercise program to address functional deficits related to her lower back Sx  Pt will report decreased low back pain to </= 6/10 at worst with standing and walking  Pt will be compliant with her Physical Therapy treatments  Improve B hamstring  flexibility by >/= 5 degrees  Pt will improve her ability to engage her gluteal and abdominal muscles to assist in supporting her lumbar spine.     Long Term Goals: 8 weeks   Pt will be independent in a HEP to assist in managing her low back Sx.  Pt will report decreased low back pain to </= 3/10 at worst with standing and walking  Pt will be compliant with her HEP  Improve B hamstring flexibility by >/= 10 degrees  Pt will report improved functional ability through an improved score on the FOTO lumbar spine survey.  PLAN     Progress physical therapy treatments to assist Pt with managing her lower back Sx.    Alberta Moscoso, PTA

## 2023-03-21 NOTE — PLAN OF CARE
[facility]   Physical Therapy Initial Evaluation       Name: Jackelyn Kendrick  Clinic Number: [id number]    Therapy Diagnosis:   Encounter Diagnoses   Name Primary?    Lumbar spondylosis     Chronic bilateral low back pain, unspecified whether sciatica present         Physician: Norman Thakur MD    Physician Orders: PT Eval and Treat   Medical Diagnosis from Referral: Lumbar spondylosis  Evaluation Date: 3/10/2023  Authorization Period Expiration: 3/5/2024  Plan of Care Expiration: 5/10/2023  Progress Note Due: 4/10/2023  Visit # / Visits authorized: 1/ 1   FOTO: 1/3    Precautions: Standard and cancer     Time In: 2:30  Time Out: 3:15  Total Appointment Time (timed & untimed codes): 45 minutes      SUBJECTIVE     Date of onset: Pt reports that her back flared up 2/3/23 and put her in bed 4 days. Pain occurred stepping off the driveway with one foot into the grass. The pain was on the L side of her back and standing increases her pain.  On the 5th day she was feeling a little better.  Transferring from it to stand also increases her pain.    History of current condition - Jackelyn Kendrick reports: Chronic back pain on the R side of her back.  She has been limiting lifting and prolonged standing - 15 to 20 minutes.    Falls: none    Imaging: X-Ray:     Prior Therapy: yes  Social History: Pt lives with her  in a single story home   Occupation: retired  Prior Level of Function: chronic R side lower back pain with limitations as above  Current Level of Function: increased pain with sit to stand    Pain:  Current 5/10, worst 9/10, best 4/10   Location: bilateral back  L side is new  Description: Aching and Sharp  Aggravating Factors: sit to stand, standing  Easing Factors: pain medication, heating pad, and rest    Patients goals: to get her pain to a manageable level and be able to do some gardening.     Medical History:   Past Medical History:   Diagnosis Date    Arthritis      Basal cell carcinoma     Bronchitis     seasonal    Cataract     Diverticulitis     Dry eye syndrome     GERD (gastroesophageal reflux disease)     Hyperlipidemia     Hypertension     Hypothyroidism 07/19/2012    Obese     PONV (postoperative nausea and vomiting)     Thyroid disease        Surgical History:   Jackelyn Kendrick  has a past surgical history that includes Colonoscopy (2007); Nasal septum surgery; Shoulder surgery; Hysterectomy; Cholecystectomy; Hernia repair; Tonsillectomy; Back surgery; De Quervain's release (Left, 03/2017); Colonoscopy (N/A, 9/3/2020); Arthroscopic repair of rotator cuff of shoulder (Right, 9/23/2020); Fixation of tendon (Right, 9/23/2020); Phacoemulsification of cataract (Left, 10/20/2021); Intraocular prosthesis insertion (Left, 10/20/2021); Cataract extraction w/  intraocular lens implant (Left, 10/20/2021); Trigger finger release (Left, 12/2/2021); Injection of steroid (Right, 12/2/2021); Phacoemulsification of cataract (Right, 12/29/2021); Intraocular prosthesis insertion (Right, 12/29/2021); and Cystoscopy.    Medications:   Jackelyn has a current medication list which includes the following prescription(s): albuterol, amoxicillin-clavulanate 875-125mg, celecoxib, cranberry fruit extract, diclofenac sodium, dicyclomine, estradiol, fluocinonide, hydrocodone-acetaminophen, ketoconazole, levothyroxine, lidocaine-prilocaine, losartan, magnesium, memantine, methocarbamol, multi-vitamin, nitrofurantoin, omeprazole, oxybutynin, psyllium husk, restasis, rosuvastatin, trazodone, and triamcinolone acetonide 0.1%.    Allergies:   Review of patient's allergies indicates:   Allergen Reactions    Levaquin [levofloxacin] Swelling and Edema    Erythromycin (bulk) Nausea And Vomiting          OBJECTIVE     Observation: Pt had difficulty moving from sit to  the waiting room, but was able to ambulate into the clinic independently    Posture:  R side of pelvis elevated     Lumbar Range of  Motion: Too painful to test   Degrees Pain   Flexion nt   nt        Extension nt   nt        Left Side Bending nt nt        Right Side Bending nt nt        Left rotation   nt nt        Right Rotation   nt nt             Lower Extremity Strength too painful to test  Right LE  Left LE    Knee extension: nt Knee extension: nt   Knee flexion: nt Knee flexion: nt   Hip flexion: nt Hip flexion: nt   Hip extension:  nt Hip extension: nt   Hip abduction: nt Hip abduction: nt   Hip adduction: nt Hip adduction nt   Ankle dorsiflexion: nt Ankle dorsiflexion: nt   Ankle plantarflexion: nt Ankle plantarflexion: nt         Special Tests:  -Repeated Flexion: nt  -Repeated Ext: nt  -Piriformis Test: nt  -Prone Instability Test: nt  -Bridge Test: nt  -OH Squat: nt    DTR:      Neuro Dynamic Testing:    Sciatic nerve:      SLR: R = nt     L = nt       Femoral Nerve:    Femoral nerve test: nt      Joint Mobility: L SI joint hypomobile    Palpation: tender to palpation over L PSIS    Sensation: intact    Flexibility:    Ely's test: R = nt degrees ; L = nt degrees   Popliteal Angle: R = 75 degrees ; L = 60 degrees   Lidia's test: R = nt ; L = nt   Silverio test: R = nt ; L = nt       Limitation/Restriction for FOTO  Survey    Therapist reviewed FOTO scores for Jackelyn Kendrick on 3/10/2023.   FOTO documents entered into [employer] - see Media section.    Limitation Score: %         TREATMENT     Total Treatment time (time-based codes) separate from Evaluation: 8 minutes      Jackelyn KENDALLGordy Kendrick received the treatments listed below:      therapeutic exercises to develop core stabilization for 08 minutes including:  Abdominal bracing  Gluteal sets      PATIENT EDUCATION AND HOME EXERCISES     Education provided:   - Role of Physical Therapy    Written Home Exercises Provided: yes. Exercises were reviewed and Jackelyn Kendrick was able to demonstrate them prior to the end of the session.  Jackelyn Kendrick demonstrated fair  understanding of  the education provided. See EMR under Patient Instructions for exercises provided during therapy sessions.    ASSESSMENT     Jackelyn is a 72 y.o. female referred to outpatient Physical Therapy with a medical diagnosis of Lumbar spondylosis. Patient presents with significant pain in her low back and a scoliotic posture with R side of pelvis elevated.  Pt reports significant pain in the L side of her back today.  She is having trouble finding a comfortable position.  She is able to contract her abdominal muscles to give some support to her lumbar spine, but is not able to engage her gluteal muscles adequately to give much support to her back.    Patient prognosis is Good.   Patient will benefit from skilled outpatient Physical Therapy to address the deficits stated above and in the chart below, provide patient /family education, and to maximize patientt's level of independence.     Plan of care discussed with patient: Yes  Patient's spiritual, cultural and educational needs considered and patient is agreeable to the plan of care and goals as stated below:     Anticipated Barriers for therapy: scheduling    Medical Necessity is demonstrated by the following  History  Co-morbidities and personal factors that may impact the plan of care Co-morbidities:   advanced age, history of cancer, HTN, and prior lumbar surgery    Personal Factors:   age     low   Examination  Body Structures and Functions, activity limitations and participation restrictions that may impact the plan of care Body Regions:   back  lower extremities  trunk    Body Systems:    gross symmetry  ROM  strength  gait  transfers    Participation Restrictions:   Sit to stand transfers and standing    Activity limitations:   Learning and applying knowledge  no deficits    General Tasks and Commands  no deficits    Communication  no deficits    Mobility  lifting and carrying objects  walking  moving around using equipment (WC)    Self care  washing oneself  (bathing, drying, washing hands)  caring for body parts (brushing teeth, shaving, grooming)  toileting  dressing    Domestic Life  shopping  cooking  doing house work (cleaning house, washing dishes, laundry)    Interactions/Relationships  no deficits    Life Areas  employment    Community and Social Life  community life  recreation and leisure         low   Clinical Presentation evolving clinical presentation with changing clinical characteristics moderate   Decision Making/ Complexity Score: low     Goals:  Short Term Goals: 4 weeks   Pt will be instructed in an exercise program to address functional deficits related to her lower back Sx  Pt will report decreased low back pain to </= 6/10 at worst with standing and walking  Pt will be compliant with her Physical Therapy treatments  Improve B hamstring flexibility by >/= 5 degrees  Pt will improve her ability to engage her gluteal and abdominal muscles to assist in supporting her lumbar spine.    Long Term Goals: 8 weeks   Pt will be independent in a HEP to assist in managing her low back Sx.  Pt will report decreased low back pain to </= 3/10 at worst with standing and walking  Pt will be compliant with her HEP  Improve B hamstring flexibility by >/= 10 degrees  Pt will report improved functional ability through an improved score on the FOTO lumbar spine survey.    PLAN   Plan of care Certification: 3/10/2023 to 5/10/2023.    Outpatient Physical Therapy 2 times weekly for 8 weeks to include the following interventions: Cervical/Lumbar Traction, Electrical Stimulation as indicated , Gait Training, Manual Therapy, Moist Heat/ Ice, Neuromuscular Re-ed, Orthotic Management and Training, Patient Education, Self Care, Therapeutic Activities, Therapeutic Exercise, and Dry needling.     Fermin Frias, PT      I CERTIFY THE NEED FOR THESE SERVICES FURNISHED UNDER THIS PLAN OF TREATMENT AND WHILE UNDER MY CARE   Physician's comments:     Physician's Signature:  ___________________________________________________       No significant past surgical history

## 2023-03-23 ENCOUNTER — CLINICAL SUPPORT (OUTPATIENT)
Dept: REHABILITATION | Facility: HOSPITAL | Age: 73
End: 2023-03-23
Attending: ORTHOPAEDIC SURGERY
Payer: MEDICARE

## 2023-03-23 DIAGNOSIS — G89.29 CHRONIC BILATERAL LOW BACK PAIN, UNSPECIFIED WHETHER SCIATICA PRESENT: Primary | ICD-10-CM

## 2023-03-23 DIAGNOSIS — M54.50 CHRONIC BILATERAL LOW BACK PAIN, UNSPECIFIED WHETHER SCIATICA PRESENT: Primary | ICD-10-CM

## 2023-03-23 PROCEDURE — 97110 THERAPEUTIC EXERCISES: CPT | Mod: PO

## 2023-03-23 PROCEDURE — 97112 NEUROMUSCULAR REEDUCATION: CPT | Mod: PO

## 2023-03-23 NOTE — PROGRESS NOTES
[facility]   Physical Therapy Treatment Note     Name: Jackelyn Kendrick  Clinic Number: [id number]    Therapy Diagnosis:   Encounter Diagnosis   Name Primary?    Chronic bilateral low back pain, unspecified whether sciatica present Yes       Physician: Reginald Pickens MD    Visit Date: 3/23/2023      Physician Orders: PT Eval and Treat   Medical Diagnosis from Referral: Lumbar spondylosis  Evaluation Date: 3/10/2023  Authorization Period Expiration: 3/5/2024  Plan of Care Expiration: 5/10/2023  Progress Note Due: 4/10/2023  Visit # / Visits authorized: 14/ 20  FOTO: 1/3     Precautions: Standard and cancer   PTA Visit #: 1/5     Time In: 2:00 pm  Time Out: 2:45 pm  Total Billable Time: 45 minutes    SUBJECTIVE     Pt reports: she is feeling OK today.  She is riding the recumbent bike x 15 min.  She was compliant with home exercise program.  Response to previous treatment: mild soreness  Functional change: at her base line level of pain    Pain: 2/10  Location: right back      OBJECTIVE     Objective Measures updated at progress report unless specified.     Treatment     Jackelyn Kendrick received the treatments listed below:      therapeutic exercises to develop core stabilization for 35 minutes including:  Abdominal bracing 10 x 2 with 5 sec hold  Gluteal sets in supine 10 x 2 with 5 sec hold  Hook lying B hip abduction with a green band with gluteal sets 10 x 2 with 5 second hold  Hook lying B hip adduction with a ball with gluteal sets 10 x 2 with 5 second hold  Lower trunk rotation with green TB x 10 to R & L  Bilateral knee to chest with LE with green physioball (shortened range of motion) 2x10  Nu-step x 5 min      manual therapy techniques: Soft tissue Mobilization were applied to the: low back for 00 minutes, including:  STM to R lumbar paraspinals and glutes using hypervolt    Neuromuscular re-education x 08 min including:  Step ups x 10 on R & L  Side stepping 20 ft x  4    Patient Education and Home Exercises     Home Exercises Provided and Patient Education Provided     Education provided:   - hook lying B hip abduction and adduction     Written Home Exercises Provided: yes. Exercises were reviewed and Jackelyn Kendrick was able to demonstrate them prior to the end of the session.  Jackelyn Kendrick demonstrated good  understanding of the education provided. See EMR under Patient Instructions for exercises provided during therapy sessions    ASSESSMENT     Ms. Kendrick presents to treatment with less discomfort than she had at her appointment.  She also stated that she has been riding a stationary bike at home. Pt was able to tolerate a progression of exercises to the standing position well.  Progress core stabilization exercise with emphasis on gluteal engagement to help support her lumbar spine as tolerated.     Jackelyn Kendrick Is progressing well towards her goals.   Pt prognosis is Good.     Pt will continue to benefit from skilled outpatient physical therapy to address the deficits listed in the problem list box on initial evaluation, provide pt/family education and to maximize pt's level of independence in the home and community environment.     Pt's spiritual, cultural and educational needs considered and pt agreeable to plan of care and goals.     Anticipated barriers to physical therapy: none    Goals:   Short Term Goals: 4 weeks   Pt will be instructed in an exercise program to address functional deficits related to her lower back Sx  Pt will report decreased low back pain to </= 6/10 at worst with standing and walking  Pt will be compliant with her Physical Therapy treatments  Improve B hamstring flexibility by >/= 5 degrees  Pt will improve her ability to engage her gluteal and abdominal muscles to assist in supporting her lumbar spine.     Long Term Goals: 8 weeks   Pt will be independent in a HEP to assist in managing her low back Sx.  Pt will report  decreased low back pain to </= 3/10 at worst with standing and walking  Pt will be compliant with her HEP  Improve B hamstring flexibility by >/= 10 degrees  Pt will report improved functional ability through an improved score on the FOTO lumbar spine survey.  PLAN     Progress physical therapy treatments to assist Pt with managing her lower back Sx.    Fermin Frias, PT

## 2023-03-30 ENCOUNTER — CLINICAL SUPPORT (OUTPATIENT)
Dept: REHABILITATION | Facility: HOSPITAL | Age: 73
End: 2023-03-30
Attending: ORTHOPAEDIC SURGERY
Payer: MEDICARE

## 2023-03-30 DIAGNOSIS — G89.29 CHRONIC BILATERAL LOW BACK PAIN, UNSPECIFIED WHETHER SCIATICA PRESENT: Primary | ICD-10-CM

## 2023-03-30 DIAGNOSIS — M54.50 CHRONIC BILATERAL LOW BACK PAIN, UNSPECIFIED WHETHER SCIATICA PRESENT: Primary | ICD-10-CM

## 2023-03-30 PROCEDURE — 97112 NEUROMUSCULAR REEDUCATION: CPT | Mod: PO,CQ

## 2023-03-30 PROCEDURE — 97110 THERAPEUTIC EXERCISES: CPT | Mod: PO,CQ

## 2023-03-30 NOTE — PROGRESS NOTES
[facility]   Physical Therapy Treatment Note     Name: Jackelyn Kendrick  Clinic Number: [id number]    Therapy Diagnosis:   Encounter Diagnosis   Name Primary?    Chronic bilateral low back pain, unspecified whether sciatica present Yes         Physician: Reginald Pickens MD    Visit Date: 3/30/2023      Physician Orders: PT Eval and Treat   Medical Diagnosis from Referral: Lumbar spondylosis  Evaluation Date: 3/10/2023  Authorization Period Expiration: 3/5/2024  Plan of Care Expiration: 5/10/2023  Progress Note Due: 4/10/2023  Visit # / Visits authorized: 15/ 20   FOTO: 1/3     Precautions: Standard and cancer   PTA Visit #: 1/5     Time In: 1:15 pm  Time Out: 1:58 pm  Total Billable Time: 43 minutes    SUBJECTIVE     Pt reports: she has continued her neck and shoulder exercises as well as the ones for her back  She was compliant with home exercise program.  Response to previous treatment: mild soreness  Functional change: at her base line level of pain    Pain: 4/10  Location: right back      OBJECTIVE     Objective Measures updated at progress report unless specified.     Treatment     Jackelyn Kendrick received the treatments listed below:      therapeutic exercises to develop core stabilization for 33 minutes including:  Nu-step x 6 min  Abdominal bracing 10 x 2 with 5 sec hold  Gluteal sets in supine 10 x 2 with 5 sec hold  Hook lying B hip abduction with a green band with gluteal sets 10 x 2 with 5 second hold  Hook lying B hip adduction with a ball with gluteal sets 10 x 2 with 5 second hold  Lower trunk rotation with green TB x 10 to R & L  Bilateral knee to chest with LE with green physioball (shortened range of motion) 2x10  Pulldowns with red TB with abdominal isometric 2x10        manual therapy techniques: Soft tissue Mobilization were applied to the: low back for 00 minutes, including:  STM to R lumbar paraspinals and glutes using hypervolt    Neuromuscular  re-education for balance and coordination for 10 min including:  Step ups x 10 on R & L  Side stepping 20 ft x 4    Patient Education and Home Exercises     Home Exercises Provided and Patient Education Provided     Education provided:   - hook lying B hip abduction and adduction     Written Home Exercises Provided: yes. Exercises were reviewed and Jackelyn Kendrick was able to demonstrate them prior to the end of the session.  Jackelyn Kendrick demonstrated good  understanding of the education provided. See EMR under Patient Instructions for exercises provided during therapy sessions    ASSESSMENT     Ms. Kendrick presents to treatment slight increase discomfort in her low back. She reports fatigue with sidestepping, but no increase in pain. Progressed standing activity to include pulldowns with abdominal isometric with no increase in pain. She did report an increase in discomfort when transferring from supine to sit. Cueing provided for core activation during transitions. Continue to progress as tolerated.      Jackelyn Kendrick Is progressing well towards her goals.   Pt prognosis is Good.     Pt will continue to benefit from skilled outpatient physical therapy to address the deficits listed in the problem list box on initial evaluation, provide pt/family education and to maximize pt's level of independence in the home and community environment.     Pt's spiritual, cultural and educational needs considered and pt agreeable to plan of care and goals.     Anticipated barriers to physical therapy: none    Goals:   Short Term Goals: 4 weeks   Pt will be instructed in an exercise program to address functional deficits related to her lower back Sx  Pt will report decreased low back pain to </= 6/10 at worst with standing and walking  Pt will be compliant with her Physical Therapy treatments  Improve B hamstring flexibility by >/= 5 degrees  Pt will improve her ability to engage her gluteal and abdominal muscles  to assist in supporting her lumbar spine.     Long Term Goals: 8 weeks   Pt will be independent in a HEP to assist in managing her low back Sx.  Pt will report decreased low back pain to </= 3/10 at worst with standing and walking  Pt will be compliant with her HEP  Improve B hamstring flexibility by >/= 10 degrees  Pt will report improved functional ability through an improved score on the FOTO lumbar spine survey.  PLAN     Progress physical therapy treatments to assist Pt with managing her lower back Sx.    Alberta Moscoso, PTA

## 2023-04-03 RX ORDER — DICYCLOMINE HYDROCHLORIDE 20 MG/1
TABLET ORAL
Qty: 120 TABLET | Refills: 11 | Status: SHIPPED | OUTPATIENT
Start: 2023-04-03 | End: 2023-12-07 | Stop reason: SDUPTHER

## 2023-04-10 ENCOUNTER — PATIENT MESSAGE (OUTPATIENT)
Dept: UROLOGY | Facility: CLINIC | Age: 73
End: 2023-04-10
Payer: MEDICARE

## 2023-04-11 ENCOUNTER — PATIENT MESSAGE (OUTPATIENT)
Dept: UROLOGY | Facility: CLINIC | Age: 73
End: 2023-04-11
Payer: MEDICARE

## 2023-04-14 ENCOUNTER — CLINICAL SUPPORT (OUTPATIENT)
Dept: REHABILITATION | Facility: HOSPITAL | Age: 73
End: 2023-04-14
Attending: ORTHOPAEDIC SURGERY
Payer: MEDICARE

## 2023-04-14 DIAGNOSIS — M54.50 CHRONIC RIGHT-SIDED LOW BACK PAIN, UNSPECIFIED WHETHER SCIATICA PRESENT: ICD-10-CM

## 2023-04-14 DIAGNOSIS — G89.29 CHRONIC RIGHT-SIDED LOW BACK PAIN, UNSPECIFIED WHETHER SCIATICA PRESENT: ICD-10-CM

## 2023-04-14 PROCEDURE — 97530 THERAPEUTIC ACTIVITIES: CPT | Mod: PO

## 2023-04-14 PROCEDURE — 97110 THERAPEUTIC EXERCISES: CPT | Mod: PO

## 2023-04-14 NOTE — PROGRESS NOTES
[facility]   Physical Therapy Treatment Note     Name: Jackelyn Kendrick  Clinic Number: [id number]    Therapy Diagnosis:   Encounter Diagnosis   Name Primary?    Chronic right-sided low back pain, unspecified whether sciatica present            Physician: Reginald Pickens MD    Visit Date: 4/14/2023      Physician Orders: PT Eval and Treat   Medical Diagnosis from Referral: Lumbar spondylosis  Evaluation Date: 3/10/2023  Authorization Period Expiration: 3/5/2024  Plan of Care Expiration: 5/10/2023  Progress Note Due: 4/10/2023  Visit # / Visits authorized: 16/ 20   FOTO: 1/3     Precautions: Standard and cancer   PTA Visit #: 1/5     Time In: 1:17 pm  Time Out: 1:58 pm  Total Billable Time: 43 minutes    SUBJECTIVE     Pt reports: she found an exercise ball at [employer]  She was compliant with home exercise program.  Response to previous treatment: mild soreness  Functional change: at her base line level of pain    Pain: 0/10  Location: right back      OBJECTIVE     Objective Measures updated at progress report unless specified.   Observation: Pt had difficulty moving from sit to  the waiting room, but was able to ambulate into the clinic independently     Posture:  R side of pelvis elevated      Lumbar Range of Motion: Too painful to test    % Pain   Flexion 40    nt         Extension 40    nt         Left Side Bending 75 yes         Right Side Bending 50 yes         Left rotation    75 no         Right Rotation    90 no               Lower Extremity Strength too painful to test  Right LE   Left LE     Knee extension: 4+/5 Knee extension: 4+/5   Knee flexion: 4/5* Knee flexion: 3+/5   Hip flexion: 3/5 Hip flexion: 3/5*   Hip extension:  nt Hip extension: nt   Hip abduction: 2/5 Hip abduction: 2/5   Hip adduction: 2/5 Hip adduction 2/5   Ankle dorsiflexion: 5/5 Ankle dorsiflexion: 5/5   Ankle plantarflexion: 4/5 Ankle plantarflexion: 4/5            Special Tests:  -Repeated  Flexion: nt  -Repeated Ext: nt  -Piriformis Test: nt  -Prone Instability Test: nt  -Bridge Test: nt  -OH Squat: nt     DTR:        Neuro Dynamic Testing:               Sciatic nerve:                                       SLR:    R = nt                                      L = nt                                        Femoral Nerve:                          Femoral nerve test: nt        Joint Mobility: L SI joint hypomobile     Palpation: tender to palpation over L PSIS     Sensation: intact     Flexibility:               Ely's test: R = nt degrees ; L = nt degrees              Popliteal Angle: R = 35 degrees ; L = 45 degrees              Lidia's test: R = nt ; L = nt              Silverio test: R = nt ; L = nt         Limitation/Restriction for FOTO  Survey     Therapist reviewed FOTO scores for Jackelyn Kendrick on 3/10/2023.   FOTO documents entered into [employer] - see Media section.     Limitation Score: %         Treatment   Re-evaluation = 18 min  Jackelyn Kendrick received the treatments listed below:      therapeutic exercises to develop core stabilization for 23 minutes including:  Nu-step x 6 min  Abdominal bracing 10 x 2 with 5 sec hold  Gluteal sets in supine 10 x 2 with 5 sec hold  Hook lying B hip abduction with a green band with gluteal sets 10 x 2 with 5 second hold  Hook lying B hip adduction with a ball with gluteal sets 10 x 2 with 5 second hold  Lower trunk rotation with green TB x 10 to R & L  Bilateral knee to chest with LE with green physioball (shortened range of motion) 2x10  Pulldowns with red TB with abdominal isometric 2x10        manual therapy techniques: Soft tissue Mobilization were applied to the: low back for 00 minutes, including:  STM to R lumbar paraspinals and glutes using hypervolt    Neuromuscular re-education for balance and coordination for 00 min including:  Step ups x 10 on R & L  Side stepping 20 ft x 4    Patient Education and Home Exercises     Home Exercises Provided and  Patient Education Provided     Education provided:   - hook lying B hip abduction and adduction     Written Home Exercises Provided: yes. Exercises were reviewed and Jackelyn Kendrick was able to demonstrate them prior to the end of the session.  Jackelyn Kendrick demonstrated good  understanding of the education provided. See EMR under Patient Instructions for exercises provided during therapy sessions    ASSESSMENT     Ms. Kendrick presents better able to tolerate evaluation of her trunk standing ROM and B LE strength.  She continues to report difficulty walking greater than 3 blocks secondary to R buttocks and lateral thigh pain.  She has a (+)  R hip IR test indicating a possibility of R  hip joint involvement.  She tolerated today's Tx with some degree of R LE discomfort. Continue to progress as tolerated.      Jackelyn Kendrick Is progressing well towards her goals.   Pt prognosis is Good.     Pt will continue to benefit from skilled outpatient physical therapy to address the deficits listed in the problem list box on initial evaluation, provide pt/family education and to maximize pt's level of independence in the home and community environment.     Pt's spiritual, cultural and educational needs considered and pt agreeable to plan of care and goals.     Anticipated barriers to physical therapy: none    Goals:   Short Term Goals: 4 weeks   Pt will be instructed in an exercise program to address functional deficits related to her lower back Sx  Pt will report decreased low back pain to </= 6/10 at worst with standing and walking  Pt will be compliant with her Physical Therapy treatments  Improve B hamstring flexibility by >/= 5 degrees  Pt will improve her ability to engage her gluteal and abdominal muscles to assist in supporting her lumbar spine.     Long Term Goals: 8 weeks   Pt will be independent in a HEP to assist in managing her low back Sx.  Pt will report decreased low back pain to </= 3/10 at  worst with standing and walking  Pt will be compliant with her HEP  Improve B hamstring flexibility by >/= 10 degrees  Pt will report improved functional ability through an improved score on the FOTO lumbar spine survey.  PLAN     Progress physical therapy treatments to assist Pt with managing her lower back Sx.    Fermin Frias, PT

## 2023-04-17 ENCOUNTER — LAB VISIT (OUTPATIENT)
Dept: LAB | Facility: HOSPITAL | Age: 73
End: 2023-04-17
Payer: MEDICARE

## 2023-04-17 DIAGNOSIS — N39.0 RECURRENT UTI: ICD-10-CM

## 2023-04-17 LAB
ALBUMIN SERPL BCP-MCNC: 3.9 G/DL (ref 3.5–5.2)
ALP SERPL-CCNC: 57 U/L (ref 55–135)
ALT SERPL W/O P-5'-P-CCNC: 43 U/L (ref 10–44)
ANION GAP SERPL CALC-SCNC: 8 MMOL/L (ref 8–16)
AST SERPL-CCNC: 47 U/L (ref 10–40)
BILIRUB SERPL-MCNC: 0.4 MG/DL (ref 0.1–1)
BUN SERPL-MCNC: 12 MG/DL (ref 8–23)
CALCIUM SERPL-MCNC: 9.5 MG/DL (ref 8.7–10.5)
CHLORIDE SERPL-SCNC: 106 MMOL/L (ref 95–110)
CO2 SERPL-SCNC: 30 MMOL/L (ref 23–29)
CREAT SERPL-MCNC: 0.9 MG/DL (ref 0.5–1.4)
EST. GFR  (NO RACE VARIABLE): >60 ML/MIN/1.73 M^2
GLUCOSE SERPL-MCNC: 90 MG/DL (ref 70–110)
POTASSIUM SERPL-SCNC: 3.7 MMOL/L (ref 3.5–5.1)
PROT SERPL-MCNC: 7.1 G/DL (ref 6–8.4)
SODIUM SERPL-SCNC: 144 MMOL/L (ref 136–145)

## 2023-04-17 PROCEDURE — 36415 COLL VENOUS BLD VENIPUNCTURE: CPT | Mod: PO

## 2023-04-17 PROCEDURE — 80053 COMPREHEN METABOLIC PANEL: CPT

## 2023-04-18 ENCOUNTER — OFFICE VISIT (OUTPATIENT)
Dept: UROLOGY | Facility: CLINIC | Age: 73
End: 2023-04-18
Payer: MEDICARE

## 2023-04-18 DIAGNOSIS — N39.0 RECURRENT UTI: Primary | ICD-10-CM

## 2023-04-18 LAB
BILIRUB SERPL-MCNC: NORMAL MG/DL
BLOOD URINE, POC: NORMAL
CLARITY, POC UA: CLEAR
COLOR, POC UA: NORMAL
GLUCOSE UR QL STRIP: NORMAL
KETONES UR QL STRIP: NORMAL
LEUKOCYTE ESTERASE URINE, POC: NORMAL
NITRITE, POC UA: NORMAL
PH, POC UA: 5
PROTEIN, POC: NORMAL
SPECIFIC GRAVITY, POC UA: 1.01
UROBILINOGEN, POC UA: NORMAL

## 2023-04-18 PROCEDURE — 99214 OFFICE O/P EST MOD 30 MIN: CPT | Mod: S$PBB,,,

## 2023-04-18 PROCEDURE — 99999 PR PBB SHADOW E&M-EST. PATIENT-LVL III: ICD-10-PCS | Mod: PBBFAC,,,

## 2023-04-18 PROCEDURE — 81002 URINALYSIS NONAUTO W/O SCOPE: CPT | Mod: PBBFAC

## 2023-04-18 PROCEDURE — 99214 PR OFFICE/OUTPT VISIT, EST, LEVL IV, 30-39 MIN: ICD-10-PCS | Mod: S$PBB,,,

## 2023-04-18 PROCEDURE — 99213 OFFICE O/P EST LOW 20 MIN: CPT | Mod: PBBFAC

## 2023-04-18 PROCEDURE — 99999 PR PBB SHADOW E&M-EST. PATIENT-LVL III: CPT | Mod: PBBFAC,,,

## 2023-04-18 NOTE — PROGRESS NOTES
CHIEF COMPLAINT:  3 month follow up    HISTORY OF PRESENTING ILLINESS:  Jackelyn Kendrick is a 72 y.o. female established to urology. She is here for a 3 month follow up after initiating daily macrodantin for UTI px. I saw her in clinic 1/17/23. She is doing well today. States that she felt symptomatic of UTI ~3 days after an IBS episode. States that when she has uncontrollable diarrhea, she develops UTI s/s in the following days. Her only symptom today is slight vaginal pressure. Her UA dip in office is free of abnormalities.       She was seen by Dr. John 7/28/22 due to recurrent UTIs. A renal US was performed 8/9/22 and cystoscopy performed 8/11/22 - imaging and cysto WNL. In January I initiated her on 50 mg daily macrodantin for UTI prophylaxis. I placed an order for a CMP prior to today's apt, completed 4/17/23 - reviewed prior to apt today. I encouraged pt to continue D-mannose and estrace cream as well as to continue following standard UTI precautions.         REVIEW OF SYSTEMS:  Review of Systems   Constitutional:  Negative for chills and fever.   HENT:  Negative for congestion and sore throat.    Respiratory:  Negative for cough and shortness of breath.    Cardiovascular:  Negative for chest pain and palpitations.   Gastrointestinal:  Negative for nausea and vomiting.   Genitourinary:  Negative for dysuria, flank pain, frequency, hematuria and urgency.        +mild vaginal pressure   Neurological:  Negative for dizziness and headaches.       PATIENT HISTORY:  Past Medical History:   Diagnosis Date    Arthritis     Basal cell carcinoma     Bronchitis     seasonal    Cataract     Diverticulitis     Dry eye syndrome     GERD (gastroesophageal reflux disease)     Hyperlipidemia     Hypertension     Hypothyroidism 07/19/2012    Obese     PONV (postoperative nausea and vomiting)     Thyroid disease        Past Surgical History:   Procedure Laterality Date    ARTHROSCOPIC REPAIR OF ROTATOR CUFF OF SHOULDER  Right 9/23/2020    Procedure: REPAIR, ROTATOR CUFF, ARTHROSCOPIC;  Surgeon: Reginald Pickens MD;  Location: [facility] OR;  Service: Orthopedics;  Laterality: Right;  regional w/catheter (interscalene)    BACK SURGERY      CATARACT EXTRACTION W/  INTRAOCULAR LENS IMPLANT Left 10/20/2021        CHOLECYSTECTOMY      COLONOSCOPY  2007    diverticulosis    COLONOSCOPY N/A 9/3/2020    Procedure: COLONOSCOPY;  Surgeon: Alberta Henriquez MD;  Location: [facility] ENDO (4TH FLR);  Service: Colon and Rectal;  Laterality: N/A;  pt requested this time-8/31-covid-uc [place]-tb    CYSTOSCOPY      DE QUERVAIN'S RELEASE Left 03/2017    FIXATION OF TENDON Right 9/23/2020    Procedure: FIXATION, TENDON;  Surgeon: Reginald Pickens MD;  Location: [facility] OR;  Service: Orthopedics;  Laterality: Right;    HERNIA REPAIR      HYSTERECTOMY      INJECTION OF STEROID Right 12/2/2021    Procedure: INJECTION, STEROID;  Surgeon: Suzy Dominguez MD;  Location: [facility] OR;  Service: Orthopedics;  Laterality: Right;    INTRAOCULAR PROSTHESES INSERTION Left 10/20/2021    Procedure: INSERTION, IOL PROSTHESIS;  Surgeon: Pippa Ashby MD;  Location: [facility] OR 1ST FLR;  Service: Ophthalmology;  Laterality: Left;    INTRAOCULAR PROSTHESES INSERTION Right 12/29/2021    Procedure: INSERTION, IOL PROSTHESIS;  Surgeon: Pippa Ashby MD;  Location: [facility] OR 1ST FLR;  Service: Ophthalmology;  Laterality: Right;    NASAL SEPTUM SURGERY      PHACOEMULSIFICATION OF CATARACT Left 10/20/2021    Procedure: PHACOEMULSIFICATION, CATARACT/ COMPLEX- PHACO / IOL - OS SMALL PUPIL-OLE RING AND TRYPAN BLUE;  Surgeon: Pippa Ashby MD;  Location: [facility] OR 1ST FLR;  Service: Ophthalmology;  Laterality: Left;    PHACOEMULSIFICATION OF CATARACT Right 12/29/2021    Procedure: PHACOEMULSIFICATION, CATARACT;  Surgeon: Pippa Ashby MD;  Location: [facility] OR 1ST FLR;  Service: Ophthalmology;  Laterality: Right;    SHOULDER SURGERY      TONSILLECTOMY       TRIGGER FINGER RELEASE Left 12/2/2021    Procedure: RELEASE, TRIGGER FINGER;  Surgeon: Suzy Dominguez MD;  Location: [facility] OR;  Service: Orthopedics;  Laterality: Left;       Family History   Problem Relation Age of Onset    Cataracts Mother     Breast cancer Mother     Diabetes Mother     Hypertension Mother     Heart failure Mother     Heart disease Mother     Cataracts Father     Hypertension Father     Stroke Father     No Known Problems Daughter     No Known Problems Son     Diabetes Paternal Grandmother     Hyperlipidemia Sister     Arthritis Sister     Hyperlipidemia Brother     Arthritis Brother     No Known Problems Son     Amblyopia Neg Hx     Blindness Neg Hx     Glaucoma Neg Hx     Macular degeneration Neg Hx     Retinal detachment Neg Hx     Strabismus Neg Hx     Ovarian cancer Neg Hx     Melanoma Neg Hx     Celiac disease Neg Hx     Colon cancer Neg Hx     Colon polyps Neg Hx     Esophageal cancer Neg Hx     Liver cancer Neg Hx     Liver disease Neg Hx     Rectal cancer Neg Hx     Stomach cancer Neg Hx        Social History     Socioeconomic History    Marital status:    Tobacco Use    Smoking status: Never    Smokeless tobacco: Never   Substance and Sexual Activity    Alcohol use: No     Comment: rare on a special occasion    Drug use: No    Sexual activity: Never   Social History Narrative    , 3 children, nonsmoker ,     Social Determinants of Health     Financial Resource Strain: Low Risk     Difficulty of Paying Living Expenses: Not hard at all   Food Insecurity: No Food Insecurity    Worried About Running Out of Food in the Last Year: Never true    Ran Out of Food in the Last Year: Never true   Transportation Needs: No Transportation Needs    Lack of Transportation (Medical): No    Lack of Transportation (Non-Medical): No   Physical Activity: Inactive    Days of Exercise per Week: 0 days    Minutes of Exercise per Session: 0 min   Stress: Stress Concern Present    Feeling of Stress  : To some extent   Social Connections: Unknown    Frequency of Communication with Friends and Family: Once a week    Frequency of Social Gatherings with Friends and Family: Once a week    Active Member of Clubs or Organizations: Yes    Attends Club or Organization Meetings: More than 4 times per year    Marital Status:    Housing Stability: Low Risk     Unable to Pay for Housing in the Last Year: No    Number of Places Lived in the Last Year: 1    Unstable Housing in the Last Year: No       Allergies:  Levaquin [levofloxacin] and Erythromycin (bulk)    Medications:    Current Outpatient Medications:     albuterol (PROVENTIL/VENTOLIN HFA) 90 mcg/actuation inhaler, Inhale 2 puffs into the lungs every 6 (six) hours as needed for Wheezing., Disp: 6.7 g, Rfl: 11    amoxicillin-clavulanate 875-125mg (AUGMENTIN) 875-125 mg per tablet, Take 1 tablet by mouth 2 (two) times daily. (Patient not taking: Reported on 3/13/2023), Disp: 20 tablet, Rfl: 0    celecoxib (CELEBREX) 200 MG capsule, Take 1 capsule (200 mg total) by mouth once daily. Do not take more than 15 consecutive days. Take w food and water, Disp: 30 capsule, Rfl: 11    cranberry fruit extract (CRANBERRY EXTRACT ORAL), Take by mouth., Disp: , Rfl:     diclofenac sodium (VOLTAREN) 1 % Gel, Apply 2 g topically 2 (two) times daily as needed (musculoskeletal pain)., Disp: 100 g, Rfl: 11    dicyclomine (BENTYL) 20 mg tablet, TAKE ONE TABLET BY MOUTH FOUR TIMES A DAY BEFORE MEALS AND NIGHTLY, Disp: 120 tablet, Rfl: 11    estradioL (ESTRACE) 0.01 % (0.1 mg/gram) vaginal cream, Place 0.5-1 g vaginally twice a week., Disp: 42.5 g, Rfl: 3    fluocinonide (LIDEX) 0.05 % external solution, Apply topically once daily., Disp: 60 mL, Rfl: 11    HYDROcodone-acetaminophen (NORCO) 7.5-325 mg per tablet, Take 1 tablet by mouth every 6 (six) hours as needed for Pain. (Patient not taking: Reported on 3/13/2023), Disp: 28 tablet, Rfl: 0    ketoconazole (NIZORAL) 2 % shampoo,  Apply topically twice a week., Disp: 120 mL, Rfl: 11    levothyroxine (SYNTHROID) 75 MCG tablet, TAKE ONE TABLET BY MOUTH EVERY DAY ON AN EMPTY STOMACH, Disp: 90 tablet, Rfl: 3    LIDOcaine-prilocaine (EMLA) cream, Apply topically 2 (two) times daily as needed. To hands., Disp: 30 g, Rfl: 2    losartan (COZAAR) 100 MG tablet, Take 1 tablet (100 mg total) by mouth once daily., Disp: 30 tablet, Rfl: 11    MAGNESIUM ORAL, Take 1 tablet by mouth once daily., Disp: , Rfl:     memantine (NAMENDA) 5 MG Tab, Take 1 tablet (5 mg total) by mouth 2 (two) times daily., Disp: 60 tablet, Rfl: 11    methocarbamoL (ROBAXIN) 500 MG Tab, Take 1 tablet (500 mg total) by mouth 3 (three) times daily., Disp: 60 tablet, Rfl: 1    Multi-Vitamin tablet, Take 1 tablet by mouth once daily. , Disp: , Rfl:     nitrofurantoin (MACRODANTIN) 50 MG capsule, Take 1 capsule (50 mg total) by mouth every morning., Disp: 30 capsule, Rfl: 11    omeprazole (PRILOSEC) 40 MG capsule, Take 1 capsule (40 mg total) by mouth once daily., Disp: 30 capsule, Rfl: 11    oxybutynin (DITROPAN-XL) 10 MG 24 hr tablet, Take 1 tablet (10 mg total) by mouth once daily., Disp: 30 tablet, Rfl: 11    PSYLLIUM SEED, WITH SUGAR, (METAMUCIL ORAL), Take by mouth as needed. , Disp: , Rfl:     RESTASIS 0.05 % ophthalmic emulsion, INSTILL ONE DROP IN EACH EYE TWO TIMES A DAY, Disp: 60 each, Rfl: 0    rosuvastatin (CRESTOR) 40 MG Tab, TAKE ONE TABLET BY MOUTH EVERY DAY, Disp: 90 tablet, Rfl: 3    traZODone (DESYREL) 50 MG tablet, 1/2 tab up to 2 tabs po qhs prn insomnia, Disp: 30 tablet, Rfl: 11    triamcinolone acetonide 0.1% (KENALOG) 0.1 % cream, AAA bid, Disp: 60 g, Rfl: 3    PHYSICAL EXAMINATION:  Physical Exam  Constitutional:       Appearance: Normal appearance.   HENT:      Head: Normocephalic and atraumatic.      Right Ear: External ear normal.      Left Ear: External ear normal.   Pulmonary:      Effort: Pulmonary effort is normal. No respiratory distress.   Skin:      General: Skin is warm and dry.   Neurological:      General: No focal deficit present.      Mental Status: She is alert and oriented to person, place, and time.   Psychiatric:         Mood and Affect: Mood normal.         Behavior: Behavior normal.       Lab Results   Component Value Date    CREATININE 0.9 04/17/2023    EGFRNORACEVR >60.0 04/17/2023         IMPRESSION:  Encounter Diagnoses   Name Primary?    Recurrent UTI Yes         Assessment:       1. Recurrent UTI        Plan:   - continue urethral estrace cream every other night indefinitely  - continue 2 grams daily D-Mannose  - continue daily 50 mg macrodantin   - continue standard UTI precautions  - creatinine in 6 months    Follow up PRN    I spent 30 minutes with the patient of which more than half was spent in direct consultation with the patient in regards to our treatment and plan.  We addressed the office findings and recent labs.   Education and recommendations of today's plan of care including home remedies and needed follow up with PCP.   We discussed the chief complaint; reviewed the LUTS and the possible contributory factors.   Reassurance no infection.  Recommended lifestyle modifications with a proper, healthy diet, good hydration but during the day. Reducing bladder irritants.   Benefits of regular exercise.

## 2023-04-24 ENCOUNTER — TELEPHONE (OUTPATIENT)
Dept: ENDOSCOPY | Facility: HOSPITAL | Age: 73
End: 2023-04-24
Payer: MEDICARE

## 2023-04-24 VITALS — HEIGHT: 61 IN | WEIGHT: 137 LBS | BODY MASS INDEX: 25.86 KG/M2

## 2023-04-25 ENCOUNTER — CLINICAL SUPPORT (OUTPATIENT)
Dept: REHABILITATION | Facility: HOSPITAL | Age: 73
End: 2023-04-25
Attending: ORTHOPAEDIC SURGERY
Payer: MEDICARE

## 2023-04-25 DIAGNOSIS — M54.50 CHRONIC RIGHT-SIDED LOW BACK PAIN, UNSPECIFIED WHETHER SCIATICA PRESENT: Primary | ICD-10-CM

## 2023-04-25 DIAGNOSIS — G89.29 CHRONIC RIGHT-SIDED LOW BACK PAIN, UNSPECIFIED WHETHER SCIATICA PRESENT: Primary | ICD-10-CM

## 2023-04-25 PROCEDURE — 97140 MANUAL THERAPY 1/> REGIONS: CPT | Mod: PO

## 2023-04-25 PROCEDURE — 97530 THERAPEUTIC ACTIVITIES: CPT | Mod: PO

## 2023-04-25 PROCEDURE — 97110 THERAPEUTIC EXERCISES: CPT | Mod: PO

## 2023-04-25 NOTE — PROGRESS NOTES
[facility]   Physical Therapy Treatment Note     Name: Jackelyn Kendrick  Clinic Number: [id number]    Therapy Diagnosis:   Encounter Diagnosis   Name Primary?    Chronic right-sided low back pain, unspecified whether sciatica present Yes           Physician: Reginald Pickens MD    Visit Date: 4/25/2023      Physician Orders: PT Eval and Treat   Medical Diagnosis from Referral: Lumbar spondylosis  Evaluation Date: 3/10/2023  Authorization Period Expiration: 3/5/2024  Plan of Care Expiration: 5/10/2023  Progress Note Due: 4/10/2023  Visit # / Visits authorized: 16/ 20   FOTO: 1/3     Precautions: Standard and cancer   PTA Visit #: 1/5     Time In: 1:17 pm  Time Out: 2:00 pm  Total Billable Time: 43 minutes    SUBJECTIVE     Pt reports: Pt continues to report that she can only walk for a certain amount of time before she starts to have R side hip and lateral thigh pain  She was compliant with home exercise program.  Response to previous treatment: mild soreness  Functional change: at her base line level of pain    Pain: 2/10  Location: right side of lower back      OBJECTIVE     Objective Measures updated at progress report unless specified.   (+) R hip internal rotation test (-) on the L  Reproduction  of L lower 1/4 Sx with passive R hip internal rotation with R hip at 90 degrees  Treatment   Re=evaluation = 10 minutes  Jackelyn Kendrick received the treatments listed below:      therapeutic exercises to develop core stabilization for 23 minutes including:  Nu-step x 6 min  Abdominal bracing 10 x 2 with 5 sec hold  Gluteal sets in supine 10 x 2 with 5 sec hold  Hook lying B hip abduction with a green band with gluteal sets 10 x 2 with 5 second hold  Hook lying B hip adduction with a ball with gluteal sets 10 x 2 with 5 second hold  Lower trunk rotation with green TB x 10 to R & L  Bilateral knee to chest with LE with green physioball (shortened range of motion) 2x10  Pulldowns with  red TB with abdominal isometric 2x10        manual therapy techniques: Soft tissue Mobilization were applied to the: low back for 08 minutes, including:  STM to R lumbar paraspinals and glutes using hypervolt  R hip long and short axis distraction - did not tolerate long axis distraction position    Neuromuscular re-education for balance and coordination for 00 min including:  Step ups x 10 on R & L  Side stepping 20 ft x 4    Patient Education and Home Exercises     Home Exercises Provided and Patient Education Provided     Education provided:   - discussion of hip vs SI vs lumbar spine as a possible cause of her discomfort with walking.    Written Home Exercises Provided: yes. Exercises were reviewed and Jackelyn Kendrick was able to demonstrate them prior to the end of the session.  Jackelyn Kendrick demonstrated good  understanding of the education provided. See EMR under Patient Instructions for exercises provided during therapy sessions    ASSESSMENT     Ms. Kendrick presents to treatment with c/o R lateral thigh discomfort that was increased with R leg roll test into R hip IR as well as pain into her R groin with R hip IR with the hip held at 90 degrees for flexion.  Her X-Ray shows and R lumbar curve with approximation of the R edge of the vertebra with in the curve which would be further approximated with this movement.  She may, therefore have R hip or lumbar involvement as well as R SI joint arthritic involvement.  Recheck all signs and Sx next visit.  Pt tolerated today's tx with some pian in her R lateral thigh and lower back. She did not tolerate the position for R hip long axis distraction. The position for short axis distraction was tolerated much better.Continue to progress as tolerated.      Jackelyn Kendrick Is progressing well towards her goals.   Pt prognosis is Good.     Pt will continue to benefit from skilled outpatient physical therapy to address the deficits listed in the problem list  box on initial evaluation, provide pt/family education and to maximize pt's level of independence in the home and community environment.     Pt's spiritual, cultural and educational needs considered and pt agreeable to plan of care and goals.     Anticipated barriers to physical therapy: none    Goals:   Short Term Goals: 4 weeks   Pt will be instructed in an exercise program to address functional deficits related to her lower back Sx  Pt will report decreased low back pain to </= 6/10 at worst with standing and walking  Pt will be compliant with her Physical Therapy treatments  Improve B hamstring flexibility by >/= 5 degrees  Pt will improve her ability to engage her gluteal and abdominal muscles to assist in supporting her lumbar spine.     Long Term Goals: 8 weeks   Pt will be independent in a HEP to assist in managing her low back Sx.  Pt will report decreased low back pain to </= 3/10 at worst with standing and walking  Pt will be compliant with her HEP  Improve B hamstring flexibility by >/= 10 degrees  Pt will report improved functional ability through an improved score on the FOTO lumbar spine survey.  PLAN     Progress physical therapy treatments to assist Pt with managing her lower back Sx.    Fermin Frias, PT

## 2023-04-27 ENCOUNTER — CLINICAL SUPPORT (OUTPATIENT)
Dept: REHABILITATION | Facility: HOSPITAL | Age: 73
End: 2023-04-27
Attending: ORTHOPAEDIC SURGERY
Payer: MEDICARE

## 2023-04-27 DIAGNOSIS — G89.29 CHRONIC RIGHT-SIDED LOW BACK PAIN, UNSPECIFIED WHETHER SCIATICA PRESENT: Primary | ICD-10-CM

## 2023-04-27 DIAGNOSIS — M54.50 CHRONIC RIGHT-SIDED LOW BACK PAIN, UNSPECIFIED WHETHER SCIATICA PRESENT: Primary | ICD-10-CM

## 2023-04-27 PROCEDURE — 97110 THERAPEUTIC EXERCISES: CPT | Mod: PO

## 2023-04-27 PROCEDURE — 97140 MANUAL THERAPY 1/> REGIONS: CPT | Mod: PO

## 2023-04-27 NOTE — PROGRESS NOTES
[facility]   Physical Therapy Treatment Note     Name: Jackelyn Kendrick  Clinic Number: [id number]    Therapy Diagnosis:   Encounter Diagnosis   Name Primary?    Chronic right-sided low back pain, unspecified whether sciatica present Yes           Physician: Reginald Pickens MD    Visit Date: 4/27/2023      Physician Orders: PT Eval and Treat   Medical Diagnosis from Referral: Lumbar spondylosis  Evaluation Date: 3/10/2023  Authorization Period Expiration: 3/5/2024  Plan of Care Expiration: 5/10/2023  Progress Note Due: 4/10/2023  Visit # / Visits authorized: 18/ 20   FOTO: 1/3     Precautions: Standard and cancer   PTA Visit #: 1/5     Time In: 1:16 pm  Time Out: 2:00 pm  Total Billable Time: 44 minutes    SUBJECTIVE     Pt reports: Pt with continued R lateral thigh discomfort.  She also reported having discomfort on the R with waking this morning.   She was compliant with home exercise program.  Response to previous treatment: mild soreness  Functional change: at her base line level of pain    Pain: 2/10  Location: right side of lower back      OBJECTIVE     Objective Measures updated at progress report unless specified.   (-) R hip internal rotation test (-) on the L    Treatment     Jackelyn Kendrick received the treatments listed below:      therapeutic exercises to develop core stabilization for 23 minutes including:  Nu-step x 8 min  Abdominal bracing 10 x 2 with 5 sec hold  Gluteal sets in supine 10 x 2 with 5 sec hold  Hook lying B hip abduction with a green band with gluteal sets 10 x 2 with 5 second hold  Hook lying B hip adduction with a ball with gluteal sets 10 x 2 with 5 second hold  Lower trunk rotation with green TB x 10 to R & L  Bilateral knee to chest with LE with green physioball (shortened range of motion) 2x10  Pulldowns with green TB with abdominal isometric 2x10        manual therapy techniques: Soft tissue Mobilization were applied to the: low back for 08  minutes, including:  STM to R lumbar paraspinals and glutes using hypervolt  R hip short axis distraction    Neuromuscular re-education for balance and coordination for 00 min including:  Step ups x 10 on R & L  Side stepping 20 ft x 4    Patient Education and Home Exercises     Home Exercises Provided and Patient Education Provided     Education provided:   - discussion of hip vs SI vs lumbar spine as a possible cause of her discomfort with walking.    Written Home Exercises Provided: yes. Exercises were reviewed and Jackelyn Kendrick was able to demonstrate them prior to the end of the session.  Jackelyn Kendrick demonstrated good  understanding of the education provided. See EMR under Patient Instructions for exercises provided during therapy sessions    ASSESSMENT     Ms. Kendrick presents to treatment with c/o R lateral thigh discomfort that was increased with R leg roll test into R hip IR as well as pain into her R groin with R hip IR with the hip held at 90 degrees for flexion.  Her X-Ray shows and R lumbar curve with approximation of the R edge of the vertebra with in the curve which would be further approximated with this movement.  She may, therefore have R hip or lumbar involvement as well as R SI joint arthritic involvement.  Recheck all signs and Sx next visit.  Pt tolerated today's tx with some pian in her R lateral thigh and lower back. She did not tolerate the position for R hip long axis distraction. The position for short axis distraction was tolerated much better.Continue to progress as tolerated.      Jackelyn Kendrick Is progressing well towards her goals.   Pt prognosis is Good.     Pt will continue to benefit from skilled outpatient physical therapy to address the deficits listed in the problem list box on initial evaluation, provide pt/family education and to maximize pt's level of independence in the home and community environment.     Pt's spiritual, cultural and educational needs  considered and pt agreeable to plan of care and goals.     Anticipated barriers to physical therapy: none    Goals:   Short Term Goals: 4 weeks   Pt will be instructed in an exercise program to address functional deficits related to her lower back Sx  Pt will report decreased low back pain to </= 6/10 at worst with standing and walking  Pt will be compliant with her Physical Therapy treatments  Improve B hamstring flexibility by >/= 5 degrees  Pt will improve her ability to engage her gluteal and abdominal muscles to assist in supporting her lumbar spine.     Long Term Goals: 8 weeks   Pt will be independent in a HEP to assist in managing her low back Sx.  Pt will report decreased low back pain to </= 3/10 at worst with standing and walking  Pt will be compliant with her HEP  Improve B hamstring flexibility by >/= 10 degrees  Pt will report improved functional ability through an improved score on the FOTO lumbar spine survey.  PLAN     Progress physical therapy treatments to assist Pt with managing her lower back Sx.    Fermin Frias, PT

## 2023-06-01 ENCOUNTER — HOSPITAL ENCOUNTER (OUTPATIENT)
Facility: HOSPITAL | Age: 73
Discharge: HOME | End: 2023-06-01
Attending: STUDENT IN AN ORGANIZED HEALTH CARE EDUCATION/TRAINING PROGRAM | Admitting: INTERNAL MEDICINE
Payer: MEDICARE

## 2023-06-01 VITALS
DIASTOLIC BLOOD PRESSURE: 67 MMHG | OXYGEN SATURATION: 95 % | HEIGHT: 61 IN | HEART RATE: 70 BPM | SYSTOLIC BLOOD PRESSURE: 156 MMHG | RESPIRATION RATE: 18 BRPM | WEIGHT: 135 LBS | TEMPERATURE: 98 F | BODY MASS INDEX: 25.49 KG/M2

## 2023-06-01 DIAGNOSIS — K59.00 FECAL INCONTINENCE ALTERNATING WITH CONSTIPATION: ICD-10-CM

## 2023-06-01 DIAGNOSIS — R15.9 FECAL INCONTINENCE ALTERNATING WITH CONSTIPATION: ICD-10-CM

## 2023-06-01 PROCEDURE — 91122 HC ANORECTAL MANOMETRY: CPT | Mod: TC | Performed by: STUDENT IN AN ORGANIZED HEALTH CARE EDUCATION/TRAINING PROGRAM

## 2023-06-01 PROCEDURE — 91120 HC RECTAL SENSATION TEST, BALLOON: CPT | Mod: TC | Performed by: STUDENT IN AN ORGANIZED HEALTH CARE EDUCATION/TRAINING PROGRAM

## 2023-06-01 NOTE — OR NURSING
Anorectal manometry completed. Tolerated well. AVS and discharge instructions reviewed and understood. No pain at time of discharge. Passed 60cc water filled expulsion balloon in 15 seconds.Nayana Paulson present for entire study.

## 2023-06-02 PROCEDURE — 91122 PR ANAL PRESSURE RECORD: CPT | Mod: 26,,, | Performed by: STUDENT IN AN ORGANIZED HEALTH CARE EDUCATION/TRAINING PROGRAM

## 2023-06-02 PROCEDURE — 91120 PR RECTAL SENSATION TEST, BALLOON: ICD-10-PCS | Mod: 26,,, | Performed by: STUDENT IN AN ORGANIZED HEALTH CARE EDUCATION/TRAINING PROGRAM

## 2023-06-02 PROCEDURE — 91120 PR RECTAL SENSATION TEST, BALLOON: CPT | Mod: 26,,, | Performed by: STUDENT IN AN ORGANIZED HEALTH CARE EDUCATION/TRAINING PROGRAM

## 2023-06-02 PROCEDURE — 91122 PR ANAL PRESSURE RECORD: ICD-10-PCS | Mod: 26,,, | Performed by: STUDENT IN AN ORGANIZED HEALTH CARE EDUCATION/TRAINING PROGRAM

## 2023-06-02 NOTE — [employer]ATION PATIENT INSTRUCTIONS
Discharge Summary/Instructions after an Endoscopic Procedure  Patient Name: Jackelyn Kendrick  Patient MRN: [id number]  Patient YOB: 1950 Thursday, June 1, 2023  Paulo Pritchett MD  Dear patient,  As a result of recent federal legislation (The Federal Cures Act), you may   receive lab or pathology results from your procedure in your [facility]   account before your physician is able to contact you. Your physician or   their representative will relay the results to you with their   recommendations at their soonest availability.  Thank you,  RESTRICTIONS:  During your procedure today, you received medications for sedation.  These   medications may affect your judgment, balance and coordination.  Therefore,   for 24 hours, you have the following restrictions:   - DO NOT drive a car, operate machinery, make legal/financial decisions,   sign important papers or drink alcohol.    ACTIVITY:  Today: no heavy lifting, straining or running due to procedural   sedation/anesthesia.  The following day: return to full activity including work.  DIET:  Eat and drink normally unless instructed otherwise.     TREATMENT FOR COMMON SIDE EFFECTS:  - Mild abdominal pain, nausea, belching, bloating or excessive gas:  rest,   eat lightly and use a heating pad.  - Sore Throat: treat with throat lozenges and/or gargle with warm salt   water.  - Because air was used during the procedure, expelling large amounts of air   from your rectum or belching is normal.  - If a bowel prep was taken, you may not have a bowel movement for 1-3 days.    This is normal.  SYMPTOMS TO WATCH FOR AND REPORT TO YOUR PHYSICIAN:  1. Abdominal pain or bloating, other than gas cramps.  2. Chest pain.  3. Back pain.  4. Signs of infection such as: chills or fever occurring within 24 hours   after the procedure.  5. Rectal bleeding, which would show as bright red, maroon, or black stools.   (A tablespoon of blood from the rectum is not serious, especially  if   hemorrhoids are present.)  6. Vomiting.  7. Weakness or dizziness.  GO DIRECTLY TO THE NEAREST EMERGENCY ROOM IF YOU HAVE ANY OF THE FOLLOWING:      Difficulty breathing              Chills and/or fever over 101 F   Persistent vomiting and/or vomiting blood   Severe abdominal pain   Severe chest pain   Black, tarry stools   Bleeding- more than one tablespoon   Any other symptom or condition that you feel may need urgent attention  Your doctor recommends these additional instructions:  If any biopsies were taken, your doctors clinic will contact you in 1 to 2   weeks with any results.  - Reasonable to refer to PT given low anal sphincter pressures.  For questions, problems or results please call your physician - Paulo Pritchett MD at Work:  [phone].  [facility], EMERGENCY ROOM PHONE NUMBER: [phone]  IF A COMPLICATION OR EMERGENCY SITUATION ARISES AND YOU ARE UNABLE TO REACH   YOUR PHYSICIAN - GO DIRECTLY TO THE EMERGENCY ROOM.  Paulo Pritchett MD  6/2/2023 12:05:53 PM  This report has been verified and signed electronically.  Dear patient,  As a result of recent federal legislation (The Federal Cures Act), you may   receive lab or pathology results from your procedure in your [facility]   account before your physician is able to contact you. Your physician or   their representative will relay the results to you with their   recommendations at their soonest availability.  Thank you,  PROVATION

## 2023-06-12 PROBLEM — Z00.00 ENCOUNTER FOR PREVENTIVE HEALTH EXAMINATION: Status: RESOLVED | Noted: 2023-03-13 | Resolved: 2023-06-12

## 2023-06-26 ENCOUNTER — HOSPITAL ENCOUNTER (OUTPATIENT)
Dept: RADIOLOGY | Facility: HOSPITAL | Age: 73
Discharge: HOME | End: 2023-06-26
Attending: ORTHOPAEDIC SURGERY
Payer: MEDICARE

## 2023-06-26 PROCEDURE — 73130 X-RAY EXAM OF HAND: CPT | Mod: 26,RT,, | Performed by: RADIOLOGY

## 2023-06-26 PROCEDURE — 73130 X-RAY EXAM OF HAND: CPT | Mod: TC,PO,RT

## 2023-06-26 PROCEDURE — 73130 XR HAND COMPLETE 3 VIEW RIGHT: ICD-10-PCS | Mod: 26,RT,, | Performed by: RADIOLOGY

## 2023-07-14 ENCOUNTER — OFFICE VISIT (OUTPATIENT)
Dept: OPTOMETRY | Facility: CLINIC | Age: 73
End: 2023-07-14
Payer: MEDICARE

## 2023-07-14 DIAGNOSIS — H52.4 PRESBYOPIA: ICD-10-CM

## 2023-07-14 DIAGNOSIS — Z13.5 GLAUCOMA SCREENING: Primary | ICD-10-CM

## 2023-07-14 DIAGNOSIS — Z13.5 SCREENING FOR EYE CONDITION: ICD-10-CM

## 2023-07-14 DIAGNOSIS — H04.123 DRY EYES, BILATERAL: ICD-10-CM

## 2023-07-14 PROCEDURE — 92015 DETERMINE REFRACTIVE STATE: CPT | Mod: ,,, | Performed by: OPTOMETRIST

## 2023-07-14 PROCEDURE — 92015 PR REFRACTION: ICD-10-PCS | Mod: ,,, | Performed by: OPTOMETRIST

## 2023-07-14 PROCEDURE — 99214 OFFICE O/P EST MOD 30 MIN: CPT | Mod: PBBFAC,25,PO | Performed by: OPTOMETRIST

## 2023-07-14 PROCEDURE — 99999 PR PBB SHADOW E&M-EST. PATIENT-LVL IV: ICD-10-PCS | Mod: PBBFAC,,, | Performed by: OPTOMETRIST

## 2023-07-14 PROCEDURE — 92014 PR EYE EXAM, EST PATIENT,COMPREHESV: ICD-10-PCS | Mod: S$PBB,,, | Performed by: OPTOMETRIST

## 2023-07-14 PROCEDURE — 92014 COMPRE OPH EXAM EST PT 1/>: CPT | Mod: S$PBB,,, | Performed by: OPTOMETRIST

## 2023-07-14 PROCEDURE — 99999 PR PBB SHADOW E&M-EST. PATIENT-LVL IV: CPT | Mod: PBBFAC,,, | Performed by: OPTOMETRIST

## 2023-07-14 RX ORDER — CYCLOSPORINE 0.5 MG/ML
1 EMULSION OPHTHALMIC 2 TIMES DAILY
Qty: 60 EACH | Refills: 12 | Status: SHIPPED | OUTPATIENT
Start: 2023-07-14

## 2023-07-14 NOTE — PROGRESS NOTES
HPI    DSL- 3/25/2021 Dr. Gutierrez    73 y/o female present to clinic for routine eye exam. H/o of dry eyes. Pt   present to clinical for concern of blurry near vision, OTC reader doesn't   help. She has dry eyes, treating with cyclosporine- refill needed. No   floaters, flashes of lights or diplopia reported.   Last edited by Jonathan Mclaughlin on 7/14/2023  9:40 AM.            Assessment /Plan     For exam results, see Encounter Report.    Glaucoma screening    Screening for eye condition  -     Ambulatory referral/consult to Ophthalmology    Dry eyes, bilateral  -     cycloSPORINE (RESTASIS) 0.05 % ophthalmic emulsion; Place 1 drop into both eyes 2 (two) times daily.  Dispense: 60 each; Refill: 12    Presbyopia      Sp pciol OU--wrote spex Rx, or otc readers OK  2. See ALL prev notes re. Failed dry eye tx.  Dr Leigh put in plugs in past--pt says eyes felt good for a while, then went back to normal.  Now comfortable on RESTASIS, but needs to add ATs in addition    PLAN:    Cont RESTASIS breakfast and dinner  Add SOOTHE XP or SYS COMPLETE lunh/bedtime/as needed  Rtc 1 yr

## 2023-08-19 ENCOUNTER — PATIENT MESSAGE (OUTPATIENT)
Dept: ORTHOPEDICS | Facility: CLINIC | Age: 73
End: 2023-08-19
Payer: MEDICARE

## 2023-08-19 DIAGNOSIS — M79.641 RIGHT HAND PAIN: Primary | ICD-10-CM

## 2023-08-21 ENCOUNTER — HOSPITAL ENCOUNTER (OUTPATIENT)
Dept: RADIOLOGY | Facility: HOSPITAL | Age: 73
Discharge: HOME | End: 2023-08-21
Attending: ORTHOPAEDIC SURGERY
Payer: MEDICARE

## 2023-08-21 ENCOUNTER — OFFICE VISIT (OUTPATIENT)
Dept: ORTHOPEDICS | Facility: CLINIC | Age: 73
End: 2023-08-21
Payer: MEDICARE

## 2023-08-21 DIAGNOSIS — M79.641 RIGHT HAND PAIN: ICD-10-CM

## 2023-08-21 DIAGNOSIS — M72.0 DUPUYTREN'S DISEASE OF PALM OF LEFT HAND: ICD-10-CM

## 2023-08-21 DIAGNOSIS — M65.4 TENOSYNOVITIS, DE QUERVAIN: Primary | ICD-10-CM

## 2023-08-21 PROCEDURE — 99999PBSHW PR PBB SHADOW TECHNICAL ONLY FILED TO HB: Mod: PBBFAC,,,

## 2023-08-21 PROCEDURE — 20550 TENDON SHEATH: ICD-10-PCS | Mod: S$PBB,RT,, | Performed by: ORTHOPAEDIC SURGERY

## 2023-08-21 PROCEDURE — 99999 PR PBB SHADOW E&M-EST. PATIENT-LVL III: CPT | Mod: PBBFAC,,, | Performed by: ORTHOPAEDIC SURGERY

## 2023-08-21 PROCEDURE — 99999 PR PBB SHADOW E&M-EST. PATIENT-LVL III: ICD-10-PCS | Mod: PBBFAC,,, | Performed by: ORTHOPAEDIC SURGERY

## 2023-08-21 PROCEDURE — 73130 X-RAY EXAM OF HAND: CPT | Mod: 26,RT,, | Performed by: INTERNAL MEDICINE

## 2023-08-21 PROCEDURE — 99214 OFFICE O/P EST MOD 30 MIN: CPT | Mod: S$PBB,25,, | Performed by: ORTHOPAEDIC SURGERY

## 2023-08-21 PROCEDURE — 99214 PR OFFICE/OUTPT VISIT, EST, LEVL IV, 30-39 MIN: ICD-10-PCS | Mod: S$PBB,25,, | Performed by: ORTHOPAEDIC SURGERY

## 2023-08-21 PROCEDURE — 20550 NJX 1 TENDON SHEATH/LIGAMENT: CPT | Mod: PBBFAC,PO,RT | Performed by: ORTHOPAEDIC SURGERY

## 2023-08-21 PROCEDURE — 73130 XR HAND COMPLETE 3 VIEW RIGHT: ICD-10-PCS | Mod: 26,RT,, | Performed by: INTERNAL MEDICINE

## 2023-08-21 PROCEDURE — 99999PBSHW PR PBB SHADOW TECHNICAL ONLY FILED TO HB: ICD-10-PCS | Mod: PBBFAC,,,

## 2023-08-21 PROCEDURE — 73130 X-RAY EXAM OF HAND: CPT | Mod: TC,PO,RT

## 2023-08-21 PROCEDURE — 99213 OFFICE O/P EST LOW 20 MIN: CPT | Mod: PBBFAC,PO,25 | Performed by: ORTHOPAEDIC SURGERY

## 2023-08-21 RX ADMIN — METHYLPREDNISOLONE ACETATE 40 MG: 40 INJECTION, SUSPENSION INTRALESIONAL; INTRAMUSCULAR; INTRASYNOVIAL; SOFT TISSUE at 10:08

## 2023-08-21 NOTE — PROCEDURES
Tendon Sheath    Date/Time: 8/21/2023 10:15 AM    Performed by: Suzy Dominguez MD  Authorized by: Suzy Dominguez MD    Consent Done?:  Yes (Verbal)  Indications:  Pain  Prep: patient was prepped and draped in usual sterile fashion      Local anesthesia used?: Yes    Anesthesia:  Local infiltration  Local anesthetic:  Lidocaine 1% without epinephrine  Anesthetic total (ml):  1    Location:  Wrist  Site:  R first doral compartment  Needle size:  25 G  Approach:  Radial  Medications:  40 mg methylPREDNISolone acetate 40 mg/mL  Patient tolerance:  Patient tolerated the procedure well with no immediate complications

## 2023-09-17 ENCOUNTER — OFFICE VISIT (OUTPATIENT)
Dept: URGENT CARE | Facility: CLINIC | Age: 73
End: 2023-09-17
Payer: MEDICARE

## 2023-09-17 VITALS
SYSTOLIC BLOOD PRESSURE: 126 MMHG | OXYGEN SATURATION: 96 % | HEIGHT: 61 IN | DIASTOLIC BLOOD PRESSURE: 58 MMHG | HEART RATE: 60 BPM | TEMPERATURE: 99 F | WEIGHT: 135 LBS | BODY MASS INDEX: 25.49 KG/M2 | RESPIRATION RATE: 19 BRPM

## 2023-09-17 DIAGNOSIS — R30.0 DYSURIA: ICD-10-CM

## 2023-09-17 DIAGNOSIS — N30.00 ACUTE CYSTITIS WITHOUT HEMATURIA: Primary | ICD-10-CM

## 2023-09-17 LAB
BILIRUB UR QL STRIP: NEGATIVE
GLUCOSE UR QL STRIP: NEGATIVE
KETONES UR QL STRIP: NEGATIVE
LEUKOCYTE ESTERASE UR QL STRIP: POSITIVE
PH, POC UA: 6 (ref 5–8)
POC BLOOD, URINE: POSITIVE
POC NITRATES, URINE: POSITIVE
PROT UR QL STRIP: POSITIVE
SP GR UR STRIP: 1.02 (ref 1–1.03)
UROBILINOGEN UR STRIP-ACNC: NORMAL (ref 0.1–1.1)

## 2023-09-17 PROCEDURE — 99213 PR OFFICE/OUTPT VISIT, EST, LEVL III, 20-29 MIN: ICD-10-PCS | Mod: S$GLB,,, | Performed by: PHYSICIAN ASSISTANT

## 2023-09-17 PROCEDURE — 81003 URINALYSIS AUTO W/O SCOPE: CPT | Mod: QW,S$GLB,, | Performed by: PHYSICIAN ASSISTANT

## 2023-09-17 PROCEDURE — 81003 POCT URINALYSIS, DIPSTICK, AUTOMATED, W/O SCOPE: ICD-10-PCS | Mod: QW,S$GLB,, | Performed by: PHYSICIAN ASSISTANT

## 2023-09-17 PROCEDURE — 87088 URINE BACTERIA CULTURE: CPT | Performed by: PHYSICIAN ASSISTANT

## 2023-09-17 PROCEDURE — 87086 URINE CULTURE/COLONY COUNT: CPT | Performed by: PHYSICIAN ASSISTANT

## 2023-09-17 PROCEDURE — 99213 OFFICE O/P EST LOW 20 MIN: CPT | Mod: S$GLB,,, | Performed by: PHYSICIAN ASSISTANT

## 2023-09-17 PROCEDURE — 87077 CULTURE AEROBIC IDENTIFY: CPT | Mod: 59 | Performed by: PHYSICIAN ASSISTANT

## 2023-09-17 PROCEDURE — 87186 SC STD MICRODIL/AGAR DIL: CPT | Performed by: PHYSICIAN ASSISTANT

## 2023-09-17 RX ORDER — SULFAMETHOXAZOLE AND TRIMETHOPRIM 800; 160 MG/1; MG/1
1 TABLET ORAL 2 TIMES DAILY
Qty: 10 TABLET | Refills: 0 | Status: SHIPPED | OUTPATIENT
Start: 2023-09-17 | End: 2023-09-22

## 2023-09-17 RX ORDER — PHENAZOPYRIDINE HYDROCHLORIDE 200 MG/1
200 TABLET, FILM COATED ORAL 3 TIMES DAILY PRN
Qty: 6 TABLET | Refills: 0 | Status: SHIPPED | OUTPATIENT
Start: 2023-09-17 | End: 2023-09-19

## 2023-09-17 RX ORDER — METHYLPREDNISOLONE ACETATE 40 MG/ML
40 INJECTION, SUSPENSION INTRA-ARTICULAR; INTRALESIONAL; INTRAMUSCULAR; SOFT TISSUE
Status: DISCONTINUED | OUTPATIENT
Start: 2023-08-21 | End: 2023-09-17 | Stop reason: HOSPADM

## 2023-09-17 NOTE — PROGRESS NOTES
"Subjective:      Patient ID: Jackelyn Kendrick is a 72 y.o. female.    Vitals:  height is 5' 1" (1.549 m) and weight is 61.2 kg (135 lb). Her oral temperature is 98.5 °F (36.9 °C). Her blood pressure is 126/58 (abnormal) and her pulse is 60. Her respiration is 19 and oxygen saturation is 96%.     Chief Complaint: Dysuria    Jackelyn Kendrick is a 72 y.o. female who complains of urinary frequency, urgency and dysuria x 7 days, without flank pain, fever, chills, or abnormal vaginal discharge or bleeding.   Pt states she took Macrodantin, it helped some what then symptoms came back. Patient is followed by urology for recurrent UTIs.          Dysuria   This is a new problem. The current episode started in the past 7 days. The problem occurs every urination. The problem has been gradually worsening. Quality: pressure. The pain is at a severity of 0/10. The patient is experiencing no pain. There has been no fever. She is Not sexually active. There is No history of pyelonephritis. Associated symptoms include frequency, urgency and withholding. Pertinent negatives include no behavior changes, chills, discharge, flank pain, hematuria, hesitancy, nausea, possible pregnancy, sweats, vomiting, weight loss, bubble bath use, constipation or rash. She has tried antibiotics for the symptoms. The treatment provided no relief. Her past medical history is significant for recurrent UTIs. There is no history of catheterization, diabetes insipidus, diabetes mellitus, genitourinary reflux, hypertension, kidney stones, a single kidney, STD, urinary stasis or a urological procedure.       Constitution: Negative for chills and fever.   Cardiovascular:  Negative for chest pain, leg swelling, palpitations and sob on exertion.   Eyes:  Negative for eye discharge, eye itching, eye pain and photophobia.   Respiratory:  Negative for cough, sputum production, shortness of breath, wheezing and asthma.    Gastrointestinal:  Negative for abdominal " pain, nausea, vomiting and constipation.   Genitourinary:  Positive for dysuria, frequency and urgency. Negative for urine decreased, flank pain and hematuria.   Musculoskeletal:  Negative for pain and back pain.   Skin:  Negative for rash.   Allergic/Immunologic: Negative for environmental allergies, seasonal allergies, food allergies and asthma.   Neurological:  Negative for dizziness, speech difficulty and headaches.      Objective:     Physical Exam   Constitutional: She is oriented to person, place, and time. She appears well-developed. She is cooperative. normal  HENT:   Head: Normocephalic and atraumatic.   Ears:   Right Ear: Hearing, tympanic membrane, external ear and ear canal normal.   Left Ear: Hearing, tympanic membrane, external ear and ear canal normal.   Nose: Nose normal. No mucosal edema or nasal deformity. No epistaxis. Right sinus exhibits no maxillary sinus tenderness and no frontal sinus tenderness. Left sinus exhibits no maxillary sinus tenderness and no frontal sinus tenderness.   Mouth/Throat: Uvula is midline and oropharynx is clear and moist. Mucous membranes are moist. No trismus in the jaw. Normal dentition. No uvula swelling. Oropharynx is clear.   Eyes: Conjunctivae and lids are normal. Pupils are equal, round, and reactive to light. Extraocular movement intact   Neck: Trachea normal and phonation normal. Neck supple.   Cardiovascular: Normal rate, regular rhythm, normal heart sounds and normal pulses.   Pulmonary/Chest: Effort normal and breath sounds normal.   Abdominal: Normal appearance and bowel sounds are normal. Soft. There is no left CVA tenderness and no right CVA tenderness.   Musculoskeletal: Normal range of motion.         General: Normal range of motion.   Neurological: She is alert and oriented to person, place, and time. She exhibits normal muscle tone.   Skin: Skin is warm, dry and intact.   Psychiatric: Her speech is normal and behavior is normal. Judgment and thought  content normal.   Nursing note and vitals reviewed.    Results for orders placed or performed in visit on 09/17/23   POCT Urinalysis, Dipstick, Automated, W/O Scope   Result Value Ref Range    POC Blood, Urine Positive (A) Negative    POC Bilirubin, Urine Negative Negative    POC Urobilinogen, Urine normal 0.1 - 1.1    POC Ketones, Urine Negative Negative    POC Protein, Urine Positive (A) Negative    POC Nitrates, Urine Positive (A) Negative    POC Glucose, Urine Negative Negative    pH, UA 6.0 5 - 8    POC Specific Gravity, Urine 1.020 1.003 - 1.029    POC Leukocytes, Urine Positive (A) Negative         Assessment:     1. Acute cystitis without hematuria    2. Dysuria      Patient presents with clinical exam findings and history consistent with above.      On exam, patient is nontoxic appearing and vitals are stable.      Diagnostic testing results were reviewed and discussed with patient.  Tests ordered in clinic:  POCT Urinalysis, Dipstick, Automated, W/O Scope: positive for RBC's, positive for protein, positive for nitrates, and positive for leukocytes.    Urine culture: pending  Previous progress notes/admissions/labs and medications were reviewed.    Plan:   Reviewed note; patient is on daily 50 mg macrodantin for UTI prevention. Reports good response with bactrim.  Continue urology recommendations:   - continue urethral estrace cream every other night indefinitely  - continue 2 grams daily D-Mannose  - continue daily 50 mg macrodantin   - continue standard UTI precautions    Acute cystitis without hematuria  -     phenazopyridine (PYRIDIUM) 200 MG tablet; Take 1 tablet (200 mg total) by mouth 3 (three) times daily as needed for Pain (Pain while urinating (dysuria)).  Dispense: 6 tablet; Refill: 0  -     sulfamethoxazole-trimethoprim 800-160mg (BACTRIM DS) 800-160 mg Tab; Take 1 tablet by mouth 2 (two) times daily. for 5 days  Dispense: 10 tablet; Refill: 0  -     Ambulatory referral/consult to  Urology    Dysuria  -     POCT Urinalysis, Dipstick, Automated, W/O Scope  -     CULTURE, URINE                    1) See orders for this visit as documented in the electronic medical record.  2) Symptomatic therapy suggested: push fluids.   3) Call or return to clinic prn if these symptoms worsen or fail to improve as anticipated.    Discussed results/diagnosis/plan with patient in clinic.  We had shared decision making for patient's treatment. Patient verbalized understanding and in agreement with current treatment plan.     Patient was instructed to return for re-evaluation with urgent care or PCP for continued outpatient workup and management if symptoms do not improve/worsening symptoms. Strict ED versus clinic precautions given in depth.    Discharge and follow-up instructions given verbally/printed with the patient who expressed understanding. The instructions and results are also available on [employer].              Shaylee Thompson PA-C          Patient Instructions   Urine culture was ordered if you have a history of recurrent UTIs, pediatrics/elderly population.  If you were prescribed antibiotics, please take them to completion.  If you were prescribed a narcotic medication, do not drive or operate heavy equipment or machinery while taking these medications.  Please drink plenty of fluids.  Please get plenty of rest.  If you were prescribed Pyridium (phenazopyridine), please be aware that if you wear contact lens that this medication may stain your contacts.  While taking this medication it is recommended that you do not wear your contacts until 24 hours after your last dose.  If you  smoke, please stop smoking.      Please remember that you have received care at an urgent care today. Urgent cares are not emergency rooms and are not equipped to handle life threatening emergencies and cannot rule in or out certain medical conditions and you may be released before all of your medical problems are known or  treated. Please arrange follow up with your primary care physician or speciality clinic  within 2-5 days if your signs and symptoms have not resolved or worsen. Patient can call our Referral Hotline at [phone] to make an appointment.    Please return here or go to the Emergency Department for any concerns or worsening of condition.Patient was educated on signs/symptoms that would warrant emergent medical attention.   Signs of infection. These include a fever of 100.4°F (38°C) or higher, chills, back pain, nausea, throwing up, or bloody urine.  Signs come back after treatment ends  You notice more blood in your urine.  Your signs get worse or do not improve within 24 hours of starting treatment.  You are not able to urinate for more than 8 hours.  Your signs come back after treatment has stopped.

## 2023-09-17 NOTE — PROGRESS NOTES
Jackelyn Kendrick presents for follow up evaluation of   Encounter Diagnoses   Name Primary?    Tenosynovitis, de Quervain Yes    Dupuytren's disease of palm of left hand      She reports right wrist pain with range of motion.  She denies any trauma.  She does do ceramics.  She continues to have a Dupuytren's cord in the left palm.    PE:    AA&O x 4.  NAD  HEENT:  NCAT, sclera nonicteric  Lungs:  Respirations are equal and unlabored.  CV:  2+ bilateral upper and lower extremity pulses.  MSK:  Neurovascularly intact bilaterally.  5/5 thenar and intrinsic musculature strength. full range of motion hands, wrists and elbows. + Finkelstein's test, tender to palpation right 1st dorsal extensor compartment.  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         A/P: Right de Quervain's tendonitis, bilateral hand osteoarthritis, bilateral Dupuytren's disease     1) We have discussed the natural history of hand arthritis and de Quervain's tendonitis including treatment options such as splinting, oral and topical anti-inflammatories, cortisone injections and surgery.     -I have offered her a selective injection. I have explained the risks, benefits, and alternatives of the procedure in detail.  The patient voices understanding and all questions have been answered. The patient agrees to proceed as planned. So after a sterile prep of the skin in the normal fashion the right 1st dorsal extensor compartment was injected using a 25 gauge needle with a combination of 1cc 1% plain lidocaine and 40 mg of methylprednisolone.  The patient is cautioned and immediate relief of pain is secondary to the local anesthetic and will be temporary.  After the anesthetic wears off there may be a  increase in pain that may last for a few hours or a few days and they should use ice to help alleviate this flair up of pain. Patient tolerated the procedure well.     - F/U as needed for any worsening of symptoms  - Call with any questions/concerns in the interim         Suzy Dominguez MD    Please be aware that this note has been generated with the assistance of [employer] voice-to-text.  Please excuse any spelling or grammatical errors.

## 2023-09-17 NOTE — PATIENT INSTRUCTIONS
Urine culture was ordered if you have a history of recurrent UTIs, pediatrics/elderly population.  If you were prescribed antibiotics, please take them to completion.  If you were prescribed a narcotic medication, do not drive or operate heavy equipment or machinery while taking these medications.  Please drink plenty of fluids.  Please get plenty of rest.  If you were prescribed Pyridium (phenazopyridine), please be aware that if you wear contact lens that this medication may stain your contacts.  While taking this medication it is recommended that you do not wear your contacts until 24 hours after your last dose.  If you  smoke, please stop smoking.      Please remember that you have received care at an urgent care today. Urgent cares are not emergency rooms and are not equipped to handle life threatening emergencies and cannot rule in or out certain medical conditions and you may be released before all of your medical problems are known or treated. Please arrange follow up with your primary care physician or speciality clinic  within 2-5 days if your signs and symptoms have not resolved or worsen. Patient can call our Referral Hotline at [phone] to make an appointment.    Please return here or go to the Emergency Department for any concerns or worsening of condition.Patient was educated on signs/symptoms that would warrant emergent medical attention.   Signs of infection. These include a fever of 100.4°F (38°C) or higher, chills, back pain, nausea, throwing up, or bloody urine.  Signs come back after treatment ends  You notice more blood in your urine.  Your signs get worse or do not improve within 24 hours of starting treatment.  You are not able to urinate for more than 8 hours.  Your signs come back after treatment has stopped.

## 2023-09-19 ENCOUNTER — PATIENT MESSAGE (OUTPATIENT)
Dept: URGENT CARE | Facility: CLINIC | Age: 73
End: 2023-09-19
Payer: MEDICARE

## 2023-09-19 LAB — BACTERIA UR CULT: ABNORMAL

## 2023-09-20 ENCOUNTER — TELEPHONE (OUTPATIENT)
Dept: URGENT CARE | Facility: CLINIC | Age: 73
End: 2023-09-20
Payer: MEDICARE

## 2023-09-20 NOTE — TELEPHONE ENCOUNTER
Calling to give patient positive urine culture results. She is feeling much better. States that she did not get up last night to urinate. Dysuria is resolved.    RTC if symptoms worsen.  
EMS

## 2023-10-20 ENCOUNTER — OFFICE VISIT (OUTPATIENT)
Dept: SPORTS MEDICINE | Facility: CLINIC | Age: 73
End: 2023-10-20
Payer: MEDICARE

## 2023-10-20 VITALS
SYSTOLIC BLOOD PRESSURE: 183 MMHG | HEART RATE: 70 BPM | DIASTOLIC BLOOD PRESSURE: 79 MMHG | HEIGHT: 61 IN | BODY MASS INDEX: 25.49 KG/M2 | WEIGHT: 135 LBS

## 2023-10-20 DIAGNOSIS — G56.22 CUBITAL TUNNEL SYNDROME ON LEFT: ICD-10-CM

## 2023-10-20 DIAGNOSIS — M25.512 CHRONIC LEFT SHOULDER PAIN: Primary | ICD-10-CM

## 2023-10-20 DIAGNOSIS — G89.29 CHRONIC LEFT SHOULDER PAIN: Primary | ICD-10-CM

## 2023-10-20 DIAGNOSIS — Z98.890 S/P LEFT ROTATOR CUFF REPAIR: ICD-10-CM

## 2023-10-20 PROCEDURE — 99214 OFFICE O/P EST MOD 30 MIN: CPT | Mod: S$PBB,,, | Performed by: ORTHOPAEDIC SURGERY

## 2023-10-20 PROCEDURE — 99214 PR OFFICE/OUTPT VISIT, EST, LEVL IV, 30-39 MIN: ICD-10-PCS | Mod: S$PBB,,, | Performed by: ORTHOPAEDIC SURGERY

## 2023-10-20 PROCEDURE — 99999 PR PBB SHADOW E&M-EST. PATIENT-LVL IV: CPT | Mod: PBBFAC,,, | Performed by: ORTHOPAEDIC SURGERY

## 2023-10-20 PROCEDURE — 99999 PR PBB SHADOW E&M-EST. PATIENT-LVL IV: ICD-10-PCS | Mod: PBBFAC,,, | Performed by: ORTHOPAEDIC SURGERY

## 2023-10-20 PROCEDURE — 99214 OFFICE O/P EST MOD 30 MIN: CPT | Mod: PBBFAC | Performed by: ORTHOPAEDIC SURGERY

## 2023-10-20 NOTE — PROGRESS NOTES
CC: LEFT shoulder pain    72 y.o.female presents for follow up of her left shoulder. Patient received a CSI approximately 7 months ago and notes good relief relief from this. She is complaining today of medial elbow pain that radiates to her small finger. She states this is new for her.       Hx (2/23/23):   72 y.o. Female who is a prior patient of mine. History of left shoulder rotator cuff repair by me in 2010, she has a right RCR in 2020 by me. RHD. Reports left shoulder pain for 2 months, no discreet injury. Pain localizes over trapezius with occasional radiation to left elbow. Pain associated with activity and rest. Worse with reaching over head. Reports pain at night. For treatment, she has tried rest, activity modification, NSAIDs, and physical therapy for 8 weeks.      Past Medical History:   Diagnosis Date    Arthritis     Basal cell carcinoma     Bronchitis     seasonal    Cataract     Diverticulitis     Dry eye syndrome     GERD (gastroesophageal reflux disease)     Hyperlipidemia     Hypertension     Hypothyroidism 07/19/2012    Obese     PONV (postoperative nausea and vomiting)     Thyroid disease        Past Surgical History:   Procedure Laterality Date    ANORECTAL MANOMETRY N/A 6/1/2023    Procedure: MANOMETRY, ANORECTAL;  Surgeon: Paulo Pritchett MD;  Location: [address]);  Service: Endoscopy;  Laterality: N/A;  instructions sent to [id number]  5/26 pre-call no answer; MB    ARTHROSCOPIC REPAIR OF ROTATOR CUFF OF SHOULDER Right 9/23/2020    Procedure: REPAIR, ROTATOR CUFF, ARTHROSCOPIC;  Surgeon: Reginald Pickens MD;  Location: [facility];  Service: Orthopedics;  Laterality: Right;  regional w/catheter (interscalene)    BACK SURGERY      CATARACT EXTRACTION W/  INTRAOCULAR LENS IMPLANT Left 10/20/2021        CHOLECYSTECTOMY      COLONOSCOPY  2007    diverticulosis    COLONOSCOPY N/A 9/3/2020    Procedure: COLONOSCOPY;  Surgeon: Alberta Henriquez MD;  Location: [facility]  ENDO (4TH FLR);  Service: Colon and Rectal;  Laterality: N/A;  pt requested this time-8/31-covid-uc [place]    CYSTOSCOPY      DE QUERVAIN'S RELEASE Left 03/2017    FIXATION OF TENDON Right 9/23/2020    Procedure: FIXATION, TENDON;  Surgeon: Reginald Pickens MD;  Location: [facility] OR;  Service: Orthopedics;  Laterality: Right;    HERNIA REPAIR      HYSTERECTOMY      INJECTION OF STEROID Right 12/2/2021    Procedure: INJECTION, STEROID;  Surgeon: Suzy Dominguez MD;  Location: [facility] OR;  Service: Orthopedics;  Laterality: Right;    INTRAOCULAR PROSTHESES INSERTION Left 10/20/2021    Procedure: INSERTION, IOL PROSTHESIS;  Surgeon: Pippa Ashby MD;  Location: [facility] OR 1ST FLR;  Service: Ophthalmology;  Laterality: Left;    INTRAOCULAR PROSTHESES INSERTION Right 12/29/2021    Procedure: INSERTION, IOL PROSTHESIS;  Surgeon: Pippa Ashby MD;  Location: [facility] OR 1ST FLR;  Service: Ophthalmology;  Laterality: Right;    NASAL SEPTUM SURGERY      PHACOEMULSIFICATION OF CATARACT Left 10/20/2021    Procedure: PHACOEMULSIFICATION, CATARACT/ COMPLEX- PHACO / IOL - OS SMALL PUPIL-OLE RING AND TRYPAN BLUE;  Surgeon: Pippa Ashby MD;  Location: [facility] OR 1ST FLR;  Service: Ophthalmology;  Laterality: Left;    PHACOEMULSIFICATION OF CATARACT Right 12/29/2021    Procedure: PHACOEMULSIFICATION, CATARACT;  Surgeon: Pippa Ashby MD;  Location: [facility] OR 1ST FLR;  Service: Ophthalmology;  Laterality: Right;    SHOULDER SURGERY      TONSILLECTOMY      TRIGGER FINGER RELEASE Left 12/2/2021    Procedure: RELEASE, TRIGGER FINGER;  Surgeon: Suzy Dominguez MD;  Location: [facility] OR;  Service: Orthopedics;  Laterality: Left;       Family History   Problem Relation Age of Onset    Cataracts Mother     Breast cancer Mother     Diabetes Mother     Hypertension Mother     Heart failure Mother     Heart disease Mother     Cataracts Father     Hypertension Father     Stroke Father     No Known Problems Daughter     No  Known Problems Son     Diabetes Paternal Grandmother     Hyperlipidemia Sister     Arthritis Sister     Hyperlipidemia Brother     Arthritis Brother     No Known Problems Son     Amblyopia Neg Hx     Blindness Neg Hx     Glaucoma Neg Hx     Macular degeneration Neg Hx     Retinal detachment Neg Hx     Strabismus Neg Hx     Ovarian cancer Neg Hx     Melanoma Neg Hx     Celiac disease Neg Hx     Colon cancer Neg Hx     Colon polyps Neg Hx     Esophageal cancer Neg Hx     Liver cancer Neg Hx     Liver disease Neg Hx     Rectal cancer Neg Hx     Stomach cancer Neg Hx          Current Outpatient Medications:     albuterol (PROVENTIL/VENTOLIN HFA) 90 mcg/actuation inhaler, Inhale 2 puffs into the lungs every 6 (six) hours as needed for Wheezing., Disp: 6.7 g, Rfl: 11    celecoxib (CELEBREX) 200 MG capsule, Take 1 capsule (200 mg total) by mouth once daily. Do not take more than 15 consecutive days. Take w food and water, Disp: 30 capsule, Rfl: 11    cranberry fruit extract (CRANBERRY EXTRACT ORAL), Take by mouth., Disp: , Rfl:     cycloSPORINE (RESTASIS) 0.05 % ophthalmic emulsion, Place 1 drop into both eyes 2 (two) times daily., Disp: 60 each, Rfl: 12    diclofenac sodium (VOLTAREN) 1 % Gel, Apply 2 g topically 2 (two) times daily as needed (musculoskeletal pain)., Disp: 100 g, Rfl: 11    dicyclomine (BENTYL) 20 mg tablet, TAKE ONE TABLET BY MOUTH FOUR TIMES A DAY BEFORE MEALS AND NIGHTLY, Disp: 120 tablet, Rfl: 11    fluocinonide (LIDEX) 0.05 % external solution, Apply topically once daily., Disp: 60 mL, Rfl: 11    ketoconazole (NIZORAL) 2 % shampoo, Apply topically twice a week., Disp: 120 mL, Rfl: 11    levothyroxine (SYNTHROID) 75 MCG tablet, TAKE ONE TABLET BY MOUTH EVERY DAY ON AN EMPTY STOMACH, Disp: 90 tablet, Rfl: 3    LIDOcaine-prilocaine (EMLA) cream, Apply topically 2 (two) times daily as needed. To hands., Disp: 30 g, Rfl: 2    losartan (COZAAR) 100 MG tablet, Take 1 tablet (100 mg total) by mouth once  daily., Disp: 30 tablet, Rfl: 11    MAGNESIUM ORAL, Take 1 tablet by mouth once daily., Disp: , Rfl:     memantine (NAMENDA) 5 MG Tab, Take 1 tablet (5 mg total) by mouth 2 (two) times daily., Disp: 60 tablet, Rfl: 11    Multi-Vitamin tablet, Take 1 tablet by mouth once daily. , Disp: , Rfl:     nitrofurantoin (MACRODANTIN) 50 MG capsule, Take 1 capsule (50 mg total) by mouth every morning., Disp: 30 capsule, Rfl: 11    omeprazole (PRILOSEC) 40 MG capsule, Take 1 capsule (40 mg total) by mouth once daily., Disp: 30 capsule, Rfl: 11    oxybutynin (DITROPAN-XL) 10 MG 24 hr tablet, Take 1 tablet (10 mg total) by mouth once daily., Disp: 30 tablet, Rfl: 11    PSYLLIUM SEED, WITH SUGAR, (METAMUCIL ORAL), Take by mouth as needed. , Disp: , Rfl:     rosuvastatin (CRESTOR) 40 MG Tab, TAKE ONE TABLET BY MOUTH EVERY DAY, Disp: 90 tablet, Rfl: 3    traZODone (DESYREL) 50 MG tablet, 1/2 tab up to 2 tabs po qhs prn insomnia, Disp: 30 tablet, Rfl: 11    triamcinolone acetonide 0.1% (KENALOG) 0.1 % cream, AAA bid, Disp: 60 g, Rfl: 3    amoxicillin-clavulanate 875-125mg (AUGMENTIN) 875-125 mg per tablet, Take 1 tablet by mouth 2 (two) times daily. (Patient not taking: Reported on 3/13/2023), Disp: 20 tablet, Rfl: 0    estradioL (ESTRACE) 0.01 % (0.1 mg/gram) vaginal cream, Place 0.5-1 g vaginally twice a week., Disp: 42.5 g, Rfl: 3    HYDROcodone-acetaminophen (NORCO) 7.5-325 mg per tablet, Take 1 tablet by mouth every 6 (six) hours as needed for Pain. (Patient not taking: Reported on 3/13/2023), Disp: 28 tablet, Rfl: 0    methocarbamoL (ROBAXIN) 500 MG Tab, Take 1 tablet (500 mg total) by mouth 3 (three) times daily., Disp: 60 tablet, Rfl: 1    Review of patient's allergies indicates:   Allergen Reactions    Levaquin [levofloxacin] Swelling and Edema    Erythromycin (bulk) Nausea And Vomiting          REVIEW OF SYSTEMS:  Constitution: Negative. Negative for chills, fever and night sweats.   HENT: Negative for congestion and  "headaches.    Eyes: Negative for blurred vision, left vision loss and right vision loss.   Cardiovascular: Negative for chest pain and syncope.   Respiratory: Negative for cough and shortness of breath.    Endocrine: Negative for polydipsia, polyphagia and polyuria.   Hematologic/Lymphatic: Negative for bleeding problem. Does not bruise/bleed easily.   Skin: Negative for dry skin, itching and rash.   Musculoskeletal: Negative for falls.  Positive for left shoulder pain and muscle weakness.   Gastrointestinal: Negative for abdominal pain and bowel incontinence.   Genitourinary: Negative for bladder incontinence and nocturia.   Neurological: Negative for disturbances in coordination, loss of balance and seizures.   Psychiatric/Behavioral: Negative for depression. The patient does not have insomnia.    Allergic/Immunologic: Negative for hives and persistent infections.      PHYSICAL EXAMINATION:  Vitals:  BP (!) 183/79   Pulse 70   Ht 5' 1" (1.549 m)   Wt 61.2 kg (135 lb)   BMI 25.51 kg/m²    General: The patient is alert and oriented x 3.  Mood is pleasant.  Observation of ears, eyes and nose reveal no gross abnormalities.  No labored breathing observed.  Gait is coordinated. Patient can toe walk and heel walk without difficulty.      LEFT Shoulder / Upper Extremity Exam    OBSERVATION:     Swelling  none  Deformity  none   Discoloration  none   Scapular winging none   Scars   none  Atrophy  none    TENDERNESS / CREPITUS (T/C):          T/C      T/C   Clavicle   -/-  SUPRAspinatus    +/-     AC Jt.    -/-  INFRAspinatus  -/-    SC Jt.    -/-  Deltoid    -/-      G. Tuberosity  -/-  LH BICEP groove  -/-   Acromion:  -/-  Midline Neck   -/-     Scapular Spine -/-  Trapezium   +/-   SMA Scapula  -/-  GH jt. line - post  -/-     Scapulothoracic  -/-         ROM: (* = with pain)  Right shoulder   Left shoulder        AROM (PROM)   AROM (PROM)   FE    170° (175°)     160° (175°)     ER at 0°    60°  (65°)    65°  " (65°)   ER at 90° ABD  90°  (90°)    90°  (90°)   IR at 90°  ABD   NA  (40°)     NA  (40°)      IR (spine level)   T10     T10    STRENGTH: (* = with pain) Right shoulder   Left shoulder    SCAPTION   5/5    4/5    IR    5/5    5/5   ER    5/5    5/5   BICEPS   5/5    5/5   Deltoid    5/5    5/5     SIGNS:  Painful side       NEER   -    OYEES  neg    BLACK   +    SPEEDS  neg     DROP ARM   -   BELLY PRESS neg   Superior escape none    LIFT-OFF  neg   X-Body ADD    neg    MOVING VALGUS neg        STABILITY TESTING    Right shoulder   Left shoulder    Translation     Anterior  up face     up face    Posterior  up face    up face    Sulcus   < 10mm    < 10 mm     Signs   Apprehension   neg      neg       Relocation   no change     no change      Jerk test  neg     neg    EXTREMITY NEURO-VASCULAR EXAM:    Sensation grossly intact to light touch all dermatomal regions.    DTR 2+ Biceps, Triceps, BR and Negative Janias sign   Grossly intact motor function at Elbow, Wrist and Hand   Distal pulses radial and ulnar 2+, brisk cap refill, symmetric.      NECK:  Painless FROM and spinous processes non-tender. Negative Spurlings sign.      OTHER FINDINGS:  Positive tinels are the elbow    XRAYS:  Xrays including AP, Outlet and Axillary Lateral of Left shoulder are ordered / images reviewed by me:   No fracture dislocation or other pathology   Acromion type 2   Proximal migration of humeral head: None   GH arthritis: None    MRI left shoulder -  Impression:     1. Operative change of rotator cuff repair.  Partial-thickness undersurface tear of the infraspinatus with interstitial delamination and subcentimeter intramuscular cyst.  2. Circumferential degenerative labral fraying.  3. Diminutive caliber biceps tendon, likely postoperative.  4. Mild AC joint arthrosis.  5. Glenohumeral osteoarthritis.      ASSESSMENT:   Left shoulder pain,  1. Chronic left shoulder pain    2. S/P left rotator cuff repair    3. Cubital  tunnel syndrome on left          PLAN:    Will refer back to Dr. Dominguez for cubital tunnel       All questions were answered, patient will contact us for questions or concerns in the interim.

## 2023-10-23 ENCOUNTER — PATIENT MESSAGE (OUTPATIENT)
Dept: UROLOGY | Facility: CLINIC | Age: 73
End: 2023-10-23
Payer: MEDICARE

## 2023-10-26 ENCOUNTER — PATIENT MESSAGE (OUTPATIENT)
Dept: ORTHOPEDICS | Facility: CLINIC | Age: 73
End: 2023-10-26
Payer: MEDICARE

## 2023-10-30 ENCOUNTER — OFFICE VISIT (OUTPATIENT)
Dept: UROLOGY | Facility: CLINIC | Age: 73
End: 2023-10-30
Payer: MEDICARE

## 2023-10-30 ENCOUNTER — LAB VISIT (OUTPATIENT)
Dept: LAB | Facility: HOSPITAL | Age: 73
End: 2023-10-30
Payer: MEDICARE

## 2023-10-30 VITALS
DIASTOLIC BLOOD PRESSURE: 78 MMHG | BODY MASS INDEX: 25.49 KG/M2 | WEIGHT: 135 LBS | HEART RATE: 73 BPM | HEIGHT: 61 IN | SYSTOLIC BLOOD PRESSURE: 173 MMHG

## 2023-10-30 DIAGNOSIS — N39.46 URINARY INCONTINENCE, MIXED: ICD-10-CM

## 2023-10-30 DIAGNOSIS — N39.0 RECURRENT UTI: ICD-10-CM

## 2023-10-30 DIAGNOSIS — N39.0 RECURRENT UTI: Primary | ICD-10-CM

## 2023-10-30 LAB
CREAT SERPL-MCNC: 0.8 MG/DL (ref 0.5–1.4)
EST. GFR  (NO RACE VARIABLE): >60 ML/MIN/1.73 M^2

## 2023-10-30 PROCEDURE — 82565 ASSAY OF CREATININE: CPT

## 2023-10-30 PROCEDURE — 99999 PR PBB SHADOW E&M-EST. PATIENT-LVL IV: ICD-10-PCS | Mod: PBBFAC,,,

## 2023-10-30 PROCEDURE — 36415 COLL VENOUS BLD VENIPUNCTURE: CPT

## 2023-10-30 PROCEDURE — 99214 PR OFFICE/OUTPT VISIT, EST, LEVL IV, 30-39 MIN: ICD-10-PCS | Mod: S$PBB,,,

## 2023-10-30 PROCEDURE — 99214 OFFICE O/P EST MOD 30 MIN: CPT | Mod: PBBFAC

## 2023-10-30 PROCEDURE — 99999 PR PBB SHADOW E&M-EST. PATIENT-LVL IV: CPT | Mod: PBBFAC,,,

## 2023-10-30 PROCEDURE — 99214 OFFICE O/P EST MOD 30 MIN: CPT | Mod: S$PBB,,,

## 2023-10-30 RX ORDER — NITROFURANTOIN MACROCRYSTALS 50 MG/1
50 CAPSULE ORAL EVERY MORNING
Qty: 30 CAPSULE | Refills: 11 | Status: SHIPPED | OUTPATIENT
Start: 2023-10-30 | End: 2024-10-29

## 2023-10-30 NOTE — PROGRESS NOTES
CHIEF COMPLAINT:  Follow up      HISTORY OF PRESENTING ILLINESS:  Jackelyn Kendrick is a 72 y.o. female established to urology. She is here for a 6 month follow up after initiating daily macrodantin for UTI px. I saw her in clinic 4/18/23. She is doing well today. Typically feels symptomatic of UTI ~3 days after an IBS episode. States that when she has uncontrollable diarrhea, she develops UTI s/s in the following days. She is asymptomatic today. She recently completed 5 day course of Bactrim DS after dx of K. Aerogenes 9/17/23. She felt relief of UTI s/s on day 5 of treatment. Continues with low dose daily macrodantin for prophylaxis.      She was seen by Dr. John 7/28/22 due to recurrent UTIs. A renal US was performed 8/9/22 and cystoscopy performed 8/11/22 - imaging and cysto WNL. In January I initiated her on 50 mg daily macrodantin for UTI prophylaxis. I placed an order for a Cr. I encouraged pt to continue D-mannose and estrace cream as well as to continue following standard UTI precautions.         REVIEW OF SYSTEMS:  Review of Systems   Constitutional:  Negative for chills and fever.   HENT:  Negative for congestion and sore throat.    Respiratory:  Negative for cough and shortness of breath.    Cardiovascular:  Negative for chest pain and palpitations.   Gastrointestinal:  Negative for nausea and vomiting.   Genitourinary:  Negative for dysuria, flank pain, frequency, hematuria and urgency.   Neurological:  Negative for dizziness and headaches.         PATIENT HISTORY:    Past Medical History:   Diagnosis Date    Arthritis     Basal cell carcinoma     Bronchitis     seasonal    Cataract     Diverticulitis     Dry eye syndrome     GERD (gastroesophageal reflux disease)     Hyperlipidemia     Hypertension     Hypothyroidism 07/19/2012    Obese     PONV (postoperative nausea and vomiting)     Thyroid disease        Past Surgical History:   Procedure Laterality Date    ANORECTAL MANOMETRY N/A 6/1/2023     Procedure: MANOMETRY, ANORECTAL;  Surgeon: Paulo Pritchett MD;  Location: [facility] ENDO (4TH FLR);  Service: Endoscopy;  Laterality: N/A;  instructions sent to [facility]  5/26 pre-call no answer; MB    ARTHROSCOPIC REPAIR OF ROTATOR CUFF OF SHOULDER Right 9/23/2020    Procedure: REPAIR, ROTATOR CUFF, ARTHROSCOPIC;  Surgeon: Reginald Pickens MD;  Location: [facility] OR;  Service: Orthopedics;  Laterality: Right;  regional w/catheter (interscalene)    BACK SURGERY      CATARACT EXTRACTION W/  INTRAOCULAR LENS IMPLANT Left 10/20/2021        CHOLECYSTECTOMY      COLONOSCOPY  2007    diverticulosis    COLONOSCOPY N/A 9/3/2020    Procedure: COLONOSCOPY;  Surgeon: Alberta Henriquez MD;  Location: [facility] ENDO (4TH FLR);  Service: Colon and Rectal;  Laterality: N/A;  pt requested this time-8/31-covid-uc [place]-[facility]    CYSTOSCOPY      DE QUERVAIN'S RELEASE Left 03/2017    FIXATION OF TENDON Right 9/23/2020    Procedure: FIXATION, TENDON;  Surgeon: Reginald Pickens MD;  Location: [facility] OR;  Service: Orthopedics;  Laterality: Right;    HERNIA REPAIR      HYSTERECTOMY      INJECTION OF STEROID Right 12/2/2021    Procedure: INJECTION, STEROID;  Surgeon: Suzy Dominguez MD;  Location: [facility] OR;  Service: Orthopedics;  Laterality: Right;    INTRAOCULAR PROSTHESES INSERTION Left 10/20/2021    Procedure: INSERTION, IOL PROSTHESIS;  Surgeon: Pippa Ashby MD;  Location: [facility] OR 1ST FLR;  Service: Ophthalmology;  Laterality: Left;    INTRAOCULAR PROSTHESES INSERTION Right 12/29/2021    Procedure: INSERTION, IOL PROSTHESIS;  Surgeon: Pippa Ashby MD;  Location: [facility] OR 1ST FLR;  Service: Ophthalmology;  Laterality: Right;    NASAL SEPTUM SURGERY      PHACOEMULSIFICATION OF CATARACT Left 10/20/2021    Procedure: PHACOEMULSIFICATION, CATARACT/ COMPLEX- PHACO / IOL - OS SMALL PUPIL-OLE RING AND TRYPAN BLUE;  Surgeon: Pippa Ashby MD;  Location: [facility] OR 1ST FLR;  Service: Ophthalmology;  Laterality:  Left;    PHACOEMULSIFICATION OF CATARACT Right 12/29/2021    Procedure: PHACOEMULSIFICATION, CATARACT;  Surgeon: Pippa Ashby MD;  Location: [facility] OR [address] [place] [address];  Service: Ophthalmology;  Laterality: Right;    SHOULDER SURGERY      TONSILLECTOMY      TRIGGER FINGER RELEASE Left 12/2/2021    Procedure: RELEASE, TRIGGER FINGER;  Surgeon: Suzy Dominguez MD;  Location: [facility];  Service: Orthopedics;  Laterality: Left;       Family History   Problem Relation Age of Onset    Cataracts Mother     Breast cancer Mother     Diabetes Mother     Hypertension Mother     Heart failure Mother     Heart disease Mother     Cataracts Father     Hypertension Father     Stroke Father     No Known Problems Daughter     No Known Problems Son     Diabetes Paternal Grandmother     Hyperlipidemia Sister     Arthritis Sister     Hyperlipidemia Brother     Arthritis Brother     No Known Problems Son     Amblyopia Neg Hx     Blindness Neg Hx     Glaucoma Neg Hx     Macular degeneration Neg Hx     Retinal detachment Neg Hx     Strabismus Neg Hx     Ovarian cancer Neg Hx     Melanoma Neg Hx     Celiac disease Neg Hx     Colon cancer Neg Hx     Colon polyps Neg Hx     Esophageal cancer Neg Hx     Liver cancer Neg Hx     Liver disease Neg Hx     Rectal cancer Neg Hx     Stomach cancer Neg Hx        Social History     Socioeconomic History    Marital status:    Tobacco Use    Smoking status: Never     Passive exposure: Never    Smokeless tobacco: Never   Substance and Sexual Activity    Alcohol use: No     Comment: rare on a special occasion    Drug use: No    Sexual activity: Never   Social History Narrative    , 3 children, nonsmoker ,     Social Determinants of Health     Financial Resource Strain: Low Risk  (10/23/2023)    Overall Financial Resource Strain (CARDIA)     Difficulty of Paying Living Expenses: Not hard at all   Food Insecurity: No Food Insecurity (10/23/2023)    Hunger Vital Sign     Worried About Running Out of  Food in the Last Year: Never true     Ran Out of Food in the Last Year: Never true   Transportation Needs: No Transportation Needs (10/23/2023)    PRAPARE - Transportation     Lack of Transportation (Medical): No     Lack of Transportation (Non-Medical): No   Physical Activity: Inactive (10/23/2023)    Exercise Vital Sign     Days of Exercise per Week: 0 days     Minutes of Exercise per Session: 0 min   Stress: No Stress Concern Present (10/23/2023)    [employer] [place] - Occupational Stress Questionnaire     Feeling of Stress : Only a little   Recent Concern: Stress - Stress Concern Present (10/20/2023)    [employer] [place] [employer] - Occupational Stress Questionnaire     Feeling of Stress : To some extent   Social Connections: Unknown (10/23/2023)    Social Connection and Isolation Panel [NHANES]     Frequency of Communication with Friends and Family: Once a week     Frequency of Social Gatherings with Friends and Family: Once a week     Active Member of Clubs or Organizations: Yes     Attends Club or Organization Meetings: More than 4 times per year     Marital Status:    Housing Stability: Low Risk  (10/23/2023)    Housing Stability Vital Sign     Unable to Pay for Housing in the Last Year: No     Number of Places Lived in the Last Year: 1     Unstable Housing in the Last Year: No       Allergies:  Levaquin [levofloxacin] and Erythromycin (bulk)    Medications:    Current Outpatient Medications:     albuterol (PROVENTIL/VENTOLIN HFA) 90 mcg/actuation inhaler, Inhale 2 puffs into the lungs every 6 (six) hours as needed for Wheezing., Disp: 6.7 g, Rfl: 11    celecoxib (CELEBREX) 200 MG capsule, Take 1 capsule (200 mg total) by mouth once daily. Do not take more than 15 consecutive days. Take w food and water, Disp: 30 capsule, Rfl: 11    cranberry fruit extract (CRANBERRY EXTRACT ORAL), Take by mouth., Disp: , Rfl:     cycloSPORINE (RESTASIS) 0.05 % ophthalmic emulsion,  Place 1 drop into both eyes 2 (two) times daily., Disp: 60 each, Rfl: 12    diclofenac sodium (VOLTAREN) 1 % Gel, Apply 2 g topically 2 (two) times daily as needed (musculoskeletal pain)., Disp: 100 g, Rfl: 11    dicyclomine (BENTYL) 20 mg tablet, TAKE ONE TABLET BY MOUTH FOUR TIMES A DAY BEFORE MEALS AND NIGHTLY, Disp: 120 tablet, Rfl: 11    fluocinonide (LIDEX) 0.05 % external solution, Apply topically once daily., Disp: 60 mL, Rfl: 11    ketoconazole (NIZORAL) 2 % shampoo, Apply topically twice a week., Disp: 120 mL, Rfl: 11    levothyroxine (SYNTHROID) 75 MCG tablet, TAKE ONE TABLET BY MOUTH EVERY DAY ON AN EMPTY STOMACH, Disp: 90 tablet, Rfl: 3    LIDOcaine-prilocaine (EMLA) cream, Apply topically 2 (two) times daily as needed. To hands., Disp: 30 g, Rfl: 2    losartan (COZAAR) 100 MG tablet, Take 1 tablet (100 mg total) by mouth once daily., Disp: 30 tablet, Rfl: 11    MAGNESIUM ORAL, Take 1 tablet by mouth once daily., Disp: , Rfl:     memantine (NAMENDA) 5 MG Tab, Take 1 tablet (5 mg total) by mouth 2 (two) times daily., Disp: 60 tablet, Rfl: 11    Multi-Vitamin tablet, Take 1 tablet by mouth once daily. , Disp: , Rfl:     omeprazole (PRILOSEC) 40 MG capsule, Take 1 capsule (40 mg total) by mouth once daily., Disp: 30 capsule, Rfl: 11    oxybutynin (DITROPAN-XL) 10 MG 24 hr tablet, Take 1 tablet (10 mg total) by mouth once daily., Disp: 30 tablet, Rfl: 11    PSYLLIUM SEED, WITH SUGAR, (METAMUCIL ORAL), Take by mouth as needed. , Disp: , Rfl:     rosuvastatin (CRESTOR) 40 MG Tab, TAKE ONE TABLET BY MOUTH EVERY DAY, Disp: 90 tablet, Rfl: 3    traZODone (DESYREL) 50 MG tablet, 1/2 tab up to 2 tabs po qhs prn insomnia, Disp: 30 tablet, Rfl: 11    triamcinolone acetonide 0.1% (KENALOG) 0.1 % cream, AAA bid, Disp: 60 g, Rfl: 3    amoxicillin-clavulanate 875-125mg (AUGMENTIN) 875-125 mg per tablet, Take 1 tablet by mouth 2 (two) times daily. (Patient not taking: Reported on 3/13/2023), Disp: 20 tablet, Rfl: 0     estradioL (ESTRACE) 0.01 % (0.1 mg/gram) vaginal cream, Place 0.5-1 g vaginally twice a week., Disp: 42.5 g, Rfl: 3    HYDROcodone-acetaminophen (NORCO) 7.5-325 mg per tablet, Take 1 tablet by mouth every 6 (six) hours as needed for Pain. (Patient not taking: Reported on 3/13/2023), Disp: 28 tablet, Rfl: 0    methocarbamoL (ROBAXIN) 500 MG Tab, Take 1 tablet (500 mg total) by mouth 3 (three) times daily., Disp: 60 tablet, Rfl: 1    nitrofurantoin (MACRODANTIN) 50 MG capsule, Take 1 capsule (50 mg total) by mouth every morning., Disp: 30 capsule, Rfl: 11    PHYSICAL EXAMINATION:  Physical Exam  Constitutional:       Appearance: Normal appearance.   HENT:      Head: Normocephalic and atraumatic.      Right Ear: External ear normal.      Left Ear: External ear normal.      Nose: Nose normal.      Mouth/Throat:      Mouth: Mucous membranes are moist.   Pulmonary:      Effort: Pulmonary effort is normal. No respiratory distress.   Skin:     General: Skin is warm and dry.   Neurological:      General: No focal deficit present.      Mental Status: She is alert and oriented to person, place, and time.   Psychiatric:         Mood and Affect: Mood normal.         Behavior: Behavior normal.           LABS:  UA WNL    Lab Results   Component Value Date    CREATININE 0.8 10/30/2023    EGFRNORACEVR >60.0 10/30/2023             IMPRESSION:  Encounter Diagnoses   Name Primary?    Recurrent UTI Yes    Urinary incontinence, mixed          Assessment:       1. Recurrent UTI    2. Urinary incontinence, mixed        Plan:   - Cr today, WNL.  - Continue UTI precautions.  - Continue daily 50 mg macrodantin.    RTC 6 months or sooner PRN    I spent 30 minutes with the patient of which more than half was spent in direct consultation with the patient in regards to our treatment and plan.  We addressed the office findings and recent labs.   Education and recommendations of today's plan of care including home remedies and needed follow up with  PCP.   We discussed the chief complaint; reviewed the LUTS and the possible contributory factors.   Reassurance no infection.  Reviewed management  Recommended lifestyle modifications with a proper, healthy diet, good hydration but during the day. Reducing bladder irritants.   Benefits of regular exercise.

## 2023-11-10 ENCOUNTER — PATIENT MESSAGE (OUTPATIENT)
Dept: ORTHOPEDICS | Facility: CLINIC | Age: 73
End: 2023-11-10
Payer: MEDICARE

## 2023-11-10 DIAGNOSIS — M79.641 BILATERAL HAND PAIN: Primary | ICD-10-CM

## 2023-11-10 DIAGNOSIS — M79.642 BILATERAL HAND PAIN: Primary | ICD-10-CM

## 2023-11-13 ENCOUNTER — HOSPITAL ENCOUNTER (OUTPATIENT)
Dept: RADIOLOGY | Facility: HOSPITAL | Age: 73
Discharge: HOME | End: 2023-11-13
Attending: ORTHOPAEDIC SURGERY
Payer: MEDICARE

## 2023-11-13 ENCOUNTER — OFFICE VISIT (OUTPATIENT)
Dept: ORTHOPEDICS | Facility: CLINIC | Age: 73
End: 2023-11-13
Payer: MEDICARE

## 2023-11-13 DIAGNOSIS — M19.041 PRIMARY OSTEOARTHRITIS OF BOTH HANDS: ICD-10-CM

## 2023-11-13 DIAGNOSIS — M79.641 BILATERAL HAND PAIN: ICD-10-CM

## 2023-11-13 DIAGNOSIS — M19.042 PRIMARY OSTEOARTHRITIS OF BOTH HANDS: ICD-10-CM

## 2023-11-13 DIAGNOSIS — M72.0 DUPUYTREN'S DISEASE OF PALM OF LEFT HAND: ICD-10-CM

## 2023-11-13 DIAGNOSIS — M79.642 BILATERAL HAND PAIN: ICD-10-CM

## 2023-11-13 DIAGNOSIS — G56.22 CUBITAL TUNNEL SYNDROME ON LEFT: Primary | ICD-10-CM

## 2023-11-13 DIAGNOSIS — M79.642 LEFT HAND PAIN: ICD-10-CM

## 2023-11-13 PROCEDURE — 99214 OFFICE O/P EST MOD 30 MIN: CPT | Mod: S$PBB,,, | Performed by: ORTHOPAEDIC SURGERY

## 2023-11-13 PROCEDURE — 99999 PR PBB SHADOW E&M-EST. PATIENT-LVL III: CPT | Mod: PBBFAC,,, | Performed by: ORTHOPAEDIC SURGERY

## 2023-11-13 PROCEDURE — 73130 X-RAY EXAM OF HAND: CPT | Mod: 26,50,, | Performed by: RADIOLOGY

## 2023-11-13 PROCEDURE — 99214 PR OFFICE/OUTPT VISIT, EST, LEVL IV, 30-39 MIN: ICD-10-PCS | Mod: S$PBB,,, | Performed by: ORTHOPAEDIC SURGERY

## 2023-11-13 PROCEDURE — 73130 X-RAY EXAM OF HAND: CPT | Mod: TC,50,PO

## 2023-11-13 PROCEDURE — 99213 OFFICE O/P EST LOW 20 MIN: CPT | Mod: PBBFAC,PO | Performed by: ORTHOPAEDIC SURGERY

## 2023-11-13 PROCEDURE — 73130 XR HAND COMPLETE 3 VIEWS BILATERAL: ICD-10-PCS | Mod: 26,50,, | Performed by: RADIOLOGY

## 2023-11-13 PROCEDURE — 99999 PR PBB SHADOW E&M-EST. PATIENT-LVL III: ICD-10-PCS | Mod: PBBFAC,,, | Performed by: ORTHOPAEDIC SURGERY

## 2023-11-13 NOTE — PROGRESS NOTES
History of Present Illness:     Patient is a 72 y.o. right hand dominant female previously seen for right long finger pip joint csi and dequervains and doing well at this time. Her dequervains has resolved at this time. She continues to have pain of her long, ring and small finger PIP joints. Achy 2/10 constant pain.     Presents today with new complaints of Left ulnar pain that started in October. She is c/o left small and ring finger numbness/tingling. The numbness/tingling worse at night and worse with elbow flexion. She has had not treatment in the past for this.    The patient denies any fevers, chills, N/V, D/C and presents for evaluation.    Review of Systems:  Constitutional: no fever or chills  Eyes: no visual changes  ENT: no nasal congestion or sore throat  Respiratory: no cough or shortness of breath  Cardiovascular: no chest pain  Gastrointestinal: no nausea or vomiting, tolerating diet  Musculoskeletal: pain, soreness, and numbness/tingling    OBJECTIVE:      Vital Signs (Most Recent):  There were no vitals filed for this visit.  There is no height or weight on file to calculate BMI.    Physical Exam    Physical Exam:  Constitutional: The patient appears well-developed and well-nourished. No distress.   Head: Normocephalic and atraumatic.   Nose: Nose normal.   Eyes: Conjunctivae and EOM are normal.   Neck: No tracheal deviation present.   Cardiovascular: Normal rate and intact distal pulses.    Pulmonary/Chest: Effort normal. No respiratory distress.   Abdominal: There is no guarding.   Lymphatic: Negative for adenopathy   Neurological: The patient is alert.   Psychiatric: The patient has a normal mood and affect.     Bilateral Hand/Wrist Examination:    Observation/Inspection:  Swelling  none    Deformity  none  Discoloration  none     Scars   none    Atrophy  none    HAND/WRIST EXAMINATION:    Left Elbow: + Tinel's elbow. Neurovascularly intact. 5/5 thenar and intrinsic musculature strength,  otherwise full range of motion hands, wrists and elbows.    Neurovascular Exam:  Digits WWP, brisk CR < 3s throughout  NVI motor/LTS to M/R/U nerves, radial pulse 2+  2+ biceps and brachioradialis reflexes    Diagnostic Results:    X-rays AP, lateral and oblique bilateral hands taken today are independently reviewed by me and show Eaton stage II basilar thumb arthritis and finger IP joint arthritis.     ASSESSMENT/PLAN:      72 y.o. yo female with   Encounter Diagnoses   Name Primary?    Cubital tunnel syndrome on left Yes    Primary osteoarthritis of both hands     Left hand pain     Dupuytren's disease of palm of left hand       Plan:  We have discussed the natural history of cubital tunnel and arthritis including treatment options such as splinting, oral and topical anti-inflammatories, cortisone injections and surgery. I have ordered an EMG/NCS to further evaluate her numbness.  Follow-up after the study is performed.    The patient's pathophysiology was explained in detail with reference to x-rays, models, other visual aids as appropriate.  Treatment options were discussed in detail.  Questions were invited and answered to the patient's satisfaction. I reviewed Primary care , and other specialty's notes to better coordinate patient's care.        Suzy Dominguez MD    Please be aware that this note has been generated with the assistance of [employer] voice-to-text.  Please excuse any spelling or grammatical errors.

## 2023-11-27 ENCOUNTER — OFFICE VISIT (OUTPATIENT)
Dept: URGENT CARE | Facility: CLINIC | Age: 73
End: 2023-11-27
Payer: MEDICARE

## 2023-11-27 VITALS
DIASTOLIC BLOOD PRESSURE: 76 MMHG | RESPIRATION RATE: 16 BRPM | BODY MASS INDEX: 25.49 KG/M2 | SYSTOLIC BLOOD PRESSURE: 179 MMHG | HEIGHT: 61 IN | WEIGHT: 135 LBS | TEMPERATURE: 99 F | OXYGEN SATURATION: 95 % | HEART RATE: 71 BPM

## 2023-11-27 DIAGNOSIS — R35.0 URINARY FREQUENCY: Primary | ICD-10-CM

## 2023-11-27 DIAGNOSIS — R31.9 URINARY TRACT INFECTION WITH HEMATURIA, SITE UNSPECIFIED: ICD-10-CM

## 2023-11-27 DIAGNOSIS — N39.0 URINARY TRACT INFECTION WITH HEMATURIA, SITE UNSPECIFIED: ICD-10-CM

## 2023-11-27 PROCEDURE — 99214 PR OFFICE/OUTPT VISIT, EST, LEVL IV, 30-39 MIN: ICD-10-PCS | Mod: S$GLB,,, | Performed by: FAMILY MEDICINE

## 2023-11-27 PROCEDURE — 81003 POCT URINALYSIS, DIPSTICK, AUTOMATED, W/O SCOPE: ICD-10-PCS | Mod: QW,S$GLB,, | Performed by: FAMILY MEDICINE

## 2023-11-27 PROCEDURE — 99214 OFFICE O/P EST MOD 30 MIN: CPT | Mod: S$GLB,,, | Performed by: FAMILY MEDICINE

## 2023-11-27 PROCEDURE — 81003 URINALYSIS AUTO W/O SCOPE: CPT | Mod: QW,S$GLB,, | Performed by: FAMILY MEDICINE

## 2023-11-27 RX ORDER — PHENAZOPYRIDINE HYDROCHLORIDE 200 MG/1
200 TABLET, FILM COATED ORAL EVERY 12 HOURS PRN
Qty: 20 TABLET | Refills: 0 | Status: SHIPPED | OUTPATIENT
Start: 2023-11-27 | End: 2023-12-07

## 2023-11-27 RX ORDER — SULFAMETHOXAZOLE AND TRIMETHOPRIM 800; 160 MG/1; MG/1
1 TABLET ORAL 2 TIMES DAILY
Qty: 20 TABLET | Refills: 0 | Status: SHIPPED | OUTPATIENT
Start: 2023-11-27 | End: 2023-12-07

## 2023-11-27 NOTE — PROGRESS NOTES
"Subjective:      Patient ID: Jackelyn Kendrick is a 72 y.o. female.    Vitals:  height is 5' 1" (1.549 m) and weight is 61.2 kg (135 lb). Her oral temperature is 98.7 °F (37.1 °C). Her blood pressure is 179/76 (abnormal) and her pulse is 71. Her respiration is 16 and oxygen saturation is 95%.     Chief Complaint: Urinary Frequency    This is a 72 y.o. female who presents today with a chief complaint of urinary frequency and urgency that started a couple days ago. Pt is also having burning sensation when urinating with pressure.       Urinary Frequency   This is a new problem. The current episode started in the past 7 days. The problem occurs every urination. The problem has been unchanged. The quality of the pain is described as burning. The pain is at a severity of 4/10. The pain is mild. There has been no fever. She is Not sexually active. There is No history of pyelonephritis. Associated symptoms include frequency and urgency. Pertinent negatives include no behavior changes, chills, discharge, flank pain, hematuria, hesitancy, nausea, possible pregnancy, sweats, vomiting, weight loss, bubble bath use, constipation, rash or withholding. Treatments tried: AZO. The treatment provided no relief. Her past medical history is significant for recurrent UTIs. There is no history of catheterization, diabetes insipidus, diabetes mellitus, genitourinary reflux, hypertension, kidney stones, a single kidney, STD, urinary stasis or a urological procedure.       Constitution: Negative for chills.   Gastrointestinal:  Negative for nausea, vomiting and constipation.   Genitourinary:  Positive for frequency and urgency. Negative for flank pain and hematuria.   Skin:  Negative for rash and erythema.      Objective:     Physical Exam   Constitutional: She is oriented to person, place, and time. She does not appear ill. She appears distressed. normal  HENT:   Head: Normocephalic and atraumatic.   Ears:   Right Ear: External ear " normal.   Left Ear: External ear normal.   Nose: No rhinorrhea or congestion.   Eyes: Conjunctivae are normal. Pupils are equal, round, and reactive to light. Extraocular movement intact   Neck: Neck supple. No neck rigidity present.   Cardiovascular: Normal rate, regular rhythm, normal heart sounds and normal pulses.   No murmur heard.  Pulmonary/Chest: Effort normal and breath sounds normal. No respiratory distress.   Abdominal: Normal appearance and bowel sounds are normal. She exhibits no mass. Soft. flat abdomen   Musculoskeletal: Normal range of motion.         General: No swelling or signs of injury. Normal range of motion.   Neurological: no focal deficit. She is alert, oriented to person, place, and time and at baseline.   Skin: Skin is warm and dry. Capillary refill takes less than 2 seconds. No erythema jaundice  Psychiatric: Her behavior is normal. Mood, judgment and thought content normal.   Nursing note and vitals reviewed.    Assessment:     Plan:   1. Urinary frequency  - POCT Urinalysis, Dipstick, Automated, W/O Scope    2. Urinary tract infection with hematuria, site unspecified  - sulfamethoxazole-trimethoprim 800-160mg (BACTRIM DS) 800-160 mg Tab; Take 1 tablet by mouth 2 (two) times daily.  Dispense: 20 tablet; Refill: 0  - phenazopyridine (PYRIDIUM) 200 MG tablet; Take 1 tablet (200 mg total) by mouth every 12 (twelve) hours as needed for Pain.  Dispense: 20 tablet; Refill: 0   Reviewed pt s last set of cultures  All results discussed with pt prior to discharge from clinic

## 2023-11-30 DIAGNOSIS — I10 ESSENTIAL HYPERTENSION: ICD-10-CM

## 2023-12-01 RX ORDER — OXYBUTYNIN CHLORIDE 10 MG/1
10 TABLET, EXTENDED RELEASE ORAL DAILY
Qty: 90 TABLET | Refills: 0 | Status: SHIPPED | OUTPATIENT
Start: 2023-12-01 | End: 2023-12-07 | Stop reason: SDUPTHER

## 2023-12-01 RX ORDER — MEMANTINE HYDROCHLORIDE 5 MG/1
5 TABLET ORAL 2 TIMES DAILY
Qty: 180 TABLET | Refills: 0 | Status: SHIPPED | OUTPATIENT
Start: 2023-12-01 | End: 2023-12-07 | Stop reason: SDUPTHER

## 2023-12-01 RX ORDER — LOSARTAN POTASSIUM 100 MG/1
100 TABLET ORAL DAILY
Qty: 90 TABLET | Refills: 0 | Status: SHIPPED | OUTPATIENT
Start: 2023-12-01 | End: 2023-12-07 | Stop reason: SDUPTHER

## 2023-12-01 RX ORDER — OMEPRAZOLE 40 MG/1
40 CAPSULE, DELAYED RELEASE ORAL DAILY
Qty: 90 CAPSULE | Refills: 0 | Status: SHIPPED | OUTPATIENT
Start: 2023-12-01 | End: 2023-12-07 | Stop reason: SDUPTHER

## 2023-12-01 RX ORDER — TRAZODONE HYDROCHLORIDE 50 MG/1
TABLET ORAL
Qty: 180 TABLET | Refills: 0 | Status: SHIPPED | OUTPATIENT
Start: 2023-12-01 | End: 2023-12-07 | Stop reason: SDUPTHER

## 2023-12-01 RX ORDER — CELECOXIB 200 MG/1
CAPSULE ORAL
Qty: 30 CAPSULE | Refills: 0 | Status: SHIPPED | OUTPATIENT
Start: 2023-12-01 | End: 2023-12-07 | Stop reason: SDUPTHER

## 2023-12-01 NOTE — TELEPHONE ENCOUNTER
Refill Routing Note   Medication(s) are not appropriate for processing by [facility] for the following reason(s):        Required vitals abnormal  Outside of protocol    [facility] action(s):  Defer  Route  Approve     Requires labs : Yes           Pharmacist review requested: Yes     Appointments  past 12m or future 3m with PCP    Date Provider   Last Visit   11/29/2022 Fabian Luna MD   Next Visit   12/7/2023 Fabian Luna MD   ED visits in past 90 days: 0        Note composed:11:01 AM 12/01/2023

## 2023-12-01 NOTE — TELEPHONE ENCOUNTER
Care Due:                  Date            Visit Type   Department     Provider  --------------------------------------------------------------------------------                                EP -                              PRIMARY      [facility] PRIMARY  Last Visit: 11-      CARE ([facility])   BELLE Luna                              EP -                              PRIMARY      [facility] PRIMARY  Next Visit: 12-      CARE ([facility])   BELLE Luna                                                            Last  Test          Frequency    Reason                     Performed    Due Date  --------------------------------------------------------------------------------    CBC.........  12 months..  celecoxib................  11- 11-    Lipid Panel.  12 months..  rosuvastatin.............  11-   11-    [facility] Embedded Care Due Messages. Reference number: [id number].   11/30/2023 8:12:18 PM CST

## 2023-12-01 NOTE — TELEPHONE ENCOUNTER
Refill Routing Note   Medication(s) are not appropriate for processing by [facility] for the following reason(s):        Drug-drug interaction:   Required vitals abnormal: Losartan (BP)  Outside of protocol: Celebrex, Namenda    [facility] action(s):        Requires labs : Yes      Medication Therapy Plan: Duplicate Therapy: Dicyclomine, Oxybutyn    Pharmacist review requested: Yes     Appointments  past 12m or future 3m with PCP    Date Provider   Last Visit   11/29/2022 Fabian Luna MD   Next Visit   12/7/2023 Fabian Luna MD   ED visits in past 90 days: 0        Note composed:9:53 AM 12/01/2023

## 2023-12-07 ENCOUNTER — LAB VISIT (OUTPATIENT)
Dept: LAB | Facility: HOSPITAL | Age: 73
End: 2023-12-07
Attending: FAMILY MEDICINE
Payer: MEDICARE

## 2023-12-07 ENCOUNTER — OFFICE VISIT (OUTPATIENT)
Dept: PRIMARY CARE CLINIC | Facility: CLINIC | Age: 73
End: 2023-12-07
Payer: MEDICARE

## 2023-12-07 VITALS
HEART RATE: 70 BPM | DIASTOLIC BLOOD PRESSURE: 60 MMHG | BODY MASS INDEX: 25.23 KG/M2 | OXYGEN SATURATION: 99 % | HEIGHT: 61 IN | WEIGHT: 133.63 LBS | SYSTOLIC BLOOD PRESSURE: 138 MMHG

## 2023-12-07 DIAGNOSIS — Z00.00 GENERAL MEDICAL EXAM: Primary | ICD-10-CM

## 2023-12-07 DIAGNOSIS — I70.0 ATHEROSCLEROSIS OF AORTA: ICD-10-CM

## 2023-12-07 DIAGNOSIS — R79.9 ABNORMAL FINDING OF BLOOD CHEMISTRY, UNSPECIFIED: ICD-10-CM

## 2023-12-07 DIAGNOSIS — J20.9 ACUTE BRONCHITIS, UNSPECIFIED ORGANISM: ICD-10-CM

## 2023-12-07 DIAGNOSIS — B35.1 ONYCHOMYCOSIS: ICD-10-CM

## 2023-12-07 DIAGNOSIS — E03.9 ACQUIRED HYPOTHYROIDISM: ICD-10-CM

## 2023-12-07 DIAGNOSIS — K21.9 GASTROESOPHAGEAL REFLUX DISEASE, UNSPECIFIED WHETHER ESOPHAGITIS PRESENT: ICD-10-CM

## 2023-12-07 DIAGNOSIS — K58.2 IRRITABLE BOWEL SYNDROME WITH BOTH CONSTIPATION AND DIARRHEA: ICD-10-CM

## 2023-12-07 DIAGNOSIS — M15.9 PRIMARY OSTEOARTHRITIS INVOLVING MULTIPLE JOINTS: ICD-10-CM

## 2023-12-07 DIAGNOSIS — I10 ESSENTIAL HYPERTENSION: ICD-10-CM

## 2023-12-07 DIAGNOSIS — E78.2 MIXED HYPERLIPIDEMIA: ICD-10-CM

## 2023-12-07 DIAGNOSIS — Q21.12 PFO (PATENT FORAMEN OVALE): ICD-10-CM

## 2023-12-07 DIAGNOSIS — Z00.00 GENERAL MEDICAL EXAM: ICD-10-CM

## 2023-12-07 DIAGNOSIS — R68.89 OTHER GENERAL SYMPTOMS AND SIGNS: ICD-10-CM

## 2023-12-07 LAB
ALBUMIN SERPL BCP-MCNC: 4 G/DL (ref 3.5–5.2)
ALP SERPL-CCNC: 47 U/L (ref 55–135)
ALT SERPL W/O P-5'-P-CCNC: 33 U/L (ref 10–44)
ANION GAP SERPL CALC-SCNC: 9 MMOL/L (ref 8–16)
AST SERPL-CCNC: 39 U/L (ref 10–40)
BILIRUB SERPL-MCNC: 0.4 MG/DL (ref 0.1–1)
BUN SERPL-MCNC: 16 MG/DL (ref 8–23)
CALCIUM SERPL-MCNC: 9.7 MG/DL (ref 8.7–10.5)
CHLORIDE SERPL-SCNC: 106 MMOL/L (ref 95–110)
CHOLEST SERPL-MCNC: 144 MG/DL (ref 120–199)
CHOLEST/HDLC SERPL: 2.6 {RATIO} (ref 2–5)
CO2 SERPL-SCNC: 27 MMOL/L (ref 23–29)
CREAT SERPL-MCNC: 1.1 MG/DL (ref 0.5–1.4)
ERYTHROCYTE [DISTWIDTH] IN BLOOD BY AUTOMATED COUNT: 12.1 % (ref 11.5–14.5)
EST. GFR  (NO RACE VARIABLE): 53.4 ML/MIN/1.73 M^2
ESTIMATED AVG GLUCOSE: 97 MG/DL (ref 68–131)
GLUCOSE SERPL-MCNC: 101 MG/DL (ref 70–110)
HBA1C MFR BLD: 5 % (ref 4–5.6)
HCT VFR BLD AUTO: 35.3 % (ref 37–48.5)
HDLC SERPL-MCNC: 55 MG/DL (ref 40–75)
HDLC SERPL: 38.2 % (ref 20–50)
HGB BLD-MCNC: 11.3 G/DL (ref 12–16)
LDLC SERPL CALC-MCNC: 67.6 MG/DL (ref 63–159)
MCH RBC QN AUTO: 30.1 PG (ref 27–31)
MCHC RBC AUTO-ENTMCNC: 32 G/DL (ref 32–36)
MCV RBC AUTO: 94 FL (ref 82–98)
NONHDLC SERPL-MCNC: 89 MG/DL
PLATELET # BLD AUTO: 226 K/UL (ref 150–450)
PMV BLD AUTO: 11.6 FL (ref 9.2–12.9)
POTASSIUM SERPL-SCNC: 4.3 MMOL/L (ref 3.5–5.1)
PROT SERPL-MCNC: 7.3 G/DL (ref 6–8.4)
RBC # BLD AUTO: 3.76 M/UL (ref 4–5.4)
SODIUM SERPL-SCNC: 142 MMOL/L (ref 136–145)
TRIGL SERPL-MCNC: 107 MG/DL (ref 30–150)
TSH SERPL DL<=0.005 MIU/L-ACNC: 1.24 UIU/ML (ref 0.4–4)
WBC # BLD AUTO: 6.72 K/UL (ref 3.9–12.7)

## 2023-12-07 PROCEDURE — 99999 PR PBB SHADOW E&M-EST. PATIENT-LVL V: CPT | Mod: PBBFAC,,, | Performed by: FAMILY MEDICINE

## 2023-12-07 PROCEDURE — 36415 COLL VENOUS BLD VENIPUNCTURE: CPT | Mod: PN | Performed by: FAMILY MEDICINE

## 2023-12-07 PROCEDURE — 84443 ASSAY THYROID STIM HORMONE: CPT | Performed by: FAMILY MEDICINE

## 2023-12-07 PROCEDURE — 83036 HEMOGLOBIN GLYCOSYLATED A1C: CPT | Performed by: FAMILY MEDICINE

## 2023-12-07 PROCEDURE — 99215 OFFICE O/P EST HI 40 MIN: CPT | Mod: S$PBB,,, | Performed by: FAMILY MEDICINE

## 2023-12-07 PROCEDURE — 99999 PR PBB SHADOW E&M-EST. PATIENT-LVL V: ICD-10-PCS | Mod: PBBFAC,,, | Performed by: FAMILY MEDICINE

## 2023-12-07 PROCEDURE — 99215 PR OFFICE/OUTPT VISIT, EST, LEVL V, 40-54 MIN: ICD-10-PCS | Mod: S$PBB,,, | Performed by: FAMILY MEDICINE

## 2023-12-07 PROCEDURE — 99215 OFFICE O/P EST HI 40 MIN: CPT | Mod: PBBFAC,PN | Performed by: FAMILY MEDICINE

## 2023-12-07 PROCEDURE — 80061 LIPID PANEL: CPT | Performed by: FAMILY MEDICINE

## 2023-12-07 PROCEDURE — 85027 COMPLETE CBC AUTOMATED: CPT | Performed by: FAMILY MEDICINE

## 2023-12-07 PROCEDURE — 80053 COMPREHEN METABOLIC PANEL: CPT | Performed by: FAMILY MEDICINE

## 2023-12-07 RX ORDER — MEMANTINE HYDROCHLORIDE 5 MG/1
5 TABLET ORAL 2 TIMES DAILY
Qty: 180 TABLET | Refills: 3 | Status: SHIPPED | OUTPATIENT
Start: 2023-12-07 | End: 2024-03-04

## 2023-12-07 RX ORDER — ALBUTEROL SULFATE 90 UG/1
2 AEROSOL, METERED RESPIRATORY (INHALATION) EVERY 6 HOURS PRN
Qty: 6.7 G | Refills: 11 | Status: SHIPPED | OUTPATIENT
Start: 2023-12-07

## 2023-12-07 RX ORDER — AZITHROMYCIN 250 MG/1
TABLET, FILM COATED ORAL
Qty: 6 TABLET | Refills: 0 | Status: SHIPPED | OUTPATIENT
Start: 2023-12-07 | End: 2023-12-12

## 2023-12-07 RX ORDER — CELECOXIB 200 MG/1
200 CAPSULE ORAL DAILY
Qty: 30 CAPSULE | Refills: 0 | Status: SHIPPED | OUTPATIENT
Start: 2023-12-07 | End: 2024-03-05

## 2023-12-07 RX ORDER — LOSARTAN POTASSIUM 100 MG/1
100 TABLET ORAL DAILY
Qty: 90 TABLET | Refills: 3 | Status: SHIPPED | OUTPATIENT
Start: 2023-12-07 | End: 2024-03-04

## 2023-12-07 RX ORDER — DICYCLOMINE HYDROCHLORIDE 20 MG/1
20 TABLET ORAL
Qty: 120 TABLET | Refills: 11 | Status: SHIPPED | OUTPATIENT
Start: 2023-12-07 | End: 2024-04-01

## 2023-12-07 RX ORDER — OXYBUTYNIN CHLORIDE 10 MG/1
10 TABLET, EXTENDED RELEASE ORAL DAILY
Qty: 90 TABLET | Refills: 3 | Status: SHIPPED | OUTPATIENT
Start: 2023-12-07

## 2023-12-07 RX ORDER — OMEPRAZOLE 40 MG/1
40 CAPSULE, DELAYED RELEASE ORAL DAILY
Qty: 90 CAPSULE | Refills: 3 | Status: SHIPPED | OUTPATIENT
Start: 2023-12-07 | End: 2024-03-01

## 2023-12-07 RX ORDER — ROSUVASTATIN CALCIUM 40 MG/1
40 TABLET, COATED ORAL DAILY
Qty: 90 TABLET | Refills: 3 | Status: SHIPPED | OUTPATIENT
Start: 2023-12-07 | End: 2024-02-01

## 2023-12-07 RX ORDER — TRAZODONE HYDROCHLORIDE 100 MG/1
100 TABLET ORAL NIGHTLY PRN
Qty: 90 TABLET | Refills: 3 | Status: SHIPPED | OUTPATIENT
Start: 2023-12-07

## 2023-12-07 RX ORDER — RESPIRATORY SYNCYTIAL VISUS VACCINE RECOMBINANT, ADJUVANTED 120MCG/0.5
KIT INTRAMUSCULAR
COMMUNITY
Start: 2023-10-09

## 2023-12-07 RX ORDER — LEVOTHYROXINE SODIUM 75 UG/1
75 TABLET ORAL DAILY
Qty: 90 TABLET | Refills: 3 | Status: SHIPPED | OUTPATIENT
Start: 2023-12-07 | End: 2024-02-01

## 2023-12-08 NOTE — ASSESSMENT & PLAN NOTE
Does well with relatively regular use of dicyclomine.  Stay hydrated.  Fiber.  Stool softener as needed.  MiraLax as needed.  Continue dicyclomine.  Monitor.

## 2023-12-08 NOTE — PROGRESS NOTES
"    /60 (BP Location: Left arm, Patient Position: Sitting, BP Method: Medium (Manual))   Pulse 70   Ht 5' 1" (1.549 m)   Wt 60.6 kg (133 lb 9.6 oz)   SpO2 99%   BMI 25.24 kg/m²       ===========    Chief Complaint: Annual Exam          HPI    Jackelyn Kendrick is a 72 y.o. female     here for    Annual Wellness/Preventative Exam.  Health maintenance reviewed with patient in detail inc any recent labs and studies and needs for future screening labs.  Age-appropriate vaccines and other age-appropriate screening studies reviewed with patient in detail.  Sleep health reviewed with patient.  Skin health regarding possible skin cancer screening reviewed with patient.  General regularity of bowel movements and urinations reviewed with patient including any possibility of urine leakage.  Vision screening reviewed with patient.      Patient queried and denies any further complaints      Patient Active Problem List   Diagnosis    GERD (gastroesophageal reflux disease)    Acquired hypothyroidism    Mixed hyperlipidemia    Primary osteoarthritis involving multiple joints    Nuclear sclerosis - Both Eyes    Urinary incontinence, mixed    Vaginal vault prolapse, posthysterectomy    Rectocele    De Quervain's disease (tenosynovitis)    Essential hypertension    Atherosclerosis of aorta    Impingement syndrome of right shoulder    Nontraumatic tear of right rotator cuff    Lower extremity edema    PFO (patent foramen ovale)    Posterior subcapsular age-related cataract, left eye    Arthritis of carpometacarpal (CMC) joint of both thumbs    Ganglion cyst    Venous insufficiency of both lower extremities    Nuclear sclerotic cataract of right eye    Dupuytren's disease of palm of right hand    S/P trigger finger release    Irritable bowel syndrome with both constipation and diarrhea    Bilateral shoulder pain    Chronic bilateral low back pain    Chronic right-sided low back pain    Urinary tract infection with hematuria "    Urinary frequency       SURGICAL AND MEDICAL HISTORY: updated and reviewed.  Past Surgical History:   Procedure Laterality Date    ANORECTAL MANOMETRY N/A 6/1/2023    Procedure: MANOMETRY, ANORECTAL;  Surgeon: Paulo Pritchett MD;  Location: [facility] ENDO (4TH FLR);  Service: Endoscopy;  Laterality: N/A;  instructions sent to [facility]  5/26 pre-call no answer; MB    ARTHROSCOPIC REPAIR OF ROTATOR CUFF OF SHOULDER Right 9/23/2020    Procedure: REPAIR, ROTATOR CUFF, ARTHROSCOPIC;  Surgeon: Reginald Pickens MD;  Location: [facility] OR;  Service: Orthopedics;  Laterality: Right;  regional w/catheter (interscalene)    BACK SURGERY      CATARACT EXTRACTION W/  INTRAOCULAR LENS IMPLANT Left 10/20/2021        CHOLECYSTECTOMY      COLONOSCOPY  2007    diverticulosis    COLONOSCOPY N/A 9/3/2020    Procedure: COLONOSCOPY;  Surgeon: Alberta Henriquez MD;  Location: [facility] ENDO (4TH FLR);  Service: Colon and Rectal;  Laterality: N/A;  pt requested this time-8/31-covid-uc [place]-[facility]    CYSTOSCOPY      DE QUERVAIN'S RELEASE Left 03/2017    FIXATION OF TENDON Right 9/23/2020    Procedure: FIXATION, TENDON;  Surgeon: Regniald Pickens MD;  Location: [facility] OR;  Service: Orthopedics;  Laterality: Right;    HERNIA REPAIR      HYSTERECTOMY      INJECTION OF STEROID Right 12/2/2021    Procedure: INJECTION, STEROID;  Surgeon: Suzy Dominguez MD;  Location: [facility] OR;  Service: Orthopedics;  Laterality: Right;    INTRAOCULAR PROSTHESES INSERTION Left 10/20/2021    Procedure: INSERTION, IOL PROSTHESIS;  Surgeon: Pippa Ashby MD;  Location: [facility] OR 1ST FLR;  Service: Ophthalmology;  Laterality: Left;    INTRAOCULAR PROSTHESES INSERTION Right 12/29/2021    Procedure: INSERTION, IOL PROSTHESIS;  Surgeon: Pippa Ashby MD;  Location: [facility] OR 1ST FLR;  Service: Ophthalmology;  Laterality: Right;    NASAL SEPTUM SURGERY      PHACOEMULSIFICATION OF CATARACT Left 10/20/2021    Procedure: PHACOEMULSIFICATION,  CATARACT/ COMPLEX- PHACO / IOL - OS SMALL PUPIL-OLE RING AND TRYPAN BLUE;  Surgeon: Pippa Ashby MD;  Location: [facility] OR [address] [place] [address];  Service: Ophthalmology;  Laterality: Left;    PHACOEMULSIFICATION OF CATARACT Right 12/29/2021    Procedure: PHACOEMULSIFICATION, CATARACT;  Surgeon: Pippa Ashby MD;  [facility];  Service: Ophthalmology;  Laterality: Right;    SHOULDER SURGERY      TONSILLECTOMY      TRIGGER FINGER RELEASE Left 12/2/2021    Procedure: RELEASE, TRIGGER FINGER;  Surgeon: Suzy Dominguez MD;  Location: [facility] OR;  Service: Orthopedics;  Laterality: Left;     ALLERGIES updated and reviewed.  Review of patient's allergies indicates:   Allergen Reactions    Levaquin [levofloxacin] Swelling and Edema    Erythromycin (bulk) Nausea And Vomiting     Not true allergy       CURRENT OUTPATIENT MEDICATIONS updated and reviewed    Current Outpatient Medications:     cranberry fruit extract (CRANBERRY EXTRACT ORAL), Take by mouth., Disp: , Rfl:     cycloSPORINE (RESTASIS) 0.05 % ophthalmic emulsion, Place 1 drop into both eyes 2 (two) times daily., Disp: 60 each, Rfl: 12    diclofenac sodium (VOLTAREN) 1 % Gel, Apply 2 g topically 2 (two) times daily as needed (musculoskeletal pain)., Disp: 100 g, Rfl: 11    estradioL (ESTRACE) 0.01 % (0.1 mg/gram) vaginal cream, Place 0.5-1 g vaginally twice a week., Disp: 42.5 g, Rfl: 3    fluocinonide (LIDEX) 0.05 % external solution, Apply topically once daily., Disp: 60 mL, Rfl: 11    ketoconazole (NIZORAL) 2 % shampoo, Apply topically twice a week., Disp: 120 mL, Rfl: 11    LIDOcaine-prilocaine (EMLA) cream, Apply topically 2 (two) times daily as needed. To hands., Disp: 30 g, Rfl: 2    MAGNESIUM ORAL, Take 1 tablet by mouth once daily., Disp: , Rfl:     Multi-Vitamin tablet, Take 1 tablet by mouth once daily. , Disp: , Rfl:     nitrofurantoin (MACRODANTIN) 50 MG capsule, Take 1 capsule (50 mg total) by mouth every morning., Disp: 30 capsule, Rfl:  11    PSYLLIUM SEED, WITH SUGAR, (METAMUCIL ORAL), Take by mouth as needed. , Disp: , Rfl:     triamcinolone acetonide 0.1% (KENALOG) 0.1 % cream, AAA bid, Disp: 60 g, Rfl: 3    albuterol (PROVENTIL/VENTOLIN HFA) 90 mcg/actuation inhaler, Inhale 2 puffs into the lungs every 6 (six) hours as needed for Wheezing., Disp: 6.7 g, Rfl: 11    AREXVY, PF, 120 mcg/0.5 mL SusR vaccine, , Disp: , Rfl:     azithromycin (Z-ORA) 250 MG tablet, Take 2 tablets by mouth on day 1; Take 1 tablet by mouth on days 2-5, Disp: 6 tablet, Rfl: 0    celecoxib (CELEBREX) 200 MG capsule, Take 1 capsule (200 mg total) by mouth once daily., Disp: 30 capsule, Rfl: 0    dicyclomine (BENTYL) 20 mg tablet, Take 1 tablet (20 mg total) by mouth 4 (four) times daily before meals and nightly., Disp: 120 tablet, Rfl: 11    levothyroxine (SYNTHROID) 75 MCG tablet, Take 1 tablet (75 mcg total) by mouth once daily., Disp: 90 tablet, Rfl: 3    losartan (COZAAR) 100 MG tablet, Take 1 tablet (100 mg total) by mouth once daily., Disp: 90 tablet, Rfl: 3    memantine (NAMENDA) 5 MG Tab, Take 1 tablet (5 mg total) by mouth 2 (two) times daily., Disp: 180 tablet, Rfl: 3    omeprazole (PRILOSEC) 40 MG capsule, Take 1 capsule (40 mg total) by mouth Daily., Disp: 90 capsule, Rfl: 3    oxybutynin (DITROPAN-XL) 10 MG 24 hr tablet, Take 1 tablet (10 mg total) by mouth once daily., Disp: 90 tablet, Rfl: 3    rosuvastatin (CRESTOR) 40 MG Tab, Take 1 tablet (40 mg total) by mouth once daily., Disp: 90 tablet, Rfl: 3    traZODone (DESYREL) 100 MG tablet, Take 1 tablet (100 mg total) by mouth nightly as needed for Insomnia., Disp: 90 tablet, Rfl: 3    Review of Systems   Constitutional:  Negative for activity change, appetite change, chills, diaphoresis, fatigue, fever and unexpected weight change.   HENT:  Negative for congestion, ear discharge, ear pain, facial swelling, hearing loss, nosebleeds, postnasal drip, rhinorrhea, sinus pressure, sneezing, sore throat, tinnitus,  "trouble swallowing and voice change.    Eyes:  Negative for photophobia, pain, discharge, redness, itching and visual disturbance.   Respiratory:  Positive for cough. Negative for chest tightness, shortness of breath and wheezing.    Cardiovascular:  Negative for chest pain, palpitations and leg swelling.   Gastrointestinal:  Negative for abdominal distention, abdominal pain, anal bleeding, blood in stool, constipation, diarrhea, nausea, rectal pain and vomiting.   Endocrine: Negative for cold intolerance, heat intolerance, polydipsia, polyphagia and polyuria.   Genitourinary:  Negative for difficulty urinating, dysuria and flank pain.   Musculoskeletal:  Negative for arthralgias, back pain, joint swelling, myalgias and neck pain.   Skin:  Negative for rash.   Neurological:  Negative for dizziness, tremors, seizures, syncope, speech difficulty, weakness, light-headedness, numbness and headaches.   Psychiatric/Behavioral:  Negative for behavioral problems, confusion, decreased concentration, dysphoric mood, sleep disturbance and suicidal ideas. The patient is not nervous/anxious and is not hyperactive.        /60 (BP Location: Left arm, Patient Position: Sitting, BP Method: Medium (Manual))   Pulse 70   Ht 5' 1" (1.549 m)   Wt 60.6 kg (133 lb 9.6 oz)   SpO2 99%   BMI 25.24 kg/m²   Physical Exam  Vitals and nursing note reviewed.   Constitutional:       General: She is not in acute distress.     Appearance: Normal appearance. She is well-developed. She is not ill-appearing or toxic-appearing.   HENT:      Head: Normocephalic and atraumatic.      Right Ear: Tympanic membrane, ear canal and external ear normal.      Left Ear: Tympanic membrane, ear canal and external ear normal.      Nose: Nose normal.      Mouth/Throat:      Lips: Pink.      Mouth: Mucous membranes are moist.      Pharynx: No oropharyngeal exudate or posterior oropharyngeal erythema.   Eyes:      General: No scleral icterus.        Right " eye: No discharge.         Left eye: No discharge.      Extraocular Movements: Extraocular movements intact.      Conjunctiva/sclera: Conjunctivae normal.   Cardiovascular:      Rate and Rhythm: Normal rate and regular rhythm.      Pulses: Normal pulses.      Heart sounds: Normal heart sounds. No murmur heard.  Pulmonary:      Effort: Pulmonary effort is normal. No respiratory distress.      Breath sounds: Normal breath sounds. No wheezing or rales.   Abdominal:      General: Bowel sounds are normal. There is no distension.      Palpations: Abdomen is soft. There is no mass.      Tenderness: There is no abdominal tenderness. There is no right CVA tenderness, left CVA tenderness, guarding or rebound.      Hernia: No hernia is present.   Musculoskeletal:      Cervical back: Normal range of motion and neck supple. No rigidity or tenderness.   Lymphadenopathy:      Cervical: No cervical adenopathy.   Skin:     General: Skin is warm and dry.   Neurological:      General: No focal deficit present.      Mental Status: She is alert. Mental status is at baseline.   Psychiatric:         Mood and Affect: Mood normal.         Behavior: Behavior normal. Behavior is cooperative.         ASSESSMENT/PLAN  1. General medical exam  -     CBC Without Differential; Future; Expected date: 12/07/2023  -     Comprehensive Metabolic Panel; Future; Expected date: 12/07/2023  -     Lipid Panel; Future  -     TSH; Future; Expected date: 01/07/2024  -     Hemoglobin A1C; Future    2. Acute bronchitis, unspecified organism  Comments:  Z-Nadno, nasal saline, Flonase, Mucinex expectorant, Allegra.  Hydrate well.  Follow-up if not improving in 2 weeks.  Orders:  -     albuterol (PROVENTIL/VENTOLIN HFA) 90 mcg/actuation inhaler; Inhale 2 puffs into the lungs every 6 (six) hours as needed for Wheezing.  Dispense: 6.7 g; Refill: 11    3. Essential hypertension  Assessment & Plan:  Excellent control.  Continue current management.  Monitor.       Orders:  -     losartan (COZAAR) 100 MG tablet; Take 1 tablet (100 mg total) by mouth once daily.  Dispense: 90 tablet; Refill: 3    4. Abnormal finding of blood chemistry, unspecified  -     CBC Without Differential; Future; Expected date: 12/07/2023  -     Lipid Panel; Future  -     Hemoglobin A1C; Future    5. Other general symptoms and signs  -     TSH; Future; Expected date: 01/07/2024    6. Onychomycosis  -     Ambulatory referral/consult to Podiatry; Future; Expected date: 12/14/2023    7. PFO (patent foramen ovale)  Assessment & Plan:  No current issues.  Monitor.      8. Mixed hyperlipidemia  Assessment & Plan:  Low-fat diet.  Weight loss by calorie restriction and exercise.  Continue statin.        9. Atherosclerosis of aorta  Overview:  xcr    Assessment & Plan:  Continue low-fat diet.  Continue statin.  Consider 81 mg aspirin in the future.  Monitor.        10. Acquired hypothyroidism  Assessment & Plan:  Continue levothyroxine.  Check TSH.  Doing well.  Monitor.        11. Irritable bowel syndrome with both constipation and diarrhea  Assessment & Plan:  Does well with relatively regular use of dicyclomine.  Stay hydrated.  Fiber.  Stool softener as needed.  MiraLax as needed.  Continue dicyclomine.  Monitor.      12. Gastroesophageal reflux disease, unspecified whether esophagitis present  Assessment & Plan:  GERD education.  Continue PPI.  Discussed risks and benefits of continuing long-term including risk of osteoporosis in a recent study showing class 3 evidence for increased risk of dementia..  She prefers to continue.      13. Primary osteoarthritis involving multiple joints  Assessment & Plan:  Stable.  Monitor.  P.r.n. Tylenol or Voltaren gel.      Other orders  -     celecoxib (CELEBREX) 200 MG capsule; Take 1 capsule (200 mg total) by mouth once daily.  Dispense: 30 capsule; Refill: 0  -     dicyclomine (BENTYL) 20 mg tablet; Take 1 tablet (20 mg total) by mouth 4 (four) times daily before  meals and nightly.  Dispense: 120 tablet; Refill: 11  -     levothyroxine (SYNTHROID) 75 MCG tablet; Take 1 tablet (75 mcg total) by mouth once daily.  Dispense: 90 tablet; Refill: 3  -     memantine (NAMENDA) 5 MG Tab; Take 1 tablet (5 mg total) by mouth 2 (two) times daily.  Dispense: 180 tablet; Refill: 3  -     omeprazole (PRILOSEC) 40 MG capsule; Take 1 capsule (40 mg total) by mouth Daily.  Dispense: 90 capsule; Refill: 3  -     oxybutynin (DITROPAN-XL) 10 MG 24 hr tablet; Take 1 tablet (10 mg total) by mouth once daily.  Dispense: 90 tablet; Refill: 3  -     rosuvastatin (CRESTOR) 40 MG Tab; Take 1 tablet (40 mg total) by mouth once daily.  Dispense: 90 tablet; Refill: 3  -     traZODone (DESYREL) 100 MG tablet; Take 1 tablet (100 mg total) by mouth nightly as needed for Insomnia.  Dispense: 90 tablet; Refill: 3  -     azithromycin (Z-ORA) 250 MG tablet; Take 2 tablets by mouth on day 1; Take 1 tablet by mouth on days 2-5  Dispense: 6 tablet; Refill: 0              Most recent some lab results reviewed, if available.  Any new prescription medications gone over in detail including reason for taking the medication, the general mechanism of action, most common possible side effects and possible costs, etcetera.    Chronic conditions updated. Other than changes or additions as above, cont current medications and maintain follow-up with specialists if indicated.     Fabian Luna MD  A dictation device was used to produce this document. Use of such devices sometimes results in grammatical errors or replacement of words that sound similarly.          Est5  Time spent in the evaluation and management of this patient exceeded 40min and greater than 50% of this time was in face-to-face time with the patient on day of the clinic visit.   This includes face-to-face time and non face-to-face time and includes the following:  --preparing to see the patient (eg, obtaining and/or reviewing old records such as, when  applicable, primary care notes, specialist notes, hospital notes, review of laboratory tests, and/or radiographic and/or cardiology or other studies)  --performing a medically appropriate review of systems and examination and/or evaluation  --reviewing and independently interpreting results (not separately reported; eg, lab results) and communicating results to the patient and/or family/caregiver  --placing orders and/or reviewing other physician's orders which can both include medications, laboratory studies, radiographic studies, procedures, referrals etcetera and   --counseling and educating the patient and/or family member/caregiver regarding the treatment plan  --documentation of the visit in the electronic health record  --communicating with other health care providers regarding referrals, studies, follow-up, etc

## 2023-12-08 NOTE — ASSESSMENT & PLAN NOTE
GERD education.  Continue PPI.  Discussed risks and benefits of continuing long-term including risk of osteoporosis in a recent study showing class 3 evidence for increased risk of dementia..  She prefers to continue.

## 2023-12-11 ENCOUNTER — TELEPHONE (OUTPATIENT)
Dept: ORTHOPEDICS | Facility: CLINIC | Age: 73
End: 2023-12-11
Payer: MEDICARE

## 2023-12-11 NOTE — TELEPHONE ENCOUNTER
Left patient a voicemail stating that we seen her emg appointment and appointment with  are both on the same day and same time so we going to move it to Jack 3 for 2:30 PM at the [facility] location.

## 2023-12-18 ENCOUNTER — PROCEDURE VISIT (OUTPATIENT)
Dept: NEUROLOGY | Facility: CLINIC | Age: 73
End: 2023-12-18
Payer: MEDICARE

## 2023-12-18 DIAGNOSIS — G56.22 CUBITAL TUNNEL SYNDROME ON LEFT: ICD-10-CM

## 2023-12-18 PROCEDURE — 95911 NRV CNDJ TEST 9-10 STUDIES: CPT | Mod: 26,S$PBB,, | Performed by: PHYSICAL MEDICINE & REHABILITATION

## 2023-12-18 PROCEDURE — 95911 PR NERVE CONDUCTION STUDY; 9-10 STUDIES: ICD-10-PCS | Mod: 26,S$PBB,, | Performed by: PHYSICAL MEDICINE & REHABILITATION

## 2023-12-18 PROCEDURE — 95910 NRV CNDJ TEST 7-8 STUDIES: CPT | Mod: PBBFAC | Performed by: PHYSICAL MEDICINE & REHABILITATION

## 2023-12-18 PROCEDURE — 95886 MUSC TEST DONE W/N TEST COMP: CPT | Mod: 26,S$PBB,, | Performed by: PHYSICAL MEDICINE & REHABILITATION

## 2023-12-18 PROCEDURE — 95886 PR EMG COMPLETE, W/ NERVE CONDUCTION STUDIES, 5+ MUSCLES: ICD-10-PCS | Mod: 26,S$PBB,, | Performed by: PHYSICAL MEDICINE & REHABILITATION

## 2023-12-18 PROCEDURE — 95886 MUSC TEST DONE W/N TEST COMP: CPT | Mod: PBBFAC | Performed by: PHYSICAL MEDICINE & REHABILITATION

## 2023-12-18 NOTE — PROCEDURES
Test Date:  2023    Patient: anjali sotomayor : 1950 Physician: Grant Jason D.O.   ID#: [id number] SEX: Female Ref. Phys: Suzy Dominguez MD     HPI: Anjali Sotomayor is a 72 y.o.female who presents for NCS/EMG to evaluate for left ulnar neuropathy.      NCV & EMG Findings:  Evaluation of the left median sensory nerve showed decreased conduction velocity.  The left Median-Radial (Dig I) sensory nerve showed abnormal peak latency difference ((Median Wrist-Dig I)-(Radial Wrist-Dig I)).  The left Median-Ulnar Palmar sensory nerve showed abnormal peak latency difference ((Median Palm-Wrist)-(Ulnar Palm-Wrist)).  All remaining nerves (as indicated in the following tables) were within normal limits.  Needle evaluation of the left Abductor Digiti Minimi muscle showed increased motor unit amplitude.  All remaining muscles (as indicated in the following table) showed no evidence of electrical instability.    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 Carpal tunnel syndrome).  There is no motor axonal loss.  This is graded as Mild in severity on the left.    There were chronic neuropathic changes in the left abductor digiti minimi muscle.  In isolation to one muscle, this is of limited diagnostic significance.  This is nonspecific, but could be considered weak evidence of an ulnar neuropathy in the appropriate clinical context, however there is no focal slowing at the elbow or wrist and no axonal loss to corroborate, which makes this much less likely.  Still, her clinical symptoms of numbness/tingling in the 4th/5th digits radiating from the medial elbow, worse with flexion or pressure, are certainly most consistent with an ulnar neuropathy, despite the rather weak evidence on today's study.      ___________________________  Grant Jason D.O.        NCS+  Motor Nerve Results      Latency Amplitude F-Lat Segment Distance CV  Comment   Site (ms) Norm (mV) Norm (ms)  (cm) (m/s) Norm    Left Median (APB)   Wrist 4.2  < 4.4 4.6  > 3.8  Wrist-Palm - - -    Elbow 7.8 - 4.4 -  Elbow-Wrist 21 58  > 51    Left Ulnar (ADM)   Wrist 2.4  < 3.7 7.6  > 3.0         Bel Elbow 6.3 - 6.9 -  Bel Elbow-Wrist 23 59  > 52    Abv Elbow 8.2 - 6.2 -  Abv Elbow-Bel Elbow 11 58  > 43    Left Ulnar (FDI)   Wrist 3.6 - 9.4 -         Bel Elbow 7.2 - 6.5 -  Bel Elbow-Wrist 20 56  > 52    Abv Elbow 8.8 - 4.2 -  Abv Elbow-Bel Elbow 9 56  > 43    Right Ulnar (ADM)   Wrist 2.4  < 3.7 9.2  > 3.0         Bel Elbow 5.9 - 8.7 -  Bel Elbow-Wrist 25 71  > 52      Sensory Nerve Results      Latency (Peak) Amplitude (P-P) Segment Distance CV Comment   Site (ms) Norm (µV) Norm  (cm) (m/s) Norm    Left Median   Wrist-Dig I 3.0 - 40 - Wrist-Dig I 10 33 -    Wrist-Dig II 3.6  < 4.0 31  > 8 Wrist-Dig II 14 *39  > 39    Palm-Wrist 2.5 - 27 - Palm-Wrist - - -    Right Median   Wrist-Dig II 3.2  < 4.0 41  > 8 Wrist-Dig II 14 44  > 39    Left Ulnar   Wrist-Dig V 2.5  < 4.0 40  > 4 Wrist-Dig V 14 56  > 38    Palm-Wrist 1.70 - 8 - Palm-Wrist - - -    Right Ulnar   Wrist-Dig V 2.5  < 4.0 49  > 4 Wrist-Dig V 14 56  > 38    Left Radial   Wrist-Dig I 2.5 - 12 - Wrist-Dig I 10 40 -      Inter-Nerve Comparisons     Nerve 1 Value 1 Nerve 2 Value 2 Parameter Result Normal   Sensory Sites   L Median Wrist-Dig I 3.0 ms L Radial Wrist-Dig I 2.5 ms Peak Lat Diff *0.50 ms <0.40   L Median Palm-Wrist 2.5 ms L Ulnar Palm-Wrist 1.7 ms Peak Lat Diff *0.80 ms <0.30     EMG+     Side Muscle Nerve Root Ins Act Fibs Psw Amp Dur Poly Recrt Int Pat Comment   Left Deltoid Axillary C5-C6 Nml Nml Nml Nml Nml 0 Nml Nml    Left Biceps Musculocut C5-C6 Nml Nml Nml Nml Nml 0 Nml Nml    Left Triceps Radial C6-C8 Nml Nml Nml Nml Nml 0 Nml Nml    Left FDI Ulnar C8-T1 Nml Nml Nml Nml Nml 0 Nml Nml    Left ADM Ulnar C8-T1 Nml Nml Nml *Incr Nml 0 Nml Nml    Left Cervical Parasp (Lower) Rami C7-C8 Nml Nml Nml                  Waveforms:    Motor         Sensory

## 2024-01-02 RX ORDER — CELECOXIB 200 MG/1
CAPSULE ORAL
Qty: 30 CAPSULE | Refills: 0 | OUTPATIENT
Start: 2024-01-02

## 2024-01-02 NOTE — TELEPHONE ENCOUNTER
No care due was identified.  Health [facility] Embedded Care Due Messages. Reference number: [id number].   1/02/2024 9:05:10 AM CST

## 2024-01-03 ENCOUNTER — OFFICE VISIT (OUTPATIENT)
Dept: ORTHOPEDICS | Facility: CLINIC | Age: 74
End: 2024-01-03
Payer: MEDICARE

## 2024-01-03 DIAGNOSIS — M65.4 DE QUERVAIN'S TENOSYNOVITIS, RIGHT: ICD-10-CM

## 2024-01-03 DIAGNOSIS — G56.02 CARPAL TUNNEL SYNDROME OF LEFT WRIST: Primary | ICD-10-CM

## 2024-01-03 DIAGNOSIS — M72.0 DUPUYTREN'S CONTRACTURE OF LEFT HAND: ICD-10-CM

## 2024-01-03 DIAGNOSIS — G56.22 ULNAR NERVE COMPRESSION, LEFT: ICD-10-CM

## 2024-01-03 PROCEDURE — 99215 OFFICE O/P EST HI 40 MIN: CPT | Mod: PBBFAC | Performed by: ORTHOPAEDIC SURGERY

## 2024-01-03 PROCEDURE — 99999 PR PBB SHADOW E&M-EST. PATIENT-LVL V: CPT | Mod: PBBFAC,,, | Performed by: ORTHOPAEDIC SURGERY

## 2024-01-03 PROCEDURE — 99214 OFFICE O/P EST MOD 30 MIN: CPT | Mod: S$PBB,,, | Performed by: ORTHOPAEDIC SURGERY

## 2024-01-03 RX ORDER — CELECOXIB 200 MG/1
200 CAPSULE ORAL DAILY
Qty: 30 CAPSULE | Refills: 0 | OUTPATIENT
Start: 2024-01-03

## 2024-01-03 NOTE — TELEPHONE ENCOUNTER
----- Message from Kalpana Fontanez sent at 1/3/2024  4:01 PM CST -----  Contact: [phone]  Requesting an RX refill or new RX.  Is this a refill or new RX:   RX name and strength ):  celecoxib (CELEBREX) 200 MG capsule  Is this a 30 day or 90 day RX: 30  Pharmacy name and phone #:   [facility] - [place] - [address] [place] [address] [address]  [address] [place] [address] [address]  [place] [address]  Phone: [phone] Fax: [phone]    Pharmacist state patient just has the appt on 12/07/2023

## 2024-01-03 NOTE — H&P (VIEW-ONLY)
Jackelyn Kendrick presents for follow up evaluation of   Encounter Diagnoses   Name Primary?    Carpal tunnel syndrome of left wrist Yes    Dupuytren's contracture of left hand     De Quervain's tenosynovitis, right     Ulnar tunnel syndrome of left wrist    The patient has had an EMG/NCS due to her left hand and elbow numbness/tingling which we reviewed together today and it showed:  Impression:  There is electrophysiologic evidence of a left sensory median mononeuropathy across the wrist (I.e. Carpal tunnel syndrome).  There is no motor axonal loss.  This is graded as Mild in severity on the left.    There were chronic neuropathic changes in the left abductor digiti minimi muscle.  In isolation to one muscle, this is of limited diagnostic significance.  This is nonspecific, but could be considered weak evidence of an ulnar neuropathy in the appropriate clinical context, however there is no focal slowing at the elbow or wrist and no axonal loss to corroborate, which makes this much less likely.  Still, her clinical symptoms of numbness/tingling in the 4th/5th digits radiating from the medial elbow, worse with flexion or pressure, are certainly most consistent with an ulnar neuropathy, despite the rather weak evidence on today's study.       Patient is also complaining of left palmar dupuytren's contracture along ring finger. She states that is has become progressively worse over the last couple of months and is affecting her ADLs. She is also having pain along her right radial wrist. She has long standing history of DeQuervain's and her right wrist and has been injected x 4 since 2.16.22. She states that she has pain with thumb extension and pottery. Her pain is a 3/10.    PE:    AA&O x 4.  NAD  HEENT:  NCAT, sclera nonicteric  Lungs:  Respirations are equal and unlabored.  CV:  2+ bilateral upper and lower extremity pulses.  MSK:    Left hand: + median n. compression, + Tinel's + Phalens left wrist, + compression  and Tinels left elbow. Neurovascularly intact. 5/5 thenar and intrinsic musculature strength. Dupuytrens cord with contracture left ring MP 25 degrees  otherwise full range of motion hands, wrists and elbows.    Right hand/Wrist:  strength normal  + swelling right radial wrist, Tenderness to palpation right 1st dorsal extensor compartment, pain with the radial and ulnar deviation, + Finkelstein's test, + WHAT Test, otherwise ROM hand/wrist/elbow full, painless      Assessment: Left carpal tunnel syndrome, cubital tunnel syndrome, Dupuytren's disease and right DeQuervain's tenosynovitis.    Plan: We have discussed conservative vs. surgical treatment as well as risks, benefits and alternatives for carpal tunnel syndrome, ulnar nerve compression, Dupuytren's disease and DeQuervain's tenosynovitis.  She has exhausted conservative measures and would like to proceed with surgery. Surgery would include Left carpal tunnel release, left ulnar nerve decompression at elbow, left partial fasciectomy dupuytrens removal // Right 1st dorsal extensor compartment cortisone injection.     We have discussed risks of hand surgery which include but are not limited to blood clots in the legs that can travel to the lungs (pulmonary embolism). Pulmonary embolism can cause shortness of breath, chest pain, and even shock. Other risks include urinary tract infection, nausea and vomiting (usually related to pain medication), chronic pain, bleeding, nerve damage, blood vessel injury, scarring and infection of the hand which can require re-operation. Furthermore, the risks of anesthesia include potential heart, lung, kidney, and liver damage.  Informed consent was obtained.  She understands and would like to proceed with surgery in the near future.    Call with any questions/concerns in the interim        Suzy Doimnguez MD    Please be aware that this note has been generated with the assistance of matt voice-to-text.  Please excuse any  spelling or grammatical errors.

## 2024-01-06 RX ORDER — CEFAZOLIN SODIUM 2 G/50ML
2 SOLUTION INTRAVENOUS
Status: CANCELLED | OUTPATIENT
Start: 2024-01-06

## 2024-01-06 RX ORDER — MUPIROCIN 20 MG/G
OINTMENT TOPICAL
Status: CANCELLED | OUTPATIENT
Start: 2024-01-06

## 2024-01-08 ENCOUNTER — ANESTHESIA EVENT (OUTPATIENT)
Dept: SURGERY | Facility: HOSPITAL | Age: 74
End: 2024-01-08
Payer: MEDICARE

## 2024-01-10 ENCOUNTER — TELEPHONE (OUTPATIENT)
Dept: ORTHOPEDICS | Facility: CLINIC | Age: 74
End: 2024-01-10
Payer: MEDICARE

## 2024-01-10 ENCOUNTER — TELEPHONE (OUTPATIENT)
Dept: PRIMARY CARE CLINIC | Facility: CLINIC | Age: 74
End: 2024-01-10
Payer: MEDICARE

## 2024-01-10 DIAGNOSIS — Z12.31 ENCOUNTER FOR SCREENING MAMMOGRAM FOR MALIGNANT NEOPLASM OF BREAST: Primary | ICD-10-CM

## 2024-01-10 RX ORDER — DOCUSATE SODIUM 100 MG/1
100 CAPSULE, LIQUID FILLED ORAL 2 TIMES DAILY
Qty: 30 CAPSULE | Refills: 1 | Status: SHIPPED | OUTPATIENT
Start: 2024-01-10

## 2024-01-10 RX ORDER — ONDANSETRON HYDROCHLORIDE 8 MG/1
8 TABLET, FILM COATED ORAL EVERY 8 HOURS PRN
Qty: 30 TABLET | Refills: 0 | Status: SHIPPED | OUTPATIENT
Start: 2024-01-10

## 2024-01-10 RX ORDER — HYDROCODONE BITARTRATE AND ACETAMINOPHEN 5; 325 MG/1; MG/1
1 TABLET ORAL EVERY 6 HOURS PRN
Qty: 30 TABLET | Refills: 0 | Status: SHIPPED | OUTPATIENT
Start: 2024-01-10

## 2024-01-10 NOTE — TELEPHONE ENCOUNTER
----- Message from Gemini Arevalo sent at 1/10/2024 11:10 AM CST -----  Regarding: Orders; Mammogram  Contact: pt [phone]  Pt is calling to speak with someone in the office to request and order. Pt received a letter in the mail to schedule; no order in Epic to schedule from. Pt is asking for a return call back once the order is in, so that they can be scheduled. Please call pt to advise. Thanks.        Ordered Needed: mammogram     Communication Preference: pt [phone]     Additional Information:

## 2024-01-10 NOTE — TELEPHONE ENCOUNTER
Spoke to patient to inform them of their surgery arrival time (6 am), [facility], [address] [facility] [address]:  Verbalized understanding of instructions for pre-wash, and reviewed NPO after midnight.   Confirmed call in regards to medication instructions from anesthesia.   Confirmed patient was NOT using a rideshare service for surgery arrival or  transportation.

## 2024-01-10 NOTE — TELEPHONE ENCOUNTER
LOV /Annual 12/7/23  RTC     Patient is requesting an order for an annual mammogram. Her last mammogram was 1/10/23

## 2024-01-11 ENCOUNTER — ANESTHESIA (OUTPATIENT)
Dept: SURGERY | Facility: HOSPITAL | Age: 74
End: 2024-01-11
Payer: MEDICARE

## 2024-01-11 ENCOUNTER — HOSPITAL ENCOUNTER (OUTPATIENT)
Facility: HOSPITAL | Age: 74
Discharge: HOME | End: 2024-01-11
Attending: ORTHOPAEDIC SURGERY | Admitting: ORTHOPAEDIC SURGERY
Payer: MEDICARE

## 2024-01-11 VITALS
HEART RATE: 65 BPM | BODY MASS INDEX: 25.11 KG/M2 | WEIGHT: 133 LBS | RESPIRATION RATE: 28 BRPM | OXYGEN SATURATION: 93 % | SYSTOLIC BLOOD PRESSURE: 152 MMHG | DIASTOLIC BLOOD PRESSURE: 62 MMHG | HEIGHT: 61 IN | TEMPERATURE: 98 F

## 2024-01-11 DIAGNOSIS — G56.22 ULNAR NERVE COMPRESSION, LEFT: Primary | ICD-10-CM

## 2024-01-11 DIAGNOSIS — M72.0 DUPUYTREN'S CONTRACTURE OF LEFT HAND: ICD-10-CM

## 2024-01-11 DIAGNOSIS — G56.02 CARPAL TUNNEL SYNDROME OF LEFT WRIST: ICD-10-CM

## 2024-01-11 DIAGNOSIS — M65.4 DE QUERVAIN'S TENOSYNOVITIS, RIGHT: ICD-10-CM

## 2024-01-11 PROCEDURE — 63600175 PHARM REV CODE 636 W HCPCS: Performed by: ORTHOPAEDIC SURGERY

## 2024-01-11 PROCEDURE — 25000003 PHARM REV CODE 250: Performed by: ORTHOPAEDIC SURGERY

## 2024-01-11 PROCEDURE — 63600175 PHARM REV CODE 636 W HCPCS: Performed by: NURSE ANESTHETIST, CERTIFIED REGISTERED

## 2024-01-11 PROCEDURE — 37000009 HC ANESTHESIA EA ADD 15 MINS: Performed by: ORTHOPAEDIC SURGERY

## 2024-01-11 PROCEDURE — D9220A PRA ANESTHESIA: Mod: CRNA,,, | Performed by: NURSE ANESTHETIST, CERTIFIED REGISTERED

## 2024-01-11 PROCEDURE — D9220A PRA ANESTHESIA: Mod: ANES,,, | Performed by: SURGERY

## 2024-01-11 PROCEDURE — 64721 CARPAL TUNNEL SURGERY: CPT | Mod: 59,51,LT, | Performed by: ORTHOPAEDIC SURGERY

## 2024-01-11 PROCEDURE — 25000003 PHARM REV CODE 250: Performed by: NURSE ANESTHETIST, CERTIFIED REGISTERED

## 2024-01-11 PROCEDURE — 20550 NJX 1 TENDON SHEATH/LIGAMENT: CPT | Mod: 59,RT,, | Performed by: ORTHOPAEDIC SURGERY

## 2024-01-11 PROCEDURE — 27201423 OPTIME MED/SURG SUP & DEVICES STERILE SUPPLY: Performed by: ORTHOPAEDIC SURGERY

## 2024-01-11 PROCEDURE — 94761 N-INVAS EAR/PLS OXIMETRY MLT: CPT

## 2024-01-11 PROCEDURE — 99900035 HC TECH TIME PER 15 MIN (STAT)

## 2024-01-11 PROCEDURE — 88304 TISSUE EXAM BY PATHOLOGIST: CPT | Performed by: PATHOLOGY

## 2024-01-11 PROCEDURE — 63600175 PHARM REV CODE 636 W HCPCS: Performed by: PHYSICIAN ASSISTANT

## 2024-01-11 PROCEDURE — 71000033 HC RECOVERY, INTIAL HOUR: Performed by: ORTHOPAEDIC SURGERY

## 2024-01-11 PROCEDURE — 63600175 PHARM REV CODE 636 W HCPCS: Performed by: SURGERY

## 2024-01-11 PROCEDURE — 36000707: Performed by: ORTHOPAEDIC SURGERY

## 2024-01-11 PROCEDURE — 71000015 HC POSTOP RECOV 1ST HR: Performed by: ORTHOPAEDIC SURGERY

## 2024-01-11 PROCEDURE — 37000008 HC ANESTHESIA 1ST 15 MINUTES: Performed by: ORTHOPAEDIC SURGERY

## 2024-01-11 PROCEDURE — 64415 NJX AA&/STRD BRCH PLXS IMG: CPT | Mod: 59,LT | Performed by: SURGERY

## 2024-01-11 PROCEDURE — 26121 RELEASE PALM CONTRACTURE: CPT | Mod: LT,,, | Performed by: ORTHOPAEDIC SURGERY

## 2024-01-11 PROCEDURE — 36000706: Performed by: ORTHOPAEDIC SURGERY

## 2024-01-11 PROCEDURE — 25000003 PHARM REV CODE 250: Performed by: PHYSICIAN ASSISTANT

## 2024-01-11 PROCEDURE — 64718 REVISE ULNAR NERVE AT ELBOW: CPT | Mod: 51,LT,, | Performed by: ORTHOPAEDIC SURGERY

## 2024-01-11 PROCEDURE — 88304 TISSUE EXAM BY PATHOLOGIST: CPT | Mod: 26,,, | Performed by: PATHOLOGY

## 2024-01-11 RX ORDER — OXYCODONE HYDROCHLORIDE 5 MG/1
5 TABLET ORAL
Status: DISCONTINUED | OUTPATIENT
Start: 2024-01-11 | End: 2024-01-11 | Stop reason: HOSPADM

## 2024-01-11 RX ORDER — SODIUM CHLORIDE 0.9 % (FLUSH) 0.9 %
10 SYRINGE (ML) INJECTION
Status: DISCONTINUED | OUTPATIENT
Start: 2024-01-11 | End: 2024-01-11 | Stop reason: HOSPADM

## 2024-01-11 RX ORDER — ACETAMINOPHEN 500 MG
1000 TABLET ORAL
Status: COMPLETED | OUTPATIENT
Start: 2024-01-11 | End: 2024-01-11

## 2024-01-11 RX ORDER — HYDROCODONE BITARTRATE AND ACETAMINOPHEN 5; 325 MG/1; MG/1
1 TABLET ORAL EVERY 4 HOURS PRN
Status: CANCELLED | OUTPATIENT
Start: 2024-01-11

## 2024-01-11 RX ORDER — DEXAMETHASONE SODIUM PHOSPHATE 4 MG/ML
INJECTION, SOLUTION INTRA-ARTICULAR; INTRALESIONAL; INTRAMUSCULAR; INTRAVENOUS; SOFT TISSUE
Status: DISCONTINUED | OUTPATIENT
Start: 2024-01-11 | End: 2024-01-11

## 2024-01-11 RX ORDER — PROPOFOL 10 MG/ML
VIAL (ML) INTRAVENOUS
Status: DISCONTINUED | OUTPATIENT
Start: 2024-01-11 | End: 2024-01-11

## 2024-01-11 RX ORDER — FENTANYL CITRATE 50 UG/ML
100 INJECTION, SOLUTION INTRAMUSCULAR; INTRAVENOUS
Status: DISCONTINUED | OUTPATIENT
Start: 2024-01-11 | End: 2024-01-11 | Stop reason: HOSPADM

## 2024-01-11 RX ORDER — MUPIROCIN 20 MG/G
OINTMENT TOPICAL
Status: DISCONTINUED | OUTPATIENT
Start: 2024-01-11 | End: 2024-01-11 | Stop reason: HOSPADM

## 2024-01-11 RX ORDER — HALOPERIDOL 5 MG/ML
0.5 INJECTION INTRAMUSCULAR EVERY 10 MIN PRN
Status: DISCONTINUED | OUTPATIENT
Start: 2024-01-11 | End: 2024-01-11 | Stop reason: HOSPADM

## 2024-01-11 RX ORDER — ONDANSETRON 2 MG/ML
INJECTION INTRAMUSCULAR; INTRAVENOUS
Status: DISCONTINUED | OUTPATIENT
Start: 2024-01-11 | End: 2024-01-11

## 2024-01-11 RX ORDER — ROPIVACAINE HYDROCHLORIDE 5 MG/ML
INJECTION, SOLUTION EPIDURAL; INFILTRATION; PERINEURAL
Status: COMPLETED | OUTPATIENT
Start: 2024-01-11 | End: 2024-01-11

## 2024-01-11 RX ORDER — LIDOCAINE HYDROCHLORIDE 20 MG/ML
INJECTION INTRAVENOUS
Status: DISCONTINUED | OUTPATIENT
Start: 2024-01-11 | End: 2024-01-11

## 2024-01-11 RX ORDER — CELECOXIB 200 MG/1
400 CAPSULE ORAL
Status: DISCONTINUED | OUTPATIENT
Start: 2024-01-11 | End: 2024-01-11 | Stop reason: HOSPADM

## 2024-01-11 RX ORDER — FAMOTIDINE 10 MG/ML
INJECTION INTRAVENOUS
Status: DISCONTINUED | OUTPATIENT
Start: 2024-01-11 | End: 2024-01-11

## 2024-01-11 RX ORDER — PROPOFOL 10 MG/ML
VIAL (ML) INTRAVENOUS CONTINUOUS PRN
Status: DISCONTINUED | OUTPATIENT
Start: 2024-01-11 | End: 2024-01-11

## 2024-01-11 RX ORDER — DEXMEDETOMIDINE HYDROCHLORIDE 100 UG/ML
INJECTION, SOLUTION INTRAVENOUS
Status: DISCONTINUED | OUTPATIENT
Start: 2024-01-11 | End: 2024-01-11

## 2024-01-11 RX ORDER — MIDAZOLAM HYDROCHLORIDE 1 MG/ML
1 INJECTION INTRAMUSCULAR; INTRAVENOUS
Status: DISCONTINUED | OUTPATIENT
Start: 2024-01-11 | End: 2024-01-11 | Stop reason: HOSPADM

## 2024-01-11 RX ORDER — METHYLPREDNISOLONE ACETATE 40 MG/ML
INJECTION, SUSPENSION INTRA-ARTICULAR; INTRALESIONAL; INTRAMUSCULAR; SOFT TISSUE
Status: DISCONTINUED | OUTPATIENT
Start: 2024-01-11 | End: 2024-01-11 | Stop reason: HOSPADM

## 2024-01-11 RX ORDER — MUPIROCIN 20 MG/G
OINTMENT TOPICAL 2 TIMES DAILY
Status: CANCELLED | OUTPATIENT
Start: 2024-01-11 | End: 2024-01-16

## 2024-01-11 RX ORDER — LIDOCAINE HYDROCHLORIDE 10 MG/ML
INJECTION, SOLUTION EPIDURAL; INFILTRATION; INTRACAUDAL; PERINEURAL
Status: DISCONTINUED | OUTPATIENT
Start: 2024-01-11 | End: 2024-01-11 | Stop reason: HOSPADM

## 2024-01-11 RX ADMIN — CEFAZOLIN 2 G: 2 INJECTION, POWDER, FOR SOLUTION INTRAMUSCULAR; INTRAVENOUS at 08:01

## 2024-01-11 RX ADMIN — ROPIVACAINE HYDROCHLORIDE 30 ML: 5 INJECTION EPIDURAL; INFILTRATION; PERINEURAL at 08:01

## 2024-01-11 RX ADMIN — ONDANSETRON 4 MG: 2 INJECTION INTRAMUSCULAR; INTRAVENOUS at 08:01

## 2024-01-11 RX ADMIN — ACETAMINOPHEN 1000 MG: 500 TABLET ORAL at 07:01

## 2024-01-11 RX ADMIN — LIDOCAINE HYDROCHLORIDE 50 MG: 20 INJECTION INTRAVENOUS at 08:01

## 2024-01-11 RX ADMIN — DEXAMETHASONE SODIUM PHOSPHATE 4 MG: 4 INJECTION, SOLUTION INTRAMUSCULAR; INTRAVENOUS at 08:01

## 2024-01-11 RX ADMIN — FAMOTIDINE 20 MG: 10 INJECTION, SOLUTION INTRAVENOUS at 08:01

## 2024-01-11 RX ADMIN — MIDAZOLAM 2 MG: 1 INJECTION INTRAMUSCULAR; INTRAVENOUS at 08:01

## 2024-01-11 RX ADMIN — FENTANYL CITRATE 50 MCG: 50 INJECTION, SOLUTION INTRAMUSCULAR; INTRAVENOUS at 08:01

## 2024-01-11 RX ADMIN — PROPOFOL 50 MG: 10 INJECTION, EMULSION INTRAVENOUS at 08:01

## 2024-01-11 RX ADMIN — PROPOFOL 75 MCG/KG/MIN: 10 INJECTION, EMULSION INTRAVENOUS at 08:01

## 2024-01-11 RX ADMIN — MUPIROCIN: 20 OINTMENT TOPICAL at 07:01

## 2024-01-11 RX ADMIN — DEXMEDETOMIDINE 10 MCG: 100 INJECTION, SOLUTION, CONCENTRATE INTRAVENOUS at 08:01

## 2024-01-11 RX ADMIN — SODIUM CHLORIDE: 9 INJECTION, SOLUTION INTRAVENOUS at 07:01

## 2024-01-11 RX ADMIN — GLYCOPYRROLATE 0.2 MG: 0.2 INJECTION, SOLUTION INTRAMUSCULAR; INTRAVENOUS at 09:01

## 2024-01-11 NOTE — ANESTHESIA PREPROCEDURE EVALUATION
Pre-operative evaluation for Procedure(s) (LRB):  RELEASE, CARPAL TUNNEL (Left)  RELEASE, DUPUYTREN CONTRACTURE, PALM (Left)  DECOMPRESSION, NERVE ULNAR (Left)  INJECTION, STEROID 1ST DORSAL COMPARTMENT (Right)    Jackelyn Kendrick is a 73 y.o. female with the medical history listed below who now presents for the above procedure.     Patient Active Problem List   Diagnosis    GERD (gastroesophageal reflux disease)    Acquired hypothyroidism    Mixed hyperlipidemia    Primary osteoarthritis involving multiple joints    Nuclear sclerosis - Both Eyes    Urinary incontinence, mixed    Vaginal vault prolapse, posthysterectomy    Rectocele    De Quervain's disease (tenosynovitis)    Essential hypertension    Atherosclerosis of aorta    Impingement syndrome of right shoulder    Nontraumatic tear of right rotator cuff    Lower extremity edema    PFO (patent foramen ovale)    Posterior subcapsular age-related cataract, left eye    Arthritis of carpometacarpal (CMC) joint of both thumbs    Ganglion cyst    Venous insufficiency of both lower extremities    Nuclear sclerotic cataract of right eye    Dupuytren's disease of palm of right hand    S/P trigger finger release    Irritable bowel syndrome with both constipation and diarrhea    Bilateral shoulder pain    Chronic bilateral low back pain    Chronic right-sided low back pain    Urinary tract infection with hematuria    Urinary frequency       Review of patient's allergies indicates:   Allergen Reactions    Levaquin [levofloxacin] Swelling and Edema    Erythromycin (bulk) Nausea And Vomiting     Not true allergy       No current facility-administered medications on file prior to encounter.     Current Outpatient Medications on File Prior to Encounter   Medication Sig Dispense Refill    celecoxib (CELEBREX) 200 MG capsule Take 1 capsule (200 mg total) by mouth once daily. 30 capsule 0    cranberry fruit extract (CRANBERRY EXTRACT ORAL) Take by mouth.      cycloSPORINE  (RESTASIS) 0.05 % ophthalmic emulsion Place 1 drop into both eyes 2 (two) times daily. 60 each 12    dicyclomine (BENTYL) 20 mg tablet Take 1 tablet (20 mg total) by mouth 4 (four) times daily before meals and nightly. 120 tablet 11    estradioL (ESTRACE) 0.01 % (0.1 mg/gram) vaginal cream Place 0.5-1 g vaginally twice a week. 42.5 g 3    levothyroxine (SYNTHROID) 75 MCG tablet Take 1 tablet (75 mcg total) by mouth once daily. 90 tablet 3    losartan (COZAAR) 100 MG tablet Take 1 tablet (100 mg total) by mouth once daily. 90 tablet 3    memantine (NAMENDA) 5 MG Tab Take 1 tablet (5 mg total) by mouth 2 (two) times daily. 180 tablet 3    Multi-Vitamin tablet Take 1 tablet by mouth once daily.       nitrofurantoin (MACRODANTIN) 50 MG capsule Take 1 capsule (50 mg total) by mouth every morning. 30 capsule 11    omeprazole (PRILOSEC) 40 MG capsule Take 1 capsule (40 mg total) by mouth Daily. 90 capsule 3    oxybutynin (DITROPAN-XL) 10 MG 24 hr tablet Take 1 tablet (10 mg total) by mouth once daily. 90 tablet 3    PSYLLIUM SEED, WITH SUGAR, (METAMUCIL ORAL) Take by mouth as needed.       rosuvastatin (CRESTOR) 40 MG Tab Take 1 tablet (40 mg total) by mouth once daily. 90 tablet 3    traZODone (DESYREL) 100 MG tablet Take 1 tablet (100 mg total) by mouth nightly as needed for Insomnia. 90 tablet 3    albuterol (PROVENTIL/VENTOLIN HFA) 90 mcg/actuation inhaler Inhale 2 puffs into the lungs every 6 (six) hours as needed for Wheezing. 6.7 g 11    AREXVY, PF, 120 mcg/0.5 mL SusR vaccine       diclofenac sodium (VOLTAREN) 1 % Gel Apply 2 g topically 2 (two) times daily as needed (musculoskeletal pain). 100 g 11    fluocinonide (LIDEX) 0.05 % external solution Apply topically once daily. 60 mL 11    ketoconazole (NIZORAL) 2 % shampoo Apply topically twice a week. 120 mL 11    LIDOcaine-prilocaine (EMLA) cream Apply topically 2 (two) times daily as needed. To hands. 30 g 2    MAGNESIUM ORAL Take 1 tablet by mouth once daily.       triamcinolone acetonide 0.1% (KENALOG) 0.1 % cream AAA bid 60 g 3       Past Surgical History:   Procedure Laterality Date    ANORECTAL MANOMETRY N/A 6/1/2023    Procedure: MANOMETRY, ANORECTAL;  Surgeon: Paulo Pritchett MD;  Location: [facility] ENDO (4TH FLR);  Service: Endoscopy;  Laterality: N/A;  instructions sent to [id number]  5/26 pre-call no answer; MB    ARTHROSCOPIC REPAIR OF ROTATOR CUFF OF SHOULDER Right 9/23/2020    Procedure: REPAIR, ROTATOR CUFF, ARTHROSCOPIC;  Surgeon: Reginald Pickens MD;  Location: [facility] OR;  Service: Orthopedics;  Laterality: Right;  regional w/catheter (interscalene)    BACK SURGERY      CATARACT EXTRACTION W/  INTRAOCULAR LENS IMPLANT Left 10/20/2021        CHOLECYSTECTOMY      COLONOSCOPY  2007    diverticulosis    COLONOSCOPY N/A 9/3/2020    Procedure: COLONOSCOPY;  Surgeon: Alberta Henriquez MD;  Location: [facility] ENDO (4TH FLR);  Service: Colon and Rectal;  Laterality: N/A;  pt requested this time-8/31-covid- [place]-[facility]    CYSTOSCOPY      DE QUERVAIN'S RELEASE Left 03/2017    FIXATION OF TENDON Right 9/23/2020    Procedure: FIXATION, TENDON;  Surgeon: Reginald Pickens MD;  Location: [facility] OR;  Service: Orthopedics;  Laterality: Right;    HERNIA REPAIR      HYSTERECTOMY      INJECTION OF STEROID Right 12/2/2021    Procedure: INJECTION, STEROID;  Surgeon: Suzy Dominguez MD;  Location: [facility] OR;  Service: Orthopedics;  Laterality: Right;    INTRAOCULAR PROSTHESES INSERTION Left 10/20/2021    Procedure: INSERTION, IOL PROSTHESIS;  Surgeon: Pippa Ashby MD;  Location: [facility] OR 1ST FLR;  Service: Ophthalmology;  Laterality: Left;    INTRAOCULAR PROSTHESES INSERTION Right 12/29/2021    Procedure: INSERTION, IOL PROSTHESIS;  Surgeon: Pippa Ashby MD;  Location: [facility] OR 1ST FLR;  Service: Ophthalmology;  Laterality: Right;    NASAL SEPTUM SURGERY      PHACOEMULSIFICATION OF CATARACT Left 10/20/2021    Procedure: PHACOEMULSIFICATION, CATARACT/  "COMPLEX- PHACO / IOL - OS SMALL PUPIL-OLE RING AND TRYPAN BLUE;  Surgeon: Pippa Ashby MD;  Location: [facility] OR [address] [place] [address];  Service: Ophthalmology;  Laterality: Left;    PHACOEMULSIFICATION OF CATARACT Right 12/29/2021    Procedure: PHACOEMULSIFICATION, CATARACT;  Surgeon: Pippa Ashby MD;  Location: [address];  Service: Ophthalmology;  Laterality: Right;    SHOULDER SURGERY      TONSILLECTOMY      TRIGGER FINGER RELEASE Left 12/2/2021    Procedure: RELEASE, TRIGGER FINGER;  Surgeon: Suzy Dominguez MD;  Location: [facility];  Service: Orthopedics;  Laterality: Left;         CBC:  Lab Results   Component Value Date    WBC 6.72 12/07/2023    RBC 3.76 (L) 12/07/2023    HGB 11.3 (L) 12/07/2023    HCT 35.3 (L) 12/07/2023     12/07/2023    MCV 94 12/07/2023    MCH 30.1 12/07/2023    MCHC 32.0 12/07/2023       CMP:   Lab Results   Component Value Date     12/07/2023    K 4.3 12/07/2023     12/07/2023    CO2 27 12/07/2023    BUN 16 12/07/2023    CREATININE 1.1 12/07/2023     12/07/2023    CALCIUM 9.7 12/07/2023    ALBUMIN 4.0 12/07/2023    PROT 7.3 12/07/2023    ALKPHOS 47 (L) 12/07/2023    ALT 33 12/07/2023    AST 39 12/07/2023    BILITOT 0.4 12/07/2023       INR:  No results found for: "PT", "INR", "PROTIME", "APTT"      Diagnostic Studies:      EKG:   Results for orders placed or performed in visit on 08/25/20   EKG 12-lead    Collection Time: 08/25/20  3:35 PM    Narrative    Test Reason : Z01.818    Vent. Rate : 071 BPM     Atrial Rate : 071 BPM     P-R Int : 126 ms          QRS Dur : 088 ms      QT Int : 414 ms       P-R-T Axes : 041 016 030 degrees     QTc Int : 449 ms    Normal sinus rhythm  Low voltage, precordial leads    When compared with ECG of 02-JUL-2014 13:35,  No significant change was found  Confirmed by DINAH HINOJOSA MD ([id number]) on 8/26/2020 9:45:39 AM    Referred By: [facility]           Confirmed By:DINAH HINOJOSA MD        2D Echo:  No results found for this or any " "previous visit.    Stress Test:   No results found for this or any previous visit.      Pre-op Vitals [01/11/24 0659]   BP Pulse Resp Temp SpO2   (!) 175/76 66 16 36.4 °C (97.5 °F) 96 %      Height Weight BMI (Calculated)     5' 1" 133 lb 25.1         Pre-op Vitals [01/11/24 0659]   BP Pulse Resp Temp SpO2   (!) 175/76 66 16 36.4 °C (97.5 °F) 96 %      Height Weight BMI (Calculated)     5' 1" 133 lb 25.1            Pre-op Assessment    I have reviewed the Patient Summary Reports.     I have reviewed the Nursing Notes. I have reviewed the NPO Status.   I have reviewed the Medications.     Review of Systems  Anesthesia Hx:             Denies Family Hx of Anesthesia complications.    Denies Personal Hx of Anesthesia complications.                    Cardiovascular:     Hypertension    Denies CABG/stent.  Denies Dysrhythmias.   Denies Angina.       Denies BERNAL.                            Pulmonary:    Denies COPD.  Denies Asthma.                    Renal/:   Denies Chronic Renal Disease.                Hepatic/GI:     GERD             Musculoskeletal:  Arthritis               Neurological:    Denies CVA.    Denies Seizures.                                Endocrine:   Hypothyroidism              Physical Exam  General: Well nourished, Cooperative, Alert and Oriented    Airway:  Mallampati: III   Mouth Opening: Normal  TM Distance: Normal  Tongue: Normal  Neck ROM: Normal ROM    Dental:  Intact    Heart:  Rate: Normal  Rhythm: Regular Rhythm        Anesthesia Plan  Type of Anesthesia, risks & benefits discussed:    Anesthesia Type: Gen ETT  Intra-op Monitoring Plan: Standard ASA Monitors  Post Op Pain Control Plan: multimodal analgesia, IV/PO Opioids PRN and peripheral nerve block  Induction:  IV  Airway Plan: Video  Informed Consent: Informed consent signed with the Patient and all parties understand the risks and agree with anesthesia plan.  All questions answered.   ASA Score: 3  Day of Surgery Review of History & " Physical: H&P Update referred to the surgeon/provider.    Ready For Surgery From Anesthesia Perspective.     .

## 2024-01-11 NOTE — TRANSFER OF CARE
"Anesthesia Transfer of Care Note    Patient: Jackelyn Kendrick    Procedure(s) Performed: Procedure(s) (LRB):  RELEASE, CARPAL TUNNEL (Left)  RELEASE, DUPUYTREN CONTRACTURE, PALM (Left)  DECOMPRESSION, NERVE ULNAR (Left)  INJECTION, STEROID 1ST DORSAL COMPARTMENT (Right)    Patient location: PACU    Anesthesia Type: general and regional    Transport from OR: Transported from OR on 100% O2 by closed face mask with adequate spontaneous ventilation    Post pain: adequate analgesia    Post assessment: no apparent anesthetic complications and tolerated procedure well    Post vital signs: stable    Level of consciousness: awake, alert and responds to stimulation    Nausea/Vomiting: no nausea/vomiting    Complications: none    Transfer of care protocol was followed    Last vitals: Visit Vitals  BP (!) 143/64   Pulse 67   Temp 36.4 °C (97.5 °F) (Temporal)   Resp 11   Ht 5' 1" (1.549 m)   Wt 60.3 kg (133 lb)   SpO2 98%   Breastfeeding No   BMI 25.13 kg/m²     "

## 2024-01-11 NOTE — DISCHARGE SUMMARY
[place] [facility] (Hospital)  Discharge Note  Short Stay    Procedure(s) (LRB):  RELEASE, CARPAL TUNNEL (Left)  RELEASE, DUPUYTREN CONTRACTURE, PALM (Left)  DECOMPRESSION, NERVE ULNAR (Left)  INJECTION, STEROID 1ST DORSAL COMPARTMENT (Right)      OUTCOME: Patient tolerated treatment/procedure well without complication and is now ready for discharge.    DISPOSITION: Home or Self Care    FINAL DIAGNOSIS:  left carpal and cubital tunnel, left dupuytren's disease, right de Quervain's tenosynovitis    FOLLOWUP: In clinic    DISCHARGE INSTRUCTIONS:    Discharge Procedure Orders   Diet general     Keep surgical extremity elevated     Ice to affected area     Lifting restrictions     No driving, operating heavy equipment or signing legal documents while taking pain medication.     Leave dressing on - Keep it clean, dry, and intact until clinic visit     Call MD for:  temperature >100.4     Call MD for:  persistent nausea and vomiting     Call MD for:  severe uncontrolled pain     Call MD for:  difficulty breathing, headache or visual disturbances     Call MD for:  redness, tenderness, or signs of infection (pain, swelling, redness, odor or green/yellow discharge around incision site)     Call MD for:  hives     Call MD for:  persistent dizziness or light-headedness     Call MD for:  extreme fatigue

## 2024-01-11 NOTE — PLAN OF CARE
Patient is AAO and VSS.  Tolerating PO and states pain is tolerable.  Dressing CDI.  Patient states they are ready for d/c.  IV removed.  Catheter tip intact.  Caregiver at bedside.  Discharge instructions reviewed and copy given to the patient and caregiver.  Questions answered.  Both verbalized understanding.  Medication delivered to bedside.  Patient wheeled to car by RN

## 2024-01-11 NOTE — OP NOTE
[facility])  Surgery Department  Operative Note    SUMMARY     Date of Procedure: 1/11/2024     Procedure:    Left hand partial fasciectomy for Dupuytren's disease, cpt 02871  Left elbow ulnar nerve decompression, cpt 07486   3.   Left carpal tunnel release, cpt 35067  4.   Right 1st dorsal extensor compartment steroid injection, cpt 31769    Surgeon(s) and Role:     * Suzy Dominguez MD - Primary    Assisting Surgeon: None     Assistant: Silvio Ozuna    Pre-Operative Diagnosis: Carpal tunnel syndrome of left wrist [G56.02]  Dupuytren's contracture of left hand [M72.0]  De Quervain's tenosynovitis, right [M65.4]  Ulnar nerve compression, left [G56.22]    Post-Operative Diagnosis: Post-Op Diagnosis Codes:     * Carpal tunnel syndrome of left wrist [G56.02]     * Dupuytren's contracture of left hand [M72.0]     * De Quervain's tenosynovitis, right [M65.4]     * Ulnar nerve compression, left [G56.22]    Anesthesia: Regional    Indication for Procedure: 74 yo female with cubital and carpal tunnel syndrome confirmed by EMG/NCS as well as Dupuytren's disease.  Risks and benefits of the procedure were discussed with the patient and informed consent was obtained.    Description of the Findings of the Procedure: The patient was seen in the preoperative holding area and the left elbow and hand were marked. The patient was taken to the OR, placed supine on the table, and a padded hand table was used. After regional and monitored anesthesia care was admitted without difficulty, a time-out procedure was performed identifying the patient, the operative site and the procedure to be performed.  40 mg of methylprednisolone was sterilely injected into the right 1st dorsal extensor compartment.  A tourniquet was placed on the left arm and the upper extremity was prepped and draped in standard sterile fashion. The upper extremity was elevated and exsanguinated with an Esmarch bandage, and the tourniquet was  inflated to 250 mm Hg.  A 5 cm incision was made at the left medial elbow, tenotomy scissors were used to dissect down to Cortez ligament.  The medial antebrachial cutaneous nerve was identified and protected throughout the case.  Cortez ligament was released and the ulnar nerve was identified.  The ulnar nerve was traced distally between the 2 heads of the FCU muscle.  The FCU fascia was released and the ulnar nerve was decompressed 6 cm distal to the medial epicondyle.  In a similar manner the ulnar nerve was traced proximally, the overlying fascia was released 6 cm proximally, the intermuscular septum as well as the arcade of [place] were released.  The elbow was put through range of motion, the ulnar nerve was stable and there were no new sites of compression along the nerve.  The elbow was copiously irrigated with normal saline.  4-0 monocryl was used to repair the subcutaneous layer, 4-0 monocryl was used to close the skin in a running subcuticular fashion, Dermabond was used to close the skin.     Attention was turned to the left carpal tunnel, 2 cm incision was made over the carpal tunnel.  The palmar fascia was sharply incised.  Sherry retractors were used to expose the transverse carpal ligament, this was sharply incised.  A carpal tunnel skid was placed underneath the transverse carpal ligament proximally, and the proximal transverse carpal ligament as well as the distal forearm fascia was sharply released. The median nerve was found to be significantly compressed through the carpal tunnel. The carpal tunnel skid was replaced distally, and the transverse carpal ligament was released under direct visualization.  The median nerve was found to be completely decompressed.     Attention was turned to the dupuytren's cord in the palm.  A 4 cm volar Watson incision was made over the cord.  Tenotomy scissors were used to dissect down to the cord.  The cord to the ring finger was traced proximally and distally,  it was sharply excised and partial fasciectomy was performed.  The neurovascular structures were identified as well as the tendons to the ring long small fingers and protected.  Bipolar electrocautery was used for hemostasis.    The neurovascular bundles were identified and protected throughout the case. The wound was then irrigated with normal saline using a bulb syringe. 3-0 prolene was used to close the skin in a horizontal mattress fashion.    Adaptic, 4x4, cast padding, a posterior elbow splint and coban were used to dress the hand. The tourniquet was deflated and the hand and fingers were pink and well-perfused.  The patient tolerated the procedure well.  He was taken awake, alert and in good condition to the recovery room.    Complications: No    Estimated Blood Loss (EBL): minimal           Specimens: none           Condition: Good    Disposition: PACU - hemodynamically stable.    Attestation: I was present and scrubbed for the entire procedure.

## 2024-01-11 NOTE — ANESTHESIA PROCEDURE NOTES
Left supraclavicular single injection    Patient location during procedure: pre-op   Block not for primary anesthetic.  Reason for block: at surgeon's request and post-op pain management   Post-op Pain Location: left arm   Start time: 1/11/2024 8:21 AM  Timeout: 1/11/2024 8:20 AM   End time: 1/11/2024 8:25 AM    Staffing  Authorizing Provider: Fabian Fagan MD  Performing Provider: Fabian Fagan MD    Staffing  Performed by: Fabian Fagan MD  Authorized by: Fabian Fagan MD    Preanesthetic Checklist  Completed: patient identified, IV checked, site marked, risks and benefits discussed, surgical consent, monitors and equipment checked, pre-op evaluation and timeout performed  Peripheral Block  Patient position: supine  Prep: ChloraPrep  Patient monitoring: heart rate, cardiac monitor, continuous pulse ox, continuous capnometry and frequent blood pressure checks  Block type: supraclavicular  Laterality: left  Injection technique: single shot  Needle  Needle type: Stimuplex   Needle gauge: 22 G  Needle length: 2 in  Needle localization: anatomical landmarks and ultrasound guidance   -ultrasound image captured on disc.  Assessment  Injection assessment: negative aspiration, negative parasthesia and local visualized surrounding nerve  Paresthesia pain: none  Heart rate change: no  Slow fractionated injection: yes  Pain Tolerance: comfortable throughout block and no complaints  Medications:    Medications: ropivacaine (NAROPIN) injection 0.5% - Perineural   30 mL - 1/11/2024 8:25:00 AM    Additional Notes  VSS.  [facility] RN monitoring vitals throughout procedure.  Patient tolerated procedure well.

## 2024-01-11 NOTE — DISCHARGE INSTRUCTIONS
HAND SURGERY - Care of the Hand after Surgery       Post-Op Care  It is important to follow your orthopaedic surgeon's instructions carefully after you return home. You should ask   someone to check on you that evening. The protocols described here are general in nature. Every person and   every surgery is different so the information given here is for guidance only. If you have questions you should   contact us.     Day of Surgery    You were place in a plaster splint today to protect the surgical area during healing. Do not remove the plaster splint until you are seen by Occupational Therapy or Dr. Dominguez for your first postop visit    The hand and fingers may be numb for quite some time after surgery. This is due to the anesthetic block used at the time of surgery for pain control.    Begin taking liquids and food as soon as you can. You should always eat some solid food, a sandwich or light meal, a little while before taking your pain meds.     Day 3  Things are much the same on the third day after your surgery. Usually you have less pain and feel like doing more.    Showering: It is important to keep the incision absolutely dry while showering or bathing (use two plastic bags over your hand) or a shower bag.   If you feel that the pain medication you were given after surgery is stronger than you really need you can reduce the dose, take it less frequently or switch to ibuprofen or Tylenol. If you received antibiotics they should be taken until the entire prescription is completed.    Day 10-11  Occupational Therapy will see you between this time. Please let us know if you are not scheduled [phone]    Day 14  We will see you back after your surgery to review with you what was done in surgery and will talk about rehab and answer any questions you may have.       HAND SURGERY  Driving: You can drive if you are comfortable and have regained full finger movements and if you have sufficient power to control the  vehicle.   Return to work: Timing of your return to work is variable according to your occupation and specific surgery. We should discuss this at your follow up visit if not already discussed prior.  Elevation: Hand elevation is important to prevent swelling and stiffness of the fingers. One minute of leaving your hand dangling negates four hours of keeping it elevated. Please remember not to walk with your hand hanging down or to sit with your hand resting in your lap. It is fine, however, to lower your hand for light use and you should get back to normal light activities as soon as possible as guided by common sense.     Post-operative exercises  [x] Bend your fingers  Relax your hand. Start with your fingers straight and close together. Bend the end and middle joints of your fingers. Keep your wrist and knuckles straight. Moving slowly and smoothly, return your hand to the starting position. Repeat with your other hand. If you can, perform multiple repetitions of this exercise on each hand.    [x] Open your hand wide                       Spread your fingers apart as wide as you can and hold that position. Slowly relax your fingers and bring them together. Return to the open-wide position. Repeat with each hand and gradually add to the number of repetitions.    [x] Make a fist  Start with your fingers straight and spread apart. Make a loose, gentle fist and wrap your thumb around the outside of your fingers. Be careful not to squeeze your fingers together too tightly. Moving slowly and smoothly, return to the starting position. Repeat. Perform this exercise on both hands.    [x] Touch your fingertips  Straighten your fingers and thumb. Bend your thumb across your palm, touching the tip of your thumb to the pad of your hand just below your pinky finger. If you can't make your thumb touch, just stretch as far as you can. Return your thumb to its starting position, as shown in images 1 through 3.  For the next  "exercise, form the letter O by touching your thumb to each fingertip, as shown in images 4 through 6. Moving slowly and smoothly, touch your index finger to your thumb, then straighten your fingers. Touch your middle finger to your thumb and straighten. Follow with your ring and pinky fingers.    [x] Walk your fingers  Rest your hand on a flat surface, such as a tabletop, with your palm facing down and your fingers spread slightly apart. Moving one finger at a time, slowly walk your fingers toward your thumb. Start by lifting and moving your index finger toward your thumb. Follow by lifting and moving your middle finger toward your thumb. Proceed with moving your ring finger and then your pinky finger toward your thumb. Don't move your wrist or thumb while doing this exercise. Repeat with your other hand.      HAND SURGERY - Common Concerns and Frequently Asked Questions    When to Call the Doctor  Pain, burning, or numbness of the fingers or the back of the hand not relieved by elevation of the arm  Pale or cold finger; bluish nail beds  Red line or streak going up the arm  Excessive swelling  Fever over 101.3ºF  Pain unrelieved by pain medication    Tips for one armed living  It helps to have...   In the shower   Plastic bags and rubber bands to cover bandages - the bags that newspapers come in are good to cover the hand and wrist. Otherwise small trash can liners will do. Use two at a time.   Bottle sponge (soft sponge on a long stick) - for the armpit of your "good" hand.   Shower brush   A hair brush in the shower will help you to wash your hair.   Cotton lola cloth bathrobe - to dry your back.    In the bathroom   Toothpaste, shampoo, etc. in flip-top or pump (not screw top) dispensers.   Consider an electric razor.   Flossers (dental floss on a "Y" shaped handle).    In the kitchen   Dycem mat (rubber jar opener mat) - to help open jars, but also keep things from sliding around while you are working on " "them.    Double suction cup pads ("little Octopus") - to hold items while you use or wash them.   Electric can opener with a lid magnet strong enough to hold the can in the air - for one handed use.   In the bedroom   Back scratcher.   Large sleeve shirts and tops.   Put away clothing which buttons, fastens or snaps in the back or which uses drawstrings.    Sports bra or a camisole instead of a bra.   [employer] nylons can be pulled on one handed - most others can't.   A "wash and wear" haircut.     "

## 2024-01-12 LAB
FINAL PATHOLOGIC DIAGNOSIS: NORMAL
GROSS: NORMAL
Lab: NORMAL

## 2024-01-12 NOTE — ANESTHESIA POSTPROCEDURE EVALUATION
Anesthesia Post Evaluation    Patient: Jackelyn Kendrick    Procedure(s) Performed: Procedure(s) (LRB):  RELEASE, CARPAL TUNNEL (Left)  RELEASE, DUPUYTREN CONTRACTURE, PALM (Left)  DECOMPRESSION, NERVE ULNAR (Left)  INJECTION, STEROID 1ST DORSAL COMPARTMENT (Right)    Final Anesthesia Type: general      Patient location during evaluation: PACU  Patient participation: Yes- Able to Participate  Level of consciousness: awake and alert and oriented  Post-procedure vital signs: reviewed and stable  Pain management: adequate  Airway patency: patent  EAN mitigation strategies: Multimodal analgesia  PONV status at discharge: No PONV  Anesthetic complications: no      Cardiovascular status: blood pressure returned to baseline and hemodynamically stable  Respiratory status: unassisted, spontaneous ventilation and room air  Hydration status: euvolemic  Follow-up not needed.              Vitals Value Taken Time   /62 01/11/24 1116   Temp 36.7 °C (98 °F) 01/11/24 1115   Pulse 67 01/11/24 1119   Resp 25 01/11/24 1118   SpO2 85 % 01/11/24 1119   Vitals shown include unvalidated device data.      Event Time   Out of Recovery 11:26:00         Pain/Maxi Score: Pain Rating Prior to Med Admin: 0 (1/11/2024  8:18 AM)  Maxi Score: 10 (1/11/2024 11:00 AM)

## 2024-01-16 NOTE — ADDENDUM NOTE
Addendum  created 01/16/24 1027 by Fabian Fagan MD    Clinical Note Signed, Diagnosis association updated, Intraprocedure Blocks edited

## 2024-01-17 ENCOUNTER — OFFICE VISIT (OUTPATIENT)
Dept: PODIATRY | Facility: CLINIC | Age: 74
End: 2024-01-17
Payer: MEDICARE

## 2024-01-17 VITALS
SYSTOLIC BLOOD PRESSURE: 150 MMHG | HEIGHT: 61 IN | HEART RATE: 75 BPM | BODY MASS INDEX: 25.1 KG/M2 | TEMPERATURE: 99 F | WEIGHT: 132.94 LBS | DIASTOLIC BLOOD PRESSURE: 65 MMHG

## 2024-01-17 DIAGNOSIS — B35.1 TINEA UNGUIUM: Primary | ICD-10-CM

## 2024-01-17 PROCEDURE — 99203 OFFICE O/P NEW LOW 30 MIN: CPT | Mod: S$PBB,,, | Performed by: STUDENT IN AN ORGANIZED HEALTH CARE EDUCATION/TRAINING PROGRAM

## 2024-01-17 PROCEDURE — 99215 OFFICE O/P EST HI 40 MIN: CPT | Mod: PBBFAC | Performed by: STUDENT IN AN ORGANIZED HEALTH CARE EDUCATION/TRAINING PROGRAM

## 2024-01-17 PROCEDURE — 99999 PR PBB SHADOW E&M-EST. PATIENT-LVL V: CPT | Mod: PBBFAC,,, | Performed by: STUDENT IN AN ORGANIZED HEALTH CARE EDUCATION/TRAINING PROGRAM

## 2024-01-17 RX ORDER — TERBINAFINE HYDROCHLORIDE 250 MG/1
250 TABLET ORAL DAILY
Qty: 90 TABLET | Refills: 0 | Status: SHIPPED | OUTPATIENT
Start: 2024-01-17 | End: 2024-04-16

## 2024-01-17 NOTE — PROGRESS NOTES
Subjective:     Patient    Jackelyn Kendrick is a 73 y.o. female.    Problem    Referred for thick discolored toenails which she says was partially improved by prescription topical antifungals, but not completely cleared. She has had these nail changes for over 1 year. Does not cause pain. Usually cuts the nails herself.      History    History obtained from patient and review of medical records.     Past Medical History:   Diagnosis Date    Arthritis     Basal cell carcinoma     Bronchitis     seasonal    Cataract     Diverticulitis     Dry eye syndrome     GERD (gastroesophageal reflux disease)     Hyperlipidemia     Hypertension     Hypothyroidism 07/19/2012    Obese     PONV (postoperative nausea and vomiting)     Thyroid disease        Past Surgical History:   Procedure Laterality Date    ANORECTAL MANOMETRY N/A 6/1/2023    Procedure: MANOMETRY, ANORECTAL;  Surgeon: Paulo Pritchett MD;  Location: [facility] (4TH FLR);  Service: Endoscopy;  Laterality: N/A;  instructions sent to [id number]  5/26 pre-call no answer; MB    ARTHROSCOPIC REPAIR OF ROTATOR CUFF OF SHOULDER Right 9/23/2020    Procedure: REPAIR, ROTATOR CUFF, ARTHROSCOPIC;  Surgeon: Reginald Pickens MD;  Location: [facility] OR;  Service: Orthopedics;  Laterality: Right;  regional w/catheter (interscalene)    BACK SURGERY      CARPAL TUNNEL RELEASE Left 1/11/2024    Procedure: RELEASE, CARPAL TUNNEL;  Surgeon: Suzy Dominguez MD;  Location: [facility] OR;  Service: Orthopedics;  Laterality: Left;    CATARACT EXTRACTION W/  INTRAOCULAR LENS IMPLANT Left 10/20/2021        CHOLECYSTECTOMY      COLONOSCOPY  2007    diverticulosis    COLONOSCOPY N/A 9/3/2020    Procedure: COLONOSCOPY;  Surgeon: Alberta Henriquez MD;  Location: [facility] (4TH FLR);  Service: Colon and Rectal;  Laterality: N/A;  pt requested this time-8/31-covid-uc [place]-[facility]    CYSTOSCOPY      DE QUERVAIN'S RELEASE Left 03/2017    DECOMPRESSION OF NERVE Left 1/11/2024     Procedure: DECOMPRESSION, NERVE ULNAR;  Surgeon: Suzy Dominguez MD;  Location: [facility] OR;  Service: Orthopedics;  Laterality: Left;    DUPUYTREN CONTRACTURE RELEASE Left 1/11/2024    Procedure: RELEASE, DUPUYTREN CONTRACTURE, PALM;  Surgeon: Suzy Dominguez MD;  Location: [facility] OR;  Service: Orthopedics;  Laterality: Left;    FIXATION OF TENDON Right 9/23/2020    Procedure: FIXATION, TENDON;  Surgeon: Reginald Pickens MD;  Location: [facility] OR;  Service: Orthopedics;  Laterality: Right;    HERNIA REPAIR      HYSTERECTOMY      INJECTION OF STEROID Right 12/2/2021    Procedure: INJECTION, STEROID;  Surgeon: Suzy Dominguez MD;  Location: [facility] OR;  Service: Orthopedics;  Laterality: Right;    INJECTION OF STEROID Right 1/11/2024    Procedure: INJECTION, STEROID 1ST DORSAL COMPARTMENT;  Surgeon: Suzy Dominguez MD;  Location: [facility] OR;  Service: Orthopedics;  Laterality: Right;    INTRAOCULAR PROSTHESES INSERTION Left 10/20/2021    Procedure: INSERTION, IOL PROSTHESIS;  Surgeon: Pippa Ashby MD;  Location: [facility] OR 1ST FLR;  Service: Ophthalmology;  Laterality: Left;    INTRAOCULAR PROSTHESES INSERTION Right 12/29/2021    Procedure: INSERTION, IOL PROSTHESIS;  Surgeon: Pippa Ashby MD;  Location: [facility] OR 1ST FLR;  Service: Ophthalmology;  Laterality: Right;    NASAL SEPTUM SURGERY      PHACOEMULSIFICATION OF CATARACT Left 10/20/2021    Procedure: PHACOEMULSIFICATION, CATARACT/ COMPLEX- PHACO / IOL - OS SMALL PUPIL-OLE RING AND TRYPAN BLUE;  Surgeon: Pippa Ashby MD;  Location: [facility] OR 1ST FLR;  Service: Ophthalmology;  Laterality: Left;    PHACOEMULSIFICATION OF CATARACT Right 12/29/2021    Procedure: PHACOEMULSIFICATION, CATARACT;  Surgeon: Pippa Ashby MD;  Location: [facility] OR 1ST FLR;  Service: Ophthalmology;  Laterality: Right;    SHOULDER SURGERY      TONSILLECTOMY      TRIGGER FINGER RELEASE Left 12/2/2021    Procedure: RELEASE, TRIGGER FINGER;  Surgeon: Suzy Dominguez MD;   Location: [facility];  Service: Orthopedics;  Laterality: Left;        Objective:     Vitals  Wt Readings from Last 1 Encounters:   01/17/24 60.3 kg (132 lb 15 oz)     Temp Readings from Last 1 Encounters:   01/17/24 98.6 °F (37 °C) (Oral)     BP Readings from Last 1 Encounters:   01/17/24 (!) 150/65     Pulse Readings from Last 1 Encounters:   01/17/24 75       Dermatological Exam    Skin:  Pedal hair growth, skin color, and skin texture normal on right  Pedal hair growth, skin color, and skin texture normal on left    Nails:  Bilateral 1st nail(s) thickened and bilateral 1st nail(s) discolored              Vascular Exam    Arteries:  Posterior tibial artery palpable on right  Dorsalis pedis artery palpable on right  Posterior tibial artery palpable on left  Dorsalis pedis artery palpable on left    Veins:  Superficial veins unremarkable on right  Superficial veins unremarkable on left    Swelling:  None on right  None on left    Neurological Exam    [place] touch test:  6/6 sites sensed, light touch intact     Musculoskeletal Exam    Footwear:  Casual on right  Casual on left    Gait Exam:   Ambulatory Status: Ambulatory  Gait: Normal  Assistive Devices: None    Foot Progression Angle:  Normal on right  Normal on left    Right Lower Extremity Additional Findings:  Right foot and ankle function, strength, and range of motion unremarkable except as noted above.     Left Lower Extremity Additional Findings:  Left foot and ankle function, strength, and range of motion unremarkable except as noted above.    Imaging and Other Tests    Imaging:  Independently reviewed and interpreted imaging, findings are as follows: N/A     Assessment:     Encounter Diagnosis   Name Primary?    Tinea unguium Yes        Plan:     I counseled the patient on her conditions, their implications and medical management.    Tinea unguium: chronic stable  -Recommended wearing comfortable well fitting shoes; discarding old footwear or disinfecting  old footwear with naphthalene mothballs; avoiding trauma to the nails; avoiding sharing nail clippers with family or friends; avoiding nail salons that do not employ sterile techniques; and wearing flip flops or shower shoes at public pools, showers, locker rooms, and hotels.  -Discussed treatment options including (1) monitoring, (2) debridement, (3) topical antifungals, (4) oral antifungals. Discussed potential risks, benefits, alternatives to each. Patient opted for oral antifungals.  -Debrided bilateral 1st nails as a courtesy, thickness reduced using sterile nail nippers. Can return for procedure brief nail care.    -Reviewed most recent CMP, ordered 3 months terbinafine.       Return to clinic in 2-3 months for nail check, call sooner PRN.

## 2024-01-22 ENCOUNTER — CLINICAL SUPPORT (OUTPATIENT)
Dept: REHABILITATION | Facility: HOSPITAL | Age: 74
End: 2024-01-22
Attending: ORTHOPAEDIC SURGERY
Payer: MEDICARE

## 2024-01-22 DIAGNOSIS — G56.02 CARPAL TUNNEL SYNDROME OF LEFT WRIST: ICD-10-CM

## 2024-01-22 DIAGNOSIS — G56.22 ULNAR NERVE COMPRESSION, LEFT: ICD-10-CM

## 2024-01-22 DIAGNOSIS — M72.0 DUPUYTREN'S CONTRACTURE OF LEFT HAND: ICD-10-CM

## 2024-01-22 DIAGNOSIS — M79.642 LEFT HAND PAIN: Primary | ICD-10-CM

## 2024-01-22 PROCEDURE — L3913 HFO W/O JOINTS CF: HCPCS | Mod: PO

## 2024-01-22 PROCEDURE — 97165 OT EVAL LOW COMPLEX 30 MIN: CPT | Mod: PO

## 2024-01-22 NOTE — PATIENT INSTRUCTIONS
Use heat 10-15 minutes prior to performing exercises.        Scar Tissue Massage        Place pad of fingertip on scar area. Apply steady downward pressure while moving in circular fashion.   Perform 3-5 minutes 3 times a day    Elbow Flexion / Extension        Hold arm at sides, palms forward. Bend elbow, bringing hand toward shoulders. Then straighten to start position.  Repeat sequence 10 times per set. Perform 1-3 sets per session. Do 5 sessions per day.  Hand Variation: Palms backward   Position: Standing     Extension (Active With Finger Extension)        With forearm on table and wrist over edge, lift hand with fingers straight.   Repeat 20 times. Do 5 sessions per day.    Radial / Ulnar Deviation (Assistive)        Move hand side to side like a [employer] wiper. Do not move elbow.  Repeat 20 times. Do 5 sessions per day.    Circumduction (Active)        With fingers curled, move slowly at wrist in clock- wise circles 20 times. Repeat counterclockwise. Do not move elbow or shoulder.  Do 5 sessions per day.    Tendon Glides         Start at position A and move through each position slowly attempting to achieve full glide.  A-E is ONE repetition.     Complete 10 reps 3 times per day.               Perform 10 repetitions 3 times/day     Ulnar Nerve Glides    Complete 1 sequence (Positions 1-6) 5 times, 3-5 times per day.   Avoid numbness and tingling or excessive pain, if this occurs wait 30 min before resuming

## 2024-01-22 NOTE — PROGRESS NOTES
[facility]  Occupational Therapy Initial Evaluation     Date: 1/22/2024  Patient: Jackelyn Kendrick  Chart Number: [id number]  Referring Physician: Suzy Dominguez MD  Therapy Diagnosis:   1. Left hand pain        2. Carpal tunnel syndrome of left wrist  Ambulatory referral/consult to Physical/Occupational Therapy      3. Dupuytren's contracture of left hand  Ambulatory referral/consult to Physical/Occupational Therapy      4. Ulnar nerve compression, left  Ambulatory referral/consult to Physical/Occupational Therapy          Medical Diagnosis:   G56.02 (ICD-10-CM) - Carpal tunnel syndrome of left wrist   M72.0 (ICD-10-CM) - Dupuytren's contracture of left hand   G56.22 (ICD-10-CM) - Ulnar nerve compression, left     Physician Orders: OT evaluation at 10d postop DOS: 1.11.24  #1 CTR   Pt is to be seen 10-11 days post-op. at [address] for OT Eval.   Evaluate incision: If appropriate sutures to be removed at 10-11 days, 10-14 days if DMII contact MD or do not remove sutures if any drainage or concerns for wound healing. Teach HEP, educate patient on scar massage, and assess for continued therapy.  If patient not able to make at least 75% of a full fist, recommend continued FU appointments until regained ability to make full fist.  Pt. is to see physician at 2-4 weeks post op.     #2 Ulnar Nerve Decompression:  Pt. to be seen 10-14 days post op for the following at [facility]:  Pt. to attend for Eval, Suture removal, and HEP (able to begin gentle elbow AROM, educate patient on scar massage, and assess for continued therapy.   Pt. Discharged from OT when therapist feels is appropriate.  Pt to see physician at 2-4 weeks s/p. Await further orders.  Evaluation Date: 1/22/2024  Plan of Care Certification Date: 3/8/24  Authorization Period: 1/4/25  Surgery Date and Procedure: 1/11/24  Date of Return to MD: 1/24/24    FOTO: /3    Visit #: 1 of 1  Time In: 10:36 AM  Time  Post Acute Skilled Nursing Home Subsequent Note     Date of Service: 5/18/2021  Location seen at:  [facility]  Subacute / Skilled Need: Rehabilitation    PCP: Radha Young MD   Patient Care Team:  Radha Young MD as PCP - General  Peace Junior MD as Post Acute Facility Provider: Physician (Family Practice)  Shelia Castanon CNP as Post Acute Facility Provider: APC (Nurse Practitioner)  Attending SNF MD: Sandi  Seen by Peace Junior MD today    Katharine Samayoa is a 62 year old female presenting to Post Acute Skilled Nursing for: PT/OT.   History of Present Illness: Inpatient [facility] 5/5/2021 from 5/12/2021.  62-year-old female with history of morbid obesity, diabetes, hypertension, SIDNEY who presented to the hospital with increased weakness, being unable to walk as usual and shortness of breath.  She also described significant swelling in her bilateral lower extremities.  Venous Dopplers were negative for DVT and she was noted to be hypoxic.  She was monitored in ICU initially and a BNP was 4900.  She was diagnosed with new CHF, and managed with IV Lasix, amlodipine, enalapril and aspirin.  Echo revealed an ejection fraction of 55%.  She is now transferred to the skilled nursing facility for physical therapy.  Patient reports he is feeling well at this time is eager to get into therapy and go home soon as possible.  She has her own personal walker in the room and a commode and has been up by herself but not using the commode.  She denies any respiratory symptoms at this time.  She does have chronic psoriasis over several areas her body but has no medication at this time she states she never been able to afford.  She states it will bother her too much although they do itch at times.    5/14  Above information copied,  PT seen in her room, alert, had been sitting at edge of the bed with therapy, but now lying down, and want CNA to assist her to get up in w/c,  Pt is wearing O2 but states  Out: 11:30 AM  Total Billable Time: 54 minutes    Precautions: Standard    Subjective     Involved Side: Left  Dominant Side: Right  Date of Onset: 3 months ago  History of Current Condition:   Per Dr. Dominguez note on 11/13/23: Patient is a 72 y.o. right hand dominant female previously seen for right long finger pip joint csi and dequervains and doing well at this time. Her dequervains has resolved at this time. She continues to have pain of her long, ring and small finger PIP joints. Achy 2/10 constant pain.      Presents today with new complaints of Left ulnar pain that started in October. She is c/o left small and ring finger numbness/tingling. The numbness/tingling worse at night and worse with elbow flexion. She has had not treatment in the past for this.    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  Bone, joint, soft tissues otherwise normal   Previous Therapy: None    Patient's Goals for Therapy: To reduce pain at palm, turn on stove with lighter     Pain:  Functional Pain Scale Rating 0-10:   3/10 on average  1/10 at best  5/10 at worst  Location: Voalr surface of 4th MC incision site   Description: Sharp  Aggravating Factors: movement  Easing Factors: None    Occupation:  Retired nurse      Functional Limitations/Social History:    Previous functional status includes: Independent with all ADLs.     Current Functional Status   Home/Living environment: lives with their spouse      Limitation of Functional Status as follows:   ADLs/IADLs:     - Feeding: Independent    - Bathing: Independent    - Dressing/Grooming:  Independent    - Home Management:  Independent    - Driving:  Independent     Leisure: Ceramics on Wednesdays    Past Medical History/Physical Systems Review:   Jackelyn Kendrick  has a past medical history of Arthritis, Basal cell carcinoma, Bronchitis, Cataract, Diverticulitis, Dry eye syndrome, GERD (gastroesophageal reflux disease), Hyperlipidemia, Hypertension, Hypothyroidism, Obese,  she does not wear home O2, She has a cough, but it is improving,  PT denies difficulty with her bowels, appetite good, /18/21: Above notes read and copied. Patient seen sitting on side of bed. States she is able to walk independently from one side of the room to the other, to the bathroom and back. Multiple questions about diagnosis of heart failure answered. Patient requesting to go home. Has homemaker 5 days a week x4 hours. Feels she is ready to go home. Case discussed in IDT, therapy notes reviewed. Patient ambulating only 5 feet with front wheel walker and therapy. Patient resting in wheelchair. Case discussed with nursing, physician, , therapy. Medications, labs and vitals reviewed. Anticipate discharge in 1 week.    5/18/21:  Follow-up patient was looking forward to discharge this Friday, 5/21/2021.  She states she is doing well transfer herself to the toilet independently and able to dress herself independently.  She uses the help of an extender for socks and shoes.  She is not sure why therapy notes have her less functional than she really is.  She has help in the home 4 days a week and is comfortable and ready for discharge.  She still requiring low-dose of oxygen at this time at 1 L she tends to go down to 89 to 90%.  At 2 L she is in the mid 90s.  She denies shortness of breath or cough.  She is requesting to speak to nutritionist so she can work on a better diet plan.    HISTORY  Past Medical History:   Diagnosis Date   • Asthma    • Bronchitis    • DM (diabetes mellitus) (CMS/[facility])    • HTN (hypertension)    • MRSA (methicillin resistant staph aureus) culture positive    • Obesities, morbid (CMS/[facility])       reports that she has never smoked. She has never used smokeless tobacco. She reports previous alcohol use. She reports previous drug use.  No past surgical history on file.  No family history on file.  History     Not marked as reviewed during this visit.          PROBLEM  LIST:  Specialty Problems     None           ADVANCE DIRECTIVES:  Katharine Samayoa     AD Type ([facility]#655)     Value   User Date/Time Recorded    Power of  for Health Care  Melania Corbett 5/5/2021 15:58:28          Decision Maker ([facility]#650)     Value   User Date/Time Recorded    Other  Melania Owens 5/5/2021 15:58:28    Self  Blu García RN 5/5/2021 05:47:01          Do you have an AD? ([facility]#652)     Value   User Date/Time Recorded    Yes  Melania Corbett 5/5/2021 15:58:28    No  Blu García RN 5/5/2021 05:47:01          [facility]     Value   User Date/Time Recorded    5/5/2021  Melaniaelvin Corbett 5/5/2021 15:58:28          [facility] Signed ([facility])     Value   User Date/Time Recorded    5/5/2021  Melania Corbtet 5/5/2021 15:58:28          [facility] LOCATION OF DOCUMENT ([facility]#[id number])     Value   User Date/Time Recorded    In HIM folder for scanning  Melania Owens 5/5/2021 15:58:28          Patient's desire to create an advance directive ([facility]#[id number])     Value   User Date/Time Recorded    <blank>  Melania Corbett 5/5/2021 15:58:28    Patient wishes to create a new document (T - IP Only)  Manish Johnston RN 5/5/2021 07:53:50    Unable to complete at this time  Blu García RN 5/5/2021 05:47:01          Primary Agent Name ([facility]#660)     Value   User Date/Time Recorded    CRISTIANA Pickettsalena Corbett 5/5/2021 15:58:28          Primary Agent Number ([facility]#663)     Value   User Date/Time Recorded    [phone]  Melania Corbett 5/5/2021 15:58:28              Power of  Status:  Activated  Code Status:  DNR  Goals of Care: Improve functionally and return home  Advanced Directive Forms: discussed and on file     Advance Care Planning Discussion    Participants: Patient and physician    Current Code Status: Do Not Resuscitate    A discussion of the patient's Goals of Care has been completed with patient regarding the following:  (1) Current Serious Conditions: Moderate to Severe COPD  (2) Plan  PONV (postoperative nausea and vomiting), and Thyroid disease.    Jackelyn Kendrick  has a past surgical history that includes Colonoscopy (2007); Nasal septum surgery; Shoulder surgery; Hysterectomy; Cholecystectomy; Hernia repair; Tonsillectomy; Back surgery; De Quervain's release (Left, 03/2017); Colonoscopy (N/A, 9/3/2020); Arthroscopic repair of rotator cuff of shoulder (Right, 9/23/2020); Fixation of tendon (Right, 9/23/2020); Phacoemulsification of cataract (Left, 10/20/2021); Intraocular prosthesis insertion (Left, 10/20/2021); Cataract extraction w/  intraocular lens implant (Left, 10/20/2021); Trigger finger release (Left, 12/2/2021); Injection of steroid (Right, 12/2/2021); Phacoemulsification of cataract (Right, 12/29/2021); Intraocular prosthesis insertion (Right, 12/29/2021); Cystoscopy; Anorectal manometry (N/A, 6/1/2023); Carpal tunnel release (Left, 1/11/2024); Dupuytren contracture release (Left, 1/11/2024); Decompression of nerve (Left, 1/11/2024); and Injection of steroid (Right, 1/11/2024).    Jackelyn has a current medication list which includes the following prescription(s): albuterol, arexvy (pf), celecoxib, cranberry fruit extract, cyclosporine, diclofenac sodium, dicyclomine, docusate sodium, estradiol, fluocinonide, hydrocodone-acetaminophen, ketoconazole, levothyroxine, lidocaine-prilocaine, losartan, magnesium, memantine, multi-vitamin, nitrofurantoin, omeprazole, ondansetron, oxybutynin, psyllium husk, rosuvastatin, terbinafine hcl, trazodone, and triamcinolone acetonide 0.1%.    Review of patient's allergies indicates:   Allergen Reactions    Levaquin [levofloxacin] Swelling and Edema    Erythromycin (bulk) Nausea And Vomiting     Not true allergy          Objective     Mental status: alert, oriented x3    Observation:   Pt arrived in post op dressings. Incision sites CDI. Post op dressings removed. Sutures removed at carpal tunnel and palmar surface.     Sensation: Intact. Reports no  for Future Deterioration: Would be ok with returning to hospital for care.        ALLERGIES:  Allergies as of 05/18/2021 - Reviewed 05/04/2021   Allergen Reaction Noted   • Aztreonam Other (See Comments) 09/04/2015   • Cefepime Other (See Comments) 09/20/2015   • Doxycycline Other (See Comments) 01/30/2013   • Penicillins Other (See Comments) 01/18/2019   • Sulfa antibiotics Other (See Comments) 09/18/2012   • Vancomycin Other (See Comments) 08/27/2015       CURRENT HOME MEDICATIONS:   Current Outpatient Medications   Medication Sig Dispense Refill   • fluticasone-vilanterol (BREO ELLIPTA) 100-25 MCG/INH inhaler Inhale 1 puff into the lungs daily.     • furosemide (LASIX) 40 MG tablet Take 40 mg by mouth daily.     • carvedilol (COREG) 3.125 MG tablet Take 1 tablet by mouth every 12 hours. 60 tablet 0   • furosemide (LASIX) 20 MG tablet Take 20 mg by mouth daily.     • amLODIPine (NORVASC) 2.5 MG tablet Take 2.5 mg by mouth daily.     • vitamin D3 (CHOLECALCIFEROL) 1.25 mg (50,000 units) capsule      • meloxicam (MOBIC) 15 MG tablet Take 15 mg by mouth daily.     • azithromycin (ZITHROMAX) 250 MG tablet Take 250 mg by mouth as directed.     • enalapril (VASOTEC) 20 MG tablet Take 20 mg by mouth 2 times daily.     • Advair Diskus 250-50 MCG/DOSE inhaler Inhale 1 puff into the lungs 2 times daily.     • gabapentin (NEURONTIN) 100 MG capsule Take 200 mg by mouth 3 times daily.     • levoFLOXacin (LEVAQUIN) 500 MG tablet Take 500 mg by mouth daily.     • metFORMIN (GLUCOPHAGE) 500 MG tablet Take 500 mg by mouth daily.       No current facility-administered medications for this visit.       CURRENT SNF MEDICATIONS:  Amlodipine 5 mg take 2.5 mg daily  Aspirin 81 mg daily  Coreg 3.125 mg twice daily  Enalapril 20 mg twice daily  Breo Ellipta 100-25 1 puff daily  Lasix 40 mg daily  Gabapentin 200 mg 3 times daily  Guaifenesin  mg twice daily  Heparin 7500 units twice daily  Norco 5/3/2025 twice daily as  numbness or tingling in fingers since surgery.    Edema: Circumferential measurements: In centimters     Left Right   MPs 17.8  18.3    PWC (Proximal Wrist Crease) 15.3  15.1      No notable swelling at elbow.     Range of Motion:   Left    Elbow and Wrist ROM. Measured in degrees.   1/22/2024      Left     Supination Full     Pronation Full     Wrist Ext 60     Wrist Flex 74     Wrist RD 20     Wrist UD 26       Hand ROM. Measured in degrees.   1/22/2024      Left           Index: MP  0/83                PIP     0/89                DIP 0/45           Long:  MP 0/90                PIP 0/89                DIP 0/54           Ring:   MP 0/91                PIP 0/91                DIP 0/41           Small:  MP 0/79                 PIP 0/74                 DIP 0/60           Thumb:              Opposition SF MP     Note: Pt close to forming composite fist.      Strength (Dynamometer) and Pinch Strength (Pinch Gauge)  Measured in pounds.   1/22/2024 1/22/2024      Left Right     Rung II       Khan Pinch       3pt Pinch       2pt Pinch        NT this session 2/2 length of time since surgery. To test R next session.    Intake Outcome Measure for FOTO Hand/Wrist/Finger Survey    Therapist reviewed FOTO scores for patient this session.  FOTO documents entered into [employer] - see Media section.    Intake Score: 48%       Treatment     Treatment Time In: 11:00 AM  Treatment Time Out: 11:30 AM  Total Treatment time separate from Evaluation time:30 minutes    Jackelyn Kendrick participated in dynamic functional therapeutic activities to improve functional performance for 5  minutes, including:  - Introduced home exercise program and checked for correct performance  - Introduced and instructed on scar massage - checked for correct performance  - Discussed principles of edema management - compression, elevation, active range of motion   - Discussed principles of desensitization - use different fabrics/textures on hypersensitive  needed  Ibuprofen 600 mg every 6 as needed  Meloxicam 15 mg daily  Nystatin powder 3 times daily  Vitamin D3 50,000 units weekly    BASELINE FUNCTIONAL STATUS:  Walker    CURRENT FUNCTIONAL STATUS:  Weight bearing as tolerated (WBAT)    DIET:  Consistency: General   Type: DM diet  Appetite: Normal    REVIEW OF SYSTEMS:  Review of Systems   Constitutional: Negative for appetite change, diaphoresis, fatigue and fever.   HENT: Negative.  Negative for congestion, nosebleeds, rhinorrhea, sneezing and trouble swallowing.    Eyes: Negative.  Negative for discharge and visual disturbance.   Respiratory: Negative for cough, choking, wheezing and stridor.         With exertion, lying on left or right side   Cardiovascular: Positive for leg swelling. Negative for chest pain and palpitations.   Gastrointestinal: Negative.  Negative for abdominal pain, constipation, diarrhea, nausea and vomiting.   Endocrine: Negative.    Genitourinary: Negative.  Negative for difficulty urinating, dysuria, flank pain, frequency and hematuria.   Musculoskeletal: Negative.  Negative for arthralgias, back pain and gait problem.   Skin: Negative.  Negative for rash.   Neurological: Negative.  Negative for dizziness, weakness, numbness and headaches.   Hematological: Negative.    Psychiatric/Behavioral: Negative.  Negative for agitation, behavioral problems, confusion, decreased concentration and dysphoric mood.       PHYSICAL ASSESSMENT:  Physical Exam  Vitals and nursing note reviewed.   Constitutional:       General: She is not in acute distress.     Appearance: Normal appearance. She is obese. She is not ill-appearing.      Comments: Sitting up on side of bed   HENT:      Head: Normocephalic and atraumatic.      Right Ear: External ear normal.      Left Ear: External ear normal.      Nose: Nose normal. No congestion or rhinorrhea.      Mouth/Throat:      Mouth: Mucous membranes are moist.   Eyes:      General:         Right eye: No discharge.     scars   - Review precautions - no heavy  or pinch   - Use of hot and cold modalities     Fabricated a ulnar resting hand orthosis in order to prevent flexion contractures post op.  Instructed to wear at night with removal for HEP, bathing/hygiene.  Instructed to monitor for pain/pressure, increased edema, or redness and to contact office for adjustments as needed. Patient reported good understanding of orthotic wear and care schedule.        Home Exercise Program/Education:  Issued HEP (see patient instructions in EMR) and educated on modality use for pain management . Exercises were reviewed and Jackelyn Kendrick was able to demonstrate them prior to the end of the session.   Pt received a written copy of exercises to perform at home. Jackelyn Kendrick demonstrated good  understanding of the education provided.  Pt was advised to perform these exercises free of pain, and to stop performing them if pain occurs.    Patient/Family Education: role of OT, goals for OT, scheduling/cancellations - pt verbalized understanding. Discussed insurance limitations with patient.    Assessment     Jackelyn Kendrick is a 73 y.o. female presents with limitations as described in problem list. Patient can benefit from Occupational Therapy services for Iontophoresis, ultrasound, moist heat, therapeutic exercises, home exercise program provied with written instructions, ice and strengthening and orthotics, if deemed necessary . The following goals were discussed with the patient and she is in agreement with them as to be addressed in the treatment plan.    The patient's rehab potential is Good.     Anticipated barriers to occupational therapy: None  Pt has no cultural, educational or language barriers to learning provided.    Medical Necessity is demonstrated by the following:  Occupational Profile/History  Co-morbidities and personal factors that may impact the plan of care [x] LOW: Brief chart review  [] MODERATE: Expanded       Left eye: No discharge.      Conjunctiva/sclera: Conjunctivae normal.      Pupils: Pupils are equal, round, and reactive to light.   Cardiovascular:      Rate and Rhythm: Normal rate and regular rhythm.      Heart sounds: Normal heart sounds.   Pulmonary:      Effort: Pulmonary effort is normal. No respiratory distress.      Breath sounds: Normal breath sounds.      Comments: Diminished sounds all fields, no adventitious sounds  Abdominal:      General: Bowel sounds are normal.      Palpations: Abdomen is soft.      Tenderness: There is no abdominal tenderness.      Comments: Obese body habitus   Musculoskeletal:      Cervical back: Normal range of motion and neck supple.      Right lower leg: Edema present.      Left lower leg: Edema present.      Comments: +2-3 edema BLE   Skin:     General: Skin is warm and dry.      Comments: Psoriatic patches noted over multiple body parts.  Red dermatitis under abdominal fold   Neurological:      General: No focal deficit present.      Mental Status: She is alert and oriented to person, place, and time.   Psychiatric:         Mood and Affect: Mood normal.         Behavior: Behavior normal.         Thought Content: Thought content normal.         Judgment: Judgment normal.         VITALS:  164/78, 97.2, 79, 19, 95% on 2 L, 89 to 90% on 1 L  Accu-Chek 114   pain Level 0/10    LABS:  5/17: WBC 8.53 Hgb 14.1  glucose 87 BUN 13 CR 0.96 BUN 3.1 total protein 5.9 NA 142K4.1 LFTs WNL GFR > 60    5/13  CBC 14.6/49, WBC 7.17, plt 205  CMP  Na 146 K 4.7 BUN/cr 12/0.49, glu 91    5/7/2021(Hospital labs):  BUN 11  CR 0.7  WBC 11.3  Hgb 14.9        ASSESSMENT AND PLAN  1. Debility  PT/OT and fall precautions  Patient improved and eager for discharge home where she will continue with rehab at next level of care  Home health care and DME to be in place   assisting    2. Acute congestive heart failure, unspecified heart failure type (CMS/HCC)  Clinically  chart review   [] HIGH: Extensive chart review    Moderate / High Support Documentation     Examination  Performance deficits relating to physical, cognitive or psychosocial skills that result in activity limitations and/or participation restrictions  [] LOW: addressing 1-3 Performance deficits  [x] MODERATE: 3-5 Performance deficits  [] HIGH: 5+ Performance deficits (please support below)    Moderate / High Support Documentation:    Physical:  Joint Mobility  Joint Stability  Muscle Power/Strength  Skin Integrity/Scar Formation  Edema   Strength  Pinch Strength  Pain    Cognitive:  No Deficits    Psychosocial:    No Deficits     Treatment Options [x] LOW: Limited options  [] MODERATE: Several options  [] HIGH: Multiple options      Decision Making/ Complexity Score: low         Goals:    LTG's (4-6 weeks):  1)   Increase ROM to WFL degrees in composite fist to increase functional hand use for manipulating lighter for gas stove. progressing not met 1/22/2024  2)   Increase  strength to 40 lbs. to grasp pot handles progressing not met 1/22/2024   3)   Increase all limited pinch to 8 psis for opening packages. progressing not met 1/22/2024   4)   Decrease complaints of pain to   0  out of 10 at worst to increase functional hand use for ADL/work/leisure activities. progressing not met 1/22/2024  5)   Patient to score at 63% or more on FOTO to demonstrate improved perception of functional LUE use. progressing not met 1/22/2024   6)   Pt will return to near to prior level of function for ADLs and household management reporting I or Mod I with ADLs (dressing, feeding, grooming, toileting). progressing not met 1/22/2024    Plan     Pt to be treated by Occupational Therapy 2 times per week for 6 weeks during the certification period from 1/22/2024 to 3/8/24 to achieve the established goals.     Treatment to include: Paraffin, Fluidotherapy, Manual therapy/joint mobilizations, Modalities for pain management, US 3  compensated  Continue management with Lasix, enalapril, Coreg, amlodipine  Monitor labs, vital signs, weights    3. Chronic obstructive pulmonary disease, unspecified COPD type (CMS/[facility])  Chronic O2 may be indicated at home  Patient unable to tolerate CPAP  Seen and evaluated by pulmonary facility recommendation for continued conditioning  Continue inhalers    4. Morbid obesity with BMI of 60.0-69.9, adult (CMS/[facility])  Dietary/nutrition to be consulted    5. Type 2 diabetes mellitus with diabetic neuropathy, without long-term current use of insulin (CMS/[facility])  Controlled on metformin currently  Continue to monitor Accu-Cheks      Peace Junior MD        FOLLOW UP APPOINTMENTS:  No follow-ups on file.    DISCHARGE PLANNING: TBD 7 to 14 days    Discussed with:RN, patient    Prognosis: fair    Total time spent is more than 35 minutes, with more than 50% of the time spent in coordination of care, counseling, review of records and discussion of plan of care with the patient /staff /family.       mhz, Therapeutic exercises/activities., Strengthening, Orthotic Fabrication/Fit/Training, Scar Management, Wound Care, and Joint Protection, as well as any other treatments deemed necessary based on the patient's needs or progress.     Delma Patel, OT

## 2024-01-22 NOTE — PLAN OF CARE
[facility]  Occupational Therapy Initial Evaluation     Date: 1/22/2024  Patient: Jackelyn Kendrick  Chart Number: [id number]  Referring Physician: Suzy Dominguez MD  Therapy Diagnosis:   1. Left hand pain        2. Carpal tunnel syndrome of left wrist  Ambulatory referral/consult to Physical/Occupational Therapy      3. Dupuytren's contracture of left hand  Ambulatory referral/consult to Physical/Occupational Therapy      4. Ulnar nerve compression, left  Ambulatory referral/consult to Physical/Occupational Therapy          Medical Diagnosis:   G56.02 (ICD-10-CM) - Carpal tunnel syndrome of left wrist   M72.0 (ICD-10-CM) - Dupuytren's contracture of left hand   G56.22 (ICD-10-CM) - Ulnar nerve compression, left     Physician Orders: OT evaluation at 10d postop DOS: 1.11.24  #1 CTR   Pt is to be seen 10-11 days post-op. at [address] for OT Eval.   Evaluate incision: If appropriate sutures to be removed at 10-11 days, 10-14 days if DMII contact MD or do not remove sutures if any drainage or concerns for wound healing. Teach HEP, educate patient on scar massage, and assess for continued therapy.  If patient not able to make at least 75% of a full fist, recommend continued FU appointments until regained ability to make full fist.  Pt. is to see physician at 2-4 weeks post op.     #2 Ulnar Nerve Decompression:  Pt. to be seen 10-14 days post op for the following at [facility]:  Pt. to attend for Eval, Suture removal, and HEP (able to begin gentle elbow AROM, educate patient on scar massage, and assess for continued therapy.   Pt. Discharged from OT when therapist feels is appropriate.  Pt to see physician at 2-4 weeks s/p. Await further orders.  Evaluation Date: 1/22/2024  Plan of Care Certification Date: 3/8/24  Authorization Period: 1/4/25  Surgery Date and Procedure: 1/11/24  Date of Return to MD: 1/24/24    FOTO: /3    Visit #: 1 of 1  Time In: 10:36 AM  Time  Out: 11:30 AM  Total Billable Time: 54 minutes    Precautions: Standard    Subjective     Involved Side: Left  Dominant Side: Right  Date of Onset: 3 months ago  History of Current Condition:   Per Dr. Dominguez note on 11/13/23: Patient is a 72 y.o. right hand dominant female previously seen for right long finger pip joint csi and dequervains and doing well at this time. Her dequervains has resolved at this time. She continues to have pain of her long, ring and small finger PIP joints. Achy 2/10 constant pain.      Presents today with new complaints of Left ulnar pain that started in October. She is c/o left small and ring finger numbness/tingling. The numbness/tingling worse at night and worse with elbow flexion. She has had not treatment in the past for this.    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  Bone, joint, soft tissues otherwise normal   Previous Therapy: None    Patient's Goals for Therapy: To reduce pain at palm, turn on stove with lighter     Pain:  Functional Pain Scale Rating 0-10:   3/10 on average  1/10 at best  5/10 at worst  Location: Voalr surface of 4th MC incision site   Description: Sharp  Aggravating Factors: movement  Easing Factors: None    Occupation:  Retired nurse      Functional Limitations/Social History:    Previous functional status includes: Independent with all ADLs.     Current Functional Status   Home/Living environment: lives with their spouse      Limitation of Functional Status as follows:   ADLs/IADLs:     - Feeding: Independent    - Bathing: Independent    - Dressing/Grooming:  Independent    - Home Management:  Independent    - Driving:  Independent     Leisure: Ceramics on Wednesdays    Past Medical History/Physical Systems Review:   Jackelyn Kendrick  has a past medical history of Arthritis, Basal cell carcinoma, Bronchitis, Cataract, Diverticulitis, Dry eye syndrome, GERD (gastroesophageal reflux disease), Hyperlipidemia, Hypertension, Hypothyroidism, Obese,  PONV (postoperative nausea and vomiting), and Thyroid disease.    Jackelyn Kendrick  has a past surgical history that includes Colonoscopy (2007); Nasal septum surgery; Shoulder surgery; Hysterectomy; Cholecystectomy; Hernia repair; Tonsillectomy; Back surgery; De Quervain's release (Left, 03/2017); Colonoscopy (N/A, 9/3/2020); Arthroscopic repair of rotator cuff of shoulder (Right, 9/23/2020); Fixation of tendon (Right, 9/23/2020); Phacoemulsification of cataract (Left, 10/20/2021); Intraocular prosthesis insertion (Left, 10/20/2021); Cataract extraction w/  intraocular lens implant (Left, 10/20/2021); Trigger finger release (Left, 12/2/2021); Injection of steroid (Right, 12/2/2021); Phacoemulsification of cataract (Right, 12/29/2021); Intraocular prosthesis insertion (Right, 12/29/2021); Cystoscopy; Anorectal manometry (N/A, 6/1/2023); Carpal tunnel release (Left, 1/11/2024); Dupuytren contracture release (Left, 1/11/2024); Decompression of nerve (Left, 1/11/2024); and Injection of steroid (Right, 1/11/2024).    Jackelyn has a current medication list which includes the following prescription(s): albuterol, arexvy (pf), celecoxib, cranberry fruit extract, cyclosporine, diclofenac sodium, dicyclomine, docusate sodium, estradiol, fluocinonide, hydrocodone-acetaminophen, ketoconazole, levothyroxine, lidocaine-prilocaine, losartan, magnesium, memantine, multi-vitamin, nitrofurantoin, omeprazole, ondansetron, oxybutynin, psyllium husk, rosuvastatin, terbinafine hcl, trazodone, and triamcinolone acetonide 0.1%.    Review of patient's allergies indicates:   Allergen Reactions    Levaquin [levofloxacin] Swelling and Edema    Erythromycin (bulk) Nausea And Vomiting     Not true allergy          Objective     Mental status: alert, oriented x3    Observation:   Pt arrived in post op dressings. Incision sites CDI. Post op dressings removed. Sutures removed at carpal tunnel and palmar surface.     Sensation: Intact. Reports no  numbness or tingling in fingers since surgery.    Edema: Circumferential measurements: In centimters     Left Right   MPs 17.8  18.3    PWC (Proximal Wrist Crease) 15.3  15.1      No notable swelling at elbow.     Range of Motion:   Left    Elbow and Wrist ROM. Measured in degrees.   1/22/2024      Left     Supination Full     Pronation Full     Wrist Ext 60     Wrist Flex 74     Wrist RD 20     Wrist UD 26       Hand ROM. Measured in degrees.   1/22/2024      Left           Index: MP  0/83                PIP     0/89                DIP 0/45           Long:  MP 0/90                PIP 0/89                DIP 0/54           Ring:   MP 0/91                PIP 0/91                DIP 0/41           Small:  MP 0/79                 PIP 0/74                 DIP 0/60           Thumb:              Opposition SF MP     Note: Pt close to forming composite fist.      Strength (Dynamometer) and Pinch Strength (Pinch Gauge)  Measured in pounds.   1/22/2024 1/22/2024      Left Right     Rung II       Khan Pinch       3pt Pinch       2pt Pinch        NT this session 2/2 length of time since surgery. To test R next session.    Intake Outcome Measure for FOTO Hand/Wrist/Finger Survey    Therapist reviewed FOTO scores for patient this session.  FOTO documents entered into [employer] - see Media section.    Intake Score: 48%       Treatment     Treatment Time In: 11:00 AM  Treatment Time Out: 11:30 AM  Total Treatment time separate from Evaluation time:30 minutes    Jackelyn Kendrick participated in dynamic functional therapeutic activities to improve functional performance for 5  minutes, including:  - Introduced home exercise program and checked for correct performance  - Introduced and instructed on scar massage - checked for correct performance  - Discussed principles of edema management - compression, elevation, active range of motion   - Discussed principles of desensitization - use different fabrics/textures on hypersensitive  scars   - Review precautions - no heavy  or pinch   - Use of hot and cold modalities     Fabricated a ulnar resting hand orthosis in order to prevent flexion contractures post op.  Instructed to wear at night with removal for HEP, bathing/hygiene.  Instructed to monitor for pain/pressure, increased edema, or redness and to contact office for adjustments as needed. Patient reported good understanding of orthotic wear and care schedule.        Home Exercise Program/Education:  Issued HEP (see patient instructions in EMR) and educated on modality use for pain management . Exercises were reviewed and Jackelyn Kendrick was able to demonstrate them prior to the end of the session.   Pt received a written copy of exercises to perform at home. Jackelyn Kendrick demonstrated good  understanding of the education provided.  Pt was advised to perform these exercises free of pain, and to stop performing them if pain occurs.    Patient/Family Education: role of OT, goals for OT, scheduling/cancellations - pt verbalized understanding. Discussed insurance limitations with patient.    Assessment     Jackelyn Kendrick is a 73 y.o. female presents with limitations as described in problem list. Patient can benefit from Occupational Therapy services for Iontophoresis, ultrasound, moist heat, therapeutic exercises, home exercise program provied with written instructions, ice and strengthening and orthotics, if deemed necessary . The following goals were discussed with the patient and she is in agreement with them as to be addressed in the treatment plan.    The patient's rehab potential is Good.     Anticipated barriers to occupational therapy: None  Pt has no cultural, educational or language barriers to learning provided.    Medical Necessity is demonstrated by the following:  Occupational Profile/History  Co-morbidities and personal factors that may impact the plan of care [x] LOW: Brief chart review  [] MODERATE: Expanded  chart review   [] HIGH: Extensive chart review    Moderate / High Support Documentation     Examination  Performance deficits relating to physical, cognitive or psychosocial skills that result in activity limitations and/or participation restrictions  [] LOW: addressing 1-3 Performance deficits  [x] MODERATE: 3-5 Performance deficits  [] HIGH: 5+ Performance deficits (please support below)    Moderate / High Support Documentation:    Physical:  Joint Mobility  Joint Stability  Muscle Power/Strength  Skin Integrity/Scar Formation  Edema   Strength  Pinch Strength  Pain    Cognitive:  No Deficits    Psychosocial:    No Deficits     Treatment Options [x] LOW: Limited options  [] MODERATE: Several options  [] HIGH: Multiple options      Decision Making/ Complexity Score: low         Goals:    LTG's (4-6 weeks):  1)   Increase ROM to WFL degrees in composite fist to increase functional hand use for manipulating lighter for gas stove. progressing not met 1/22/2024  2)   Increase  strength to 40 lbs. to grasp pot handles progressing not met 1/22/2024   3)   Increase all limited pinch to 8 psis for opening packages. progressing not met 1/22/2024   4)   Decrease complaints of pain to  0 out of 10 at worst to increase functional hand use for ADL/work/leisure activities. progressing not met 1/22/2024  5)   Patient to score at 63% or more on FOTO to demonstrate improved perception of functional LUE use. progressing not met 1/22/2024   6)   Pt will return to near to prior level of function for ADLs and household management reporting I or Mod I with ADLs (dressing, feeding, grooming, toileting). progressing not met 1/22/2024    Plan     Pt to be treated by Occupational Therapy 2 times per week for 6 weeks during the certification period from 1/22/2024 to 3/8/24 to achieve the established goals.     Treatment to include: Paraffin, Fluidotherapy, Manual therapy/joint mobilizations, Modalities for pain management, US 3 mhz,  Therapeutic exercises/activities., Strengthening, Orthotic Fabrication/Fit/Training, Scar Management, Wound Care, and Joint Protection, as well as any other treatments deemed necessary based on the patient's needs or progress.     Delma Patel, OT

## 2024-01-24 ENCOUNTER — OFFICE VISIT (OUTPATIENT)
Dept: ORTHOPEDICS | Facility: CLINIC | Age: 74
End: 2024-01-24
Payer: MEDICARE

## 2024-01-24 DIAGNOSIS — G56.02 CARPAL TUNNEL SYNDROME OF LEFT WRIST: ICD-10-CM

## 2024-01-24 DIAGNOSIS — Z09 FOLLOW-UP EXAMINATION AFTER ORTHOPEDIC SURGERY: Primary | ICD-10-CM

## 2024-01-24 DIAGNOSIS — M65.4 DE QUERVAIN'S TENOSYNOVITIS, RIGHT: ICD-10-CM

## 2024-01-24 DIAGNOSIS — M72.0 DUPUYTREN'S CONTRACTURE OF LEFT HAND: ICD-10-CM

## 2024-01-24 DIAGNOSIS — G56.22 CUBITAL TUNNEL SYNDROME ON LEFT: ICD-10-CM

## 2024-01-24 PROCEDURE — 99213 OFFICE O/P EST LOW 20 MIN: CPT | Mod: PBBFAC | Performed by: ORTHOPAEDIC SURGERY

## 2024-01-24 PROCEDURE — 99999 PR PBB SHADOW E&M-EST. PATIENT-LVL III: CPT | Mod: PBBFAC,,, | Performed by: ORTHOPAEDIC SURGERY

## 2024-01-24 PROCEDURE — 99024 POSTOP FOLLOW-UP VISIT: CPT | Mod: POP,,, | Performed by: ORTHOPAEDIC SURGERY

## 2024-01-24 NOTE — PROGRESS NOTES
Jackelyn Kendrick presents for post-operative evaluation of   Encounter Diagnoses   Name Primary?    Follow-up examination after orthopedic surgery Yes    Dupuytren's contracture of left hand     Carpal tunnel syndrome of left wrist     Cubital tunnel syndrome on left     De Quervain's tenosynovitis, right    The patient is now 13d s/p 1.11.24 Left hand partial fasciectomy for Dupuytren's, Left elbow ulnar nerve decompression, Left carpal tunnel release / Right 1st dorsal extensor compartment steroid injection.  Overall the patient reports doing well.  The patient reports appropriate postoperative soreness with well controlled overall pain.      PE:    AA&O x 4.  NAD  HEENT:  NCAT, sclera nonicteric  Lungs:  Respirations are equal and unlabored.  CV:  2+ bilateral upper and lower extremity pulses.  MSK: The incision is well healed.  Near full wrist and finger motion.  Neurovascularly intact and has 5/5 thenar and intrinsic musculature strength.        A/P: Status post above, doing well  1) Continue with range of motion, strengthening at 6 weeks  2) F/U 6 weeks with new xrays  3) Call with any questions/concerns in the interim        Suzy Dominguez MD    Please be aware that this note has been generated with the assistance of [employer] voice-to-text.  Please excuse any spelling or grammatical errors.

## 2024-02-07 ENCOUNTER — OFFICE VISIT (OUTPATIENT)
Dept: URGENT CARE | Facility: CLINIC | Age: 74
End: 2024-02-07
Payer: MEDICARE

## 2024-02-07 VITALS
RESPIRATION RATE: 17 BRPM | DIASTOLIC BLOOD PRESSURE: 70 MMHG | SYSTOLIC BLOOD PRESSURE: 180 MMHG | TEMPERATURE: 98 F | HEART RATE: 78 BPM | BODY MASS INDEX: 25.11 KG/M2 | OXYGEN SATURATION: 98 % | WEIGHT: 133 LBS | HEIGHT: 61 IN

## 2024-02-07 DIAGNOSIS — R30.0 DYSURIA: ICD-10-CM

## 2024-02-07 DIAGNOSIS — N30.01 ACUTE CYSTITIS WITH HEMATURIA: Primary | ICD-10-CM

## 2024-02-07 LAB
BILIRUB UR QL STRIP: NEGATIVE
GLUCOSE UR QL STRIP: NEGATIVE
KETONES UR QL STRIP: NEGATIVE
LEUKOCYTE ESTERASE UR QL STRIP: POSITIVE
PH, POC UA: 7.5 (ref 5–8)
POC BLOOD, URINE: POSITIVE
POC NITRATES, URINE: NEGATIVE
PROT UR QL STRIP: POSITIVE
SP GR UR STRIP: 1 (ref 1–1.03)
UROBILINOGEN UR STRIP-ACNC: ABNORMAL (ref 0.1–1.1)

## 2024-02-07 PROCEDURE — 87088 URINE BACTERIA CULTURE: CPT

## 2024-02-07 PROCEDURE — 99213 OFFICE O/P EST LOW 20 MIN: CPT | Mod: S$GLB,,,

## 2024-02-07 PROCEDURE — 87077 CULTURE AEROBIC IDENTIFY: CPT

## 2024-02-07 PROCEDURE — 81003 URINALYSIS AUTO W/O SCOPE: CPT | Mod: QW,S$GLB,,

## 2024-02-07 PROCEDURE — 87086 URINE CULTURE/COLONY COUNT: CPT

## 2024-02-07 PROCEDURE — 87186 SC STD MICRODIL/AGAR DIL: CPT

## 2024-02-07 RX ORDER — SULFAMETHOXAZOLE AND TRIMETHOPRIM 800; 160 MG/1; MG/1
1 TABLET ORAL 2 TIMES DAILY
Qty: 14 TABLET | Refills: 0 | Status: SHIPPED | OUTPATIENT
Start: 2024-02-07 | End: 2024-02-14

## 2024-02-07 NOTE — PROGRESS NOTES
"Subjective:      Patient ID: Jackelyn Kendrick is a 73 y.o. female.    Vitals:  height is 5' 1" (1.549 m) and weight is 60.3 kg (133 lb). Her oral temperature is 98.2 °F (36.8 °C). Her blood pressure is 180/70 (abnormal) and her pulse is 78. Her respiration is 17 and oxygen saturation is 98%.     Chief Complaint: Dysuria    Patient presents to clinic c/o dysuria and urinary urgency for 3 days. Patient states she has frequent UTIs, is followed by urology and takes daily macrodantin.  Patient states she usually is prescribed Bactrim for her UTIs that works well.  Denies any fever, flank pain, nausea, vomiting, constipation, vaginal discharge or pain, or dizziness.    Dysuria   This is a new problem. Episode onset: 2 DAYS. The problem has been gradually worsening. Associated symptoms include frequency and urgency. Pertinent negatives include no flank pain. Treatments tried: PRIDEIUM. The treatment provided no relief.       Constitution: Negative for fever and generalized weakness.   HENT:  Negative for ear pain, sinus pain and sore throat.    Neck: Negative for neck pain.   Cardiovascular:  Negative for chest pain.   Respiratory:  Negative for cough and shortness of breath.    Gastrointestinal:  Negative for abdominal pain.   Genitourinary:  Positive for dysuria, frequency and urgency. Negative for flank pain and pelvic pain.   Neurological:  Negative for headaches.      Objective:     Physical Exam   Constitutional: She is oriented to person, place, and time. She appears well-developed. She is cooperative.  Non-toxic appearance. She does not appear ill. No distress.   HENT:   Head: Normocephalic and atraumatic.   Ears:   Right Ear: Hearing, tympanic membrane, external ear and ear canal normal.   Left Ear: Hearing, tympanic membrane, external ear and ear canal normal.   Nose: Nose normal. No mucosal edema, rhinorrhea or nasal deformity. No epistaxis. Right sinus exhibits no maxillary sinus tenderness and no frontal " sinus tenderness. Left sinus exhibits no maxillary sinus tenderness and no frontal sinus tenderness.   Mouth/Throat: Uvula is midline, oropharynx is clear and moist and mucous membranes are normal. No trismus in the jaw. Normal dentition. No uvula swelling. No oropharyngeal exudate, posterior oropharyngeal edema or posterior oropharyngeal erythema.   Eyes: Conjunctivae and lids are normal. No scleral icterus.   Neck: Trachea normal and phonation normal. Neck supple. No edema present. No erythema present. No neck rigidity present.   Cardiovascular: Normal rate, regular rhythm, normal heart sounds and normal pulses.   Pulmonary/Chest: Effort normal and breath sounds normal. No respiratory distress. She has no decreased breath sounds. She has no rhonchi.   Abdominal: Normal appearance. There is no abdominal tenderness. There is no left CVA tenderness and no right CVA tenderness.   Musculoskeletal: Normal range of motion.         General: No deformity. Normal range of motion.   Neurological: She is alert and oriented to person, place, and time. She exhibits normal muscle tone. Coordination normal.   Skin: Skin is warm, dry, intact, not diaphoretic and not pale.   Psychiatric: Her speech is normal and behavior is normal. Judgment and thought content normal.   Nursing note and vitals reviewed.      Assessment:     1. Acute cystitis with hematuria    2. Dysuria        Plan:     Patient is very well-appearing, alert and active, VSS, afebrile without recent antipyretic, and appears well-hydrated.  Physical exam as documented above. UA indicated UTI, will treat with Bactrim pending culture sensitivities.  Based on history and PE, low suspicion for emergent GI or  condition at this time.  Anticipatory guidance regarding UTIs discussed with patient, as well as appropriate antibiotic use. Discussed use of over-the-counter medications, such as AZO and pyridium for symptoms as well as supportive care and return precautions.  Patient verbalizes understanding and agrees to follow-up with PCP as needed.  Also instructed patient to follow up with her urologist.    Results for orders placed or performed in visit on 02/07/24   POCT Urinalysis, Dipstick, Automated, W/O Scope   Result Value Ref Range    POC Blood, Urine Positive (A) Negative    POC Bilirubin, Urine Negative Negative    POC Urobilinogen, Urine NORM 0.1 - 1.1    POC Ketones, Urine Negative Negative    POC Protein, Urine Positive (A) Negative    POC Nitrates, Urine Negative Negative    POC Glucose, Urine Negative Negative    pH, UA 7.5 5 - 8    POC Specific Gravity, Urine 1.005 1.003 - 1.029    POC Leukocytes, Urine Positive (A) Negative         Acute cystitis with hematuria  -     sulfamethoxazole-trimethoprim 800-160mg (BACTRIM DS) 800-160 mg Tab; Take 1 tablet by mouth 2 (two) times daily. for 7 days  Dispense: 14 tablet; Refill: 0  -     CULTURE, URINE    Dysuria  -     POCT Urinalysis, Dipstick, Automated, W/O Scope  -     CULTURE, URINE      Patient Instructions   Take antibiotics for the full 7 days.  May take over the counter AZO or pyridium for pain and burning symptoms.     Drink plenty of non-sugary liquids (water!!), you should aim to have light-yellow or clear urine.    If symptoms are not improving within 3 days or worsen, including fever, flank pain, nausea, vomiting, or dizziness, return to urgent care or ED.    Please follow up with your primary care doctor or specialist as needed.    If you  smoke, please stop smoking.

## 2024-02-08 NOTE — PATIENT INSTRUCTIONS
Take antibiotics for the full 7 days.  May take over the counter AZO or pyridium for pain and burning symptoms.     Drink plenty of non-sugary liquids (water!!), you should aim to have light-yellow or clear urine.    If symptoms are not improving within 3 days or worsen, including fever, flank pain, nausea, vomiting, or dizziness, return to urgent care or ED.    Please follow up with your primary care doctor or specialist as needed.    If you  smoke, please stop smoking.

## 2024-02-10 ENCOUNTER — TELEPHONE (OUTPATIENT)
Dept: URGENT CARE | Facility: CLINIC | Age: 74
End: 2024-02-10
Payer: MEDICARE

## 2024-02-10 LAB — BACTERIA UR CULT: ABNORMAL

## 2024-02-10 NOTE — TELEPHONE ENCOUNTER
Left VM for patient about + urine culture results (Klebsiella pneumonia). She is on bactrim which is appropriate treatment. Encouraged to return phone call if she has any questions or complaints.

## 2024-02-16 ENCOUNTER — CLINICAL SUPPORT (OUTPATIENT)
Dept: REHABILITATION | Facility: HOSPITAL | Age: 74
End: 2024-02-16
Payer: MEDICARE

## 2024-02-16 DIAGNOSIS — M79.642 LEFT HAND PAIN: Primary | ICD-10-CM

## 2024-02-16 PROCEDURE — 97035 APP MDLTY 1+ULTRASOUND EA 15: CPT | Mod: PO

## 2024-02-16 PROCEDURE — 97530 THERAPEUTIC ACTIVITIES: CPT | Mod: PO

## 2024-02-16 NOTE — PROGRESS NOTES
Occupational Therapy Daily Treatment Note     Date: 2/16/2024  Name: Jackelyn Kendrick  Clinic Number: [id number]    Therapy Diagnosis:   Encounter Diagnosis   Name Primary?    Left hand pain Yes     Physician: Suzy Dominguez MD    Medical Diagnosis:   G56.02 (ICD-10-CM) - Carpal tunnel syndrome of left wrist   M72.0 (ICD-10-CM) - Dupuytren's contracture of left hand   G56.22 (ICD-10-CM) - Ulnar nerve compression, left      Physician Orders: OT evaluation at 10d postop DOS: 1.11.24  #1 CTR   Pt is to be seen 10-11 days post-op. at [facility] for OT Eval.   Evaluate incision: If appropriate sutures to be removed at 10-11 days, 10-14 days if DMII contact MD or do not remove sutures if any drainage or concerns for wound healing. Teach HEP, educate patient on scar massage, and assess for continued therapy.  If patient not able to make at least 75% of a full fist, recommend continued FU appointments until regained ability to make full fist.  Pt. is to see physician at 2-4 weeks post op.     #2 Ulnar Nerve Decompression:  Pt. to be seen 10-14 days post op for the following at [facility]:  Pt. to attend for Eval, Suture removal, and HEP (able to begin gentle elbow AROM, educate patient on scar massage, and assess for continued therapy.   Pt. Discharged from OT when therapist feels is appropriate.  Pt to see physician at 2-4 weeks s/p. Await further orders.    Evaluation Date: 1/22/2024  Plan of Care Certification Date: 3/8/24  Authorization Period: 1/4/25  Surgery Date and Procedure: 1/11/24  Date of Return to MD: 1/24/24     FOTO: /3    Visit # / Visits authorized: 1 / 20  Time In:10:30 AM  Time Out: 11:15 AM  Total Billable Time: 45 minutes    Precautions:  Standard      Subjective     Pt reports: It hurts throughout the hand and that elbow  Response to previous treatment:Dangelo well    Pain: 2/10  Location: Carpal tunnel and MPs    Objective     Edema: Circumferential measurements: In centimters        Left Right   MPs 17.8  18.3    PWC (Proximal Wrist Crease) 15.3  15.1       No notable swelling at elbow.      Range of Motion:   Left     Elbow and Wrist ROM. Measured in degrees.    1/22/2024 2/16/24       Left  Left     Supination Full  Full ROM     Pronation Full       Wrist Ext 60       Wrist Flex 74       Wrist RD 20       Wrist UD 26          Hand ROM. Measured in degrees.    1/22/2024 2/16/24       Left  Left               Index: MP  0/83  Full ROM                PIP     0/89                  DIP 0/45                 Long:  MP 0/90                  PIP 0/89                  DIP 0/54                 Ring:   MP 0/91                  PIP 0/91                  DIP 0/41                 Small:  MP 0/79                   PIP 0/74                   DIP 0/60                 Thumb:                 Opposition SF MP       Note: Pt close to forming composite fist.       Strength (Dynamometer) and Pinch Strength (Pinch Gauge)  Measured in pounds.    2/16/24         Left       Rung II 30.0        Key Pinch 8        3pt Pinch  6.5             Jackelyn Kendrick received the following manual therapy techniques for 2 minutes in order to maximize tissue function and/or decrease pain:   -CHT performed  Instrument Assisted Soft Tissue Mobilization  stimulating tissue turnover, scar tissue resorption, and the regeneration of tendons, cross friction massage of scar tissue at palm/intrinsic muscles  and throughout musculature  x 2  minutes       Patient received ultrasound  for pain control and decreased inflammation @ 100 % duty cycle, 3.3 Mhz, applied to volar palm, intensity = 0.6 w/cm2 for 8 minutes.     Jackelyn Kendrick participated in dynamic functional therapeutic activities to improve functional performance for 25  minutes, including:  -Measurements updated  -Isospheres 2' in pronated   -Juxicisor 2'  -Ulnar nerve glides 10x  -Chip clip to decrease tension at webspace 2'    Home Exercises and Education  Provided     Education provided:   - Use of heat  - Progress towards goals     Written Home Exercises Provided: Patient instructed to cont prior HEP.  Exercises were reviewed and Jackelyn Kendrick was able to demonstrate them prior to the end of the session.  Jackelyn Kendrick demonstrated fair  understanding of the HEP provided. Pt unable to perform ulnar nerve glides without therapist demonstration.  .   See EMR under Patient Instructions for exercises provided prior visit.        Assessment     Pt would continue to benefit from skilled OT to maximize left upper extremity functioning. Pt with difficulty coordinating all exercises this session requiring mod visual and tactile cues for correct completion. Pt with full ROM since last visit but with pain throughout elbow and hand. Instructed patient to not lean on elbow to prevent compressing ulnar nerve if it is irritated.     Jackelyn Kendrick is progressing towards her goals and there are no updates to goals at this time. Pt prognosis is Good.     Pt will continue to benefit from skilled outpatient occupational therapy to address the deficits listed in the problem list on initial evaluation provide pt/family education and to maximize pt's level of independence in the home and community environment.     Anticipated barriers to therapy: none      Pt's spiritual, cultural and educational needs considered and pt agreeable to plan of care and goals.    Goals:    LTG's (4-6 weeks):  1)   Increase ROM to WFL degrees in composite fist to increase functional hand use for manipulating lighter for gas stove.met 2/16/2024  2)   Increase  strength to 40 lbs. to grasp pot handles progressing not met 1/22/2024   3)   Increase all limited pinch to 8 psis for opening packages. progressing not met 1/22/2024   4)   Decrease complaints of pain to   0  out of 10 at worst to increase functional hand use for ADL/work/leisure activities. progressing not met 1/22/2024  5)    Patient to score at 63% or more on FOTO to demonstrate improved perception of functional LUE use. progressing not met 1/22/2024   6)   Pt will return to near to prior level of function for ADLs and household management reporting I or Mod I with ADLs (dressing, feeding, grooming, toileting). progressing not met 1/22/2024    Plan   Cont OT to address above goals.    Delma Patel, OT

## 2024-02-23 ENCOUNTER — CLINICAL SUPPORT (OUTPATIENT)
Dept: REHABILITATION | Facility: HOSPITAL | Age: 74
End: 2024-02-23
Payer: MEDICARE

## 2024-02-23 ENCOUNTER — TELEPHONE (OUTPATIENT)
Dept: ORTHOPEDICS | Facility: CLINIC | Age: 74
End: 2024-02-23
Payer: MEDICARE

## 2024-02-23 DIAGNOSIS — M79.642 LEFT HAND PAIN: Primary | ICD-10-CM

## 2024-02-23 PROCEDURE — 97035 APP MDLTY 1+ULTRASOUND EA 15: CPT | Mod: PO

## 2024-02-23 PROCEDURE — 97022 WHIRLPOOL THERAPY: CPT | Mod: PO

## 2024-02-23 PROCEDURE — 97530 THERAPEUTIC ACTIVITIES: CPT | Mod: PO

## 2024-02-23 NOTE — TELEPHONE ENCOUNTER
Called and Informed patient of appointment on 5/5/24 with back and spine. Patient states verbal understanding and has no further questions.     PRE-OP DIAGNOSIS:  Painful orthopaedic hardware 13-Nov-2020 08:08:24  Raymond Hudson

## 2024-02-23 NOTE — TELEPHONE ENCOUNTER
----- Message from Silvio Ozuna sent at 2/23/2024  2:44 PM CST -----  Marco A Sherwood,     We saw this patient a couple weeks ago and she told us she wasn't able to walk more than 2 blocks due to her back and hip pain. Thinking maybe we could start with her hip.     Can you see her soon?    Silvio Ozuna, MS, OTC   Sports Medicine Assistant   [facility]  (P) [phone]  (F) [phone]

## 2024-02-23 NOTE — PROGRESS NOTES
Occupational Therapy Daily Treatment Note     Date: 2/23/2024  Name: Jackelyn Kendrick  Clinic Number: [id number]    Therapy Diagnosis:   Encounter Diagnosis   Name Primary?    Left hand pain Yes     Physician: Suzy Dominguez MD    Medical Diagnosis:   G56.02 (ICD-10-CM) - Carpal tunnel syndrome of left wrist   M72.0 (ICD-10-CM) - Dupuytren's contracture of left hand   G56.22 (ICD-10-CM) - Ulnar nerve compression, left      Physician Orders: OT evaluation at 10d postop DOS: 1.11.24  #1 CTR   Pt is to be seen 10-11 days post-op. at [facility] for OT Eval.   Evaluate incision: If appropriate sutures to be removed at 10-11 days, 10-14 days if DMII contact MD or do not remove sutures if any drainage or concerns for wound healing. Teach HEP, educate patient on scar massage, and assess for continued therapy.  If patient not able to make at least 75% of a full fist, recommend continued FU appointments until regained ability to make full fist.  Pt. is to see physician at 2-4 weeks post op.     #2 Ulnar Nerve Decompression:  Pt. to be seen 10-14 days post op for the following at [facility]:  Pt. to attend for Eval, Suture removal, and HEP (able to begin gentle elbow AROM, educate patient on scar massage, and assess for continued therapy.   Pt. Discharged from OT when therapist feels is appropriate.  Pt to see physician at 2-4 weeks s/p. Await further orders.    Evaluation Date: 1/22/2024  Plan of Care Certification Date: 3/8/24  Authorization Period: 1/4/25  Surgery Date and Procedure: 1/11/24  Date of Return to MD: 1/24/24     FOTO: 1/3    Visit # / Visits authorized: 1 / 20  Time In:10:30 AM  Time Out: 11:15 AM  Total Billable Time: 45 minutes    Precautions:  Standard      Subjective     Pt reports: Pain is mainly in hands.   Response to previous treatment:Dangelo well    Pain: 2/10  Location: Carpal tunnel and MPs    Objective   Patient received fluidotherapy to L hand(s) for 10 minutes to increase  blood flow, circulation, desensitization, sensory re-education and for pain management.       Jackelyn Kendrick received the following manual therapy techniques for 2 minutes in order to maximize tissue function and/or decrease pain:     myofascial cupping X 2 minutes for loosening soft tissue,  improve scar mobility, release  tissue restrictions, decrease muscle tension  and pain, improve blood flow and increase function of  tissues in volar hand scars  muscle        Patient received ultrasound  for pain control and decreased inflammation @ 100 % duty cycle, 3.3 Mhz, applied to volar palm, intensity = 0.6 w/cm2 for 8 minutes.     Jackelyn Kendrick participated in dynamic functional therapeutic activities to improve functional performance for 25  minutes, including:    -flex bar  rolling for izquierdo scar x 2'   Patient performed desensitization with popcorn x 3 minutes for nerve re-education and sensory discrimination.   -Chip clip to decrease tension at webspace 2'  - iso finger extension x 15 reps each   - ulnar nerve glides x 12 reps   - scar pads to volar hand     Home Exercises and Education Provided     Education provided:   - Use of heat  - Progress towards goals     Written Home Exercises Provided: Patient instructed to cont prior HEP.  Exercises were reviewed and Jackelyn Meilass was able to demonstrate them prior to the end of the session.  Jackelyn Kendrick demonstrated fair  understanding of the HEP provided. Pt unable to perform ulnar nerve glides without therapist demonstration.  .   See EMR under Patient Instructions for exercises provided prior visit.        Assessment     Pt would continue to benefit from skilled OT to maximize left upper extremity functioning. Pt with hypersensitivity with gripping stirring wheel. Cannot tolerate much better pressure from negative pressure at this time. Very angry scar at this time. Required verbal cues to continue with desensitizing  . Educated on use of  scar pads and finger extension and supplied handout     Jackelyn Kendrick is progressing towards her goals and there are no updates to goals at this time. Pt prognosis is Good.     Pt will continue to benefit from skilled outpatient occupational therapy to address the deficits listed in the problem list on initial evaluation provide pt/family education and to maximize pt's level of independence in the home and community environment.     Anticipated barriers to therapy: none      Pt's spiritual, cultural and educational needs considered and pt agreeable to plan of care and goals.    Goals:    LTG's (4-6 weeks):  1)   Increase ROM to WFL degrees in composite fist to increase functional hand use for manipulating lighter for gas stove.met 2/16/2024  2)   Increase  strength to 40 lbs. to grasp pot handles progressing not met 1/22/2024   3)   Increase all limited pinch to 8 psis for opening packages. progressing not met 1/22/2024   4)   Decrease complaints of pain to   0  out of 10 at worst to increase functional hand use for ADL/work/leisure activities. progressing not met 1/22/2024  5)   Patient to score at 63% or more on FOTO to demonstrate improved perception of functional LUE use. progressing not met 1/22/2024   6)   Pt will return to near to prior level of function for ADLs and household management reporting I or Mod I with ADLs (dressing, feeding, grooming, toileting). progressing not met 1/22/2024    Plan   Cont OT to address above goals.    Courtney Kramer, OT

## 2024-02-23 NOTE — PATIENT INSTRUCTIONS
With palm on table, straighten fingers completely at large knuckles, and lift fingers off table. Hold _3___ seconds.  Repeat ___15_ times. Do __5__ sessions per day.  Activity: Tap fingers one at a time on table..          Attending Attestation (For Attendings USE Only)...

## 2024-02-26 PROBLEM — N39.0 URINARY TRACT INFECTION WITH HEMATURIA: Status: RESOLVED | Noted: 2023-11-27 | Resolved: 2024-02-26

## 2024-02-26 PROBLEM — R31.9 URINARY TRACT INFECTION WITH HEMATURIA: Status: RESOLVED | Noted: 2023-11-27 | Resolved: 2024-02-26

## 2024-02-26 NOTE — PROGRESS NOTES
DATE: 3/5/2024  PATIENT: Jackelyn Kendrick    Supervising Physician: Tutu Blnaca M.D.    CHIEF COMPLAINT: low back pain    HISTORY:  Jackelyn Kendrick is a 73 y.o. female with pmhx of L4/5 discectomy in the 90's here for initial evaluation of low back and right posterior buttock/leg pain (Back - 5, Leg - 4).  The pain in the low back is what bothers her most.  The pain has been present for years. The patient describes the pain as aching.  The pain is worse with walking and improved by rest. She is only able to walk about 1 block before having to sit down due to low back pain. There is no associated numbness and tingling. There is no subjective weakness. Prior treatments have included Tylenol, Celebrex and PT, but no recent suregry.  The patient denies myelopathic symptoms such as handwriting changes or difficulty with buttons/coins/keys. Denies perineal paresthesias, bowel/bladder dysfunction.    PAST MEDICAL/SURGICAL HISTORY:  Past Medical History:   Diagnosis Date    Arthritis     Basal cell carcinoma     Bronchitis     seasonal    Cataract     Diverticulitis     Dry eye syndrome     GERD (gastroesophageal reflux disease)     Hyperlipidemia     Hypertension     Hypothyroidism 07/19/2012    Obese     PONV (postoperative nausea and vomiting)     Thyroid disease      Past Surgical History:   Procedure Laterality Date    ANORECTAL MANOMETRY N/A 06/01/2023    Procedure: MANOMETRY, ANORECTAL;  Surgeon: Paulo Pritchett MD;  Location: [address];  Service: Endoscopy;  Laterality: N/A;  instructions sent to [facility]  5/26 pre-call no answer; MB    ARTHROSCOPIC REPAIR OF ROTATOR CUFF OF SHOULDER Right 09/23/2020    Procedure: REPAIR, ROTATOR CUFF, ARTHROSCOPIC;  Surgeon: Regniald Pickens MD;  Location: [facility];  Service: Orthopedics;  Laterality: Right;  regional w/catheter (interscalene)    BACK SURGERY      CARPAL TUNNEL RELEASE Left 01/11/2024    Procedure: RELEASE, CARPAL TUNNEL;  Surgeon:  Suzy Dominguez MD;  Location: [facility] OR;  Service: Orthopedics;  Laterality: Left;    CATARACT EXTRACTION W/  INTRAOCULAR LENS IMPLANT Left 10/20/2021        CHOLECYSTECTOMY      COLONOSCOPY  2007    diverticulosis    COLONOSCOPY N/A 09/03/2020    Procedure: COLONOSCOPY;  Surgeon: Alberta Henriquez MD;  Location: [facility] ENDO (4TH FLR);  Service: Colon and Rectal;  Laterality: N/A;  pt requested this time-8/31-covid-[facility] [place]-[facility]    CYSTOSCOPY      DE QUERVAIN'S RELEASE Left 03/2017    DECOMPRESSION OF NERVE Left 01/11/2024    Procedure: DECOMPRESSION, NERVE ULNAR;  Surgeon: Suzy Dominguez MD;  Location: [facility] OR;  Service: Orthopedics;  Laterality: Left;    DUPUYTREN CONTRACTURE RELEASE Left 01/11/2024    Procedure: RELEASE, DUPUYTREN CONTRACTURE, PALM;  Surgeon: Suzy Dominguez MD;  Location: [facility] OR;  Service: Orthopedics;  Laterality: Left;    FIXATION OF TENDON Right 09/23/2020    Procedure: FIXATION, TENDON;  Surgeon: Reginald Pickens MD;  Location: [facility] OR;  Service: Orthopedics;  Laterality: Right;    HERNIA REPAIR      HYSTERECTOMY      INJECTION OF STEROID Right 12/02/2021    Procedure: INJECTION, STEROID;  Surgeon: Suzy Dominguez MD;  Location: [facility] OR;  Service: Orthopedics;  Laterality: Right;    INJECTION OF STEROID Right 01/11/2024    Procedure: INJECTION, STEROID 1ST DORSAL COMPARTMENT;  Surgeon: Suzy Dominguez MD;  Location: [facility] OR;  Service: Orthopedics;  Laterality: Right;    INTRAOCULAR PROSTHESES INSERTION Left 10/20/2021    Procedure: INSERTION, IOL PROSTHESIS;  Surgeon: Pippa Ashby MD;  Location: [facility] OR 1ST FLR;  Service: Ophthalmology;  Laterality: Left;    INTRAOCULAR PROSTHESES INSERTION Right 12/29/2021    Procedure: INSERTION, IOL PROSTHESIS;  Surgeon: Pippa Ashby MD;  Location: [facility] OR 1ST FLR;  Service: Ophthalmology;  Laterality: Right;    NASAL SEPTUM SURGERY      PHACOEMULSIFICATION OF CATARACT Left 10/20/2021    Procedure:  PHACOEMULSIFICATION, CATARACT/ COMPLEX- PHACO / IOL - OS SMALL PUPIL-OLE RING AND TRYPAN BLUE;  Surgeon: Pippa Ashby MD;  Location: [facility] OR [address] [place] [address];  Service: Ophthalmology;  Laterality: Left;    PHACOEMULSIFICATION OF CATARACT Right 12/29/2021    Procedure: PHACOEMULSIFICATION, CATARACT;  Surgeon: Pippa Ashby MD;  Location: [facility] OR [address] [place] [address];  Service: Ophthalmology;  Laterality: Right;    SHOULDER SURGERY      TONSILLECTOMY      TRIGGER FINGER RELEASE Left 12/02/2021    Procedure: RELEASE, TRIGGER FINGER;  Surgeon: Suzy Dominguez MD;  Location: [facility];  Service: Orthopedics;  Laterality: Left;       Medications:   Current Outpatient Medications on File Prior to Visit   Medication Sig Dispense Refill    albuterol (PROVENTIL/VENTOLIN HFA) 90 mcg/actuation inhaler Inhale 2 puffs into the lungs every 6 (six) hours as needed for Wheezing. 6.7 g 11    AREXVY, PF, 120 mcg/0.5 mL SusR vaccine       cranberry fruit extract (CRANBERRY EXTRACT ORAL) Take by mouth.      cycloSPORINE (RESTASIS) 0.05 % ophthalmic emulsion Place 1 drop into both eyes 2 (two) times daily. 60 each 12    diclofenac sodium (VOLTAREN) 1 % Gel Apply 2 g topically 2 (two) times daily as needed (musculoskeletal pain). 100 g 11    dicyclomine (BENTYL) 20 mg tablet Take 1 tablet (20 mg total) by mouth 4 (four) times daily before meals and nightly. 120 tablet 11    docusate sodium (COLACE) 100 MG capsule Take 1 capsule (100 mg total) by mouth 2 (two) times daily. 30 capsule 1    fluocinonide (LIDEX) 0.05 % external solution Apply topically once daily. 60 mL 11    HYDROcodone-acetaminophen (NORCO) 5-325 mg per tablet Take 1 tablet by mouth every 6 (six) hours as needed for Pain. 30 tablet 0    ketoconazole (NIZORAL) 2 % shampoo Apply topically twice a week. 120 mL 11    levothyroxine (SYNTHROID) 75 MCG tablet TAKE ONE TABLET BY MOUTH EVERY DAY ON AN EMPTY STOMACH 90 tablet 3    LIDOcaine-prilocaine (EMLA) cream Apply topically 2 (two)  times daily as needed. To hands. 30 g 2    losartan (COZAAR) 100 MG tablet TAKE ONE TABLET BY MOUTH EVERY DAY 90 tablet 3    MAGNESIUM ORAL Take 1 tablet by mouth once daily.      memantine (NAMENDA) 5 MG Tab TAKE ONE TABLET BY MOUTH TWICE A  tablet 0    Multi-Vitamin tablet Take 1 tablet by mouth once daily.       nitrofurantoin (MACRODANTIN) 50 MG capsule Take 1 capsule (50 mg total) by mouth every morning. 30 capsule 11    omeprazole (PRILOSEC) 40 MG capsule TAKE ONE CAPSULE BY MOUTH EVERY DAY 90 capsule 3    ondansetron (ZOFRAN) 8 MG tablet Take 1 tablet (8 mg total) by mouth every 8 (eight) hours as needed for Nausea. 30 tablet 0    oxybutynin (DITROPAN-XL) 10 MG 24 hr tablet Take 1 tablet (10 mg total) by mouth once daily. 90 tablet 3    PSYLLIUM SEED, WITH SUGAR, (METAMUCIL ORAL) Take by mouth as needed.       rosuvastatin (CRESTOR) 40 MG Tab TAKE ONE TABLET BY MOUTH EVERY DAY 90 tablet 3    terbinafine HCL (LAMISIL) 250 mg tablet Take 1 tablet (250 mg total) by mouth once daily. 90 tablet 0    traZODone (DESYREL) 100 MG tablet Take 1 tablet (100 mg total) by mouth nightly as needed for Insomnia. 90 tablet 3    traZODone (DESYREL) 50 MG tablet Take 100 mg by mouth nightly as needed.      triamcinolone acetonide 0.1% (KENALOG) 0.1 % cream AAA bid 60 g 3    [DISCONTINUED] celecoxib (CELEBREX) 200 MG capsule Take 1 capsule (200 mg total) by mouth once daily. 30 capsule 0    [DISCONTINUED] estradioL (ESTRACE) 0.01 % (0.1 mg/gram) vaginal cream Place 0.5-1 g vaginally twice a week. 42.5 g 3     No current facility-administered medications on file prior to visit.       Social History:   Social History     Socioeconomic History    Marital status:    Tobacco Use    Smoking status: Never     Passive exposure: Never    Smokeless tobacco: Never   Substance and Sexual Activity    Alcohol use: No     Comment: rare on a special occasion    Drug use: No    Sexual activity: Never   Social History Narrative     , 3 children, nonsmoker ,     Social Determinants of Health     Financial Resource Strain: Low Risk  (11/10/2023)    Overall Financial Resource Strain (CARDIA)     Difficulty of Paying Living Expenses: Not hard at all   Food Insecurity: No Food Insecurity (11/10/2023)    Hunger Vital Sign     Worried About Running Out of Food in the Last Year: Never true     Ran Out of Food in the Last Year: Never true   Transportation Needs: No Transportation Needs (11/10/2023)    PRAPARE - Transportation     Lack of Transportation (Medical): No     Lack of Transportation (Non-Medical): No   Physical Activity: Inactive (11/10/2023)    Exercise Vital Sign     Days of Exercise per Week: 0 days     Minutes of Exercise per Session: 0 min   Stress: Stress Concern Present (11/10/2023)    [place] [employer] [place] [employer] - Occupational Stress Questionnaire     Feeling of Stress : To some extent   Social Connections: Unknown (11/10/2023)    Social Connection and Isolation Panel [NHANES]     Frequency of Communication with Friends and Family: Once a week     Frequency of Social Gatherings with Friends and Family: Once a week     Active Member of Clubs or Organizations: Yes     Attends Club or Organization Meetings: More than 4 times per year     Marital Status:    Housing Stability: Low Risk  (11/10/2023)    Housing Stability Vital Sign     Unable to Pay for Housing in the Last Year: No     Number of Places Lived in the Last Year: 1     Unstable Housing in the Last Year: No       REVIEW OF SYSTEMS:  Constitution: Negative. Negative for chills, fever and night sweats.   Cardiovascular: Negative for chest pain and syncope.   Respiratory: Negative for cough and shortness of breath.   Gastrointestinal: See HPI. Negative for nausea/vomiting. Negative for abdominal pain.  Genitourinary: See HPI. Negative for discoloration or dysuria.  Skin: Negative for dry skin, itching and rash.   Hematologic/Lymphatic: Negative for  "bleeding problem. Does not bruise/bleed easily.   Musculoskeletal: Negative for falls and muscle weakness.   Neurological: See HPI. No seizures.   Endocrine: Negative for polydipsia, polyphagia and polyuria.   Allergic/Immunologic: Negative for hives and persistent infections.     EXAM:  Ht 5' 1" (1.549 m)   Wt 62 kg (136 lb 9.2 oz)   BMI 25.81 kg/m²     General: The patient is a 73 y.o. female in no apparent distress, the patient is oriented to person, place and time.  Psych: Normal mood and affect  HEENT: Vision grossly intact, hearing intact to the spoken word.  Lungs: Respirations unlabored.  Gait: Normal station and gait, no difficulty with toe or heel walk.   Skin: Dorsal lumbar skin negative for rashes, lesions, hairy patches and surgical scars. There is mild lumbar tenderness to palpation.  Range of motion: Lumbar range of motion is acceptable.  Spinal Balance: Global saggital and coronal spinal balance acceptable, not significant for scoliosis and kyphosis.  Musculoskeletal: No pain with the range of motion of the bilateral hips. No trochanteric tenderness to palpation.  Vascular: Bilateral lower extremities warm and well perfused, dorsalis pedis pulses 2+ bilaterally.  Neurological: Normal strength and tone in all major motor groups in the bilateral lower extremities. Normal sensation to light touch in the L2-S1 dermatomes bilaterally.  Deep tendon reflexes symmetric 2+ in the bilateral lower extremities.  Negative Babinski bilaterally. Straight leg raise negative bilaterally.    IMAGING:      Today I personally reviewed AP, Lat and Flex/Ex  upright L-spine films that demonstrate mild curve, moderate DDD throughout. Trace retrolisthesis of L4 on L5.       Body mass index is 25.81 kg/m².    Hemoglobin A1C   Date Value Ref Range Status   12/07/2023 5.0 4.0 - 5.6 % Final     Comment:     ADA Screening Guidelines:  5.7-6.4%  Consistent with prediabetes  >or=6.5%  Consistent with diabetes    High levels of " fetal hemoglobin interfere with the HbA1C  assay. Heterozygous hemoglobin variants (HbS, HgC, etc)do  not significantly interfere with this assay.   However, presence of multiple variants may affect accuracy.     11/14/2022 5.5 4.0 - 5.6 % Final     Comment:     ADA Screening Guidelines:  5.7-6.4%  Consistent with prediabetes  >or=6.5%  Consistent with diabetes    High levels of fetal hemoglobin interfere with the HbA1C  assay. Heterozygous hemoglobin variants (HbS, HgC, etc)do  not significantly interfere with this assay.   However, presence of multiple variants may affect accuracy.             ASSESSMENT/PLAN:    Jackelyn was seen today for low-back pain and hip pain.    Diagnoses and all orders for this visit:    Dorsalgia, unspecified  -     MRI Lumbar Spine Without Contrast; Future    DDD (degenerative disc disease), lumbar  -     MRI Lumbar Spine Without Contrast; Future  -     gabapentin (NEURONTIN) 100 MG capsule; Take 1-3 capsules (100-300 mg total) by mouth every evening.  -     tiZANidine (ZANAFLEX) 2 MG tablet; Take 1-2 tablets (2-4 mg total) by mouth every 6 (six) hours as needed (muscle tension).      Today we discussed at length all of the different treatment options including anti-inflammatories, acetaminophen, rest, ice, heat, physical therapy including strengthening and stretching exercises, home exercises, ROM, aerobic conditioning, aqua therapy, other modalities including ultrasound, massage, and dry needling, epidural steroid injections and finally surgical intervention.  MRI ordered, she will follow up in the clinic for results. She would like to hold off on PT for now.

## 2024-02-29 ENCOUNTER — HOSPITAL ENCOUNTER (OUTPATIENT)
Dept: RADIOLOGY | Facility: HOSPITAL | Age: 74
Discharge: HOME | End: 2024-02-29
Attending: FAMILY MEDICINE
Payer: MEDICARE

## 2024-02-29 ENCOUNTER — PATIENT MESSAGE (OUTPATIENT)
Dept: ORTHOPEDICS | Facility: CLINIC | Age: 74
End: 2024-02-29
Payer: MEDICARE

## 2024-02-29 VITALS — HEIGHT: 61 IN | WEIGHT: 133 LBS | BODY MASS INDEX: 25.11 KG/M2

## 2024-02-29 DIAGNOSIS — M79.642 LEFT HAND PAIN: Primary | ICD-10-CM

## 2024-02-29 DIAGNOSIS — M25.522 LEFT ELBOW PAIN: ICD-10-CM

## 2024-02-29 DIAGNOSIS — Z12.31 ENCOUNTER FOR SCREENING MAMMOGRAM FOR MALIGNANT NEOPLASM OF BREAST: ICD-10-CM

## 2024-02-29 PROCEDURE — 77067 SCR MAMMO BI INCL CAD: CPT | Mod: TC,PO

## 2024-02-29 PROCEDURE — 77063 BREAST TOMOSYNTHESIS BI: CPT | Mod: 26,,, | Performed by: RADIOLOGY

## 2024-02-29 PROCEDURE — 77067 SCR MAMMO BI INCL CAD: CPT | Mod: 26,,, | Performed by: RADIOLOGY

## 2024-03-01 ENCOUNTER — TELEPHONE (OUTPATIENT)
Dept: ORTHOPEDICS | Facility: CLINIC | Age: 74
End: 2024-03-01
Payer: MEDICARE

## 2024-03-01 ENCOUNTER — CLINICAL SUPPORT (OUTPATIENT)
Dept: REHABILITATION | Facility: HOSPITAL | Age: 74
End: 2024-03-01
Payer: MEDICARE

## 2024-03-01 DIAGNOSIS — M79.642 LEFT HAND PAIN: Primary | ICD-10-CM

## 2024-03-01 DIAGNOSIS — I10 ESSENTIAL HYPERTENSION: ICD-10-CM

## 2024-03-01 PROCEDURE — 97035 APP MDLTY 1+ULTRASOUND EA 15: CPT | Mod: PO

## 2024-03-01 PROCEDURE — 97110 THERAPEUTIC EXERCISES: CPT | Mod: PO

## 2024-03-01 PROCEDURE — 97018 PARAFFIN BATH THERAPY: CPT | Mod: PO

## 2024-03-01 RX ORDER — OMEPRAZOLE 40 MG/1
40 CAPSULE, DELAYED RELEASE ORAL
Qty: 90 CAPSULE | Refills: 3 | Status: SHIPPED | OUTPATIENT
Start: 2024-03-01

## 2024-03-01 RX ORDER — TRAZODONE HYDROCHLORIDE 50 MG/1
TABLET ORAL
Qty: 180 TABLET | Refills: 0 | OUTPATIENT
Start: 2024-03-01

## 2024-03-01 NOTE — TELEPHONE ENCOUNTER
Attempt to contact patient regarding clarification of visit. Left message stating that we will need to put x-ray orders in an hour before her appointment arrival. Also left number for patient to return call back to [phone]. Thanks.

## 2024-03-01 NOTE — TELEPHONE ENCOUNTER
Refill Routing Note   Medication(s) are not appropriate for processing by [facility] for the following reason(s):        Required vitals abnormal  Outside of protocol    [facility] action(s):  Defer  Route  Quick Discontinue  Approve        Medication Therapy Plan: Trazodone changed to 100mg nightly (12/7/23)      Appointments  past 12m or future 3m with PCP    Date Provider   Last Visit   12/7/2023 Fabian Luna MD   Next Visit   Visit date not found Fabian Luna MD   ED visits in past 90 days: 0        Note composed:11:04 AM 03/01/2024

## 2024-03-01 NOTE — TELEPHONE ENCOUNTER
No care due was identified.  [facility] Embedded Care Due Messages. Reference number: [id number].   3/01/2024 8:02:06 AM CST

## 2024-03-01 NOTE — PATIENT INSTRUCTIONS
ULNAR NERVE GLIDING     Perform 12 times 3 times daily in a pain-free range                      If  these exercises aggravate your hands, stop, rest and try returning again to the previous exercise performed without pain. Proceed at your own pace using pain tolerance       Desensitization Home Program  Perform these activities 5 times a day 4 minutes each:    Massage the hypersensitive areas using your thumb. Start with light pressure and gradually increase pressure going in circles.    Massage hypersensitive areas with a scar massager. If it hurts too much to use the massager directly on your skin, put a towel down first and then the massager on top of that. Eventually, try to use the massager without a towel.       Rub the hypersensitive areas with different textures of fabric and materials that you have around the house and at work.  *Cotton  *Wool   *Velvet  *Velcro  *Alvaro Material *Dish cloths  *Terrence Cloths *Scrub Brush *Corduroy  *Flannel  *Felt  (Gradually increase how fast you do this and how hard)    Fill an empty container with one or any of the following:      *Beans  *Macaroni  *Rice  *Marbles      *Sand   *Mariama  *Popcorn Kernels  Put your hand into the container of the texture and move it around.     Tap the area that is sensitive. You can use the end of a toothbrush or a pencil eraser to tap the area that hurts. Tap the sensitive areas on the table or other surfaces of other objects. You can start with several layers of towels and decrease the number of towels until you can stand to tap without padding. You can also tap objects like sponges and cloths.        Clinic  Information:     - If you have any concerns with your schedule, please contact [phone]. I do not have any capability to make or cancel your appointments.    - You are allowed 2 no show visits, then we remove you from the schedule to allow the needs for other patients to attend therapy.     - Children  are NOT allowed in  treatment areas, unless the child is the patient. Please find childcare prior to attending your therapy session.

## 2024-03-01 NOTE — PROGRESS NOTES
Occupational Therapy Daily Treatment Note     Date: 3/1/2024  Name: Jackelyn Kendrick  Clinic Number: [id number]    Therapy Diagnosis:   Encounter Diagnosis   Name Primary?    Left hand pain Yes       Physician: Suzy Dominguez MD    Medical Diagnosis:   G56.02 (ICD-10-CM) - Carpal tunnel syndrome of left wrist   M72.0 (ICD-10-CM) - Dupuytren's contracture of left hand   G56.22 (ICD-10-CM) - Ulnar nerve compression, left      Physician Orders: OT evaluation at 10d postop DOS: 1.11.24  #1 CTR   Pt is to be seen 10-11 days post-op. at [facility] for OT Eval.   Evaluate incision: If appropriate sutures to be removed at 10-11 days, 10-14 days if DMII contact MD or do not remove sutures if any drainage or concerns for wound healing. Teach HEP, educate patient on scar massage, and assess for continued therapy.  If patient not able to make at least 75% of a full fist, recommend continued FU appointments until regained ability to make full fist.  Pt. is to see physician at 2-4 weeks post op.     #2 Ulnar Nerve Decompression:  Pt. to be seen 10-14 days post op for the following at [facility]:  Pt. to attend for Eval, Suture removal, and HEP (able to begin gentle elbow AROM, educate patient on scar massage, and assess for continued therapy.   Pt. Discharged from OT when therapist feels is appropriate.  Pt to see physician at 2-4 weeks s/p. Await further orders.    Evaluation Date: 1/22/2024  Plan of Care Certification Date: 3/8/24  Authorization Period: 1/4/25  Surgery Date and Procedure: 1/11/24  Date of Return to MD: 1/24/24     FOTO: 1/3    Visit # / Visits authorized: 1 / 20  Time In:10:30 AM  Time Out: 11:15 AM  Total Billable Time: 45 minutes    Precautions:  Standard      Subjective     Pt reports: Pain is mainly in hands.   Response to previous treatment:Dangelo well    Pain: 2/10  Location: Carpal tunnel and MPs    Objective       Patient received paraffin bath to left hand(s) for 10 minutes to  increase blood flow, circulation, pain management and for tissue elasticity prior to therex.   - ADDED RF in extension at DP with finger wedge to stretch volar scar      Jackelyn ARGUETAGordy Kendrick received the following manual therapy techniques for 2 minutes in order to maximize tissue function and/or decrease pain:     myofascial cupping X 2 minutes for loosening soft tissue,  improve scar mobility, release  tissue restrictions, decrease muscle tension  and pain, improve blood flow and increase function of  tissues in volar hand scars  muscle        Patient received ultrasound  for pain control and decreased inflammation @ 100 % duty cycle, 3.3 Mhz, applied to volar palm, intensity = 0.6 w/cm2 for 8 minutes.     Jackelyn BGordy Kendrick participated in dynamic functional therapeutic activities to improve functional performance for 25  minutes, including:    -flex bar  rolling for izquierdo scar x 2'   Patient performed desensitization with popcorn x 3 minutes for nerve re-education and sensory discrimination.   -Chip clip to decrease tension at webspace 2'  - iso finger extension x 15 reps each   - ulnar nerve glides x 12 reps   - scar pads to volar hand   -yellow putty presses using tool # 2  for 2 minutes to increase fisting and  strength   - rolling yellow putty x 1 minute  - elastomer to volar scar for night orthosis to flatten scar tissue     Home Exercises and Education Provided     Education provided:   - Use of heat  - Progress towards goals     Written Home Exercises Provided: Patient instructed to cont prior HEP.  Exercises were reviewed and Jcakelyn Meilass was able to demonstrate them prior to the end of the session.  Jackelyn Kendrick demonstrated fair  understanding of the HEP provided. Pt unable to perform ulnar nerve glides without therapist demonstration.  .   See EMR under Patient Instructions for exercises provided prior visit.        Assessment     Pt would continue to benefit from skilled OT to  maximize left upper extremity functioning. Supplied ulnar nerve glides for HEP. Added elastomer for night orthosis for scar adherence . Supplied updated ulnar nerve glides and desensitizing HEP.Can make a full fist, just has pain with gripping due to scar      Jackelyn Kendrick is progressing towards her goals and there are no updates to goals at this time. Pt prognosis is Good.     Pt will continue to benefit from skilled outpatient occupational therapy to address the deficits listed in the problem list on initial evaluation provide pt/family education and to maximize pt's level of independence in the home and community environment.     Anticipated barriers to therapy: none      Pt's spiritual, cultural and educational needs considered and pt agreeable to plan of care and goals.    Goals:    LTG's (4-6 weeks):  1)   Increase ROM to WFL degrees in composite fist to increase functional hand use for manipulating lighter for gas stove.met 2/16/2024  2)   Increase  strength to 40 lbs. to grasp pot handles progressing not met 1/22/2024   3)   Increase all limited pinch to 8 psis for opening packages. progressing not met 1/22/2024   4)   Decrease complaints of pain to   0  out of 10 at worst to increase functional hand use for ADL/work/leisure activities. progressing not met 1/22/2024  5)   Patient to score at 63% or more on FOTO to demonstrate improved perception of functional LUE use. progressing not met 1/22/2024   6)   Pt will return to near to prior level of function for ADLs and household management reporting I or Mod I with ADLs (dressing, feeding, grooming, toileting). progressing not met 1/22/2024    Plan     Pt to be treated by Occupational Therapy 2 times per week for 6 weeks during the certification period from 1/22/2024 to 3/8/24 to achieve the established goals.     Treatment to include: Paraffin, Fluidotherapy, Manual therapy/joint mobilizations, Modalities for pain management, US 3 mhz, Therapeutic  exercises/activities., Strengthening, Orthotic Fabrication/Fit/Training, Scar Management, Wound Care, and Joint Protection, as well as any other treatments deemed necessary based on the patient's needs or progress.     Courtney Kramer, OT

## 2024-03-04 ENCOUNTER — HOSPITAL ENCOUNTER (OUTPATIENT)
Dept: RADIOLOGY | Facility: HOSPITAL | Age: 74
Discharge: HOME | End: 2024-03-04
Attending: ORTHOPAEDIC SURGERY
Payer: MEDICARE

## 2024-03-04 ENCOUNTER — OFFICE VISIT (OUTPATIENT)
Dept: ORTHOPEDICS | Facility: CLINIC | Age: 74
End: 2024-03-04
Payer: MEDICARE

## 2024-03-04 ENCOUNTER — TELEPHONE (OUTPATIENT)
Dept: ORTHOPEDICS | Facility: CLINIC | Age: 74
End: 2024-03-04
Payer: MEDICARE

## 2024-03-04 DIAGNOSIS — M51.36 DDD (DEGENERATIVE DISC DISEASE), LUMBAR: Primary | ICD-10-CM

## 2024-03-04 DIAGNOSIS — G56.22 CUBITAL TUNNEL SYNDROME ON LEFT: ICD-10-CM

## 2024-03-04 DIAGNOSIS — M79.642 LEFT HAND PAIN: ICD-10-CM

## 2024-03-04 DIAGNOSIS — M72.0 DUPUYTREN'S CONTRACTURE OF LEFT HAND: ICD-10-CM

## 2024-03-04 DIAGNOSIS — Z98.890 POST-OPERATIVE STATE: Primary | ICD-10-CM

## 2024-03-04 DIAGNOSIS — M25.522 LEFT ELBOW PAIN: ICD-10-CM

## 2024-03-04 DIAGNOSIS — G56.02 CARPAL TUNNEL SYNDROME OF LEFT WRIST: ICD-10-CM

## 2024-03-04 PROCEDURE — 99213 OFFICE O/P EST LOW 20 MIN: CPT | Mod: PBBFAC,25,PO | Performed by: ORTHOPAEDIC SURGERY

## 2024-03-04 PROCEDURE — 73130 X-RAY EXAM OF HAND: CPT | Mod: TC,PO,LT

## 2024-03-04 PROCEDURE — 73070 X-RAY EXAM OF ELBOW: CPT | Mod: 26,LT,, | Performed by: RADIOLOGY

## 2024-03-04 PROCEDURE — 99024 POSTOP FOLLOW-UP VISIT: CPT | Mod: POP,,, | Performed by: ORTHOPAEDIC SURGERY

## 2024-03-04 PROCEDURE — 99999 PR PBB SHADOW E&M-EST. PATIENT-LVL III: CPT | Mod: PBBFAC,,, | Performed by: ORTHOPAEDIC SURGERY

## 2024-03-04 PROCEDURE — 73130 X-RAY EXAM OF HAND: CPT | Mod: 26,LT,, | Performed by: RADIOLOGY

## 2024-03-04 PROCEDURE — 73070 X-RAY EXAM OF ELBOW: CPT | Mod: TC,PO,LT

## 2024-03-04 RX ORDER — MEMANTINE HYDROCHLORIDE 5 MG/1
5 TABLET ORAL 2 TIMES DAILY
Qty: 180 TABLET | Refills: 0 | Status: SHIPPED | OUTPATIENT
Start: 2024-03-04 | End: 2024-06-03

## 2024-03-04 RX ORDER — LOSARTAN POTASSIUM 100 MG/1
100 TABLET ORAL
Qty: 90 TABLET | Refills: 3 | Status: SHIPPED | OUTPATIENT
Start: 2024-03-04

## 2024-03-04 NOTE — TELEPHONE ENCOUNTER
Attempt to contact patient regarding clarification on what area she's having pain with so that we put in the correct x-ray. Also left number for patient to return call back to [phone]. Thanks.

## 2024-03-04 NOTE — PROGRESS NOTES
Jackelyn Kendrick presents for post-operative evaluation of   Encounter Diagnoses   Name Primary?    Post-operative state Yes    Dupuytren's contracture of left hand     Cubital tunnel syndrome on left     Carpal tunnel syndrome of left wrist      The patient is now 7w 4d d s/p 1.11.24 Left hand partial fasciectomy for Dupuytren's, Left elbow ulnar nerve decompression, Left carpal tunnel release / Right 1st dorsal extensor compartment steroid injection.  She reports no numbness and tingling.  Just some soreness in the palm over the incisions.      Vitals:    03/04/24 1114   PainSc:   3   PainLoc: Hand         PE:    AA&O x 4.  NAD  HEENT:  NCAT, sclera nonicteric  Lungs:  Respirations are equal and unlabored.  CV:  2+ bilateral upper and lower extremity pulses.  MSK: The incision is well healed.  Near full wrist and finger motion.  Neurovascularly intact and has 5/5 thenar and intrinsic musculature strength.        A/P: Status post above, doing well  1) Continue with range of motion, continue strengthening, no restrictions to full use of the left arm  2) F/U p.r.n.  3) Call with any questions/concerns in the interim        Suzy Dominguez MD    Please be aware that this note has been generated with the assistance of [employer] voice-to-text.  Please excuse any spelling or grammatical errors.

## 2024-03-04 NOTE — TELEPHONE ENCOUNTER
Spoke to patient regarding x-ray. Patient is aware of x-ray scheduled for 9:00 am at the [facility] [place]. Patient stated thank you. Thanks.

## 2024-03-05 ENCOUNTER — HOSPITAL ENCOUNTER (OUTPATIENT)
Dept: RADIOLOGY | Facility: HOSPITAL | Age: 74
Discharge: HOME | End: 2024-03-05
Attending: REGISTERED NURSE
Payer: MEDICARE

## 2024-03-05 ENCOUNTER — PATIENT MESSAGE (OUTPATIENT)
Dept: ORTHOPEDICS | Facility: CLINIC | Age: 74
End: 2024-03-05

## 2024-03-05 ENCOUNTER — OFFICE VISIT (OUTPATIENT)
Dept: ORTHOPEDICS | Facility: CLINIC | Age: 74
End: 2024-03-05
Payer: MEDICARE

## 2024-03-05 VITALS — HEIGHT: 61 IN | BODY MASS INDEX: 25.78 KG/M2 | WEIGHT: 136.56 LBS

## 2024-03-05 DIAGNOSIS — M54.9 DORSALGIA, UNSPECIFIED: Primary | ICD-10-CM

## 2024-03-05 DIAGNOSIS — M51.36 DDD (DEGENERATIVE DISC DISEASE), LUMBAR: ICD-10-CM

## 2024-03-05 PROCEDURE — 99999 PR PBB SHADOW E&M-EST. PATIENT-LVL IV: CPT | Mod: PBBFAC,,, | Performed by: REGISTERED NURSE

## 2024-03-05 PROCEDURE — 72110 X-RAY EXAM L-2 SPINE 4/>VWS: CPT | Mod: 26,,, | Performed by: RADIOLOGY

## 2024-03-05 PROCEDURE — 99214 OFFICE O/P EST MOD 30 MIN: CPT | Mod: PBBFAC,25 | Performed by: REGISTERED NURSE

## 2024-03-05 PROCEDURE — 99214 OFFICE O/P EST MOD 30 MIN: CPT | Mod: S$PBB,,, | Performed by: REGISTERED NURSE

## 2024-03-05 PROCEDURE — 72110 X-RAY EXAM L-2 SPINE 4/>VWS: CPT | Mod: TC

## 2024-03-05 RX ORDER — GABAPENTIN 100 MG/1
100-300 CAPSULE ORAL NIGHTLY
Qty: 90 CAPSULE | Refills: 11 | Status: SHIPPED | OUTPATIENT
Start: 2024-03-05 | End: 2024-04-15

## 2024-03-05 RX ORDER — TIZANIDINE 2 MG/1
2-4 TABLET ORAL EVERY 6 HOURS PRN
Qty: 60 TABLET | Refills: 2 | Status: SHIPPED | OUTPATIENT
Start: 2024-03-05 | End: 2024-03-28

## 2024-03-05 RX ORDER — TRAZODONE HYDROCHLORIDE 50 MG/1
100 TABLET ORAL NIGHTLY PRN
COMMUNITY
Start: 2024-02-01

## 2024-03-07 NOTE — PROGRESS NOTES
"DATE: 3/28/2024  PATIENT: Jackelyn Kendrick    Attending Physician: Tutu Blanca M.D.    HISTORY:  Jackelyn Kendrick is a 73 y.o. female who returns to me today for MRI results.  She was last seen by me 3/5/2024.  Today she is doing well but notes low back and right posterior buttock/leg pain (Back - 5, Leg - 4).     The Patient denies myelopathic symptoms such as handwriting changes or difficulty with buttons/coins/keys. Denies perineal paresthesias, bowel/bladder dysfunction.    PMH/PSH/FamHx/SocHx:  Unchanged from prior visit    ROS:  REVIEW OF SYSTEMS:  Constitution: Negative. Negative for chills, fever and night sweats.   HENT: Negative for congestion and headaches.    Eyes: Negative for blurred vision, left vision loss and right vision loss.   Cardiovascular: Negative for chest pain and syncope.   Respiratory: Negative for cough and shortness of breath.    Endocrine: Negative for polydipsia, polyphagia and polyuria.   Hematologic/Lymphatic: Negative for bleeding problem. Does not bruise/bleed easily.   Skin: Negative for dry skin, itching and rash.   Musculoskeletal: Negative for falls and muscle weakness.   Gastrointestinal: Negative for abdominal pain and bowel incontinence.   Allergic/Immunologic: Negative for hives and persistent infections.  Genitourinary: Negative for urinary retention/incontinence and nocturia.   Neurological: negative for disturbances in coordination, no myelopathic symptoms such as handwriting changes or difficulty with buttons, coins, keys or small objects. No loss of balance and seizures.   Psychiatric/Behavioral: Negative for depression. The patient does not have insomnia.   Denies perineal paresthesias, bowel or bladder incontinence    EXAM:  Ht 5' 1" (1.549 m)   Wt 60.3 kg (133 lb)   BMI 25.13 kg/m²   Stable.     IMAGING:    Today I personally re- reviewed AP, Lat and Flex/Ex  upright L-spine that demonstrate mild curve, moderate DDD throughout. Trace retrolisthesis of " L4 on L5.      Lumbar MRI shows multilevel foraminal narrowing from L2-L5. Worse at L4/5.     Body mass index is 25.13 kg/m².    Hemoglobin A1C   Date Value Ref Range Status   12/07/2023 5.0 4.0 - 5.6 % Final     Comment:     ADA Screening Guidelines:  5.7-6.4%  Consistent with prediabetes  >or=6.5%  Consistent with diabetes    High levels of fetal hemoglobin interfere with the HbA1C  assay. Heterozygous hemoglobin variants (HbS, HgC, etc)do  not significantly interfere with this assay.   However, presence of multiple variants may affect accuracy.     11/14/2022 5.5 4.0 - 5.6 % Final     Comment:     ADA Screening Guidelines:  5.7-6.4%  Consistent with prediabetes  >or=6.5%  Consistent with diabetes    High levels of fetal hemoglobin interfere with the HbA1C  assay. Heterozygous hemoglobin variants (HbS, HgC, etc)do  not significantly interfere with this assay.   However, presence of multiple variants may affect accuracy.           ASSESSMENT/PLAN:    Jackelyn was seen today for low-back pain.    Diagnoses and all orders for this visit:    DDD (degenerative disc disease), lumbar      She will be scheduled in the clinic with pain mgmt for MBBs. No surgical intervention warranted at this time.

## 2024-03-08 ENCOUNTER — CLINICAL SUPPORT (OUTPATIENT)
Dept: REHABILITATION | Facility: HOSPITAL | Age: 74
End: 2024-03-08
Payer: MEDICARE

## 2024-03-08 DIAGNOSIS — M79.642 LEFT HAND PAIN: Primary | ICD-10-CM

## 2024-03-08 PROCEDURE — 97022 WHIRLPOOL THERAPY: CPT | Mod: PO

## 2024-03-08 PROCEDURE — 97530 THERAPEUTIC ACTIVITIES: CPT | Mod: PO

## 2024-03-08 PROCEDURE — 97035 APP MDLTY 1+ULTRASOUND EA 15: CPT | Mod: PO

## 2024-03-08 NOTE — PROGRESS NOTES
Occupational Therapy Daily Treatment Note     Date: 3/8/2024  Name: Jackelyn Kendrick  Clinic Number: [id number]    Therapy Diagnosis:   Encounter Diagnosis   Name Primary?    Left hand pain Yes         Physician: Suzy Dominguez MD    Medical Diagnosis:   G56.02 (ICD-10-CM) - Carpal tunnel syndrome of left wrist   M72.0 (ICD-10-CM) - Dupuytren's contracture of left hand   G56.22 (ICD-10-CM) - Ulnar nerve compression, left      Physician Orders: OT evaluation at 10d postop DOS: 1.11.24  #1 CTR   Pt is to be seen 10-11 days post-op. at [facility] for OT Eval.   Evaluate incision: If appropriate sutures to be removed at 10-11 days, 10-14 days if DMII contact MD or do not remove sutures if any drainage or concerns for wound healing. Teach HEP, educate patient on scar massage, and assess for continued therapy.  If patient not able to make at least 75% of a full fist, recommend continued FU appointments until regained ability to make full fist.  Pt. is to see physician at 2-4 weeks post op.     #2 Ulnar Nerve Decompression:  Pt. to be seen 10-14 days post op for the following at [facility]:  Pt. to attend for Eval, Suture removal, and HEP (able to begin gentle elbow AROM, educate patient on scar massage, and assess for continued therapy.   Pt. Discharged from OT when therapist feels is appropriate.  Pt to see physician at 2-4 weeks s/p. Await further orders.    Evaluation Date: 1/22/2024  Plan of Care Certification Date: 3/8/24  Authorization Period: 1/4/25  Surgery Date and Procedure: 1/11/24  Date of Return to MD: 1/24/24     FOTO: 1/3    Visit # / Visits authorized: 5 / 20  Time In:10:30 AM  Time Out: 11:15 AM  Total Billable Time: 45 minutes    Precautions:  Standard      Subjective     Pt reports: Pain is mainly in hands.   Response to previous treatment:Dangelo well    Pain: 2/10  Location: Carpal tunnel and MPs    Objective       Patient received paraffin bath to left hand(s) for 10 minutes to  increase blood flow, circulation, pain management and for tissue elasticity prior to therex.   - ADDED RF in extension at DP with finger wedge to stretch volar scar      Jackelyn Kendrick received the following manual therapy techniques for 2 minutes in order to maximize tissue function and/or decrease pain:     myofascial cupping X 2 minutes for loosening soft tissue,  improve scar mobility, release  tissue restrictions, decrease muscle tension  and pain, improve blood flow and increase function of  tissues in volar hand scars  muscle        Patient received ultrasound  for pain control and decreased inflammation @ 100 % duty cycle, 3.3 Mhz, applied to volar palm, intensity = 0.6 w/cm2 for 8 minutes.     Jackelyn Kendrick participated in dynamic functional therapeutic activities to improve functional performance for 25  minutes, including:    - ring finger izquierdo and dorsal interosseous x 1 minutes   Patient performed desensitization with popcorn x 3 minutes for nerve re-education and sensory discrimination.   -Chip clip to decrease tension at webspace 2'  - iso finger extension x 15 reps each   - ulnar nerve glides x 12 reps   -yellow putty presses using tool # 2  for 2 minutes to increase fisting and  strength   - rolling yellow putty x 1 minute  Patient performed desensitization with rice x 2 minutes for nerve re-education and sensory discrimination.       Home Exercises and Education Provided     Education provided:   - Use of heat  - Progress towards goals     Written Home Exercises Provided: Patient instructed to cont prior HEP.  Exercises were reviewed and Jackelyn Kendrick was able to demonstrate them prior to the end of the session.  Jackelyn Kendrick demonstrated fair  understanding of the HEP provided. Pt unable to perform ulnar nerve glides without therapist demonstration.  .   See EMR under Patient Instructions for exercises provided prior visit.        Assessment     Pt would continue to  benefit from skilled OT to maximize left upper extremity functioning. Supplied desensitization  for hand. Use putty for strength now. Using elastomer NIGHTLY.       Jackelyn Kendrick is progressing towards her goals and there are no updates to goals at this time. Pt prognosis is Good.     Pt will continue to benefit from skilled outpatient occupational therapy to address the deficits listed in the problem list on initial evaluation provide pt/family education and to maximize pt's level of independence in the home and community environment.     Anticipated barriers to therapy: none      Pt's spiritual, cultural and educational needs considered and pt agreeable to plan of care and goals.    Goals:    LTG's (4-6 weeks):  1)   Increase ROM to WFL degrees in composite fist to increase functional hand use for manipulating lighter for gas stove.met 2/16/2024  2)   Increase  strength to 40 lbs. to grasp pot handles progressing not met 1/22/2024   3)   Increase all limited pinch to 8 psis for opening packages. progressing not met 1/22/2024   4)   Decrease complaints of pain to   0  out of 10 at worst to increase functional hand use for ADL/work/leisure activities. progressing not met 1/22/2024  5)   Patient to score at 63% or more on FOTO to demonstrate improved perception of functional LUE use. progressing not met 1/22/2024   6)   Pt will return to near to prior level of function for ADLs and household management reporting I or Mod I with ADLs (dressing, feeding, grooming, toileting). progressing not met 1/22/2024    Plan     Pt to be treated by Occupational Therapy 2 times per week for 6 weeks during the certification period from 1/22/2024 to 3/8/24 to achieve the established goals.     Treatment to include: Paraffin, Fluidotherapy, Manual therapy/joint mobilizations, Modalities for pain management, US 3 mhz, Therapeutic exercises/activities., Strengthening, Orthotic Fabrication/Fit/Training, Scar Management, Wound  Care, and Joint Protection, as well as any other treatments deemed necessary based on the patient's needs or progress.     Courtney Kramer, OT

## 2024-03-08 NOTE — PATIENT INSTRUCTIONS
Desensitization Home Program  Perform these activities 5 times a day 4 minutes each:    Massage the hypersensitive areas using your thumb. Start with light pressure and gradually increase pressure going in circles.    Massage hypersensitive areas with a scar massager. If it hurts too much to use the massager directly on your skin, put a towel down first and then the massager on top of that. Eventually, try to use the massager without a towel.       Rub the hypersensitive areas with different textures of fabric and materials that you have around the house and at work.  *Cotton  *Wool   *Velvet  *Velcro  *Alvaro Material *Dish cloths  *Terrence Cloths *Scrub Brush *Corduroy  *Flannel  *Felt  (Gradually increase how fast you do this and how hard)    Fill an empty container with one or any of the following:      *Beans  *Macaroni  *Rice  *Marbles      *Sand   *Mariama  *Popcorn Kernels  Put your hand into the container of the texture and move it around.     Tap the area that is sensitive. You can use the end of a toothbrush or a pencil eraser to tap the area that hurts. Tap the sensitive areas on the table or other surfaces of other objects. You can start with several layers of towels and decrease the number of towels until you can stand to tap without padding. You can also tap objects like sponges and cloths.

## 2024-03-15 ENCOUNTER — CLINICAL SUPPORT (OUTPATIENT)
Dept: REHABILITATION | Facility: HOSPITAL | Age: 74
End: 2024-03-15
Payer: MEDICARE

## 2024-03-15 DIAGNOSIS — M79.642 LEFT HAND PAIN: Primary | ICD-10-CM

## 2024-03-15 PROCEDURE — 97140 MANUAL THERAPY 1/> REGIONS: CPT | Mod: PO

## 2024-03-15 PROCEDURE — 97530 THERAPEUTIC ACTIVITIES: CPT | Mod: PO

## 2024-03-15 NOTE — PROGRESS NOTES
Occupational Therapy Daily Discharge Note     Date: 3/15/2024  Name: Jackelyn Kendrick  Clinic Number: [id number]    Therapy Diagnosis:   Encounter Diagnosis   Name Primary?    Left hand pain Yes         Physician: Suzy Dominguez MD    Medical Diagnosis:   G56.02 (ICD-10-CM) - Carpal tunnel syndrome of left wrist   M72.0 (ICD-10-CM) - Dupuytren's contracture of left hand   G56.22 (ICD-10-CM) - Ulnar nerve compression, left      Physician Orders: OT evaluation at 10d postop DOS: 1.11.24      Evaluation Date: 1/22/2024  Plan of Care Certification Date: 3/8/24  Authorization Period: 12/31/2024  Surgery Date and Procedure: 1/11/24  Date of Return to MD:  no more appts     FOTO: 2/3 (65%)    Visit # / Visits authorized: 6 / 20  Time In:10:30 AM  Time Out: 11:30 AM  Total Billable Time: 60 minutes    Precautions:  Standard      Subjective     Pt reports: no pain today, just with touching scar firmly  Response to previous treatment:Dangelo well    Pain: 2/10  Location: Carpal tunnel and MPs    Objective       Patient received paraffin bath to left hand(s) for 10 minutes to increase blood flow, circulation, pain management and for tissue elasticity prior to therex.   - ADDED RF in extension at DP with finger wedge to stretch volar scar      Jackelyn Kendrick received the following manual therapy techniques for 2 minutes in order to maximize tissue function and/or decrease pain:     myofascial cupping X 2 minutes for loosening soft tissue,  improve scar mobility, release  tissue restrictions, decrease muscle tension  and pain, improve blood flow and increase function of  tissues in volar hand scars  muscle          Jackelyn Kendrick participated in dynamic functional therapeutic activities to improve functional performance for 50  minutes, including:    - ring finger izquierdo and dorsal interosseous x 1 minutes   Patient performed desensitization with popcorn x 3 minutes for nerve re-education and sensory discrimination.    -Chip clip to decrease tension at webspace 2'  - iso finger extension x 15 reps each   - flex ball for scar massage x 2 minutes  -yellow putty presses using tool # 2  for 2 minutes to increase fisting and  strength   - adjusted orthosis        Range of Motion:   Left     Elbow and Wrist ROM. Measured in degrees.    1/22/2024 2/16/24       Left  Left     Supination Full  Full ROM     Pronation Full       Wrist Ext 60       Wrist Flex 74       Wrist RD 20       Wrist UD 26          Hand ROM. Measured in degrees.    1/22/2024  2/16/24 3/15/25      Left  Left               Index: MP  0/83  Full ROM full               PIP     0/89                  DIP 0/45                 Long:  MP 0/90    full              PIP 0/89                  DIP 0/54                 Ring:   MP 0/91   full               PIP 0/91                  DIP 0/41                 Small:  MP 0/79                   PIP 0/74                   DIP 0/60                 Thumb:                 Opposition SF MP   full    Note: Pt close to forming composite fist.       Strength (Dynamometer) and Pinch Strength (Pinch Gauge)  Measured in pounds.    2/16/24 3/15/24        Left left     Rung II 30.0   31     Khan Pinch 8   12     3pt Pinch  6.5  9           Home Exercises and Education Provided     Education provided:   - Use of heat  - Progress towards goals     Written Home Exercises Provided: Patient instructed to cont prior HEP.  Exercises were reviewed and Jackelyn Kendrick was able to demonstrate them prior to the end of the session.  Jackelyn Kendrick demonstrated fair  understanding of the HEP provided. Pt unable to perform ulnar nerve glides without therapist demonstration.  .   See EMR under Patient Instructions for exercises provided prior visit.        Assessment     Pt would not continue to benefit from skilled OT to maximize left upper extremity functioning. Not using elastomer , but will start. Supplied small sleeve for elastomer and  adjusted orthosis for improved fit.       Jackelyn Kendrick is progressing towards her goals and there are no updates to goals at this time. Pt prognosis is Good.       Anticipated barriers to therapy: none      Pt's spiritual, cultural and educational needs considered and pt agreeable to plan of care and goals.    Goals:    LTG's (4-6 weeks):  1)   Increase ROM to WFL degrees in composite fist to increase functional hand use for manipulating lighter for gas stove.met 2/16/2024  2)   Increase  strength to 40 lbs. to grasp pot handles not met   3)   Increase all limited pinch to 8 psis for opening packages. met  4)   Decrease complaints of pain to   0  out of 10 at worst to increase functional hand use for ADL/work/leisure activities. met  5)   Patient to score at 63% or more on FOTO to demonstrate improved perception of functional LUE use. met  6)   Pt will return to near to prior level of function for ADLs and household management reporting I or Mod I with ADLs (dressing, feeding, grooming, toileting). met    Plan     Discharge with HEP, use orthosis nightly x 3 12 weeks from date of surgery with elastomer for scar tissue .     Courtney Kramer, OT

## 2024-03-26 ENCOUNTER — HOSPITAL ENCOUNTER (OUTPATIENT)
Dept: RADIOLOGY | Facility: HOSPITAL | Age: 74
Discharge: HOME | End: 2024-03-26
Attending: REGISTERED NURSE
Payer: MEDICARE

## 2024-03-26 DIAGNOSIS — M51.36 DDD (DEGENERATIVE DISC DISEASE), LUMBAR: ICD-10-CM

## 2024-03-26 DIAGNOSIS — M54.9 DORSALGIA, UNSPECIFIED: ICD-10-CM

## 2024-03-26 DIAGNOSIS — Z00.00 ENCOUNTER FOR MEDICARE ANNUAL WELLNESS EXAM: ICD-10-CM

## 2024-03-26 PROCEDURE — 72148 MRI LUMBAR SPINE W/O DYE: CPT | Mod: TC

## 2024-03-26 PROCEDURE — 72148 MRI LUMBAR SPINE W/O DYE: CPT | Mod: 26,,, | Performed by: RADIOLOGY

## 2024-03-27 ENCOUNTER — OFFICE VISIT (OUTPATIENT)
Dept: PODIATRY | Facility: CLINIC | Age: 74
End: 2024-03-27
Payer: MEDICARE

## 2024-03-27 VITALS
HEART RATE: 77 BPM | BODY MASS INDEX: 25.39 KG/M2 | HEIGHT: 61 IN | DIASTOLIC BLOOD PRESSURE: 71 MMHG | WEIGHT: 134.5 LBS | TEMPERATURE: 98 F | RESPIRATION RATE: 18 BRPM | SYSTOLIC BLOOD PRESSURE: 161 MMHG

## 2024-03-27 DIAGNOSIS — B35.1 TINEA UNGUIUM: Primary | ICD-10-CM

## 2024-03-27 PROCEDURE — 99213 OFFICE O/P EST LOW 20 MIN: CPT | Mod: PBBFAC | Performed by: STUDENT IN AN ORGANIZED HEALTH CARE EDUCATION/TRAINING PROGRAM

## 2024-03-27 PROCEDURE — 99999 PR PBB SHADOW E&M-EST. PATIENT-LVL III: CPT | Mod: PBBFAC,,, | Performed by: STUDENT IN AN ORGANIZED HEALTH CARE EDUCATION/TRAINING PROGRAM

## 2024-03-27 PROCEDURE — 99213 OFFICE O/P EST LOW 20 MIN: CPT | Mod: S$PBB,,, | Performed by: STUDENT IN AN ORGANIZED HEALTH CARE EDUCATION/TRAINING PROGRAM

## 2024-03-27 NOTE — PROGRESS NOTES
Subjective:     Patient    Jackelyn Kendrick is a 73 y.o. female.    Problem    01/17/24: Referred for thick discolored toenails which she says was partially improved by prescription topical antifungals, but not completely cleared. She has had these nail changes for over 1 year. Does not cause pain. Usually cuts the nails herself.      03/27/24: Returns for nail check, has been on 2.5 months terbinafine. Has noticed some improvement in the nails.     History    History obtained from patient and review of medical records.     Past Medical History:   Diagnosis Date    Arthritis     Basal cell carcinoma     Bronchitis     seasonal    Cataract     Diverticulitis     Dry eye syndrome     GERD (gastroesophageal reflux disease)     Hyperlipidemia     Hypertension     Hypothyroidism 07/19/2012    Obese     PONV (postoperative nausea and vomiting)     Thyroid disease        Past Surgical History:   Procedure Laterality Date    ANORECTAL MANOMETRY N/A 06/01/2023    Procedure: MANOMETRY, ANORECTAL;  Surgeon: Paulo Pritchett MD;  Location: [facility] (4TH FLR);  Service: Endoscopy;  Laterality: N/A;  instructions sent to [facility]  5/26 pre-call no answer; MB    ARTHROSCOPIC REPAIR OF ROTATOR CUFF OF SHOULDER Right 09/23/2020    Procedure: REPAIR, ROTATOR CUFF, ARTHROSCOPIC;  Surgeon: Reginald Pickens MD;  Location: [facility] OR;  Service: Orthopedics;  Laterality: Right;  regional w/catheter (interscalene)    BACK SURGERY      CARPAL TUNNEL RELEASE Left 01/11/2024    Procedure: RELEASE, CARPAL TUNNEL;  Surgeon: Suzy Dominguez MD;  Location: [facility] OR;  Service: Orthopedics;  Laterality: Left;    CATARACT EXTRACTION W/  INTRAOCULAR LENS IMPLANT Left 10/20/2021        CHOLECYSTECTOMY      COLONOSCOPY  2007    diverticulosis    COLONOSCOPY N/A 09/03/2020    Procedure: COLONOSCOPY;  Surgeon: Alberta Henriquez MD;  Location: [facility] ENDO (4TH FLR);  Service: Colon and Rectal;  Laterality: N/A;  pt requested  this time-8/31-[facility] [place]    CYSTOSCOPY      DE QUERVAIN'S RELEASE Left 03/2017    DECOMPRESSION OF NERVE Left 01/11/2024    Procedure: DECOMPRESSION, NERVE ULNAR;  Surgeon: Suzy Dominguez MD;  Location: [facility] OR;  Service: Orthopedics;  Laterality: Left;    DUPUYTREN CONTRACTURE RELEASE Left 01/11/2024    Procedure: RELEASE, DUPUYTREN CONTRACTURE, PALM;  Surgeon: Suzy Dominguez MD;  Location: [facility] OR;  Service: Orthopedics;  Laterality: Left;    FIXATION OF TENDON Right 09/23/2020    Procedure: FIXATION, TENDON;  Surgeon: Reginald Pickens MD;  Location: [facility] OR;  Service: Orthopedics;  Laterality: Right;    HERNIA REPAIR      HYSTERECTOMY      INJECTION OF STEROID Right 12/02/2021    Procedure: INJECTION, STEROID;  Surgeon: Suzy Dominguez MD;  Location: [facility] OR;  Service: Orthopedics;  Laterality: Right;    INJECTION OF STEROID Right 01/11/2024    Procedure: INJECTION, STEROID 1ST DORSAL COMPARTMENT;  Surgeon: Suzy Dominguez MD;  Location: [facility] OR;  Service: Orthopedics;  Laterality: Right;    INTRAOCULAR PROSTHESES INSERTION Left 10/20/2021    Procedure: INSERTION, IOL PROSTHESIS;  Surgeon: Pippa Ashby MD;  Location: [facility] OR 1ST FLR;  Service: Ophthalmology;  Laterality: Left;    INTRAOCULAR PROSTHESES INSERTION Right 12/29/2021    Procedure: INSERTION, IOL PROSTHESIS;  Surgeon: Pippa Ashby MD;  Location: [facility] OR 1ST FLR;  Service: Ophthalmology;  Laterality: Right;    NASAL SEPTUM SURGERY      PHACOEMULSIFICATION OF CATARACT Left 10/20/2021    Procedure: PHACOEMULSIFICATION, CATARACT/ COMPLEX- PHACO / IOL - OS SMALL PUPIL-OLE RING AND TRYPAN BLUE;  Surgeon: Pippa Ashby MD;  Location: [facility] OR 1ST FLR;  Service: Ophthalmology;  Laterality: Left;    PHACOEMULSIFICATION OF CATARACT Right 12/29/2021    Procedure: PHACOEMULSIFICATION, CATARACT;  Surgeon: Pippa Ashby MD;  Location: [facility] OR 1ST FLR;  Service: Ophthalmology;  Laterality: Right;    SHOULDER SURGERY       TONSILLECTOMY      TRIGGER FINGER RELEASE Left 12/02/2021    Procedure: RELEASE, TRIGGER FINGER;  Surgeon: Suzy Dominguez MD;  Location: [facility];  Service: Orthopedics;  Laterality: Left;        Objective:     Vitals  Wt Readings from Last 1 Encounters:   03/27/24 61 kg (134 lb 7.7 oz)     Temp Readings from Last 1 Encounters:   03/27/24 98.4 °F (36.9 °C)     BP Readings from Last 1 Encounters:   03/27/24 (!) 161/71     Pulse Readings from Last 1 Encounters:   03/27/24 77       Dermatological Exam    Skin:  Pedal hair growth, skin color, and skin texture normal on right  Pedal hair growth, skin color, and skin texture normal on left    Nails:  Bilateral 1st nail(s) thickened and bilateral 1st nail(s) discolored; improving      Vascular Exam    Arteries:  Posterior tibial artery palpable on right  Dorsalis pedis artery palpable on right  Posterior tibial artery palpable on left  Dorsalis pedis artery palpable on left    Veins:  Superficial veins unremarkable on right  Superficial veins unremarkable on left    Swelling:  None on right  None on left    Neurological Exam    Waldo touch test:  6/6 sites sensed, light touch intact     Musculoskeletal Exam    Footwear:  Casual on right  Casual on left    Gait Exam:   Ambulatory Status: Ambulatory  Gait: Normal  Assistive Devices: None    Foot Progression Angle:  Normal on right  Normal on left    Right Lower Extremity Additional Findings:  Right foot and ankle function, strength, and range of motion unremarkable except as noted above.     Left Lower Extremity Additional Findings:  Left foot and ankle function, strength, and range of motion unremarkable except as noted above.    Imaging and Other Tests    Imaging:  Independently reviewed and interpreted imaging, findings are as follows: N/A     Assessment:     Encounter Diagnosis   Name Primary?    Tinea unguium Yes          Plan:     I counseled the patient on her conditions, their implications and medical  management.    Tinea unguium: chronic stable   -Previously discussed treatment options including (1) monitoring, (2) debridement, (3) topical antifungals, (4) oral antifungals. Discussed potential risks, benefits, alternatives to each. Patient opted for oral antifungals.  -Can return for procedure brief nail care.    -Complete 3 months terbinafine, then monitor for an additional 6 months for continued clearing; return if nails not cleared by that point.       Return to clinic PRN.

## 2024-03-28 ENCOUNTER — OFFICE VISIT (OUTPATIENT)
Dept: ORTHOPEDICS | Facility: CLINIC | Age: 74
End: 2024-03-28
Payer: MEDICARE

## 2024-03-28 VITALS — WEIGHT: 133 LBS | BODY MASS INDEX: 25.11 KG/M2 | HEIGHT: 61 IN

## 2024-03-28 DIAGNOSIS — M51.36 DDD (DEGENERATIVE DISC DISEASE), LUMBAR: Primary | ICD-10-CM

## 2024-03-28 PROCEDURE — 99213 OFFICE O/P EST LOW 20 MIN: CPT | Mod: S$PBB,,, | Performed by: REGISTERED NURSE

## 2024-03-28 PROCEDURE — 99212 OFFICE O/P EST SF 10 MIN: CPT | Mod: PBBFAC | Performed by: REGISTERED NURSE

## 2024-03-28 PROCEDURE — 99999 PR PBB SHADOW E&M-EST. PATIENT-LVL II: CPT | Mod: PBBFAC,,, | Performed by: REGISTERED NURSE

## 2024-04-01 RX ORDER — DICYCLOMINE HYDROCHLORIDE 20 MG/1
TABLET ORAL
Qty: 120 TABLET | Refills: 11 | Status: SHIPPED | OUTPATIENT
Start: 2024-04-01

## 2024-04-01 NOTE — TELEPHONE ENCOUNTER
No care due was identified.  [facility] Embedded Care Due Messages. Reference number: [id number].   4/01/2024 10:40:57 AM CDT

## 2024-04-15 ENCOUNTER — TELEPHONE (OUTPATIENT)
Dept: PAIN MEDICINE | Facility: CLINIC | Age: 74
End: 2024-04-15

## 2024-04-15 ENCOUNTER — OFFICE VISIT (OUTPATIENT)
Dept: PAIN MEDICINE | Facility: CLINIC | Age: 74
End: 2024-04-15
Payer: MEDICARE

## 2024-04-15 VITALS
DIASTOLIC BLOOD PRESSURE: 72 MMHG | SYSTOLIC BLOOD PRESSURE: 126 MMHG | WEIGHT: 132.94 LBS | HEART RATE: 76 BPM | BODY MASS INDEX: 25.1 KG/M2 | HEIGHT: 61 IN

## 2024-04-15 DIAGNOSIS — M54.16 LUMBAR RADICULOPATHY: Primary | ICD-10-CM

## 2024-04-15 DIAGNOSIS — M51.36 DDD (DEGENERATIVE DISC DISEASE), LUMBAR: ICD-10-CM

## 2024-04-15 DIAGNOSIS — M96.1 POST LAMINECTOMY SYNDROME: ICD-10-CM

## 2024-04-15 PROCEDURE — 99214 OFFICE O/P EST MOD 30 MIN: CPT | Mod: PBBFAC,PN | Performed by: STUDENT IN AN ORGANIZED HEALTH CARE EDUCATION/TRAINING PROGRAM

## 2024-04-15 PROCEDURE — 99204 OFFICE O/P NEW MOD 45 MIN: CPT | Mod: S$PBB,,, | Performed by: STUDENT IN AN ORGANIZED HEALTH CARE EDUCATION/TRAINING PROGRAM

## 2024-04-15 PROCEDURE — 99999 PR PBB SHADOW E&M-EST. PATIENT-LVL IV: CPT | Mod: PBBFAC,,, | Performed by: STUDENT IN AN ORGANIZED HEALTH CARE EDUCATION/TRAINING PROGRAM

## 2024-04-15 RX ORDER — GABAPENTIN 600 MG/1
600 TABLET ORAL NIGHTLY
Qty: 90 TABLET | Refills: 3 | Status: SHIPPED | OUTPATIENT
Start: 2024-04-15 | End: 2024-05-30

## 2024-04-15 NOTE — TELEPHONE ENCOUNTER
----- Message from Mick Wong DO sent at 4/15/2024  9:31 AM CDT -----  Regarding: Order for SHEEBA CERNA    Patient Name: SHEEBA CERNA([id number])  Sex: Female  : 1950      PCP: AUBREY MILLAN    Center: [facility] CENTRAL BILLING OFFICE     Types of orders made on 04/15/2024: Medications, Procedure Request    Order Date:4/15/2024  Ordering User:MICK WONG [[id number]]  Encounter Provider:Mick Wong DO [[id number]]  Authorizing Provider: Mick Wong DO [[id number]]  Department:[facility] PAIN MANAGEMENT[[id number]]    Common Order Information  Procedure -> Transforaminal Injection (Specify level and laterality)     Pre-op Diagnosis -> Lumbar radiculopathy     Order Specific Information  Order: Procedure Order to Pain Management [Custom: [id number]]  Order #:          [id number]: 1 FUTURE    Priority: Routine    Class: Clinic Performed    Future Order Information      Expires on:04/15/2025            Expected by:04/15/2024                   Comment:24 - Right L3-4, L4-5 TFESI - RN sed - no AC    Associated Diagnoses      M54.16 Lumbar radiculopathy      Physician -> valu         Is patient on anti-coagulants? -> No         Facility Name: -> [facility]           Priority: Routine  Class: Clinic Performed    Z  1 Future Order Information      Expires on:04/15/2025            Expected by:04/15/2024                   Comment:24 - Right L3-4, L4-5 TFESI - RN sed - no AC    Associated Diagnoses      M54.16 Lumbar radiculopathy      Procedure -> Transforaminal Injection (Specify level and laterality)         Physician -> valu         Is patient on anti-coagulants? -> No         Pre-op Diagnosis -> Lumbar radiculopathy         Faci  lity Name: -> [facility]

## 2024-04-15 NOTE — H&P (VIEW-ONLY)
Chronic Pain - New Consult    Referring Physician: No ref. provider found    Date: 04/15/2024     Re: Jackelyn Kendrick  MR#: [id number]  YOB: 1950  Age: 73 y.o.    Chief Complaint:   Chief Complaint   Patient presents with    Low-back Pain    Leg Pain     **This note is dictated using the M*Modal Fluency Direct word recognition program. There are word recognition mistakes that are occasionally missed on review.**    ASSESSMENT: 73 y.o. year old female with back and leg pain, consistent with     1. Lumbar radiculopathy  Procedure Order to Pain Management    gabapentin (NEURONTIN) 600 MG tablet    Ambulatory referral/consult to [facility]      2. Post laminectomy syndrome  Ambulatory referral/consult to [facility]      3. DDD (degenerative disc disease), lumbar            PLAN:     Right lumbar radiculopathy and post-laminectomy syndrome  -We will schedule the patient for right L3-4, L4-5 TFESI.  Risks,benefits, and alternatives of the procedure were discussed with the patient and She would like to proceed with the procedure.  At this juncture, we believe that the patient's medical comorbidity status adds medium risk and complexity to our proposed evaluation and treatment.  -increase gabapentin to 600mg qhs  -discussed SCS with [employer]. Information provided  -healthy back referral  -vertiflex may be an option, but I would be concerned that it might make her back pain worse while helping the leg pain    DDD  -patient has substantial DDD at multiple levels.  Could consider Viadisc for disc regeneration    - RTC 4-6 weeks after KALEE  - Counseled patient regarding the importance of activity modification and physical therapy.    The above plan and management options were discussed at length with patient. Patient is in agreement with the above and verbalized understanding. It will be communicated with the referring physician via electronic record, fax, or mail.  Lab/study reports  reviewed were important and necessary because subsequent medical and treatment recommendations required review of the above lab/study reports. Images viewed/reviewed above were important and necessary because subsequent medical and treatment recommendations required review of the reviewed image(s).     Electronically signed by:  Mick Pineda DO  04/15/2024    =========================================================================================================    SUBJECTIVE:    Jackelyn Kendrick is a 73 y.o. female presents to the clinic for the evaluation of lower back pain. The pain started years ago following no inciting event and symptoms have been worsening.  The patient has a hx of right L4-5 hemilaminectomy for sciatic pain in the 90s. Pain now is different.  This pain has been ongoing for a number of years.  It would come and go.  She used to see a pain management doctor and would get some injections in the back which would help.  The pain is located in the right buttocks and travels down the right lateral thigh to the side of the knee.  It does not travel down the knee.  If she is standing or doing household chores then she gets an achy back pain.  When she first stands up it is painful, but then it works itself out.      She saw spine surgery and was started on gabapentin.  She can walk a little further (about 4 blocks) before she has to stop.     Pain Description:    The pain is located in the lower back area and radiates to the right leg .    At BEST  3/10   At WORST  8/10 on the WORST day.    On average pain is rated as 4/10.   Today the pain is rated as 4/10  The pain is continuous.  The pain is described as aching    Symptoms interfere with daily activity.   Exacerbating factors: Sitting, Standing, Walking, and Getting out of bed/chair.    Mitigating factors heat and medications.   She reports 7 hours of sleep per night.    Physical Therapy/Home Exercise: No, not currently in physical  therapy or home exercise program    Current Pain Medications:    - gabapentin 300mg qhs, tylenol    Failed Pain Medications:    - celebrex (due to CKD3)    Pain Treatment Therapies:    Pain procedures:   Pain injections (epidurals and nerve ablations) - [id number]  Nerve ablations - not helpful x2  Physical Therapy: none  Chiropractor: none  Acupuncture: none  TENS unit: none  Spinal decompression:   Right L4-5 Hemilaminectomy x2 (1990s)  Joint replacement: none    Patient denies urinary incontinence, bowel incontinence, significant motor weakness, and loss of sensations.  Patient denies any suicidal or homicidal ideations     report:  Reviewed and consistent with medication use as prescribed.    Imaging:   MRI Lumbar 03/2024:  FINDINGS:  Alignment: Mild levocurvature of the lumbar spine.  Minimal retrolisthesis L4 on L5.     Vertebrae: Postsurgical changes of right L4-L5 hemilaminectomy.  No acute fracture or marrow infiltrative process.     Discs: Multilevel disc height loss most notable at at L3-L4 and L4-L5.     Cord: Normal.  Conus terminates at L1     Degenerative findings:     T11-T12: Posterior disc bulge.  No significant spinal canal stenosis or neural foraminal narrowing.     T12-L1: No significant spinal canal stenosis or neural foraminal narrowing.     L1-L2: Mild diffuse posterior disc bulge and bilateral facet arthropathy.  No significant spinal canal stenosis or neural foraminal narrowing.     L2-L3: Diffuse posterior disc bulge, bilateral facet arthropathy and ligament flavum buckling.  Findings result in mild spinal canal stenosis, mild right and moderate left neural foraminal narrowing.     L3-L4: Diffuse posterior disc bulge with superimposed central disc protrusion, bilateral facet arthropathy and ligament flavum buckling.  Findings result in mild spinal canal stenosis and mild right neural foraminal narrowing.     L4-L5: Right L4 hemilaminectomy.  Diffuse posterior disc bulge, facet  arthropathy and left ligamentum flavum buckling.  Findings result in severe right and moderate left neural foraminal narrowing.     L5-S1: Left eccentric disc bulge.  Findings result in mild right and moderate left neural foraminal narrowing.     Paraspinal muscles & soft tissues: Unremarkable.     Impression:     Lumbar spondylosis most notable at L2-L3 and L3-L4 with mild spinal canal stenosis mild/moderate neural foraminal narrowing.  Severe right and moderate left neural foraminal narrowing at L4-L5.        4/15/2024     8:38 AM   Pain Disability Index (PDI)   Family/Home Responsibilities: 4   Recreation: 4   Occupation: 4   Sexual Behavior: 4   Self Care: 4   Life-Support Activities: 4   Pain Disability Index (PDI) 28        Past Medical History:   Diagnosis Date    Arthritis     Basal cell carcinoma     Bronchitis     seasonal    Cataract     Diverticulitis     Dry eye syndrome     GERD (gastroesophageal reflux disease)     Hyperlipidemia     Hypertension     Hypothyroidism 07/19/2012    Obese     PONV (postoperative nausea and vomiting)     Thyroid disease      Past Surgical History:   Procedure Laterality Date    ANORECTAL MANOMETRY N/A 06/01/2023    Procedure: MANOMETRY, ANORECTAL;  Surgeon: Paulo Pritchett MD;  Location: [address]);  Service: Endoscopy;  Laterality: N/A;  instructions sent to [facility]  5/26 pre-call no answer; MB    ARTHROSCOPIC REPAIR OF ROTATOR CUFF OF SHOULDER Right 09/23/2020    Procedure: REPAIR, ROTATOR CUFF, ARTHROSCOPIC;  Surgeon: Reginald Pickens MD;  Location: [facility];  Service: Orthopedics;  Laterality: Right;  regional w/catheter (interscalene)    BACK SURGERY      CARPAL TUNNEL RELEASE Left 01/11/2024    Procedure: RELEASE, CARPAL TUNNEL;  Surgeon: Suzy Dominguez MD;  Location: [facility];  Service: Orthopedics;  Laterality: Left;    CATARACT EXTRACTION W/  INTRAOCULAR LENS IMPLANT Left 10/20/2021        CHOLECYSTECTOMY      COLONOSCOPY  2007     diverticulosis    COLONOSCOPY N/A 09/03/2020    Procedure: COLONOSCOPY;  Surgeon: Alberta Henriquez MD;  Location: [facility] ENDO (4TH FLR);  Service: Colon and Rectal;  Laterality: N/A;  pt requested this time-8/31-covid-uc [place]-[facility]    CYSTOSCOPY      DE QUERVAIN'S RELEASE Left 03/2017    DECOMPRESSION OF NERVE Left 01/11/2024    Procedure: DECOMPRESSION, NERVE ULNAR;  Surgeon: Suzy Dominguez MD;  Location: [facility] OR;  Service: Orthopedics;  Laterality: Left;    DUPUYTREN CONTRACTURE RELEASE Left 01/11/2024    Procedure: RELEASE, DUPUYTREN CONTRACTURE, PALM;  Surgeon: Suzy Dominguez MD;  Location: [facility] OR;  Service: Orthopedics;  Laterality: Left;    FIXATION OF TENDON Right 09/23/2020    Procedure: FIXATION, TENDON;  Surgeon: Reginald Pickens MD;  Location: [facility] OR;  Service: Orthopedics;  Laterality: Right;    HERNIA REPAIR      HYSTERECTOMY      INJECTION OF STEROID Right 12/02/2021    Procedure: INJECTION, STEROID;  Surgeon: Suzy Dominguez MD;  Location: [facility] OR;  Service: Orthopedics;  Laterality: Right;    INJECTION OF STEROID Right 01/11/2024    Procedure: INJECTION, STEROID 1ST DORSAL COMPARTMENT;  Surgeon: Suzy Dominguez MD;  Location: [facility] OR;  Service: Orthopedics;  Laterality: Right;    INTRAOCULAR PROSTHESES INSERTION Left 10/20/2021    Procedure: INSERTION, IOL PROSTHESIS;  Surgeon: Pippa Ashby MD;  Location: [facility] OR 1ST FLR;  Service: Ophthalmology;  Laterality: Left;    INTRAOCULAR PROSTHESES INSERTION Right 12/29/2021    Procedure: INSERTION, IOL PROSTHESIS;  Surgeon: Pippa Ashby MD;  Location: [facility] OR 1ST FLR;  Service: Ophthalmology;  Laterality: Right;    NASAL SEPTUM SURGERY      PHACOEMULSIFICATION OF CATARACT Left 10/20/2021    Procedure: PHACOEMULSIFICATION, CATARACT/ COMPLEX- PHACO / IOL - OS SMALL PUPIL-OLE RING AND TRYPAN BLUE;  Surgeon: Pippa Ashby MD;  Location: [facility] OR 1ST FLR;  Service: Ophthalmology;  Laterality: Left;    PHACOEMULSIFICATION OF  CATARACT Right 12/29/2021    Procedure: PHACOEMULSIFICATION, CATARACT;  Surgeon: Pippa Ashby MD;  Location: [facility] OR [address] [place] [address];  Service: Ophthalmology;  Laterality: Right;    SHOULDER SURGERY      TONSILLECTOMY      TRIGGER FINGER RELEASE Left 12/02/2021    Procedure: RELEASE, TRIGGER FINGER;  Surgeon: Suzy Dominguez MD;  Location: [facility];  Service: Orthopedics;  Laterality: Left;     Social History     Socioeconomic History    Marital status:    Tobacco Use    Smoking status: Never     Passive exposure: Never    Smokeless tobacco: Never   Substance and Sexual Activity    Alcohol use: No     Comment: rare on a special occasion    Drug use: No    Sexual activity: Never   Social History Narrative    , 3 children, nonsmoker ,     Social Determinants of Health     Financial Resource Strain: Low Risk  (11/10/2023)    Overall Financial Resource Strain (CARDIA)     Difficulty of Paying Living Expenses: Not hard at all   Food Insecurity: No Food Insecurity (11/10/2023)    Hunger Vital Sign     Worried About Running Out of Food in the Last Year: Never true     Ran Out of Food in the Last Year: Never true   Transportation Needs: No Transportation Needs (11/10/2023)    PRAPARE - Transportation     Lack of Transportation (Medical): No     Lack of Transportation (Non-Medical): No   Physical Activity: Inactive (11/10/2023)    Exercise Vital Sign     Days of Exercise per Week: 0 days     Minutes of Exercise per Session: 0 min   Stress: Stress Concern Present (11/10/2023)    [employer] [place] [employer] - Occupational Stress Questionnaire     Feeling of Stress : To some extent   Social Connections: Unknown (11/10/2023)    Social Connection and Isolation Panel [NHANES]     Frequency of Communication with Friends and Family: Once a week     Frequency of Social Gatherings with Friends and Family: Once a week     Active Member of Clubs or Organizations: Yes     Attends Club or Organization Meetings:  More than 4 times per year     Marital Status:    Housing Stability: Low Risk  (11/10/2023)    Housing Stability Vital Sign     Unable to Pay for Housing in the Last Year: No     Number of Places Lived in the Last Year: 1     Unstable Housing in the Last Year: No     Family History   Problem Relation Name Age of Onset    Cataracts Mother      Breast cancer Mother      Diabetes Mother      Hypertension Mother      Heart failure Mother      Heart disease Mother      Cataracts Father      Hypertension Father      Stroke Father      No Known Problems Daughter      No Known Problems Son      Diabetes Paternal Grandmother      Hyperlipidemia Sister x1     Arthritis Sister x1     Hyperlipidemia Brother x5     Arthritis Brother x5     No Known Problems Son      Amblyopia Neg Hx      Blindness Neg Hx      Glaucoma Neg Hx      Macular degeneration Neg Hx      Retinal detachment Neg Hx      Strabismus Neg Hx      Ovarian cancer Neg Hx      Melanoma Neg Hx      Celiac disease Neg Hx      Colon cancer Neg Hx      Colon polyps Neg Hx      Esophageal cancer Neg Hx      Liver cancer Neg Hx      Liver disease Neg Hx      Rectal cancer Neg Hx      Stomach cancer Neg Hx         Review of patient's allergies indicates:   Allergen Reactions    Levaquin [levofloxacin] Swelling and Edema    Erythromycin (bulk) Nausea And Vomiting     Not true allergy       Current Outpatient Medications   Medication Sig Dispense Refill    albuterol (PROVENTIL/VENTOLIN HFA) 90 mcg/actuation inhaler Inhale 2 puffs into the lungs every 6 (six) hours as needed for Wheezing. 6.7 g 11    AREXVY, PF, 120 mcg/0.5 mL SusR vaccine       cranberry fruit extract (CRANBERRY EXTRACT ORAL) Take by mouth.      cycloSPORINE (RESTASIS) 0.05 % ophthalmic emulsion Place 1 drop into both eyes 2 (two) times daily. 60 each 12    diclofenac sodium (VOLTAREN) 1 % Gel Apply 2 g topically 2 (two) times daily as needed (musculoskeletal pain). 100 g 11    dicyclomine (BENTYL)  20 mg tablet TAKE ONE TABLET BY MOUTH FOUR TIMES A DAY BEFORE MEALS AND NIGHTLY 120 tablet 11    docusate sodium (COLACE) 100 MG capsule Take 1 capsule (100 mg total) by mouth 2 (two) times daily. 30 capsule 1    fluocinonide (LIDEX) 0.05 % external solution Apply topically once daily. 60 mL 11    ketoconazole (NIZORAL) 2 % shampoo Apply topically twice a week. 120 mL 11    levothyroxine (SYNTHROID) 75 MCG tablet TAKE ONE TABLET BY MOUTH EVERY DAY ON AN EMPTY STOMACH 90 tablet 3    LIDOcaine-prilocaine (EMLA) cream Apply topically 2 (two) times daily as needed. To hands. 30 g 2    losartan (COZAAR) 100 MG tablet TAKE ONE TABLET BY MOUTH EVERY DAY 90 tablet 3    MAGNESIUM ORAL Take 1 tablet by mouth once daily.      memantine (NAMENDA) 5 MG Tab TAKE ONE TABLET BY MOUTH TWICE A  tablet 0    Multi-Vitamin tablet Take 1 tablet by mouth once daily.       nitrofurantoin (MACRODANTIN) 50 MG capsule Take 1 capsule (50 mg total) by mouth every morning. 30 capsule 11    omeprazole (PRILOSEC) 40 MG capsule TAKE ONE CAPSULE BY MOUTH EVERY DAY 90 capsule 3    ondansetron (ZOFRAN) 8 MG tablet Take 1 tablet (8 mg total) by mouth every 8 (eight) hours as needed for Nausea. 30 tablet 0    oxybutynin (DITROPAN-XL) 10 MG 24 hr tablet Take 1 tablet (10 mg total) by mouth once daily. 90 tablet 3    PSYLLIUM SEED, WITH SUGAR, (METAMUCIL ORAL) Take by mouth as needed.       rosuvastatin (CRESTOR) 40 MG Tab TAKE ONE TABLET BY MOUTH EVERY DAY 90 tablet 3    terbinafine HCL (LAMISIL) 250 mg tablet Take 1 tablet (250 mg total) by mouth once daily. 90 tablet 0    traZODone (DESYREL) 100 MG tablet Take 1 tablet (100 mg total) by mouth nightly as needed for Insomnia. 90 tablet 3    traZODone (DESYREL) 50 MG tablet Take 100 mg by mouth nightly as needed.      triamcinolone acetonide 0.1% (KENALOG) 0.1 % cream AAA bid 60 g 3    gabapentin (NEURONTIN) 600 MG tablet Take 1 tablet (600 mg total) by mouth every evening. 90 tablet 3     "HYDROcodone-acetaminophen (NORCO) 5-325 mg per tablet Take 1 tablet by mouth every 6 (six) hours as needed for Pain. 30 tablet 0     No current facility-administered medications for this visit.       REVIEW OF SYSTEMS:    GENERAL:  No weight loss, malaise or fevers.  HEENT:   No recent changes in vision or hearing  NECK:  Negative for lumps, no difficulty with swallowing.  RESPIRATORY:  Negative for cough, wheezing or shortness of breath, patient denies any recent URI.  CARDIOVASCULAR:  Negative for chest pain, leg swelling or palpitations.  GI:  Negative for abdominal discomfort, blood in stools or black stools or change in bowel habits.  MUSCULOSKELETAL:  See HPI.  SKIN:  Negative for lesions, rash, and itching.  PSYCH:  No mood disorder or recent psychosocial stressors.  Patients sleep is not disturbed secondary to pain.  HEMATOLOGY/LYMPHOLOGY:  Negative for prolonged bleeding, bruising easily or swollen nodes.  Patient is not currently taking any anti-coagulants  NEURO:   No history of headaches, syncope, paralysis, seizures or tremors.  All other reviewed and negative other than HPI.    OBJECTIVE:    /72 (BP Location: Right arm, Patient Position: Sitting)   Pulse 76   Ht 5' 1" (1.549 m)   Wt 60.3 kg (132 lb 15 oz)   BMI 25.12 kg/m²     PHYSICAL EXAMINATION:    GENERAL: Well appearing, in no acute distress, alert and oriented x3.  PSYCH:  Mood and affect appropriate.  SKIN: Skin color, texture, turgor normal, no rashes or lesions.  HEAD/FACE:  Normocephalic, atraumatic. Cranial nerves grossly intact.  CV: RRR with palpation of the radial artery.  PULM: CTAB. No evidence of respiratory difficulty, symmetric chest rise.  GI:  Soft and non-tender.    BACK:   - No obvious deformity or signs of trauma, Normal lumbar lordotic curve  - Negative spinous process tenderness  - Negative paravertebral tenderness  - Negative pain to palpation over the facet joints of the lumbar spine.   - Negative QL / Iliac crest " / Glut tenderness  - Slump test is Negative for radicular pain  - Slump test is Negative for back pain  - Supine Straight leg raising is Negative for radicular pain  - Supine Straight leg raising is Negative for back pain  - Lumbar ROM is normal in Flexion without pain  - Lumbar ROM is normal in Extension without pain  - Lumbar ROM is normal in Lateral Flexion with pain  - Negative Sustained Hip Flexion test (for discogenic pain)  - Negative Altered Gait, Posture  - Axial facet loading test Negative on the bilateral side(s)    SI Joint exam:  - Negative SI joint tenderness to palpation  - Hardik's sign Positive  - Yeoman's Test: Did not perform for SI joint pain indicating anterior SI ligament involvement. Did not perform for anterior thigh pain/paresthesia which indicates femoral nerve stretch.  - Gaenslen's Test:Negative  - Finger Geraldine's Sign:Negative  - SI compression test:Did not perform  - SI distraction test:Negative  - Thigh Thrust: Negative  - SI Thrust: Did not perform    MUSKULOSKELETAL:    EXTREMITIES:   Hip Exam:  - Log Roll Negative  - FADIR Negative  - Stinchfield Did not perform  - Hip Scour Negative  - GTB Tenderness Negative    MUSCULOSKELETAL:  No atrophy or tone abnormalities are noted in the UE or LE.  No deformities, edema, or skin discoloration are noted on visible skin. Good capillary refill.    NEURO: Bilateral upper and lower extremity coordination and muscle stretch reflexes are physiologic and symmetric.      NEUROLOGICAL EXAM:  MENTAL STATUS: A x O x 3, good concentration, speech is fluent and goal directed  MEMORY: recent and remote are intact  CN: CN2-12 grossly intact  MOTOR: 5/5 in all muscle groups  DTRs: 2+ intact symmetric  Sensation:    -no Loss of sensation in a left lower and right lower L-1, L-2, L-3, L-4, and L-5 bilaterally distribution.  Sterling: absent on the bilateral side(s)  Clonus: absent on the bilateral side(s)    GAIT: normal.

## 2024-04-15 NOTE — PROGRESS NOTES
Chronic Pain - New Consult    Referring Physician: No ref. provider found    Date: 04/15/2024     Re: Jackelyn Kendrick  MR#: [id number]  YOB: 1950  Age: 73 y.o.    Chief Complaint:   Chief Complaint   Patient presents with    Low-back Pain    Leg Pain     **This note is dictated using the M*Modal Fluency Direct word recognition program. There are word recognition mistakes that are occasionally missed on review.**    ASSESSMENT: 73 y.o. year old female with back and leg pain, consistent with     1. Lumbar radiculopathy  Procedure Order to Pain Management    gabapentin (NEURONTIN) 600 MG tablet    Ambulatory referral/consult to [facility]      2. Post laminectomy syndrome  Ambulatory referral/consult to [facility]      3. DDD (degenerative disc disease), lumbar            PLAN:     Right lumbar radiculopathy and post-laminectomy syndrome  -We will schedule the patient for right L3-4, L4-5 TFESI.  Risks,benefits, and alternatives of the procedure were discussed with the patient and She would like to proceed with the procedure.  At this juncture, we believe that the patient's medical comorbidity status adds medium risk and complexity to our proposed evaluation and treatment.  -increase gabapentin to 600mg qhs  -discussed SCS with [facility]. Information provided  -healthy back referral  -vertiflex may be an option, but I would be concerned that it might make her back pain worse while helping the leg pain    DDD  -patient has substantial DDD at multiple levels.  Could consider Viadisc for disc regeneration    - RTC 4-6 weeks after KALEE  - Counseled patient regarding the importance of activity modification and physical therapy.    The above plan and management options were discussed at length with patient. Patient is in agreement with the above and verbalized understanding. It will be communicated with the referring physician via electronic record, fax, or mail.  Lab/study reports  reviewed were important and necessary because subsequent medical and treatment recommendations required review of the above lab/study reports. Images viewed/reviewed above were important and necessary because subsequent medical and treatment recommendations required review of the reviewed image(s).     Electronically signed by:  Mick Pineda DO  04/15/2024    =========================================================================================================    SUBJECTIVE:    Jackelyn Kendrick is a 73 y.o. female presents to the clinic for the evaluation of lower back pain. The pain started years ago following no inciting event and symptoms have been worsening.  The patient has a hx of right L4-5 hemilaminectomy for sciatic pain in the 90s. Pain now is different.  This pain has been ongoing for a number of years.  It would come and go.  She used to see a pain management doctor and would get some injections in the back which would help.  The pain is located in the right buttocks and travels down the right lateral thigh to the side of the knee.  It does not travel down the knee.  If she is standing or doing household chores then she gets an achy back pain.  When she first stands up it is painful, but then it works itself out.      She saw spine surgery and was started on gabapentin.  She can walk a little further (about 4 blocks) before she has to stop.     Pain Description:    The pain is located in the lower back area and radiates to the right leg .    At BEST  3/10   At WORST  8/10 on the WORST day.    On average pain is rated as 4/10.   Today the pain is rated as 4/10  The pain is continuous.  The pain is described as aching    Symptoms interfere with daily activity.   Exacerbating factors: Sitting, Standing, Walking, and Getting out of bed/chair.    Mitigating factors heat and medications.   She reports 7 hours of sleep per night.    Physical Therapy/Home Exercise: No, not currently in physical  therapy or home exercise program    Current Pain Medications:    - gabapentin 300mg qhs, tylenol    Failed Pain Medications:    - celebrex (due to CKD3)    Pain Treatment Therapies:    Pain procedures:   Pain injections (epidurals and nerve ablations) - [id number]  Nerve ablations - not helpful x2  Physical Therapy: none  Chiropractor: none  Acupuncture: none  TENS unit: none  Spinal decompression:   Right L4-5 Hemilaminectomy x2 (1990s)  Joint replacement: none    Patient denies urinary incontinence, bowel incontinence, significant motor weakness, and loss of sensations.  Patient denies any suicidal or homicidal ideations     report:  Reviewed and consistent with medication use as prescribed.    Imaging:   MRI Lumbar 03/2024:  FINDINGS:  Alignment: Mild levocurvature of the lumbar spine.  Minimal retrolisthesis L4 on L5.     Vertebrae: Postsurgical changes of right L4-L5 hemilaminectomy.  No acute fracture or marrow infiltrative process.     Discs: Multilevel disc height loss most notable at at L3-L4 and L4-L5.     Cord: Normal.  Conus terminates at L1     Degenerative findings:     T11-T12: Posterior disc bulge.  No significant spinal canal stenosis or neural foraminal narrowing.     T12-L1: No significant spinal canal stenosis or neural foraminal narrowing.     L1-L2: Mild diffuse posterior disc bulge and bilateral facet arthropathy.  No significant spinal canal stenosis or neural foraminal narrowing.     L2-L3: Diffuse posterior disc bulge, bilateral facet arthropathy and ligament flavum buckling.  Findings result in mild spinal canal stenosis, mild right and moderate left neural foraminal narrowing.     L3-L4: Diffuse posterior disc bulge with superimposed central disc protrusion, bilateral facet arthropathy and ligament flavum buckling.  Findings result in mild spinal canal stenosis and mild right neural foraminal narrowing.     L4-L5: Right L4 hemilaminectomy.  Diffuse posterior disc bulge, facet  arthropathy and left ligamentum flavum buckling.  Findings result in severe right and moderate left neural foraminal narrowing.     L5-S1: Left eccentric disc bulge.  Findings result in mild right and moderate left neural foraminal narrowing.     Paraspinal muscles & soft tissues: Unremarkable.     Impression:     Lumbar spondylosis most notable at L2-L3 and L3-L4 with mild spinal canal stenosis mild/moderate neural foraminal narrowing.  Severe right and moderate left neural foraminal narrowing at L4-L5.        4/15/2024     8:38 AM   Pain Disability Index (PDI)   Family/Home Responsibilities: 4   Recreation: 4   Occupation: 4   Sexual Behavior: 4   Self Care: 4   Life-Support Activities: 4   Pain Disability Index (PDI) 28        Past Medical History:   Diagnosis Date    Arthritis     Basal cell carcinoma     Bronchitis     seasonal    Cataract     Diverticulitis     Dry eye syndrome     GERD (gastroesophageal reflux disease)     Hyperlipidemia     Hypertension     Hypothyroidism 07/19/2012    Obese     PONV (postoperative nausea and vomiting)     Thyroid disease      Past Surgical History:   Procedure Laterality Date    ANORECTAL MANOMETRY N/A 06/01/2023    Procedure: MANOMETRY, ANORECTAL;  Surgeon: Paulo Pritchett MD;  Location: [address]);  Service: Endoscopy;  Laterality: N/A;  instructions sent to [facility]  5/26 pre-call no answer; MB    ARTHROSCOPIC REPAIR OF ROTATOR CUFF OF SHOULDER Right 09/23/2020    Procedure: REPAIR, ROTATOR CUFF, ARTHROSCOPIC;  Surgeon: Reginald Pickens MD;  Location: [facility];  Service: Orthopedics;  Laterality: Right;  regional w/catheter (interscalene)    BACK SURGERY      CARPAL TUNNEL RELEASE Left 01/11/2024    Procedure: RELEASE, CARPAL TUNNEL;  Surgeon: Suzy Dominguez MD;  Location: [facility];  Service: Orthopedics;  Laterality: Left;    CATARACT EXTRACTION W/  INTRAOCULAR LENS IMPLANT Left 10/20/2021        CHOLECYSTECTOMY      COLONOSCOPY  2007     diverticulosis    COLONOSCOPY N/A 09/03/2020    Procedure: COLONOSCOPY;  Surgeon: Alberta Henriquez MD;  Location: [facility] ENDO (4TH FLR);  Service: Colon and Rectal;  Laterality: N/A;  pt requested this time-8/31-covid-uc [place]-[facility]    CYSTOSCOPY      DE QUERVAIN'S RELEASE Left 03/2017    DECOMPRESSION OF NERVE Left 01/11/2024    Procedure: DECOMPRESSION, NERVE ULNAR;  Surgeon: Suzy Dominguez MD;  Location: [facility] OR;  Service: Orthopedics;  Laterality: Left;    DUPUYTREN CONTRACTURE RELEASE Left 01/11/2024    Procedure: RELEASE, DUPUYTREN CONTRACTURE, PALM;  Surgeon: Suzy Dominguez MD;  Location: [facility] OR;  Service: Orthopedics;  Laterality: Left;    FIXATION OF TENDON Right 09/23/2020    Procedure: FIXATION, TENDON;  Surgeon: Reginald Pickens MD;  Location: [facility] OR;  Service: Orthopedics;  Laterality: Right;    HERNIA REPAIR      HYSTERECTOMY      INJECTION OF STEROID Right 12/02/2021    Procedure: INJECTION, STEROID;  Surgeon: Suzy Dominguez MD;  Location: [facility] OR;  Service: Orthopedics;  Laterality: Right;    INJECTION OF STEROID Right 01/11/2024    Procedure: INJECTION, STEROID 1ST DORSAL COMPARTMENT;  Surgeon: Suzy Dominguez MD;  Location: [facility] OR;  Service: Orthopedics;  Laterality: Right;    INTRAOCULAR PROSTHESES INSERTION Left 10/20/2021    Procedure: INSERTION, IOL PROSTHESIS;  Surgeon: Pippa Ashby MD;  Location: [facility] OR 1ST FLR;  Service: Ophthalmology;  Laterality: Left;    INTRAOCULAR PROSTHESES INSERTION Right 12/29/2021    Procedure: INSERTION, IOL PROSTHESIS;  Surgeon: Pippa Ashby MD;  Location: [facility] OR 1ST FLR;  Service: Ophthalmology;  Laterality: Right;    NASAL SEPTUM SURGERY      PHACOEMULSIFICATION OF CATARACT Left 10/20/2021    Procedure: PHACOEMULSIFICATION, CATARACT/ COMPLEX- PHACO / IOL - OS SMALL PUPIL-OLE RING AND TRYPAN BLUE;  Surgeon: Pippa Ashby MD;  Location: [facility] OR 1ST FLR;  Service: Ophthalmology;  Laterality: Left;    PHACOEMULSIFICATION OF  CATARACT Right 12/29/2021    Procedure: PHACOEMULSIFICATION, CATARACT;  Surgeon: Pippa Ashby MD;  Location: [facility] OR [address] [place] [address];  Service: Ophthalmology;  Laterality: Right;    SHOULDER SURGERY      TONSILLECTOMY      TRIGGER FINGER RELEASE Left 12/02/2021    Procedure: RELEASE, TRIGGER FINGER;  Surgeon: Suzy Dominguez MD;  Location: [facility];  Service: Orthopedics;  Laterality: Left;     Social History     Socioeconomic History    Marital status:    Tobacco Use    Smoking status: Never     Passive exposure: Never    Smokeless tobacco: Never   Substance and Sexual Activity    Alcohol use: No     Comment: rare on a special occasion    Drug use: No    Sexual activity: Never   Social History Narrative    , 3 children, nonsmoker ,     Social Determinants of Health     Financial Resource Strain: Low Risk  (11/10/2023)    Overall Financial Resource Strain (CARDIA)     Difficulty of Paying Living Expenses: Not hard at all   Food Insecurity: No Food Insecurity (11/10/2023)    Hunger Vital Sign     Worried About Running Out of Food in the Last Year: Never true     Ran Out of Food in the Last Year: Never true   Transportation Needs: No Transportation Needs (11/10/2023)    PRAPARE - Transportation     Lack of Transportation (Medical): No     Lack of Transportation (Non-Medical): No   Physical Activity: Inactive (11/10/2023)    Exercise Vital Sign     Days of Exercise per Week: 0 days     Minutes of Exercise per Session: 0 min   Stress: Stress Concern Present (11/10/2023)    [place] [employer] - Occupational Stress Questionnaire     Feeling of Stress : To some extent   Social Connections: Unknown (11/10/2023)    Social Connection and Isolation Panel [NHANES]     Frequency of Communication with Friends and Family: Once a week     Frequency of Social Gatherings with Friends and Family: Once a week     Active Member of Clubs or Organizations: Yes     Attends Club or Organization Meetings:  More than 4 times per year     Marital Status:    Housing Stability: Low Risk  (11/10/2023)    Housing Stability Vital Sign     Unable to Pay for Housing in the Last Year: No     Number of Places Lived in the Last Year: 1     Unstable Housing in the Last Year: No     Family History   Problem Relation Name Age of Onset    Cataracts Mother      Breast cancer Mother      Diabetes Mother      Hypertension Mother      Heart failure Mother      Heart disease Mother      Cataracts Father      Hypertension Father      Stroke Father      No Known Problems Daughter      No Known Problems Son      Diabetes Paternal Grandmother      Hyperlipidemia Sister x1     Arthritis Sister x1     Hyperlipidemia Brother x5     Arthritis Brother x5     No Known Problems Son      Amblyopia Neg Hx      Blindness Neg Hx      Glaucoma Neg Hx      Macular degeneration Neg Hx      Retinal detachment Neg Hx      Strabismus Neg Hx      Ovarian cancer Neg Hx      Melanoma Neg Hx      Celiac disease Neg Hx      Colon cancer Neg Hx      Colon polyps Neg Hx      Esophageal cancer Neg Hx      Liver cancer Neg Hx      Liver disease Neg Hx      Rectal cancer Neg Hx      Stomach cancer Neg Hx         Review of patient's allergies indicates:   Allergen Reactions    Levaquin [levofloxacin] Swelling and Edema    Erythromycin (bulk) Nausea And Vomiting     Not true allergy       Current Outpatient Medications   Medication Sig Dispense Refill    albuterol (PROVENTIL/VENTOLIN HFA) 90 mcg/actuation inhaler Inhale 2 puffs into the lungs every 6 (six) hours as needed for Wheezing. 6.7 g 11    AREXVY, PF, 120 mcg/0.5 mL SusR vaccine       cranberry fruit extract (CRANBERRY EXTRACT ORAL) Take by mouth.      cycloSPORINE (RESTASIS) 0.05 % ophthalmic emulsion Place 1 drop into both eyes 2 (two) times daily. 60 each 12    diclofenac sodium (VOLTAREN) 1 % Gel Apply 2 g topically 2 (two) times daily as needed (musculoskeletal pain). 100 g 11    dicyclomine (BENTYL)  20 mg tablet TAKE ONE TABLET BY MOUTH FOUR TIMES A DAY BEFORE MEALS AND NIGHTLY 120 tablet 11    docusate sodium (COLACE) 100 MG capsule Take 1 capsule (100 mg total) by mouth 2 (two) times daily. 30 capsule 1    fluocinonide (LIDEX) 0.05 % external solution Apply topically once daily. 60 mL 11    ketoconazole (NIZORAL) 2 % shampoo Apply topically twice a week. 120 mL 11    levothyroxine (SYNTHROID) 75 MCG tablet TAKE ONE TABLET BY MOUTH EVERY DAY ON AN EMPTY STOMACH 90 tablet 3    LIDOcaine-prilocaine (EMLA) cream Apply topically 2 (two) times daily as needed. To hands. 30 g 2    losartan (COZAAR) 100 MG tablet TAKE ONE TABLET BY MOUTH EVERY DAY 90 tablet 3    MAGNESIUM ORAL Take 1 tablet by mouth once daily.      memantine (NAMENDA) 5 MG Tab TAKE ONE TABLET BY MOUTH TWICE A  tablet 0    Multi-Vitamin tablet Take 1 tablet by mouth once daily.       nitrofurantoin (MACRODANTIN) 50 MG capsule Take 1 capsule (50 mg total) by mouth every morning. 30 capsule 11    omeprazole (PRILOSEC) 40 MG capsule TAKE ONE CAPSULE BY MOUTH EVERY DAY 90 capsule 3    ondansetron (ZOFRAN) 8 MG tablet Take 1 tablet (8 mg total) by mouth every 8 (eight) hours as needed for Nausea. 30 tablet 0    oxybutynin (DITROPAN-XL) 10 MG 24 hr tablet Take 1 tablet (10 mg total) by mouth once daily. 90 tablet 3    PSYLLIUM SEED, WITH SUGAR, (METAMUCIL ORAL) Take by mouth as needed.       rosuvastatin (CRESTOR) 40 MG Tab TAKE ONE TABLET BY MOUTH EVERY DAY 90 tablet 3    terbinafine HCL (LAMISIL) 250 mg tablet Take 1 tablet (250 mg total) by mouth once daily. 90 tablet 0    traZODone (DESYREL) 100 MG tablet Take 1 tablet (100 mg total) by mouth nightly as needed for Insomnia. 90 tablet 3    traZODone (DESYREL) 50 MG tablet Take 100 mg by mouth nightly as needed.      triamcinolone acetonide 0.1% (KENALOG) 0.1 % cream AAA bid 60 g 3    gabapentin (NEURONTIN) 600 MG tablet Take 1 tablet (600 mg total) by mouth every evening. 90 tablet 3     "HYDROcodone-acetaminophen (NORCO) 5-325 mg per tablet Take 1 tablet by mouth every 6 (six) hours as needed for Pain. 30 tablet 0     No current facility-administered medications for this visit.       REVIEW OF SYSTEMS:    GENERAL:  No weight loss, malaise or fevers.  HEENT:   No recent changes in vision or hearing  NECK:  Negative for lumps, no difficulty with swallowing.  RESPIRATORY:  Negative for cough, wheezing or shortness of breath, patient denies any recent URI.  CARDIOVASCULAR:  Negative for chest pain, leg swelling or palpitations.  GI:  Negative for abdominal discomfort, blood in stools or black stools or change in bowel habits.  MUSCULOSKELETAL:  See HPI.  SKIN:  Negative for lesions, rash, and itching.  PSYCH:  No mood disorder or recent psychosocial stressors.  Patients sleep is not disturbed secondary to pain.  HEMATOLOGY/LYMPHOLOGY:  Negative for prolonged bleeding, bruising easily or swollen nodes.  Patient is not currently taking any anti-coagulants  NEURO:   No history of headaches, syncope, paralysis, seizures or tremors.  All other reviewed and negative other than HPI.    OBJECTIVE:    /72 (BP Location: Right arm, Patient Position: Sitting)   Pulse 76   Ht 5' 1" (1.549 m)   Wt 60.3 kg (132 lb 15 oz)   BMI 25.12 kg/m²     PHYSICAL EXAMINATION:    GENERAL: Well appearing, in no acute distress, alert and oriented x3.  PSYCH:  Mood and affect appropriate.  SKIN: Skin color, texture, turgor normal, no rashes or lesions.  HEAD/FACE:  Normocephalic, atraumatic. Cranial nerves grossly intact.  CV: RRR with palpation of the radial artery.  PULM: CTAB. No evidence of respiratory difficulty, symmetric chest rise.  GI:  Soft and non-tender.    BACK:   - No obvious deformity or signs of trauma, Normal lumbar lordotic curve  - Negative spinous process tenderness  - Negative paravertebral tenderness  - Negative pain to palpation over the facet joints of the lumbar spine.   - Negative QL / Iliac crest " / Glut tenderness  - Slump test is Negative for radicular pain  - Slump test is Negative for back pain  - Supine Straight leg raising is Negative for radicular pain  - Supine Straight leg raising is Negative for back pain  - Lumbar ROM is normal in Flexion without pain  - Lumbar ROM is normal in Extension without pain  - Lumbar ROM is normal in Lateral Flexion with pain  - Negative Sustained Hip Flexion test (for discogenic pain)  - Negative Altered Gait, Posture  - Axial facet loading test Negative on the bilateral side(s)    SI Joint exam:  - Negative SI joint tenderness to palpation  - Hardik's sign Positive  - Yeoman's Test: Did not perform for SI joint pain indicating anterior SI ligament involvement. Did not perform for anterior thigh pain/paresthesia which indicates femoral nerve stretch.  - Gaenslen's Test:Negative  - Finger Geraldine's Sign:Negative  - SI compression test:Did not perform  - SI distraction test:Negative  - Thigh Thrust: Negative  - SI Thrust: Did not perform    MUSKULOSKELETAL:    EXTREMITIES:   Hip Exam:  - Log Roll Negative  - FADIR Negative  - Stinchfield Did not perform  - Hip Scour Negative  - GTB Tenderness Negative    MUSCULOSKELETAL:  No atrophy or tone abnormalities are noted in the UE or LE.  No deformities, edema, or skin discoloration are noted on visible skin. Good capillary refill.    NEURO: Bilateral upper and lower extremity coordination and muscle stretch reflexes are physiologic and symmetric.      NEUROLOGICAL EXAM:  MENTAL STATUS: A x O x 3, good concentration, speech is fluent and goal directed  MEMORY: recent and remote are intact  CN: CN2-12 grossly intact  MOTOR: 5/5 in all muscle groups  DTRs: 2+ intact symmetric  Sensation:    -no Loss of sensation in a left lower and right lower L-1, L-2, L-3, L-4, and L-5 bilaterally distribution.  Sterling: absent on the bilateral side(s)  Clonus: absent on the bilateral side(s)    GAIT: normal.

## 2024-04-22 ENCOUNTER — TELEPHONE (OUTPATIENT)
Dept: PAIN MEDICINE | Facility: CLINIC | Age: 74
End: 2024-04-22
Payer: MEDICARE

## 2024-04-22 ENCOUNTER — OFFICE VISIT (OUTPATIENT)
Dept: PRIMARY CARE CLINIC | Facility: CLINIC | Age: 74
End: 2024-04-22
Payer: MEDICARE

## 2024-04-22 VITALS
OXYGEN SATURATION: 96 % | BODY MASS INDEX: 26.03 KG/M2 | SYSTOLIC BLOOD PRESSURE: 136 MMHG | HEART RATE: 80 BPM | WEIGHT: 137.81 LBS | DIASTOLIC BLOOD PRESSURE: 62 MMHG | TEMPERATURE: 98 F

## 2024-04-22 DIAGNOSIS — Z00.00 HEALTHCARE MAINTENANCE: ICD-10-CM

## 2024-04-22 DIAGNOSIS — Z00.00 ENCOUNTER FOR MEDICARE ANNUAL WELLNESS EXAM: ICD-10-CM

## 2024-04-22 DIAGNOSIS — Z00.00 ENCOUNTER FOR PREVENTIVE HEALTH EXAMINATION: Primary | ICD-10-CM

## 2024-04-22 DIAGNOSIS — I70.0 ATHEROSCLEROSIS OF AORTA: ICD-10-CM

## 2024-04-22 DIAGNOSIS — R26.9 ABNORMALITY OF GAIT AND MOBILITY: ICD-10-CM

## 2024-04-22 DIAGNOSIS — H91.91 HEARING LOSS OF RIGHT EAR, UNSPECIFIED HEARING LOSS TYPE: ICD-10-CM

## 2024-04-22 DIAGNOSIS — Z71.89 ADVANCE CARE PLANNING: ICD-10-CM

## 2024-04-22 DIAGNOSIS — E03.9 ACQUIRED HYPOTHYROIDISM: ICD-10-CM

## 2024-04-22 PROCEDURE — 99999 PR PBB SHADOW E&M-EST. PATIENT-LVL IV: CPT | Mod: PBBFAC,,, | Performed by: PHYSICIAN ASSISTANT

## 2024-04-22 PROCEDURE — 99214 OFFICE O/P EST MOD 30 MIN: CPT | Mod: PBBFAC,PN | Performed by: PHYSICIAN ASSISTANT

## 2024-04-22 PROCEDURE — G0439 PPPS, SUBSEQ VISIT: HCPCS | Mod: ,,, | Performed by: PHYSICIAN ASSISTANT

## 2024-04-22 NOTE — PATIENT INSTRUCTIONS
Counseling and Referral of Other Preventative  (Italic type indicates deductible and co-insurance are waived)    Patient Name: Jackelyn Kendrcik  Today's Date: 4/22/2024    Health Maintenance       Date Due Completion Date    Mammogram 02/28/2025 2/29/2024    High Dose Statin 04/15/2025 4/15/2024    DEXA Scan 07/14/2025 7/14/2023    Lipid Panel 12/07/2028 12/7/2023    TETANUS VACCINE 08/19/2029 8/19/2019    Colorectal Cancer Screening 09/03/2030 9/3/2020        Orders Placed This Encounter   Procedures    Ambulatory referral/consult to Audiology     The following information is provided to all patients.  This information is to help you find resources for any of the problems found today that may be affecting your health:                  Living healthy guide: www.[facility].[place].gov      Understanding Diabetes: www.diabetes.org      Eating healthy: www.cdc.gov/healthyweight      [facility] home safety checklist: www.cdc.gov/steadi/patient.html      Agency on Aging: www.goea.[place].gov      Alcoholics anonymous (AA): www.aa.org      Physical Activity: www.gerri.nih.gov/lb5uwwh      Tobacco use: www.quitwithusla.org

## 2024-04-22 NOTE — PROGRESS NOTES
Jackelyn Kendrick presented for a follow-up Medicare AWV today. The following components were reviewed and updated:    Medical history  Family History  Social history  Allergies and Current Medications  Health Risk Assessment  Health Maintenance  Care Team    **See Completed Assessments for Annual Wellness visit with in the encounter summary    The following assessments were completed:  Depression Screening  Cognitive function Screening  Timed Get Up Test  Whisper Test      Opioid documentation:      Patient does not have a current opioid prescription.          Vitals:    04/22/24 0949   BP: 136/62   BP Location: Right arm   Patient Position: Sitting   BP Method: Medium (Manual)   Pulse: 80   Temp: 98.2 °F (36.8 °C)   TempSrc: Oral   SpO2: 96%   Weight: 62.5 kg (137 lb 12.6 oz)     Body mass index is 26.03 kg/m².       Physical Exam  HENT:      Head: Normocephalic and atraumatic.   Cardiovascular:      Rate and Rhythm: Normal rate.   Pulmonary:      Effort: Pulmonary effort is normal.      Breath sounds: Normal breath sounds.   Neurological:      Mental Status: She is alert.   Psychiatric:         Mood and Affect: Mood normal.         Thought Content: Thought content normal.           Diagnoses and health risks identified today and associated recommendations/orders:  1. Encounter for preventive health examination  Provided Jackelyn with a 5-10 year written screening schedule and personal prevention plan. Recommendations were developed using the USPSTF age appropriate recommendations. Education, counseling, and referrals were provided as needed.  After Visit Summary printed and given to patient which includes a list of additional screenings\tests needed.    2. Encounter for Medicare annual wellness exam  -     Ambulatory Referral/Consult to Enhanced Annual Wellness Visit (eAWV)    3. Atherosclerosis of aorta  Overview:  xcr    Assessment & Plan:  Stable, seen on imagining. Continue statin, discuss aspirin with  pcp      4. Hearing loss of right ear, unspecified hearing loss type  -     Ambulatory referral/consult to Audiology; Future; Expected date: 04/29/2024    5. Abnormality of gait and mobility  Assessment & Plan:  Due to back issues. Will be getting KALEE next week and will join Healthy Back program next month      6. Acquired hypothyroidism  Assessment & Plan:  Continue levothyroxine.  Check TSH.  Doing well.  Monitor.      7. ACP  Forms given    [facility]      No follow-ups on file.      Roz Trejo PA-C

## 2024-04-25 NOTE — PRE-PROCEDURE INSTRUCTIONS
Patient reviewed on 4/25/2024.  Okay to proceed at [place]. The following pre-procedure instructions and arrival time have been reviewed with patient via phone and sent to patient portal for review.  Patient verbalized an understanding.  Pt to be accompanied by -Hermes day of procedure as responsible .    Dear Jackelyn Kendrick ,     You are scheduled for a procedure with Dr. Mick Pineda on 4/29/2024.  Your scheduled arrival time is 10:00 am.  This arrival time is roughly 1 hour before your anticipated procedure time to allow sufficient time for pre-op..  You will need to change into a gown for this procedure.  This procedure will take place at the [facility] at the corner of [facility] and [facility].  It is in the [place] next to [facility].  The address is:     [address] [place] [address].  [place] [id number]     After entering the building, you will proceed to the second floor where you can check in with registration. You should take any medications that you routinely take for blood pressure, heart medications, thyroid, cholesterol, etc.      The fasting restrictions are dependent on whether or not you are receiving sedation.  Sedation is not available for all procedures.      Your fasting instructions are as follow:  IV sedation. You should not eat for 8 hours and can only drink clear liquids (water or black coffee without cream/sugar) up until 2 hours before your scheduled time.  You CANNOT drive yourself and must have a .     If you are on blood thinners, you need to follow the anticoagulation instructions that had been discussed previously.  You should only stop the blood thinners if it was approved by your primary care physician or your cardiologist.  In the event that you are not able to stop your blood thinners, a blood thinner was not listed on your medication list, or we were not able to get clearance from your cardiologist, then  the procedure may have to be postponed/canceled.      IF you were told to stop your blood thinners, this is how long you should generally hold some of the more common ones.  Remember that stopping blood thinners is only necessary for certain procedures. If you are unsure of your instructions, please call us.   Aspirin - 5 days  Plavix/Clopidogrel - 7 days  Warfarin / Coumadin - 5 days  Eliquis - 3 days  Pradaxa/Dabigatran - 4 days  Xarelto/Rivaroxaban - 3 days     If you are a diabetic, do not take your medication if you will be fasting, but bring it with you. Please plan on being here for roughly 2 hours.     Please call us if you have been sick (running fever, having any flu-like symptoms) or have been taking antibiotics in the past 2 weeks or had any outpatient procedures other than with us (colonoscopy, endoscopy, OBGYN, dental, etc.). If you have been previously COVID positive, you will need to hold off on your procedure until you are symptom free for 10 days. If you did not have any symptoms, you can have your procedure 10 days from your positive test result.       *HOLD ALL VITAMINS, MINERALS, HERBS (INCLUDING HERBAL TEAS) AND SUPPLEMENTS  *SHOWER WITH ANTIBACTERIAL SOAP (EX. DIAL) NIGHT BEFORE AND MORNING OF PROCEDURE  *DO NOT APPLY ANY LOTIONS, OILS, POWDERS, PERFUME/COLOGNE, OINTMENTS, GELS, CREAMS, MAKEUP OR DEODORANT TO YOUR SKIN MORNING OF PROCEDURE  *LEAVE JEWELRY AND ANY VALUABLES AT HOME  *WEAR LOOSE COMFORTABLE CLOTHING (PREFERABLY A BUTTON UP SHIRT)        Thank you,  [facility] &  Joann, LPN [facility]  Pre-Admit

## 2024-04-29 ENCOUNTER — HOSPITAL ENCOUNTER (OUTPATIENT)
Facility: HOSPITAL | Age: 74
Discharge: HOME | End: 2024-04-29
Attending: STUDENT IN AN ORGANIZED HEALTH CARE EDUCATION/TRAINING PROGRAM | Admitting: STUDENT IN AN ORGANIZED HEALTH CARE EDUCATION/TRAINING PROGRAM
Payer: MEDICARE

## 2024-04-29 VITALS
OXYGEN SATURATION: 96 % | SYSTOLIC BLOOD PRESSURE: 128 MMHG | DIASTOLIC BLOOD PRESSURE: 60 MMHG | RESPIRATION RATE: 16 BRPM | WEIGHT: 135 LBS | HEIGHT: 61 IN | HEART RATE: 65 BPM | BODY MASS INDEX: 25.49 KG/M2 | TEMPERATURE: 98 F

## 2024-04-29 DIAGNOSIS — M54.16 LUMBAR RADICULOPATHY: Primary | ICD-10-CM

## 2024-04-29 PROCEDURE — 64483 NJX AA&/STRD TFRM EPI L/S 1: CPT | Mod: RT,,, | Performed by: STUDENT IN AN ORGANIZED HEALTH CARE EDUCATION/TRAINING PROGRAM

## 2024-04-29 PROCEDURE — 64484 NJX AA&/STRD TFRM EPI L/S EA: CPT | Mod: RT,,, | Performed by: STUDENT IN AN ORGANIZED HEALTH CARE EDUCATION/TRAINING PROGRAM

## 2024-04-29 PROCEDURE — 25500020 PHARM REV CODE 255: Performed by: STUDENT IN AN ORGANIZED HEALTH CARE EDUCATION/TRAINING PROGRAM

## 2024-04-29 PROCEDURE — 63600175 PHARM REV CODE 636 W HCPCS: Performed by: STUDENT IN AN ORGANIZED HEALTH CARE EDUCATION/TRAINING PROGRAM

## 2024-04-29 PROCEDURE — 25000003 PHARM REV CODE 250: Performed by: STUDENT IN AN ORGANIZED HEALTH CARE EDUCATION/TRAINING PROGRAM

## 2024-04-29 PROCEDURE — 64483 NJX AA&/STRD TFRM EPI L/S 1: CPT | Mod: RT | Performed by: STUDENT IN AN ORGANIZED HEALTH CARE EDUCATION/TRAINING PROGRAM

## 2024-04-29 PROCEDURE — 64484 NJX AA&/STRD TFRM EPI L/S EA: CPT | Mod: RT | Performed by: STUDENT IN AN ORGANIZED HEALTH CARE EDUCATION/TRAINING PROGRAM

## 2024-04-29 RX ORDER — LIDOCAINE HYDROCHLORIDE 20 MG/ML
INJECTION, SOLUTION EPIDURAL; INFILTRATION; INTRACAUDAL; PERINEURAL
Status: DISCONTINUED | OUTPATIENT
Start: 2024-04-29 | End: 2024-04-29 | Stop reason: HOSPADM

## 2024-04-29 RX ORDER — LIDOCAINE HYDROCHLORIDE 10 MG/ML
INJECTION, SOLUTION EPIDURAL; INFILTRATION; INTRACAUDAL; PERINEURAL
Status: DISCONTINUED | OUTPATIENT
Start: 2024-04-29 | End: 2024-04-29 | Stop reason: HOSPADM

## 2024-04-29 RX ORDER — SODIUM CHLORIDE 9 MG/ML
500 INJECTION, SOLUTION INTRAVENOUS CONTINUOUS
Status: DISCONTINUED | OUTPATIENT
Start: 2024-04-29 | End: 2024-04-29 | Stop reason: HOSPADM

## 2024-04-29 RX ORDER — MIDAZOLAM HYDROCHLORIDE 1 MG/ML
INJECTION INTRAMUSCULAR; INTRAVENOUS
Status: DISCONTINUED | OUTPATIENT
Start: 2024-04-29 | End: 2024-04-29 | Stop reason: HOSPADM

## 2024-04-29 RX ORDER — FENTANYL CITRATE 50 UG/ML
INJECTION, SOLUTION INTRAMUSCULAR; INTRAVENOUS
Status: DISCONTINUED | OUTPATIENT
Start: 2024-04-29 | End: 2024-04-29 | Stop reason: HOSPADM

## 2024-04-29 RX ORDER — DEXAMETHASONE SODIUM PHOSPHATE 10 MG/ML
INJECTION INTRAMUSCULAR; INTRAVENOUS
Status: DISCONTINUED | OUTPATIENT
Start: 2024-04-29 | End: 2024-04-29 | Stop reason: HOSPADM

## 2024-04-29 NOTE — DISCHARGE SUMMARY
[facility]  Discharge Note  Short Stay    Procedure(s) (LRB):  RT L3-4 L4-5 TFESI (Right)      OUTCOME: Patient tolerated treatment/procedure well without complication and is now ready for discharge.    DISPOSITION: Home or Self Care    FINAL DIAGNOSIS:  <principal problem not specified>    FOLLOWUP: In clinic    DISCHARGE INSTRUCTIONS:  No discharge procedures on file.     TIME SPENT ON DISCHARGE: 10 minutes

## 2024-04-29 NOTE — OP NOTE
"PROCEDURE: right Lumbar L3-4 and L4-5 Transforaminal Epidural Steroid Injection    Patient Name: Jackelyn Kendrick  MRN: [id number]    PROCEDURE DATE: 4/29/2024    INJECTION # 1    DIAGNOSIS: Lumbar Radiculopathy  CPT CODE: 88977 (INJECTION(S), ANESTHETIC AGENT(S) AND/OR STEROID; TRANSFORAMINAL EPIDURAL, WITH IMAGING GUIDANCE (FLUOROSCOPY OR CT), LUMBAR OR SACRAL, SINGLE LEVEL), 55199 (Each additional level).     POSTPROCEDURE DIAGNOSIS: Same    PHYSICIAN: Mick Pineda DO  NEEDLE TYPE: - 22G 5" Spinal Needle  MEDICATIONS INJECTED: 6ml mixture of 1ml Dexamethasone 10mg/ml and 5ml 1% lidocaine PF split equally between each site.  CONTRAST: Omni 300    Sedation Medications - Mild Sedation with 2mg Versed and 25mcg Fentanyl    Estimated Blood Loss - <2ml  Drains: None  Specimens Removed: None  Urine Output - Not Measured  Complications: None  Outcome: Good    Informed Consent:  The patient's condition and proposed procedures, risks, and alternatives were discussed with the patient or responsible party.  The patient's / responsible party's questions were answered.   The patient / responsible party appeared to understand and chose to proceed.  Informed consent was obtained.  After obtaining written consent, an IV hep lock was placed. (See nurses notes for details).     Procedure in Detail:  The patient was taken back to the OR suite and placed in a prone position. The skin overlying the injection site was prepped and draped in an aseptic fashion. The target injection site (see above) was identified with fluoroscopy.     Procedural Pause:  A procedural pause verifying correct patient, medical record number, allergies, medications to be administered, current vital signs, and surgical site was performed immediately prior to beginning the procedure.    The skin and subcutaneous tissue overlying the target site(s) of injection for the L3-4 and L4-5 transforaminal epidural steroid injection was/were anesthetized using 4 " mL of 1% lidocaine with a 25-gauge, 1½-inch needle.      The fluoroscopic beam was aligned to create a tunnel view.  The above noted needle was advanced parallel to the fluoroscopic beam towards the above noted foramen under fluoroscopic guidance.  The final position of the needle(s) was identified using AP and lateral views.  Paresthesias were not noted with final needle positioning.       A microbore extension tubing was attached to the needle to minimize any movement of the needle during injection or syringe change.  After negative aspiration for heme or CSF at each site(s) where the needle(s) was placed, 1ml of contrast dye was injected to confirm appropriate placement and that there was no vascular uptake.  Pain provocation by the injected contrast material was noted.  Then the injectate solution described above was injected in increments.  The needle was then retracted approximately intermediate and the needle track was flushed with 0.5 mL of Lidocaine 1%.  The needle(s) was then removed.     The same procedural technique outlined above was not repeated on the OPPOSITE side.    The heart rate, pulse oximetry, and blood pressure were continuously monitored throughout the procedure.  The procedure was well tolerated. She was carefully escorted to the recovery room in stable condition. Patient was monitored by RN for recovery period.  The patient will be contacted in the next few days to determine extent of relief.  Patient was given post procedure and discharge instructions to follow at home.  The patient was discharged in a stable condition.    Note Electronically Signed By:  Mick Owusu  04/29/2024

## 2024-04-29 NOTE — DISCHARGE INSTRUCTIONS
[facility]  [facility]  [place] service # [phone]    POST-PROCEDURE INSTRUCTIONS:    Today you had an injection that included a steroid medications.  The steroid may or may not have been mixed with a local anesthetic when it was injected.   If the injection was in the neck, you may feel some pressure, numbness, or slight weakness in the arm after the procedure for a short period of time (this is a normal response), if this persists for longer than 1 day please contact our office or go to the emergency room.  If the injection was in the low back, you may feel some pressure, numbness, or slight weakness in the leg after the procedure for a short period of time (this is a normal response), if this persists for longer than 1 day please contact our office or go to the emergency room.  You may get side effects from the steroid.  This is not uncommon.  Symptoms include: elevated blood sugar, elevated blood pressure, headache, flushing, nausea, insomnia.  These symptoms are transient and will resolve within 1-3 days.  If symptoms last longer than this please contact our office or head to the emergency room.  Steroid medications can take anywhere from 3-14 days to take effect (rarely longer).  You may notice that your pain worsens for a short period of time after the injection, this would not be unusual due to the pressure and trauma from the needle.    If you do not have a follow up appointment scheduled, please contact my office (or the office of the physician who referred you for the procedure) to get a post-procedure follow up scheduled 2-4 weeks after the procedure.  This can be done as a virtual visit if that is more convenient for you.      What you need to do:    Keep a record of your response to the injection you had today.    How much relief did you get?   When did the relief start and how long did it last?  Were you able to decrease the use of any of your pain  medications?  Were you able to increase your level of activity?  How long did the relief last?    What to watch out for:    If you experience any of the following symptoms after your procedure, please notify the messaging service immediately (see above for contact information):   fever (increased oral temperature)   bleeding or swelling at the injection site,    drainage, rash or redness at the injection site    possible signs of infection    increased pain at the injection site   worsening of your usual pain   severe headache   new or worsening numbness    new arm and/or leg weakness, or    changes in bowel and/or bladder function: urinating or defecating on yourself and not knowing that you did it.    PLEASE FOLLOW ALL INSTRUCTIONS CAREFULLY     Do not engage in strenuous activity (e.g., lifting or pushing heavy objects or repeated bending) for 24 hours.     Do not take a bath, swim or use Jacuzzi for 24 hours after procedure. (A shower is fine).   Remove any Band-Aids when you get home.    Use cold/ice, as needed for comfort.  We recommend the use of cold therapy alternating on for 20 minutes, off for 20 minutes.    Do not apply direct heat (heating pad or heat packs) to the injection site for 24 hours.     Resume your usual medications, unless instructed otherwise by your Pain Physician.     If you are on warfarin (Coumadin) or other blood thinner, resume this medication as instructed by your prescribing Physician.    IF AT ANY POINT YOU ARE VERY CONCERNED ABOUT YOUR SYMPTOMS, PLEASE GO TO THE EMERGENCY ROOM.    If you develop worsening pain, weakness, numbness, lose bowel or bladder control (i.e., having an accident where you did not even know you had to go to the bathroom and suddenly noticed you soiled yourself), saddle anesthesia (a loss of sensation restricted to the area of the buttocks, anus and between the legs -- i.e., those parts of your body that would touch a saddle if you were sitting on one) you  need to go immediately to the emergency department for evaluation and treatment.    ----------------------------------------------------------------------------------------------------------------------------------------------------------------  If you received Sedation please read the following instructions:  POST SEDATION INSTRUCTIONS    Today you received intravenous medication (also known as sedation) that was used to help you relax and/or decrease discomfort during your procedure. This medication will be acting in your body for the next 24 hours, so you might feel a little tired or sleepy. This feeling will slowly wear off.   Common side effects associated with these medications include: drowsiness, dizziness, sleepiness, confusion, feeling excited, difficulty remembering things, lack of steadiness with walking or balance, loss of fine muscle control, slowed reflexes, difficulty focusing, and blurred vision.  Some over-the-counter and prescription medications (e.g., muscle relaxants, opioids, mood-altering medications, sedatives/hypnotics, antihistamines) can interact with the intravenous medication you received and cause an increased risk of the side effects listed above in addition to other potentially life threatening side effects. Use extreme caution if you are taking such medications, and consult with your Pain Physician or prescribing physician if you have any questions.  For the next 12-24 hours:    DO NOT--Drive a car, operate machinery or power tools   DO NOT--Drink any alcoholic beverages (not even beer), they may dangerously increase the risk of side effects.    DO NOT--Make any important legal or business decisions or sign important documents.  We advise you to have someone to assist you at home. Move slowly and carefully. Do not make sudden changes in position. Be aware of dizziness or light-headedness and move accordingly.   If you seek medical treatment within 24 hours, let the nurse or doctor  caring for you know that you have received the above medications. If you have any questions or concerns related to your sedation or treatment today please contact us.

## 2024-04-30 ENCOUNTER — CLINICAL SUPPORT (OUTPATIENT)
Dept: AUDIOLOGY | Facility: CLINIC | Age: 74
End: 2024-04-30
Payer: COMMERCIAL

## 2024-04-30 DIAGNOSIS — H90.A21 SENSORINEURAL HEARING LOSS (SNHL) OF RIGHT EAR WITH RESTRICTED HEARING OF LEFT EAR: Primary | ICD-10-CM

## 2024-04-30 DIAGNOSIS — H91.91 HEARING LOSS OF RIGHT EAR, UNSPECIFIED HEARING LOSS TYPE: ICD-10-CM

## 2024-04-30 PROCEDURE — 92567 TYMPANOMETRY: CPT | Mod: S$GLB,,, | Performed by: AUDIOLOGIST

## 2024-04-30 PROCEDURE — 92557 COMPREHENSIVE HEARING TEST: CPT | Mod: S$GLB,,, | Performed by: AUDIOLOGIST

## 2024-04-30 PROCEDURE — 99999 PR PBB SHADOW E&M-EST. PATIENT-LVL II: CPT | Mod: PBBFAC,,, | Performed by: AUDIOLOGIST

## 2024-04-30 NOTE — PROGRESS NOTES
Jackelyn Kendrick was seen today in the clinic for an audiologic evaluation.  Patient's main complaint was decreased hearing in her right ear.  Mrs. Kendrick reported she had a normal MRI after she was evaluated a few years ago for an asymmetric hearing loss. Upon completing chart review, Mrs. Kendrick's last audiogram was in 2021 that revealed normal hearing in her left ear and a mild to severe sensorineural hearing loss (SNHL). She denied any tinnitus or otalgia. Mrs. Kendrick reported fullness in her right ear; she noted that she was diagnosed with a dermatitis/flaking skin build up in her right ear.     Otoscopy revealed clear EAC's with visible TM's in both ears.    Tympanometry revealed Type A in the right ear and Type A in the left ear.     Audiogram results revealed a moderate to severe SNHL in the right ear and slight to mild SNHL in the left ear.      Speech reception thresholds were noted at 45 dB in the right ear and 15 dB in the left ear.    Speech discrimination scores were 76% in the right ear and 100% in the left ear.    Results were reviewed with Mrs. Kendrick following testing. It is recommended she is fit with hearing aid(s) following medical clearance. She elected to schedule a hearing aid consultation prior to leaving today's visit.     Recommendations:  Otologic evaluation with ENT for medical clearance for hearing aids  Annual audiogram  Hearing protection when in noise  Hearing aid consultation

## 2024-04-30 NOTE — Clinical Note
Domi Courtney!  Can you please put in a referral for ENT for medical clearance for hearing aids for Mrs. Kendrick? Let me know if you have any questions.  Miriam

## 2024-05-01 ENCOUNTER — PATIENT MESSAGE (OUTPATIENT)
Dept: AUDIOLOGY | Facility: CLINIC | Age: 74
End: 2024-05-01
Payer: MEDICARE

## 2024-05-01 ENCOUNTER — TELEPHONE (OUTPATIENT)
Dept: OTOLARYNGOLOGY | Facility: CLINIC | Age: 74
End: 2024-05-01
Payer: MEDICARE

## 2024-05-01 ENCOUNTER — TELEPHONE (OUTPATIENT)
Dept: PRIMARY CARE CLINIC | Facility: CLINIC | Age: 74
End: 2024-05-01
Payer: MEDICARE

## 2024-05-01 DIAGNOSIS — Z71.89 HEARING AID CONSULTATION: Primary | ICD-10-CM

## 2024-05-01 NOTE — TELEPHONE ENCOUNTER
----- Message from Cornelia Green sent at 5/1/2024  4:15 PM CDT -----  Regarding: Hearing aid consultation  Hearing aid consultation     5/01/24- Pt has a referral and need assistance with scheduling for ENT provider. Pt had the Audiogram appt on 4/30; and has an appt scheduled for 5/17/24 for the Hearing Aide Consultation. Please contact pt at [phone] for scheduling with ENT.

## 2024-05-01 NOTE — TELEPHONE ENCOUNTER
----- Message from Louis Sanon, CCC-A sent at 4/30/2024  5:02 PM CDT -----  Domi Courtney!    Can you please put in a referral for ENT for medical clearance for hearing aids for Mrs. Kendrick? Let me know if you have any questions.    Miriam

## 2024-05-16 ENCOUNTER — CLINICAL SUPPORT (OUTPATIENT)
Dept: REHABILITATION | Facility: HOSPITAL | Age: 74
End: 2024-05-16
Payer: MEDICARE

## 2024-05-16 DIAGNOSIS — M54.16 LUMBAR RADICULOPATHY: ICD-10-CM

## 2024-05-16 DIAGNOSIS — R29.898 DECREASED STRENGTH OF TRUNK AND BACK: Primary | ICD-10-CM

## 2024-05-16 DIAGNOSIS — M25.69 DECREASED ROM OF TRUNK AND BACK: ICD-10-CM

## 2024-05-16 DIAGNOSIS — M96.1 POST LAMINECTOMY SYNDROME: ICD-10-CM

## 2024-05-16 PROCEDURE — 97161 PT EVAL LOW COMPLEX 20 MIN: CPT

## 2024-05-16 PROCEDURE — 97530 THERAPEUTIC ACTIVITIES: CPT

## 2024-05-16 NOTE — PLAN OF CARE
"[facility]  Physical Therapy Lumbar Evaluation      Name: Jackelyn Kendrick  Clinic Number: [id number]    Therapy Diagnosis:   Encounter Diagnoses   Name Primary?    Lumbar radiculopathy     Post laminectomy syndrome     Decreased strength of trunk and back Yes    Decreased ROM of trunk and back      Physician: Mick Pineda,*    Physician Orders: PT Eval and Treat  Medical Diagnosis from Referral:   M54.16 (ICD-10-CM) - Lumbar radiculopathy   M96.1 (ICD-10-CM) - Post laminectomy syndrome     Evaluation Date: 5/16/2024  Authorization Period Expiration: 4/15/25  Plan of Care Expiration:8/16/24  Reassessment Due: 6/16/2024  Visit # / Visits authorized: 1/1  MedX testing visit 2    Time In: 9  Time Out: 10  Total Billable Time: 60 minutes  INSURANCE and OUTCOMES: Fee for Service with FOTO Outcomes 1/3    Precautions: standard and previous LB surgery/HTN/L RTC repair/R arthroscopic    Pattern of pain determined: 1PEN    Subjective     Date of onset: 3years  History of current condition: Jackelyn Kendrick reports chronic LBP for 20 years.  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 Pt has undergone 2 lumbar surgeries( in the 90"s) which improved condition.  Pt reports pain in general has begun to worsen with age. Pt reports she had an KALEE in April which decreased her symptoms about 10%.   Currently R>L LBP with radiating symptoms to R knee.  Pt states RLE pain occurs when she is walking > 3blocks and LBP usually bothers her when she is slightly flexed fwd in standing(dishes)   Worse : walking /standing 5min /standing with fwd lean /sit to stand      Better:sitting   RLB dominant, constant.  Pain described as achy        Medical History:   Past Medical History:   Diagnosis Date    Arthritis     Basal cell carcinoma     Bronchitis     seasonal    Cataract     Disorder of kidney and ureter     Diverticulitis     Dry eye syndrome     " GERD (gastroesophageal reflux disease)     Hyperlipidemia     Hypertension     Hypothyroidism 07/19/2012    Obese     PONV (postoperative nausea and vomiting)     Thyroid disease        Surgical History:   Jackelyn Kendrick  has a past surgical history that includes Colonoscopy (2007); Nasal septum surgery; Shoulder surgery; Hysterectomy; Cholecystectomy; Hernia repair; Tonsillectomy; Back surgery; De Quervain's release (Left, 03/2017); Colonoscopy (N/A, 09/03/2020); Arthroscopic repair of rotator cuff of shoulder (Right, 09/23/2020); Fixation of tendon (Right, 09/23/2020); Phacoemulsification of cataract (Left, 10/20/2021); Intraocular prosthesis insertion (Left, 10/20/2021); Cataract extraction w/  intraocular lens implant (Left, 10/20/2021); Trigger finger release (Left, 12/02/2021); Injection of steroid (Right, 12/02/2021); Phacoemulsification of cataract (Right, 12/29/2021); Intraocular prosthesis insertion (Right, 12/29/2021); Cystoscopy; Anorectal manometry (N/A, 06/01/2023); Carpal tunnel release (Left, 01/11/2024); Dupuytren contracture release (Left, 01/11/2024); Decompression of nerve (Left, 01/11/2024); Injection of steroid (Right, 01/11/2024); and Epidural steroid injection into lumbar spine (Right, 4/29/2024).  As per MD:  SUBJECTIVE:     Jackelyn Kendrick is a 73 y.o. female presents to the clinic for the evaluation of lower back pain. The pain started years ago following no inciting event and symptoms have been worsening.  The patient has a hx of right L4-5 hemilaminectomy for sciatic pain in the 90s. Pain now is different.  This pain has been ongoing for a number of years.  It would come and go.  She used to see a pain management doctor and would get some injections in the back which would help.  The pain is located in the right buttocks and travels down the right lateral thigh to the side of the knee.  It does not travel down the knee.  If she is standing or doing household chores then she gets  an achy back pain.  When she first stands up it is painful, but then it works itself out.       She saw spine surgery and was started on gabapentin.  She can walk a little further (about 4 blocks) before she has to stop.      Pain Description:     The pain is located in the lower back area and radiates to the right leg .    At BEST  3/10   At WORST  8/10 on the WORST day.    On average pain is rated as 4/10.   Today the pain is rated as 4/10  The pain is continuous.  The pain is described as aching    Symptoms interfere with daily activity.   Exacerbating factors: Sitting, Standing, Walking, and Getting out of bed/chair.    Mitigating factors heat and medications.   She reports 7 hours of sleep per night.  Medications:   Jackelyn has a current medication list which includes the following prescription(s): albuterol, arexvy (pf), cranberry fruit extract, cyclosporine, diclofenac sodium, dicyclomine, docusate sodium, fluocinonide, gabapentin, ketoconazole, levothyroxine, lidocaine-prilocaine, losartan, magnesium, memantine, multi-vitamin, nitrofurantoin, omeprazole, ondansetron, oxybutynin, psyllium husk, rosuvastatin, trazodone, trazodone, and triamcinolone acetonide 0.1%.    Allergies:   Review of patient's allergies indicates:   Allergen Reactions    Levaquin [levofloxacin] Swelling and Edema    Erythromycin (bulk) Nausea And Vomiting     Not true allergy        Imaging: MRI & XRAY  Xray:  Impression:     1. No acute displaced fracture or dislocation of the lumbar spine noting degenerative changes as described  MRI:     Impression:     Lumbar spondylosis most notable at L2-L3 and L3-L4 with mild spinal canal stenosis mild/moderate neural foraminal narrowing.  Severe right and moderate left neural foraminal narrowing at L4-L5.      Prior Therapy: yes  Prior Treatment: 2 surgery/KALEE  Social History:  lives with their spouse  Occupation: retired nurse  Leisure: garden  Prior Level of Function: I  Current Level of  "Function: difficulty with standing washing dishes/mopping/sweeping  DME owned/used: stationary bike  Gym Membership: no    Pain:  Current 2/10, worst 4/10, best 2/10   Location: right back  and buttocks   Description: Aching and Dull  Aggravating Factors: Standing, Walking, Morning, Flexing, Lifting, and Getting out of bed/chair  Easing Factors: hot bath and movement  Disturbed Sleep: no    Pattern of pain questions:  1.  Where is your pain the worst? LB  2.  Is your pain constant or intermittent? constant  3.  Does bending forward make your typical pain worse? yes  4.  Since the start of your back pain, has there been a change in your bowel or bladder? no  5.  What can't you do now that you use to be able to do? Walking long distances/lift    Pts goals: "decrease pain and be able to walk further."    Red Flag Screening:   Cough/Sneeze Strain: (--)  Bladder/Bowel: (--)  Falls: (--)  Night pain: (--)  Unexplained weight loss: (--)  General health: fair    Objective      Postural examination/scapula alignment: Rounded shoulder and Head forward/ decreased lordosis  Joint integrity: Firm end feeling  Skin integrity:WNL   Edema: None  Correction of posture: better with lumbar roll  Sitting: poor  Standing: poor    MOVEMENT LOSS - Lumbar   Norms ROM Loss Initial   Flexion Fingers touch toes, sacral angle >/= 70 deg, uniform spinal curvature, posterior weight shift  moderate loss   Extension ASIS surpasses toes, spine of scapulae surpasses heels, uniform spinal curve moderate loss and major loss   Side glide Right  moderate loss c/ inc pain   Side glide Left  minimal loss   Rotation Right PT observes contralateral shoulder minimal loss   Rotation Left PT observes contralateral shoulder minimal loss     Lower Extremity Strength  Right LE  Left LE    Hip flexion: 4/5 Hip flexion: 5/5   Hip extension:  4-/5 Hip extension: 4/5   Hip abduction: 4/5 Hip abduction: 4/5   Hip adduction:  4/5 Hip adduction:  4/5   Hip External " Rotation N/T Hip External Rotation N/T   Hip Internal rotation   N/T Hip Internal rotation N/T   Knee Flexion 4/5 Knee Flexion 5/5   Knee Extension 5/5 Knee Extension 5/5   Ankle dorsiflexion: 5/5 Ankle dorsiflexion: 5/5   Ankle plantarflexion: 4-/5 Ankle plantarflexion: 4-/5     GAIT:  Assistive Device used: none  Level of Assistance: independent  Patient displays the following gait deviations: no gait deviations observed.     Special Tests:   Test Name  Test Result   Prone Instability Test (--)   SI Joint Provocation Test (--)   Straight Leg Raise (--)   Neural Tension Test (--)   Crossed Straight Leg Raise (--)   Walking on toes Not able   Walking on heels  Not able     NEUROLOGICAL SCREENING:     Sensory deficits: Intact B LE's    Reflexes:    Left Right   Patella Tendon 2+ 2+   Achilles Tendon N/T N/T   Babinski  N/T N/T   Clonus (--) (--)     REPEATED TEST MOVEMENTS:    Baseline symptoms:  Repeated Flexion in Standing pain during motion  no worse   Repeated Extension in Standing worse   Repeated Flexion in lying worse   Repeated Extension in lying  worse       STATIC TESTS and other movements:   Prone lie no worse   Prone lie on elbows worse   Sitting slouched  better   Sitting erect worse   Standing slouched better   Standing erect  no worse   Lying prone in extension  worse   Long sitting   N/T   Sustained flexion better   Sustained prone using mat N/t     Lumbar testing Visit 2    OUTCOMES SELECTION:   Intake Outcome Measure for FOTO Lumbar Survey    Therapist reviewed FOTO scores for Jackelyn Kendrick on 5/16/2024.   FOTO documents entered into [employer] - see Media section.    Intake Score: 49% functional ability  Goal Score: 54% functional ability          Treatment       Written Home Exercises Provided: yes.    HEP AS FOLLOWS:  Piriformis stretch  BKTC  LTR    Exercises were reviewed and Jackelyn Kendrick was able to demonstrate them prior to the end of the session. Jackelyn Kendrick demonstrated good   understanding of the education provided.     See EMR under Patient Instructions for exercises provided 5/16/2024.    MedX Testing:  MedX testing to be performed next visit        5/16/2024     9:30 AM   [facility]   Visit Number 1   VAS Pain Rating 3         Therapeutic Education/Activity provided for 15 minutes:   - Patient was given an [facility] handout which discusses the following:  - what to expect in therapy  - an overview of the program, including health coaching and wellness  - importance of spinal hygiene, proper posture, lifting mechanics, sleep quality, and nutrition/hydration   - nEdy roll trialed, recommended, and purchase information was provided.  - Patient received a handout regarding anticipated muscular soreness following the isometric test and strategies for management were reviewed with patient including stretching, using ice and scheduled rest.   - Patient received verbal education on the following:   - [facility] program   - purpose of the isometric test  - safe progression of lumbar strengthening, wellness approach, and systemic strengthening.   - safe usage of MedX machine and testing protocols.    Jackelyn Kendrick received cold pack for 5 minutes to LB in [place].    Assessment     Jackelyn is a 73 y.o. female referred to [facility] with a medical diagnosis of   M54.16 (ICD-10-CM) - Lumbar radiculopathy   M96.1 (ICD-10-CM) - Post laminectomy syndrome   . Pt presents with chronic LBP which started approx 20 years ago while lifting a patient. Pt reports she had surgeries which improved her overall condition. Pain has progressively worsened over the years which has lead to restrictions in standing tolerance,walking tolerance and ADL's.  Pt presents with decreased trunk ROM, poor posture, decreased trunk strength, 49% FOTO ability score, and decreased LE strength .  Pts goal is to be able to stand and walk longer without pain      Pain Pattern: 1PEN        Pt prognosis is Good.     Pt will benefit from skilled outpatient Physical Therapy to address the deficits stated above and in the chart below, to provide pt/family education, and to maximize pt's level of independence. Based on the above history and physical examination an active physical therapy program is recommended.      Plan of care discussed with patient: Yes  Pt's spiritual, cultural and educational needs considered and patient is agreeable to the plan of care and goals as stated below:     Anticipated Barriers for therapy: 2 previous LB sugeries/RTC surgery    PT Evaluation Completed? Med x testing visit 2    Medical necessity is demonstrated by the following problem list:    History  Co-morbidities and personal factors that may impact the plan of care [] LOW: no personal factors / co-morbidities  [x] MODERATE: 1-2 personal factors / co-morbidities  [] HIGH: 3+ personal factors / co-morbidities    Moderate / High Support Documentation:   Co-morbidities affecting plan of care: 2 previous LB surgeries    Personal Factors:   no deficits     Examination  Body Structures and Functions, activity limitations and participation restrictions that may impact the plan of care [x] LOW: addressing 1-2 elements  [] MODERATE: 3+ elements  [] HIGH: 4+ elements (please support below)    Moderate / High Support Documentation:     Clinical Presentation [x] LOW: stable  [] MODERATE: Evolving  [] HIGH: Unstable     Decision Making/ Complexity Score: low         GOALS: Pt is in agreement with the following goals.    Short term goals:  6 weeks or 10 visits   - Pt will demonstrate increased lumbar MedX ROM by at least 3 degrees from the initial ROM value with improvements noted in functional ROM and ability to perform ADLs. Appropriate and Ongoing  - Pt will demonstrate increased MedX average isometric strength value by 20% from initial test resulting in improved ability to perform bending, lifting, and carrying activities  safely, confidently. Appropriate and Ongoing  - Pt will report a reduction in worst pain score by 1-2 points for improved tolerance for walking/standing. Appropriate and Ongoing  - Pt able to perform HEP correctly with minimal cueing or supervision from therapist to encourage independent management of symptoms. Appropriate and Ongoing    Long term goals: 10 weeks or 20 visits   - Pt will demonstrate increased lumbar MedX ROM by at least 9 degrees from initial ROM value, resulting in improved ability to perform functional forward bending while standing and sitting. Appropriate and Ongoing  - Pt will demonstrate increased MedX average isometric strength value by 40% from initial test resulting in improved ability to perform bending, lifting, and carrying activities safely and confidently. Appropriate and Ongoing  - Pt to demonstrate ability to independently control and reduce their pain through posture positioning and mechanical movements throughout a typical day. Appropriate and Ongoing  - Pt will demonstrate reduced pain and improved functional outcomes as reported on the FOTO by reaching an intake score of >/= 61% functional ability in order to demonstrate subjective improvement in patient's condition. . Appropriate and Ongoing  - Pt will demonstrate independence with the HEP at discharge. Appropriate and Ongoing  - Pt(patient goal)will be able to walk > 3 blocks without  RLE pain Appropriate and Ongoing  Pt will be able to stand and wash dishes > 5 minutes without LBP  Plan     Outpatient physical therapy 2x week for 10 weeks or 20 visits to include the following:   - Patient education  - Therapeutic exercise  - Manual therapy  - Performance testing   - Neuromuscular Re-education  - Therapeutic activity   - Modalities    Pt may be seen by PTA as part of the rehabilitation team.     Therapist: Renea Mccann, PT  5/16/2024

## 2024-05-17 ENCOUNTER — CLINICAL SUPPORT (OUTPATIENT)
Dept: AUDIOLOGY | Facility: CLINIC | Age: 74
End: 2024-05-17
Payer: COMMERCIAL

## 2024-05-17 DIAGNOSIS — H90.A21 SENSORINEURAL HEARING LOSS (SNHL) OF RIGHT EAR WITH RESTRICTED HEARING OF LEFT EAR: Primary | ICD-10-CM

## 2024-05-17 NOTE — PROGRESS NOTES
HEARING AID CONSULTATION    Jackelyn Kendrick was seen today for a hearing aid consultation for her right ear. We reviewed her hearing test results and discussed different levels of technology of hearing aids. We discussed the benefits of binaural amplification versus monaural amplification. It was recommended she start with the right ear only and consider the left hearing aid if needed. She reported that she is mostly in quiet settings at home. She is going to spend a lot of time at her pool at home this summer. Mrs. Kendrick was informed of all pricing and service options available through [facility] She was also advised to verify what, if any, discounts or benefits are available with her insurance. Mrs. Kendrick understood that [facility] is not in network with any insurance payor.     Mrs. Kendrick was informed of the non-refundable $250.00 deposit required to order and that the remaining balance is due the day of .     A ellie blue Oticon Intent 4 hearing aid was selected. Impressions for custom ear molds were taken without incident. Patient opted for the bundled price. We will call her upon receipt of the hearing aid to schedule a fitting.

## 2024-05-20 NOTE — PROGRESS NOTES
Jackelyn Kendrick presents for follow up evaluation of   Encounter Diagnoses   Name Primary?    Carpal tunnel syndrome of left wrist Yes    Dupuytren's contracture of left hand     De Quervain's tenosynovitis, right     Ulnar tunnel syndrome of left wrist    The patient has had an EMG/NCS due to her left hand and elbow numbness/tingling which we reviewed together today and it showed:  Impression:  There is electrophysiologic evidence of a left sensory median mononeuropathy across the wrist (I.e. Carpal tunnel syndrome).  There is no motor axonal loss.  This is graded as Mild in severity on the left.    There were chronic neuropathic changes in the left abductor digiti minimi muscle.  In isolation to one muscle, this is of limited diagnostic significance.  This is nonspecific, but could be considered weak evidence of an ulnar neuropathy in the appropriate clinical context, however there is no focal slowing at the elbow or wrist and no axonal loss to corroborate, which makes this much less likely.  Still, her clinical symptoms of numbness/tingling in the 4th/5th digits radiating from the medial elbow, worse with flexion or pressure, are certainly most consistent with an ulnar neuropathy, despite the rather weak evidence on today's study.       Patient is also complaining of left palmar dupuytren's contracture along ring finger. She states that is has become progressively worse over the last couple of months and is affecting her ADLs. She is also having pain along her right radial wrist. She has long standing history of DeQuervain's and her right wrist and has been injected x 4 since 2.16.22. She states that she has pain with thumb extension and pottery. Her pain is a 3/10.    PE:    AA&O x 4.  NAD  HEENT:  NCAT, sclera nonicteric  Lungs:  Respirations are equal and unlabored.  CV:  2+ bilateral upper and lower extremity pulses.  MSK:    Left hand: + median n. compression, + Tinel's + Phalens left wrist, + compression  LVM to inform Pt we have received MRI report and images. Pt was also informed to schedule a f/u with spine team      and Tinels left elbow. Neurovascularly intact. 5/5 thenar and intrinsic musculature strength. Dupuytrens cord with contracture left ring MP 25 degrees  otherwise full range of motion hands, wrists and elbows.    Right hand/Wrist:  strength normal  + swelling right radial wrist, Tenderness to palpation right 1st dorsal extensor compartment, pain with the radial and ulnar deviation, + Finkelstein's test, + WHAT Test, otherwise ROM hand/wrist/elbow full, painless      Assessment: Left carpal tunnel syndrome, cubital tunnel syndrome, Dupuytren's disease and right DeQuervain's tenosynovitis.    Plan: We have discussed conservative vs. surgical treatment as well as risks, benefits and alternatives for carpal tunnel syndrome, ulnar nerve compression, Dupuytren's disease and DeQuervain's tenosynovitis.  She has exhausted conservative measures and would like to proceed with surgery. Surgery would include Left carpal tunnel release, left ulnar nerve decompression at elbow, left partial fasciectomy dupuytrens removal // Right 1st dorsal extensor compartment cortisone injection.     We have discussed risks of hand surgery which include but are not limited to blood clots in the legs that can travel to the lungs (pulmonary embolism). Pulmonary embolism can cause shortness of breath, chest pain, and even shock. Other risks include urinary tract infection, nausea and vomiting (usually related to pain medication), chronic pain, bleeding, nerve damage, blood vessel injury, scarring and infection of the hand which can require re-operation. Furthermore, the risks of anesthesia include potential heart, lung, kidney, and liver damage.  Informed consent was obtained.  She understands and would like to proceed with surgery in the near future.    Call with any questions/concerns in the interim        Suzy Dominguez MD    Please be aware that this note has been generated with the assistance of matt voice-to-text.  Please excuse any  spelling or grammatical errors.

## 2024-05-24 ENCOUNTER — CLINICAL SUPPORT (OUTPATIENT)
Dept: REHABILITATION | Facility: HOSPITAL | Age: 74
End: 2024-05-24
Attending: STUDENT IN AN ORGANIZED HEALTH CARE EDUCATION/TRAINING PROGRAM
Payer: MEDICARE

## 2024-05-24 DIAGNOSIS — M25.69 DECREASED ROM OF TRUNK AND BACK: Primary | ICD-10-CM

## 2024-05-24 PROCEDURE — 97110 THERAPEUTIC EXERCISES: CPT

## 2024-05-24 PROCEDURE — 97750 PHYSICAL PERFORMANCE TEST: CPT

## 2024-05-24 NOTE — PROGRESS NOTES
"[facility]      Name: Jackelyn Kendrick  Clinic Number: [id number]    Therapy Diagnosis:   Encounter Diagnosis   Name Primary?    Decreased ROM of trunk and back Yes     Physician: Mick Pineda,*    Visit Date: 2024    Physician Orders: PT Eval and Treat  Medical Diagnosis from Referral:   M54.16 (ICD-10-CM) - Lumbar radiculopathy   M96.1 (ICD-10-CM) - Post laminectomy syndrome      Evaluation Date: 2024  Authorization Period Expiration: 4/15/25  Plan of Care Expiration:24  Reassessment Due: 2024  Visit # / Visits authorized:   MedX testing visit 2    Time In: 9:00AM  Time Out: 10:00AM  Total Billable Time: 55 minutes  INSURANCE and OUTCOMES: Fee for Service with FOTO Outcomes 1/3    Precautions: standard and previous LB surgery/HTN/L RTC repair/R arthroscopic     Pattern of pain determined: 1PEN    Subjective   Jackelyn reports she tripped on a chair at the gym and hurt her little toe. She's not sure if it's broken or not. She hasn't had any change in symptoms since her evaluation. She can't walk very far before she starts getting symptoms down her right leg and has to stop and rest.     Patient reports tolerating previous visit no effect  Patient reports their pain to be 3/10 on a 0-10 scale with 0 being no pain and 10 being the worst pain imaginable.  Pain Location: right back and buttocks     Work and leisure: retired nurse/garden  Pt goals: "decrease pain and be able to walk further."     Objective     Baseline IM Testing Results:   Date of testin24   Initial:  Midpoint Final   ROM 6-45 deg     Max Peak Torque 88      Min Peak Torque 47      Flex/Ext Ratio 1.9:1     % below normative data 27%     % gain from initial N/a       Outcomes:  Initial score:   Visit 5 score:  Goal:      Treatment    Jackelyn Kendrick received the treatments listed below:      MedX testing performed day 2: Patient  received neuromuscular education to " "engage spinal musculature correctly for motor control and engagement of musculature for 15 minutes including the MedX exercise component and practice and standard testing. MedX dynamic exercise and baseline isometric test performed with instructions to guide the patient safely through the testing procedure. Patient instructed to perform isometric test correctly and safely while building to an optimal force with a pain-free effort. Patient also instructed that they should feel support/pressure from MedX restraints but no pain/discomfort, and encouraged to report any pain to therapist. Patient demonstrated appropriate understanding of information and tolerance of test.  Education regarding purpose of test, safety during test given, and reviewed possible more soreness and strategies.            5/24/2024     8:58 AM   [facility]   Visit Number 2   VAS Pain Rating 3   Time 5   Lumbar Stretches - Slouch Overcorrection 10   Flexion in Lying 10   Lumbar Extension Seat Pad 2   Femur Restraint 6   Top Dead Center 24   Counterweight 152   Lumbar Flexion 45   Lumbar Extension 6   Lumbar Peak Torque 88 ft. lbs.   Min Torque 47   Test Percent Below Normative Data 27 %        therapeutic exercises to develop strength, endurance, ROM, flexibility, posture, and core stabilization for 45 minutes including:    Piriformis stretch 2x20" right  Sciatic nerve glides x20, right  DKTC with SB x10  LTR x10  +PPT x10  +bridges x10 (minimal lift off)  +clamshells x10 red theraband - NV  +SOC x10    Peripheral muscle strengthening which included 1 set of 15-20 repetitions at a slow, controlled 10-13 second per rep pace focused on strengthening supporting musculature for improved body mechanics and functional mobility.  Pt and therapist focused on proper form during treatment to ensure optimal strengthening of each targeted muscle group.  Machines were utilized including torso rotation, leg press, hip abd and hip add, leg ext.  Leg " curl, triceps, biceps, chest and row added visit 3      cold pack for 5 minutes to lumbar spine in z-lie.    Home Exercises Provided and Patient Education Provided   Home exercises include:Piriformis stretch, BKTC, LTR  Cardio program:visit 5  Lifting education date:visit 11  Posture/Lumbar roll: recommended  Fridge Magnet Discharge handout (date given):  Equipment at home/gym membership: stationary bike      Education provided:   - HEP  - POC  - purpose and procedure for isometric MedX testing    Written Home Exercises Provided: Patient instructed to cont prior HEP.  Exercises were reviewed and Jackelyn Kendrick was able to demonstrate them prior to the end of the session.  Jackelyn Kendrick demonstrated good  understanding of the education provided.     See EMR under Patient Instructions for exercises provided prior visit.      Assessment     Pt presents to second healthy back visit reporting tripping and hurting her right foot the other day at the gym. She had no change in back pain since evaluation. She was able to demo HEP with Mod VC for form. Began lumbopelvic motor control and strengthening today, with cueing for smooth movements and breath control. She tolerated well, with no increase in symptoms. Pt received isometric testing on the Lumbar MedX machine today. She tested at 27% below age norms indicating weakness of lumbar musculature. Begin dynamic strengthening next visit at 50% of peak torque. Pt was also able to complete half of the peripheral strengthening exercises without increased discomfort and will complete the complete circuit next visit as tolerated.    Patient is making good progress towards established goals.  Pt will continue to benefit from skilled outpatient physical therapy to address the deficits stated in the impairment chart, provide pt/family education and to maximize pt's level of independence in the home and community environment.     Anticipated Barriers for therapy: 2 previous  LB surgeries/RTC surgery   Pt's spiritual, cultural and educational needs considered and pt agreeable to plan of care and goals as stated below:             Goals:   Short term goals:  6 weeks or 10 visits   - Pt will demonstrate increased lumbar MedX ROM by at least 3 degrees from the initial ROM value with improvements noted in functional ROM and ability to perform ADLs. Appropriate and Ongoing  - Pt will demonstrate increased MedX average isometric strength value by 20% from initial test resulting in improved ability to perform bending, lifting, and carrying activities safely, confidently. Appropriate and Ongoing  - Pt will report a reduction in worst pain score by 1-2 points for improved tolerance for walking/standing. Appropriate and Ongoing  - Pt able to perform HEP correctly with minimal cueing or supervision from therapist to encourage independent management of symptoms. Appropriate and Ongoing     Long term goals: 10 weeks or 20 visits   - Pt will demonstrate increased lumbar MedX ROM by at least 9 degrees from initial ROM value, resulting in improved ability to perform functional forward bending while standing and sitting. Appropriate and Ongoing  - Pt will demonstrate increased MedX average isometric strength value by 40% from initial test resulting in improved ability to perform bending, lifting, and carrying activities safely and confidently. Appropriate and Ongoing  - Pt to demonstrate ability to independently control and reduce their pain through posture positioning and mechanical movements throughout a typical day. Appropriate and Ongoing  - Pt will demonstrate reduced pain and improved functional outcomes as reported on the FOTO by reaching an intake score of >/= 61% functional ability in order to demonstrate subjective improvement in patient's condition. . Appropriate and Ongoing  - Pt will demonstrate independence with the HEP at discharge. Appropriate and Ongoing  - Pt(patient goal)will be able  to walk > 3 blocks without  RLE pain Appropriate and Ongoing  Pt will be able to stand and wash dishes > 5 minutes without LBP      Plan   Continue with established Plan of Care towards established PT goals.       Therapist: Elzbieta Dowd, PT  Co-treated with Renea Mccann PT  5/24/2024

## 2024-05-24 NOTE — PROGRESS NOTES
"[facility]      Name: Jackelyn Kendrick  Clinic Number: [id number]    Therapy Diagnosis:   Encounter Diagnosis   Name Primary?    Decreased ROM of trunk and back Yes     Physician: Mick Pineda,*    Visit Date: 2024    Physician Orders: PT Eval and Treat  Medical Diagnosis from Referral:   M54.16 (ICD-10-CM) - Lumbar radiculopathy   M96.1 (ICD-10-CM) - Post laminectomy syndrome      Evaluation Date: 2024  Authorization Period Expiration: 4/15/25  Plan of Care Expiration:24  Reassessment Due: 2024  Visit # / Visits authorized: 3/20  MedX testing visit 2    Time In: 12:25 PM  Time Out: 1:30 PM  Total Billable Time: 60 minutes  INSURANCE and OUTCOMES: Fee for Service with FOTO Outcomes 1/3    Precautions: standard and previous LB surgery/HTN/L RTC repair/R arthroscopic     Pattern of pain determined: 1PEN    Subjective   Jackelyn reports chronic (R) lower back/glute pain that is rated as moderate. She reports some increased soreness after MedX testing last visit..      Patient reports tolerating previous visit : Muscular soreness  Patient reports their pain to be 6/10 on a 0-10 scale with 0 being no pain and 10 being the worst pain imaginable.  Pain Location: right back and buttocks     Work and leisure: retired nurse/garden  Pt goals: "decrease pain and be able to walk further."     Objective   Baseline IM Testing Results:   Date of testin24    Initial:  Midpoint Final   ROM 6-45 deg       Max Peak Torque 88        Min Peak Torque 47        Flex/Ext Ratio 1.9:1       % below normative data 27%       % gain from initial N/a            Outcomes:  Initial score:  Visit 5 score:  Goal:      Treatment    Jackelyn Kendrick received the treatments listed below:       Jackelyn Kendrick received neuromuscular education  to isolate and engage spinal stabilization musculature correctly for motor control and coordination to aid in function and posture " for 10 minutes on the Medical Medx Machine.  Patient performed MedX dynamic exercise with emphasis on spinal muscular control using pacer throughout  active range of motion. Therapist assisted patient in achieving optimal exertion for neural reeducation and endurance training by using the  Bev Exertion Rating scale, by instructing the patient to aim for mid range of exertion, performing 15-20 repetitions, slowly, correctly,and safely.          5/27/2024    12:49 PM   [facility] - [facility]   Visit Number 4   VAS Pain Rating 6   Time 5   Lumbar Stretches - Slouch 10   Extension in Standing 10   Flexion in Lying 10   Lumbar Weight 45 lbs   Repetitions 15   Rating of Perceived Exertion 4      therapeutic exercises to develop strength, endurance, ROM, flexibility, posture, and core stabilization for 50 minutes including:    Piriformis stretch 2 x 20 sec  BKTC x 10  LTR x 10  + PPT x 10 (Cues)  + Bridging x 10 (Minimal lift)  + SOC x 10 (Cues)  + EIS x 10    Peripheral muscle strengthening which included 1 set of 15-20 repetitions at a slow, controlled 10-13 second per rep pace focused on strengthening supporting musculature for improved body mechanics and functional mobility.  Pt and therapist focused on proper form during treatment to ensure optimal strengthening of each targeted muscle group.  Machines were utilized including torso rotation, leg press, hip abd and hip add, leg ext.  Leg curl, triceps, biceps (DB), chest ( T-band)and row added visit 3    manual therapy techniques:  for 00 minutes, including:     cold pack for 5 minutes to lower back.    Home Exercises Provided and Patient Education Provided   Home exercises include:Piriformis stretch, BKTC, LTR  Cardio program:visit 5  Lifting education date:visit 11  Posture/Lumbar roll: recommended  Fridge Magnet Discharge handout (date given):  Equipment at home/gym membership: stationary bike    Education provided:   -  Cues w/ex's  - MedX performance  -  Precor ex performance  - HB Protocol    Written Home Exercises Provided: Patient instructed to cont prior HEP.  Exercises were reviewed and Jackelyn Kendrick was able to demonstrate them prior to the end of the session.  Jackelyn Kendrick demonstrated good  understanding of the education provided.     See EMR under Patient Instructions for exercises provided prior visit.    Assessment   Jackelyn returns for her third healthy back visit reporting chronic lower back pain that is rated as 6/10. Treatment continued with flexibility, strengthening and neuromuscular reeducation ex's. Added PPT, Bridging, EIS and SOC this visit.  She was challenged with bridging ex (Minimal lift off) but was able to perform ex's with min cues and without increased pain. Lumbar MedX resistance was initiated at 45 ft/lbs and she completed 15 reps with a RPE = 4/10. (Cues for pacing and extension hold). She was able to complete the full circuit of peripheral strengthening ex's without c/o. Will continue per HB protocol and patient tolerance.    Patient is making progress towards established goals.   Pt will continue to benefit from skilled outpatient physical therapy to address the deficits stated in the impairment chart, provide pt/family education and to maximize pt's level of independence in the home and community environment.     Anticipated Barriers for therapy: 2 previous LB surgeries/RTC surgery   Pt's spiritual, cultural and educational needs considered and pt agreeable to plan of care and goals as stated below:     Goals:   Short term goals:  6 weeks or 10 visits   - Pt will demonstrate increased lumbar MedX ROM by at least 3 degrees from the initial ROM value with improvements noted in functional ROM and ability to perform ADLs. Appropriate and Ongoing  - Pt will demonstrate increased MedX average isometric strength value by 20% from initial test resulting in improved ability to perform bending, lifting, and carrying activities  safely, confidently. Appropriate and Ongoing  - Pt will report a reduction in worst pain score by 1-2 points for improved tolerance for walking/standing. Appropriate and Ongoing  - Pt able to perform HEP correctly with minimal cueing or supervision from therapist to encourage independent management of symptoms. Appropriate and Ongoing     Long term goals: 10 weeks or 20 visits   - Pt will demonstrate increased lumbar MedX ROM by at least 9 degrees from initial ROM value, resulting in improved ability to perform functional forward bending while standing and sitting. Appropriate and Ongoing  - Pt will demonstrate increased MedX average isometric strength value by 40% from initial test resulting in improved ability to perform bending, lifting, and carrying activities safely and confidently. Appropriate and Ongoing  - Pt to demonstrate ability to independently control and reduce their pain through posture positioning and mechanical movements throughout a typical day. Appropriate and Ongoing  - Pt will demonstrate reduced pain and improved functional outcomes as reported on the FOTO by reaching an intake score of >/= 61% functional ability in order to demonstrate subjective improvement in patient's condition. . Appropriate and Ongoing  - Pt will demonstrate independence with the HEP at discharge. Appropriate and Ongoing  - Pt(patient goal)will be able to walk > 3 blocks without  RLE pain Appropriate and Ongoing  Pt will be able to stand and wash dishes > 5 minutes without LBP    Plan   Continue with established Plan of Care towards established PT goals.     Therapist: Subhash Wayne, PTA     5/24/2024

## 2024-05-27 ENCOUNTER — CLINICAL SUPPORT (OUTPATIENT)
Dept: REHABILITATION | Facility: HOSPITAL | Age: 74
End: 2024-05-27
Attending: STUDENT IN AN ORGANIZED HEALTH CARE EDUCATION/TRAINING PROGRAM
Payer: MEDICARE

## 2024-05-27 DIAGNOSIS — M25.69 DECREASED ROM OF TRUNK AND BACK: Primary | ICD-10-CM

## 2024-05-27 PROCEDURE — 97110 THERAPEUTIC EXERCISES: CPT | Mod: CQ

## 2024-05-27 PROCEDURE — 97112 NEUROMUSCULAR REEDUCATION: CPT | Mod: CQ

## 2024-05-30 ENCOUNTER — OFFICE VISIT (OUTPATIENT)
Dept: PAIN MEDICINE | Facility: CLINIC | Age: 74
End: 2024-05-30
Payer: COMMERCIAL

## 2024-05-30 ENCOUNTER — DOCUMENTATION ONLY (OUTPATIENT)
Dept: AUDIOLOGY | Facility: CLINIC | Age: 74
End: 2024-05-30
Payer: MEDICARE

## 2024-05-30 ENCOUNTER — CLINICAL SUPPORT (OUTPATIENT)
Dept: REHABILITATION | Facility: HOSPITAL | Age: 74
End: 2024-05-30
Attending: STUDENT IN AN ORGANIZED HEALTH CARE EDUCATION/TRAINING PROGRAM
Payer: MEDICARE

## 2024-05-30 VITALS
BODY MASS INDEX: 25.48 KG/M2 | DIASTOLIC BLOOD PRESSURE: 90 MMHG | HEART RATE: 67 BPM | WEIGHT: 134.94 LBS | SYSTOLIC BLOOD PRESSURE: 133 MMHG | HEIGHT: 61 IN

## 2024-05-30 DIAGNOSIS — M25.69 DECREASED ROM OF TRUNK AND BACK: Primary | ICD-10-CM

## 2024-05-30 DIAGNOSIS — M96.1 POST LAMINECTOMY SYNDROME: Primary | ICD-10-CM

## 2024-05-30 DIAGNOSIS — M54.16 LUMBAR RADICULOPATHY: ICD-10-CM

## 2024-05-30 PROCEDURE — 1159F MED LIST DOCD IN RCRD: CPT | Mod: CPTII,S$GLB,, | Performed by: STUDENT IN AN ORGANIZED HEALTH CARE EDUCATION/TRAINING PROGRAM

## 2024-05-30 PROCEDURE — 1125F AMNT PAIN NOTED PAIN PRSNT: CPT | Mod: CPTII,S$GLB,, | Performed by: STUDENT IN AN ORGANIZED HEALTH CARE EDUCATION/TRAINING PROGRAM

## 2024-05-30 PROCEDURE — 99215 OFFICE O/P EST HI 40 MIN: CPT | Mod: S$GLB,,, | Performed by: STUDENT IN AN ORGANIZED HEALTH CARE EDUCATION/TRAINING PROGRAM

## 2024-05-30 PROCEDURE — 99999 PR PBB SHADOW E&M-EST. PATIENT-LVL IV: CPT | Mod: PBBFAC,,, | Performed by: STUDENT IN AN ORGANIZED HEALTH CARE EDUCATION/TRAINING PROGRAM

## 2024-05-30 PROCEDURE — 97110 THERAPEUTIC EXERCISES: CPT | Performed by: PHYSICAL MEDICINE & REHABILITATION

## 2024-05-30 PROCEDURE — 3080F DIAST BP >= 90 MM HG: CPT | Mod: CPTII,S$GLB,, | Performed by: STUDENT IN AN ORGANIZED HEALTH CARE EDUCATION/TRAINING PROGRAM

## 2024-05-30 PROCEDURE — 3008F BODY MASS INDEX DOCD: CPT | Mod: CPTII,S$GLB,, | Performed by: STUDENT IN AN ORGANIZED HEALTH CARE EDUCATION/TRAINING PROGRAM

## 2024-05-30 PROCEDURE — 3075F SYST BP GE 130 - 139MM HG: CPT | Mod: CPTII,S$GLB,, | Performed by: STUDENT IN AN ORGANIZED HEALTH CARE EDUCATION/TRAINING PROGRAM

## 2024-05-30 PROCEDURE — 97112 NEUROMUSCULAR REEDUCATION: CPT | Performed by: PHYSICAL MEDICINE & REHABILITATION

## 2024-05-30 PROCEDURE — 1101F PT FALLS ASSESS-DOCD LE1/YR: CPT | Mod: CPTII,S$GLB,, | Performed by: STUDENT IN AN ORGANIZED HEALTH CARE EDUCATION/TRAINING PROGRAM

## 2024-05-30 PROCEDURE — 4010F ACE/ARB THERAPY RXD/TAKEN: CPT | Mod: CPTII,S$GLB,, | Performed by: STUDENT IN AN ORGANIZED HEALTH CARE EDUCATION/TRAINING PROGRAM

## 2024-05-30 PROCEDURE — 3288F FALL RISK ASSESSMENT DOCD: CPT | Mod: CPTII,S$GLB,, | Performed by: STUDENT IN AN ORGANIZED HEALTH CARE EDUCATION/TRAINING PROGRAM

## 2024-05-30 RX ORDER — PREGABALIN 50 MG/1
50 CAPSULE ORAL 2 TIMES DAILY
Qty: 180 CAPSULE | Refills: 1 | Status: SHIPPED | OUTPATIENT
Start: 2024-05-30 | End: 2024-11-26

## 2024-05-30 NOTE — PROGRESS NOTES
Hearing aid arrived from ; device was assembled and charged in preparation for fitting. Patient was called to schedule fitting.     Hearing Aid Details      & Model: [employer] Intent 4 miniRITE  Color: ellie blue  Rt SN: [id number]  Battery: LI Rechargeable 13  Rt  and Dome: 3R 85 dB [id number] MicroShell  Repair Warranty Exp: 06/23/2027  L&D Warranty Exp: 06/23/2027   SN: [id number]

## 2024-05-30 NOTE — PROGRESS NOTES
"[facility]      Name: Jackelyn Kendrick  Clinic Number: [id number]    Therapy Diagnosis:   Encounter Diagnosis   Name Primary?    Decreased ROM of trunk and back Yes       Physician: Mick Pineda,*    Visit Date: 2024    Physician Orders: PT Eval and Treat  Medical Diagnosis from Referral:   M54.16 (ICD-10-CM) - Lumbar radiculopathy   M96.1 (ICD-10-CM) - Post laminectomy syndrome      Evaluation Date: 2024  Authorization Period Expiration: 4/15/25  Plan of Care Expiration:24  Reassessment Due: 2024  Visit # / Visits authorized:   MedX testing visit 2    Time In: 12:50 pm  Time Out: 2:00 pm  Total Billable Time: 70 minutes  INSURANCE and OUTCOMES: Fee for Service with FOTO Outcomes 1/3    Precautions: standard and previous LB surgery/HTN/L RTC repair/R arthroscopic     Pattern of pain determined: 1PEN    Subjective   Jackelyn reports chronic (R) lower back/glute pain that is rated as moderate. Symptoms worse standing/walking and better sitting.  Walking is reduced which is a goal.  Bending at sink and bending also bother her.  She maybe can report she can walk just a little bit further now.  She uses lumbar roll.    Patient reports tolerating previous visit : Muscular soreness  Patient reports their pain to be 5/10 on a 0-10 scale with 0 being no pain and 10 being the worst pain imaginable.  Pain Location: right back and buttocks     Work and leisure: retired nurse/garden  Pt goals: "decrease pain and be able to walk further."     Objective   Baseline IM Testing Results:   Date of testin24    Initial:  Midpoint Final   ROM 6-45 deg       Max Peak Torque 88        Min Peak Torque 47        Flex/Ext Ratio 1.9:1       % below normative data 27%       % gain from initial N/a          MOVEMENT LOSS - Lumbar    Norms ROM Loss Initial 24   Flexion Fingers touch toes, sacral angle >/= 70 deg, uniform spinal curvature, posterior weight shift  " moderate loss Mod loss   Extension ASIS surpasses toes, spine of scapulae surpasses heels, uniform spinal curve moderate loss and major loss Mod loss   Side glide Right   moderate loss c/ inc pain Mod loss   Side glide Left   minimal loss Min loss   Rotation Right PT observes contralateral shoulder minimal loss Min loss   Rotation Left PT observes contralateral shoulder minimal loss Min loss     Hip: 5/30/24  Left hip reduced int rot with pain, reduced extension with pain, (+) [place] and (+) fabers  Neg slump    Outcomes:  Initial score:49%  Visit 5 score:  Goal:>60%      Treatment    Jackelyn Kendrick received the treatments listed below:       Jackelyn Kendrick received neuromuscular education  to isolate and engage spinal stabilization musculature correctly for motor control and coordination to aid in function and posture for 10 minutes on the [employer] Machine.  Patient performed MedX dynamic exercise with emphasis on spinal muscular control using pacer throughout  active range of motion. Therapist assisted patient in achieving optimal exertion for neural reeducation and endurance training by using the  Bev Exertion Rating scale, by instructing the patient to aim for mid range of exertion, performing 15-20 repetitions, slowly, correctly,and safely.            5/30/2024     1:39 PM   [facility]   Visit Number 4   VAS Pain Rating 5   Time 5   Lumbar Stretches - Slouch Overcorrection 10   Extension in Standing 10   Flexion in Lying 10   Lumbar Weight 45 lbs   Repetitions 20   Rating of Perceived Exertion 5   Ice - Z Lie (in min.) 5       therapeutic exercises to develop strength, endurance, ROM, flexibility, posture, and core stabilization for 50 minutes including:    Piriformis stretch 2 x 20 sec  BKTC x 10  LTR x 10  + PPT x 10 (Cues)  + Bridging x 10 (Minimal lift)  +clams 10  + SOC x 10 (Cues)  + EIS x 10  +Hip ext rotation stretch 2 X 10  Prone lie 1 min    Peripheral muscle strengthening  which included 1 set of 15-20 repetitions at a slow, controlled 10-13 second per rep pace focused on strengthening supporting musculature for improved body mechanics and functional mobility.  Pt and therapist focused on proper form during treatment to ensure optimal strengthening of each targeted muscle group.  Machines were utilized including torso rotation, leg press, hip abd and hip add, leg ext.  Leg curl, triceps, biceps (DB), chest ( T-band)and row added visit 3    manual therapy techniques:  for 05 minutes, including: traction left hip 5 min to note effect, not much change noted by patient    cold pack for 5 minutes to lower back.    Home Exercises Provided and Patient Education Provided   Home exercises include:Piriformis stretch, BKTC, LTR, hip ext rot, bridging, clams, prone lie on elbows 1 min  Cardio program:visit 5  Lifting education date:visit 11  Posture/Lumbar roll: she is using lumbar roll  Fr[employer] Discharge handout (date given):  Equipment at home/gym membership: stationary bike    Education provided:   -  Cues w/ex's  - MedX performance  - Precor ex performance  - HB Protocol    Written Home Exercises Provided: Patient instructed to cont prior HEP.  Exercises were reviewed and Jackelyn Kendrick was able to demonstrate them prior to the end of the session.  Jackelyn Kendrick demonstrated good  understanding of the education provided.     See EMR under Patient Instructions for exercises provided prior visit.    Assessment   Jackelyn returns  reporting chronic lower back pain and left buttock and lateral thigh pain that is worse standing/walking, relieved sitting, but also worse with bending, that is rated as 5/10.   Did note some discomfort with hip range of motion and fabers, but no response noted to traction of hip.  Did progress stretching to include hip ext rotation.  Trial traction hip without significant change.   Continue to monitor and work on improving hip mobility and muscular  flexibility around hip and pelvis.   Treatment continued with flexibility, strengthening and neuromuscular reeducation ex's. She was challenged with bridging ex (Minimal lift off) but was able to perform ex's with min cues and without increased pain. Lumbar MedX resistance was maintained at 45 ft/lbs and she completed 20 reps with a RPE = 4/10. (Cues for pacing and extension hold). She was able to complete the full circuit of peripheral strengthening ex's without c/o. Will continue per HB protocol and patient tolerance.    Patient is making progress towards established goals.   Pt will continue to benefit from skilled outpatient physical therapy to address the deficits stated in the impairment chart, provide pt/family education and to maximize pt's level of independence in the home and community environment.     Anticipated Barriers for therapy: 2 previous LB surgeries/RTC surgery   Pt's spiritual, cultural and educational needs considered and pt agreeable to plan of care and goals as stated below:     Goals:   Short term goals:  6 weeks or 10 visits   - Pt will demonstrate increased lumbar MedX ROM by at least 3 degrees from the initial ROM value with improvements noted in functional ROM and ability to perform ADLs. Appropriate and Ongoing  - Pt will demonstrate increased MedX average isometric strength value by 20% from initial test resulting in improved ability to perform bending, lifting, and carrying activities safely, confidently. Appropriate and Ongoing  - Pt will report a reduction in worst pain score by 1-2 points for improved tolerance for walking/standing. Appropriate and Ongoing  - Pt able to perform HEP correctly with minimal cueing or supervision from therapist to encourage independent management of symptoms. Appropriate and Ongoing     Long term goals: 10 weeks or 20 visits   - Pt will demonstrate increased lumbar MedX ROM by at least 9 degrees from initial ROM value, resulting in improved ability to  perform functional forward bending while standing and sitting. Appropriate and Ongoing  - Pt will demonstrate increased MedX average isometric strength value by 40% from initial test resulting in improved ability to perform bending, lifting, and carrying activities safely and confidently. Appropriate and Ongoing  - Pt to demonstrate ability to independently control and reduce their pain through posture positioning and mechanical movements throughout a typical day. Appropriate and Ongoing  - Pt will demonstrate reduced pain and improved functional outcomes as reported on the FOTO by reaching an intake score of >/= 61% functional ability in order to demonstrate subjective improvement in patient's condition. . Appropriate and Ongoing  - Pt will demonstrate independence with the HEP at discharge. Appropriate and Ongoing  - Pt(patient goal)will be able to walk > 3 blocks without  RLE pain Appropriate and Ongoing  Pt will be able to stand and wash dishes > 5 minutes without LBP    Plan   Continue with established Plan of Care towards established PT goals.     Therapist: Yelena Alonso, PT     5/30/2024

## 2024-05-30 NOTE — PROGRESS NOTES
Chronic Pain - f/u    Referring Physician: No ref. provider found    Date: 05/30/2024     Re: Jackelyn Kendrick  MR#: [id number]  YOB: 1950  Age: 73 y.o.    Chief Complaint:   Chief Complaint   Patient presents with    Low-back Pain     **This note is dictated using the M*Modal Fluency Direct word recognition program. There are word recognition mistakes that are occasionally missed on review.**    ASSESSMENT: 73 y.o. year old female with back and leg pain, consistent with     1. Post laminectomy syndrome  pregabalin (LYRICA) 50 MG capsule      2. Lumbar radiculopathy  pregabalin (LYRICA) 50 MG capsule        PLAN:     Right lumbar radiculopathy and post-laminectomy syndrome  4/29/24 - Right L3-4, L4-5 TFESI - RN sed - no AC - no improvement  -stop gabapentin to 600mg qhs. This is causing excess sedation during the day  -discussed SCS with [facility]. Information provided. She can send me a message if she wants to proceed with the stimulator  -healthy back referral - she is in this now.  She states that they are working her out good.  -vertiflex may be an option, but I would be concerned that it might make her back pain worse while helping the leg pain due to the severity of her disc disease.  -try Lyrica 50mg BID  -long time discussing options between surgery referral, stimulator trial, or vertiflex.    Lumbar spondylosis  -failed 2 RFAs in the past    DDD  -patient has substantial DDD at multiple levels.  Could consider Viadisc for disc regeneration. This would be for back pain.    - RTC 3-4 months  - Counseled patient regarding the importance of activity modification and physical therapy.    The above plan and management options were discussed at length with patient. Patient is in agreement with the above and verbalized understanding. It will be communicated with the referring physician via electronic record, fax, or mail.  Lab/study reports reviewed were important and necessary because  subsequent medical and treatment recommendations required review of the above lab/study reports. Images viewed/reviewed above were important and necessary because subsequent medical and treatment recommendations required review of the reviewed image(s).     Electronically signed by:  Mick Pineda DO  05/30/2024    Patient was seen in clinic today.  The total amount of time spent on this patient was greater than 45 minutes.  Due to medical complexity and multiple issues discussed/addressed I am billing for a level 5 visit. This includes face to face time and non-face to face time preparing to see the patient by reviewing previous labs/imaging, obtaining and/or reviewing separately obtained history, documenting clinical information in the electronic or other health record, independently reviewing results and communicating results to the patient/family/caregiver.  The available imaging was reviewed in person with the patient, pathology and treatment options were explained in detail with the patient.  The patient was given multiple opportunities to ask questions, and all questions were answered.    =========================================================================================================    SUBJECTIVE:    Interval History 5/30/2024:   Jackelyn Kendrick is a 73 y.o. female presents to the clinic for follow up.  Since last visit the pain has has slightly improved.    The pain is located in the lower back area and radiates to the right hip, knee .  The pain is described as aching    At BEST  3/10   At WORST  8/10 on the WORST day.    On average pain is rated as 4/10.   Today the pain is rated as 5/10  Symptoms interfere with daily activity.   Exacerbating factors: Sitting, Standing, Laying, and Walking.    Mitigating factors rest.     Current pain medications: gabapentin 600mg QHS, tylenol  Failed Pain Medications: celebrex (due to CKD3)    Pain procedures:   Pain injections (epidurals and nerve  ablations) - [id number]  Nerve ablations - not helpful x2    Initial hx:  Jackelyn Kendrick is a 73 y.o. female presents to the clinic for the evaluation of lower back pain. The pain started years ago following no inciting event and symptoms have been worsening.  The patient has a hx of right L4-5 hemilaminectomy for sciatic pain in the 90s. Pain now is different.  This pain has been ongoing for a number of years.  It would come and go.  She used to see a pain management doctor and would get some injections in the back which would help.  The pain is located in the right buttocks and travels down the right lateral thigh to the side of the knee.  It does not travel down the knee.  If she is standing or doing household chores then she gets an achy back pain.  When she first stands up it is painful, but then it works itself out.      She saw spine surgery and was started on gabapentin.  She can walk a little further (about 4 blocks) before she has to stop.     Pain Description:    The pain is located in the lower back area and radiates to the right leg .    At BEST  3/10   At WORST  8/10 on the WORST day.    On average pain is rated as 4/10.   Today the pain is rated as 4/10  The pain is continuous.  The pain is described as aching    Symptoms interfere with daily activity.   Exacerbating factors: Sitting, Standing, Walking, and Getting out of bed/chair.    Mitigating factors heat and medications.   She reports 7 hours of sleep per night.    Physical Therapy/Home Exercise: No, not currently in physical therapy or home exercise program    Current Pain Medications:    - gabapentin 300mg qhs, tylenol    Failed Pain Medications:    - celebrex (due to CKD3)    Pain Treatment Therapies:    Pain procedures:   Pain injections (epidurals and nerve ablations) - [id number]  Nerve ablations - not helpful x2  Physical Therapy: none  Chiropractor: none  Acupuncture: none  TENS unit: none  Spinal decompression:   Right L4-5  Hemilaminectomy x2 (1990s)  Joint replacement: none    Patient denies urinary incontinence, bowel incontinence, significant motor weakness, and loss of sensations.  Patient denies any suicidal or homicidal ideations     report:  Reviewed and consistent with medication use as prescribed.    Imaging:   MRI Lumbar 03/2024:  FINDINGS:  Alignment: Mild levocurvature of the lumbar spine.  Minimal retrolisthesis L4 on L5.     Vertebrae: Postsurgical changes of right L4-L5 hemilaminectomy.  No acute fracture or marrow infiltrative process.     Discs: Multilevel disc height loss most notable at at L3-L4 and L4-L5.     Cord: Normal.  Conus terminates at L1     Degenerative findings:     T11-T12: Posterior disc bulge.  No significant spinal canal stenosis or neural foraminal narrowing.     T12-L1: No significant spinal canal stenosis or neural foraminal narrowing.     L1-L2: Mild diffuse posterior disc bulge and bilateral facet arthropathy.  No significant spinal canal stenosis or neural foraminal narrowing.     L2-L3: Diffuse posterior disc bulge, bilateral facet arthropathy and ligament flavum buckling.  Findings result in mild spinal canal stenosis, mild right and moderate left neural foraminal narrowing.     L3-L4: Diffuse posterior disc bulge with superimposed central disc protrusion, bilateral facet arthropathy and ligament flavum buckling.  Findings result in mild spinal canal stenosis and mild right neural foraminal narrowing.     L4-L5: Right L4 hemilaminectomy.  Diffuse posterior disc bulge, facet arthropathy and left ligamentum flavum buckling.  Findings result in severe right and moderate left neural foraminal narrowing.     L5-S1: Left eccentric disc bulge.  Findings result in mild right and moderate left neural foraminal narrowing.     Paraspinal muscles & soft tissues: Unremarkable.     Impression:     Lumbar spondylosis most notable at L2-L3 and L3-L4 with mild spinal canal stenosis mild/moderate neural  foraminal narrowing.  Severe right and moderate left neural foraminal narrowing at L4-L5.        5/30/2024     2:35 PM 5/30/2024     2:33 PM 4/15/2024     8:38 AM   Pain Disability Index (PDI)   Family/Home Responsibilities: 5 5 4   Recreation: 5 5 4   Occupation: 5 5 4   Sexual Behavior: 5 5 4   Self Care: 5 5 4   Life-Support Activities: 5 5 4   Pain Disability Index (PDI) 35 35 28        Past Medical History:   Diagnosis Date    Arthritis     Basal cell carcinoma     Bronchitis     seasonal    Cataract     Disorder of kidney and ureter     Diverticulitis     Dry eye syndrome     GERD (gastroesophageal reflux disease)     Hyperlipidemia     Hypertension     Hypothyroidism 07/19/2012    Obese     PONV (postoperative nausea and vomiting)     Thyroid disease      Past Surgical History:   Procedure Laterality Date    ANORECTAL MANOMETRY N/A 06/01/2023    Procedure: MANOMETRY, ANORECTAL;  Surgeon: Paulo Pritchett MD;  Location: [facility] ENDO (4TH FLR);  Service: Endoscopy;  Laterality: N/A;  instructions sent to [id number]  5/26 pre-call no answer; MB    ARTHROSCOPIC REPAIR OF ROTATOR CUFF OF SHOULDER Right 09/23/2020    Procedure: REPAIR, ROTATOR CUFF, ARTHROSCOPIC;  Surgeon: Reginald Pickens MD;  Location: [facility] OR;  Service: Orthopedics;  Laterality: Right;  regional w/catheter (interscalene)    BACK SURGERY      CARPAL TUNNEL RELEASE Left 01/11/2024    Procedure: RELEASE, CARPAL TUNNEL;  Surgeon: Suzy Dominguez MD;  Location: [facility] OR;  Service: Orthopedics;  Laterality: Left;    CATARACT EXTRACTION W/  INTRAOCULAR LENS IMPLANT Left 10/20/2021        CHOLECYSTECTOMY      COLONOSCOPY  2007    diverticulosis    COLONOSCOPY N/A 09/03/2020    Procedure: COLONOSCOPY;  Surgeon: Alberta Henriquez MD;  Location: [facility] ENDO (4TH FLR);  Service: Colon and Rectal;  Laterality: N/A;  pt requested this time-8/31-covid-[facility] [place]    CYSTOSCOPY      DE QUERVAIN'S RELEASE Left 03/2017    DECOMPRESSION OF  NERVE Left 01/11/2024    Procedure: DECOMPRESSION, NERVE ULNAR;  Surgeon: Suzy Dominguez MD;  Location: [facility] OR;  Service: Orthopedics;  Laterality: Left;    DUPUYTREN CONTRACTURE RELEASE Left 01/11/2024    Procedure: RELEASE, DUPUYTREN CONTRACTURE, PALM;  Surgeon: Suzy Dominguez MD;  Location: [facility] OR;  Service: Orthopedics;  Laterality: Left;    EPIDURAL STEROID INJECTION INTO LUMBAR SPINE Right 4/29/2024    Procedure: RT L3-4 L4-5 TFESI;  Surgeon: Mick Pineda DO;  Location: [facility] PAIN MANAGEMENT;  Service: Pain Management;  Laterality: Right;  20 mins    FIXATION OF TENDON Right 09/23/2020    Procedure: FIXATION, TENDON;  Surgeon: Reginald Pickens MD;  Location: [facility] OR;  Service: Orthopedics;  Laterality: Right;    HERNIA REPAIR      HYSTERECTOMY      INJECTION OF STEROID Right 12/02/2021    Procedure: INJECTION, STEROID;  Surgeon: Suzy Dominguez MD;  Location: [facility] OR;  Service: Orthopedics;  Laterality: Right;    INJECTION OF STEROID Right 01/11/2024    Procedure: INJECTION, STEROID 1ST DORSAL COMPARTMENT;  Surgeon: Suzy Dominguez MD;  Location: [facility] OR;  Service: Orthopedics;  Laterality: Right;    INTRAOCULAR PROSTHESES INSERTION Left 10/20/2021    Procedure: INSERTION, IOL PROSTHESIS;  Surgeon: Pippa Ashby MD;  Location: [facility] OR 1ST FLR;  Service: Ophthalmology;  Laterality: Left;    INTRAOCULAR PROSTHESES INSERTION Right 12/29/2021    Procedure: INSERTION, IOL PROSTHESIS;  Surgeon: Pippa Ashby MD;  Location: [facility] OR 1ST FLR;  Service: Ophthalmology;  Laterality: Right;    NASAL SEPTUM SURGERY      PHACOEMULSIFICATION OF CATARACT Left 10/20/2021    Procedure: PHACOEMULSIFICATION, CATARACT/ COMPLEX- PHACO / IOL - OS SMALL PUPIL-OLE RING AND TRYPAN BLUE;  Surgeon: Pippa Ashby MD;  Location: [facility] OR 1ST FLR;  Service: Ophthalmology;  Laterality: Left;    PHACOEMULSIFICATION OF CATARACT Right 12/29/2021    Procedure: PHACOEMULSIFICATION, CATARACT;  Surgeon:  Pippa Ashby MD;  Location: [address];  Service: Ophthalmology;  Laterality: Right;    SHOULDER SURGERY      TONSILLECTOMY      TRIGGER FINGER RELEASE Left 12/02/2021    Procedure: RELEASE, TRIGGER FINGER;  Surgeon: Suzy Dominguez MD;  Location: [facility];  Service: Orthopedics;  Laterality: Left;     Social History     Socioeconomic History    Marital status:    Tobacco Use    Smoking status: Never     Passive exposure: Never    Smokeless tobacco: Never   Substance and Sexual Activity    Alcohol use: No     Comment: rare on a special occasion    Drug use: No    Sexual activity: Never   Social History Narrative    , 3 children, nonsmoker ,     Social Determinants of Health     Financial Resource Strain: Low Risk  (4/22/2024)    Overall Financial Resource Strain (CARDIA)     Difficulty of Paying Living Expenses: Not hard at all   Food Insecurity: No Food Insecurity (4/22/2024)    Hunger Vital Sign     Worried About Running Out of Food in the Last Year: Never true     Ran Out of Food in the Last Year: Never true   Transportation Needs: No Transportation Needs (4/22/2024)    PRAPARE - Transportation     Lack of Transportation (Medical): No     Lack of Transportation (Non-Medical): No   Physical Activity: Inactive (4/22/2024)    Exercise Vital Sign     Days of Exercise per Week: 0 days     Minutes of Exercise per Session: 0 min   Stress: No Stress Concern Present (4/22/2024)    [employer] - Occupational Stress Questionnaire     Feeling of Stress : Not at all   Housing Stability: Low Risk  (4/22/2024)    Housing Stability Vital Sign     Unable to Pay for Housing in the Last Year: No     Homeless in the Last Year: No     Family History   Problem Relation Name Age of Onset    Cataracts Mother      Breast cancer Mother      Diabetes Mother      Hypertension Mother      Heart failure Mother      Heart disease Mother      Cataracts Father      Hypertension Father       Stroke Father      No Known Problems Daughter      No Known Problems Son      Diabetes Paternal Grandmother      Hyperlipidemia Sister x1     Arthritis Sister x1     Hyperlipidemia Brother x5     Arthritis Brother x5     No Known Problems Son      Amblyopia Neg Hx      Blindness Neg Hx      Glaucoma Neg Hx      Macular degeneration Neg Hx      Retinal detachment Neg Hx      Strabismus Neg Hx      Ovarian cancer Neg Hx      Melanoma Neg Hx      Celiac disease Neg Hx      Colon cancer Neg Hx      Colon polyps Neg Hx      Esophageal cancer Neg Hx      Liver cancer Neg Hx      Liver disease Neg Hx      Rectal cancer Neg Hx      Stomach cancer Neg Hx         Review of patient's allergies indicates:   Allergen Reactions    Levaquin [levofloxacin] Swelling and Edema    Erythromycin (bulk) Nausea And Vomiting     Not true allergy       Current Outpatient Medications   Medication Sig    albuterol (PROVENTIL/VENTOLIN HFA) 90 mcg/actuation inhaler Inhale 2 puffs into the lungs every 6 (six) hours as needed for Wheezing.    AREXVY, PF, 120 mcg/0.5 mL SusR vaccine     cranberry fruit extract (CRANBERRY EXTRACT ORAL) Take by mouth.    cycloSPORINE (RESTASIS) 0.05 % ophthalmic emulsion Place 1 drop into both eyes 2 (two) times daily.    diclofenac sodium (VOLTAREN) 1 % Gel Apply 2 g topically 2 (two) times daily as needed (musculoskeletal pain).    dicyclomine (BENTYL) 20 mg tablet TAKE ONE TABLET BY MOUTH FOUR TIMES A DAY BEFORE MEALS AND NIGHTLY    docusate sodium (COLACE) 100 MG capsule Take 1 capsule (100 mg total) by mouth 2 (two) times daily.    fluocinonide (LIDEX) 0.05 % external solution Apply topically once daily.    ketoconazole (NIZORAL) 2 % shampoo Apply topically twice a week.    levothyroxine (SYNTHROID) 75 MCG tablet TAKE ONE TABLET BY MOUTH EVERY DAY ON AN EMPTY STOMACH    LIDOcaine-prilocaine (EMLA) cream Apply topically 2 (two) times daily as needed. To hands.    losartan (COZAAR) 100 MG tablet TAKE ONE TABLET  BY MOUTH EVERY DAY    MAGNESIUM ORAL Take 1 tablet by mouth once daily.    memantine (NAMENDA) 5 MG Tab TAKE ONE TABLET BY MOUTH TWICE A DAY    Multi-Vitamin tablet Take 1 tablet by mouth once daily.     nitrofurantoin (MACRODANTIN) 50 MG capsule Take 1 capsule (50 mg total) by mouth every morning.    omeprazole (PRILOSEC) 40 MG capsule TAKE ONE CAPSULE BY MOUTH EVERY DAY    ondansetron (ZOFRAN) 8 MG tablet Take 1 tablet (8 mg total) by mouth every 8 (eight) hours as needed for Nausea.    oxybutynin (DITROPAN-XL) 10 MG 24 hr tablet Take 1 tablet (10 mg total) by mouth once daily.    PSYLLIUM SEED, WITH SUGAR, (METAMUCIL ORAL) Take by mouth as needed.     rosuvastatin (CRESTOR) 40 MG Tab TAKE ONE TABLET BY MOUTH EVERY DAY    traZODone (DESYREL) 100 MG tablet Take 1 tablet (100 mg total) by mouth nightly as needed for Insomnia.    traZODone (DESYREL) 50 MG tablet Take 100 mg by mouth nightly as needed.    triamcinolone acetonide 0.1% (KENALOG) 0.1 % cream AAA bid    pregabalin (LYRICA) 50 MG capsule Take 1 capsule (50 mg total) by mouth 2 (two) times a day. Start with 1 at night for 1 week, then add one during the day if tolerated     No current facility-administered medications for this visit.       REVIEW OF SYSTEMS:    GENERAL:  No weight loss, malaise or fevers.  HEENT:   No recent changes in vision or hearing  NECK:  Negative for lumps, no difficulty with swallowing.  RESPIRATORY:  Negative for cough, wheezing or shortness of breath, patient denies any recent URI.  CARDIOVASCULAR:  Negative for chest pain, leg swelling or palpitations.  GI:  Negative for abdominal discomfort, blood in stools or black stools or change in bowel habits.  MUSCULOSKELETAL:  See HPI.  SKIN:  Negative for lesions, rash, and itching.  PSYCH:  No mood disorder or recent psychosocial stressors.  Patients sleep is not disturbed secondary to pain.  HEMATOLOGY/LYMPHOLOGY:  Negative for prolonged bleeding, bruising easily or swollen nodes.   "Patient is not currently taking any anti-coagulants  NEURO:   No history of headaches, syncope, paralysis, seizures or tremors.  All other reviewed and negative other than HPI.    OBJECTIVE:    BP (!) 133/90 (BP Location: Right arm, Patient Position: Sitting)   Pulse 67   Ht 5' 1" (1.549 m)   Wt 61.2 kg (134 lb 14.7 oz)   BMI 25.49 kg/m²     PHYSICAL EXAMINATION:    GENERAL: Well appearing, in no acute distress, alert and oriented x3.  PSYCH:  Mood and affect appropriate.  SKIN: Skin color, texture, turgor normal, no rashes or lesions.  HEAD/FACE:  Normocephalic, atraumatic. Cranial nerves grossly intact.  CV: RRR with palpation of the radial artery.  PULM: CTAB. No evidence of respiratory difficulty, symmetric chest rise.  GI:  Soft and non-tender.    BACK:   - No obvious deformity or signs of trauma, Normal lumbar lordotic curve  - Negative spinous process tenderness  - Positive paravertebral tenderness  - Negative pain to palpation over the facet joints of the lumbar spine.   - Negative QL / Iliac crest / Glut tenderness  - Slump test is Negative for radicular pain  - Slump test is Negative for back pain  - Supine Straight leg raising is Negative for radicular pain  - Supine Straight leg raising is Negative for back pain  - Lumbar ROM is normal in Flexion without pain  - Lumbar ROM is normal in Extension without pain  - Lumbar ROM is normal in Lateral Flexion without pain  - Negative Sustained Hip Flexion test (for discogenic pain)  - Negative Altered Gait, Posture  - Axial facet loading test Negative on the bilateral side(s)    SI Joint exam:  - Negative SI joint tenderness to palpation  - Hardik's sign Positive  - Yeoman's Test: Did not perform for SI joint pain indicating anterior SI ligament involvement. Did not perform for anterior thigh pain/paresthesia which indicates femoral nerve stretch.  - Gaenslen's Test:Negative  - Finger Geraldine's Sign:Negative  - SI compression test:Did not perform  - SI " distraction test:Negative  - Thigh Thrust: Negative  - SI Thrust: Did not perform    MUSKULOSKELETAL:    EXTREMITIES:   Hip Exam:  - Log Roll Negative  - FADIR Negative  - Stinchfield Did not perform  - Hip Scour Negative  - GTB Tenderness Negative    MUSCULOSKELETAL:  No atrophy or tone abnormalities are noted in the UE or LE.  No deformities, edema, or skin discoloration are noted on visible skin. Good capillary refill.    NEURO: Bilateral upper and lower extremity coordination and muscle stretch reflexes are physiologic and symmetric.      NEUROLOGICAL EXAM:  MENTAL STATUS: A x O x 3, good concentration, speech is fluent and goal directed  MEMORY: recent and remote are intact  CN: CN2-12 grossly intact  MOTOR: 5/5 in all muscle groups  DTRs: 2+ intact symmetric  Sensation:    -no Loss of sensation in a left lower and right lower L-1, L-2, L-3, L-4, and L-5 bilaterally distribution.  Sterling: absent on the bilateral side(s)  Clonus: absent on the bilateral side(s)    GAIT: normal.

## 2024-05-31 ENCOUNTER — TELEPHONE (OUTPATIENT)
Dept: AUDIOLOGY | Facility: CLINIC | Age: 74
End: 2024-05-31
Payer: MEDICARE

## 2024-06-03 ENCOUNTER — CLINICAL SUPPORT (OUTPATIENT)
Dept: REHABILITATION | Facility: HOSPITAL | Age: 74
End: 2024-06-03
Attending: STUDENT IN AN ORGANIZED HEALTH CARE EDUCATION/TRAINING PROGRAM
Payer: COMMERCIAL

## 2024-06-03 DIAGNOSIS — M25.69 DECREASED ROM OF TRUNK AND BACK: Primary | ICD-10-CM

## 2024-06-03 PROCEDURE — 97112 NEUROMUSCULAR REEDUCATION: CPT

## 2024-06-03 PROCEDURE — 97110 THERAPEUTIC EXERCISES: CPT

## 2024-06-03 RX ORDER — MEMANTINE HYDROCHLORIDE 5 MG/1
5 TABLET ORAL 2 TIMES DAILY
Qty: 180 TABLET | Refills: 1 | Status: SHIPPED | OUTPATIENT
Start: 2024-06-03

## 2024-06-03 NOTE — PROGRESS NOTES
"[facility]      Name: Jackelyn Kendrick  Clinic Number: [id number]    Therapy Diagnosis:   Encounter Diagnosis   Name Primary?    Decreased ROM of trunk and back Yes       Physician: Mick Pineda,*    Visit Date: 6/3/2024    Physician Orders: PT Eval and Treat  Medical Diagnosis from Referral:   M54.16 (ICD-10-CM) - Lumbar radiculopathy   M96.1 (ICD-10-CM) - Post laminectomy syndrome      Evaluation Date: 2024  Authorization Period Expiration: 4/15/25  Plan of Care Expiration:24  Reassessment Due: 2024  Visit # / Visits authorized:   MedX testing visit 2    Time In: 955  Time Out 1055  Total Billable Time:55minutes  INSURANCE and OUTCOMES: Fee for Service with FOTO Outcomes 1/3    Precautions: standard and previous LB surgery/HTN/L RTC repair/R arthroscopic     Pattern of pain determined: 1PEN    Subjective   Jackelyn reports chronic (R) LB/hip pain today rated 6/10.  Pt states she cleaned her [employer] on  which increased her pain for several days    Patient reports tolerating previous visit : Muscular soreness  Patient reports their pain to be 6/10 on a 0-10 scale with 0 being no pain and 10 being the worst pain imaginable.  Pain Location: right back and buttocks     Work and leisure: retired nurse/garden  Pt goals: "decrease pain and be able to walk further."     Objective   Baseline IM Testing Results:   Date of testin24    Initial:  Midpoint Final   ROM 6-45 deg       Max Peak Torque 88        Min Peak Torque 47        Flex/Ext Ratio 1.9:1       % below normative data 27%       % gain from initial N/a          MOVEMENT LOSS - Lumbar    Norms ROM Loss Initial 24   Flexion Fingers touch toes, sacral angle >/= 70 deg, uniform spinal curvature, posterior weight shift  moderate loss Mod loss   Extension ASIS surpasses toes, spine of scapulae surpasses heels, uniform spinal curve moderate loss and major loss Mod loss   Side glide Right  "  moderate loss c/ inc pain Mod loss   Side glide Left   minimal loss Min loss   Rotation Right PT observes contralateral shoulder minimal loss Min loss   Rotation Left PT observes contralateral shoulder minimal loss Min loss     Hip: 5/30/24  Left hip reduced int rot with pain, reduced extension with pain, (+) Faddirs and (+) fabers  Neg slump    Outcomes:  Initial score:49%  Visit 5 score:  Goal:>60%      Treatment    Jackelyn Kendrick received the treatments listed below:       Jackelyn Kendrick received neuromuscular education  to isolate and engage spinal stabilization musculature correctly for motor control and coordination to aid in function and posture for 10 minutes on the Medical Med"[employer] Machine.  Patient performed MedX dynamic exercise with emphasis on spinal muscular control using pacer throughout  active range of motion. Therapist assisted patient in achieving optimal exertion for neural reeducation and endurance training by using the  Bev Exertion Rating scale, by instructing the patient to aim for mid range of exertion, performing 15-20 repetitions, slowly, correctly,and safely.          6/3/2024    10:17 AM   [facility]   Visit Number 5   VAS Pain Rating 6   Time 5   Lumbar Stretches - Slouch Overcorrection 10   Extension in Standing 10   Flexion in Lying 10   Lumbar Weight 48 lbs   Repetitions 15   Rating of Perceived Exertion 5   Ice - Z Lie (in min.) 5       therapeutic exercises to develop strength, endurance, ROM, flexibility, posture, and core stabilization for 50 minutes including:    Piriformis stretch 2 x 20 sec  BKTC x 10  LTR x 10  + PPT x 10 (Cues)  + Bridging x 10 (Minimal lift)  +clams 10c/YTB  + SOC x 10 (Cues)  + EIS x 10  +Hip ext rotation stretch 2 X 10  Prone lie 1 min    Peripheral muscle strengthening which included 1 set of 15-20 repetitions at a slow, controlled 10-13 second per rep pace focused on strengthening supporting musculature for improved body mechanics and  functional mobility.  Pt and therapist focused on proper form during treatment to ensure optimal strengthening of each targeted muscle group.  Machines were utilized including torso rotation, leg press, hip abd and hip add, leg ext.  Leg curl, triceps, biceps (DB), chest ( T-band)and row added visit 3    manual therapy techniques:  for 5 minutes DTM c/ thera roller to R lateral thigh    cold pack for 5 minutes to lower back.    Home Exercises Provided and Patient Education Provided   Home exercises include:Piriformis stretch, BKTC, LTR, hip ext rot, bridging, clams, prone lie on elbows 1 min  Cardio program:visit 5  Lifting education date:visit 11  Posture/Lumbar roll: she is using lumbar roll  [employer] Discharge handout (date given):  Equipment at home/gym membership: stationary bike    Education provided:   -  Cues w/ex's  - MedX performance  - Precor ex performance  - HB Protocol    Written Home Exercises Provided: Patient instructed to cont prior HEP.  Exercises were reviewed and Jackelyn Kendrick was able to demonstrate them prior to the end of the session.  Jackelyn Kendrick demonstrated good  understanding of the education provided.     See EMR under Patient Instructions for exercises provided prior visit.    Assessment   Jackelyn returns  reporting chronic lower back pain R buttock and lateral thigh pain.  Pt reports an increase in pain after cleaning her [employer].  Instructed pt to ice and stretch after activities which can help decrease pain.  Massage with roller to R lateral thigh to help decrease pain.    Treatment continued with flexibility, strengthening and neuromuscular reeducation ex's. Added YTB to SL clams. Lumbar MedX resistance was increased to 48ft/lbs and she completed 15 reps with a RPE = 4/10. (Cues for pacing and extension hold). She was able to complete the full circuit of peripheral strengthening ex's without c/o. Will continue per HB protocol and patient tolerance.    Patient is  making progress towards established goals.   Pt will continue to benefit from skilled outpatient physical therapy to address the deficits stated in the impairment chart, provide pt/family education and to maximize pt's level of independence in the home and community environment.     Anticipated Barriers for therapy: 2 previous LB surgeries/RTC surgery   Pt's spiritual, cultural and educational needs considered and pt agreeable to plan of care and goals as stated below:     Goals:   Short term goals:  6 weeks or 10 visits   - Pt will demonstrate increased lumbar MedX ROM by at least 3 degrees from the initial ROM value with improvements noted in functional ROM and ability to perform ADLs. Appropriate and Ongoing  - Pt will demonstrate increased MedX average isometric strength value by 20% from initial test resulting in improved ability to perform bending, lifting, and carrying activities safely, confidently. Appropriate and Ongoing  - Pt will report a reduction in worst pain score by 1-2 points for improved tolerance for walking/standing. Appropriate and Ongoing  - Pt able to perform HEP correctly with minimal cueing or supervision from therapist to encourage independent management of symptoms. Appropriate and Ongoing     Long term goals: 10 weeks or 20 visits   - Pt will demonstrate increased lumbar MedX ROM by at least 9 degrees from initial ROM value, resulting in improved ability to perform functional forward bending while standing and sitting. Appropriate and Ongoing  - Pt will demonstrate increased MedX average isometric strength value by 40% from initial test resulting in improved ability to perform bending, lifting, and carrying activities safely and confidently. Appropriate and Ongoing  - Pt to demonstrate ability to independently control and reduce their pain through posture positioning and mechanical movements throughout a typical day. Appropriate and Ongoing  - Pt will demonstrate reduced pain and  improved functional outcomes as reported on the FOTO by reaching an intake score of >/= 61% functional ability in order to demonstrate subjective improvement in patient's condition. . Appropriate and Ongoing  - Pt will demonstrate independence with the HEP at discharge. Appropriate and Ongoing  - Pt(patient goal)will be able to walk > 3 blocks without  RLE pain Appropriate and Ongoing  Pt will be able to stand and wash dishes > 5 minutes without LBP    Plan   Continue with established Plan of Care towards established PT goals.     Therapist: Renea Mccann, PT     6/3/2024

## 2024-06-05 NOTE — PROGRESS NOTES
"[facility]      Name: Jackelyn Kendrick  Clinic Number: [id number]    Therapy Diagnosis:   Encounter Diagnosis   Name Primary?    Decreased ROM of trunk and back Yes       Physician: Mick Pineda,*    Visit Date: 2024    Physician Orders: PT Eval and Treat  Medical Diagnosis from Referral:   M54.16 (ICD-10-CM) - Lumbar radiculopathy   M96.1 (ICD-10-CM) - Post laminectomy syndrome      Evaluation Date: 2024  Authorization Period Expiration: 4/15/25  Plan of Care Expiration:24  Reassessment Due: 2024  Visit # / Visits authorized:   MedX testing visit 2    Time In: 10:00 AM  Time Out  11:00 AM  Total Billable Time: 60 minutes  INSURANCE and OUTCOMES: Fee for Service with FOTO Outcomes 2/3    Precautions: standard and previous LB surgery/HTN/L RTC repair/R arthroscopic     Pattern of pain determined: 1PEN    Subjective   Jackelyn reports that she is feeling a little better today. States that some of her residual soreness from [employer] cleaning has mostly resolved. Still reports chronic lower back/glute discomfort.    Patient reports tolerating previous visit : Muscular soreness  Patient reports their pain to be  4/10 on a 0-10 scale with 0 being no pain and 10 being the worst pain imaginable.  Pain Location: right back and buttocks     Work and leisure: retired nurse/garden  Pt goals: "decrease pain and be able to walk further."     Objective   Baseline IM Testing Results:   Date of testin24    Initial:  Midpoint Final   ROM 6-45 deg       Max Peak Torque 88        Min Peak Torque 47        Flex/Ext Ratio 1.9:1       % below normative data 27%       % gain from initial N/a          MOVEMENT LOSS - Lumbar    Norms ROM Loss Initial 24   Flexion Fingers touch toes, sacral angle >/= 70 deg, uniform spinal curvature, posterior weight shift  moderate loss Mod loss   Extension ASIS surpasses toes, spine of scapulae surpasses heels, uniform spinal " curve moderate loss and major loss Mod loss   Side glide Right   moderate loss c/ inc pain Mod loss   Side glide Left   minimal loss Min loss   Rotation Right PT observes contralateral shoulder minimal loss Min loss   Rotation Left PT observes contralateral shoulder minimal loss Min loss     Hip: 5/30/24  Left hip reduced int rot with pain, reduced extension with pain, (+) Faddirs and (+) fabers  Neg slump    Outcomes:  Initial score:49%  Visit 6 score: 43%  Goal:>60%        Treatment    Jackelyn Kendrick received the treatments listed below:       Jackelyn Kendrick received neuromuscular education  to isolate and engage spinal stabilization musculature correctly for motor control and coordination to aid in function and posture for 10 minutes on the Medical [employer] Machine.  Patient performed MedX dynamic exercise with emphasis on spinal muscular control using pacer throughout  active range of motion. Therapist assisted patient in achieving optimal exertion for neural reeducation and endurance training by using the  Bev Exertion Rating scale, by instructing the patient to aim for mid range of exertion, performing 15-20 repetitions, slowly, correctly,and safely.          6/6/2024    10:07 AM   [facility] - [facility]   Visit Number 6   VAS Pain Rating 4   Treadmill Time (in min.) 5 min   Speed 2 mph   Lumbar Stretches - Slouch 10   Extension in Lying 1   Extension in Standing 10   Flexion in Lying 10   Lumbar Flexion 45   Lumbar Extension 3   Lumbar Weight 48 lbs   Repetitions 18   Rating of Perceived Exertion 4       therapeutic exercises to develop strength, endurance, ROM, flexibility, posture, and core stabilization for 50 minutes including:    LTR x 10  Piriformis stretch 2 x 20 sec  Hip ext rotation stretch 3 x 10 sec  BKTC x 10  +open book x 10  PPT x 10 (Cues)  Bridging + RTB x 10 (Minimal lift off)  clams 10c/RTB  RIZWAN 1 min  SOC x 10 (Cues)  EIS w/ towel x 10    Peripheral muscle strengthening which  included 1 set of 15-20 repetitions at a slow, controlled 10-13 second per rep pace focused on strengthening supporting musculature for improved body mechanics and functional mobility.  Pt and therapist focused on proper form during treatment to ensure optimal strengthening of each targeted muscle group.  Machines were utilized including torso rotation, leg press, hip abd and hip add, leg ext.  Leg curl, triceps, biceps (DB), chest ( NP)and row added visit 3    manual therapy techniques:  for 00 minutes DTM c/ thera roller to R lateral thigh--NP    cold pack for 5 minutes to lower back.    Home Exercises Provided and Patient Education Provided   Home exercises include:Piriformis stretch, BKTC, LTR, hip ext rot, bridging, clams, prone lie on elbows 1 min  Cardio program:visit 5  Lifting education date:visit 11  Posture/Lumbar roll: she is using lumbar roll  [employer] Discharge handout (date given):  Equipment at home/gym membership: [employer] bike    Education provided:   -  Cues w/ex's  - MedX performance  - Precor ex performance    Written Home Exercises Provided: Patient instructed to cont prior HEP.  Exercises were reviewed and Jackelyn Kendrick was able to demonstrate them prior to the end of the session.  Jackelyn Kendrick demonstrated good  understanding of the education provided.     See EMR under Patient Instructions for exercises provided prior visit.    Assessment   Jackelyn returns reporting chronic lower back pain R buttock/lower back discomfort.  Treatment continued with flexibility, strengthening and neuromuscular reeducation ex's. She performed warm-up on TM today vs nu-step and reported some increased R lower back/thigh pain but felt like it was a good challenge. Added open book stretch for additional flexibility, RTB resistance to bridging ex for additional glute activation (Minimal lift with bridge).  She was able to perform ex's with min cues and without increased pain. Lumbar MedX  extension ROM was increased to 3 degrees, resistance was maintained at 48ft/lbs and she completed  18 reps with a RPE = 4/10. (Min Cues for pacing and extension hold). She was able to complete the full circuit of peripheral strengthening ex's without c/o.(Chest press not performed ) Will continue per HB protocol and patient tolerance.      Patient is making progress towards established goals.   Pt will continue to benefit from skilled outpatient physical therapy to address the deficits stated in the impairment chart, provide pt/family education and to maximize pt's level of independence in the home and community environment.     Anticipated Barriers for therapy: 2 previous LB surgeries/RTC surgery   Pt's spiritual, cultural and educational needs considered and pt agreeable to plan of care and goals as stated below:     Goals:   Short term goals:  6 weeks or 10 visits   - Pt will demonstrate increased lumbar MedX ROM by at least 3 degrees from the initial ROM value with improvements noted in functional ROM and ability to perform ADLs. Appropriate and Ongoing  - Pt will demonstrate increased MedX average isometric strength value by 20% from initial test resulting in improved ability to perform bending, lifting, and carrying activities safely, confidently. Appropriate and Ongoing  - Pt will report a reduction in worst pain score by 1-2 points for improved tolerance for walking/standing. Appropriate and Ongoing  - Pt able to perform HEP correctly with minimal cueing or supervision from therapist to encourage independent management of symptoms. Appropriate and Ongoing     Long term goals: 10 weeks or 20 visits   - Pt will demonstrate increased lumbar MedX ROM by at least 9 degrees from initial ROM value, resulting in improved ability to perform functional forward bending while standing and sitting. Appropriate and Ongoing  - Pt will demonstrate increased MedX average isometric strength value by 40% from initial test  resulting in improved ability to perform bending, lifting, and carrying activities safely and confidently. Appropriate and Ongoing  - Pt to demonstrate ability to independently control and reduce their pain through posture positioning and mechanical movements throughout a typical day. Appropriate and Ongoing  - Pt will demonstrate reduced pain and improved functional outcomes as reported on the FOTO by reaching an intake score of >/= 61% functional ability in order to demonstrate subjective improvement in patient's condition. . Appropriate and Ongoing  - Pt will demonstrate independence with the HEP at discharge. Appropriate and Ongoing  - Pt(patient goal)will be able to walk > 3 blocks without  RLE pain Appropriate and Ongoing  Pt will be able to stand and wash dishes > 5 minutes without LBP    Plan   Continue with established Plan of Care towards established PT goals.     Therapist: Subhash Wayne, PTA     6/5/2024

## 2024-06-06 ENCOUNTER — CLINICAL SUPPORT (OUTPATIENT)
Dept: REHABILITATION | Facility: HOSPITAL | Age: 74
End: 2024-06-06
Attending: STUDENT IN AN ORGANIZED HEALTH CARE EDUCATION/TRAINING PROGRAM
Payer: COMMERCIAL

## 2024-06-06 DIAGNOSIS — M25.69 DECREASED ROM OF TRUNK AND BACK: Primary | ICD-10-CM

## 2024-06-06 PROCEDURE — 97112 NEUROMUSCULAR REEDUCATION: CPT | Mod: CQ

## 2024-06-06 PROCEDURE — 97110 THERAPEUTIC EXERCISES: CPT | Mod: CQ

## 2024-06-10 ENCOUNTER — CLINICAL SUPPORT (OUTPATIENT)
Dept: REHABILITATION | Facility: HOSPITAL | Age: 74
End: 2024-06-10
Attending: STUDENT IN AN ORGANIZED HEALTH CARE EDUCATION/TRAINING PROGRAM
Payer: COMMERCIAL

## 2024-06-10 DIAGNOSIS — M25.69 DECREASED ROM OF TRUNK AND BACK: Primary | ICD-10-CM

## 2024-06-10 PROCEDURE — 97112 NEUROMUSCULAR REEDUCATION: CPT

## 2024-06-10 PROCEDURE — 97110 THERAPEUTIC EXERCISES: CPT

## 2024-06-10 NOTE — PROGRESS NOTES
"[facility]      Name: Jackelyn Kendrick  Clinic Number: [id number]    Therapy Diagnosis:   Encounter Diagnosis   Name Primary?    Decreased ROM of trunk and back Yes       Physician: Mick Pineda,*    Visit Date: 6/10/2024    Physician Orders: PT Eval and Treat  Medical Diagnosis from Referral:   M54.16 (ICD-10-CM) - Lumbar radiculopathy   M96.1 (ICD-10-CM) - Post laminectomy syndrome      Evaluation Date: 2024  Authorization Period Expiration: 4/15/25  Plan of Care Expiration:24  Reassessment Due: 2024  Visit # / Visits authorized:   MedX testing visit 2    Time In: 10:30 AM  Time Out  11:30 AM  Total Billable Time: 60 minutes  INSURANCE and OUTCOMES: Fee for Service with FOTO Outcomes 2/3    Precautions: standard and previous LB surgery/HTN/L RTC repair/R arthroscopic     Pattern of pain determined: 1PEN    Subjective   Jackelyn reports right sided back and hip pain, slightly more than normal.      Patient reports tolerating previous visit : Muscular soreness  Patient reports their pain to be  5/10 on a 0-10 scale with 0 being no pain and 10 being the worst pain imaginable.  Pain Location: right back and buttocks     Work and leisure: retired nurse/garden  Pt goals: "decrease pain and be able to walk further."     Objective   Baseline IM Testing Results:   Date of testin24    Initial:  Midpoint Final   ROM 6-45 deg       Max Peak Torque 88        Min Peak Torque 47        Flex/Ext Ratio 1.9:1       % below normative data 27%       % gain from initial N/a          MOVEMENT LOSS - Lumbar    Norms ROM Loss Initial 24   Flexion Fingers touch toes, sacral angle >/= 70 deg, uniform spinal curvature, posterior weight shift  moderate loss Mod loss   Extension ASIS surpasses toes, spine of scapulae surpasses heels, uniform spinal curve moderate loss and major loss Mod loss   Side glide Right   moderate loss c/ inc pain Mod loss   Side glide Left "   minimal loss Min loss   Rotation Right PT observes contralateral shoulder minimal loss Min loss   Rotation Left PT observes contralateral shoulder minimal loss Min loss     Hip: 5/30/24  Left hip reduced int rot with pain, reduced extension with pain, (+) Faddirs and (+) fabers  Neg slearlene    Outcomes:  Initial score:49%  Visit 6 score: 43%  Goal:>60%        Treatment    Jackelyn Kendrick received the treatments listed below:       Jackelyn Kendrick received neuromuscular education  to isolate and engage spinal stabilization musculature correctly for motor control and coordination to aid in function and posture for 10 minutes on the Medical Medx Machine.  Patient performed MedX dynamic exercise with emphasis on spinal muscular control using pacer throughout  active range of motion. Therapist assisted patient in achieving optimal exertion for neural reeducation and endurance training by using the  Bev Exertion Rating scale, by instructing the patient to aim for mid range of exertion, performing 15-20 repetitions, slowly, correctly,and safely.          6/10/2024    11:12 AM   [facility]   Visit Number 7   VAS Pain Rating 5   Treadmill Time (in min.) 5 min   Lumbar Stretches - Slouch Overcorrection 10   Extension in Standing 10   Flexion in Lying 10   Lumbar Weight 48 lbs   Repetitions 20   Rating of Perceived Exertion 3   Ice - Z Lie (in min.) 5         therapeutic exercises to develop strength, endurance, ROM, flexibility, posture, and core stabilization for 50 minutes including:    LTR x 10  Piriformis stretch right 2 x 20 sec  Hip ext rotation right stretch 3 x 10 sec  BKTC x 10  + right supine sciatic nerve glides x 15  open book x 10  PPT x 10 (Cues)  Bridging + RTB x 10 (Minimal lift off)  clams 10c/RTB  RIZWAN 1 min  SOC x 10 (Cues)  EIS w/ towel x 10    Peripheral muscle strengthening which included 1 set of 15-20 repetitions at a slow, controlled 10-13 second per rep pace focused on strengthening  supporting musculature for improved body mechanics and functional mobility.  Pt and therapist focused on proper form during treatment to ensure optimal strengthening of each targeted muscle group.  Machines were utilized including torso rotation, leg press, hip abd and hip add, leg ext.  Leg curl, triceps, biceps (DB), chest ( NP)and row added visit 3    manual therapy techniques:  for 00 minutes DTM c/ thera roller to R lateral thigh--NP    cold pack for 5 minutes to lower back.    Home Exercises Provided and Patient Education Provided   Home exercises include:Piriformis stretch, BKTC, LTR, hip ext rot, bridging, clams, prone lie on elbows 1 min  Cardio program:visit 5  Lifting education date:visit 11  Posture/Lumbar roll: she is using lumbar roll  Fridge Magnet Discharge handout (date given):  Equipment at home/gym membership: [employer] bike    Education provided:   -  Cues w/ex's  - MedX performance  - Precor ex performance    Written Home Exercises Provided: Patient instructed to cont prior HEP.  Exercises were reviewed and Jackelyn eKndrick was able to demonstrate them prior to the end of the session.  Jackelyn Kendrick demonstrated good  understanding of the education provided.     See EMR under Patient Instructions for exercises provided prior visit.    Assessment   Jackelyn returns reporting chronic lower back pain R buttock/lower back discomfort.  Treatment continued with flexibility, strengthening and neuromuscular reeducation ex's. Added supine sciatic nerve glide for right lower extremity today.  She was able to perform ex's with min cues and without increased pain. Lumbar MedX resistance was maintained at 48ft/lbs and she completed  20 reps with a RPE = 3/10. (Min Cues for pacing and extension hold). She was able to complete the full circuit of peripheral strengthening ex's without c/o.(Chest press not performed ) Will continue per HB protocol and patient tolerance.      Patient is making progress  towards established goals.   Pt will continue to benefit from skilled outpatient physical therapy to address the deficits stated in the impairment chart, provide pt/family education and to maximize pt's level of independence in the home and community environment.     Anticipated Barriers for therapy: 2 previous LB surgeries/RTC surgery   Pt's spiritual, cultural and educational needs considered and pt agreeable to plan of care and goals as stated below:     Goals:   Short term goals:  6 weeks or 10 visits   - Pt will demonstrate increased lumbar MedX ROM by at least 3 degrees from the initial ROM value with improvements noted in functional ROM and ability to perform ADLs. Appropriate and Ongoing  - Pt will demonstrate increased MedX average isometric strength value by 20% from initial test resulting in improved ability to perform bending, lifting, and carrying activities safely, confidently. Appropriate and Ongoing  - Pt will report a reduction in worst pain score by 1-2 points for improved tolerance for walking/standing. Appropriate and Ongoing  - Pt able to perform HEP correctly with minimal cueing or supervision from therapist to encourage independent management of symptoms. Appropriate and Ongoing     Long term goals: 10 weeks or 20 visits   - Pt will demonstrate increased lumbar MedX ROM by at least 9 degrees from initial ROM value, resulting in improved ability to perform functional forward bending while standing and sitting. Appropriate and Ongoing  - Pt will demonstrate increased MedX average isometric strength value by 40% from initial test resulting in improved ability to perform bending, lifting, and carrying activities safely and confidently. Appropriate and Ongoing  - Pt to demonstrate ability to independently control and reduce their pain through posture positioning and mechanical movements throughout a typical day. Appropriate and Ongoing  - Pt will demonstrate reduced pain and improved functional  outcomes as reported on the FOTO by reaching an intake score of >/= 61% functional ability in order to demonstrate subjective improvement in patient's condition. . Appropriate and Ongoing  - Pt will demonstrate independence with the HEP at discharge. Appropriate and Ongoing  - Pt(patient goal)will be able to walk > 3 blocks without  RLE pain Appropriate and Ongoing  Pt will be able to stand and wash dishes > 5 minutes without LBP    Plan   Continue with established Plan of Care towards established PT goals.     Therapist: Ana Cristina Lewis, PT     6/10/2024

## 2024-06-11 ENCOUNTER — CLINICAL SUPPORT (OUTPATIENT)
Dept: AUDIOLOGY | Facility: CLINIC | Age: 74
End: 2024-06-11
Payer: COMMERCIAL

## 2024-06-11 DIAGNOSIS — H90.A21 SENSORINEURAL HEARING LOSS (SNHL) OF RIGHT EAR WITH RESTRICTED HEARING OF LEFT EAR: Primary | ICD-10-CM

## 2024-06-11 NOTE — PROGRESS NOTES
HEARING AID FITTING    Jackelyn Kendrick was seen today for a hearing aid fitting.  All parts of the hearing aid, including battery, domes, wax filters, and microphones, were discussed with the patient.  Battery charging was also reviewed and practiced with the patient.  Real ear and feedback measures were taken and hearing aid was fit to patient's satisfaction.  Counseled patient on daily wear and maintenance of the hearing aid.  Mrs. Kendrick acknowledged that she understood.  The hearing aid was connected to the patient's phone.  Bluetooth settings were tested successfully in office.  The purchase agreement, 30-day trial period, and warranties were also reviewed with patient.  It is recommended Mrs. Kendrick return to clinic in 2 weeks or as needed.    3R 85 dB [id number] MicroShell - did not work well with patient's dexterity and  length was too long. Will shorten and have remade if return is not possible.     Hearing Aid Details     & Model: [employer] Intent 4 miniRITE  Color: ellie blue  Rt SN: [id number]  Battery: LI Rechargeable 13  Rt  and Dome: 2 85 dB; 10 mm double bass  Repair Warranty Exp: 06/23/2027  L&D Warranty Exp: 06/23/2027   SN: [id number]

## 2024-06-17 ENCOUNTER — CLINICAL SUPPORT (OUTPATIENT)
Dept: REHABILITATION | Facility: HOSPITAL | Age: 74
End: 2024-06-17
Attending: STUDENT IN AN ORGANIZED HEALTH CARE EDUCATION/TRAINING PROGRAM
Payer: COMMERCIAL

## 2024-06-17 DIAGNOSIS — M25.69 DECREASED ROM OF TRUNK AND BACK: Primary | ICD-10-CM

## 2024-06-17 PROCEDURE — 97110 THERAPEUTIC EXERCISES: CPT

## 2024-06-17 PROCEDURE — 97112 NEUROMUSCULAR REEDUCATION: CPT

## 2024-06-17 NOTE — PROGRESS NOTES
"[facility]      Name: Jackelyn Kendrick  Clinic Number: [id number]    Therapy Diagnosis:   Encounter Diagnosis   Name Primary?    Decreased ROM of trunk and back Yes       Physician: Mick Pineda,*    Visit Date: 2024    Physician Orders: PT Eval and Treat  Medical Diagnosis from Referral:   M54.16 (ICD-10-CM) - Lumbar radiculopathy   M96.1 (ICD-10-CM) - Post laminectomy syndrome      Evaluation Date: 2024  Authorization Period Expiration: 4/15/25  Plan of Care Expiration:24  Reassessment Due: 2024  Visit # / Visits authorized:   MedX testing visit 2    Time In: 10:30 AM  Time Out  11:30 AM  Total Billable Time: 55 min  (20 min with physical therapy tech)  INSURANCE and OUTCOMES: Fee for Service with FOTO Outcomes 2/3    Precautions: standard and previous LB surgery/HTN/L RTC repair/R arthroscopic     Pattern of pain determined: 1PEN    Subjective   Jackelyn waking up the other day without any pain, today pain is minimal, she sat at her grandson's graduation party yesterday and her back is a little sore today.    In addition to back pain, patient reports increase in muscle cramps in left hand and bilateral knees as well as left knee pain today.    Patient reports tolerating previous visit : Muscular soreness  Patient reports their pain to be  3/10 on a 0-10 scale with 0 being no pain and 10 being the worst pain imaginable.  Pain Location: right back and buttocks     Work and leisure: retired nurse/garden  Pt goals: "decrease pain and be able to walk further."     Objective   Baseline IM Testing Results:   Date of testin24    Initial:  Midpoint Final   ROM 6-45 deg       Max Peak Torque 88        Min Peak Torque 47        Flex/Ext Ratio 1.9:1       % below normative data 27%       % gain from initial N/a          MOVEMENT LOSS - Lumbar    Norms ROM Loss Initial 24   Flexion Fingers touch toes, sacral angle >/= 70 deg, uniform spinal " curvature, posterior weight shift  moderate loss Mod loss   Extension ASIS surpasses toes, spine of scapulae surpasses heels, uniform spinal curve moderate loss and major loss Mod loss   Side glide Right   moderate loss c/ inc pain Mod loss   Side glide Left   minimal loss Min loss   Rotation Right PT observes contralateral shoulder minimal loss Min loss   Rotation Left PT observes contralateral shoulder minimal loss Min loss     Hip: 5/30/24  Left hip reduced int rot with pain, reduced extension with pain, (+) Faddirs and (+) fabers  Neg slump    Outcomes:  Initial score:49%  Visit 6 score: 43%  Goal:>60%        Treatment    Jackelyn Kendrick received the treatments listed below:       Jackelyn Kendrick received neuromuscular education  to isolate and engage spinal stabilization musculature correctly for motor control and coordination to aid in function and posture for 10 minutes on the [employer] Machine.  Patient performed MedX dynamic exercise with emphasis on spinal muscular control using pacer throughout  active range of motion. Therapist assisted patient in achieving optimal exertion for neural reeducation and endurance training by using the  Bev Exertion Rating scale, by instructing the patient to aim for mid range of exertion, performing 15-20 repetitions, slowly, correctly,and safely.          6/17/2024    11:16 AM   [facility]   Visit Number 8   VAS Pain Rating 3   Recumbent Bike Seat Pos. 5   Lumbar Stretches - Slouch Overcorrection 10   Extension in Lying 10   Extension in Standing 10   Flexion in Lying 10   Lumbar Flexion 48   Lumbar Extension 3   Lumbar Weight 52 lbs   Repetitions 16   Rating of Perceived Exertion 4   Ice - Z Lie (in min.) 5         therapeutic exercises to develop strength, endurance, ROM, flexibility, posture, and core stabilization for 45 minutes including:    LTR x 10  Piriformis stretch right 2 x 20 sec  Hip ext rotation right stretch 3 x 10 sec  BKTC x 10  right  supine sciatic nerve glides x 15  open book x 10  PPT x 10 (Cues)  Bridging + RTB x 10 (Minimal lift off)  clams 10c/RTB  RIZWAN 1 min  SOC x 10 (Cues)  EIS w/ towel x 10  + EIL x 10    Peripheral muscle strengthening which included 1 set of 15-20 repetitions at a slow, controlled 10-13 second per rep pace focused on strengthening supporting musculature for improved body mechanics and functional mobility.  Pt and therapist focused on proper form during treatment to ensure optimal strengthening of each targeted muscle group.  Machines were utilized including torso rotation, leg press, hip abd and hip add, leg ext.  Leg curl, triceps, biceps (DB), chest ( NP)and row added visit 3    manual therapy techniques:  for 00 minutes DTM c/ thera roller to R lateral thigh--NP    cold pack for 5 minutes to lower back.    Home Exercises Provided and Patient Education Provided   Home exercises include:Piriformis stretch, BKTC, LTR, hip ext rot, bridging, clams, prone lie on elbows 1 min  Cardio program:visit 5  Lifting education date:visit 11  Posture/Lumbar roll: she is using lumbar roll  [employer]idge [employer] Discharge handout (date given):  Equipment at home/gym membership: stationary bike    Education provided:   -  Cues w/ex's  - MedX performance  - Precor ex performance    Written Home Exercises Provided: Patient instructed to cont prior HEP.  Exercises were reviewed and Jackelyn Kendrick was able to demonstrate them prior to the end of the session.  Jackelyn Kendrick demonstrated good  understanding of the education provided.     See EMR under Patient Instructions for exercises provided prior visit.    Assessment   Jackelyn returns reporting chronic lower back pain R buttock/lower back discomfort which is improved from last visit.  Treatment continued with flexibility, strengthening and neuromuscular reeducation ex's. Added extension in lying today for increased range of motion.  She was able to perform ex's with min cues and  without increased pain. Lumbar MedX flexion range of motion was increased to 48 degrees and resistance increased to 52 ft/lbs and she completed  16 reps with a RPE = 4/10. (Min Cues for pacing and extension hold). She was able to complete the full circuit of peripheral strengthening ex's without c/o.(Chest press not performed ) Will continue per HB protocol and patient tolerance.      Patient is making progress towards established goals.   Pt will continue to benefit from skilled outpatient physical therapy to address the deficits stated in the impairment chart, provide pt/family education and to maximize pt's level of independence in the home and community environment.     Anticipated Barriers for therapy: 2 previous LB surgeries/RTC surgery   Pt's spiritual, cultural and educational needs considered and pt agreeable to plan of care and goals as stated below:     Goals:   Short term goals:  6 weeks or 10 visits   - Pt will demonstrate increased lumbar MedX ROM by at least 3 degrees from the initial ROM value with improvements noted in functional ROM and ability to perform ADLs. Appropriate and Ongoing  - Pt will demonstrate increased MedX average isometric strength value by 20% from initial test resulting in improved ability to perform bending, lifting, and carrying activities safely, confidently. Appropriate and Ongoing  - Pt will report a reduction in worst pain score by 1-2 points for improved tolerance for walking/standing. Appropriate and Ongoing  - Pt able to perform HEP correctly with minimal cueing or supervision from therapist to encourage independent management of symptoms. Appropriate and Ongoing     Long term goals: 10 weeks or 20 visits   - Pt will demonstrate increased lumbar MedX ROM by at least 9 degrees from initial ROM value, resulting in improved ability to perform functional forward bending while standing and sitting. Appropriate and Ongoing  - Pt will demonstrate increased MedX average  isometric strength value by 40% from initial test resulting in improved ability to perform bending, lifting, and carrying activities safely and confidently. Appropriate and Ongoing  - Pt to demonstrate ability to independently control and reduce their pain through posture positioning and mechanical movements throughout a typical day. Appropriate and Ongoing  - Pt will demonstrate reduced pain and improved functional outcomes as reported on the FOTO by reaching an intake score of >/= 61% functional ability in order to demonstrate subjective improvement in patient's condition. . Appropriate and Ongoing  - Pt will demonstrate independence with the HEP at discharge. Appropriate and Ongoing  - Pt(patient goal)will be able to walk > 3 blocks without  RLE pain Appropriate and Ongoing  Pt will be able to stand and wash dishes > 5 minutes without LBP    Plan   Continue with established Plan of Care towards established PT goals.     Therapist: Ana Cristina Lewis, PT     6/17/2024

## 2024-06-20 ENCOUNTER — CLINICAL SUPPORT (OUTPATIENT)
Dept: REHABILITATION | Facility: HOSPITAL | Age: 74
End: 2024-06-20
Attending: STUDENT IN AN ORGANIZED HEALTH CARE EDUCATION/TRAINING PROGRAM
Payer: MEDICARE

## 2024-06-20 DIAGNOSIS — M25.69 DECREASED ROM OF TRUNK AND BACK: Primary | ICD-10-CM

## 2024-06-20 PROCEDURE — 97110 THERAPEUTIC EXERCISES: CPT | Mod: CQ

## 2024-06-20 PROCEDURE — 97112 NEUROMUSCULAR REEDUCATION: CPT | Mod: CQ
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
## 2024-06-24 ENCOUNTER — CLINICAL SUPPORT (OUTPATIENT)
Dept: REHABILITATION | Facility: HOSPITAL | Age: 74
End: 2024-06-24
Attending: STUDENT IN AN ORGANIZED HEALTH CARE EDUCATION/TRAINING PROGRAM
Payer: COMMERCIAL

## 2024-06-24 DIAGNOSIS — M25.69 DECREASED ROM OF TRUNK AND BACK: Primary | ICD-10-CM

## 2024-06-24 PROCEDURE — 97750 PHYSICAL PERFORMANCE TEST: CPT

## 2024-06-24 PROCEDURE — 97110 THERAPEUTIC EXERCISES: CPT

## 2024-06-24 NOTE — PROGRESS NOTES
"[facility] Treatment      Name: Jackelyn Kendrick  Clinic Number: [id number]    Therapy Diagnosis:   Encounter Diagnosis   Name Primary?    Decreased ROM of trunk and back Yes         Physician: Mick Pineda,*    Visit Date: 2024    Physician Orders: PT Eval and Treat  Medical Diagnosis from Referral:   M54.16 (ICD-10-CM) - Lumbar radiculopathy   M96.1 (ICD-10-CM) - Post laminectomy syndrome      Evaluation Date: 2024  Authorization Period Expiration: 4/15/25  Plan of Care Expiration:24  Reassessment Due: 2024  Visit # / Visits authorized: 10/20 GIVE FOTO VISIT 11  MedX testing visit 2    Time In: 10:00 AM  Time Out  11:00 AM  Total Billable Time:  55 minutes   INSURANCE and OUTCOMES: Fee for Service with FOTO Outcomes 2/3    Precautions: standard and previous LB surgery/HTN/L RTC repair/R arthroscopic     Pattern of pain determined: 1PEN    Subjective   Jackelyn  reports feeling pretty good. She has some stiffness that works itself out in the shower in the morning.     Patient reports tolerating previous visit : Muscular soreness  Patient reports their pain to be  5/10 on a 0-10 scale with 0 being no pain and 10 being the worst pain imaginable.  Pain Location: right back and buttocks     Work and leisure: retired nurse/garden  Pt goals: "decrease pain and be able to walk further."     Objective   Baseline IM Testing Results:   Date of testin24    Initial:  Midpoint Final   ROM 6-45 deg  3-48 deg     Max Peak Torque 88   120     Min Peak Torque 47   74     Flex/Ext Ratio 1.9:1  1.6:1     % below normative data 27%  +8%     % gain from initial N/a  +48%        MOVEMENT LOSS - Lumbar    Norms ROM Loss Initial 24   Flexion Fingers touch toes, sacral angle >/= 70 deg, uniform spinal curvature, posterior weight shift  moderate loss Mod loss   Extension ASIS surpasses toes, spine of scapulae surpasses heels, uniform spinal curve moderate loss and major " loss Mod loss   Side glide Right   moderate loss c/ inc pain Mod loss   Side glide Left   minimal loss Min loss   Rotation Right PT observes contralateral shoulder minimal loss Min loss   Rotation Left PT observes contralateral shoulder minimal loss Min loss     Hip: 5/30/24  Left hip reduced int rot with pain, reduced extension with pain, (+) Faddirs and (+) fabers  Neg slump    Outcomes:  Initial score:49%  Visit 6 score: 43%  Goal:>60%    Treatment    Jackelyn Kendrick received the treatments listed below:       Jackelyn Kendrick received neuromuscular education  to isolate and engage spinal stabilization musculature correctly for motor control and coordination to aid in function and posture for 10 minutes on the Medical Medx Machine.  Patient performed MedX dynamic exercise with emphasis on spinal muscular control using pacer throughout  active range of motion. Therapist assisted patient in achieving optimal exertion for neural reeducation and endurance training by using the  Bev Exertion Rating scale, by instructing the patient to aim for mid range of exertion, performing 15-20 repetitions, slowly, correctly,and safely.          6/24/2024    10:02 AM   [facility]   Visit Number 10   VAS Pain Rating 4   Recumbent Bike Seat Pos. 5   Extension in Lying 10   Flexion in Lying 10   Lumbar Flexion 48   Lumbar Extension 3   Lumbar Peak Torque 120 ft. lbs.   Min Torque 74   Test Percent Gain in Strength from Initial  48 %      therapeutic exercises to develop strength, endurance, ROM, flexibility, posture, and core stabilization for 45 minutes including:    LTR x 10  Piriformis stretch 2 x 20 sec  Hip ext rotation right stretch 3 x 10 sec  BKTC x 10  right supine sciatic nerve glides x 15  open book x 10  PPT x 10 (Cues)  Bridging + RTB x 15 (Minimal lift off)  clams 15 c/RTB  RIZWAN 1 min  EIL x 10  SOC x 10    EIS x 10      Peripheral muscle strengthening which included 1 set of 15-20 repetitions at a slow,  controlled 10-13 second per rep pace focused on strengthening supporting musculature for improved body mechanics and functional mobility.  Pt and therapist focused on proper form during treatment to ensure optimal strengthening of each targeted muscle group.  Machines were utilized including torso rotation, leg press, hip abd and hip add, leg ext.  Leg curl, triceps, biceps (DB), chest ( NP)and row added visit 3    manual therapy techniques:  for 00 minutes DTM c/ thera roller to R lateral thigh--NP    cold pack for 5 minutes to lower back.    Home Exercises Provided and Patient Education Provided   Home exercises include:Piriformis stretch, BKTC, LTR, hip ext rot, bridging, clams, prone lie on elbows 1 min  Cardio program:visit 5  Lifting education date:visit 11  Posture/Lumbar roll: she is using lumbar roll  Fr[employer] Discharge handout (date given):  Equipment at home/gym membership: [employer] bike    Education provided:   -  Cues w/ex's  - MedX performance  - Precor ex performance    Written Home Exercises Provided: Patient instructed to cont prior HEP.  Exercises were reviewed and Jackelyn Kendrick was able to demonstrate them prior to the end of the session.  Jackelyn Kendrick demonstrated good  understanding of the education provided.     See EMR under Patient Instructions for exercises provided prior visit.    Assessment   Patient has attended 10 visits at [facility] which included MD evaluation, PT evaluation with isometric testing, and physical therapy treatment including HEP instruction, education, aerobic activity, dynamic strengthening on MedX equipment for the spine, and whole body strengthening on MedX equipment with increasing resistance. Patient demonstrates increased ability to reduce symptoms, improve posture, improve ROM, and improve strength, as stated below:    -Improved posture, recommended using lumbar roll  -Improved 6deg ROM, initially on MedX test 6-45deg and currently  3-48deg.  -Improved strength at each test point on lumbar MedX isometric test with 48% average improvement noted with reduced pain noted by patient.  -Outcome tool not given 2/2 time - give next visit         Patient is making progress towards established goals.   Pt will continue to benefit from skilled outpatient physical therapy to address the deficits stated in the impairment chart, provide pt/family education and to maximize pt's level of independence in the home and community environment.     Anticipated Barriers for therapy: 2 previous LB surgeries/RTC surgery   Pt's spiritual, cultural and educational needs considered and pt agreeable to plan of care and goals as stated below:     Goals:   Short term goals:  6 weeks or 10 visits   - Pt will demonstrate increased lumbar MedX ROM by at least 3 degrees from the initial ROM value with improvements noted in functional ROM and ability to perform ADLs. Appropriate and Ongoing  - Pt will demonstrate increased MedX average isometric strength value by 20% from initial test resulting in improved ability to perform bending, lifting, and carrying activities safely, confidently. Appropriate and Ongoing  - Pt will report a reduction in worst pain score by 1-2 points for improved tolerance for walking/standing. Appropriate and Ongoing  - Pt able to perform HEP correctly with minimal cueing or supervision from therapist to encourage independent management of symptoms. Appropriate and Ongoing     Long term goals: 10 weeks or 20 visits   - Pt will demonstrate increased lumbar MedX ROM by at least 9 degrees from initial ROM value, resulting in improved ability to perform functional forward bending while standing and sitting. Appropriate and Ongoing  - Pt will demonstrate increased MedX average isometric strength value by 40% from initial test resulting in improved ability to perform bending, lifting, and carrying activities safely and confidently. Appropriate and Ongoing  - Pt  to demonstrate ability to independently control and reduce their pain through posture positioning and mechanical movements throughout a typical day. Appropriate and Ongoing  - Pt will demonstrate reduced pain and improved functional outcomes as reported on the FOTO by reaching an intake score of >/= 61% functional ability in order to demonstrate subjective improvement in patient's condition. . Appropriate and Ongoing  - Pt will demonstrate independence with the HEP at discharge. Appropriate and Ongoing  - Pt(patient goal)will be able to walk > 3 blocks without  RLE pain Appropriate and Ongoing  Pt will be able to stand and wash dishes > 5 minutes without LBP    Plan   Continue with established Plan of Care towards established PT goals.     Therapist: Elzbieta Dowd, PT   Co-treated with Renea Mccann PT  6/24/2024

## 2024-06-26 NOTE — PROGRESS NOTES
"[facility]      Name: Jackelyn Kendrick  Clinic Number: [id number]    Therapy Diagnosis:   Encounter Diagnosis   Name Primary?    Decreased ROM of trunk and back Yes       Physician: Mick Pineda,*    Visit Date: 2024    Physician Orders: PT Eval and Treat  Medical Diagnosis from Referral:   M54.16 (ICD-10-CM) - Lumbar radiculopathy   M96.1 (ICD-10-CM) - Post laminectomy syndrome      Evaluation Date: 2024  Authorization Period Expiration: 4/15/25  Plan of Care Expiration:24  Reassessment Due: 24  Visit # / Visits authorized:    MedX testing visit 2    Time In: 11:30  Time Out  12:30  Total Billable Time:  55 minutes   INSURANCE and OUTCOMES: Fee for Service with FOTO Outcomes 2/3      Precautions: standard and previous LB surgery/HTN/L RTC repair/R arthroscopic     Pattern of pain determined: 1PEN    Subjective   Jackelyn  reports feeling pretty good. She has some stiffness that works itself out in the shower in the morning.     Patient reports tolerating previous visit : Muscular soreness  Patient reports their pain to be  5/10 on a 0-10 scale with 0 being no pain and 10 being the worst pain imaginable.  Pain Location: right back and buttocks     Work and leisure: retired nurse/garden  Pt goals: "decrease pain and be able to walk further."     Objective   Baseline IM Testing Results:   Date of testin24    Initial:  Midpoint Final   ROM 6-45 deg  3-48 deg     Max Peak Torque 88   120     Min Peak Torque 47   74     Flex/Ext Ratio 1.9:1  1.6:1     % below normative data 27%  +8%     % gain from initial N/a  +48%        MOVEMENT LOSS - Lumbar    Norms ROM Loss Initial 24   Flexion Fingers touch toes, sacral angle >/= 70 deg, uniform spinal curvature, posterior weight shift  moderate loss Mod loss   Extension ASIS surpasses toes, spine of scapulae surpasses heels, uniform spinal curve moderate loss and major loss Mod loss   Side glide " Right   moderate loss c/ inc pain Mod loss   Side glide Left   minimal loss Min loss   Rotation Right PT observes contralateral shoulder minimal loss Min loss   Rotation Left PT observes contralateral shoulder minimal loss Min loss     Hip: 5/30/24  Left hip reduced int rot with pain, reduced extension with pain, (+) Faddirs and (+) fabers  Neg slump    Outcomes:  Initial score:49%  Visit 6 score: 43%  FOTO visit 11: 51%  Goal:>60%    Treatment    Jackelyn Kendrick received the treatments listed below:       Jackelyn Kendrick received neuromuscular education  to isolate and engage spinal stabilization musculature correctly for motor control and coordination to aid in function and posture for 10 minutes on the Medical Medx Machine.  Patient performed MedX dynamic exercise with emphasis on spinal muscular control using pacer throughout  active range of motion. Therapist assisted patient in achieving optimal exertion for neural reeducation and endurance training by using the  Bev Exertion Rating scale, by instructing the patient to aim for mid range of exertion, performing 15-20 repetitions, slowly, correctly,and safely.        6/27/2024    12:24 PM   [facility]   Visit Number 11   VAS Pain Rating 4   Recumbent Bike Seat Pos. 5   Extension in Lying 10   Flexion in Lying 10   Lumbar Weight 52 lbs   Repetitions 20   Rating of Perceived Exertion 4   Ice - Z Lie (in min.) 5         therapeutic exercises to develop strength, endurance, ROM, flexibility, posture, and core stabilization for 45 minutes including:    LTR x 10  Piriformis stretch 2 x 20 sec  Hip ext rotation right stretch 3 x 10 sec  BKTC x 10  right supine sciatic nerve glides x 15  open book x 10  PPT x 10 (Cues)  Bridging + RTB x 15 (Minimal lift off)  clams 15 c/RTB  RIZWAN 1 min  EIL x 10  SOC x 10    EIS x 10      Peripheral muscle strengthening which included 1 set of 15-20 repetitions at a slow, controlled 10-13 second per rep pace focused on  strengthening supporting musculature for improved body mechanics and functional mobility.  Pt and therapist focused on proper form during treatment to ensure optimal strengthening of each targeted muscle group.  Machines were utilized including torso rotation, leg press, hip abd and hip add, leg ext.  Leg curl, triceps, biceps (DB), chest ( NP)and row added visit 3    manual therapy techniques:  for 00 minutes DTM c/ thera roller to R lateral thigh--NP    Therapeutic exercise for 15 min including:  Do's and don't of lifting  Floor to waist  Hip hinge  Golfer's lift  How to get on and off flow done 3 times    cold pack for 5 minutes to lower back.    Home Exercises Provided and Patient Education Provided   Home exercises include:Piriformis stretch, BKTC, LTR, hip ext rot, bridging, clams, prone lie on elbows 1 min  Cardio program:visit 5  Lifting education date: 6/27/24  Posture/Lumbar roll: she is using lumbar roll  Fridge [employer] Discharge handout (date given):  Equipment at home/gym membership: stationary bike    Education provided:   -  Cues w/ex's  - MedX performance  - Precor ex performance  -Do's and don't of lifting  -how to get on and off floor  -breathing relaxation    Written Home Exercises Provided: Patient instructed to cont prior HEP.  Exercises were reviewed and Jackelyn Kendrick was able to demonstrate them prior to the end of the session.  Jackelyn Kendrick demonstrated good  understanding of the education provided.     See EMR under Patient Instructions for exercises provided prior visit.    Assessment      Jackelyn returns reporting chronic lower back pain R buttock/lower back discomfort which is rated as mild currently.  She notes stretching in the am helps her.  She does report some improvement in overall strength since the start of care. Treatment continued with flexibility, strengthening and neuromuscular reeducation ex's.  She was progressed with increased reps for clamshells and bridging  with band. She was able to perform ex's with min cues and without increased pain. Lumbar MedX resistance was maintained at 52 ft/lbs and she completed 20 reps with a RPE = 4/10.  She was able to complete the full circuit of peripheral strengthening ex's without c/o.(Chest press not performed ) Will continue per HB protocol and patient tolerance.  Do's and don't of lifting taught and getting on and off floor taught.  FOTO improved from 49% function to 51%      Patient is making progress towards established goals.   Pt will continue to benefit from skilled outpatient physical therapy to address the deficits stated in the impairment chart, provide pt/family education and to maximize pt's level of independence in the home and community environment.     Anticipated Barriers for therapy: 2 previous LB surgeries/RTC surgery   Pt's spiritual, cultural and educational needs considered and pt agreeable to plan of care and goals as stated below:     Goals:   Short term goals:  6 weeks or 10 visits   - Pt will demonstrate increased lumbar MedX ROM by at least 3 degrees from the initial ROM value with improvements noted in functional ROM and ability to perform ADLs. Appropriate and Ongoing  - Pt will demonstrate increased MedX average isometric strength value by 20% from initial test resulting in improved ability to perform bending, lifting, and carrying activities safely, confidently. Appropriate and Ongoing  - Pt will report a reduction in worst pain score by 1-2 points for improved tolerance for walking/standing. Appropriate and Ongoing  - Pt able to perform HEP correctly with minimal cueing or supervision from therapist to encourage independent management of symptoms. Appropriate and Ongoing     Long term goals: 10 weeks or 20 visits   - Pt will demonstrate increased lumbar MedX ROM by at least 9 degrees from initial ROM value, resulting in improved ability to perform functional forward bending while standing and sitting.  Appropriate and Ongoing  - Pt will demonstrate increased MedX average isometric strength value by 40% from initial test resulting in improved ability to perform bending, lifting, and carrying activities safely and confidently. Appropriate and Ongoing  - Pt to demonstrate ability to independently control and reduce their pain through posture positioning and mechanical movements throughout a typical day. Appropriate and Ongoing  - Pt will demonstrate reduced pain and improved functional outcomes as reported on the FOTO by reaching an intake score of >/= 61% functional ability in order to demonstrate subjective improvement in patient's condition. . Appropriate and Ongoing  - Pt will demonstrate independence with the HEP at discharge. Appropriate and Ongoing  - Pt(patient goal)will be able to walk > 3 blocks without  RLE pain Appropriate and Ongoing  Pt will be able to stand and wash dishes > 5 minutes without LBP    Plan   Continue with established Plan of Care towards established PT goals.     Therapist: Yelena Alonso, PT  6/27/24

## 2024-06-27 ENCOUNTER — CLINICAL SUPPORT (OUTPATIENT)
Dept: AUDIOLOGY | Facility: CLINIC | Age: 74
End: 2024-06-27
Payer: COMMERCIAL

## 2024-06-27 ENCOUNTER — CLINICAL SUPPORT (OUTPATIENT)
Dept: REHABILITATION | Facility: HOSPITAL | Age: 74
End: 2024-06-27
Attending: STUDENT IN AN ORGANIZED HEALTH CARE EDUCATION/TRAINING PROGRAM
Payer: COMMERCIAL

## 2024-06-27 DIAGNOSIS — H90.A21 SENSORINEURAL HEARING LOSS (SNHL) OF RIGHT EAR WITH RESTRICTED HEARING OF LEFT EAR: Primary | ICD-10-CM

## 2024-06-27 DIAGNOSIS — M25.69 DECREASED ROM OF TRUNK AND BACK: Primary | ICD-10-CM

## 2024-06-27 PROCEDURE — 99499 UNLISTED E&M SERVICE: CPT | Mod: S$GLB,,, | Performed by: AUDIOLOGIST

## 2024-06-27 PROCEDURE — 97112 NEUROMUSCULAR REEDUCATION: CPT | Performed by: PHYSICAL MEDICINE & REHABILITATION

## 2024-06-27 PROCEDURE — 97530 THERAPEUTIC ACTIVITIES: CPT | Performed by: PHYSICAL MEDICINE & REHABILITATION

## 2024-06-27 PROCEDURE — 97110 THERAPEUTIC EXERCISES: CPT | Performed by: PHYSICAL MEDICINE & REHABILITATION

## 2024-06-27 NOTE — PROGRESS NOTES
HEARING AID FOLLOW-UP    Jackelyn Kendrick was seen today for a hearing aid follow-up visit.     She reported that she has been hearing well; she notices the noise when she touches her hand to the hearing aids. She noted that she has forgotten to put the hearing aids in some days and has moved the location of her hearing aids to be next to her phone so she remembers. Data logging revealed about 4.5 hours of daily use; we discussed improving time worn. Gain was left as is for now.     Mrs. Kendrick reported she was overwhelmed with the bluetooth connection to her hearing aid. She would prefer to use the phone as normal on her left ear. She did not have any use for the stefan and understood that disconnecting the phone from the hearing aid would prevent her from using the stefan.     Mrs. Kendrick is scheduled to return in two months to check on wear time and determine if gain can be turned up towards target.

## 2024-07-01 ENCOUNTER — DOCUMENTATION ONLY (OUTPATIENT)
Dept: REHABILITATION | Facility: HOSPITAL | Age: 74
End: 2024-07-01

## 2024-07-01 ENCOUNTER — CLINICAL SUPPORT (OUTPATIENT)
Dept: REHABILITATION | Facility: HOSPITAL | Age: 74
End: 2024-07-01
Attending: STUDENT IN AN ORGANIZED HEALTH CARE EDUCATION/TRAINING PROGRAM
Payer: COMMERCIAL

## 2024-07-01 DIAGNOSIS — M25.69 DECREASED ROM OF TRUNK AND BACK: Primary | ICD-10-CM

## 2024-07-01 PROCEDURE — 97110 THERAPEUTIC EXERCISES: CPT | Mod: CQ

## 2024-07-01 NOTE — PROGRESS NOTES
PT/PTA met face to face to discuss pt's treatment plan and progress towards established goals. Pt will be seen by a physical therapist minimally every 6th visit or every 30 days.       Subhash Wayne PTA

## 2024-07-01 NOTE — PROGRESS NOTES
"[facility]      Name: Jackelyn Kendrick  Clinic Number: [id number]    Therapy Diagnosis:   Encounter Diagnosis   Name Primary?    Decreased ROM of trunk and back Yes       Physician: Mick Pineda,*    Visit Date: 2024    Physician Orders: PT Eval and Treat  Medical Diagnosis from Referral:   M54.16 (ICD-10-CM) - Lumbar radiculopathy   M96.1 (ICD-10-CM) - Post laminectomy syndrome      Evaluation Date: 2024  Authorization Period Expiration: 4/15/25  Plan of Care Expiration:24  Reassessment Due: 24  Visit # / Visits authorized:    MedX testing visit 2    Time In: 12:30 PM  Time Out  1:30 PM  Total Billable Time:  50 minutes   INSURANCE and OUTCOMES: Fee for Service with FOTO Outcomes 2/3      Precautions: standard and previous LB surgery/HTN/L RTC repair/R arthroscopic     Pattern of pain determined: 1PEN    Subjective   Jackelyn  reports feeling a little "Off" today. She reports min dizziness earlier which she attributes to new medicine prescribed by pain med MD. States that her back pain is minimal currently and tends to come and go with various intensity.     Patient reports tolerating previous visit : Muscular soreness  Patient reports their pain to be  3/10 on a 0-10 scale with 0 being no pain and 10 being the worst pain imaginable.  Pain Location: right back and buttocks     Work and leisure: retired nurse/garden  Pt goals: "decrease pain and be able to walk further."     Objective   Baseline IM Testing Results:   Date of testin24    Initial:  Midpoint Final   ROM 6-45 deg  3-48 deg     Max Peak Torque 88   120     Min Peak Torque 47   74     Flex/Ext Ratio 1.9:1  1.6:1     % below normative data 27%  +8%     % gain from initial N/a  +48%        MOVEMENT LOSS - Lumbar    Norms ROM Loss Initial 24   Flexion Fingers touch toes, sacral angle >/= 70 deg, uniform spinal curvature, posterior weight shift  moderate loss Mod loss "   Extension ASIS surpasses toes, spine of scapulae surpasses heels, uniform spinal curve moderate loss and major loss Mod loss   Side glide Right   moderate loss c/ inc pain Mod loss   Side glide Left   minimal loss Min loss   Rotation Right PT observes contralateral shoulder minimal loss Min loss   Rotation Left PT observes contralateral shoulder minimal loss Min loss     Hip: 5/30/24  Left hip reduced int rot with pain, reduced extension with pain, (+) Faddirs and (+) fabers  Neg slump    Outcomes:  Initial score:49%  Visit 6 score: 43%  FOTO visit 11: 51%  Goal:>60%    Treatment    Jackelyn Kendrick received the treatments listed below:       Jackelyn Kendrick received neuromuscular education  to isolate and engage spinal stabilization musculature correctly for motor control and coordination to aid in function and posture for 00 minutes on the Medical Medx Machine.  Patient performed MedX dynamic exercise with emphasis on spinal muscular control using pacer throughout  active range of motion. Therapist assisted patient in achieving optimal exertion for neural reeducation and endurance training by using the  Bev Exertion Rating scale, by instructing the patient to aim for mid range of exertion, performing 15-20 repetitions, slowly, correctly,and safely.       therapeutic exercises to develop strength, endurance, ROM, flexibility, posture, and core stabilization for 50 minutes including:    LTR x 10  Piriformis stretch 2 x 20 sec  Hip ext rotation right stretch 3 x 10 sec  BKTC x 10  right supine sciatic nerve glides x 15--NP  open book x 10  PPT x 10 (Cues)  Bridging + GTB x 15 (Minimal lift off)  clams 15 c/GTB  RIZWAN 1 min--NP  EIL x 10--NP  SOC x 10    EIS x 10  +Precor Lumbar extension 65# 20 reps, RPE = 3/10 (Lumbar MedX not available)      Peripheral muscle strengthening which included 1 set of 15-20 repetitions at a slow, controlled 10-13 second per rep pace focused on strengthening supporting  "musculature for improved body mechanics and functional mobility.  Pt and therapist focused on proper form during treatment to ensure optimal strengthening of each targeted muscle group.  Machines were utilized including torso rotation, leg press, hip abd and hip add, leg ext.  Leg curl, triceps, biceps (DB), chest ( NP)and row added visit 3    manual therapy techniques:  for 00 minutes DTM c/ thera roller to R lateral thigh--NP    Therapeutic exercise for 15 min including:  Do's and don't of lifting  Floor to waist  Hip hinge  Golfer's lift  How to get on and off flow done 3 times    cold pack for 5 minutes to lower back.    Home Exercises Provided and Patient Education Provided   Home exercises include:Piriformis stretch, BKTC, LTR, hip ext rot, bridging, clams, prone lie on elbows 1 min  Cardio program:visit 5  Lifting education date: 6/27/24  Posture/Lumbar roll: she is using lumbar roll  Fridge [employer]et Discharge handout (date given):  Equipment at home/gym membership: stationary bike    Education provided:   -  Cues w/ex's  - MedX performance  - Precor ex performance  -Do's and don't of lifting  -how to get on and off floor  -breathing relaxation    Written Home Exercises Provided: Patient instructed to cont prior HEP.  Exercises were reviewed and Jackelyn Kendrick was able to demonstrate them prior to the end of the session.  Jackelyn Kendirck demonstrated good  understanding of the education provided.     See EMR under Patient Instructions for exercises provided prior visit.    Assessment   Jackelyn returns reporting chronic lower back pain R buttock/lower back discomfort which is rated as minimal currently.  She also c/o some fatigue and feeling a little "off" today which she attributes to new meds. BP prior to treatment = 123/65, BP post Tx = 122/63.  Treatment continued with flexibility, strengthening and neuromuscular reeducation ex's. Progressed to GTB for bridging with band and clamshells. She was able " to perform ex's with min cues and without increased pain but was moving a little slower overall and required some increased cuing for correct performance.. Lumbar MedX machine was not available today therefore, she performed Precor Lumbar extension at 65#'s 20 reps RPE = 3/10.  She was able to complete the full circuit of peripheral strengthening ex's without c/o.(Chest press not performed ) Will continue per HB protocol and patient tolerance.      Patient is making progress towards established goals.   Pt will continue to benefit from skilled outpatient physical therapy to address the deficits stated in the impairment chart, provide pt/family education and to maximize pt's level of independence in the home and community environment.     Anticipated Barriers for therapy: 2 previous LB surgeries/RTC surgery   Pt's spiritual, cultural and educational needs considered and pt agreeable to plan of care and goals as stated below:     Goals:   Short term goals:  6 weeks or 10 visits   - Pt will demonstrate increased lumbar MedX ROM by at least 3 degrees from the initial ROM value with improvements noted in functional ROM and ability to perform ADLs. Appropriate and Ongoing  - Pt will demonstrate increased MedX average isometric strength value by 20% from initial test resulting in improved ability to perform bending, lifting, and carrying activities safely, confidently. Appropriate and Ongoing  - Pt will report a reduction in worst pain score by 1-2 points for improved tolerance for walking/standing. Appropriate and Ongoing  - Pt able to perform HEP correctly with minimal cueing or supervision from therapist to encourage independent management of symptoms. Appropriate and Ongoing     Long term goals: 10 weeks or 20 visits   - Pt will demonstrate increased lumbar MedX ROM by at least 9 degrees from initial ROM value, resulting in improved ability to perform functional forward bending while standing and sitting. Appropriate  and Ongoing  - Pt will demonstrate increased MedX average isometric strength value by 40% from initial test resulting in improved ability to perform bending, lifting, and carrying activities safely and confidently. Appropriate and Ongoing  - Pt to demonstrate ability to independently control and reduce their pain through posture positioning and mechanical movements throughout a typical day. Appropriate and Ongoing  - Pt will demonstrate reduced pain and improved functional outcomes as reported on the FOTO by reaching an intake score of >/= 61% functional ability in order to demonstrate subjective improvement in patient's condition. . Appropriate and Ongoing  - Pt will demonstrate independence with the HEP at discharge. Appropriate and Ongoing  - Pt(patient goal)will be able to walk > 3 blocks without  RLE pain Appropriate and Ongoing  Pt will be able to stand and wash dishes > 5 minutes without LBP    Plan   Continue with established Plan of Care towards established PT goals.     Therapist: Subhash Wayne, PTA  6/27/24

## 2024-07-08 ENCOUNTER — CLINICAL SUPPORT (OUTPATIENT)
Dept: REHABILITATION | Facility: HOSPITAL | Age: 74
End: 2024-07-08
Attending: STUDENT IN AN ORGANIZED HEALTH CARE EDUCATION/TRAINING PROGRAM
Payer: COMMERCIAL

## 2024-07-08 DIAGNOSIS — M25.69 DECREASED ROM OF TRUNK AND BACK: Primary | ICD-10-CM

## 2024-07-08 PROCEDURE — 97112 NEUROMUSCULAR REEDUCATION: CPT

## 2024-07-08 PROCEDURE — 97110 THERAPEUTIC EXERCISES: CPT

## 2024-07-08 NOTE — PROGRESS NOTES
"[facility]      Name: Jackelyn Kendrick  Clinic Number: [id number]    Therapy Diagnosis:   Encounter Diagnosis   Name Primary?    Decreased ROM of trunk and back Yes       Physician: Mick Pineda,*    Visit Date: 2024    Physician Orders: PT Eval and Treat  Medical Diagnosis from Referral:   M54.16 (ICD-10-CM) - Lumbar radiculopathy   M96.1 (ICD-10-CM) - Post laminectomy syndrome      Evaluation Date: 2024  Authorization Period Expiration: 4/15/25  Plan of Care Expiration:24  Reassessment Due: 24  Visit # / Visits authorized:    MedX testing visit 2    Time In: 855  Time Out  955  Total Billable Time:  55 minutes 30 min 1 on   INSURANCE and OUTCOMES: Fee for Service with FOTO Outcomes 2/3      Precautions: standard and previous LB surgery/HTN/L RTC repair/R arthroscopic     Pattern of pain determined: 1PEN    Subjective   Jackelyn  reports she did a lot of yard work over the weekend and did not have any pain    Patient reports tolerating previous visit : Muscular soreness  Patient reports their pain to be  2/10 on a 0-10 scale with 0 being no pain and 10 being the worst pain imaginable.  Pain Location: right back and buttocks     Work and leisure: retired nurse/garden  Pt goals: "decrease pain and be able to walk further."     Objective   Baseline IM Testing Results:   Date of testin24    Initial:  Midpoint Final   ROM 6-45 deg  3-48 deg     Max Peak Torque 88   120     Min Peak Torque 47   74     Flex/Ext Ratio 1.9:1  1.6:1     % below normative data 27%  +8%     % gain from initial N/a  +48%        MOVEMENT LOSS - Lumbar    Norms ROM Loss Initial 24   Flexion Fingers touch toes, sacral angle >/= 70 deg, uniform spinal curvature, posterior weight shift  moderate loss Mod loss   Extension ASIS surpasses toes, spine of scapulae surpasses heels, uniform spinal curve moderate loss and major loss Mod loss   Side glide Right   moderate " loss c/ inc pain Mod loss   Side glide Left   minimal loss Min loss   Rotation Right PT observes contralateral shoulder minimal loss Min loss   Rotation Left PT observes contralateral shoulder minimal loss Min loss     Hip: 5/30/24  Left hip reduced int rot with pain, reduced extension with pain, (+) Faddirs and (+) fabers  Neg slump    Outcomes:  Initial score:49%  Visit 6 score: 43%  FOTO visit 11: 51%  Goal:>60%    Treatment    Jackelyn Kendrick received the treatments listed below:       Jackelyn Kendrick received neuromuscular education  to isolate and engage spinal stabilization musculature correctly for motor control and coordination to aid in function and posture for 10 minutes on the Medical Medx Machine.  Patient performed MedX dynamic exercise with emphasis on spinal muscular control using pacer throughout  active range of motion. Therapist assisted patient in achieving optimal exertion for neural reeducation and endurance training by using the  Bev Exertion Rating scale, by instructing the patient to aim for mid range of exertion, performing 15-20 repetitions, slowly, correctly,and safely.           7/8/2024     8:56 AM   [facility]   Visit Number 13   VAS Pain Rating 2   Recumbent Bike Seat Pos. 5   Extension in Standing 10   Flexion in Lying 10   Lumbar Weight 55 lbs   Repetitions 15   Rating of Perceived Exertion 4   Ice - Z Lie (in min.) 5       therapeutic exercises to develop strength, endurance, ROM, flexibility, posture, and core stabilization for 50 minutes including:    LTR x 10  Piriformis stretch 2 x 20 sec  Hip ext rotation right stretch 3 x 10 sec  BKTC x 10  right supine sciatic nerve glides x 15--NP  open book x 10  PPT x 10 (Cues)  Bridging + GTB x 15 (Minimal lift off)  clams 15 c/GTB  SOC x 10    EIS x 10        Peripheral muscle strengthening which included 1 set of 15-20 repetitions at a slow, controlled 10-13 second per rep pace focused on strengthening supporting  musculature for improved body mechanics and functional mobility.  Pt and therapist focused on proper form during treatment to ensure optimal strengthening of each targeted muscle group.  Machines were utilized including torso rotation, leg press, hip abd and hip add, leg ext.  Leg curl, triceps, biceps (DB), chest ( NP)and row added visit 3    manual therapy techniques:  for 00 minutes DTM c/ thera roller to R lateral thigh--NP    Therapeutic exercise for 15 min including:  Do's and don't of lifting  Floor to waist  Hip hinge  Golfer's lift  How to get on and off flow done 3 times    cold pack for 5 minutes to lower back.    Home Exercises Provided and Patient Education Provided   Home exercises include:Piriformis stretch, BKTC, LTR, hip ext rot, bridging, clams, prone lie on elbows 1 min  Cardio program:visit 5  Lifting education date: 6/27/24  Posture/Lumbar roll: she is using lumbar roll  [employer] Discharge handout (date given):  Equipment at home/gym membership: stationary bike    Education provided:   -  Cues w/ex's  - MedX performance  - Precor ex performance  -Do's and don't of lifting  -how to get on and off floor  -breathing relaxation    Written Home Exercises Provided: Patient instructed to cont prior HEP.  Exercises were reviewed and Jackelyn Kendrick was able to demonstrate them prior to the end of the session.  Jackelyn Kendrick demonstrated good  understanding of the education provided.     See EMR under Patient Instructions for exercises provided prior visit.    Assessment   Jackelyn returns reporting her LBP is steadily getting better.  Pt reports being able to work in her yard without increasing LBP. Treatment continued with flexibility, strengthening and neuromuscular reeducation ex's.  Lumbar MedX weight increased to 55 ft/lbs with pt completing 15 reps at 4/10 exertion level.  She was able to complete the full circuit of peripheral strengthening ex's without c/o.(Chest press not  performed ) Will continue per HB protocol and patient tolerance.      Patient is making progress towards established goals.   Pt will continue to benefit from skilled outpatient physical therapy to address the deficits stated in the impairment chart, provide pt/family education and to maximize pt's level of independence in the home and community environment.     Anticipated Barriers for therapy: 2 previous LB surgeries/RTC surgery   Pt's spiritual, cultural and educational needs considered and pt agreeable to plan of care and goals as stated below:     Goals:   Short term goals:  6 weeks or 10 visits   - Pt will demonstrate increased lumbar MedX ROM by at least 3 degrees from the initial ROM value with improvements noted in functional ROM and ability to perform ADLs. Appropriate and Ongoing  - Pt will demonstrate increased MedX average isometric strength value by 20% from initial test resulting in improved ability to perform bending, lifting, and carrying activities safely, confidently. Appropriate and Ongoing  - Pt will report a reduction in worst pain score by 1-2 points for improved tolerance for walking/standing. Appropriate and Ongoing  - Pt able to perform HEP correctly with minimal cueing or supervision from therapist to encourage independent management of symptoms. Appropriate and Ongoing     Long term goals: 10 weeks or 20 visits   - Pt will demonstrate increased lumbar MedX ROM by at least 9 degrees from initial ROM value, resulting in improved ability to perform functional forward bending while standing and sitting. Appropriate and Ongoing  - Pt will demonstrate increased MedX average isometric strength value by 40% from initial test resulting in improved ability to perform bending, lifting, and carrying activities safely and confidently. Appropriate and Ongoing  - Pt to demonstrate ability to independently control and reduce their pain through posture positioning and mechanical movements throughout a  typical day. Appropriate and Ongoing  - Pt will demonstrate reduced pain and improved functional outcomes as reported on the FOTO by reaching an intake score of >/= 61% functional ability in order to demonstrate subjective improvement in patient's condition. . Appropriate and Ongoing  - Pt will demonstrate independence with the HEP at discharge. Appropriate and Ongoing  - Pt(patient goal)will be able to walk > 3 blocks without  RLE pain Appropriate and Ongoing  Pt will be able to stand and wash dishes > 5 minutes without LBP    Plan   Continue with established Plan of Care towards established PT goals.     Therapist: eRnea Mccann, PT  6/27/24

## 2024-07-11 ENCOUNTER — CLINICAL SUPPORT (OUTPATIENT)
Dept: REHABILITATION | Facility: HOSPITAL | Age: 74
End: 2024-07-11
Attending: STUDENT IN AN ORGANIZED HEALTH CARE EDUCATION/TRAINING PROGRAM
Payer: COMMERCIAL

## 2024-07-11 DIAGNOSIS — M25.69 DECREASED ROM OF TRUNK AND BACK: Primary | ICD-10-CM

## 2024-07-11 PROCEDURE — 97110 THERAPEUTIC EXERCISES: CPT

## 2024-07-11 PROCEDURE — 97112 NEUROMUSCULAR REEDUCATION: CPT

## 2024-07-11 NOTE — PROGRESS NOTES
"[facility]      Name: Jackelyn Kendrick  Clinic Number: [id number]    Therapy Diagnosis:   Encounter Diagnosis   Name Primary?    Decreased ROM of trunk and back Yes         Physician: Mick Pineda,*    Visit Date: 2024    Physician Orders: PT Eval and Treat  Medical Diagnosis from Referral:   M54.16 (ICD-10-CM) - Lumbar radiculopathy   M96.1 (ICD-10-CM) - Post laminectomy syndrome      Evaluation Date: 2024  Authorization Period Expiration: 4/15/25  Plan of Care Expiration:24  Reassessment Due: 24  Visit # / Visits authorized:    MedX testing visit 2    Time In: 11  Time Out  12  Total Billable Time:  55 minutes 30 min 1 on   INSURANCE and OUTCOMES: Fee for Service with FOTO Outcomes 2/3      Precautions: standard and previous LB surgery/HTN/L RTC repair/R arthroscopic     Pattern of pain determined: 1PEN    Subjective   Jackelyn  reports she is always very stiff in the AM, which gets better the more she moves.    Patient reports tolerating previous visit : Muscular soreness  Patient reports their pain to be  2/10 on a 0-10 scale with 0 being no pain and 10 being the worst pain imaginable.  Pain Location: right back and buttocks     Work and leisure: retired nurse/garden  Pt goals: "decrease pain and be able to walk further."     Objective   Baseline IM Testing Results:   Date of testin24    Initial:  Midpoint Final   ROM 6-45 deg  3-48 deg     Max Peak Torque 88   120     Min Peak Torque 47   74     Flex/Ext Ratio 1.9:1  1.6:1     % below normative data 27%  +8%     % gain from initial N/a  +48%        MOVEMENT LOSS - Lumbar    Norms ROM Loss Initial 24   Flexion Fingers touch toes, sacral angle >/= 70 deg, uniform spinal curvature, posterior weight shift  moderate loss Mod loss   Extension ASIS surpasses toes, spine of scapulae surpasses heels, uniform spinal curve moderate loss and major loss Mod loss   Side glide Right   " moderate loss c/ inc pain Mod loss   Side glide Left   minimal loss Min loss   Rotation Right PT observes contralateral shoulder minimal loss Min loss   Rotation Left PT observes contralateral shoulder minimal loss Min loss     Hip: 5/30/24  Left hip reduced int rot with pain, reduced extension with pain, (+) Faddirs and (+) fabers  Neg slump    Outcomes:  Initial score:49%  Visit 6 score: 43%  FOTO visit 11: 51%  Goal:>60%    Treatment    Jackelyn Kendrick received the treatments listed below:       Jackelyn Kendrick received neuromuscular education  to isolate and engage spinal stabilization musculature correctly for motor control and coordination to aid in function and posture for 10 minutes on the Medical Medx Machine.  Patient performed MedX dynamic exercise with emphasis on spinal muscular control using pacer throughout  active range of motion. Therapist assisted patient in achieving optimal exertion for neural reeducation and endurance training by using the  Bev Exertion Rating scale, by instructing the patient to aim for mid range of exertion, performing 15-20 repetitions, slowly, correctly,and safely.           7/11/2024    11:18 AM   [facility]   Visit Number 14   VAS Pain Rating 2   Recumbent Bike Seat Pos. 5   Extension in Standing 10   Flexion in Lying 10   Lumbar Weight 55 lbs   Repetitions 20   Rating of Perceived Exertion 4   Ice - Z Lie (in min.) 5       therapeutic exercises to develop strength, endurance, ROM, flexibility, posture, and core stabilization for 50 minutes including:    LTR x 10  Piriformis stretch 2 x 20 sec  Hip ext rotation right stretch 3 x 10 sec  BKTC x 10  right supine sciatic nerve glides x 15--NP  open book x 10  PPT x 10 (Cues)  Bridging + GTB x 15 (Minimal lift off)  clams 15 c/GTB  SOC x 10    EIS x 10        Peripheral muscle strengthening which included 1 set of 15-20 repetitions at a slow, controlled 10-13 second per rep pace focused on strengthening  supporting musculature for improved body mechanics and functional mobility.  Pt and therapist focused on proper form during treatment to ensure optimal strengthening of each targeted muscle group.  Machines were utilized including torso rotation, leg press, hip abd and hip add, leg ext.  Leg curl, triceps, biceps (DB), chest ( NP)and row added visit 3    manual therapy techniques:  for 00 minutes DTM c/ thera roller to R lateral thigh--NP    Therapeutic exercise for 15 min including:  Do's and don't of lifting  Floor to waist  Hip hinge  Golfer's lift  How to get on and off flow done 3 times    cold pack for 5 minutes to lower back.    Home Exercises Provided and Patient Education Provided   Home exercises include:Piriformis stretch, BKTC, LTR, hip ext rot, bridging, clams, prone lie on elbows 1 min  Cardio program:visit 5  Lifting education date: 6/27/24  Posture/Lumbar roll: she is using lumbar roll  [employer] Discharge handout (date given):  Equipment at home/gym membership: stationary bike    Education provided:   -  Cues w/ex's  - MedX performance  - Precor ex performance  -Do's and don't of lifting  -how to get on and off floor  -breathing relaxation    Written Home Exercises Provided: Patient instructed to cont prior HEP.  Exercises were reviewed and Jackelyn Kendrick was able to demonstrate them prior to the end of the session.  Jackelyn Kendrick demonstrated good  understanding of the education provided.     See EMR under Patient Instructions for exercises provided prior visit.    Assessment   Jackelyn returns reporting her LBP is steadily getting better.   Treatment continued with flexibility, strengthening and neuromuscular reeducation ex's.  Lumbar MedX weight increased to 55 ft/lbs with pt completing 20reps at 4/10 exertion level.  Increase 5-10% next visit. She was able to complete the full circuit of peripheral strengthening ex's without c/o.(Chest press not performed ) Will continue per HB  protocol and patient tolerance.      Patient is making progress towards established goals.   Pt will continue to benefit from skilled outpatient physical therapy to address the deficits stated in the impairment chart, provide pt/family education and to maximize pt's level of independence in the home and community environment.     Anticipated Barriers for therapy: 2 previous LB surgeries/RTC surgery   Pt's spiritual, cultural and educational needs considered and pt agreeable to plan of care and goals as stated below:     Goals:   Short term goals:  6 weeks or 10 visits   - Pt will demonstrate increased lumbar MedX ROM by at least 3 degrees from the initial ROM value with improvements noted in functional ROM and ability to perform ADLs. Appropriate and Ongoing  - Pt will demonstrate increased MedX average isometric strength value by 20% from initial test resulting in improved ability to perform bending, lifting, and carrying activities safely, confidently. Appropriate and Ongoing  - Pt will report a reduction in worst pain score by 1-2 points for improved tolerance for walking/standing. Appropriate and Ongoing  - Pt able to perform HEP correctly with minimal cueing or supervision from therapist to encourage independent management of symptoms. Appropriate and Ongoing     Long term goals: 10 weeks or 20 visits   - Pt will demonstrate increased lumbar MedX ROM by at least 9 degrees from initial ROM value, resulting in improved ability to perform functional forward bending while standing and sitting. Appropriate and Ongoing  - Pt will demonstrate increased MedX average isometric strength value by 40% from initial test resulting in improved ability to perform bending, lifting, and carrying activities safely and confidently. Appropriate and Ongoing  - Pt to demonstrate ability to independently control and reduce their pain through posture positioning and mechanical movements throughout a typical day. Appropriate and  Ongoing  - Pt will demonstrate reduced pain and improved functional outcomes as reported on the FOTO by reaching an intake score of >/= 61% functional ability in order to demonstrate subjective improvement in patient's condition. . Appropriate and Ongoing  - Pt will demonstrate independence with the HEP at discharge. Appropriate and Ongoing  - Pt(patient goal)will be able to walk > 3 blocks without  RLE pain Appropriate and Ongoing  Pt will be able to stand and wash dishes > 5 minutes without LBP    Plan   Continue with established Plan of Care towards established PT goals.     Therapist: Renea Mccann, PT  6/27/24

## 2024-07-15 ENCOUNTER — CLINICAL SUPPORT (OUTPATIENT)
Dept: REHABILITATION | Facility: HOSPITAL | Age: 74
End: 2024-07-15
Attending: STUDENT IN AN ORGANIZED HEALTH CARE EDUCATION/TRAINING PROGRAM
Payer: COMMERCIAL

## 2024-07-15 DIAGNOSIS — M25.69 DECREASED ROM OF TRUNK AND BACK: Primary | ICD-10-CM

## 2024-07-15 PROCEDURE — 97110 THERAPEUTIC EXERCISES: CPT

## 2024-07-15 PROCEDURE — 97112 NEUROMUSCULAR REEDUCATION: CPT

## 2024-07-15 NOTE — PROGRESS NOTES
"[facility]      Name: Jackelyn Kendrick  Clinic Number: [id number]    Therapy Diagnosis:   Encounter Diagnosis   Name Primary?    Decreased ROM of trunk and back Yes         Physician: Mick Pineda,*    Visit Date: 7/15/2024    Physician Orders: PT Eval and Treat  Medical Diagnosis from Referral:   M54.16 (ICD-10-CM) - Lumbar radiculopathy   M96.1 (ICD-10-CM) - Post laminectomy syndrome      Evaluation Date: 2024  Authorization Period Expiration: 4/15/25  Plan of Care Expiration:24  Reassessment Due: 24  Visit # / Visits authorized:    MedX testing visit 2    Time In: 950  Time Out  1050  Total Billable Time:  55 minutes 30 min 1 on 1  INSURANCE and OUTCOMES: Fee for Service with FOTO Outcomes 2/3      Precautions: standard and previous LB surgery/HTN/L RTC repair/R arthroscopic     Pattern of pain determined: 1PEN    Subjective   Jackelyn  reports she woke up this AM with 4/10 pain.  Pain is on the right side of the LB into Lateral hip/thigh    Patient reports tolerating previous visit : Muscular soreness  Patient reports their pain to be  4/10 on a 0-10 scale with 0 being no pain and 10 being the worst pain imaginable.  Pain Location: right back and buttocks     Work and leisure: retired nurse/garden  Pt goals: "decrease pain and be able to walk further."     Objective   Baseline IM Testing Results:   Date of testin24    Initial:  Midpoint Final   ROM 6-45 deg  3-48 deg     Max Peak Torque 88   120     Min Peak Torque 47   74     Flex/Ext Ratio 1.9:1  1.6:1     % below normative data 27%  +8%     % gain from initial N/a  +48%        MOVEMENT LOSS - Lumbar    Norms ROM Loss Initial 24   Flexion Fingers touch toes, sacral angle >/= 70 deg, uniform spinal curvature, posterior weight shift  moderate loss Mod loss   Extension ASIS surpasses toes, spine of scapulae surpasses heels, uniform spinal curve moderate loss and major loss Mod loss "   Side glide Right   moderate loss c/ inc pain Mod loss   Side glide Left   minimal loss Min loss   Rotation Right PT observes contralateral shoulder minimal loss Min loss   Rotation Left PT observes contralateral shoulder minimal loss Min loss     Hip: 5/30/24  Left hip reduced int rot with pain, reduced extension with pain, (+) Faddirs and (+) fabers  Neg slump    Outcomes:  Initial score:49%  Visit 6 score: 43%  FOTO visit 11: 51%  Goal:>60%    Treatment    Jackelyn Kendrick received the treatments listed below:       Jackelyn Kendrick received neuromuscular education  to isolate and engage spinal stabilization musculature correctly for motor control and coordination to aid in function and posture for 10 minutes on the Medical Medx Machine.  Patient performed MedX dynamic exercise with emphasis on spinal muscular control using pacer throughout  active range of motion. Therapist assisted patient in achieving optimal exertion for neural reeducation and endurance training by using the  Bev Exertion Rating scale, by instructing the patient to aim for mid range of exertion, performing 15-20 repetitions, slowly, correctly,and safely.           7/15/2024    10:26 AM   [facility]   Visit Number 15   VAS Pain Rating 4   Recumbent Bike Seat Pos. 5   Extension in Standing 10   Flexion in Lying 10   Lumbar Weight 58 lbs   Repetitions 15   Rating of Perceived Exertion 4   Ice - Z Lie (in min.) 5       therapeutic exercises to develop strength, endurance, ROM, flexibility, posture, and core stabilization for 50 minutes including:    LTR x 10  Piriformis stretch 2 x 20 sec  Hip ext rotation right stretch 3 x 10 sec  BKTC x 10  right supine sciatic nerve glides x 15--NP  open book x 10  PPT x 10 (Cues)  Bridging + BlackTB x 15 (Minimal lift off)  clams 15 c/GTB  SOC x 10    EIS x 10        Peripheral muscle strengthening which included 1 set of 15-20 repetitions at a slow, controlled 10-13 second per rep pace  focused on strengthening supporting musculature for improved body mechanics and functional mobility.  Pt and therapist focused on proper form during treatment to ensure optimal strengthening of each targeted muscle group.  Machines were utilized including torso rotation, leg press, hip abd and hip add, leg ext.  Leg curl, triceps, biceps (DB), chest ( NP)and row added visit 3    manual therapy techniques:  for 00 minutes DTM c/ thera roller to R lateral thigh--NP    Therapeutic exercise for 15 min including:  Do's and don't of lifting  Floor to waist  Hip hinge  Golfer's lift  How to get on and off flow done 3 times    cold pack for 5 minutes to lower back.    Home Exercises Provided and Patient Education Provided   Home exercises include:Piriformis stretch, BKTC, LTR, hip ext rot, bridging, clams, prone lie on elbows 1 min  Cardio program:visit 5  Lifting education date: 6/27/24  Posture/Lumbar roll: she is using lumbar roll  [employer] Discharge handout (date given):  Equipment at home/gym membership: stationary bike    Education provided:   -  Cues w/ex's  - MedX performance  - Precor ex performance  -Do's and don't of lifting  -how to get on and off floor  -breathing relaxation    Written Home Exercises Provided: Patient instructed to cont prior HEP.  Exercises were reviewed and Jackelyn Kendrick was able to demonstrate them prior to the end of the session.  Jackelyn Kendrick demonstrated good  understanding of the education provided.     See EMR under Patient Instructions for exercises provided prior visit.    Assessment   Jackelyn returns reporting her LBP is steadily getting better.   Treatment continued with flexibility, strengthening and neuromuscular reeducation ex's.  Lumbar MedX weight increased to 58 ft/lbs with pt completing 15reps at 4/10 exertion level.  Attempt inc ROM next visit. She was able to complete the full circuit of peripheral strengthening ex's without c/o.(Chest press not performed  ) Will continue per HB protocol and patient tolerance.      Patient is making progress towards established goals.   Pt will continue to benefit from skilled outpatient physical therapy to address the deficits stated in the impairment chart, provide pt/family education and to maximize pt's level of independence in the home and community environment.     Anticipated Barriers for therapy: 2 previous LB surgeries/RTC surgery   Pt's spiritual, cultural and educational needs considered and pt agreeable to plan of care and goals as stated below:     Goals:   Short term goals:  6 weeks or 10 visits   - Pt will demonstrate increased lumbar MedX ROM by at least 3 degrees from the initial ROM value with improvements noted in functional ROM and ability to perform ADLs. Appropriate and Ongoing  - Pt will demonstrate increased MedX average isometric strength value by 20% from initial test resulting in improved ability to perform bending, lifting, and carrying activities safely, confidently. Appropriate and Ongoing  - Pt will report a reduction in worst pain score by 1-2 points for improved tolerance for walking/standing. Appropriate and Ongoing  - Pt able to perform HEP correctly with minimal cueing or supervision from therapist to encourage independent management of symptoms. Appropriate and Ongoing     Long term goals: 10 weeks or 20 visits   - Pt will demonstrate increased lumbar MedX ROM by at least 9 degrees from initial ROM value, resulting in improved ability to perform functional forward bending while standing and sitting. Appropriate and Ongoing  - Pt will demonstrate increased MedX average isometric strength value by 40% from initial test resulting in improved ability to perform bending, lifting, and carrying activities safely and confidently. Appropriate and Ongoing  - Pt to demonstrate ability to independently control and reduce their pain through posture positioning and mechanical movements throughout a typical day.  Appropriate and Ongoing  - Pt will demonstrate reduced pain and improved functional outcomes as reported on the FOTO by reaching an intake score of >/= 61% functional ability in order to demonstrate subjective improvement in patient's condition. . Appropriate and Ongoing  - Pt will demonstrate independence with the HEP at discharge. Appropriate and Ongoing  - Pt(patient goal)will be able to walk > 3 blocks without  RLE pain Appropriate and Ongoing  Pt will be able to stand and wash dishes > 5 minutes without LBP    Plan   Continue with established Plan of Care towards established PT goals.     Therapist: Renea Mccann, PT    7/15/2024

## 2024-07-16 ENCOUNTER — TELEPHONE (OUTPATIENT)
Dept: AUDIOLOGY | Facility: CLINIC | Age: 74
End: 2024-07-16
Payer: MEDICARE

## 2024-07-16 NOTE — TELEPHONE ENCOUNTER
Patient called to say she lost her hearing aid yesterday; she is looking all over her home for it without any success. She was reminded of the $300 replacement fee for a lost hearing aid that is covered under the . She is going to give it another day and then call me to get the replacement. She would like to connect her hearing aid back to her phone and take the volume off her game apps so she can find her hearing aid if this occurs again.

## 2024-07-18 ENCOUNTER — CLINICAL SUPPORT (OUTPATIENT)
Dept: REHABILITATION | Facility: HOSPITAL | Age: 74
End: 2024-07-18
Attending: STUDENT IN AN ORGANIZED HEALTH CARE EDUCATION/TRAINING PROGRAM
Payer: COMMERCIAL

## 2024-07-18 DIAGNOSIS — M25.69 DECREASED ROM OF TRUNK AND BACK: Primary | ICD-10-CM

## 2024-07-18 PROCEDURE — 97110 THERAPEUTIC EXERCISES: CPT | Mod: CQ

## 2024-07-18 PROCEDURE — 97112 NEUROMUSCULAR REEDUCATION: CPT | Mod: CQ

## 2024-07-18 NOTE — PROGRESS NOTES
"[facility]      Name: Jackelyn Kendrick  Clinic Number: [id number]    Therapy Diagnosis:   Encounter Diagnosis   Name Primary?    Decreased ROM of trunk and back Yes     Physician: Mick Pineda,*    Visit Date: 2024    Physician Orders: PT Eval and Treat  Medical Diagnosis from Referral:   M54.16 (ICD-10-CM) - Lumbar radiculopathy   M96.1 (ICD-10-CM) - Post laminectomy syndrome      Evaluation Date: 2024  Authorization Period Expiration: 4/15/25  Plan of Care Expiration:24  Reassessment Due: 24  Visit # / Visits authorized:    MedX testing visit 2    Time In: 10:00 AM  Time Out  11:00 AM  Total Billable Time:25   minutes  ( 1on1 time)  INSURANCE and OUTCOMES: Fee for Service with FOTO Outcomes 2/3      Precautions: standard and previous LB surgery/HTN/L RTC repair/R arthroscopic     Pattern of pain determined: 1PEN    Subjective   Jackelyn  reports Minimal  lower back pain. She also chronic (L) shoulder pain and plans on seeing MD for this.    Patient reports tolerating previous visit : Muscular soreness  Patient reports their pain to be  4/10 on a 0-10 scale with 0 being no pain and 10 being the worst pain imaginable.  Pain Location: right back and buttocks     Work and leisure: retired nurse/garden  Pt goals: "decrease pain and be able to walk further."     Objective   Baseline IM Testing Results:   Date of testin24    Initial:  Midpoint Final   ROM 6-45 deg  3-48 deg     Max Peak Torque 88   120     Min Peak Torque 47   74     Flex/Ext Ratio 1.9:1  1.6:1     % below normative data 27%  +8%     % gain from initial N/a  +48%        MOVEMENT LOSS - Lumbar    Norms ROM Loss Initial 24   Flexion Fingers touch toes, sacral angle >/= 70 deg, uniform spinal curvature, posterior weight shift  moderate loss Mod loss   Extension ASIS surpasses toes, spine of scapulae surpasses heels, uniform spinal curve moderate loss and major loss Mod " loss   Side glide Right   moderate loss c/ inc pain Mod loss   Side glide Left   minimal loss Min loss   Rotation Right PT observes contralateral shoulder minimal loss Min loss   Rotation Left PT observes contralateral shoulder minimal loss Min loss     Hip: 5/30/24  Left hip reduced int rot with pain, reduced extension with pain, (+) Faddirs and (+) fabers  Neg slump    Outcomes:  Initial score:49%  Visit 6 score: 43%  FOTO visit 11: 51%  Goal:>60%    Treatment    Jackelyn Kendrick received the treatments listed below:       Jackelyn Kendrick received neuromuscular education  to isolate and engage spinal stabilization musculature correctly for motor control and coordination to aid in function and posture for 10 minutes on the Medical [employer] Machine.  Patient performed MedX dynamic exercise with emphasis on spinal muscular control using pacer throughout  active range of motion. Therapist assisted patient in achieving optimal exertion for neural reeducation and endurance training by using the  Bev Exertion Rating scale, by instructing the patient to aim for mid range of exertion, performing 15-20 repetitions, slowly, correctly,and safely.           7/18/2024    10:27 AM   [facility]   Visit Number 16   VAS Pain Rating 3   Recumbent Bike Seat Pos. 5   Lumbar Stretches - Slouch Overcorrection 10   Extension in Standing 10   Flexion in Lying 10   Lumbar Weight 58 lbs   Repetitions 20   Rating of Perceived Exertion 4   Ice - Z Lie (in min.) 5     therapeutic exercises to develop strength, endurance, ROM, flexibility, posture, and core stabilization for 45 minutes including:    LTR x 10  Piriformis stretch 2 x 20 sec  Hip ext rotation right stretch 3 x 10 sec  BKTC x 10  right supine sciatic nerve glides x 15--NP  open book x 10  PPT x 10 (Cues)  Bridging + BlackTB x 15 (Minimal lift off)  clams 15 c/GTB  SOC x 10    EIS x 10    Peripheral muscle strengthening which included 1 set of 15-20 repetitions at a  slow, controlled 10-13 second per rep pace focused on strengthening supporting musculature for improved body mechanics and functional mobility.  Pt and therapist focused on proper form during treatment to ensure optimal strengthening of each targeted muscle group.  Machines were utilized including torso rotation, leg press, hip abd and hip add, leg ext.  Leg curl, triceps, biceps (DB), chest ( NP)and row added visit 3    manual therapy techniques:  for 00 minutes DTM c/ thera roller to R lateral thigh--NP    Therapeutic exercise for 15 min including:  Do's and don't of lifting  Floor to waist  Hip hinge  Golfer's lift  How to get on and off flow done 3 times    cold pack for 5 minutes to lower back.    Home Exercises Provided and Patient Education Provided   Home exercises include:Piriformis stretch, BKTC, LTR, hip ext rot, bridging, clams, prone lie on elbows 1 min  Cardio program:visit 5  Lifting education date: 6/27/24  Posture/Lumbar roll: she is using lumbar roll  [employer] Discharge handout (date given):  Equipment at home/gym membership: stationary bike    Education provided:   -  Cues w/ex's  - MedX performance  - Precor ex performance      Written Home Exercises Provided: Patient instructed to cont prior HEP.  Exercises were reviewed and Jackelyn Kendrick was able to demonstrate them prior to the end of the session.  Jackelyn Kendrick demonstrated good  understanding of the education provided.     See EMR under Patient Instructions for exercises provided prior visit.    Assessment   Jackelyn returns with minimal lower back pain. She does c/o moderate (L) shoulder pain and plans to follow-up with Ortho MD for this. Treatment continued with flexibility, strengthening and neuromuscular reeducation ex's which she was able to perform with minimal cues and without increased symptoms.  Lumbar MedX resistance was maintained at 58 ft/lbs with pt completing 20 reps with a RPE = 4/10.  She was able to complete  the full circuit of peripheral strengthening ex's without c/o.(Chest press and tricep ex not performed due to (L) shoulder pain) Will continue per HB protocol and patient tolerance.      Patient is making progress towards established goals.   Pt will continue to benefit from skilled outpatient physical therapy to address the deficits stated in the impairment chart, provide pt/family education and to maximize pt's level of independence in the home and community environment.     Anticipated Barriers for therapy: 2 previous LB surgeries/RTC surgery   Pt's spiritual, cultural and educational needs considered and pt agreeable to plan of care and goals as stated below:     Goals:   Short term goals:  6 weeks or 10 visits   - Pt will demonstrate increased lumbar MedX ROM by at least 3 degrees from the initial ROM value with improvements noted in functional ROM and ability to perform ADLs. Appropriate and Ongoing  - Pt will demonstrate increased MedX average isometric strength value by 20% from initial test resulting in improved ability to perform bending, lifting, and carrying activities safely, confidently. Appropriate and Ongoing  - Pt will report a reduction in worst pain score by 1-2 points for improved tolerance for walking/standing. Appropriate and Ongoing  - Pt able to perform HEP correctly with minimal cueing or supervision from therapist to encourage independent management of symptoms. Appropriate and Ongoing     Long term goals: 10 weeks or 20 visits   - Pt will demonstrate increased lumbar MedX ROM by at least 9 degrees from initial ROM value, resulting in improved ability to perform functional forward bending while standing and sitting. Appropriate and Ongoing  - Pt will demonstrate increased MedX average isometric strength value by 40% from initial test resulting in improved ability to perform bending, lifting, and carrying activities safely and confidently. Appropriate and Ongoing  - Pt to demonstrate ability  to independently control and reduce their pain through posture positioning and mechanical movements throughout a typical day. Appropriate and Ongoing  - Pt will demonstrate reduced pain and improved functional outcomes as reported on the FOTO by reaching an intake score of >/= 61% functional ability in order to demonstrate subjective improvement in patient's condition. . Appropriate and Ongoing  - Pt will demonstrate independence with the HEP at discharge. Appropriate and Ongoing  - Pt(patient goal)will be able to walk > 3 blocks without  RLE pain Appropriate and Ongoing  Pt will be able to stand and wash dishes > 5 minutes without LBP    Plan   Continue with established Plan of Care towards established PT goals.     Therapist: Subhash Wayne, PTA    7/18/2024

## 2024-07-22 ENCOUNTER — CLINICAL SUPPORT (OUTPATIENT)
Dept: REHABILITATION | Facility: HOSPITAL | Age: 74
End: 2024-07-22
Attending: STUDENT IN AN ORGANIZED HEALTH CARE EDUCATION/TRAINING PROGRAM
Payer: COMMERCIAL

## 2024-07-22 DIAGNOSIS — M25.69 DECREASED ROM OF TRUNK AND BACK: Primary | ICD-10-CM

## 2024-07-22 PROCEDURE — 97110 THERAPEUTIC EXERCISES: CPT | Mod: CQ

## 2024-07-22 PROCEDURE — 97112 NEUROMUSCULAR REEDUCATION: CPT | Mod: CQ

## 2024-07-22 NOTE — PROGRESS NOTES
"[facility]      Name: Jackelyn Kendrick  Clinic Number: [id number]    Therapy Diagnosis:   Encounter Diagnosis   Name Primary?    Decreased ROM of trunk and back Yes     Physician: Mick Pineda,*    Visit Date: 2024    Physician Orders: PT Eval and Treat  Medical Diagnosis from Referral:   M54.16 (ICD-10-CM) - Lumbar radiculopathy   M96.1 (ICD-10-CM) - Post laminectomy syndrome      Evaluation Date: 2024  Authorization Period Expiration: 4/15/25  Plan of Care Expiration:24  Reassessment Due: 24  Visit # / Visits authorized:    MedX testing visit 2    Time In: 10:55 AM  Time Out  12:00 PM    Total Billable Time:30 minutes  ( 1on1 time)  INSURANCE and OUTCOMES: Fee for Service with FOTO Outcomes 2/3      Precautions: standard and previous LB surgery/HTN/L RTC repair/R arthroscopic     Pattern of pain determined: 1PEN    Subjective   Jackelyn  reports Minimal  lower back pain. She also chronic (L) shoulder pain and is waiting on ortho MD visit.    Patient reports tolerating previous visit : Muscular soreness  Patient reports their pain to be  3/10 on a 0-10 scale with 0 being no pain and 10 being the worst pain imaginable.  Pain Location: right back and buttocks     Work and leisure: retired nurse/garden  Pt goals: "decrease pain and be able to walk further."     Objective   Baseline IM Testing Results:   Date of testin24    Initial:  Midpoint Final   ROM 6-45 deg  3-48 deg     Max Peak Torque 88   120     Min Peak Torque 47   74     Flex/Ext Ratio 1.9:1  1.6:1     % below normative data 27%  +8%     % gain from initial N/a  +48%        MOVEMENT LOSS - Lumbar    Norms ROM Loss Initial 24   Flexion Fingers touch toes, sacral angle >/= 70 deg, uniform spinal curvature, posterior weight shift  moderate loss Mod loss   Extension ASIS surpasses toes, spine of scapulae surpasses heels, uniform spinal curve moderate loss and major loss Mod " loss   Side glide Right   moderate loss c/ inc pain Mod loss   Side glide Left   minimal loss Min loss   Rotation Right PT observes contralateral shoulder minimal loss Min loss   Rotation Left PT observes contralateral shoulder minimal loss Min loss     Hip: 5/30/24  Left hip reduced int rot with pain, reduced extension with pain, (+) Faddirs and (+) fabers  Neg slump    Outcomes:  Initial score:49%  Visit 6 score: 43%  FOTO visit 11: 51%  Goal:>60%    Treatment    Jackelyn Kendrick received the treatments listed below:       Jackelyn Kendrick received neuromuscular education  to isolate and engage spinal stabilization musculature correctly for motor control and coordination to aid in function and posture for 10 minutes on the Medical Medx Machine.  Patient performed MedX dynamic exercise with emphasis on spinal muscular control using pacer throughout  active range of motion. Therapist assisted patient in achieving optimal exertion for neural reeducation and endurance training by using the  Bev Exertion Rating scale, by instructing the patient to aim for mid range of exertion, performing 15-20 repetitions, slowly, correctly,and safely.           7/22/2024    10:58 AM   [facility] - [facility]   Visit Number 17   VAS Pain Rating 3   Recumbent Bike - Seat Pos. 5   Lumbar Stretches - Slouch 10   Extension in Standing 10   Flexion in Lying 10   Lumbar Weight 61 lbs   Repetitions 15   Rating of Perceived Exertion 4      therapeutic exercises to develop strength, endurance, ROM, flexibility, posture, and core stabilization for 45 minutes including:    LTR x 10  Piriformis stretch 2 x 20 sec  Hip ext rotation right stretch 3 x 10 sec  BKTC x 10  right supine sciatic nerve glides x 15--NP  open book x 10  PPT x 10 (Cues)-NP  Bridging + BlackTB x 15    clams 15 c/BTB  SOC x 10    EIS x 10    Peripheral muscle strengthening which included 1 set of 15-20 repetitions at a slow, controlled 10-13 second per rep pace  focused on strengthening supporting musculature for improved body mechanics and functional mobility.  Pt and therapist focused on proper form during treatment to ensure optimal strengthening of each targeted muscle group.  Machines were utilized including torso rotation, leg press, hip abd and hip add, leg ext.  Leg curl, triceps, biceps (DB), chest ( NP)and row added visit 3    manual therapy techniques:  for 00 minutes DTM c/ thera roller to R lateral thigh--NP    Therapeutic exercise for 00 min including:  Do's and don't of lifting  Floor to waist  Hip hinge  Golfer's lift  How to get on and off flow done 3 times    cold pack for 5 minutes to lower back.    Home Exercises Provided and Patient Education Provided   Home exercises include:Piriformis stretch, BKTC, LTR, hip ext rot, bridging, clams, prone lie on elbows 1 min  Cardio program:visit 5  Lifting education date: 6/27/24  Posture/Lumbar roll: she is using lumbar roll  [employer] Discharge handout (date given):  Equipment at home/gym membership: stationary bike    Education provided:   -  Cues w/ex's  - MedX performance  - Precor ex performance      Written Home Exercises Provided: Patient instructed to cont prior HEP.  Exercises were reviewed and Jackelyn Kendrick was able to demonstrate them prior to the end of the session.  Jackelyn Kendrick demonstrated good  understanding of the education provided.     See EMR under Patient Instructions for exercises provided prior visit.    Assessment   Jackelyn returns with minimal lower back pain/stiffness.. Treatment continued with flexibility, strengthening and neuromuscular reeducation ex's.  She was progressed to BTB for clamshell ex and is demonstrating improved lift off with bridging ex. She was able to perform with minimal cues and without increased symptoms.  Lumbar MedX resistance was  increased to 61 ft/lbs with pt completing 15 reps with a RPE = 4/10.  She was able to complete the full circuit of  peripheral strengthening ex's without c/o.(Chest press and tricep ex not performed due to (L) shoulder pain) Will continue per HB protocol and patient tolerance.      Patient is making progress towards established goals.   Pt will continue to benefit from skilled outpatient physical therapy to address the deficits stated in the impairment chart, provide pt/family education and to maximize pt's level of independence in the home and community environment.     Anticipated Barriers for therapy: 2 previous LB surgeries/RTC surgery   Pt's spiritual, cultural and educational needs considered and pt agreeable to plan of care and goals as stated below:     Goals:   Short term goals:  6 weeks or 10 visits   - Pt will demonstrate increased lumbar MedX ROM by at least 3 degrees from the initial ROM value with improvements noted in functional ROM and ability to perform ADLs. Appropriate and Ongoing  - Pt will demonstrate increased MedX average isometric strength value by 20% from initial test resulting in improved ability to perform bending, lifting, and carrying activities safely, confidently. Appropriate and Ongoing  - Pt will report a reduction in worst pain score by 1-2 points for improved tolerance for walking/standing. Appropriate and Ongoing  - Pt able to perform HEP correctly with minimal cueing or supervision from therapist to encourage independent management of symptoms. Appropriate and Ongoing     Long term goals: 10 weeks or 20 visits   - Pt will demonstrate increased lumbar MedX ROM by at least 9 degrees from initial ROM value, resulting in improved ability to perform functional forward bending while standing and sitting. Appropriate and Ongoing  - Pt will demonstrate increased MedX average isometric strength value by 40% from initial test resulting in improved ability to perform bending, lifting, and carrying activities safely and confidently. Appropriate and Ongoing  - Pt to demonstrate ability to independently  control and reduce their pain through posture positioning and mechanical movements throughout a typical day. Appropriate and Ongoing  - Pt will demonstrate reduced pain and improved functional outcomes as reported on the FOTO by reaching an intake score of >/= 61% functional ability in order to demonstrate subjective improvement in patient's condition. . Appropriate and Ongoing  - Pt will demonstrate independence with the HEP at discharge. Appropriate and Ongoing  - Pt(patient goal)will be able to walk > 3 blocks without  RLE pain Appropriate and Ongoing  Pt will be able to stand and wash dishes > 5 minutes without LBP    Plan   Continue with established Plan of Care towards established PT goals.     Therapist: Subhash Wayne, PTA    7/22/2024

## 2024-07-25 ENCOUNTER — CLINICAL SUPPORT (OUTPATIENT)
Dept: REHABILITATION | Facility: HOSPITAL | Age: 74
End: 2024-07-25
Attending: STUDENT IN AN ORGANIZED HEALTH CARE EDUCATION/TRAINING PROGRAM
Payer: COMMERCIAL

## 2024-07-25 DIAGNOSIS — M25.69 DECREASED ROM OF TRUNK AND BACK: Primary | ICD-10-CM

## 2024-07-25 PROCEDURE — 97112 NEUROMUSCULAR REEDUCATION: CPT

## 2024-07-25 PROCEDURE — 97110 THERAPEUTIC EXERCISES: CPT

## 2024-07-25 NOTE — PROGRESS NOTES
"[facility]      Name: Jackelyn Kendrick  Clinic Number: [id number]    Therapy Diagnosis:   Encounter Diagnosis   Name Primary?    Decreased ROM of trunk and back Yes       Physician: Mick Pineda,*    Visit Date: 2024    Physician Orders: PT Eval and Treat  Medical Diagnosis from Referral:   M54.16 (ICD-10-CM) - Lumbar radiculopathy   M96.1 (ICD-10-CM) - Post laminectomy syndrome      Evaluation Date: 2024  Authorization Period Expiration: 4/15/25  Plan of Care Expiration:24  Reassessment Due: 24  Visit # / Visits authorized:    MedX testing visit 2    Time In: 10:55 AM  Time Out  1155  Total Billable Time:55minutes  ( 1on1 time)  INSURANCE and OUTCOMES: Fee for Service with FOTO Outcomes 2/3      Precautions: standard and previous LB surgery/HTN/L RTC repair/R arthroscopic     Pattern of pain determined: 1PEN    Subjective   Jackelyn  reports Minimal  lower back pain, however c/o R lateral thigh pain    Patient reports tolerating previous visit : Muscular soreness  Patient reports their pain to be  3/10 on a 0-10 scale with 0 being no pain and 10 being the worst pain imaginable.  Pain Location: right back and buttocks     Work and leisure: retired nurse/garden  Pt goals: "decrease pain and be able to walk further."     Objective   Baseline IM Testing Results:   Date of testin24    Initial:  Midpoint Final   ROM 6-45 deg  3-48 deg     Max Peak Torque 88   120     Min Peak Torque 47   74     Flex/Ext Ratio 1.9:1  1.6:1     % below normative data 27%  +8%     % gain from initial N/a  +48%        MOVEMENT LOSS - Lumbar    Norms ROM Loss Initial 24   Flexion Fingers touch toes, sacral angle >/= 70 deg, uniform spinal curvature, posterior weight shift  moderate loss Mod loss   Extension ASIS surpasses toes, spine of scapulae surpasses heels, uniform spinal curve moderate loss and major loss Mod loss   Side glide Right   moderate loss c/ " inc pain Mod loss   Side glide Left   minimal loss Min loss   Rotation Right PT observes contralateral shoulder minimal loss Min loss   Rotation Left PT observes contralateral shoulder minimal loss Min loss     Hip: 5/30/24  Left hip reduced int rot with pain, reduced extension with pain, (+) Faddirs and (+) fabers  Neg slump    Outcomes:  Initial score:49%  Visit 6 score: 43%  FOTO visit 11: 51%  Goal:>60%    Treatment    Jackelyn Kendrick received the treatments listed below:       Jackelyn Kendrick received neuromuscular education  to isolate and engage spinal stabilization musculature correctly for motor control and coordination to aid in function and posture for 10 minutes on the Medical Medx Machine.  Patient performed MedX dynamic exercise with emphasis on spinal muscular control using pacer throughout  active range of motion. Therapist assisted patient in achieving optimal exertion for neural reeducation and endurance training by using the  Bev Exertion Rating scale, by instructing the patient to aim for mid range of exertion, performing 15-20 repetitions, slowly, correctly,and safely.        7/25/2024    11:37 AM   [facility]   Visit Number 18   VAS Pain Rating 3   Recumbent Bike Seat Pos. 5   Lumbar Stretches - Slouch Overcorrection 10   Extension in Standing 10   Flexion in Lying 10   Lumbar Weight 61 lbs   Repetitions 18   Rating of Perceived Exertion 4   Ice - Z Lie (in min.) 5       therapeutic exercises to develop strength, endurance, ROM, flexibility, posture, and core stabilization for 50minutes including:    LTR x 10  Piriformis stretch 2 x 20 sec  Hip ext rotation right stretch 3 x 10 sec  BKTC x 10  right supine sciatic nerve glides x 15--NP  open book x 10  PPT x 10 (Cues)-NP  Bridging + BlackTB x 15    clams 15 c/BTB  SOC x 10    EIS x 10    Peripheral muscle strengthening which included 1 set of 15-20 repetitions at a slow, controlled 10-13 second per rep pace focused on  strengthening supporting musculature for improved body mechanics and functional mobility.  Pt and therapist focused on proper form during treatment to ensure optimal strengthening of each targeted muscle group.  Machines were utilized including torso rotation, leg press, hip abd and hip add, leg ext.  Leg curl, triceps, biceps (DB), chest ( NP)and row added visit 3    manual therapy techniques:  for 5 minutes DTM  to R lateral thigh-5 min    Therapeutic exercise for 00 min including:  Do's and don't of lifting  Floor to waist  Hip hinge  Golfer's lift  How to get on and off flow done 3 times    cold pack for 5 minutes to lower back.    Home Exercises Provided and Patient Education Provided   Home exercises include:Piriformis stretch, BKTC, LTR, hip ext rot, bridging, clams, prone lie on elbows 1 min  Cardio program:visit 5  Lifting education date: 6/27/24  Posture/Lumbar roll: she is using lumbar roll  [employer] Discharge handout (date given):  Equipment at home/gym membership: [employer] bike    Education provided:   -  Cues w/ex's  - MedX performance  - Precor ex performance      Written Home Exercises Provided: Patient instructed to cont prior HEP.  Exercises were reviewed and Jackelyn Kendrick was able to demonstrate them prior to the end of the session.  Jackelyn Kendrick demonstrated good  understanding of the education provided.     See EMR under Patient Instructions for exercises provided prior visit.    Assessment   Jackelyn returns with minimal lower back pain/stiffness.. Treatment continued with flexibility, strengthening and neuromuscular reeducation ex's. She was able to perform with minimal cues and without increased symptoms.  Lumbar MedX resistance was maintained at 61 ft/lbs with pt completing 18 reps with a RPE = 4/10.  She was able to complete the full circuit of peripheral strengthening ex's without c/o.(Chest press and tricep ex not performed due to (L) shoulder pain) Will continue per HB  protocol and patient tolerance.      Patient is making progress towards established goals.   Pt will continue to benefit from skilled outpatient physical therapy to address the deficits stated in the impairment chart, provide pt/family education and to maximize pt's level of independence in the home and community environment.     Anticipated Barriers for therapy: 2 previous LB surgeries/RTC surgery   Pt's spiritual, cultural and educational needs considered and pt agreeable to plan of care and goals as stated below:     Goals:   Short term goals:  6 weeks or 10 visits   - Pt will demonstrate increased lumbar MedX ROM by at least 3 degrees from the initial ROM value with improvements noted in functional ROM and ability to perform ADLs. Appropriate and Ongoing  - Pt will demonstrate increased MedX average isometric strength value by 20% from initial test resulting in improved ability to perform bending, lifting, and carrying activities safely, confidently. Appropriate and Ongoing  - Pt will report a reduction in worst pain score by 1-2 points for improved tolerance for walking/standing. Appropriate and Ongoing  - Pt able to perform HEP correctly with minimal cueing or supervision from therapist to encourage independent management of symptoms. Appropriate and Ongoing     Long term goals: 10 weeks or 20 visits   - Pt will demonstrate increased lumbar MedX ROM by at least 9 degrees from initial ROM value, resulting in improved ability to perform functional forward bending while standing and sitting. Appropriate and Ongoing  - Pt will demonstrate increased MedX average isometric strength value by 40% from initial test resulting in improved ability to perform bending, lifting, and carrying activities safely and confidently. Appropriate and Ongoing  - Pt to demonstrate ability to independently control and reduce their pain through posture positioning and mechanical movements throughout a typical day. Appropriate and  Ongoing  - Pt will demonstrate reduced pain and improved functional outcomes as reported on the FOTO by reaching an intake score of >/= 61% functional ability in order to demonstrate subjective improvement in patient's condition. . Appropriate and Ongoing  - Pt will demonstrate independence with the HEP at discharge. Appropriate and Ongoing  - Pt(patient goal)will be able to walk > 3 blocks without  RLE pain Appropriate and Ongoing  Pt will be able to stand and wash dishes > 5 minutes without LBP    Plan   Continue with established Plan of Care towards established PT goals.     Therapist: Renea Mccann, PT    7/25/2024

## 2024-07-29 ENCOUNTER — CLINICAL SUPPORT (OUTPATIENT)
Dept: REHABILITATION | Facility: HOSPITAL | Age: 74
End: 2024-07-29
Attending: STUDENT IN AN ORGANIZED HEALTH CARE EDUCATION/TRAINING PROGRAM
Payer: MEDICARE

## 2024-07-29 DIAGNOSIS — M25.69 DECREASED ROM OF TRUNK AND BACK: Primary | ICD-10-CM

## 2024-07-29 NOTE — PROGRESS NOTES
"[facility]      Name: Jackelyn Kendrick  Clinic Number: [id number]    Therapy Diagnosis:   Encounter Diagnosis   Name Primary?    Decreased ROM of trunk and back Yes       Physician: Mick Pineda,*    Visit Date: 2024    Physician Orders: PT Eval and Treat  Medical Diagnosis from Referral:   M54.16 (ICD-10-CM) - Lumbar radiculopathy   M96.1 (ICD-10-CM) - Post laminectomy syndrome      Evaluation Date: 2024  Authorization Period Expiration: 4/15/25  Plan of Care Expiration:24  Reassessment Due: 24  Visit # / Visits authorized:    MedX testing visit 2    Time In: 11:15 AM  Time Out  12:30 PM  Total Billable Time: 70 min    INSURANCE and OUTCOMES: Fee for Service with FOTO Outcomes 2/3      Precautions: standard and previous LB surgery/HTN/L RTC repair/R arthroscopic     Pattern of pain determined: 1PEN    Subjective   Jackelyn  reports Minimal  lower back pain, however c/o R lateral thigh pain    Patient reports tolerating previous visit : Muscular soreness  Patient reports their pain to be  2/10 on a 0-10 scale with 0 being no pain and 10 being the worst pain imaginable.  Pain Location: right back and buttocks     Work and leisure: retired nurse/garden  Pt goals: "decrease pain and be able to walk further."     Objective   Baseline IM Testing Results:   Date of testin24    Initial:  Midpoint Final   ROM 6-45 deg  3-48 deg     Max Peak Torque 88   120     Min Peak Torque 47   74     Flex/Ext Ratio 1.9:1  1.6:1     % below normative data 27%  +8%     % gain from initial N/a  +48%        MOVEMENT LOSS - Lumbar    Norms ROM Loss Initial 24   Flexion Fingers touch toes, sacral angle >/= 70 deg, uniform spinal curvature, posterior weight shift  moderate loss Mod loss Mod loss   Extension ASIS surpasses toes, spine of scapulae surpasses heels, uniform spinal curve moderate loss and major loss Mod loss Mod loss   Side glide Right   " moderate loss c/ inc pain Mod loss Min loss   Side glide Left   minimal loss Min loss Min loss   Rotation Right PT observes contralateral shoulder minimal loss Min loss Min loss   Rotation Left PT observes contralateral shoulder minimal loss Min loss Min loss     Hip: 5/30/24  Left hip reduced int rot with pain, reduced extension with pain, (+) Faddirs and (+) fabers  Neg slump    Outcomes:  Initial score:49%  Visit 6 score: 43%  FOTO visit 11: 51%  Goal:>60%    Treatment    Jackelyn Kendrick received the treatments listed below:       Jackelyn Kendrick received neuromuscular education  to isolate and engage spinal stabilization musculature correctly for motor control and coordination to aid in function and posture for 10 minutes on the Medical MedWoldme Machine.  Patient performed MedX dynamic exercise with emphasis on spinal muscular control using pacer throughout  active range of motion. Therapist assisted patient in achieving optimal exertion for neural reeducation and endurance training by using the  Bev Exertion Rating scale, by instructing the patient to aim for mid range of exertion, performing 15-20 repetitions, slowly, correctly,and safely.          7/29/2024    12:04 PM   [facility]   Visit Number 19   VAS Pain Rating 2   Time 5   Lumbar Stretches - Slouch Overcorrection 10   Extension in Standing 10   Flexion in Lying 10   Lumbar Weight 61 lbs   Repetitions 20   Rating of Perceived Exertion 4   Ice - Z Lie (in min.) 5         therapeutic exercises to develop strength, endurance, ROM, flexibility, posture, and core stabilization for 60 minutes including:    LTR x 10  Piriformis stretch 2 x 20 sec  Hip ext rotation right stretch 3 x 10 sec  BKTC x 10  right supine sciatic nerve glides x 15--NP  open book x 10  Bridging + BlackTB x 15    clams 15 c/BTB  SOC x 10    EIS x 10    Peripheral muscle strengthening which included 1 set of 15-20 repetitions at a slow, controlled 10-13 second per rep pace  focused on strengthening supporting musculature for improved body mechanics and functional mobility.  Pt and therapist focused on proper form during treatment to ensure optimal strengthening of each targeted muscle group.  Machines were utilized including torso rotation, leg press, hip abd and hip add, leg ext.  Leg curl, triceps, biceps (DB), chest ( NP)and row added visit 3    manual therapy techniques:  for 5 minutes DTM  to R lateral thigh-5 min    Therapeutic exercise for 00 min including:  Do's and don't of lifting  Floor to waist  Hip hinge  Golfer's lift  How to get on and off flow done 3 times    cold pack for 5 minutes to lower back.    Home Exercises Provided and Patient Education Provided   Home exercises include:Piriformis stretch, BKTC, LTR, hip ext rot, bridging, clams, prone lie on elbows 1 min  Cardio program:visit 5  Lifting education date: 6/27/24  Posture/Lumbar roll: she is using lumbar roll  Fridge Magnet Discharge handout (date given):  Equipment at home/gym membership: [employer] bike    Education provided:   -  Cues w/ex's  - MedX performance  - Precor ex performance      Written Home Exercises Provided: Patient instructed to cont prior HEP.  Exercises were reviewed and Jackelyn Kendrick was able to demonstrate them prior to the end of the session.  Jackelyn Kendrick demonstrated good  understanding of the education provided.     See EMR under Patient Instructions for exercises provided prior visit.    Assessment   Jackelyn returns with minimal lower back pain/stiffness. Treatment continued with flexibility, strengthening and neuromuscular reeducation ex's. She was able to perform with minimal cues and without increased symptoms.  All exercises reviewed for discharge planning, Fridge Magnet issued to patient with photos of all exercises. Lumbar MedX resistance was maintained at 61 ft/lbs with pt completing 20 reps with a RPE = 4/10.  She was able to complete the full circuit of peripheral  strengthening ex's without c/o. (Chest press and tricep ex not performed due to (L) shoulder pain) Will continue per HB protocol and patient tolerance.      Patient is making progress towards established goals.   Pt will continue to benefit from skilled outpatient physical therapy to address the deficits stated in the impairment chart, provide pt/family education and to maximize pt's level of independence in the home and community environment.     Anticipated Barriers for therapy: 2 previous LB surgeries/RTC surgery   Pt's spiritual, cultural and educational needs considered and pt agreeable to plan of care and goals as stated below:     Goals:   Short term goals:  6 weeks or 10 visits   - Pt will demonstrate increased lumbar MedX ROM by at least 3 degrees from the initial ROM value with improvements noted in functional ROM and ability to perform ADLs. Appropriate and Ongoing  - Pt will demonstrate increased MedX average isometric strength value by 20% from initial test resulting in improved ability to perform bending, lifting, and carrying activities safely, confidently. Appropriate and Ongoing  - Pt will report a reduction in worst pain score by 1-2 points for improved tolerance for walking/standing. Appropriate and Ongoing  - Pt able to perform HEP correctly with minimal cueing or supervision from therapist to encourage independent management of symptoms. Appropriate and Ongoing     Long term goals: 10 weeks or 20 visits   - Pt will demonstrate increased lumbar MedX ROM by at least 9 degrees from initial ROM value, resulting in improved ability to perform functional forward bending while standing and sitting. Appropriate and Ongoing  - Pt will demonstrate increased MedX average isometric strength value by 40% from initial test resulting in improved ability to perform bending, lifting, and carrying activities safely and confidently. Appropriate and Ongoing  - Pt to demonstrate ability to independently control and  reduce their pain through posture positioning and mechanical movements throughout a typical day. Appropriate and Ongoing  - Pt will demonstrate reduced pain and improved functional outcomes as reported on the FOTO by reaching an intake score of >/= 61% functional ability in order to demonstrate subjective improvement in patient's condition. . Appropriate and Ongoing  - Pt will demonstrate independence with the HEP at discharge. Appropriate and Ongoing  - Pt(patient goal)will be able to walk > 3 blocks without  RLE pain Appropriate and Ongoing  Pt will be able to stand and wash dishes > 5 minutes without LBP    Plan   Continue with established Plan of Care towards established PT goals.     Therapist: Ana Cristina Lewis, PT    7/29/2024

## 2024-07-29 NOTE — PATIENT INSTRUCTIONS
"HEALTHY BACK TOOLS        KEEP YOUR SPINE FEELING FINE   HEALTHY HABITS   Do your "GO TO" stretches 2/day   Get a good night's REST   Watch your POSTURE in sitting/standing Drink PLENTY of water   Use a lumbar roll Eat LOTS of fruits & vegetables   GET UP often (walk and/or stretch) Manage your STRESS   Make your workplace IDEAL FOR YOU  Don't smoke   Lift correctly EXERCISE   Practice positive self talk-talk to yourself kindly like you would your best friend                         WHAT TO DO WHEN SYMPTOMS FLARE UP  Back and neck pain may occasionally flare up.  If you experience a flare   up, remember your tools. Be encouraged, by remembering that flare-ups will   usually pass.   My Tools:    ~Use your "Go To" Stretches/Positions   ~Keep Moving-pain usually gets better if you move  ~Z lie (with or without ice)  10 min several times a day until symptoms reduce  ~Slowly resume normal activities   ~Practice Deep Breathing and Relaxation techniques                                                 MY EXERCISE PLAN  GO TO STRETCHES  2/day (like brushing your teeth) STRENGTHENING  2-3 times/week CARDIO PROGRAM  20 min 3-4/week   Knee side to side x 10 Butt lift with black band x 20 Walking before school    Knees to chest x 10 Side lying clam with black band x 15 Walking in pool / exercising in pool   Knee stretch pushing down 10 sec x 3     Knee stretch pull opposite thigh 15" x 3     Open book x 10     Standing back bend x 10     Slouch x 10        "

## 2024-07-31 ENCOUNTER — CLINICAL SUPPORT (OUTPATIENT)
Dept: REHABILITATION | Facility: HOSPITAL | Age: 74
End: 2024-07-31
Attending: STUDENT IN AN ORGANIZED HEALTH CARE EDUCATION/TRAINING PROGRAM
Payer: MEDICARE

## 2024-07-31 DIAGNOSIS — M25.69 DECREASED ROM OF TRUNK AND BACK: Primary | ICD-10-CM

## 2024-07-31 PROCEDURE — 97110 THERAPEUTIC EXERCISES: CPT

## 2024-07-31 PROCEDURE — 97750 PHYSICAL PERFORMANCE TEST: CPT

## 2024-07-31 NOTE — PROGRESS NOTES
"[facility]      Name: Jackelyn Kendrick  Clinic Number: [id number]    Therapy Diagnosis:   Encounter Diagnosis   Name Primary?    Decreased ROM of trunk and back Yes       Physician: Mick Pineda,*    Visit Date: 2024    Physician Orders: PT Eval and Treat  Medical Diagnosis from Referral:   M54.16 (ICD-10-CM) - Lumbar radiculopathy   M96.1 (ICD-10-CM) - Post laminectomy syndrome      Evaluation Date: 2024  Authorization Period Expiration: 4/15/25  Plan of Care Expiration:24  Reassessment Due: 24  Visit # / Visits authorized:    MedX testing visit 2    Time In: 10  Time Out  11  Total Billable Time: 55min    INSURANCE and OUTCOMES: Fee for Service with FOTO Outcomes 3/3      Precautions: standard and previous LB surgery/HTN/L RTC repair/R arthroscopic     Pattern of pain determined: 1PEN    Subjective   Jackelyn  reports Minimal  lower back pain,however still c/o pain with prolonged walking and standing    Patient reports tolerating previous visit : Muscular soreness  Patient reports their pain to be  2/10 on a 0-10 scale with 0 being no pain and 10 being the worst pain imaginable.  Pain Location: right back and buttocks     Work and leisure: retired nurse/garden  Pt goals: "decrease pain and be able to walk further."     Objective   Baseline IM Testing Results:   Date of testin24    Initial:  Midpoint Final 24   ROM 6-45 deg  3-48 deg  3-48   Max Peak Torque 88   120  151   Min Peak Torque 47   74  79   Flex/Ext Ratio 1.9:1  1.6:1  2.:1   % below normative data 27%  +8%  10%above   % gain from initial N/a  +48%  38%      MOVEMENT LOSS - Lumbar    Norms ROM Loss Initial 24   Flexion Fingers touch toes, sacral angle >/= 70 deg, uniform spinal curvature, posterior weight shift  moderate loss Mod loss Mod loss   Extension ASIS surpasses toes, spine of scapulae surpasses heels, uniform spinal curve moderate loss and major " loss Mod loss Mod loss   Side glide Right   moderate loss c/ inc pain Mod loss Min loss   Side glide Left   minimal loss Min loss Min loss   Rotation Right PT observes contralateral shoulder minimal loss Min loss Min loss   Rotation Left PT observes contralateral shoulder minimal loss Min loss Min loss     Hip: 5/30/24  Left hip reduced int rot with pain, reduced extension with pain, (+) Faddirs and (+) fabers  Neg slump    Outcomes:  Initial score:49%  Visit 6 score: 43%  FOTO visit 11: 51%  Goal:>60%    Treatment    Jackelyn Kendrick received the treatments listed below:       Jackelyn Kendrick received neuromuscular education  to isolate and engage spinal stabilization musculature correctly for motor control and coordination to aid in function and posture for 10 minutes on the Medical Liiiike Machine.  Patient performed MedX dynamic exercise with emphasis on spinal muscular control using pacer throughout  active range of motion. Therapist assisted patient in achieving optimal exertion for neural reeducation and endurance training by using the  Bev Exertion Rating scale, by instructing the patient to aim for mid range of exertion, performing 15-20 repetitions, slowly, correctly,and safely.          7/31/2024    10:38 AM   [facility]   Visit Number 20   VAS Pain Rating 2   Time 5   Lumbar Stretches - Slouch Overcorrection 10   Extension in Standing 10   Flexion in Lying 10   Lumbar Peak Torque 151 ft. lbs.   Min Torque 79   Test Percent Gain in Strength from Initial  38 %   Ice - Z Lie (in min.) 5       therapeutic exercises to develop strength, endurance, ROM, flexibility, posture, and core stabilization for 50 minutes including:    LTR x 10  Piriformis stretch 2 x 20 sec  Hip ext rotation right stretch 3 x 10 sec  BKTC x 10  right supine sciatic nerve glides x 15--NP  open book x 10  Bridging + BlackTB x 15    clams 15 c/BTB  SOC x 10    EIS x 10    Peripheral muscle strengthening which included 1 set  of 15-20 repetitions at a slow, controlled 10-13 second per rep pace focused on strengthening supporting musculature for improved body mechanics and functional mobility.  Pt and therapist focused on proper form during treatment to ensure optimal strengthening of each targeted muscle group.  Machines were utilized including torso rotation, leg press, hip abd and hip add, leg ext.  Leg curl, triceps, biceps (DB), chest ( NP)and row added visit 3    manual therapy techniques:  for 5 minutes DTM  to R lateral thigh-5 min    Therapeutic exercise for 00 min including:  Do's and don't of lifting  Floor to waist  Hip hinge  Golfer's lift  How to get on and off flow done 3 times    cold pack for 5 minutes to lower back.    Home Exercises Provided and Patient Education Provided   Home exercises include:Piriformis stretch, BKTC, LTR, hip ext rot, bridging, clams, prone lie on elbows 1 min  Cardio program:visit 5  Lifting education date: 6/27/24  Posture/Lumbar roll: she is using lumbar roll  Fridge Magnet Discharge handout (date given):  Equipment at home/gym membership: stationary bike    Education provided:   -  Cues w/ex's  - MedX performance  - Precor ex performance      Written Home Exercises Provided: Patient instructed to cont prior HEP.  Exercises were reviewed and Jackelyn Kendrick was able to demonstrate them prior to the end of the session.  Jackelyn Kendrick demonstrated good  understanding of the education provided.     See EMR under Patient Instructions for exercises provided prior visit.    Assessment   Patient has attended 20 visits of the [facility] program focusing aerobic training, isometric testing with dynamic strengthening on MedX machine for spine, whole body strengthening on peripheral strengthening equipment, HEP, and patient education. Patient has completed the [facility] Program and is ready to be transitioned into wellness program. Educated on the importance of wellness program and attending  weekly in order to maintain strength. Stressed the importance of continuing exercise and maintaining proper body mechanics and ergonomics in order to fully participate in activities of daily living, work, and leisure activities. At this time, patient demonstrates improvement in ability to reduce symptoms, improved posture, improved spinal ROM and improved strength. Discharge handout with HEP given and reviewed all information given. Patient able to demonstrate and verbalize understanding. Patient does plan to attend wellness and is appropriate for transition.       -Improved 6 ROM, initially on MedX test 6-45 and currently 3-48.  -Improved strength at each test point on lumbar med ex IM test with 38% average improvement with reduced pain noted by patient.  -Initial outcome tool score 49 and current outcome tool score 54 indicating reduced pain and improved function.          Goals:   Short term goals:  6 weeks or 10 visits   - Pt will demonstrate increased lumbar MedX ROM by at least 3 degrees from the initial ROM value with improvements noted in functional ROM and ability to perform ADLs. MET  - Pt will demonstrate increased MedX average isometric strength value by 20% from initial test resulting in improved ability to perform bending, lifting, and carrying activities safely, confidently. MET  - Pt will report a reduction in worst pain score by 1-2 points for improved tolerance for walking/standing. MET  - Pt able to perform HEP correctly with minimal cueing or supervision from therapist to encourage independent management of symptoms. MET     Long term goals: 10 weeks or 20 visits   - Pt will demonstrate increased lumbar MedX ROM by at least 9 degrees from initial ROM value, resulting in improved ability to perform functional forward bending while standing and sitting. Not met  - Pt will demonstrate increased MedX average isometric strength value by 40% from initial test resulting in improved ability to perform  bending, lifting, and carrying activities safely and confidently.not met  - Pt to demonstrate ability to independently control and reduce their pain through posture positioning and mechanical movements throughout a typical day. MET  - Pt will demonstrate reduced pain and improved functional outcomes as reported on the FOTO by reaching an intake score of >/= 61% functional ability in order to demonstrate subjective improvement in patient's condition. . Not met  - Pt will demonstrate independence with the HEP at discharge met  - Pt(patient goal)will be able to walk > 3 blocks without  RLE pain not met  Pt will be able to stand and wash dishes > 5 minutes without LBP MET    Plan   Continue with wellness    Therapist: Renea Mccann, PT    7/31/2024

## 2024-08-01 ENCOUNTER — OFFICE VISIT (OUTPATIENT)
Dept: URGENT CARE | Facility: CLINIC | Age: 74
End: 2024-08-01
Payer: COMMERCIAL

## 2024-08-01 VITALS
HEIGHT: 61 IN | BODY MASS INDEX: 25.89 KG/M2 | OXYGEN SATURATION: 96 % | RESPIRATION RATE: 20 BRPM | WEIGHT: 137.13 LBS | TEMPERATURE: 98 F | HEART RATE: 70 BPM | DIASTOLIC BLOOD PRESSURE: 74 MMHG | SYSTOLIC BLOOD PRESSURE: 134 MMHG

## 2024-08-01 DIAGNOSIS — N30.01 ACUTE CYSTITIS WITH HEMATURIA: Primary | ICD-10-CM

## 2024-08-01 DIAGNOSIS — R30.0 DYSURIA: ICD-10-CM

## 2024-08-01 LAB
BILIRUBIN, UA POC OHS: NEGATIVE
BLOOD, UA POC OHS: ABNORMAL
CLARITY, UA POC OHS: ABNORMAL
COLOR, UA POC OHS: YELLOW
GLUCOSE, UA POC OHS: NEGATIVE
KETONES, UA POC OHS: NEGATIVE
LEUKOCYTES, UA POC OHS: ABNORMAL
NITRITE, UA POC OHS: NEGATIVE
PH, UA POC OHS: 6
PROTEIN, UA POC OHS: >=300
SPECIFIC GRAVITY, UA POC OHS: >=1.03
UROBILINOGEN, UA POC OHS: 0.2

## 2024-08-01 PROCEDURE — 87086 URINE CULTURE/COLONY COUNT: CPT

## 2024-08-01 PROCEDURE — 99213 OFFICE O/P EST LOW 20 MIN: CPT | Mod: S$GLB,,,

## 2024-08-01 PROCEDURE — 81003 URINALYSIS AUTO W/O SCOPE: CPT | Mod: QW,S$GLB,,

## 2024-08-01 PROCEDURE — 87186 SC STD MICRODIL/AGAR DIL: CPT

## 2024-08-01 PROCEDURE — 87088 URINE BACTERIA CULTURE: CPT

## 2024-08-01 RX ORDER — PHENAZOPYRIDINE HYDROCHLORIDE 200 MG/1
200 TABLET, FILM COATED ORAL 3 TIMES DAILY PRN
Qty: 30 TABLET | Refills: 0 | Status: SHIPPED | OUTPATIENT
Start: 2024-08-01 | End: 2024-08-11

## 2024-08-01 RX ORDER — AMOXICILLIN AND CLAVULANATE POTASSIUM 875; 125 MG/1; MG/1
1 TABLET, FILM COATED ORAL EVERY 12 HOURS
Qty: 14 TABLET | Refills: 0 | Status: SHIPPED | OUTPATIENT
Start: 2024-08-01 | End: 2024-08-08

## 2024-08-01 NOTE — PROGRESS NOTES
"Subjective:      Patient ID: Jackelyn Kendrick is a 73 y.o. female.    Vitals:  height is 5' 0.98" (1.549 m) and weight is 62.2 kg (137 lb 2 oz). Her oral temperature is 98.1 °F (36.7 °C). Her blood pressure is 134/74 and her pulse is 70. Her respiration is 20 and oxygen saturation is 96%.     Chief Complaint: Dysuria    Patient is here today for possible urinary tract infection that she gets often and is on preventative antibiotic but developed symptoms Sunday.  Patient states that she is having bladder pressure.  She is also having urinary urgency and frequency.  She has also noticed urine odor.  She denies any lower back pain.    Dysuria   This is a new problem. The current episode started in the past 7 days. The problem occurs every urination. The problem has been gradually worsening. The quality of the pain is described as burning. The pain is at a severity of 5/10. The pain is moderate. There has been no fever. She is Not sexually active. There is No history of pyelonephritis. Associated symptoms include frequency and urgency. Pertinent negatives include no hematuria. She has tried antibiotics (microdan) for the symptoms. Her past medical history is significant for recurrent UTIs.       Gastrointestinal:  Negative for abdominal pain.   Genitourinary:  Positive for dysuria, frequency and urgency. Negative for hematuria.   Neurological:  Negative for disorientation and altered mental status.   Psychiatric/Behavioral:  Negative for altered mental status and disorientation.       Objective:     Physical Exam   Constitutional: She is oriented to person, place, and time. She appears well-developed.   HENT:   Head: Normocephalic and atraumatic.   Ears:   Right Ear: External ear normal.   Left Ear: External ear normal.   Nose: Nose normal. No nasal deformity. No epistaxis.   Mouth/Throat: Oropharynx is clear and moist and mucous membranes are normal.   Eyes: Lids are normal.   Neck: Trachea normal and phonation " normal. Neck supple.   Cardiovascular: Normal pulses.   Pulmonary/Chest: Effort normal and breath sounds normal. No stridor. No respiratory distress. She has no wheezes. She has no rhonchi. She has no rales. She exhibits no tenderness.   Abdominal: Normal appearance. There is no left CVA tenderness and no right CVA tenderness.   Neurological: She is alert and oriented to person, place, and time.   Skin: Skin is warm, dry and intact.   Psychiatric: Her speech is normal and behavior is normal.   Nursing note and vitals reviewed.    Results for orders placed or performed in visit on 08/01/24   POCT Urinalysis(Instrument)   Result Value Ref Range    Color, POC UA Yellow Yellow, Straw, Colorless    Clarity, POC UA Cloudy (A) Clear    Glucose, POC UA Negative Negative    Bilirubin, POC UA Negative Negative    Ketones, POC UA Negative Negative    Spec Grav POC UA >=1.030 1.005 - 1.030    Blood, POC UA Large (A) Negative    pH, POC UA 6.0 5.0 - 8.0    Protein, POC UA >=300 (A) Negative    Urobilinogen, POC UA 0.2 <=1.0    Nitrite, POC UA Negative Negative    WBC, POC UA Large (A) Negative       Assessment:     1. Acute cystitis with hematuria    2. Dysuria        Plan:   Upon chart review patient has had recurrent UTIs for some time now.  Last documented UTI was in February.  Urine culture grew out Klebsiella pneumoniae, which is resistant to Macrobid.  Patient states that she had another UTI in May that she self treated with leftover Bactrim, which resolved.  Patient is currently on a daily prophylactic Macrobid regimen.  Last CMP in December shows GFR of 53.4.  Will avoid Bactrim at this time and treat with Augmentin.  Patient states that she has tried Keflex in the past and it has not worked for her.  We will also send out urine culture due to patient's age and history of recurrent UTIs.  Advised patient reach out to her doctor who prescribes the Macrobid, since patient mainly grows out Klebsiella pneumoniae which is  resistant to Macrobid, so patient can potentially have a different prophylactic antibiotic prescribed to her.  Patient expressed understanding and agrees with plan.      Acute cystitis with hematuria  -     amoxicillin-clavulanate 875-125mg (AUGMENTIN) 875-125 mg per tablet; Take 1 tablet by mouth every 12 (twelve) hours. for 7 days  Dispense: 14 tablet; Refill: 0  -     phenazopyridine (PYRIDIUM) 200 MG tablet; Take 1 tablet (200 mg total) by mouth 3 (three) times daily as needed for Pain.  Dispense: 30 tablet; Refill: 0  -     CULTURE, URINE    Dysuria  -     POCT Urinalysis(Instrument)                Patient Instructions   - Today your urine test shows that you have a UTI.   - Take pyridium or AZO for your symptoms. Beware, this will turn your urine orange and can turn tears orange.      - Rest.    - Drink plenty of fluids.      Labs have been sent to our outside laboratory. Results should be back in 2-5 days. We will call you once results come back. Check [facility] MyChart stefan for results during after hours.       - You must understand that you have received an Urgent Care treatment only and that you may be released before all of your medical problems are known or treated.   - You, the patient, will arrange for follow up care as instructed.   - If your condition worsens or fails to improve we recommend that you receive another evaluation at the ER immediately or contact your PCP to discuss your concerns or return here.   - Follow up with your PCP or specialty clinic as directed in the next 1-2 weeks if not improved or as needed.  You can call [phone] to schedule an appointment with the appropriate provider.    If your symptoms do not improve or worsen, go to the emergency room immediately.

## 2024-08-01 NOTE — PATIENT INSTRUCTIONS
- Today your urine test shows that you have a UTI.   - Take pyridium or AZO for your symptoms. Beware, this will turn your urine orange and can turn tears orange.      - Rest.    - Drink plenty of fluids.      Labs have been sent to our outside laboratory. Results should be back in 2-5 days. We will call you once results come back. Check [facility] MyChart stefan for results during after hours.       - You must understand that you have received an Urgent Care treatment only and that you may be released before all of your medical problems are known or treated.   - You, the patient, will arrange for follow up care as instructed.   - If your condition worsens or fails to improve we recommend that you receive another evaluation at the ER immediately or contact your PCP to discuss your concerns or return here.   - Follow up with your PCP or specialty clinic as directed in the next 1-2 weeks if not improved or as needed.  You can call [phone] to schedule an appointment with the appropriate provider.    If your symptoms do not improve or worsen, go to the emergency room immediately.

## 2024-08-03 LAB — BACTERIA UR CULT: ABNORMAL

## 2024-08-06 ENCOUNTER — DOCUMENTATION ONLY (OUTPATIENT)
Dept: REHABILITATION | Facility: HOSPITAL | Age: 74
End: 2024-08-06
Payer: COMMERCIAL

## 2024-08-12 ENCOUNTER — DOCUMENTATION ONLY (OUTPATIENT)
Dept: REHABILITATION | Facility: HOSPITAL | Age: 74
End: 2024-08-12
Payer: COMMERCIAL

## 2024-08-12 NOTE — PROGRESS NOTES
Health  Wellness Visit Note    Name: Jackelyn Kendrick  Clinic Number: [id number]  Physician: No ref. provider found  Diagnosis: No diagnosis found.  Past Medical History:   Diagnosis Date    Arthritis     Basal cell carcinoma     Bronchitis     seasonal    Cataract     Disorder of kidney and ureter     Diverticulitis     Dry eye syndrome     GERD (gastroesophageal reflux disease)     Hyperlipidemia     Hypertension     Hypothyroidism 07/19/2012    Obese     PONV (postoperative nausea and vomiting)     Thyroid disease      Visit Number: 22  Precautions: Standard, Previous LB Surgery, HTN, L RTC Repair, R Arthroscopic       1st PT visit:  05/16/2024  Year of care end date:  May 2025  Mindbody plan: 60--- 9 Months  Patient level: C    Time In: 9:00 AM  Time Out: 10:02 AM  Total Treatment Time: 62 Minutes    Wellness [employer] 2022  Handout on this week's wellness topic Stages of Change provided  along with a discussion on what it means, the benefits, and suggestions for practice.  Reviewed last week's topic of Small Steps.     Subjective:   Patient reports she has not been doing much since her last Wellness session. She has back, piriformis and shoulder pain today. She tried to do her stretches to help subside her pain but got no relief. Patient asked HC if using her daughter's elliptical would be okay for her back. We talked about [place] and going there once weekly. Patient ices when need be at home.     Objective:   Jackelyn completed therapeutic stretches (EIL, BOLIVAR) and the following MedX exercise machines: core lumbar, torso rotation l/r, leg extension, leg curl, upright row, chest press, biceps curl, triceps extension, leg press    See exercise log in patient folder for rate of exertion and repetitions completed.       Fitness Machine Education Key:  E=education on equipment initiated and further follow up and education needed  I=independent with  and exercise.  The  patient:  Adjusts machines to his/her settings  Uses equipment levers, pins, weights safely  Maintains safe and correct posture while exercising  Moves through exercise with correct pace and control  Gets on and off equipment safely      Lumbar/Cervical Ext.  Torso Rotation  Leg Press    Leg Extension  Seated Leg Curl  Chest Press    Seated Row  Hip ADD  Hip ABD    Triceps Extension  Bicep Curl  Other:      [x] Indicates exercise has been taught for home  Lumbar/Cervical Ext. [] Torso Rotation [] Leg Press []   Leg Extension [] Seated Leg Curl [] Chest Press []   Seated Row [] Hip ADD [] Hip ABD []   Triceps Extension [] Bicep Curl [] Other:        Assessment:   Patient tolerated Patient tolerated MedX Core Lumbar Strength and all other peripheral exercises without an increase in symptoms. Patient warmed up on treadmill for 5 minutes, stretched, and iced low back for 5 minutes after the workout.     Plan:  Continue with established plan of care towards wellness goals.     Health  : Camille Maki  8/12/2024

## 2024-08-13 ENCOUNTER — CLINICAL SUPPORT (OUTPATIENT)
Dept: AUDIOLOGY | Facility: CLINIC | Age: 74
End: 2024-08-13
Payer: COMMERCIAL

## 2024-08-13 DIAGNOSIS — H90.3 SENSORINEURAL HEARING LOSS, BILATERAL: Primary | ICD-10-CM

## 2024-08-13 PROCEDURE — 99499 UNLISTED E&M SERVICE: CPT | Mod: S$GLB,,, | Performed by: AUDIOLOGIST

## 2024-08-13 NOTE — PROGRESS NOTES
HEARING AID FOLLOW-UP    Jackelyn Kendrick was seen today for a hearing aid follow-up visit. She reported that she sometimes forgets to put her hearing aid on in the morning. She usually puts it on when she leaves the house, otherwise, she is not wearing the hearing aid. We reconnected the hearing aid to her phone as she wants to be able to find it if she misplaces it. Overall gain was increased from 2 to 3 adaptation level and gain was slightly decreased. Feedback measures were completed due to noted feedback. Mrs. Hayden also noted she hears the soft sounds of the microphones when she brushes her hair behind her ears or puts on glasses. She was counseled on awareness of soft sounds improving with longer wear times. She is currently averaging 3.5 hours of wear time a day. She was encouraged to start with 6-8 hours and work her way up. She was reminded that this will help with better understanding as well. A speech in noise program was created with directional microphones. Mrs. Hayden was counseled on how to use and change the program both in her stefan and on the hearing aid itself. Notes were written for her to take home as well as a booklet on how to use the [employer]  stefan. We practiced using the phone with and without the hearing aid at length and notes were also given to her to take home.     It is recommended she return to clinic as needed. She was encouraged to check in with her wear time to ensure she is improving access to sound.

## 2024-08-19 ENCOUNTER — DOCUMENTATION ONLY (OUTPATIENT)
Dept: REHABILITATION | Facility: HOSPITAL | Age: 74
End: 2024-08-19
Payer: COMMERCIAL

## 2024-08-19 NOTE — PROGRESS NOTES
Health  Wellness Visit Note    Name: Jackelyn Kendrick  Clinic Number: [id number]  Physician: No ref. provider found  Diagnosis: No diagnosis found.  Past Medical History:   Diagnosis Date    Arthritis     Basal cell carcinoma     Bronchitis     seasonal    Cataract     Disorder of kidney and ureter     Diverticulitis     Dry eye syndrome     GERD (gastroesophageal reflux disease)     Hyperlipidemia     Hypertension     Hypothyroidism 07/19/2012    Obese     PONV (postoperative nausea and vomiting)     Thyroid disease      Visit Number: 23  Precautions: Standard, Previous LB Surgery, HTN, L RTC Repair, R Arthroscopic       1st PT visit:  05/16/2024  Year of care end date:  May 2025  Mindbody plan: 60--- 9 Months  Patient level: C    Time In: 9:00 AM  Time Out: 10:00 AM  Total Treatment Time: 60 Minutes    Wellness [employer] 2022  Handout on this week's wellness topic Journaling provided  along with a discussion on what it means, the benefits, and suggestions for practice.  Reviewed last week's topic of Stages of Change.     Subjective:   Patient reports she has had some low back pain over the last week. She was busy getting her grandchildren ready for the new school year. Patient reports some low back pain when doing her banded clam shells. HC and patient discussed substitutes for that exercise. She did not ice at home.    Objective:   Jackelyn completed therapeutic stretches (EIL, BOLIVAR) and the following MedX exercise machines: core lumbar, torso rotation l/r, leg extension, leg curl, upright row, chest press, biceps curl, triceps extension, leg press    See exercise log in patient folder for rate of exertion and repetitions completed.       Fitness Machine Education Key:  E=education on equipment initiated and further follow up and education needed  I=independent with  and exercise.  The patient:  Adjusts machines to his/her settings  Uses equipment levers, pins, weights safely  Maintains safe and  correct posture while exercising  Moves through exercise with correct pace and control  Gets on and off equipment safely      Lumbar/Cervical Ext. E Torso Rotation E Leg Press    Leg Extension  Seated Leg Curl  Chest Press    Seated Row  Hip ADD  Hip ABD    Triceps Extension  Bicep Curl  Other:      [x] Indicates exercise has been taught for home  Lumbar/Cervical Ext. [] Torso Rotation [] Leg Press []   Leg Extension [] Seated Leg Curl [] Chest Press []   Seated Row [] Hip ADD [] Hip ABD []   Triceps Extension [] Bicep Curl [] Other:        Assessment:   Patient tolerated Patient tolerated MedX Core Lumbar Strength and all other peripheral exercises without an increase in symptoms. Patient warmed up on treadmill for 5 minutes, stretched, and iced low back for 5 minutes after the workout.     Plan:  Continue with established plan of care towards wellness goals.     Health  : Camille Maki  8/19/2024

## 2024-08-26 ENCOUNTER — DOCUMENTATION ONLY (OUTPATIENT)
Dept: REHABILITATION | Facility: HOSPITAL | Age: 74
End: 2024-08-26
Payer: COMMERCIAL

## 2024-08-26 NOTE — PROGRESS NOTES
Health  Wellness Visit Note    Name: Jackelyn Kendrick  Clinic Number: [id number]  Physician: No ref. provider found  Diagnosis: No diagnosis found.  Past Medical History:   Diagnosis Date    Arthritis     Basal cell carcinoma     Bronchitis     seasonal    Cataract     Disorder of kidney and ureter     Diverticulitis     Dry eye syndrome     GERD (gastroesophageal reflux disease)     Hyperlipidemia     Hypertension     Hypothyroidism 07/19/2012    Obese     PONV (postoperative nausea and vomiting)     Thyroid disease      Visit Number: 24  Precautions: Standard, Previous LB Surgery, HTN, L RTC Repair, R Arthroscopic       1st PT visit:  05/16/2024  Year of care end date:  May 2025  Mindbody plan: 60--- 9 Months  Patient level: C    Time In: 9:00 AM  Time Out: 10:04 AM  Total Treatment Time: 64 Minutes    Wellness [employer] 2022  Handout on this week's wellness topic Decisional Balance provided  along with a discussion on what it means, the benefits, and suggestions for practice.  Reviewed last week's topic of Journaling.      Subjective:   Patient reports some low back pain over the last week but felt pretty good after last week's session. Over the weekend she stayed busy with her family. She will be traveling for vacation with her family this week into next. HC encouraged patient to go for walks on the beach and stretch daily. Patient did not ice at home.    Objective:   Jackelyn completed therapeutic stretches (EIL, BOLIVAR) and the following MedX exercise machines: core lumbar, torso rotation l/r, leg extension, leg curl, upright row, chest press, biceps curl, triceps extension, leg press    See exercise log in patient folder for rate of exertion and repetitions completed.       Fitness Machine Education Key:  E=education on equipment initiated and further follow up and education needed  I=independent with  and exercise.  The patient:  Adjusts machines to his/her settings  Uses equipment levers, pins,  weights safely  Maintains safe and correct posture while exercising  Moves through exercise with correct pace and control  Gets on and off equipment safely      Lumbar/Cervical Ext. E Torso Rotation E Leg Press E   Leg Extension  Seated Leg Curl  Chest Press    Seated Row  Hip ADD E Hip ABD E   Triceps Extension  Bicep Curl  Other:      [x] Indicates exercise has been taught for home  Lumbar/Cervical Ext. [] Torso Rotation [] Leg Press []   Leg Extension [] Seated Leg Curl [] Chest Press []   Seated Row [] Hip ADD [] Hip ABD []   Triceps Extension [] Bicep Curl [] Other:        Assessment:   Patient tolerated Patient tolerated MedX Core Lumbar Strength and all other peripheral exercises without an increase in symptoms. Patient warmed up on treadmill for 5 minutes, stretched, and iced low back for 5 minutes after the workout.     Plan:  Continue with established plan of care towards wellness goals.     Health  : Camille Maki  8/26/2024

## 2024-09-05 ENCOUNTER — DOCUMENTATION ONLY (OUTPATIENT)
Dept: REHABILITATION | Facility: HOSPITAL | Age: 74
End: 2024-09-05
Payer: COMMERCIAL

## 2024-09-05 NOTE — PROGRESS NOTES
Health  Wellness Visit Note    Name: Jackelyn Kendrick  Clinic Number: [id number]  Physician: No ref. provider found  Diagnosis: No diagnosis found.  Past Medical History:   Diagnosis Date    Arthritis     Basal cell carcinoma     Bronchitis     seasonal    Cataract     Disorder of kidney and ureter     Diverticulitis     Dry eye syndrome     GERD (gastroesophageal reflux disease)     Hyperlipidemia     Hypertension     Hypothyroidism 07/19/2012    Obese     PONV (postoperative nausea and vomiting)     Thyroid disease      Visit Number: 25  Precautions: Standard, Previous LB Surgery, HTN, L RTC Repair, R Arthroscopic       1st PT visit:  05/16/2024  Year of care end date:  May 2025  Mindbody plan: 60--- 9 Months  Patient level: C    Time In: 9:00 AM  Time Out: 10:00 AM  Total Treatment Time: 60 Minutes    Wellness [employer] 2022  Handout on this week's wellness topic Body Image provided  along with a discussion on what it means, the benefits, and suggestions for practice.  Reviewed last week's topic of Decisional Balance.     Subjective:   Patient reports she has a lot of low back pain since her vacation. She did a lot of walking on the beach and riding boats with mukesh weather. She stretched once his her last session. Patient did not ice at home.    Objective:   Jackelyn completed therapeutic stretches (EIL, BOLIVAR) and the following MedX exercise machines: core lumbar, torso rotation l/r, leg extension, leg curl, upright row, chest press, biceps curl, triceps extension, leg press    See exercise log in patient folder for rate of exertion and repetitions completed.       Fitness Machine Education Key:  E=education on equipment initiated and further follow up and education needed  I=independent with  and exercise.  The patient:  Adjusts machines to his/her settings  Uses equipment levers, pins, weights safely  Maintains safe and correct posture while exercising  Moves through exercise with correct pace and  control  Gets on and off equipment safely      Lumbar/Cervical Ext. E Torso Rotation E Leg Press E   Leg Extension  Seated Leg Curl  Chest Press    Seated Row  Hip ADD E Hip ABD E   Triceps Extension  Bicep Curl  Other:      [x] Indicates exercise has been taught for home  Lumbar/Cervical Ext. [] Torso Rotation [] Leg Press []   Leg Extension [] Seated Leg Curl [] Chest Press []   Seated Row [] Hip ADD [] Hip ABD []   Triceps Extension [] Bicep Curl [] Other:        Assessment:   Patient tolerated Patient tolerated MedX Core Lumbar Strength and all other peripheral exercises without an increase in symptoms. Patient warmed up on treadmill for 5 minutes, stretched, and iced low back for 5 minutes after the workout.     Plan:  Continue with established plan of care towards wellness goals.     Health  : Camille Maki  9/5/2024

## 2024-09-06 ENCOUNTER — TELEPHONE (OUTPATIENT)
Dept: PRIMARY CARE CLINIC | Facility: CLINIC | Age: 74
End: 2024-09-06
Payer: MEDICARE

## 2024-09-06 ENCOUNTER — OFFICE VISIT (OUTPATIENT)
Dept: PAIN MEDICINE | Facility: CLINIC | Age: 74
End: 2024-09-06
Payer: COMMERCIAL

## 2024-09-06 VITALS
BODY MASS INDEX: 26.65 KG/M2 | SYSTOLIC BLOOD PRESSURE: 153 MMHG | DIASTOLIC BLOOD PRESSURE: 70 MMHG | HEIGHT: 61 IN | WEIGHT: 141.13 LBS | HEART RATE: 69 BPM

## 2024-09-06 DIAGNOSIS — M96.1 POST LAMINECTOMY SYNDROME: Primary | ICD-10-CM

## 2024-09-06 DIAGNOSIS — M51.36 DDD (DEGENERATIVE DISC DISEASE), LUMBAR: ICD-10-CM

## 2024-09-06 DIAGNOSIS — M54.16 LUMBAR RADICULOPATHY: ICD-10-CM

## 2024-09-06 PROCEDURE — 99999 PR PBB SHADOW E&M-EST. PATIENT-LVL III: CPT | Mod: PBBFAC,,, | Performed by: STUDENT IN AN ORGANIZED HEALTH CARE EDUCATION/TRAINING PROGRAM

## 2024-09-06 RX ORDER — GABAPENTIN 300 MG/1
300 CAPSULE ORAL 2 TIMES DAILY
Qty: 180 CAPSULE | Refills: 3 | Status: SHIPPED | OUTPATIENT
Start: 2024-09-06 | End: 2025-09-06

## 2024-09-06 RX ORDER — TIZANIDINE 2 MG/1
TABLET ORAL
COMMUNITY
Start: 2024-06-30 | End: 2024-09-06

## 2024-09-06 NOTE — PATIENT INSTRUCTIONS
We discussed a couple of options to help with your pain.    ViaDisc  - this is a procedure to inject jelly into your bad discs.  This would be only to help back pain.    Spinal cord stimulator - this is the device to help cover up the nerve pain in your back.    Surgery - you would need to speak to a surgeon to discuss what options you would have here.

## 2024-09-06 NOTE — TELEPHONE ENCOUNTER
----- Message from Geovanna Christensen sent at 9/6/2024  1:17 PM CDT -----  Type:  Patient Returning Call    Who Called: pt   Who Left Message for Patient: pt   Does the patient know what this is regarding?:pt want to get a call to get 2 appt for annual   Would the patient rather a call back or a response via [facility]? Call   Best Call Back Number:[phone]  Additional Information: appt

## 2024-09-06 NOTE — PROGRESS NOTES
Chronic Pain - f/u    Referring Physician: No ref. provider found    Date: 09/06/2024     Re: Jackelyn Kendrick  MR#: [id number]  YOB: 1950  Age: 73 y.o.    Chief Complaint:   Chief Complaint   Patient presents with    Low-back Pain     Right/Lower back pain     **This note is dictated using the M*Modal Fluency Direct word recognition program. There are word recognition mistakes that are occasionally missed on review.**    ASSESSMENT: 73 y.o. year old female with back and leg pain, consistent with     1. Post laminectomy syndrome  gabapentin (NEURONTIN) 300 MG capsule      2. Lumbar radiculopathy  gabapentin (NEURONTIN) 300 MG capsule      3. DDD (degenerative disc disease), lumbar  gabapentin (NEURONTIN) 300 MG capsule        PLAN:     Right lumbar radiculopathy and post-laminectomy syndrome  4/29/24 - Right L3-4, L4-5 TFESI - RN sed - no AC - no improvement  -I would not recommend any additional epidurals given that they have not provided any noticeable benefit.  -stop gabapentin to 600mg qhs. This is causing excess sedation during the day.  She went back to 300mg in the morning and 300mg at night.  Rx provided.  -discussed SCS with [employer]. Information provided. She can send me a message if she wants to proceed with the stimulator  -healthy back referral - she completed this.  She states that they are working her out good.  She is working with the healthy back .  She thinks this has helped the strength in her back.  -vertiflex may be an option, but I would be concerned that it might make her back pain worse while helping the leg pain due to the severity of her disc disease.  -stopped Lyrica 50mg BID. This made her too sleepy so she stopped but she was taking this with trazodone.    -discussed stopping the trazodone at night and taking just gabapentin or just Lyrica to see if that helps with pain and sleep.  -another long time discussing options between surgery referral, stimulator  trial, or vertiflex.  I do not recommend vertiflex given her prior L4-5 hemilaminectomy and I do not think that it would provide enough force to open the compressed side.  -For her leg pain the options are: 1) referral to surgery. 2) try a spinal cord stimulator. 3) tweak medications and live with it  -For her back pain the options are 1) ViaDisc. 2) SCS 3) tweak medications and live with it    Lumbar spondylosis  -failed 2 RFAs in the past    DDD  -patient has substantial DDD at multiple levels.  Could consider Viadisc for disc regeneration. This would be for back pain.    - RTC 2 months  - Counseled patient regarding the importance of activity modification and physical therapy.    The above plan and management options were discussed at length with patient. Patient is in agreement with the above and verbalized understanding. It will be communicated with the referring physician via electronic record, fax, or mail.  Lab/study reports reviewed were important and necessary because subsequent medical and treatment recommendations required review of the above lab/study reports. Images viewed/reviewed above were important and necessary because subsequent medical and treatment recommendations required review of the reviewed image(s).     Electronically signed by:  Mick Pineda DO  09/06/2024    Patient was seen in clinic today.  The total amount of time spent on this patient was exactly 41 minutes.  Due to medical complexity, time spent with the patient, and multiple issues discussed/addressed I am billing for a level 5 visit. This includes face to face time and non-face to face time preparing to see the patient by reviewing previous labs/imaging, obtaining and/or reviewing separately obtained history, documenting clinical information in the electronic or other health record, independently reviewing results and communicating results to the patient/family/caregiver/additional providers.  The available imaging was  reviewed in person with the patient, pathology and treatment options were explained in detail with the patient.  The patient was given multiple opportunities to ask questions, and all questions were answered.    =========================================================================================================    SUBJECTIVE:    Interval History 9/6/2024:   Jackelyn Kendrick is a 73 y.o. female presents to the clinic for follow up.  Since last visit the pain has is unchanged.    The pain is located in the right lower back area and does not radiate.  The pain is described as aching    At BEST  5/10   At WORST  5/10 on the WORST day.    On average pain is rated as 5/10.   Today the pain is rated as 5/10  Symptoms interfere with daily activity and sleeping.   Exacerbating factors:  Standing, and Bending.    Mitigating factors heat, ice, and medications.     Current pain medications: gabapentin 300mg BID, tylenol, Trazodone 100mg qhs  Failed Pain Medications: celebrex (due to CKD3), Lyrica 50mg (sedation), gabapentin (sedation)    Pain procedures:   Pain injections (epidurals and nerve ablations) - [id number]  Nerve ablations - not helpful x2  4/29/24 - Right L3-4, L4-5 TFESI - RN sed - no AC - 0%      Interval History 5/30/2024:   Jackelyn Kendrick is a 73 y.o. female presents to the clinic for follow up.  Since last visit the pain has has slightly improved.  The pain is equal back and right leg.    The pain is located in the lower back area and radiates to the right hip, knee .  The pain is described as aching    At BEST  3/10   At WORST  8/10 on the WORST day.    On average pain is rated as 4/10.   Today the pain is rated as 5/10  Symptoms interfere with daily activity.   Exacerbating factors: Sitting, Standing, Laying, and Walking.    Mitigating factors rest.     Initial hx:  Jackelyn Kendrick is a 73 y.o. female presents to the clinic for the evaluation of lower back pain. The pain started years ago  following no inciting event and symptoms have been worsening.  The patient has a hx of right L4-5 hemilaminectomy for sciatic pain in the 90s. Pain now is different.  This pain has been ongoing for a number of years.  It would come and go.  She used to see a pain management doctor and would get some injections in the back which would help.  The pain is located in the right buttocks and travels down the right lateral thigh to the side of the knee.  It does not travel down the knee.  If she is standing or doing household chores then she gets an achy back pain.  When she first stands up it is painful, but then it works itself out.      She saw spine surgery and was started on gabapentin.  She can walk a little further (about 4 blocks) before she has to stop.     Pain Description:    The pain is located in the lower back area and radiates to the right leg .    At BEST  3/10   At WORST  8/10 on the WORST day.    On average pain is rated as 4/10.   Today the pain is rated as 4/10  The pain is continuous.  The pain is described as aching    Symptoms interfere with daily activity.   Exacerbating factors: Sitting, Standing, Walking, and Getting out of bed/chair.    Mitigating factors heat and medications.   She reports 7 hours of sleep per night.    Physical Therapy/Home Exercise: No, not currently in physical therapy or home exercise program    Current Pain Medications:    - gabapentin 300mg qhs, tylenol    Failed Pain Medications:    - celebrex (due to CKD3)    Pain Treatment Therapies:    Pain procedures:   Pain injections (epidurals and nerve ablations) - [id number]  Nerve ablations - not helpful x2  Physical Therapy: none  Chiropractor: none  Acupuncture: none  TENS unit: none  Spinal decompression:   Right L4-5 Hemilaminectomy x2 (1990s)  Joint replacement: none    Patient denies urinary incontinence, bowel incontinence, significant motor weakness, and loss of sensations.  Patient denies any suicidal or homicidal  ideations     report:  Reviewed and consistent with medication use as prescribed.    Imaging:   MRI Lumbar 03/2024:  FINDINGS:  Alignment: Mild levocurvature of the lumbar spine.  Minimal retrolisthesis L4 on L5.     Vertebrae: Postsurgical changes of right L4-L5 hemilaminectomy.  No acute fracture or marrow infiltrative process.     Discs: Multilevel disc height loss most notable at at L3-L4 and L4-L5.     Cord: Normal.  Conus terminates at L1     Degenerative findings:     T11-T12: Posterior disc bulge.  No significant spinal canal stenosis or neural foraminal narrowing.     T12-L1: No significant spinal canal stenosis or neural foraminal narrowing.     L1-L2: Mild diffuse posterior disc bulge and bilateral facet arthropathy.  No significant spinal canal stenosis or neural foraminal narrowing.     L2-L3: Diffuse posterior disc bulge, bilateral facet arthropathy and ligament flavum buckling.  Findings result in mild spinal canal stenosis, mild right and moderate left neural foraminal narrowing.     L3-L4: Diffuse posterior disc bulge with superimposed central disc protrusion, bilateral facet arthropathy and ligament flavum buckling.  Findings result in mild spinal canal stenosis and mild right neural foraminal narrowing.     L4-L5: Right L4 hemilaminectomy.  Diffuse posterior disc bulge, facet arthropathy and left ligamentum flavum buckling.  Findings result in severe right and moderate left neural foraminal narrowing.     L5-S1: Left eccentric disc bulge.  Findings result in mild right and moderate left neural foraminal narrowing.     Paraspinal muscles & soft tissues: Unremarkable.     Impression:     Lumbar spondylosis most notable at L2-L3 and L3-L4 with mild spinal canal stenosis mild/moderate neural foraminal narrowing.  Severe right and moderate left neural foraminal narrowing at L4-L5.        9/6/2024     9:14 AM 5/30/2024     2:35 PM 5/30/2024     2:33 PM 4/15/2024     8:38 AM   Pain Disability Index  (PDI)   Family/Home Responsibilities: 5 5 5 4   Recreation: 5 5 5 4   Occupation: 5 5 5 4   Sexual Behavior: 5 5 5 4   Self Care: 5 5 5 4   Life-Support Activities: 5 5 5 4   Pain Disability Index (PDI) 35 35 35 28        Past Medical History:   Diagnosis Date    Arthritis     Basal cell carcinoma     Bronchitis     seasonal    Cataract     Disorder of kidney and ureter     Diverticulitis     Dry eye syndrome     GERD (gastroesophageal reflux disease)     Hyperlipidemia     Hypertension     Hypothyroidism 07/19/2012    Obese     PONV (postoperative nausea and vomiting)     Thyroid disease      Past Surgical History:   Procedure Laterality Date    ANORECTAL MANOMETRY N/A 06/01/2023    Procedure: MANOMETRY, ANORECTAL;  Surgeon: Paulo Pritchett MD;  Location: [facility] ENDO (4TH FLR);  Service: Endoscopy;  Laterality: N/A;  instructions sent to [id number]  5/26 pre-call no answer; MB    ARTHROSCOPIC REPAIR OF ROTATOR CUFF OF SHOULDER Right 09/23/2020    Procedure: REPAIR, ROTATOR CUFF, ARTHROSCOPIC;  Surgeon: Reginald Pickens MD;  Location: [facility] OR;  Service: Orthopedics;  Laterality: Right;  regional w/catheter (interscalene)    BACK SURGERY      CARPAL TUNNEL RELEASE Left 01/11/2024    Procedure: RELEASE, CARPAL TUNNEL;  Surgeon: Suzy Dominguez MD;  Location: [facility] OR;  Service: Orthopedics;  Laterality: Left;    CATARACT EXTRACTION W/  INTRAOCULAR LENS IMPLANT Left 10/20/2021        CHOLECYSTECTOMY      COLONOSCOPY  2007    diverticulosis    COLONOSCOPY N/A 09/03/2020    Procedure: COLONOSCOPY;  Surgeon: Alberta Henriquez MD;  Location: [facility] ENDO (4TH FLR);  Service: Colon and Rectal;  Laterality: N/A;  pt requested this time-8/31-covid-uc [place]-[facility]    CYSTOSCOPY      DE QUERVAIN'S RELEASE Left 03/2017    DECOMPRESSION OF NERVE Left 01/11/2024    Procedure: DECOMPRESSION, NERVE ULNAR;  Surgeon: Suzy Dominguez MD;  Location: [facility] OR;  Service: Orthopedics;  Laterality: Left;    FREDERICK  CONTRACTURE RELEASE Left 01/11/2024    Procedure: RELEASE, DUPUYTREN CONTRACTURE, PALM;  Surgeon: Suzy Dominguez MD;  Location: [facility] OR;  Service: Orthopedics;  Laterality: Left;    EPIDURAL STEROID INJECTION INTO LUMBAR SPINE Right 4/29/2024    Procedure: RT L3-4 L4-5 TFESI;  Surgeon: iMck Pineda DO;  Location: [facility] PAIN MANAGEMENT;  Service: Pain Management;  Laterality: Right;  20 mins    FIXATION OF TENDON Right 09/23/2020    Procedure: FIXATION, TENDON;  Surgeon: Reginald Pickens MD;  Location: [facility] OR;  Service: Orthopedics;  Laterality: Right;    HERNIA REPAIR      HYSTERECTOMY      INJECTION OF STEROID Right 12/02/2021    Procedure: INJECTION, STEROID;  Surgeon: Suzy Dominguez MD;  Location: [facility] OR;  Service: Orthopedics;  Laterality: Right;    INJECTION OF STEROID Right 01/11/2024    Procedure: INJECTION, STEROID 1ST DORSAL COMPARTMENT;  Surgeon: Suzy Dominguez MD;  Location: [facility] OR;  Service: Orthopedics;  Laterality: Right;    INTRAOCULAR PROSTHESES INSERTION Left 10/20/2021    Procedure: INSERTION, IOL PROSTHESIS;  Surgeon: Pippa Ashby MD;  Location: [facility] OR 1ST FLR;  Service: Ophthalmology;  Laterality: Left;    INTRAOCULAR PROSTHESES INSERTION Right 12/29/2021    Procedure: INSERTION, IOL PROSTHESIS;  Surgeon: Pippa Ashby MD;  Location: [facility] OR 1ST FLR;  Service: Ophthalmology;  Laterality: Right;    NASAL SEPTUM SURGERY      PHACOEMULSIFICATION OF CATARACT Left 10/20/2021    Procedure: PHACOEMULSIFICATION, CATARACT/ COMPLEX- PHACO / IOL - OS SMALL PUPIL-OLE RING AND TRYPAN BLUE;  Surgeon: Pippa Ashby MD;  Location: [facility] OR 1ST FLR;  Service: Ophthalmology;  Laterality: Left;    PHACOEMULSIFICATION OF CATARACT Right 12/29/2021    Procedure: PHACOEMULSIFICATION, CATARACT;  Surgeon: Pippa Ashby MD;  Location: [facility] OR 1ST FLR;  Service: Ophthalmology;  Laterality: Right;    SHOULDER SURGERY      TONSILLECTOMY      TRIGGER FINGER RELEASE Left  12/02/2021    Procedure: RELEASE, TRIGGER FINGER;  Surgeon: Suzy Dominguez MD;  Location: [facility];  Service: Orthopedics;  Laterality: Left;     Social History     Socioeconomic History    Marital status:    Tobacco Use    Smoking status: Never     Passive exposure: Never    Smokeless tobacco: Never   Substance and Sexual Activity    Alcohol use: No     Comment: rare on a special occasion    Drug use: No    Sexual activity: Never   Social History Narrative    , 3 children, nonsmoker ,     Social Determinants of Health     Financial Resource Strain: Low Risk  (4/22/2024)    Overall Financial Resource Strain (CARDIA)     Difficulty of Paying Living Expenses: Not hard at all   Food Insecurity: No Food Insecurity (4/22/2024)    Hunger Vital Sign     Worried About Running Out of Food in the Last Year: Never true     Ran Out of Food in the Last Year: Never true   Transportation Needs: No Transportation Needs (4/22/2024)    PRAPARE - Transportation     Lack of Transportation (Medical): No     Lack of Transportation (Non-Medical): No   Physical Activity: Inactive (4/22/2024)    Exercise Vital Sign     Days of Exercise per Week: 0 days     Minutes of Exercise per Session: 0 min   Stress: No Stress Concern Present (4/22/2024)    [employer] [place] [employer] - Occupational Stress Questionnaire     Feeling of Stress : Not at all   Housing Stability: Low Risk  (4/22/2024)    Housing Stability Vital Sign     Unable to Pay for Housing in the Last Year: No     Homeless in the Last Year: No     Family History   Problem Relation Name Age of Onset    Cataracts Mother      Breast cancer Mother      Diabetes Mother      Hypertension Mother      Heart failure Mother      Heart disease Mother      Cataracts Father      Hypertension Father      Stroke Father      No Known Problems Daughter      No Known Problems Son      Diabetes Paternal Grandmother      Hyperlipidemia Sister x1     Arthritis Sister x1      Hyperlipidemia Brother x5     Arthritis Brother x5     No Known Problems Son      Amblyopia Neg Hx      Blindness Neg Hx      Glaucoma Neg Hx      Macular degeneration Neg Hx      Retinal detachment Neg Hx      Strabismus Neg Hx      Ovarian cancer Neg Hx      Melanoma Neg Hx      Celiac disease Neg Hx      Colon cancer Neg Hx      Colon polyps Neg Hx      Esophageal cancer Neg Hx      Liver cancer Neg Hx      Liver disease Neg Hx      Rectal cancer Neg Hx      Stomach cancer Neg Hx         Review of patient's allergies indicates:   Allergen Reactions    Levaquin [levofloxacin] Swelling and Edema    Erythromycin (bulk) Nausea And Vomiting     Not true allergy       Current Outpatient Medications   Medication Sig    albuterol (PROVENTIL/VENTOLIN HFA) 90 mcg/actuation inhaler Inhale 2 puffs into the lungs every 6 (six) hours as needed for Wheezing.    AREXVY, PF, 120 mcg/0.5 mL SusR vaccine     cranberry fruit extract (CRANBERRY EXTRACT ORAL) Take by mouth.    cycloSPORINE (RESTASIS) 0.05 % ophthalmic emulsion Place 1 drop into both eyes 2 (two) times daily.    diclofenac sodium (VOLTAREN) 1 % Gel Apply 2 g topically 2 (two) times daily as needed (musculoskeletal pain).    dicyclomine (BENTYL) 20 mg tablet TAKE ONE TABLET BY MOUTH FOUR TIMES A DAY BEFORE MEALS AND NIGHTLY    docusate sodium (COLACE) 100 MG capsule Take 1 capsule (100 mg total) by mouth 2 (two) times daily.    fluocinonide (LIDEX) 0.05 % external solution Apply topically once daily.    ketoconazole (NIZORAL) 2 % shampoo Apply topically twice a week.    levothyroxine (SYNTHROID) 75 MCG tablet TAKE ONE TABLET BY MOUTH EVERY DAY ON AN EMPTY STOMACH    LIDOcaine-prilocaine (EMLA) cream Apply topically 2 (two) times daily as needed. To hands.    losartan (COZAAR) 100 MG tablet TAKE ONE TABLET BY MOUTH EVERY DAY    MAGNESIUM ORAL Take 1 tablet by mouth once daily.    memantine (NAMENDA) 5 MG Tab TAKE ONE TABLET BY MOUTH TWICE A DAY    Multi-Vitamin tablet  Take 1 tablet by mouth once daily.     nitrofurantoin (MACRODANTIN) 50 MG capsule Take 1 capsule (50 mg total) by mouth every morning.    omeprazole (PRILOSEC) 40 MG capsule TAKE ONE CAPSULE BY MOUTH EVERY DAY    ondansetron (ZOFRAN) 8 MG tablet Take 1 tablet (8 mg total) by mouth every 8 (eight) hours as needed for Nausea.    oxybutynin (DITROPAN-XL) 10 MG 24 hr tablet Take 1 tablet (10 mg total) by mouth once daily.    PSYLLIUM SEED, WITH SUGAR, (METAMUCIL ORAL) Take by mouth as needed.     rosuvastatin (CRESTOR) 40 MG Tab TAKE ONE TABLET BY MOUTH EVERY DAY    traZODone (DESYREL) 100 MG tablet Take 1 tablet (100 mg total) by mouth nightly as needed for Insomnia.    traZODone (DESYREL) 50 MG tablet Take 100 mg by mouth nightly as needed.    triamcinolone acetonide 0.1% (KENALOG) 0.1 % cream AAA bid    gabapentin (NEURONTIN) 300 MG capsule Take 1 capsule (300 mg total) by mouth 2 (two) times daily.     No current facility-administered medications for this visit.       REVIEW OF SYSTEMS:    GENERAL:  No weight loss, malaise or fevers.  HEENT:   No recent changes in vision or hearing  NECK:  Negative for lumps, no difficulty with swallowing.  RESPIRATORY:  Negative for cough, wheezing or shortness of breath, patient denies any recent URI.  CARDIOVASCULAR:  Negative for chest pain, leg swelling or palpitations.  GI:  Negative for abdominal discomfort, blood in stools or black stools or change in bowel habits.  MUSCULOSKELETAL:  See HPI.  SKIN:  Negative for lesions, rash, and itching.  PSYCH:  No mood disorder or recent psychosocial stressors.  Patients sleep is not disturbed secondary to pain.  HEMATOLOGY/LYMPHOLOGY:  Negative for prolonged bleeding, bruising easily or swollen nodes.  Patient is not currently taking any anti-coagulants  NEURO:   No history of headaches, syncope, paralysis, seizures or tremors.  All other reviewed and negative other than HPI.    OBJECTIVE:    BP (!) 153/70 (BP Location: Left arm,  "Patient Position: Sitting)   Pulse 69   Ht 5' 0.98" (1.549 m)   Wt 64 kg (141 lb 1.5 oz)   BMI 26.68 kg/m²     PHYSICAL EXAMINATION:    GENERAL: Well appearing, in no acute distress, alert and oriented x3.  PSYCH:  Mood and affect appropriate.  SKIN: Skin color, texture, turgor normal, no rashes or lesions.  HEAD/FACE:  Normocephalic, atraumatic. Cranial nerves grossly intact.  CV: RRR with palpation of the radial artery.  PULM: CTAB. No evidence of respiratory difficulty, symmetric chest rise.  GI:  Soft and non-tender.    BACK:   - No obvious deformity or signs of trauma, Normal lumbar lordotic curve  - Negative spinous process tenderness  - Positive paravertebral tenderness  - Negative pain to palpation over the facet joints of the lumbar spine.   - Negative QL / Iliac crest / Glut tenderness  - Slump test is Positive for radicular pain  - Slump test is Negative for back pain  - Supine Straight leg raising is Negative for radicular pain  - Supine Straight leg raising is Negative for back pain  - Lumbar ROM is normal in Flexion without pain  - Lumbar ROM is normal in Extension without pain  - Lumbar ROM is normal in Lateral Flexion without pain  - Positive Sustained Hip Flexion test (for discogenic pain)  - Negative Altered Gait, Posture  - Axial facet loading test Negative on the bilateral side(s)    SI Joint exam:  - Negative SI joint tenderness to palpation  - Hardik's sign Positive  - Yeoman's Test: Did not perform for SI joint pain indicating anterior SI ligament involvement. Did not perform for anterior thigh pain/paresthesia which indicates femoral nerve stretch.  - Gaenslen's Test:Negative  - Finger Geraldine's Sign:Negative  - SI compression test:Did not perform  - SI distraction test:Negative  - Thigh Thrust: Negative  - SI Thrust: Did not perform    MUSKULOSKELETAL:    EXTREMITIES:   Hip Exam:  - Log Roll Negative  - FADIR Negative  - [place] Did not perform  - Hip Scour Negative  - GTB Tenderness " Negative    MUSCULOSKELETAL:  No atrophy or tone abnormalities are noted in the UE or LE.  No deformities, edema, or skin discoloration are noted on visible skin. Good capillary refill.    NEURO: Bilateral upper and lower extremity coordination and muscle stretch reflexes are physiologic and symmetric.      NEUROLOGICAL EXAM:  MENTAL STATUS: A x O x 3, good concentration, speech is fluent and goal directed  MEMORY: recent and remote are intact  CN: CN2-12 grossly intact  MOTOR: 5/5 in all muscle groups  DTRs: 2+ intact symmetric  Sensation:    -no Loss of sensation in a left lower and right lower L-1, L-2, L-3, L-4, and L-5 bilaterally distribution.  Sterling: absent on the bilateral side(s)  Clonus: absent on the bilateral side(s)    GAIT: normal.

## 2024-09-09 ENCOUNTER — DOCUMENTATION ONLY (OUTPATIENT)
Dept: REHABILITATION | Facility: HOSPITAL | Age: 74
End: 2024-09-09
Payer: COMMERCIAL

## 2024-09-09 NOTE — PROGRESS NOTES
Health  Wellness Visit Note    Name: Jackelyn Kendrick  Clinic Number: [id number]  Physician: No ref. provider found  Diagnosis: No diagnosis found.  Past Medical History:   Diagnosis Date    Arthritis     Basal cell carcinoma     Bronchitis     seasonal    Cataract     Disorder of kidney and ureter     Diverticulitis     Dry eye syndrome     GERD (gastroesophageal reflux disease)     Hyperlipidemia     Hypertension     Hypothyroidism 07/19/2012    Obese     PONV (postoperative nausea and vomiting)     Thyroid disease      Visit Number: 26  Precautions: Standard, Previous LB Surgery, HTN, L RTC Repair, R Arthroscopic       1st PT visit:  05/16/2024  Year of care end date:  May 2025  Mindbody plan: 60--- 9 Months  Patient level: C    Time In: 9:00 AM  Time Out: 10:00 AM  Total Treatment Time: 60 Minutes    Well[employer] 2022  Handout on this week's wellness topic Preventing Burnout provided  along with a discussion on what it means, the benefits, and suggestions for practice.  Reviewed last week's topic of Body Image.    Subjective:   Patient reports she is feeling a lot better than she did last week. She did a lot of resting and spending time with her family over the weekend. She did some yard work while the weather was permitting her outside. She did her stretches everyday since her last session.  Patient did not ice at home.    Objective:   Jackelyn completed therapeutic stretches (EIL, BOLIVAR) and the following MedX exercise machines: core lumbar, torso rotation l/r, leg extension, leg curl, upright row, chest press, biceps curl, triceps extension, leg press    See exercise log in patient folder for rate of exertion and repetitions completed.       Fitness Machine Education Key:  E=education on equipment initiated and further follow up and education needed  I=independent with  and exercise.  The patient:  Adjusts machines to his/her settings  Uses equipment levers, pins, weights safely  Maintains  safe and correct posture while exercising  Moves through exercise with correct pace and control  Gets on and off equipment safely      Lumbar/Cervical Ext. E Torso Rotation E Leg Press E   Leg Extension E Seated Leg Curl E Chest Press    Seated Row  Hip ADD E Hip ABD E   Triceps Extension  Bicep Curl  Other:      [x] Indicates exercise has been taught for home  Lumbar/Cervical Ext. [] Torso Rotation [] Leg Press []   Leg Extension [] Seated Leg Curl [] Chest Press []   Seated Row [] Hip ADD [] Hip ABD []   Triceps Extension [] Bicep Curl [] Other:        Assessment:   Patient tolerated Patient tolerated MedX Core Lumbar Strength and all other peripheral exercises without an increase in symptoms. Patient warmed up on treadmill for 5 minutes, stretched, and iced low back for 5 minutes after the workout.     Plan:  Continue with established plan of care towards wellness goals.     Health  : Camille Maki  9/9/2024

## 2024-09-16 ENCOUNTER — DOCUMENTATION ONLY (OUTPATIENT)
Dept: REHABILITATION | Facility: HOSPITAL | Age: 74
End: 2024-09-16
Payer: COMMERCIAL

## 2024-09-16 NOTE — PROGRESS NOTES
Health  Wellness Visit Note    Name: Jackelyn Kendrick  Clinic Number: [id number]  Physician: No ref. provider found  Diagnosis: No diagnosis found.  Past Medical History:   Diagnosis Date    Arthritis     Basal cell carcinoma     Bronchitis     seasonal    Cataract     Disorder of kidney and ureter     Diverticulitis     Dry eye syndrome     GERD (gastroesophageal reflux disease)     Hyperlipidemia     Hypertension     Hypothyroidism 07/19/2012    Obese     PONV (postoperative nausea and vomiting)     Thyroid disease      Visit Number: 27  Precautions: Standard, Previous LB Surgery, HTN, L RTC Repair, R Arthroscopic       1st PT visit:  05/16/2024  Year of care end date:  May 2025  Mindbody plan: 60--- 9 Months  Patient level: B    Time In: 9:00 AM  Time Out: 10:00 AM  Total Treatment Time: 60 Minutes    Wellness [employer] 2022  Handout on this week's wellness topic Taking Ownership of Your Wellness provided  along with a discussion on what it means, the benefits, and suggestions for practice.  Reviewed last week's topic of Preventing Burnout.     Subjective:   Patient reports no change from her last Wellness session. She is feeling a lot better from a few weeks ago. Since her last Wellness session, she has been trying to do a little bit of stretching everyday. She has a pool but does not swim in it. She is interested in finding a gym that offers a variety of classes. HC and patient discussed [facility] and [facility]. Patient did not ice at home.    Objective:   Jackelyn completed therapeutic stretches (EIL, BOLIVAR) and the following MedX exercise machines: core lumbar, torso rotation l/r, leg extension, leg curl, upright row, chest press, biceps curl, triceps extension, leg press    See exercise log in patient folder for rate of exertion and repetitions completed.       Fitness Machine Education Key:  E=education on equipment initiated and further follow up and education  needed  I=independent with  and exercise.  The patient:  Adjusts machines to his/her settings  Uses equipment levers, pins, weights safely  Maintains safe and correct posture while exercising  Moves through exercise with correct pace and control  Gets on and off equipment safely      Lumbar/Cervical Ext. E Torso Rotation E Leg Press E   Leg Extension E Seated Leg Curl E Chest Press X   Seated Row E Hip ADD E Hip ABD E   Triceps Extension X Bicep Curl X Other:      [x] Indicates exercise has been taught for home  Lumbar/Cervical Ext. [] Torso Rotation [] Leg Press []   Leg Extension [] Seated Leg Curl [] Chest Press []   Seated Row [] Hip ADD [] Hip ABD []   Triceps Extension [] Bicep Curl [] Other:        Assessment:   Patient tolerated Patient tolerated MedX Core Lumbar Strength and all other peripheral exercises without an increase in symptoms. Patient warmed up on treadmill for 5 minutes, stretched, and iced low back for 5 minutes after the workout.     Plan:  Continue with established plan of care towards wellness goals.     Health  : Camille Maki  9/16/2024

## 2024-09-23 ENCOUNTER — DOCUMENTATION ONLY (OUTPATIENT)
Dept: REHABILITATION | Facility: HOSPITAL | Age: 74
End: 2024-09-23
Payer: COMMERCIAL

## 2024-09-23 NOTE — PROGRESS NOTES
Health  Wellness Visit Note    Name: Jackelyn Kendrick  Clinic Number: [id number]  Physician: No ref. provider found  Diagnosis: No diagnosis found.  Past Medical History:   Diagnosis Date    Arthritis     Basal cell carcinoma     Bronchitis     seasonal    Cataract     Disorder of kidney and ureter     Diverticulitis     Dry eye syndrome     GERD (gastroesophageal reflux disease)     Hyperlipidemia     Hypertension     Hypothyroidism 07/19/2012    Obese     PONV (postoperative nausea and vomiting)     Thyroid disease      Visit Number: 28  Precautions: Standard, Previous LB Surgery, HTN, L RTC Repair, R Arthroscopic       1st PT visit:  05/16/2024  Year of care end date:  May 2025  Mindbody plan: 60--- 9 Months  Patient level: B    Time In: 9:00 AM  Time Out: 10:00 AM  Total Treatment Time: 60 Minutes    Wellness [employer] 2022  Handout on this week's wellness topic Making and Breaking Habits provided  along with a discussion on what it means, the benefits, and suggestions for practice.  Reviewed last week's topic of Taking Ownership of Your Wellness.     Subjective:   Patient reports some low back pain here and there. She had a busy weekend watching her 9 year old granddaughter. She did not do any additional exercise or stretching. Patient plans to get a puppy soon. She does not ice outside of Wellness.     She is interested in finding a gym that offers a variety of classes. HC and patient discussed [facility] and [facility]. Patient did not ice at home.    Objective:   Jackelyn completed therapeutic stretches (EIL, BOLIVAR) and the following MedX exercise machines: core lumbar, torso rotation l/r, leg extension, leg curl, upright row, chest press, biceps curl, triceps extension, leg press    See exercise log in patient folder for rate of exertion and repetitions completed.       Fitness Machine Education Key:  E=education on equipment initiated and further follow up and education  needed  I=independent with  and exercise.  The patient:  Adjusts machines to his/her settings  Uses equipment levers, pins, weights safely  Maintains safe and correct posture while exercising  Moves through exercise with correct pace and control  Gets on and off equipment safely      Lumbar/Cervical Ext. E Torso Rotation E Leg Press E   Leg Extension E Seated Leg Curl E Chest Press X   Seated Row E Hip ADD E Hip ABD E   Triceps Extension X Bicep Curl X Other:      [x] Indicates exercise has been taught for home  Lumbar/Cervical Ext. [] Torso Rotation [] Leg Press []   Leg Extension [] Seated Leg Curl [] Chest Press []   Seated Row [] Hip ADD [] Hip ABD []   Triceps Extension [] Bicep Curl [] Other:        Assessment:   Patient tolerated Patient tolerated MedX Core Lumbar Strength and all other peripheral exercises without an increase in symptoms. Patient warmed up on treadmill for 5 minutes, stretched, and iced low back for 5 minutes after the workout.     Plan:  Continue with established plan of care towards wellness goals.     Health  : Camille Maki  9/23/2024

## 2024-09-30 ENCOUNTER — DOCUMENTATION ONLY (OUTPATIENT)
Dept: REHABILITATION | Facility: HOSPITAL | Age: 74
End: 2024-09-30
Payer: COMMERCIAL

## 2024-09-30 NOTE — PROGRESS NOTES
Health  Wellness Visit Note    Name: Jackelyn Kendrick  Clinic Number: [id number]  Physician: No ref. provider found  Diagnosis: No diagnosis found.  Past Medical History:   Diagnosis Date    Arthritis     Basal cell carcinoma     Bronchitis     seasonal    Cataract     Disorder of kidney and ureter     Diverticulitis     Dry eye syndrome     GERD (gastroesophageal reflux disease)     Hyperlipidemia     Hypertension     Hypothyroidism 07/19/2012    Obese     PONV (postoperative nausea and vomiting)     Thyroid disease      Visit Number: 29  Precautions: Standard, Previous LB Surgery, HTN, L RTC Repair, R Arthroscopic       1st PT visit:  05/16/2024  Year of care end date:  May 2025  Mindbody plan: 60--- 9 Months  Patient level: B    Time In: 9:00 AM  Time Out: 10:00 AM  Total Treatment Time: 60 Minutes    Wellness [facility] 2022  Handout on this week's wellness topic Relationships provided  along with a discussion on what it means, the benefits, and suggestions for practice.  Reviewed last week's topic of Making and Breaking Habits.     Subjective:   Patient reports she always has low back pain but it is manageable. She has a very busy week ahead with meetings, fundraising events and family gatherings. Last week she visited [facility]. She liked the cleanliness and options of group classes. She did not do any additional exercise or stretching. Patient plans to get a puppy soon. She does not ice outside of Wellness.     For today's session, patient did not ice following her workout. She has to meet her  for a MD appointment.    She is interested in finding a gym that offers a variety of classes. HC and patient discussed [facility] and [facility]. Patient did not ice at home.    Objective:   Jackelyn completed therapeutic stretches (EIL, BOLIVAR) and the following MedX exercise machines: core lumbar, torso rotation l/r, leg extension, leg curl, upright row, chest press,  biceps curl, triceps extension, leg press    See exercise log in patient folder for rate of exertion and repetitions completed.       Fitness Machine Education Key:  E=education on equipment initiated and further follow up and education needed  I=independent with  and exercise.  The patient:  Adjusts machines to his/her settings  Uses equipment levers, pins, weights safely  Maintains safe and correct posture while exercising  Moves through exercise with correct pace and control  Gets on and off equipment safely      Lumbar/Cervical Ext. E Torso Rotation E Leg Press E   Leg Extension E Seated Leg Curl E Chest Press X   Seated Row E Hip ADD E Hip ABD E   Triceps Extension X Bicep Curl X Other:      [x] Indicates exercise has been taught for home  Lumbar/Cervical Ext. [] Torso Rotation [] Leg Press []   Leg Extension [] Seated Leg Curl [] Chest Press []   Seated Row [] Hip ADD [] Hip ABD []   Triceps Extension [] Bicep Curl [] Other:        Assessment:   Patient tolerated Patient tolerated MedX Core Lumbar Strength and all other peripheral exercises without an increase in symptoms. Patient warmed up on treadmill for 5 minutes, stretched, and iced low back for 5 minutes after the workout.     Plan:  Continue with established plan of care towards wellness goals.     Health  : Camille Maki  9/30/2024

## 2024-10-10 ENCOUNTER — DOCUMENTATION ONLY (OUTPATIENT)
Dept: REHABILITATION | Facility: HOSPITAL | Age: 74
End: 2024-10-10
Payer: COMMERCIAL

## 2024-10-11 NOTE — PROGRESS NOTES
Health  Wellness Visit Note    Name: Jackelyn Kendrick  Clinic Number: [id number]  Physician: No ref. provider found  Diagnosis: No diagnosis found.  Past Medical History:   Diagnosis Date    Arthritis     Basal cell carcinoma     Bronchitis     seasonal    Cataract     Disorder of kidney and ureter     Diverticulitis     Dry eye syndrome     GERD (gastroesophageal reflux disease)     Hyperlipidemia     Hypertension     Hypothyroidism 07/19/2012    Obese     PONV (postoperative nausea and vomiting)     Thyroid disease      Visit Number: 30  Precautions: Standard, Previous LB Surgery, HTN, L RTC Repair, R Arthroscopic       1st PT visit:  05/16/2024  Year of care end date:  May 2025  Mindbody plan: 60--- 9 Months  Patient level: B    Time In: 10:00 AM  Time Out: 10:47 AM  Total Treatment Time: 47 Minutes    Wellness [employer] 2022  Handout on this week's wellness topic Communication provided  along with a discussion on what it means, the benefits, and suggestions for practice.  Reviewed last week's topic of Relationships.     Subjective:   Patient reports some shoulder pain and her low back pain is manageable. 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 She did some stretching every morning before her day. She plans to work on her property's roof with her grandchildren this weekend. HC and patient discussed being careful and taking breaks when necessary this weekend. Patient does not ice at home.     Patient visited [facility]. She liked the cleanliness and options of group classes.       Objective:   Jackelyn completed therapeutic stretches (EIL, BOLIVAR) and the following MedX exercise machines: core lumbar, torso rotation l/r, leg extension, leg curl, upright row, chest press, biceps curl, triceps extension, leg press    See exercise log in patient folder for rate of exertion and repetitions completed.       Fitness Machine Education Key:  E=education on  equipment initiated and further follow up and education needed  I=independent with  and exercise.  The patient:  Adjusts machines to his/her settings  Uses equipment levers, pins, weights safely  Maintains safe and correct posture while exercising  Moves through exercise with correct pace and control  Gets on and off equipment safely      Lumbar/Cervical Ext. E Torso Rotation E Leg Press E   Leg Extension E Seated Leg Curl E Chest Press X   Seated Row E Hip ADD E Hip ABD E   Triceps Extension X Bicep Curl X Other:      [x] Indicates exercise has been taught for home  Lumbar/Cervical Ext. [] Torso Rotation [] Leg Press []   Leg Extension [] Seated Leg Curl [] Chest Press []   Seated Row [] Hip ADD [] Hip ABD []   Triceps Extension [] Bicep Curl [] Other:        Assessment:   Patient tolerated Patient tolerated MedX Core Lumbar Strength and all other peripheral exercises without an increase in symptoms. Patient warmed up on treadmill for 5 minutes, stretched, and iced low back for 5 minutes after the workout.     Plan:  Continue with established plan of care towards wellness goals.     Health  : Camille Maki  10/11/2024

## 2024-10-14 ENCOUNTER — DOCUMENTATION ONLY (OUTPATIENT)
Dept: REHABILITATION | Facility: HOSPITAL | Age: 74
End: 2024-10-14
Payer: COMMERCIAL

## 2024-10-14 NOTE — PROGRESS NOTES
Health  Wellness Visit Note    Name: Jackelyn Kendrick  Clinic Number: [id number]  Physician: No ref. provider found  Diagnosis: No diagnosis found.  Past Medical History:   Diagnosis Date    Arthritis     Basal cell carcinoma     Bronchitis     seasonal    Cataract     Disorder of kidney and ureter     Diverticulitis     Dry eye syndrome     GERD (gastroesophageal reflux disease)     Hyperlipidemia     Hypertension     Hypothyroidism 07/19/2012    Obese     PONV (postoperative nausea and vomiting)     Thyroid disease      Visit Number: 31  Precautions: Standard, Previous LB Surgery, HTN, L RTC Repair, R Arthroscopic       1st PT visit:  05/16/2024  Year of care end date:  May 2025  Mindbody plan: 60--- 9 Months  Patient level: B    Time In: 12:30 PM  Time Out: 1:25 PM  Total Treatment Time: 55 Minutes    Wellness [employer] 2022  Handout on this week's wellness topic Self-Assessment provided  along with a discussion on what it means, the benefits, and suggestions for practice.  Reviewed last week's topic of Communication.     Subjective:   Patient reports her shoulder and low back pain is manageable today. Over the weekend she worked on her rental property's fence with her . She plans to start working on the roof soon. HC and patient discussed taking her time and finding help with heavy projects. Patient does not ice at home.     Patient visited [facility]. She liked the cleanliness and options of group classes.       Objective:   Jackelyn completed therapeutic stretches (EIL, BOLIVAR) and the following MedX exercise machines: core lumbar, torso rotation l/r, leg extension, leg curl, upright row, chest press, biceps curl, triceps extension, leg press    See exercise log in patient folder for rate of exertion and repetitions completed.       Fitness Machine Education Key:  E=education on equipment initiated and further follow up and education needed  I=independent with  and exercise.   The patient:  Adjusts machines to his/her settings  Uses equipment levers, pins, weights safely  Maintains safe and correct posture while exercising  Moves through exercise with correct pace and control  Gets on and off equipment safely      Lumbar/Cervical Ext. E Torso Rotation E Leg Press E   Leg Extension E Seated Leg Curl E Chest Press X   Seated Row E Hip ADD E Hip ABD E   Triceps Extension X Bicep Curl X Other:      [x] Indicates exercise has been taught for home  Lumbar/Cervical Ext. [] Torso Rotation [] Leg Press []   Leg Extension [] Seated Leg Curl [] Chest Press []   Seated Row [] Hip ADD [] Hip ABD []   Triceps Extension [] Bicep Curl [] Other:        Assessment:   Patient tolerated Patient tolerated MedX Core Lumbar Strength and all other peripheral exercises without an increase in symptoms. Patient warmed up on treadmill for 5 minutes, stretched, and iced low back for 5 minutes after the workout.     Plan:  Continue with established plan of care towards wellness goals.     Health  : Camille Maki  10/14/2024

## 2024-10-21 ENCOUNTER — DOCUMENTATION ONLY (OUTPATIENT)
Dept: REHABILITATION | Facility: HOSPITAL | Age: 74
End: 2024-10-21
Payer: COMMERCIAL

## 2024-10-21 NOTE — PROGRESS NOTES
Health  Wellness Visit Note    Name: Jackelyn Kendrick  Clinic Number: [id number]  Physician: No ref. provider found  Diagnosis: No diagnosis found.  Past Medical History:   Diagnosis Date    Arthritis     Basal cell carcinoma     Bronchitis     seasonal    Cataract     Disorder of kidney and ureter     Diverticulitis     Dry eye syndrome     GERD (gastroesophageal reflux disease)     Hyperlipidemia     Hypertension     Hypothyroidism 07/19/2012    Obese     PONV (postoperative nausea and vomiting)     Thyroid disease      Visit Number: 32  Precautions: Standard, Previous LB Surgery, HTN, L RTC Repair, R Arthroscopic       1st PT visit:  05/16/2024  Year of care end date:  May 2025  Mindbody plan: 60--- 9 Months  Patient level: B    Time In: 9:00 AM  Time Out: 9:58 AM  Total Treatment Time: 58 Minutes    Wellness Vision 2022  Handout on this week's wellness topic Boundaries provided along with a discussion on what it means, the benefits, and suggestions for practice.  Reviewed last week's topic of Self-Assessment.     Subjective:   Patient reports she had low back pain late last night. Yesterday she was in her garden and might've over done it. HC and patient discussed the importance of stretching before strenuous activity.Patient does not ice at home.     Patient visited [facility]. She liked the cleanliness and options of group classes.     Objective:   Jackelyn completed therapeutic stretches (EIL, BOLIVAR) and the following MedX exercise machines: core lumbar, torso rotation l/r, leg extension, leg curl, upright row, chest press, biceps curl, triceps extension, leg press    See exercise log in patient folder for rate of exertion and repetitions completed.       Fitness Machine Education Key:  E=education on equipment initiated and further follow up and education needed  I=independent with  and exercise.  The patient:  Adjusts machines to his/her settings  Uses equipment levers, pins,  weights safely  Maintains safe and correct posture while exercising  Moves through exercise with correct pace and control  Gets on and off equipment safely      Lumbar/Cervical Ext. E Torso Rotation E Leg Press E   Leg Extension E Seated Leg Curl E Chest Press X   Seated Row E Hip ADD E Hip ABD E   Triceps Extension X Bicep Curl X Other:      [x] Indicates exercise has been taught for home  Lumbar/Cervical Ext. [] Torso Rotation [] Leg Press []   Leg Extension [] Seated Leg Curl [] Chest Press []   Seated Row [] Hip ADD [] Hip ABD []   Triceps Extension [] Bicep Curl [] Other:        Assessment:   Patient tolerated Patient tolerated MedX Core Lumbar Strength and all other peripheral exercises without an increase in symptoms. Patient warmed up on treadmill for 5 minutes, stretched, and iced low back for 5 minutes after the workout.     Plan:  Continue with established plan of care towards wellness goals.     Health  : Camille Maki  10/21/2024

## 2024-10-25 ENCOUNTER — OFFICE VISIT (OUTPATIENT)
Dept: SPORTS MEDICINE | Facility: CLINIC | Age: 74
End: 2024-10-25
Payer: COMMERCIAL

## 2024-10-25 VITALS
HEART RATE: 66 BPM | SYSTOLIC BLOOD PRESSURE: 135 MMHG | BODY MASS INDEX: 27.46 KG/M2 | HEIGHT: 60 IN | WEIGHT: 139.88 LBS | DIASTOLIC BLOOD PRESSURE: 71 MMHG

## 2024-10-25 DIAGNOSIS — G89.29 CHRONIC LEFT SHOULDER PAIN: Primary | ICD-10-CM

## 2024-10-25 DIAGNOSIS — M25.512 CHRONIC LEFT SHOULDER PAIN: Primary | ICD-10-CM

## 2024-10-25 PROCEDURE — 99999 PR PBB SHADOW E&M-EST. PATIENT-LVL IV: CPT | Mod: PBBFAC,,, | Performed by: ORTHOPAEDIC SURGERY

## 2024-10-25 RX ORDER — TRIAMCINOLONE ACETONIDE 40 MG/ML
40 INJECTION, SUSPENSION INTRA-ARTICULAR; INTRAMUSCULAR
Status: DISCONTINUED | OUTPATIENT
Start: 2024-10-25 | End: 2024-10-25 | Stop reason: HOSPADM

## 2024-10-25 RX ADMIN — TRIAMCINOLONE ACETONIDE 40 MG: 40 INJECTION, SUSPENSION INTRA-ARTICULAR; INTRAMUSCULAR at 10:10

## 2024-10-25 NOTE — PROGRESS NOTES
CC: LEFT shoulder pain    73 y.o.female presents for follow up of her left shoulder. Patient received a CSI approximately 7 months ago and notes good relief relief from this.She is requesting a repeat CSI today.       Hx (2/23/23):   73 y.o. Female who is a prior patient of mine. History of left shoulder rotator cuff repair by me in 2010, she has a right RCR in 2020 by me. RHD. Reports left shoulder pain for 2 months, no discreet injury. Pain localizes over trapezius with occasional radiation to left elbow. Pain associated with activity and rest. Worse with reaching over head. Reports pain at night. For treatment, she has tried rest, activity modification, NSAIDs, and physical therapy for 8 weeks.      Past Medical History:   Diagnosis Date    Arthritis     Basal cell carcinoma     Bronchitis     seasonal    Cataract     Disorder of kidney and ureter     Diverticulitis     Dry eye syndrome     GERD (gastroesophageal reflux disease)     Hyperlipidemia     Hypertension     Hypothyroidism 07/19/2012    Obese     PONV (postoperative nausea and vomiting)     Thyroid disease        Past Surgical History:   Procedure Laterality Date    ANORECTAL MANOMETRY N/A 06/01/2023    Procedure: MANOMETRY, ANORECTAL;  Surgeon: Paulo Pritchett MD;  Location: [address]);  Service: Endoscopy;  Laterality: N/A;  instructions sent to [facility]  5/26 pre-call no answer; MB    ARTHROSCOPIC REPAIR OF ROTATOR CUFF OF SHOULDER Right 09/23/2020    Procedure: REPAIR, ROTATOR CUFF, ARTHROSCOPIC;  Surgeon: Reginald Pickens MD;  Location: [facility] OR;  Service: Orthopedics;  Laterality: Right;  regional w/catheter (interscalene)    BACK SURGERY      CARPAL TUNNEL RELEASE Left 01/11/2024    Procedure: RELEASE, CARPAL TUNNEL;  Surgeon: Suzy Dominguez MD;  Location: [facility] OR;  Service: Orthopedics;  Laterality: Left;    CATARACT EXTRACTION W/  INTRAOCULAR LENS IMPLANT Left 10/20/2021        CHOLECYSTECTOMY       COLONOSCOPY  2007    diverticulosis    COLONOSCOPY N/A 09/03/2020    Procedure: COLONOSCOPY;  Surgeon: Alberta Henriquez MD;  Location: [facility] ENDO (4TH FLR);  Service: Colon and Rectal;  Laterality: N/A;  pt requested this time-8/31-covid-uc [place]-[facility]    CYSTOSCOPY      DE QUERVAIN'S RELEASE Left 03/2017    DECOMPRESSION OF NERVE Left 01/11/2024    Procedure: DECOMPRESSION, NERVE ULNAR;  Surgeon: Suzy Dominguez MD;  Location: [facility] OR;  Service: Orthopedics;  Laterality: Left;    DUPUYTREN CONTRACTURE RELEASE Left 01/11/2024    Procedure: RELEASE, DUPUYTREN CONTRACTURE, PALM;  Surgeon: Suzy Dominguez MD;  Location: [facility] OR;  Service: Orthopedics;  Laterality: Left;    EPIDURAL STEROID INJECTION INTO LUMBAR SPINE Right 4/29/2024    Procedure: RT L3-4 L4-5 TFESI;  Surgeon: Mick Pineda DO;  Location: [facility] PAIN MANAGEMENT;  Service: Pain Management;  Laterality: Right;  20 mins    FIXATION OF TENDON Right 09/23/2020    Procedure: FIXATION, TENDON;  Surgeon: Reginald Pickens MD;  Location: [facility] OR;  Service: Orthopedics;  Laterality: Right;    HERNIA REPAIR      HYSTERECTOMY      INJECTION OF STEROID Right 12/02/2021    Procedure: INJECTION, STEROID;  Surgeon: Suzy Dominguez MD;  Location: [facility] OR;  Service: Orthopedics;  Laterality: Right;    INJECTION OF STEROID Right 01/11/2024    Procedure: INJECTION, STEROID 1ST DORSAL COMPARTMENT;  Surgeon: Suzy Dominguez MD;  Location: [facility] OR;  Service: Orthopedics;  Laterality: Right;    INTRAOCULAR PROSTHESES INSERTION Left 10/20/2021    Procedure: INSERTION, IOL PROSTHESIS;  Surgeon: Pippa Ashby MD;  Location: [facility] OR 1ST FLR;  Service: Ophthalmology;  Laterality: Left;    INTRAOCULAR PROSTHESES INSERTION Right 12/29/2021    Procedure: INSERTION, IOL PROSTHESIS;  Surgeon: Pippa Ashby MD;  Location: [facility] OR 1ST FLR;  Service: Ophthalmology;  Laterality: Right;    NASAL SEPTUM SURGERY      PHACOEMULSIFICATION OF CATARACT Left 10/20/2021     Procedure: PHACOEMULSIFICATION, CATARACT/ COMPLEX- PHACO / IOL - OS SMALL PUPIL-OLE RING AND TRYPAN BLUE;  Surgeon: Pippa Ashby MD;  Location: [address];  Service: Ophthalmology;  Laterality: Left;    PHACOEMULSIFICATION OF CATARACT Right 12/29/2021    Procedure: PHACOEMULSIFICATION, CATARACT;  Surgeon: Pippa Ashby MD;  Location: [address];  Service: Ophthalmology;  Laterality: Right;    SHOULDER SURGERY      TONSILLECTOMY      TRIGGER FINGER RELEASE Left 12/02/2021    Procedure: RELEASE, TRIGGER FINGER;  Surgeon: Suzy Dominguez MD;  Location: [facility];  Service: Orthopedics;  Laterality: Left;       Family History   Problem Relation Name Age of Onset    Cataracts Mother      Breast cancer Mother      Diabetes Mother      Hypertension Mother      Heart failure Mother      Heart disease Mother      Cataracts Father      Hypertension Father      Stroke Father      No Known Problems Daughter      No Known Problems Son      Diabetes Paternal Grandmother      Hyperlipidemia Sister x1     Arthritis Sister x1     Hyperlipidemia Brother x5     Arthritis Brother x5     No Known Problems Son      Amblyopia Neg Hx      Blindness Neg Hx      Glaucoma Neg Hx      Macular degeneration Neg Hx      Retinal detachment Neg Hx      Strabismus Neg Hx      Ovarian cancer Neg Hx      Melanoma Neg Hx      Celiac disease Neg Hx      Colon cancer Neg Hx      Colon polyps Neg Hx      Esophageal cancer Neg Hx      Liver cancer Neg Hx      Liver disease Neg Hx      Rectal cancer Neg Hx      Stomach cancer Neg Hx           Current Outpatient Medications:     albuterol (PROVENTIL/VENTOLIN HFA) 90 mcg/actuation inhaler, Inhale 2 puffs into the lungs every 6 (six) hours as needed for Wheezing., Disp: 6.7 g, Rfl: 11    AREXVY, PF, 120 mcg/0.5 mL SusR vaccine, , Disp: , Rfl:     cranberry fruit extract (CRANBERRY EXTRACT ORAL), Take by mouth., Disp: , Rfl:     cycloSPORINE (RESTASIS) 0.05 % ophthalmic emulsion,  Place 1 drop into both eyes 2 (two) times daily., Disp: 60 each, Rfl: 12    diclofenac sodium (VOLTAREN) 1 % Gel, Apply 2 g topically 2 (two) times daily as needed (musculoskeletal pain)., Disp: 100 g, Rfl: 11    dicyclomine (BENTYL) 20 mg tablet, TAKE ONE TABLET BY MOUTH FOUR TIMES A DAY BEFORE MEALS AND NIGHTLY, Disp: 120 tablet, Rfl: 11    docusate sodium (COLACE) 100 MG capsule, Take 1 capsule (100 mg total) by mouth 2 (two) times daily., Disp: 30 capsule, Rfl: 1    fluocinonide (LIDEX) 0.05 % external solution, Apply topically once daily., Disp: 60 mL, Rfl: 11    gabapentin (NEURONTIN) 300 MG capsule, Take 1 capsule (300 mg total) by mouth 2 (two) times daily., Disp: 180 capsule, Rfl: 3    ketoconazole (NIZORAL) 2 % shampoo, Apply topically twice a week., Disp: 120 mL, Rfl: 11    levothyroxine (SYNTHROID) 75 MCG tablet, TAKE ONE TABLET BY MOUTH EVERY DAY ON AN EMPTY STOMACH, Disp: 90 tablet, Rfl: 3    LIDOcaine-prilocaine (EMLA) cream, Apply topically 2 (two) times daily as needed. To hands., Disp: 30 g, Rfl: 2    losartan (COZAAR) 100 MG tablet, TAKE ONE TABLET BY MOUTH EVERY DAY, Disp: 90 tablet, Rfl: 3    MAGNESIUM ORAL, Take 1 tablet by mouth once daily., Disp: , Rfl:     memantine (NAMENDA) 5 MG Tab, TAKE ONE TABLET BY MOUTH TWICE A DAY, Disp: 180 tablet, Rfl: 1    Multi-Vitamin tablet, Take 1 tablet by mouth once daily. , Disp: , Rfl:     nitrofurantoin (MACRODANTIN) 50 MG capsule, Take 1 capsule (50 mg total) by mouth every morning., Disp: 30 capsule, Rfl: 11    omeprazole (PRILOSEC) 40 MG capsule, TAKE ONE CAPSULE BY MOUTH EVERY DAY, Disp: 90 capsule, Rfl: 3    ondansetron (ZOFRAN) 8 MG tablet, Take 1 tablet (8 mg total) by mouth every 8 (eight) hours as needed for Nausea., Disp: 30 tablet, Rfl: 0    oxybutynin (DITROPAN-XL) 10 MG 24 hr tablet, Take 1 tablet (10 mg total) by mouth once daily., Disp: 90 tablet, Rfl: 3    PSYLLIUM SEED, WITH SUGAR, (METAMUCIL ORAL), Take by mouth as needed. , Disp: , Rfl:      rosuvastatin (CRESTOR) 40 MG Tab, TAKE ONE TABLET BY MOUTH EVERY DAY, Disp: 90 tablet, Rfl: 3    traZODone (DESYREL) 100 MG tablet, Take 1 tablet (100 mg total) by mouth nightly as needed for Insomnia., Disp: 90 tablet, Rfl: 3    traZODone (DESYREL) 50 MG tablet, Take 100 mg by mouth nightly as needed., Disp: , Rfl:     triamcinolone acetonide 0.1% (KENALOG) 0.1 % cream, AAA bid, Disp: 60 g, Rfl: 3    Review of patient's allergies indicates:   Allergen Reactions    Levaquin [levofloxacin] Swelling and Edema    Erythromycin (bulk) Nausea And Vomiting     Not true allergy          REVIEW OF SYSTEMS:  Constitution: Negative. Negative for chills, fever and night sweats.   HENT: Negative for congestion and headaches.    Eyes: Negative for blurred vision, left vision loss and right vision loss.   Cardiovascular: Negative for chest pain and syncope.   Respiratory: Negative for cough and shortness of breath.    Endocrine: Negative for polydipsia, polyphagia and polyuria.   Hematologic/Lymphatic: Negative for bleeding problem. Does not bruise/bleed easily.   Skin: Negative for dry skin, itching and rash.   Musculoskeletal: Negative for falls.  Positive for left shoulder pain and muscle weakness.   Gastrointestinal: Negative for abdominal pain and bowel incontinence.   Genitourinary: Negative for bladder incontinence and nocturia.   Neurological: Negative for disturbances in coordination, loss of balance and seizures.   Psychiatric/Behavioral: Negative for depression. The patient does not have insomnia.    Allergic/Immunologic: Negative for hives and persistent infections.      PHYSICAL EXAMINATION:  Vitals:  /71   Pulse 66   Ht 5' (1.524 m)   Wt 63.5 kg (139 lb 14.1 oz)   BMI 27.32 kg/m²    General: The patient is alert and oriented x 3.  Mood is pleasant.  Observation of ears, eyes and nose reveal no gross abnormalities.  No labored breathing observed.  Gait is coordinated. Patient can toe walk and heel walk  without difficulty.      LEFT Shoulder / Upper Extremity Exam    OBSERVATION:     Swelling  none  Deformity  none   Discoloration  none   Scapular winging none   Scars   none  Atrophy  none    TENDERNESS / CREPITUS (T/C):          T/C      T/C   Clavicle   -/-  SUPRAspinatus    +/-     AC Jt.    -/-  INFRAspinatus  -/-    SC Jt.    -/-  Deltoid    -/-      G. Tuberosity  -/-  LH BICEP groove  -/-   Acromion:  -/-  Midline Neck   -/-     Scapular Spine -/-  Trapezium   +/-   SMA Scapula  -/-  GH jt. line - post  -/-     Scapulothoracic  -/-         ROM: (* = with pain)  Right shoulder   Left shoulder        AROM (PROM)   AROM (PROM)   FE    170° (175°)     160° (175°)     ER at 0°    60°  (65°)    65°  (65°)   ER at 90° ABD  90°  (90°)    90°  (90°)   IR at 90°  ABD   NA  (40°)     NA  (40°)      IR (spine level)   T10     T10    STRENGTH: (* = with pain) Right shoulder   Left shoulder    SCAPTION   5/5    4/5    IR    5/5    5/5   ER    5/5    5/5   BICEPS   5/5    5/5   Deltoid    5/5    5/5     SIGNS:  Painful side       NEER   -    OYEES  neg    BLACK   +    SPEEDS  neg     DROP ARM   -   BELLY PRESS neg   Superior escape none    LIFT-OFF  neg   X-Body ADD    neg    MOVING VALGUS neg        STABILITY TESTING    Right shoulder   Left shoulder    Translation     Anterior  up face     up face    Posterior  up face    up face    Sulcus   < 10mm    < 10 mm     Signs   Apprehension   neg      neg       Relocation   no change     no change      Jerk test  neg     neg    EXTREMITY NEURO-VASCULAR EXAM:    Sensation grossly intact to light touch all dermatomal regions.    DTR 2+ Biceps, Triceps, BR and Negative Janias sign   Grossly intact motor function at Elbow, Wrist and Hand   Distal pulses radial and ulnar 2+, brisk cap refill, symmetric.      NECK:  Painless FROM and spinous processes non-tender. Negative Spurlings sign.      OTHER FINDINGS:  Positive tinels are the elbow    XRAYS:  Xrays including  AP, Outlet and Axillary Lateral of Left shoulder are ordered / images reviewed by me:   No fracture dislocation or other pathology   Acromion type 2   Proximal migration of humeral head: None   GH arthritis: None    MRI left shoulder -  Impression:     1. Operative change of rotator cuff repair.  Partial-thickness undersurface tear of the infraspinatus with interstitial delamination and subcentimeter intramuscular cyst.  2. Circumferential degenerative labral fraying.  3. Diminutive caliber biceps tendon, likely postoperative.  4. Mild AC joint arthrosis.  5. Glenohumeral osteoarthritis.      ASSESSMENT:   Left shoulder pain,  1. Chronic left shoulder pain          PLAN:    CSI left shoulder  2. Thumb spica splint for wrist pain       All questions were answered, patient will contact us for questions or concerns in the interim.

## 2024-10-25 NOTE — PROCEDURES
Large Joint Aspiration/Injection: L subacromial bursa    Date/Time: 10/25/2024 10:00 AM    Performed by: Reginald Pickens MD  Authorized by: Reginald Pickens MD    Consent Done?:  Yes (Verbal)  Indications:  Pain  Site marked: the procedure site was marked    Timeout: prior to procedure the correct patient, procedure, and site was verified    Prep: patient was prepped and draped in usual sterile fashion    Local anesthesia used?: No      Details:  Needle Size:  22 G  Ultrasonic Guidance for needle placement?: No    Approach:  Posterior  Location:  Shoulder  Site:  L subacromial bursa  Medications:  40 mg triamcinolone acetonide 40 mg/mL  Patient tolerance:  Patient tolerated the procedure well with no immediate complications

## 2024-10-28 ENCOUNTER — DOCUMENTATION ONLY (OUTPATIENT)
Dept: REHABILITATION | Facility: HOSPITAL | Age: 74
End: 2024-10-28
Payer: COMMERCIAL

## 2024-10-31 DIAGNOSIS — H04.123 DRY EYES, BILATERAL: ICD-10-CM

## 2024-11-01 RX ORDER — CYCLOSPORINE 0.5 MG/ML
EMULSION OPHTHALMIC
Qty: 60 EACH | Refills: 12 | Status: SHIPPED | OUTPATIENT
Start: 2024-11-01

## 2024-11-04 ENCOUNTER — DOCUMENTATION ONLY (OUTPATIENT)
Dept: REHABILITATION | Facility: HOSPITAL | Age: 74
End: 2024-11-04
Payer: COMMERCIAL

## 2024-11-04 NOTE — PROGRESS NOTES
Health  Wellness Visit Note    Name: Jackelyn Kendrick  Clinic Number: [id number]  Physician: No ref. provider found  Diagnosis: No diagnosis found.  Past Medical History:   Diagnosis Date    Arthritis     Basal cell carcinoma     Bronchitis     seasonal    Cataract     Disorder of kidney and ureter     Diverticulitis     Dry eye syndrome     GERD (gastroesophageal reflux disease)     Hyperlipidemia     Hypertension     Hypothyroidism 07/19/2012    Obese     PONV (postoperative nausea and vomiting)     Thyroid disease      Visit Number: 34  Precautions: Standard, Previous LB Surgery, HTN, L RTC Repair, R Arthroscopic       1st PT visit:  05/16/2024  Year of care end date:  May 2025  Mindbody plan: 60--- 9 Months  Patient level: B    Time In: 9:00 AM  Time Out: 9:56 AM  Total Treatment Time: 56 Minutes    Wellness [employer] 2022  Handout on this week's wellness topic Requirements provided along with a discussion on what it means, the benefits, and suggestions for practice.  Reviewed last week's topic of Non-Violent Communication.     Subjective:   Patient reports no complaints of low back pain. She does have some stiffness that usually subsides as she gets moving. She reports some sciatic nerve pain in her left leg. HC and patient went over piriformis stretch and patient was advised to monitor when the pain is at its worse and with what activity. This weekend she did some light yard work, cutting down tree branches and moving her pool equipment in for the winter. Patient does not ice at home.     Patient visited [facility]. She liked the cleanliness and options of group classes.     Objective:   Jackelyn completed therapeutic stretches (EIL, BOLIVAR) and the following MedX exercise machines: core lumbar, torso rotation l/r, leg extension, leg curl, upright row, chest press, biceps curl, triceps extension, leg press    See exercise log in patient folder for rate of exertion and repetitions completed.        Fitness Machine Education Key:  E=education on equipment initiated and further follow up and education needed  I=independent with  and exercise.  The patient:  Adjusts machines to his/her settings  Uses equipment levers, pins, weights safely  Maintains safe and correct posture while exercising  Moves through exercise with correct pace and control  Gets on and off equipment safely      Lumbar/Cervical Ext. E Torso Rotation E Leg Press E   Leg Extension E Seated Leg Curl E Chest Press X   Seated Row E Hip ADD E Hip ABD E   Triceps Extension X Bicep Curl X Other:      [x] Indicates exercise has been taught for home  Lumbar/Cervical Ext. [] Torso Rotation [] Leg Press []   Leg Extension [] Seated Leg Curl [] Chest Press []   Seated Row [] Hip ADD [] Hip ABD []   Triceps Extension [] Bicep Curl [] Other:        Assessment:   Patient tolerated Patient tolerated MedX Core Lumbar Strength and all other peripheral exercises without an increase in symptoms. Patient warmed up on treadmill for 5 minutes, stretched, and iced low back for 5 minutes after the workout.     Plan:  Continue with established plan of care towards wellness goals.     Health  : Camille Maki  11/4/2024

## 2024-11-11 ENCOUNTER — DOCUMENTATION ONLY (OUTPATIENT)
Dept: REHABILITATION | Facility: HOSPITAL | Age: 74
End: 2024-11-11
Payer: COMMERCIAL

## 2024-11-18 ENCOUNTER — DOCUMENTATION ONLY (OUTPATIENT)
Dept: REHABILITATION | Facility: HOSPITAL | Age: 74
End: 2024-11-18
Payer: COMMERCIAL

## 2024-11-22 ENCOUNTER — OFFICE VISIT (OUTPATIENT)
Dept: URGENT CARE | Facility: CLINIC | Age: 74
End: 2024-11-22
Payer: COMMERCIAL

## 2024-11-22 VITALS
HEART RATE: 80 BPM | HEIGHT: 60 IN | SYSTOLIC BLOOD PRESSURE: 152 MMHG | DIASTOLIC BLOOD PRESSURE: 54 MMHG | RESPIRATION RATE: 20 BRPM | BODY MASS INDEX: 27.29 KG/M2 | TEMPERATURE: 98 F | OXYGEN SATURATION: 96 % | WEIGHT: 139 LBS

## 2024-11-22 DIAGNOSIS — R35.0 FREQUENT URINATION: ICD-10-CM

## 2024-11-22 DIAGNOSIS — N30.01 ACUTE CYSTITIS WITH HEMATURIA: Primary | ICD-10-CM

## 2024-11-22 LAB
BILIRUBIN, UA POC OHS: NEGATIVE
BLOOD, UA POC OHS: ABNORMAL
CLARITY, UA POC OHS: ABNORMAL
COLOR, UA POC OHS: YELLOW
GLUCOSE, UA POC OHS: NEGATIVE
KETONES, UA POC OHS: NEGATIVE
LEUKOCYTES, UA POC OHS: ABNORMAL
NITRITE, UA POC OHS: NEGATIVE
PH, UA POC OHS: 6
PROTEIN, UA POC OHS: 100
SPECIFIC GRAVITY, UA POC OHS: 1.01
UROBILINOGEN, UA POC OHS: 0.2

## 2024-11-22 PROCEDURE — 87086 URINE CULTURE/COLONY COUNT: CPT | Performed by: NURSE PRACTITIONER

## 2024-11-22 RX ORDER — SULFAMETHOXAZOLE AND TRIMETHOPRIM 800; 160 MG/1; MG/1
1 TABLET ORAL 2 TIMES DAILY
Qty: 10 TABLET | Refills: 0 | Status: SHIPPED | OUTPATIENT
Start: 2024-11-22 | End: 2024-11-27

## 2024-11-22 NOTE — PROGRESS NOTES
Subjective:      Patient ID: Jackelyn Kendrick is a 73 y.o. female.    Vitals:  height is 5' (1.524 m) and weight is 63 kg (139 lb). Her temperature is 98.3 °F (36.8 °C). Her blood pressure is 152/54 (abnormal) and her pulse is 80. Her respiration is 20 and oxygen saturation is 96%.     Chief Complaint: Urinary Frequency    This is a 73 y.o. female   who presents today with a chief complaint of frequent urination that began two days ago. She's complaining of pressure and cloudy urine. She hasn't taken any medication to help relieve her symptoms.     Provider note begins below:    Patient comes to the clinic with 2 day complaint bladder pressure with frequency and cloudy urine.  Some chronic history of urinary frequency.  Last UTI was August, 2024.  Urine cultures in past have been positive for both E coli and Klebsiella.  Klebsiella with some resistance to Macrobid.    No fever or chills. No N/V/D. No mychal hematuria. No back or pelvic pain.    Followed by urology for freq uti.    Takes macrobid daily (until recently) as prescribed by urology.    Urinary Frequency   This is a new problem. The current episode started in the past 7 days. The problem has been gradually worsening. The pain is at a severity of 0/10. The patient is experiencing no pain. There has been no fever. She is Not sexually active. There is No history of pyelonephritis. Associated symptoms include frequency. Pertinent negatives include no behavior changes, chills, discharge, flank pain, hematuria, hesitancy, nausea, possible pregnancy, sweats, urgency, vomiting, weight loss, bubble bath use, constipation, rash or withholding. She has tried nothing for the symptoms. The treatment provided no relief.       Constitution: Negative for chills.   Gastrointestinal:  Negative for nausea, vomiting and constipation.   Genitourinary:  Positive for frequency. Negative for urgency, flank pain and hematuria.   Skin:  Negative for rash.      Objective:      Physical Exam   Constitutional: She is oriented to person, place, and time. She appears well-developed.   HENT:   Head: Normocephalic and atraumatic.   Ears:   Right Ear: External ear normal.   Left Ear: External ear normal.   Nose: Nose normal. No nasal deformity. No epistaxis.   Mouth/Throat: Oropharynx is clear and moist and mucous membranes are normal.   Eyes: Lids are normal.   Neck: Trachea normal and phonation normal. Neck supple.   Cardiovascular: Normal pulses.   Pulmonary/Chest: Effort normal.   Abdominal: Normal appearance and bowel sounds are normal. There is no abdominal tenderness. There is no left CVA tenderness and no right CVA tenderness.   Neurological: She is alert and oriented to person, place, and time.   Skin: Skin is warm, dry and intact.   Psychiatric: Her speech is normal and behavior is normal.   Nursing note and vitals reviewed.    Results for orders placed or performed in visit on 11/22/24   POCT Urinalysis(Instrument)    Collection Time: 11/22/24  9:29 AM   Result Value Ref Range    Color, POC UA Yellow Yellow, Straw, Colorless    Clarity, POC UA Cloudy (A) Clear    Glucose, POC UA Negative Negative    Bilirubin, POC UA Negative Negative    Ketones, POC UA Negative Negative    Spec Grav POC UA 1.015 1.005 - 1.030    Blood, POC UA Large (A) Negative    pH, POC UA 6.0 5.0 - 8.0    Protein, POC  (A) Negative    Urobilinogen, POC UA 0.2 <=1.0    Nitrite, POC UA Negative Negative    WBC, POC UA Large (A) Negative     *Note: Due to a large number of results and/or encounters for the requested time period, some results have not been displayed. A complete set of results can be found in Results Review.       Assessment:     1. Acute cystitis with hematuria    2. Frequent urination        Plan:   Labs ordered at this visit reviewed.       Acute cystitis with hematuria  -     sulfamethoxazole-trimethoprim 800-160mg (BACTRIM DS) 800-160 mg Tab; Take 1 tablet by mouth 2 (two) times daily.  for 5 days  Dispense: 10 tablet; Refill: 0  -     CULTURE, URINE    Frequent urination  -     POCT Urinalysis(Instrument)  -     CULTURE, URINE      Patient Instructions   You may begin using your pyridium for symptomatic relief.

## 2024-11-23 LAB — BACTERIA UR CULT: ABNORMAL

## 2024-11-25 ENCOUNTER — DOCUMENTATION ONLY (OUTPATIENT)
Dept: REHABILITATION | Facility: HOSPITAL | Age: 74
End: 2024-11-25
Payer: COMMERCIAL

## 2024-11-25 NOTE — PROGRESS NOTES
Health  Wellness Visit Note    Name: Jackelyn Kendrick  Clinic Number: [id number]  Physician: No ref. provider found  Diagnosis: No diagnosis found.  Past Medical History:   Diagnosis Date    Arthritis     Basal cell carcinoma     Bronchitis     seasonal    Cataract     Disorder of kidney and ureter     Diverticulitis     Dry eye syndrome     GERD (gastroesophageal reflux disease)     Hyperlipidemia     Hypertension     Hypothyroidism 07/19/2012    Obese     PONV (postoperative nausea and vomiting)     Thyroid disease      Visit Number: 37  Precautions: Standard, Previous LB Surgery, HTN, L RTC Repair, R Arthroscopic       1st PT visit:  05/16/2024  Year of care end date:  May 2025  Mindbody plan: 60--- 9 Months  Patient level: B    Time In: 9:00 AM  Time Out: 9:50 AM  Total Treatment Time: 50 Minutes    [employer] 2022  Handout on this week's wellness topic Sleep Tips and Conditions provided along with a discussion on what it means, the benefits, and suggestions for practice.  Reviewed last week's topic of Sleep Journal.     Subjective:   Patient reports she has some low back soreness from gardening over the weekend. She did her stretches and HEP to help subside her discomfort. Patient plans to do Wellness through January 12th and the transition to being independent at her gym. Patient does not ice at home.    Patient visited [facility]. She liked the cleanliness and options of group classes.     Objective:   Jackelyn completed therapeutic stretches (EIL, BOLIVAR) and the following MedX exercise machines: core lumbar, torso rotation l/r, leg extension, leg curl, upright row, chest press, biceps curl, triceps extension, leg press    See exercise log in patient folder for rate of exertion and repetitions completed.       Fitness Machine Education Key:  E=education on equipment initiated and further follow up and education needed  I=independent with  and exercise.  The  patient:  Adjusts machines to his/her settings  Uses equipment levers, pins, weights safely  Maintains safe and correct posture while exercising  Moves through exercise with correct pace and control  Gets on and off equipment safely      Lumbar/Cervical Ext. E Torso Rotation E Leg Press E   Leg Extension E Seated Leg Curl E Chest Press X   Seated Row E Hip ADD E Hip ABD E   Triceps Extension X Bicep Curl X Other:      [x] Indicates exercise has been taught for home  Lumbar/Cervical Ext. [] Torso Rotation [] Leg Press []   Leg Extension [] Seated Leg Curl [] Chest Press []   Seated Row [] Hip ADD [] Hip ABD []   Triceps Extension [] Bicep Curl [] Other:        Assessment:   Patient tolerated Patient tolerated MedX Core Lumbar Strength and all other peripheral exercises without an increase in symptoms. Patient warmed up on treadmill for 5 minutes, stretched, and iced low back for 5 minutes after the workout.     Plan:  Continue with established plan of care towards wellness goals.     Health  : Camille Maki  11/25/2024

## 2024-12-02 ENCOUNTER — DOCUMENTATION ONLY (OUTPATIENT)
Dept: REHABILITATION | Facility: HOSPITAL | Age: 74
End: 2024-12-02
Payer: COMMERCIAL

## 2024-12-02 NOTE — PROGRESS NOTES
Health  Wellness Visit Note    Name: Jackelyn Kendrick  Clinic Number: [id number]  Physician: No ref. provider found  Diagnosis: No diagnosis found.  Past Medical History:   Diagnosis Date    Arthritis     Basal cell carcinoma     Bronchitis     seasonal    Cataract     Disorder of kidney and ureter     Diverticulitis     Dry eye syndrome     GERD (gastroesophageal reflux disease)     Hyperlipidemia     Hypertension     Hypothyroidism 07/19/2012    Obese     PONV (postoperative nausea and vomiting)     Thyroid disease      Visit Number: [id number]  Precautions: Standard, Previous LB Surgery, HTN, L RTC Repair, R Arthroscopic       1st PT visit:  05/16/2024  Year of care end date:  May 2025  Mindbody plan: 60--- 9 Months  Patient level: B    Time In: 9:00 AM  Time Out: 9:58 AM  Total Treatment Time: 58 Minutes    [facility]  Handout on this week's wellness topic Stress provided along with a discussion on what it means, the benefits, and suggestions for practice.  Reviewed last week's topic of Sleep Tips and Conditions.     Subjective:   Patient reports no complaints of low back pain. She was very busy last week and the weekend with her sons, grandchildren and her son's dog. She did her stretches and got plenty of steps in. Patient plans to do Wellness through January 12th and the transition to being independent at her gym. Patient does not ice at home.    Patient visited [facility]. She liked the cleanliness and options of group classes.     Objective:   Jackelyn completed therapeutic stretches (EIL, BOLIVAR) and the following MedX exercise machines: core lumbar, torso rotation l/r, leg extension, leg curl, upright row, chest press, biceps curl, triceps extension, leg press    See exercise log in patient folder for rate of exertion and repetitions completed.       Fitness Machine Education Key:  E=education on equipment initiated and further follow up and education needed  I=independent with machine  set up and exercise.  The patient:  Adjusts machines to his/her settings  Uses equipment levers, pins, weights safely  Maintains safe and correct posture while exercising  Moves through exercise with correct pace and control  Gets on and off equipment safely      Lumbar/Cervical Ext. E Torso Rotation E Leg Press E   Leg Extension E Seated Leg Curl E Chest Press X   Seated Row E Hip ADD E Hip ABD E   Triceps Extension X Bicep Curl X Other:      [x] Indicates exercise has been taught for home  Lumbar/Cervical Ext. [] Torso Rotation [] Leg Press []   Leg Extension [] Seated Leg Curl [] Chest Press []   Seated Row [] Hip ADD [] Hip ABD []   Triceps Extension [] Bicep Curl [] Other:        Assessment:   Patient tolerated Patient tolerated MedX Core Lumbar Strength and all other peripheral exercises without an increase in symptoms. Patient warmed up on treadmill for 5 minutes, stretched, and iced low back for 5 minutes after the workout.     Plan:  Continue with established plan of care towards wellness goals.     Health  : Camille Maki  12/2/2024

## 2024-12-06 ENCOUNTER — OFFICE VISIT (OUTPATIENT)
Dept: PAIN MEDICINE | Facility: CLINIC | Age: 74
End: 2024-12-06
Payer: COMMERCIAL

## 2024-12-06 VITALS
DIASTOLIC BLOOD PRESSURE: 69 MMHG | SYSTOLIC BLOOD PRESSURE: 149 MMHG | HEIGHT: 60 IN | WEIGHT: 138.88 LBS | HEART RATE: 71 BPM | BODY MASS INDEX: 27.26 KG/M2

## 2024-12-06 DIAGNOSIS — M96.1 POST LAMINECTOMY SYNDROME: Primary | ICD-10-CM

## 2024-12-06 DIAGNOSIS — M54.16 LUMBAR RADICULOPATHY: ICD-10-CM

## 2024-12-06 DIAGNOSIS — M51.360 DEGENERATION OF INTERVERTEBRAL DISC OF LUMBAR REGION WITH DISCOGENIC BACK PAIN: ICD-10-CM

## 2024-12-06 DIAGNOSIS — M62.85 DYSFUNCTION OF THE MULTIFIDUS MUSCLE OF LUMBAR REGION: ICD-10-CM

## 2024-12-06 PROCEDURE — 99999 PR PBB SHADOW E&M-EST. PATIENT-LVL IV: CPT | Mod: PBBFAC,,, | Performed by: STUDENT IN AN ORGANIZED HEALTH CARE EDUCATION/TRAINING PROGRAM

## 2024-12-06 NOTE — PROGRESS NOTES
Chronic Pain - f/u    Referring Physician: No ref. provider found    Date: 12/06/2024     Re: Jackelyn Kendrick  MR#: [id number]  YOB: 1950  Age: 73 y.o.    Chief Complaint:   Chief Complaint   Patient presents with    Follow-up    Back Pain     **This note is dictated using the M*Modal Fluency Direct word recognition program. There are word recognition mistakes that are occasionally missed on review.**    ASSESSMENT: 73 y.o. year old female with back and leg pain, consistent with     1. Post laminectomy syndrome        2. Lumbar radiculopathy        3. Degeneration of intervertebral disc of lumbar region with discogenic back pain        4. Dysfunction of the multifidus muscle of lumbar region          PLAN:     Right lumbar radiculopathy and post-laminectomy syndrome - improved  4/29/24 - Right L3-4, L4-5 TFESI - RN sed - no AC - no improvement  -I would not recommend any additional epidurals given that they have not provided any noticeable benefit.  -stop gabapentin to 600mg qhs. This is causing excess sedation during the day.  She went back to 300mg in the morning and 300mg at night.  Rx provided.  -discussed SCS with [facility]. Information provided. She can send me a message if she wants to proceed with the stimulator  -healthy back referral - she completed this.  She states that they are working her out good.  She is working with the healthy back .  She thinks this has helped the strength in her back.  -vertiflex may be an option, but I would be concerned that it might make her back pain worse while helping the leg pain due to the severity of her disc disease.  -stopped Lyrica 50mg BID. This made her too sleepy so she stopped but she was taking this with trazodone.    -discussed stopping the trazodone at night and taking just gabapentin or just Lyrica to see if that helps with pain and sleep.  -another long time discussing options between surgery referral, stimulator trial, or  vertiflex.  I do not recommend vertiflex given her prior L4-5 hemilaminectomy and I do not think that it would provide enough force to open the compressed side.  -For her leg pain the options are: 1) referral to surgery. 2) try a spinal cord stimulator. 3) tweak medications and live with it  -For her back pain the options are 1) ViaDisc. 2) SCS 3) tweak medications and live with it    Lumbar spondylosis  -failed 2 RFAs in the past    DDD  -patient has substantial DDD at multiple levels.  Could consider Viadisc for disc regeneration. This would be for back pain.    - RTC 6 months  - Counseled patient regarding the importance of activity modification and physical therapy.    The above plan and management options were discussed at length with patient. Patient is in agreement with the above and verbalized understanding. It will be communicated with the referring physician via electronic record, fax, or mail.  Lab/study reports reviewed were important and necessary because subsequent medical and treatment recommendations required review of the above lab/study reports. Images viewed/reviewed above were important and necessary because subsequent medical and treatment recommendations required review of the reviewed image(s).     Electronically signed by:  Mick Pineda DO  12/06/2024    =========================================================================================================    SUBJECTIVE:    Interval History 12/6/2024:   Jackelyn Kendrick is a 73 y.o. female presents to the clinic for follow up.  Since last visit the pain has has significantly improved.  Patient states that she is doing better.  Since healthy back this has helped a lot.  She thinks her back is stronger.  She is finishing with healthy back this month and continuing at her gym    The pain is located in the lower back  area and does not radiate.  The pain is described as  not hurting      At BEST  1/10   At WORST  6/10 on the WORST  day.    On average pain is rated as 4/10.   Today the pain is rated as 1/10  Symptoms interfere with daily activity.   Exacerbating factors: Walking.    Mitigating factors physical therapy.     Current pain medications: gabapentin 300mg BID, tylenol, Trazodone 100mg qhs  Failed Pain Medications: celebrex (due to CKD3), Lyrica 50mg (sedation), gabapentin (sedation)    Pain procedures:   Pain injections (epidurals and nerve ablations) - [id number]  Nerve ablations - not helpful x2  4/29/24 - Right L3-4, L4-5 TFESI - RN sed - no AC - 0%    Interval History 9/6/2024:   Jackelyn Kendrick is a 73 y.o. female presents to the clinic for follow up.  Since last visit the pain has is unchanged.    The pain is located in the right lower back area and does not radiate.  The pain is described as aching    At BEST  5/10   At WORST  5/10 on the WORST day.    On average pain is rated as 5/10.   Today the pain is rated as 5/10  Symptoms interfere with daily activity and sleeping.   Exacerbating factors:  Standing, and Bending.    Mitigating factors heat, ice, and medications.     Interval History 5/30/2024:   Jackelyn Kendrick is a 73 y.o. female presents to the clinic for follow up.  Since last visit the pain has has slightly improved.  The pain is equal back and right leg.    The pain is located in the lower back area and radiates to the right hip, knee .  The pain is described as aching    At BEST  3/10   At WORST  8/10 on the WORST day.    On average pain is rated as 4/10.   Today the pain is rated as 5/10  Symptoms interfere with daily activity.   Exacerbating factors: Sitting, Standing, Laying, and Walking.    Mitigating factors rest.     Initial hx:  Jackelyn Kendrick is a 73 y.o. female presents to the clinic for the evaluation of lower back pain. The pain started years ago following no inciting event and symptoms have been worsening.  The patient has a hx of right L4-5 hemilaminectomy for sciatic pain in the 90s. Pain now  is different.  This pain has been ongoing for a number of years.  It would come and go.  She used to see a pain management doctor and would get some injections in the back which would help.  The pain is located in the right buttocks and travels down the right lateral thigh to the side of the knee.  It does not travel down the knee.  If she is standing or doing household chores then she gets an achy back pain.  When she first stands up it is painful, but then it works itself out.      She saw spine surgery and was started on gabapentin.  She can walk a little further (about 4 blocks) before she has to stop.     Pain Description:    The pain is located in the lower back area and radiates to the right leg .    At BEST  3/10   At WORST  8/10 on the WORST day.    On average pain is rated as 4/10.   Today the pain is rated as 4/10  The pain is continuous.  The pain is described as aching    Symptoms interfere with daily activity.   Exacerbating factors: Sitting, Standing, Walking, and Getting out of bed/chair.    Mitigating factors heat and medications.   She reports 7 hours of sleep per night.    Physical Therapy/Home Exercise: No, not currently in physical therapy or home exercise program    Current Pain Medications:    - gabapentin 300mg qhs, tylenol    Failed Pain Medications:    - celebrex (due to CKD3)    Pain Treatment Therapies:    Pain procedures:   Pain injections (epidurals and nerve ablations) - [id number]  Nerve ablations - not helpful x2  Physical Therapy: none  Chiropractor: none  Acupuncture: none  TENS unit: none  Spinal decompression:   Right L4-5 Hemilaminectomy x2 (1990s)  Joint replacement: none    Patient denies urinary incontinence, bowel incontinence, significant motor weakness, and loss of sensations.  Patient denies any suicidal or homicidal ideations     report:  Reviewed and consistent with medication use as prescribed.    Imaging:   MRI Lumbar 03/2024:  FINDINGS:  Alignment: Mild  levocurvature of the lumbar spine.  Minimal retrolisthesis L4 on L5.     Vertebrae: Postsurgical changes of right L4-L5 hemilaminectomy.  No acute fracture or marrow infiltrative process.     Discs: Multilevel disc height loss most notable at at L3-L4 and L4-L5.     Cord: Normal.  Conus terminates at L1     Degenerative findings:     T11-T12: Posterior disc bulge.  No significant spinal canal stenosis or neural foraminal narrowing.     T12-L1: No significant spinal canal stenosis or neural foraminal narrowing.     L1-L2: Mild diffuse posterior disc bulge and bilateral facet arthropathy.  No significant spinal canal stenosis or neural foraminal narrowing.     L2-L3: Diffuse posterior disc bulge, bilateral facet arthropathy and ligament flavum buckling.  Findings result in mild spinal canal stenosis, mild right and moderate left neural foraminal narrowing.     L3-L4: Diffuse posterior disc bulge with superimposed central disc protrusion, bilateral facet arthropathy and ligament flavum buckling.  Findings result in mild spinal canal stenosis and mild right neural foraminal narrowing.     L4-L5: Right L4 hemilaminectomy.  Diffuse posterior disc bulge, facet arthropathy and left ligamentum flavum buckling.  Findings result in severe right and moderate left neural foraminal narrowing.     L5-S1: Left eccentric disc bulge.  Findings result in mild right and moderate left neural foraminal narrowing.     Paraspinal muscles & soft tissues: Unremarkable.     Impression:     Lumbar spondylosis most notable at L2-L3 and L3-L4 with mild spinal canal stenosis mild/moderate neural foraminal narrowing.  Severe right and moderate left neural foraminal narrowing at L4-L5.        12/6/2024     9:12 AM 9/6/2024     9:14 AM 5/30/2024     2:35 PM 5/30/2024     2:33 PM 4/15/2024     8:38 AM   Pain Disability Index (PDI)   Family/Home Responsibilities: 1 5 5 5 4   Recreation: 1 5 5 5 4   Occupation: 1 5 5 5 4   Sexual Behavior: 1 5 5 5 4    Self Care: 1 5 5 5 4   Life-Support Activities: 1 5 5 5 4   Pain Disability Index (PDI) 7 35 35 35 28        Past Medical History:   Diagnosis Date    Arthritis     Basal cell carcinoma     Bronchitis     seasonal    Cataract     Disorder of kidney and ureter     Diverticulitis     Dry eye syndrome     GERD (gastroesophageal reflux disease)     Hyperlipidemia     Hypertension     Hypothyroidism 07/19/2012    Obese     PONV (postoperative nausea and vomiting)     Thyroid disease      Past Surgical History:   Procedure Laterality Date    ANORECTAL MANOMETRY N/A 06/01/2023    Procedure: MANOMETRY, ANORECTAL;  Surgeon: Paulo Pritchett MD;  Location: [facility] (4TH FLR);  Service: Endoscopy;  Laterality: N/A;  instructions sent to [id number]  5/26 pre-call no answer; MB    ARTHROSCOPIC REPAIR OF ROTATOR CUFF OF SHOULDER Right 09/23/2020    Procedure: REPAIR, ROTATOR CUFF, ARTHROSCOPIC;  Surgeon: Reginald Pickens MD;  Location: [facility] OR;  Service: Orthopedics;  Laterality: Right;  regional w/catheter (interscalene)    BACK SURGERY      CARPAL TUNNEL RELEASE Left 01/11/2024    Procedure: RELEASE, CARPAL TUNNEL;  Surgeon: Suzy Dominguez MD;  Location: [facility] OR;  Service: Orthopedics;  Laterality: Left;    CATARACT EXTRACTION W/  INTRAOCULAR LENS IMPLANT Left 10/20/2021        CHOLECYSTECTOMY      COLONOSCOPY  2007    diverticulosis    COLONOSCOPY N/A 09/03/2020    Procedure: COLONOSCOPY;  Surgeon: Alberta Henriquez MD;  Location: [facility] ENDO (4TH FLR);  Service: Colon and Rectal;  Laterality: N/A;  pt requested this time-8/31-covid-[facility] [place]    CYSTOSCOPY      DE QUERVAIN'S RELEASE Left 03/2017    DECOMPRESSION OF NERVE Left 01/11/2024    Procedure: DECOMPRESSION, NERVE ULNAR;  Surgeon: Suzy Dominguez MD;  Location: [facility] OR;  Service: Orthopedics;  Laterality: Left;    DUPUYTREN CONTRACTURE RELEASE Left 01/11/2024    Procedure: RELEASE, DUPUYTREN CONTRACTURE, PALM;  Surgeon: Suzy Dominguez  MD;  Location: [facility] OR;  Service: Orthopedics;  Laterality: Left;    EPIDURAL STEROID INJECTION INTO LUMBAR SPINE Right 4/29/2024    Procedure: RT L3-4 L4-5 TFESI;  Surgeon: Mick Pineda DO;  Location: [facility];  Service: Pain Management;  Laterality: Right;  20 mins    FIXATION OF TENDON Right 09/23/2020    Procedure: FIXATION, TENDON;  Surgeon: Reginald Pickens MD;  Location: [facility] OR;  Service: Orthopedics;  Laterality: Right;    HERNIA REPAIR      HYSTERECTOMY      INJECTION OF STEROID Right 12/02/2021    Procedure: INJECTION, STEROID;  Surgeon: Suzy Dominguez MD;  Location: [facility] OR;  Service: Orthopedics;  Laterality: Right;    INJECTION OF STEROID Right 01/11/2024    Procedure: INJECTION, STEROID 1ST DORSAL COMPARTMENT;  Surgeon: Suzy Dominguez MD;  Location: [facility] OR;  Service: Orthopedics;  Laterality: Right;    INTRAOCULAR PROSTHESES INSERTION Left 10/20/2021    Procedure: INSERTION, IOL PROSTHESIS;  Surgeon: Pippa Ashby MD;  Location: [facility] OR 1ST FLR;  Service: Ophthalmology;  Laterality: Left;    INTRAOCULAR PROSTHESES INSERTION Right 12/29/2021    Procedure: INSERTION, IOL PROSTHESIS;  Surgeon: Pippa Ashby MD;  Location: [facility] OR 1ST FLR;  Service: Ophthalmology;  Laterality: Right;    NASAL SEPTUM SURGERY      PHACOEMULSIFICATION OF CATARACT Left 10/20/2021    Procedure: PHACOEMULSIFICATION, CATARACT/ COMPLEX- PHACO / IOL - OS SMALL PUPIL-OLE RING AND TRYPAN BLUE;  Surgeon: Pippa Ashby MD;  Location: [facility] OR 1ST FLR;  Service: Ophthalmology;  Laterality: Left;    PHACOEMULSIFICATION OF CATARACT Right 12/29/2021    Procedure: PHACOEMULSIFICATION, CATARACT;  Surgeon: Pippa Ashby MD;  Location: [facility] OR 1ST FLR;  Service: Ophthalmology;  Laterality: Right;    SHOULDER SURGERY      TONSILLECTOMY      TRIGGER FINGER RELEASE Left 12/02/2021    Procedure: RELEASE, TRIGGER FINGER;  Surgeon: Suzy Dominguez MD;  Location: [facility] OR;  Service:  Orthopedics;  Laterality: Left;     Social History     Socioeconomic History    Marital status:    Tobacco Use    Smoking status: Never     Passive exposure: Never    Smokeless tobacco: Never   Substance and Sexual Activity    Alcohol use: No     Comment: rare on a special occasion    Drug use: No    Sexual activity: Never   Social History Narrative    , 3 children, nonsmoker ,     Social Drivers of Health     Financial Resource Strain: Low Risk  (4/22/2024)    Overall Financial Resource Strain (CARDIA)     Difficulty of Paying Living Expenses: Not hard at all   Food Insecurity: No Food Insecurity (4/22/2024)    Hunger Vital Sign     Worried About Running Out of Food in the Last Year: Never true     Ran Out of Food in the Last Year: Never true   Transportation Needs: No Transportation Needs (4/22/2024)    PRAPARE - Transportation     Lack of Transportation (Medical): No     Lack of Transportation (Non-Medical): No   Physical Activity: Inactive (4/22/2024)    Exercise Vital Sign     Days of Exercise per Week: 0 days     Minutes of Exercise per Session: 0 min   Stress: No Stress Concern Present (4/22/2024)    [employer] - Occupational Stress Questionnaire     Feeling of Stress : Not at all   Housing Stability: Low Risk  (4/22/2024)    Housing Stability Vital Sign     Unable to Pay for Housing in the Last Year: No     Homeless in the Last Year: No     Family History   Problem Relation Name Age of Onset    Cataracts Mother      Breast cancer Mother      Diabetes Mother      Hypertension Mother      Heart failure Mother      Heart disease Mother      Cataracts Father      Hypertension Father      Stroke Father      No Known Problems Daughter      No Known Problems Son      Diabetes Paternal Grandmother      Hyperlipidemia Sister x1     Arthritis Sister x1     Hyperlipidemia Brother x5     Arthritis Brother x5     No Known Problems Son      Amblyopia Neg Hx      Blindness Neg Hx       Glaucoma Neg Hx      Macular degeneration Neg Hx      Retinal detachment Neg Hx      Strabismus Neg Hx      Ovarian cancer Neg Hx      Melanoma Neg Hx      Celiac disease Neg Hx      Colon cancer Neg Hx      Colon polyps Neg Hx      Esophageal cancer Neg Hx      Liver cancer Neg Hx      Liver disease Neg Hx      Rectal cancer Neg Hx      Stomach cancer Neg Hx         Review of patient's allergies indicates:   Allergen Reactions    Levaquin [levofloxacin] Swelling and Edema    Erythromycin (bulk) Nausea And Vomiting     Not true allergy       Current Outpatient Medications   Medication Sig    albuterol (PROVENTIL/VENTOLIN HFA) 90 mcg/actuation inhaler Inhale 2 puffs into the lungs every 6 (six) hours as needed for Wheezing.    AREXVY, PF, 120 mcg/0.5 mL SusR vaccine     cranberry fruit extract (CRANBERRY EXTRACT ORAL) Take by mouth.    diclofenac sodium (VOLTAREN) 1 % Gel Apply 2 g topically 2 (two) times daily as needed (musculoskeletal pain).    dicyclomine (BENTYL) 20 mg tablet TAKE ONE TABLET BY MOUTH FOUR TIMES A DAY BEFORE MEALS AND NIGHTLY    docusate sodium (COLACE) 100 MG capsule Take 1 capsule (100 mg total) by mouth 2 (two) times daily.    fluocinonide (LIDEX) 0.05 % external solution Apply topically once daily.    gabapentin (NEURONTIN) 300 MG capsule Take 1 capsule (300 mg total) by mouth 2 (two) times daily.    ketoconazole (NIZORAL) 2 % shampoo Apply topically twice a week.    levothyroxine (SYNTHROID) 75 MCG tablet TAKE ONE TABLET BY MOUTH EVERY DAY ON AN EMPTY STOMACH    LIDOcaine-prilocaine (EMLA) cream Apply topically 2 (two) times daily as needed. To hands.    losartan (COZAAR) 100 MG tablet TAKE ONE TABLET BY MOUTH EVERY DAY    MAGNESIUM ORAL Take 1 tablet by mouth once daily.    memantine (NAMENDA) 5 MG Tab TAKE ONE TABLET BY MOUTH TWICE A DAY    Multi-Vitamin tablet Take 1 tablet by mouth once daily.     omeprazole (PRILOSEC) 40 MG capsule TAKE ONE CAPSULE BY MOUTH EVERY DAY    ondansetron  (ZOFRAN) 8 MG tablet Take 1 tablet (8 mg total) by mouth every 8 (eight) hours as needed for Nausea.    oxybutynin (DITROPAN-XL) 10 MG 24 hr tablet TAKE ONE TABLET BY MOUTH EVERY DAY    PSYLLIUM SEED, WITH SUGAR, (METAMUCIL ORAL) Take by mouth as needed.     RESTASIS 0.05 % ophthalmic emulsion INSTILL ONE DROP INTO BOTH EYES TWO TIMES A DAY    rosuvastatin (CRESTOR) 40 MG Tab TAKE ONE TABLET BY MOUTH EVERY DAY    traZODone (DESYREL) 100 MG tablet Take 1 tablet (100 mg total) by mouth nightly as needed for Insomnia.    traZODone (DESYREL) 50 MG tablet Take 100 mg by mouth nightly as needed.    triamcinolone acetonide 0.1% (KENALOG) 0.1 % cream AAA bid     No current facility-administered medications for this visit.     REVIEW OF SYSTEMS:    GENERAL:  No weight loss, malaise or fevers.  HEENT:   No recent changes in vision or hearing  NECK:  Negative for lumps, no difficulty with swallowing.  RESPIRATORY:  Negative for cough, wheezing or shortness of breath, patient denies any recent URI.  CARDIOVASCULAR:  Negative for chest pain, leg swelling or palpitations.  GI:  Negative for abdominal discomfort, blood in stools or black stools or change in bowel habits.  MUSCULOSKELETAL:  See HPI.  SKIN:  Negative for lesions, rash, and itching.  PSYCH:  No mood disorder or recent psychosocial stressors.  Patients sleep is not disturbed secondary to pain.  HEMATOLOGY/LYMPHOLOGY:  Negative for prolonged bleeding, bruising easily or swollen nodes.  Patient is not currently taking any anti-coagulants  NEURO:   No history of headaches, syncope, paralysis, seizures or tremors.  All other reviewed and negative other than HPI.    OBJECTIVE:    BP (!) 149/69 (BP Location: Right arm, Patient Position: Sitting)   Pulse 71   Ht 5' (1.524 m)   Wt 63 kg (138 lb 14.2 oz)   BMI 27.13 kg/m²     PHYSICAL EXAMINATION:    GENERAL: Well appearing, in no acute distress, alert and oriented x3.  PSYCH:  Mood and affect appropriate.  SKIN: Skin  color, texture, turgor normal, no rashes or lesions.  HEAD/FACE:  Normocephalic, atraumatic. Cranial nerves grossly intact.  CV: RRR with palpation of the radial artery.  PULM: CTAB. No evidence of respiratory difficulty, symmetric chest rise.  GI:  Soft and non-tender.    BACK:   - No obvious deformity or signs of trauma, Normal lumbar lordotic curve  - Negative spinous process tenderness  - Positive paravertebral tenderness  - Negative pain to palpation over the facet joints of the lumbar spine.   - Negative QL / Iliac crest / Glut tenderness  - Slump test is Negative for radicular pain  - Slump test is Negative for back pain  - Supine Straight leg raising is Negative for radicular pain  - Supine Straight leg raising is Negative for back pain  - Lumbar ROM is normal in Flexion without pain  - Lumbar ROM is normal in Extension without pain  - Lumbar ROM is normal in Lateral Flexion without pain  - Positive Sustained Hip Flexion test (for discogenic pain)  - Negative Altered Gait, Posture  - Axial facet loading test Negative on the bilateral side(s)    SI Joint exam:  - Negative SI joint tenderness to palpation  - Hardik's sign Positive  - Yeoman's Test: Did not perform for SI joint pain indicating anterior SI ligament involvement. Did not perform for anterior thigh pain/paresthesia which indicates femoral nerve stretch.  - Gaenslen's Test:Negative  - Finger Geraldine's Sign:Negative  - SI compression test:Did not perform  - SI distraction test:Negative  - Thigh Thrust: Negative  - SI Thrust: Did not perform    MUSKULOSKELETAL:    EXTREMITIES:   Hip Exam:  - Log Roll Negative  - FADIR Negative  - St[facility]Critical access hospitalfield Did not perform  - Hip Scour Negative  - GTB Tenderness Negative    MUSCULOSKELETAL:  No atrophy or tone abnormalities are noted in the UE or LE.  No deformities, edema, or skin discoloration are noted on visible skin. Good capillary refill.    NEURO: Bilateral upper and lower extremity coordination and muscle  stretch reflexes are physiologic and symmetric.      NEUROLOGICAL EXAM:  MENTAL STATUS: A x O x 3, good concentration, speech is fluent and goal directed  MEMORY: recent and remote are intact  CN: CN2-12 grossly intact  MOTOR: 5/5 in all muscle groups  DTRs: 2+ intact symmetric  Sensation:    -no Loss of sensation in a left lower and right lower L-1, L-2, L-3, L-4, and L-5 bilaterally distribution.  Sterling: absent on the bilateral side(s)  Clonus: absent on the bilateral side(s)    GAIT: normal.

## 2024-12-09 ENCOUNTER — OFFICE VISIT (OUTPATIENT)
Dept: PRIMARY CARE CLINIC | Facility: CLINIC | Age: 74
End: 2024-12-09
Payer: MEDICARE

## 2024-12-09 ENCOUNTER — LAB VISIT (OUTPATIENT)
Dept: LAB | Facility: HOSPITAL | Age: 74
End: 2024-12-09
Attending: FAMILY MEDICINE
Payer: COMMERCIAL

## 2024-12-09 VITALS
HEIGHT: 60 IN | BODY MASS INDEX: 26.35 KG/M2 | WEIGHT: 134.25 LBS | DIASTOLIC BLOOD PRESSURE: 80 MMHG | HEART RATE: 67 BPM | SYSTOLIC BLOOD PRESSURE: 128 MMHG | OXYGEN SATURATION: 99 % | RESPIRATION RATE: 18 BRPM

## 2024-12-09 DIAGNOSIS — E78.2 MIXED HYPERLIPIDEMIA: ICD-10-CM

## 2024-12-09 DIAGNOSIS — I10 ESSENTIAL HYPERTENSION: ICD-10-CM

## 2024-12-09 DIAGNOSIS — I87.2 VENOUS INSUFFICIENCY OF BOTH LOWER EXTREMITIES: ICD-10-CM

## 2024-12-09 DIAGNOSIS — K58.2 IRRITABLE BOWEL SYNDROME WITH BOTH CONSTIPATION AND DIARRHEA: ICD-10-CM

## 2024-12-09 DIAGNOSIS — E03.9 ACQUIRED HYPOTHYROIDISM: ICD-10-CM

## 2024-12-09 DIAGNOSIS — I70.0 ATHEROSCLEROSIS OF AORTA: ICD-10-CM

## 2024-12-09 DIAGNOSIS — K21.9 GASTROESOPHAGEAL REFLUX DISEASE, UNSPECIFIED WHETHER ESOPHAGITIS PRESENT: ICD-10-CM

## 2024-12-09 DIAGNOSIS — Z00.00 GENERAL MEDICAL EXAM: ICD-10-CM

## 2024-12-09 DIAGNOSIS — Z00.00 GENERAL MEDICAL EXAM: Primary | ICD-10-CM

## 2024-12-09 LAB
ALBUMIN SERPL BCP-MCNC: 3.8 G/DL (ref 3.5–5.2)
ALP SERPL-CCNC: 40 U/L (ref 40–150)
ALT SERPL W/O P-5'-P-CCNC: 62 U/L (ref 10–44)
ANION GAP SERPL CALC-SCNC: 10 MMOL/L (ref 8–16)
AST SERPL-CCNC: 59 U/L (ref 10–40)
BILIRUB SERPL-MCNC: 0.3 MG/DL (ref 0.1–1)
BUN SERPL-MCNC: 20 MG/DL (ref 8–23)
CALCIUM SERPL-MCNC: 9.2 MG/DL (ref 8.7–10.5)
CHLORIDE SERPL-SCNC: 107 MMOL/L (ref 95–110)
CHOLEST SERPL-MCNC: 159 MG/DL (ref 120–199)
CHOLEST/HDLC SERPL: 2.5 {RATIO} (ref 2–5)
CO2 SERPL-SCNC: 26 MMOL/L (ref 23–29)
CREAT SERPL-MCNC: 0.9 MG/DL (ref 0.5–1.4)
ERYTHROCYTE [DISTWIDTH] IN BLOOD BY AUTOMATED COUNT: 12.8 % (ref 11.5–14.5)
EST. GFR  (NO RACE VARIABLE): >60 ML/MIN/1.73 M^2
ESTIMATED AVG GLUCOSE: 114 MG/DL (ref 68–131)
GLUCOSE SERPL-MCNC: 91 MG/DL (ref 70–110)
HBA1C MFR BLD: 5.6 % (ref 4–5.6)
HCT VFR BLD AUTO: 37.4 % (ref 37–48.5)
HDLC SERPL-MCNC: 64 MG/DL (ref 40–75)
HDLC SERPL: 40.3 % (ref 20–50)
HGB BLD-MCNC: 11.4 G/DL (ref 12–16)
LDLC SERPL CALC-MCNC: 78.8 MG/DL (ref 63–159)
MCH RBC QN AUTO: 29.8 PG (ref 27–31)
MCHC RBC AUTO-ENTMCNC: 30.5 G/DL (ref 32–36)
MCV RBC AUTO: 98 FL (ref 82–98)
NONHDLC SERPL-MCNC: 95 MG/DL
PLATELET # BLD AUTO: 204 K/UL (ref 150–450)
PMV BLD AUTO: 11.2 FL (ref 9.2–12.9)
POTASSIUM SERPL-SCNC: 4 MMOL/L (ref 3.5–5.1)
PROT SERPL-MCNC: 6.9 G/DL (ref 6–8.4)
RBC # BLD AUTO: 3.83 M/UL (ref 4–5.4)
SODIUM SERPL-SCNC: 143 MMOL/L (ref 136–145)
TRIGL SERPL-MCNC: 81 MG/DL (ref 30–150)
TSH SERPL DL<=0.005 MIU/L-ACNC: 1.35 UIU/ML (ref 0.4–4)
WBC # BLD AUTO: 5.67 K/UL (ref 3.9–12.7)

## 2024-12-09 PROCEDURE — 99397 PER PM REEVAL EST PAT 65+ YR: CPT | Mod: S$PBB,,, | Performed by: FAMILY MEDICINE

## 2024-12-09 PROCEDURE — 84443 ASSAY THYROID STIM HORMONE: CPT | Performed by: FAMILY MEDICINE

## 2024-12-09 PROCEDURE — 80053 COMPREHEN METABOLIC PANEL: CPT | Performed by: FAMILY MEDICINE

## 2024-12-09 PROCEDURE — 85027 COMPLETE CBC AUTOMATED: CPT | Performed by: FAMILY MEDICINE

## 2024-12-09 PROCEDURE — 80061 LIPID PANEL: CPT | Performed by: FAMILY MEDICINE

## 2024-12-09 PROCEDURE — 99999 PR PBB SHADOW E&M-EST. PATIENT-LVL V: CPT | Mod: PBBFAC,,, | Performed by: FAMILY MEDICINE

## 2024-12-09 PROCEDURE — 83036 HEMOGLOBIN GLYCOSYLATED A1C: CPT | Performed by: FAMILY MEDICINE

## 2024-12-09 RX ORDER — PREGABALIN 50 MG/1
CAPSULE ORAL
COMMUNITY
Start: 2024-11-01 | End: 2024-12-09

## 2024-12-09 RX ORDER — DICYCLOMINE HYDROCHLORIDE 20 MG/1
20 TABLET ORAL
Qty: 120 TABLET | Refills: 11 | Status: SHIPPED | OUTPATIENT
Start: 2024-12-09

## 2024-12-09 RX ORDER — LEVOTHYROXINE SODIUM 75 UG/1
75 TABLET ORAL
Qty: 90 TABLET | Refills: 3 | Status: SHIPPED | OUTPATIENT
Start: 2024-12-09

## 2024-12-09 RX ORDER — AMOXICILLIN AND CLAVULANATE POTASSIUM 875; 125 MG/1; MG/1
1 TABLET, FILM COATED ORAL EVERY 12 HOURS
Qty: 20 TABLET | Refills: 0 | Status: SHIPPED | OUTPATIENT
Start: 2024-12-09

## 2024-12-09 RX ORDER — MEMANTINE HYDROCHLORIDE 5 MG/1
5 TABLET ORAL 2 TIMES DAILY
Qty: 180 TABLET | Refills: 3 | Status: SHIPPED | OUTPATIENT
Start: 2024-12-09

## 2024-12-09 RX ORDER — LOSARTAN POTASSIUM 100 MG/1
100 TABLET ORAL DAILY
Qty: 90 TABLET | Refills: 3 | Status: SHIPPED | OUTPATIENT
Start: 2024-12-09

## 2024-12-09 RX ORDER — ROSUVASTATIN CALCIUM 40 MG/1
40 TABLET, COATED ORAL NIGHTLY
Qty: 90 TABLET | Refills: 3 | Status: SHIPPED | OUTPATIENT
Start: 2024-12-09

## 2024-12-09 RX ORDER — OMEPRAZOLE 40 MG/1
40 CAPSULE, DELAYED RELEASE ORAL EVERY MORNING
Qty: 90 CAPSULE | Refills: 3 | Status: SHIPPED | OUTPATIENT
Start: 2024-12-09

## 2024-12-09 RX ORDER — TRAZODONE HYDROCHLORIDE 50 MG/1
50 TABLET ORAL NIGHTLY PRN
Qty: 90 TABLET | Refills: 3 | Status: SHIPPED | OUTPATIENT
Start: 2024-12-09

## 2024-12-09 RX ORDER — OXYBUTYNIN CHLORIDE 10 MG/1
10 TABLET, EXTENDED RELEASE ORAL DAILY
Qty: 90 TABLET | Refills: 3 | Status: SHIPPED | OUTPATIENT
Start: 2024-12-09

## 2024-12-12 ENCOUNTER — OFFICE VISIT (OUTPATIENT)
Dept: UROLOGY | Facility: CLINIC | Age: 74
End: 2024-12-12
Payer: COMMERCIAL

## 2024-12-12 ENCOUNTER — DOCUMENTATION ONLY (OUTPATIENT)
Dept: REHABILITATION | Facility: HOSPITAL | Age: 74
End: 2024-12-12
Payer: COMMERCIAL

## 2024-12-12 VITALS
DIASTOLIC BLOOD PRESSURE: 67 MMHG | WEIGHT: 137.13 LBS | HEART RATE: 67 BPM | SYSTOLIC BLOOD PRESSURE: 143 MMHG | BODY MASS INDEX: 26.78 KG/M2

## 2024-12-12 DIAGNOSIS — N39.0 RECURRENT UTI: Primary | ICD-10-CM

## 2024-12-12 PROCEDURE — 4010F ACE/ARB THERAPY RXD/TAKEN: CPT | Mod: CPTII,S$GLB,,

## 2024-12-12 PROCEDURE — 1101F PT FALLS ASSESS-DOCD LE1/YR: CPT | Mod: CPTII,S$GLB,,

## 2024-12-12 PROCEDURE — 3044F HG A1C LEVEL LT 7.0%: CPT | Mod: CPTII,S$GLB,,

## 2024-12-12 PROCEDURE — 3288F FALL RISK ASSESSMENT DOCD: CPT | Mod: CPTII,S$GLB,,

## 2024-12-12 PROCEDURE — 1160F RVW MEDS BY RX/DR IN RCRD: CPT | Mod: CPTII,S$GLB,,

## 2024-12-12 PROCEDURE — 1126F AMNT PAIN NOTED NONE PRSNT: CPT | Mod: CPTII,S$GLB,,

## 2024-12-12 PROCEDURE — 99999 PR PBB SHADOW E&M-EST. PATIENT-LVL V: CPT | Mod: PBBFAC,,,

## 2024-12-12 PROCEDURE — 1159F MED LIST DOCD IN RCRD: CPT | Mod: CPTII,S$GLB,,

## 2024-12-12 PROCEDURE — 3078F DIAST BP <80 MM HG: CPT | Mod: CPTII,S$GLB,,

## 2024-12-12 PROCEDURE — 3008F BODY MASS INDEX DOCD: CPT | Mod: CPTII,S$GLB,,

## 2024-12-12 PROCEDURE — 3077F SYST BP >= 140 MM HG: CPT | Mod: CPTII,S$GLB,,

## 2024-12-12 PROCEDURE — 99214 OFFICE O/P EST MOD 30 MIN: CPT | Mod: S$GLB,,,

## 2024-12-12 RX ORDER — ESTRADIOL 0.1 MG/G
1 CREAM VAGINAL
Qty: 42.5 G | Refills: 0 | Status: SHIPPED | OUTPATIENT
Start: 2024-12-13 | End: 2025-03-22

## 2024-12-12 NOTE — PROGRESS NOTES
Health  Wellness Visit Note    Name: Jackelyn Kendrick  Clinic Number: [id number]  Physician: No ref. provider found  Diagnosis: No diagnosis found.  Past Medical History:   Diagnosis Date    Arthritis     Basal cell carcinoma     Bronchitis     seasonal    Cataract     Disorder of kidney and ureter     Diverticulitis     Dry eye syndrome     GERD (gastroesophageal reflux disease)     Hyperlipidemia     Hypertension     Hypothyroidism 07/19/2012    Obese     PONV (postoperative nausea and vomiting)     Thyroid disease      Visit Number: [id number]  Precautions: Standard, Previous LB Surgery, HTN, L RTC Repair, R Arthroscopic       1st PT visit:  05/16/2024  Year of care end date:  May 2025  Mindbody plan: 60--- 9 Months  Patient level: B    Time In: 2:30 PM  Time Out: 3:22 PM  Total Treatment Time: 52 Minutes    [employer] 2022  Handout on this week's wellness topic Coping provided along with a discussion on what it means, the benefits, and suggestions for practice.  Reviewed last week's topic of Stress.     Subjective:   Patient reports she is impressed on how well her back has been doing. She saw her pain management MD and he recommended she keep working out. She has been busy with a lot of holiday parties and festivities. Patient plans to do Wellness through January 12th and the transition to being independent at her gym. Patient does not ice at home.    Patient visited [facility]. She liked the cleanliness and options of group classes.     Objective:   Jackelyn completed therapeutic stretches (EIL, BOLIVAR) and the following MedX exercise machines: core lumbar, torso rotation l/r, leg extension, leg curl, upright row, chest press, biceps curl, triceps extension, leg press    See exercise log in patient folder for rate of exertion and repetitions completed.       Fitness Machine Education Key:  E=education on equipment initiated and further follow up and education needed  I=independent with machine set  up and exercise.  The patient:  Adjusts machines to his/her settings  Uses equipment levers, pins, weights safely  Maintains safe and correct posture while exercising  Moves through exercise with correct pace and control  Gets on and off equipment safely      Lumbar/Cervical Ext. E Torso Rotation E Leg Press E   Leg Extension E Seated Leg Curl E Chest Press X   Seated Row E Hip ADD E Hip ABD E   Triceps Extension X Bicep Curl X Other:      [x] Indicates exercise has been taught for home  Lumbar/Cervical Ext. [] Torso Rotation [] Leg Press []   Leg Extension [] Seated Leg Curl [] Chest Press []   Seated Row [] Hip ADD [] Hip ABD []   Triceps Extension [] Bicep Curl [] Other:        Assessment:   Patient tolerated Patient tolerated MedX Core Lumbar Strength and all other peripheral exercises without an increase in symptoms. Patient warmed up on treadmill for 5 minutes, stretched, and iced low back for 5 minutes after the workout.     Plan:  Continue with established plan of care towards wellness goals.     Health  : Camille Maki  12/12/2024

## 2024-12-12 NOTE — PROGRESS NOTES
[facility]      Recurrent Urinary Tract Infection Evaluation Note    12/12/2024      HPI: Jackelyn Kendrick is a very pleasant 73 y.o. female who is an established patient of Ashley Hoskins NP seen today for continued evaluation of recurrent urinary tract infections (Greg). She reports that frequent infections began several years ago. Symptoms suggestive that these episodes are associated with urinary tract infection include: dysuria, suprapubic pain, frequency, and urgency. These symptomatic episodes have been confirmed as UTI with urine cultures that are available for review. The recurrent nature of the infections has been confirmed by demonstration of at least 3 culture-proven, symptomatic infections over the last 12 months.    Review of the history reveals positive urine cultures demonstrating growth of E. coli and Klebsiella. Review of culture sensitivities shows that infections have generally been mostly sensitive with limited antibiotic resistance. Patient has been treated with appropriate, culture-specific antibiotics.     She reports diarrhea. She reports no voiding symptoms. Her symptoms typically resolve with antibiotic therapy. She is postmenopausal. She does use topical vaginal estrogen, although she has only been using it once weekly citing the cost as a reason for rationed use.     Independent of episodes of infection, she denies symptoms of irritative voiding including frequency, incontinence, and urgency. She denies symptoms of obstructive voiding including decreased stream, hesitancy, intermittency, post void dribbling, and sense of incomplete emptying. Bladder scan PVR was 31 mL.       Previous evaluation for her infections have included cystoscopy and Renal U/S. Previous treatments for her recurrent infections have included antibiotics for each infection, low-dose prophylaxis (Nitrofurantoin), topical vaginal estrogen, and D-mannose.    A review of 10+ systems was conducted with  pertinent positive and negative findings documented in HPI with all other systems reviewed and negative.    Past medical, family, surgical and social history reviewed as documented in chart with pertinent positive medical, family, surgical and social history detailed in HPI.    Exam Findings:    Const: no acute distress, conversant and alert  Eyes: anicteric, extraocular muscles intact  ENMT: normocephalic, Nl oral membranes  Cardio: no cyanosis, nl cap refill  Pulm: no tachypnea; no resp distress  Abd: soft, no tenderness  Musc: no laceration, no tenderness  Neuro: alert; oriented x 3  Skin: warm, dry; no petichiae  Psych: no anxiety; normal speech     Assessment/Plan:    Recurrent Urinary Tract Infection (est, addt'l workup): Review of her history and labs including urine cultures suggests symptomatic urinary tract infections, confirmed with urine cultures, frequent enough to be considered Greg.       Preventative measure recommended today:  Daily probiotics containing Lactobacillus species (e.g. RepHresh Pro-B, probiotic yogurts)  D-mannose 1000 mg bid   Treatment of vaginal atrophy with topical vaginal estrogen cream with use 3x/week. Cost of her cream has been prohibitive to appropriate use so we will switch this prescription to [facility].     I spent a total of 30 minutes on the day of the visit.This includes face to face time and non-face to face time preparing to see the patient (eg, review of tests), obtaining and/or reviewing separately obtained history, documenting clinical information in the electronic or other health record, independently interpreting results and communicating results to the patient/family/caregiver, or care coordinator.

## 2024-12-15 NOTE — PROGRESS NOTES
/80 (BP Location: Right arm, Patient Position: Sitting)   Pulse 67   Resp 18   Ht 5' (1.524 m)   Wt 60.9 kg (134 lb 4.2 oz)   SpO2 99%   BMI 26.22 kg/m²       ===========              Jackelyn ARGUETA Aroldo is a 73 y.o. female     here for    Annual exam.    Health maintenance reviewed with patient in detail inc any recent labs and studies and needs for future screening labs.  Age-appropriate vaccines and other age-appropriate screening studies reviewed with patient in detail.  Sleep health reviewed with patient.  Skin health regarding possible skin cancer screening reviewed with patient.  General regularity of bowel movements and urinations reviewed with patient including any possibility of urine leakage.  Vision screening reviewed with patient.      Patient queried and denies any further complaints      Patient Active Problem List   Diagnosis    GERD (gastroesophageal reflux disease)    Acquired hypothyroidism    Mixed hyperlipidemia    Primary osteoarthritis involving multiple joints    Nuclear sclerosis - Both Eyes    Urinary incontinence, mixed    Vaginal vault prolapse, posthysterectomy    Rectocele    De Quervain's tenosynovitis, right    Essential hypertension    Atherosclerosis of aorta    Impingement syndrome of right shoulder    Nontraumatic tear of right rotator cuff    Lower extremity edema    PFO (patent foramen ovale)    Posterior subcapsular age-related cataract, left eye    Arthritis of carpometacarpal (CMC) joint of both thumbs    Ganglion cyst    Venous insufficiency of both lower extremities    Nuclear sclerotic cataract of right eye    Dupuytren's disease of palm of right hand    S/P trigger finger release    Irritable bowel syndrome with both constipation and diarrhea    Bilateral shoulder pain    Chronic bilateral low back pain    Chronic right-sided low back pain    Urinary frequency    Carpal tunnel syndrome of left wrist    Dupuytren's contracture of left hand    Ulnar nerve  compression, left    Left hand pain    Hearing loss of right ear    Abnormality of gait and mobility    Decreased strength of trunk and back    Decreased ROM of trunk and back       SURGICAL AND MEDICAL HISTORY: updated and reviewed.  Past Surgical History:   Procedure Laterality Date    ANORECTAL MANOMETRY N/A 06/01/2023    Procedure: MANOMETRY, ANORECTAL;  Surgeon: Paulo Pritchett MD;  Location: [facility] ENDO (4TH FLR);  Service: Endoscopy;  Laterality: N/A;  instructions sent to [facility]  5/26 pre-call no answer; MB    ARTHROSCOPIC REPAIR OF ROTATOR CUFF OF SHOULDER Right 09/23/2020    Procedure: REPAIR, ROTATOR CUFF, ARTHROSCOPIC;  Surgeon: Reginald Pickens MD;  Location: [facility] OR;  Service: Orthopedics;  Laterality: Right;  regional w/catheter (interscalene)    BACK SURGERY      CARPAL TUNNEL RELEASE Left 01/11/2024    Procedure: RELEASE, CARPAL TUNNEL;  Surgeon: Suzy Dominguez MD;  Location: [facility] OR;  Service: Orthopedics;  Laterality: Left;    CATARACT EXTRACTION W/  INTRAOCULAR LENS IMPLANT Left 10/20/2021        CHOLECYSTECTOMY      COLONOSCOPY  2007    diverticulosis    COLONOSCOPY N/A 09/03/2020    Procedure: COLONOSCOPY;  Surgeon: Alberta Henriquez MD;  Location: [facility] ENDO (4TH FLR);  Service: Colon and Rectal;  Laterality: N/A;  pt requested this time-8/31-covid- [place]    CYSTOSCOPY      DE QUERVAIN'S RELEASE Left 03/2017    DECOMPRESSION OF NERVE Left 01/11/2024    Procedure: DECOMPRESSION, NERVE ULNAR;  Surgeon: Suzy Dominguez MD;  Location: [facility] OR;  Service: Orthopedics;  Laterality: Left;    DUPUYTREN CONTRACTURE RELEASE Left 01/11/2024    Procedure: RELEASE, DUPUYTREN CONTRACTURE, PALM;  Surgeon: Suzy Dominguez MD;  Location: [facility] OR;  Service: Orthopedics;  Laterality: Left;    EPIDURAL STEROID INJECTION INTO LUMBAR SPINE Right 4/29/2024    Procedure: RT L3-4 L4-5 TFESI;  Surgeon: Mick Pineda DO;  Location: [facility] PAIN MANAGEMENT;  Service: Pain  Management;  Laterality: Right;  20 mins    FIXATION OF TENDON Right 09/23/2020    Procedure: FIXATION, TENDON;  Surgeon: Reginald Pickens MD;  Location: [place] OR;  Service: Orthopedics;  Laterality: Right;    HERNIA REPAIR      HYSTERECTOMY      INJECTION OF STEROID Right 12/02/2021    Procedure: INJECTION, STEROID;  Surgeon: Suzy Dominguez MD;  Location: [place] OR;  Service: Orthopedics;  Laterality: Right;    INJECTION OF STEROID Right 01/11/2024    Procedure: INJECTION, STEROID 1ST DORSAL COMPARTMENT;  Surgeon: Suzy Dominguez MD;  Location: [place] OR;  Service: Orthopedics;  Laterality: Right;    INTRAOCULAR PROSTHESES INSERTION Left 10/20/2021    Procedure: INSERTION, IOL PROSTHESIS;  Surgeon: Pippa Ashby MD;  Location: [facility] OR 1ST FLR;  Service: Ophthalmology;  Laterality: Left;    INTRAOCULAR PROSTHESES INSERTION Right 12/29/2021    Procedure: INSERTION, IOL PROSTHESIS;  Surgeon: Pippa Ashby MD;  Location: [facility] OR 1ST FLR;  Service: Ophthalmology;  Laterality: Right;    NASAL SEPTUM SURGERY      PHACOEMULSIFICATION OF CATARACT Left 10/20/2021    Procedure: PHACOEMULSIFICATION, CATARACT/ COMPLEX- PHACO / IOL - OS SMALL PUPIL-OLE RING AND TRYPAN BLUE;  Surgeon: Pippa Ashby MD;  Location: [facility] OR 1ST FLR;  Service: Ophthalmology;  Laterality: Left;    PHACOEMULSIFICATION OF CATARACT Right 12/29/2021    Procedure: PHACOEMULSIFICATION, CATARACT;  Surgeon: Pippa Ashby MD;  Location: [facility] OR 1ST FLR;  Service: Ophthalmology;  Laterality: Right;    SHOULDER SURGERY      TONSILLECTOMY      TRIGGER FINGER RELEASE Left 12/02/2021    Procedure: RELEASE, TRIGGER FINGER;  Surgeon: Suzy Dominguez MD;  Location: [place] OR;  Service: Orthopedics;  Laterality: Left;     ALLERGIES updated and reviewed.  Review of patient's allergies indicates:   Allergen Reactions    Levaquin [levofloxacin] Swelling and Edema    Erythromycin (bulk) Nausea And Vomiting     Not true allergy       CURRENT  OUTPATIENT MEDICATIONS updated and reviewed    Current Outpatient Medications:     albuterol (PROVENTIL/VENTOLIN HFA) 90 mcg/actuation inhaler, Inhale 2 puffs into the lungs every 6 (six) hours as needed for Wheezing., Disp: 6.7 g, Rfl: 11    cranberry fruit extract (CRANBERRY EXTRACT ORAL), Take by mouth., Disp: , Rfl:     diclofenac sodium (VOLTAREN) 1 % Gel, Apply 2 g topically 2 (two) times daily as needed (musculoskeletal pain)., Disp: 100 g, Rfl: 11    docusate sodium (COLACE) 100 MG capsule, Take 1 capsule (100 mg total) by mouth 2 (two) times daily., Disp: 30 capsule, Rfl: 1    fluocinonide (LIDEX) 0.05 % external solution, Apply topically once daily., Disp: 60 mL, Rfl: 11    gabapentin (NEURONTIN) 300 MG capsule, Take 1 capsule (300 mg total) by mouth 2 (two) times daily., Disp: 180 capsule, Rfl: 3    ketoconazole (NIZORAL) 2 % shampoo, Apply topically twice a week., Disp: 120 mL, Rfl: 11    LIDOcaine-prilocaine (EMLA) cream, Apply topically 2 (two) times daily as needed. To hands., Disp: 30 g, Rfl: 2    MAGNESIUM ORAL, Take 1 tablet by mouth once daily., Disp: , Rfl:     Multi-Vitamin tablet, Take 1 tablet by mouth once daily. , Disp: , Rfl:     ondansetron (ZOFRAN) 8 MG tablet, Take 1 tablet (8 mg total) by mouth every 8 (eight) hours as needed for Nausea., Disp: 30 tablet, Rfl: 0    PSYLLIUM SEED, WITH SUGAR, (METAMUCIL ORAL), Take by mouth as needed. , Disp: , Rfl:     RESTASIS 0.05 % ophthalmic emulsion, INSTILL ONE DROP INTO BOTH EYES TWO TIMES A DAY, Disp: 60 each, Rfl: 12    traZODone (DESYREL) 50 MG tablet, Take 100 mg by mouth nightly as needed., Disp: , Rfl:     triamcinolone acetonide 0.1% (KENALOG) 0.1 % cream, AAA bid, Disp: 60 g, Rfl: 3    amoxicillin-clavulanate 875-125mg (AUGMENTIN) 875-125 mg per tablet, Take 1 tablet by mouth every 12 (twelve) hours., Disp: 20 tablet, Rfl: 0    dicyclomine (BENTYL) 20 mg tablet, Take 1 tablet (20 mg total) by mouth 4 (four) times daily before meals and  nightly. As needed, Disp: 120 tablet, Rfl: 11    estradioL (ESTRACE) 0.01 % (0.1 mg/gram) vaginal cream, Place 1 g vaginally 3 (three) times a week., Disp: 42.5 g, Rfl: 0    levothyroxine (SYNTHROID) 75 MCG tablet, Take 1 tablet (75 mcg total) by mouth before breakfast., Disp: 90 tablet, Rfl: 3    losartan (COZAAR) 100 MG tablet, Take 1 tablet (100 mg total) by mouth once daily., Disp: 90 tablet, Rfl: 3    memantine (NAMENDA) 5 MG Tab, Take 1 tablet (5 mg total) by mouth 2 (two) times daily., Disp: 180 tablet, Rfl: 3    omeprazole (PRILOSEC) 40 MG capsule, Take 1 capsule (40 mg total) by mouth every morning., Disp: 90 capsule, Rfl: 3    oxybutynin (DITROPAN-XL) 10 MG 24 hr tablet, Take 1 tablet (10 mg total) by mouth once daily., Disp: 90 tablet, Rfl: 3    rosuvastatin (CRESTOR) 40 MG Tab, Take 1 tablet (40 mg total) by mouth every evening., Disp: 90 tablet, Rfl: 3    traZODone (DESYREL) 50 MG tablet, Take 1 tablet (50 mg total) by mouth nightly as needed for Insomnia., Disp: 90 tablet, Rfl: 3    Review of Systems   Constitutional:  Negative for activity change, appetite change, chills, diaphoresis, fatigue, fever and unexpected weight change.   HENT:  Negative for congestion, ear discharge, ear pain, facial swelling, hearing loss, nosebleeds, postnasal drip, rhinorrhea, sinus pressure, sneezing, sore throat, tinnitus, trouble swallowing and voice change.    Eyes:  Negative for photophobia, pain, discharge, redness, itching and visual disturbance.   Respiratory:  Negative for cough, chest tightness, shortness of breath and wheezing.    Cardiovascular:  Negative for chest pain, palpitations and leg swelling.   Gastrointestinal:  Negative for abdominal distention, abdominal pain, anal bleeding, blood in stool, constipation, diarrhea, nausea, rectal pain and vomiting.   Endocrine: Negative for cold intolerance, heat intolerance, polydipsia, polyphagia and polyuria.   Genitourinary:  Negative for difficulty urinating,  dysuria and flank pain.   Musculoskeletal:  Negative for arthralgias, back pain, joint swelling, myalgias and neck pain.   Skin:  Negative for rash.   Neurological:  Negative for dizziness, tremors, seizures, syncope, speech difficulty, weakness, light-headedness, numbness and headaches.   Psychiatric/Behavioral:  Negative for behavioral problems, confusion, decreased concentration, dysphoric mood, sleep disturbance and suicidal ideas. The patient is not nervous/anxious and is not hyperactive.        /80 (BP Location: Right arm, Patient Position: Sitting)   Pulse 67   Resp 18   Ht 5' (1.524 m)   Wt 60.9 kg (134 lb 4.2 oz)   SpO2 99%   BMI 26.22 kg/m²   Physical Exam  Vitals and nursing note reviewed.   Constitutional:       General: She is not in acute distress.     Appearance: Normal appearance. She is well-developed. She is not ill-appearing or toxic-appearing.   HENT:      Head: Normocephalic and atraumatic.      Right Ear: Tympanic membrane, ear canal and external ear normal.      Left Ear: Tympanic membrane, ear canal and external ear normal.      Nose: Nose normal.      Mouth/Throat:      Lips: Pink.      Mouth: Mucous membranes are moist.      Pharynx: No oropharyngeal exudate or posterior oropharyngeal erythema.   Eyes:      General: No scleral icterus.        Right eye: No discharge.         Left eye: No discharge.      Extraocular Movements: Extraocular movements intact.      Conjunctiva/sclera: Conjunctivae normal.   Cardiovascular:      Rate and Rhythm: Normal rate and regular rhythm.      Pulses: Normal pulses.      Heart sounds: Normal heart sounds. No murmur heard.  Pulmonary:      Effort: Pulmonary effort is normal. No respiratory distress.      Breath sounds: Normal breath sounds. No wheezing or rales.   Abdominal:      General: Bowel sounds are normal. There is no distension.      Palpations: Abdomen is soft. There is no mass.      Tenderness: There is no abdominal tenderness. There is  no right CVA tenderness, left CVA tenderness, guarding or rebound.      Hernia: No hernia is present.   Musculoskeletal:      Cervical back: Normal range of motion and neck supple. No rigidity or tenderness.   Lymphadenopathy:      Cervical: No cervical adenopathy.   Skin:     General: Skin is warm and dry.   Neurological:      General: No focal deficit present.      Mental Status: She is alert. Mental status is at baseline.   Psychiatric:         Mood and Affect: Mood normal.         Behavior: Behavior normal. Behavior is cooperative.         ASSESSMENT/PLAN    Jackelyn was seen today for annual exam.    Diagnoses and all orders for this visit:    General medical exam  -     CBC Without Differential; Future  -     Comprehensive Metabolic Panel; Future  -     Lipid Panel; Future  -     TSH; Future  -     Hemoglobin A1C; Future    Essential hypertension  -     losartan (COZAAR) 100 MG tablet; Take 1 tablet (100 mg total) by mouth once daily.    Atherosclerosis of aorta    Mixed hyperlipidemia    Venous insufficiency of both lower extremities    Acquired hypothyroidism    Gastroesophageal reflux disease, unspecified whether esophagitis present    Irritable bowel syndrome with both constipation and diarrhea    Other orders  -     dicyclomine (BENTYL) 20 mg tablet; Take 1 tablet (20 mg total) by mouth 4 (four) times daily before meals and nightly. As needed  -     oxybutynin (DITROPAN-XL) 10 MG 24 hr tablet; Take 1 tablet (10 mg total) by mouth once daily.  -     traZODone (DESYREL) 50 MG tablet; Take 1 tablet (50 mg total) by mouth nightly as needed for Insomnia.  -     omeprazole (PRILOSEC) 40 MG capsule; Take 1 capsule (40 mg total) by mouth every morning.  -     rosuvastatin (CRESTOR) 40 MG Tab; Take 1 tablet (40 mg total) by mouth every evening.  -     memantine (NAMENDA) 5 MG Tab; Take 1 tablet (5 mg total) by mouth 2 (two) times daily.  -     levothyroxine (SYNTHROID) 75 MCG tablet; Take 1 tablet (75 mcg total) by  mouth before breakfast.  -     amoxicillin-clavulanate 875-125mg (AUGMENTIN) 875-125 mg per tablet; Take 1 tablet by mouth every 12 (twelve) hours.            Most recent some lab results reviewed with patient.  Any new prescription medications gone over in detail including reason for taking the medication, most common possible side effects and possible costs, etcetera.    Chronic conditions updated. Other than changes or additions as above, cont current medications and maintain follow-up with specialists if indicated.     - Cautioned the patient against excessive use of internet searches for interpreting results before professional review.     Fabian Luna MD  A dictation device was used to produce this document. Use of such devices sometimes results in grammatical errors or replacement of words that sound similarly.

## 2024-12-16 ENCOUNTER — PATIENT MESSAGE (OUTPATIENT)
Dept: UROLOGY | Facility: CLINIC | Age: 74
End: 2024-12-16
Payer: MEDICARE

## 2024-12-16 ENCOUNTER — DOCUMENTATION ONLY (OUTPATIENT)
Dept: REHABILITATION | Facility: HOSPITAL | Age: 74
End: 2024-12-16
Payer: MEDICARE

## 2024-12-16 DIAGNOSIS — N95.2 VAGINAL ATROPHY: Primary | ICD-10-CM

## 2024-12-16 NOTE — PROGRESS NOTES
Health  Wellness Visit Note    Name: Jackelyn Kendrick  Clinic Number: [id number]  Physician: No ref. provider found  Diagnosis: No diagnosis found.  Past Medical History:   Diagnosis Date    Arthritis     Basal cell carcinoma     Bronchitis     seasonal    Cataract     Disorder of kidney and ureter     Diverticulitis     Dry eye syndrome     GERD (gastroesophageal reflux disease)     Hyperlipidemia     Hypertension     Hypothyroidism 07/19/2012    Obese     PONV (postoperative nausea and vomiting)     Thyroid disease      Visit Number: 40  Precautions: Standard, Previous LB Surgery, HTN, L RTC Repair, R Arthroscopic       1st PT visit:  05/16/2024  Year of care end date:  May 2025  Mindbody plan: 60--- 9 Months  Patient level: B    Time In: 9:00 AM  Time Out: 9:59 AM  Total Treatment Time: 59 Minutes    [employer] 2022  Handout on this week's wellness topic Time Management provided along with a discussion on what it means, the benefits, and suggestions for practice.  Reviewed last week's topic of  Coping.     Subjective:   Patient reports no complaints of back pain. She went walk the mall on Saturday. She had no issues with her back while out shopping. 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 Patient does not ice at home.    Patient plans to do Wellness through January 12th and the transition to being independent at [facility].    Objective:   Jackelyn completed therapeutic stretches (EIL, BOLIVAR) and the following MedX exercise machines: core lumbar, torso rotation l/r, leg extension, leg curl, upright row, chest press, biceps curl, triceps extension, leg press    See exercise log in patient folder for rate of exertion and repetitions completed.       Fitness Machine Education Key:  E=education on equipment initiated and further follow up and education needed  I=independent with  and exercise.  The patient:  Adjusts machines to his/her settings  Uses  equipment levers, pins, weights safely  Maintains safe and correct posture while exercising  Moves through exercise with correct pace and control  Gets on and off equipment safely      Lumbar/Cervical Ext. E Torso Rotation E Leg Press E   Leg Extension E Seated Leg Curl E Chest Press X   Seated Row E Hip ADD E Hip ABD E   Triceps Extension X Bicep Curl X Other:      [x] Indicates exercise has been taught for home  Lumbar/Cervical Ext. [] Torso Rotation [] Leg Press []   Leg Extension [] Seated Leg Curl [] Chest Press []   Seated Row [] Hip ADD [] Hip ABD []   Triceps Extension [] Bicep Curl [] Other:        Assessment:   Patient tolerated Patient tolerated MedX Core Lumbar Strength and all other peripheral exercises without an increase in symptoms. Patient warmed up on treadmill for 5 minutes, stretched, and iced low back for 5 minutes after the workout.     Plan:  Continue with established plan of care towards wellness goals.     Health  : Camille Maki  12/16/2024

## 2024-12-17 RX ORDER — ESTRADIOL 0.1 MG/G
1 CREAM VAGINAL
Qty: 42.5 G | Refills: 3 | Status: SHIPPED | OUTPATIENT
Start: 2024-12-18 | End: 2025-12-18

## 2024-12-23 ENCOUNTER — DOCUMENTATION ONLY (OUTPATIENT)
Dept: REHABILITATION | Facility: HOSPITAL | Age: 74
End: 2024-12-23
Payer: MEDICARE

## 2024-12-23 NOTE — PROGRESS NOTES
Health  Wellness Visit Note    Name: Jackelyn Kendrick  Clinic Number: [id number]  Physician: No ref. provider found  Diagnosis: No diagnosis found.  Past Medical History:   Diagnosis Date    Arthritis     Basal cell carcinoma     Bronchitis     seasonal    Cataract     Disorder of kidney and ureter     Diverticulitis     Dry eye syndrome     GERD (gastroesophageal reflux disease)     Hyperlipidemia     Hypertension     Hypothyroidism 07/19/2012    Obese     PONV (postoperative nausea and vomiting)     Thyroid disease      Visit Number: 41  Precautions: Standard, Previous LB Surgery, HTN, L RTC Repair, R Arthroscopic       1st PT visit:  05/16/2024  Year of care end date:  May 2025  Mindbody plan: 60--- 9 Months  Patient level: B    Time In: 9:00 AM  Time Out: 9:56 AM  Total Treatment Time: 56 Minutes    Wellness [employer] 2022  Handout on this week's wellness topic Journaling/Prioritization  provided along with a discussion on what it means, the benefits, and suggestions for practice.  Reviewed last week's topic of Time Management.      Subjective:   Patient reports no complaints of back pain. She stayed busy preparing for the holidays over the last week. She got plenty of steps in and did her stretches daily. Patient does not ice at home.    Patient plans to do Wellness through January 12th and the transition to being independent at [facility].    Objective:   Jackelyn completed therapeutic stretches (EIL, BOLIVAR) and the following MedX exercise machines: core lumbar, torso rotation l/r, leg extension, leg curl, upright row, chest press, biceps curl, triceps extension, leg press    See exercise log in patient folder for rate of exertion and repetitions completed.       Fitness Machine Education Key:  E=education on equipment initiated and further follow up and education needed  I=independent with  and exercise.  The patient:  Adjusts machines to his/her settings  Uses equipment levers, pins, weights  safely  Maintains safe and correct posture while exercising  Moves through exercise with correct pace and control  Gets on and off equipment safely      Lumbar/Cervical Ext. E Torso Rotation E Leg Press E   Leg Extension E Seated Leg Curl E Chest Press X   Seated Row E Hip ADD E Hip ABD E   Triceps Extension X Bicep Curl X Other:      [x] Indicates exercise has been taught for home  Lumbar/Cervical Ext. [] Torso Rotation [] Leg Press []   Leg Extension [] Seated Leg Curl [] Chest Press []   Seated Row [] Hip ADD [] Hip ABD []   Triceps Extension [] Bicep Curl [] Other:        Assessment:   Patient tolerated Patient tolerated MedX Core Lumbar Strength and all other peripheral exercises without an increase in symptoms. Patient warmed up on treadmill for 5 minutes, stretched, and iced low back for 5 minutes after the workout.     Plan:  Continue with established plan of care towards wellness goals.     Health  : Camille Maki  12/23/2024

## 2024-12-30 ENCOUNTER — DOCUMENTATION ONLY (OUTPATIENT)
Dept: REHABILITATION | Facility: HOSPITAL | Age: 74
End: 2024-12-30
Payer: MEDICARE

## 2024-12-30 NOTE — PROGRESS NOTES
Health  Wellness Visit Note    Name: Jackelyn Kendrick  Clinic Number: [id number]  Physician: No ref. provider found  Diagnosis: No diagnosis found.  Past Medical History:   Diagnosis Date    Arthritis     Basal cell carcinoma     Bronchitis     seasonal    Cataract     Disorder of kidney and ureter     Diverticulitis     Dry eye syndrome     GERD (gastroesophageal reflux disease)     Hyperlipidemia     Hypertension     Hypothyroidism 07/19/2012    Obese     PONV (postoperative nausea and vomiting)     Thyroid disease      Visit Number: 42  Precautions: Standard, Previous LB Surgery, HTN, L RTC Repair, R Arthroscopic       1st PT visit:  05/16/2024  Year of care end date:  May 2025  Mindbody plan: 60--- 9 Months  Patient level: B    Time In: 9:00 AM  Time Out: 9:51 AM  Total Treatment Time: 51 Minutes    Wellness [employer] 2022  Handout on this week's wellness topic n/a (no weekly handout) provided along with a discussion on what it means, the benefits, and suggestions for practice.  Reviewed last week's topic of Journaling/Prioritization.     Subjective:   Patient reports no complaints of low back pain today. She stayed busy with her family and holiday festivities. She did her stretches daily and got plenty of steps in. Patient will be getting a dog soon. She plans to incorporate walks daily with the dog into her exercise routine.      Patient plans to do Wellness through January 12th and the transition to being independent at [facility].    Objective:   Jackelyn completed therapeutic stretches (EIL, BOLIVAR) and the following MedX exercise machines: core lumbar, torso rotation l/r, leg extension, leg curl, upright row, chest press, biceps curl, triceps extension, leg press    See exercise log in patient folder for rate of exertion and repetitions completed.       Fitness Machine Education Key:  E=education on equipment initiated and further follow up and education needed  I=independent with  and  exercise.  The patient:  Adjusts machines to his/her settings  Uses equipment levers, pins, weights safely  Maintains safe and correct posture while exercising  Moves through exercise with correct pace and control  Gets on and off equipment safely      Lumbar/Cervical Ext. E Torso Rotation E Leg Press E   Leg Extension E Seated Leg Curl E Chest Press X   Seated Row E Hip ADD E Hip ABD E   Triceps Extension X Bicep Curl X Other:      [x] Indicates exercise has been taught for home  Lumbar/Cervical Ext. [] Torso Rotation [] Leg Press []   Leg Extension [] Seated Leg Curl [] Chest Press []   Seated Row [] Hip ADD [] Hip ABD []   Triceps Extension [] Bicep Curl [] Other:        Assessment:   Patient tolerated Patient tolerated MedX Core Lumbar Strength and all other peripheral exercises without an increase in symptoms. Patient warmed up on treadmill for 5 minutes, stretched, and iced low back for 5 minutes after the workout.     Plan:  Continue with established plan of care towards wellness goals.     Health  : Camille Maki  12/30/2024

## 2025-01-02 RX ORDER — MEMANTINE HYDROCHLORIDE 5 MG/1
5 TABLET ORAL 2 TIMES DAILY
Qty: 180 TABLET | Refills: 0 | Status: SHIPPED | OUTPATIENT
Start: 2025-01-02

## 2025-01-06 ENCOUNTER — DOCUMENTATION ONLY (OUTPATIENT)
Dept: REHABILITATION | Facility: HOSPITAL | Age: 75
End: 2025-01-06
Payer: MEDICARE

## 2025-01-07 ENCOUNTER — OFFICE VISIT (OUTPATIENT)
Dept: URGENT CARE | Facility: CLINIC | Age: 75
End: 2025-01-07
Payer: MEDICARE

## 2025-01-07 VITALS
WEIGHT: 137 LBS | HEIGHT: 60 IN | SYSTOLIC BLOOD PRESSURE: 145 MMHG | BODY MASS INDEX: 26.9 KG/M2 | RESPIRATION RATE: 16 BRPM | DIASTOLIC BLOOD PRESSURE: 71 MMHG | TEMPERATURE: 99 F | OXYGEN SATURATION: 95 % | HEART RATE: 80 BPM

## 2025-01-07 DIAGNOSIS — R35.0 FREQUENT URINATION: ICD-10-CM

## 2025-01-07 DIAGNOSIS — N39.0 RECURRENT UTI: ICD-10-CM

## 2025-01-07 DIAGNOSIS — N30.01 ACUTE CYSTITIS WITH HEMATURIA: Primary | ICD-10-CM

## 2025-01-07 LAB
BILIRUBIN, UA POC OHS: NEGATIVE
BLOOD, UA POC OHS: ABNORMAL
CLARITY, UA POC OHS: ABNORMAL
COLOR, UA POC OHS: YELLOW
GLUCOSE, UA POC OHS: NEGATIVE
KETONES, UA POC OHS: NEGATIVE
LEUKOCYTES, UA POC OHS: ABNORMAL
NITRITE, UA POC OHS: NEGATIVE
PH, UA POC OHS: 6.5
PROTEIN, UA POC OHS: 100
SPECIFIC GRAVITY, UA POC OHS: >=1.03
UROBILINOGEN, UA POC OHS: 0.2

## 2025-01-07 PROCEDURE — 87086 URINE CULTURE/COLONY COUNT: CPT | Performed by: NURSE PRACTITIONER

## 2025-01-07 RX ORDER — SULFAMETHOXAZOLE AND TRIMETHOPRIM 800; 160 MG/1; MG/1
1 TABLET ORAL 2 TIMES DAILY
Qty: 14 TABLET | Refills: 0 | Status: SHIPPED | OUTPATIENT
Start: 2025-01-07 | End: 2025-01-14

## 2025-01-07 NOTE — PROGRESS NOTES
Subjective:      Patient ID: Jackelyn Kendrick is a 74 y.o. female.    Vitals:  height is 5' (1.524 m) and weight is 62.1 kg (137 lb). Her oral temperature is 98.5 °F (36.9 °C). Her blood pressure is 145/71 (abnormal) and her pulse is 80. Her respiration is 16 and oxygen saturation is 95%.     Chief Complaint: Urinary Frequency    This pt complains of frequent urination x few days. S/S: urgency, odor, and pressure. No nausea, vomiting, diarrhea, or fever.    Home tx: none     PMH: Pt states she has a past history of UTI's.     Provider note begins below:    Patient denies fever or chills.  No new onset of back or pelvic pain.  No N/V/D.  No mychal hematuria.  Patient with recurrent UTI with last 11/24.  Under the care of Urology.  Was previously on Macrobid daily but taken off of that medication in December.  Last 2 urine cultures showed Klebsiella and E coli that was susceptible to Bactrim and will place on same.    Urinary Frequency   This is a new problem. The current episode started in the past 7 days. The problem has been unchanged. Associated symptoms include frequency and urgency. Pertinent negatives include no hematuria, nausea or vomiting. She has tried nothing for the symptoms. The treatment provided no relief.       Gastrointestinal:  Negative for nausea and vomiting.   Genitourinary:  Positive for frequency and urgency. Negative for hematuria.      Objective:     Physical Exam   Constitutional: She is oriented to person, place, and time. She appears well-developed. She does not appear ill. No distress.   HENT:   Head: Normocephalic and atraumatic.   Ears:   Right Ear: External ear normal.   Left Ear: External ear normal.   Nose: Nose normal. No nasal deformity. No epistaxis.   Mouth/Throat: Oropharynx is clear and moist and mucous membranes are normal.   Eyes: Lids are normal.   Neck: Trachea normal and phonation normal. Neck supple.   Cardiovascular: Normal rate.   Pulmonary/Chest: Effort normal.    Abdominal: Normal appearance. There is no abdominal tenderness. There is no left CVA tenderness and no right CVA tenderness.   Neurological: She is alert and oriented to person, place, and time.   Skin: Skin is warm, dry and intact.   Psychiatric: Her speech is normal and behavior is normal.   Nursing note and vitals reviewed.    Results for orders placed or performed in visit on 01/07/25   POCT Urinalysis(Instrument)    Collection Time: 01/07/25  8:52 AM   Result Value Ref Range    Color, POC UA Yellow Yellow, Straw, Colorless    Clarity, POC UA Cloudy (A) Clear    Glucose, POC UA Negative Negative    Bilirubin, POC UA Negative Negative    Ketones, POC UA Negative Negative    Spec Grav POC UA >=1.030 1.005 - 1.030    Blood, POC UA Moderate (A) Negative    pH, POC UA 6.5 5.0 - 8.0    Protein, POC  (A) Negative    Urobilinogen, POC UA 0.2 <=1.0    Nitrite, POC UA Negative (A) Negative    WBC, POC UA Small (A) Negative     *Note: Due to a large number of results and/or encounters for the requested time period, some results have not been displayed. A complete set of results can be found in Results Review.       Assessment:     1. Acute cystitis with hematuria    2. Frequent urination    3. Recurrent UTI        Plan:   Labs ordered at this visit reviewed.       Acute cystitis with hematuria  -     sulfamethoxazole-trimethoprim 800-160mg (BACTRIM DS) 800-160 mg Tab; Take 1 tablet by mouth 2 (two) times daily. for 7 days  Dispense: 14 tablet; Refill: 0  -     Urine Culture High Risk    Frequent urination  -     POCT Urinalysis(Instrument)    Recurrent UTI  -     sulfamethoxazole-trimethoprim 800-160mg (BACTRIM DS) 800-160 mg Tab; Take 1 tablet by mouth 2 (two) times daily. for 7 days  Dispense: 14 tablet; Refill: 0  -     Urine Culture High Risk

## 2025-01-08 LAB — BACTERIA UR CULT: NORMAL

## 2025-02-01 NOTE — TELEPHONE ENCOUNTER
No care due was identified.  [facility] Embedded Care Due Messages. Reference number: [id number].   2/01/2025 12:34:27 PM CST

## 2025-02-02 RX ORDER — TRAZODONE HYDROCHLORIDE 100 MG/1
100 TABLET ORAL NIGHTLY
Qty: 90 TABLET | Refills: 3 | OUTPATIENT
Start: 2025-02-02

## 2025-02-02 RX ORDER — OXYBUTYNIN CHLORIDE 10 MG/1
10 TABLET, EXTENDED RELEASE ORAL
Qty: 90 TABLET | Refills: 3 | Status: SHIPPED | OUTPATIENT
Start: 2025-02-02

## 2025-02-02 RX ORDER — ROSUVASTATIN CALCIUM 40 MG/1
40 TABLET, COATED ORAL
Qty: 90 TABLET | Refills: 3 | Status: SHIPPED | OUTPATIENT
Start: 2025-02-02

## 2025-02-03 NOTE — TELEPHONE ENCOUNTER
Refill Decision Note   Jackelyn Kendrick  is requesting a refill authorization.  Brief Assessment and Rationale for Refill:  Approve  Quick Discontinue     Medication Therapy Plan:        Comments:     Note composed:7:47 PM 02/02/2025

## 2025-02-05 DIAGNOSIS — M54.16 LUMBAR RADICULOPATHY: ICD-10-CM

## 2025-02-05 DIAGNOSIS — M96.1 POST LAMINECTOMY SYNDROME: ICD-10-CM

## 2025-02-05 RX ORDER — LEVOTHYROXINE SODIUM 75 UG/1
75 TABLET ORAL
Qty: 90 TABLET | Refills: 3 | Status: SHIPPED | OUTPATIENT
Start: 2025-02-05

## 2025-02-05 RX ORDER — TRAZODONE HYDROCHLORIDE 100 MG/1
100 TABLET ORAL NIGHTLY
Qty: 90 TABLET | Refills: 3 | Status: SHIPPED | OUTPATIENT
Start: 2025-02-05

## 2025-02-05 NOTE — TELEPHONE ENCOUNTER
No care due was identified.  [facility] Embedded Care Due Messages. Reference number: [id number].   2/05/2025 2:45:35 PM CST

## 2025-02-05 NOTE — TELEPHONE ENCOUNTER
Refill Decision Note   Jackelyn Kendrick  is requesting a refill authorization.  Brief Assessment and Rationale for Refill:  Approve     Medication Therapy Plan:         Comments:     Note composed:2:57 PM 02/05/2025

## 2025-02-06 RX ORDER — PREGABALIN 50 MG/1
CAPSULE ORAL
Qty: 180 CAPSULE | Refills: 1 | Status: SHIPPED | OUTPATIENT
Start: 2025-02-06

## 2025-02-25 ENCOUNTER — PATIENT MESSAGE (OUTPATIENT)
Dept: RADIOLOGY | Facility: HOSPITAL | Age: 75
End: 2025-02-25
Payer: MEDICARE

## 2025-02-25 ENCOUNTER — TELEPHONE (OUTPATIENT)
Dept: RADIOLOGY | Facility: HOSPITAL | Age: 75
End: 2025-02-25
Payer: MEDICARE

## 2025-03-17 ENCOUNTER — HOSPITAL ENCOUNTER (OUTPATIENT)
Dept: RADIOLOGY | Facility: HOSPITAL | Age: 75
Discharge: HOME | End: 2025-03-17
Attending: FAMILY MEDICINE
Payer: MEDICARE

## 2025-03-17 VITALS — WEIGHT: 137 LBS | HEIGHT: 60 IN | BODY MASS INDEX: 26.9 KG/M2

## 2025-03-17 DIAGNOSIS — Z12.31 ENCOUNTER FOR SCREENING MAMMOGRAM FOR BREAST CANCER: ICD-10-CM

## 2025-03-17 PROCEDURE — 77067 SCR MAMMO BI INCL CAD: CPT | Mod: 26,,, | Performed by: RADIOLOGY

## 2025-03-17 PROCEDURE — 77063 BREAST TOMOSYNTHESIS BI: CPT | Mod: 26,,, | Performed by: RADIOLOGY

## 2025-03-17 PROCEDURE — 77067 SCR MAMMO BI INCL CAD: CPT | Mod: TC

## 2025-03-18 ENCOUNTER — RESULTS FOLLOW-UP (OUTPATIENT)
Dept: PRIMARY CARE CLINIC | Facility: CLINIC | Age: 75
End: 2025-03-18

## 2025-03-21 ENCOUNTER — TELEPHONE (OUTPATIENT)
Dept: AUDIOLOGY | Facility: CLINIC | Age: 75
End: 2025-03-21
Payer: MEDICARE

## 2025-03-21 DIAGNOSIS — H90.3 SENSORINEURAL HEARING LOSS, BILATERAL: Primary | ICD-10-CM

## 2025-03-24 DIAGNOSIS — Z00.00 ENCOUNTER FOR MEDICARE ANNUAL WELLNESS EXAM: ICD-10-CM

## 2025-04-01 ENCOUNTER — TELEPHONE (OUTPATIENT)
Dept: UROLOGY | Facility: CLINIC | Age: 75
End: 2025-04-01
Payer: MEDICARE

## 2025-04-01 DIAGNOSIS — N95.2 VAGINAL ATROPHY: ICD-10-CM

## 2025-04-01 RX ORDER — ESTRADIOL 0.1 MG/G
1 CREAM VAGINAL
Qty: 42.5 G | Refills: 3 | Status: SHIPPED | OUTPATIENT
Start: 2025-04-02 | End: 2026-04-02

## 2025-04-01 NOTE — TELEPHONE ENCOUNTER
"----- Message from Med Assistant Amador sent at 4/1/2025 11:23 AM CDT -----  Regarding: FW: RX REFILL  Pt last seen 12/12/24  ----- Message -----  From: Andrea Clemons  Sent: 4/1/2025  11:12 AM CDT  To: Oleg Schultz Staff  Subject: RX REFILL                                        RX Name and Strength:estradioL (ESTRACE) 0.01 % (0.1 mg/gram) vaginal cream  How is the patient currently taking it?    Is this a 30 day or 90 day Rx?    Preferred Pharmacy with phone number:[facility] - [address]  Ordering Provider:  Contact Preference:[phone] fax # [phone]  Additional Information:"Thank you for all that you do for our patients"  "

## 2025-04-10 ENCOUNTER — OFFICE VISIT (OUTPATIENT)
Dept: OPTOMETRY | Facility: CLINIC | Age: 75
End: 2025-04-10
Payer: MEDICARE

## 2025-04-10 DIAGNOSIS — H04.123 DRY EYES, BILATERAL: Primary | ICD-10-CM

## 2025-04-10 DIAGNOSIS — Z13.5 SCREENING FOR EYE CONDITION: ICD-10-CM

## 2025-04-10 DIAGNOSIS — Z13.5 GLAUCOMA SCREENING: ICD-10-CM

## 2025-04-10 PROCEDURE — 92014 COMPRE OPH EXAM EST PT 1/>: CPT | Mod: S$PBB,,, | Performed by: OPTOMETRIST

## 2025-04-10 PROCEDURE — 99999 PR PBB SHADOW E&M-EST. PATIENT-LVL I: CPT | Mod: PBBFAC,,, | Performed by: OPTOMETRIST

## 2025-04-10 PROCEDURE — 99211 OFF/OP EST MAY X REQ PHY/QHP: CPT | Mod: PBBFAC,PO | Performed by: OPTOMETRIST

## 2025-04-10 NOTE — PROGRESS NOTES
HPI    73 Y/o female is here routine eye exam with C/o about ocular health pt   wears otc readers +2.50  Pt denies pain and discomfort   Occ floaters     Eye med restasis OU QID   Last edited by Pranav Hdez MA on 4/10/2025  9:33 AM.            Assessment /Plan     For exam results, see Encounter Report.    Dry eyes, bilateral    Screening for eye condition    Glaucoma screening      Sp pciol OU--pt happy w otc readers  See all prev notes about OCTAVIO--pt comfortable now on RESTASIS    PLAN:    Rtc 1 yr

## 2025-04-21 ENCOUNTER — PATIENT MESSAGE (OUTPATIENT)
Dept: ADMINISTRATIVE | Facility: HOSPITAL | Age: 75
End: 2025-04-21
Payer: MEDICARE

## 2025-04-21 ENCOUNTER — PATIENT OUTREACH (OUTPATIENT)
Dept: ADMINISTRATIVE | Facility: HOSPITAL | Age: 75
End: 2025-04-21
Payer: MEDICARE

## 2025-04-21 DIAGNOSIS — Z78.0 MENOPAUSE: Primary | ICD-10-CM

## 2025-05-01 RX ORDER — DICYCLOMINE HYDROCHLORIDE 20 MG/1
TABLET ORAL
Qty: 120 TABLET | Refills: 0 | Status: SHIPPED | OUTPATIENT
Start: 2025-05-01

## 2025-05-01 NOTE — TELEPHONE ENCOUNTER
No care due was identified.  [facility] Embedded Care Due Messages. Reference number: [id number].   4/30/2025 10:03:17 PM CDT

## 2025-05-02 ENCOUNTER — OFFICE VISIT (OUTPATIENT)
Dept: URGENT CARE | Facility: CLINIC | Age: 75
End: 2025-05-02
Payer: MEDICARE

## 2025-05-02 VITALS
HEART RATE: 64 BPM | DIASTOLIC BLOOD PRESSURE: 65 MMHG | WEIGHT: 137 LBS | RESPIRATION RATE: 19 BRPM | BODY MASS INDEX: 26.76 KG/M2 | OXYGEN SATURATION: 97 % | TEMPERATURE: 99 F | SYSTOLIC BLOOD PRESSURE: 109 MMHG

## 2025-05-02 DIAGNOSIS — N30.00 ACUTE CYSTITIS WITHOUT HEMATURIA: ICD-10-CM

## 2025-05-02 DIAGNOSIS — R30.0 DYSURIA: Primary | ICD-10-CM

## 2025-05-02 DIAGNOSIS — H10.9 BACTERIAL CONJUNCTIVITIS: ICD-10-CM

## 2025-05-02 LAB
BILIRUBIN, UA POC OHS: NEGATIVE
BLOOD, UA POC OHS: ABNORMAL
CLARITY, UA POC OHS: CLEAR
COLOR, UA POC OHS: YELLOW
GLUCOSE, UA POC OHS: NEGATIVE
KETONES, UA POC OHS: NEGATIVE
LEUKOCYTES, UA POC OHS: ABNORMAL
NITRITE, UA POC OHS: NEGATIVE
PH, UA POC OHS: 6.5
PROTEIN, UA POC OHS: NEGATIVE
SPECIFIC GRAVITY, UA POC OHS: >=1.03
UROBILINOGEN, UA POC OHS: 0.2

## 2025-05-02 PROCEDURE — 87086 URINE CULTURE/COLONY COUNT: CPT | Performed by: NURSE PRACTITIONER

## 2025-05-02 RX ORDER — NEOMYCIN AND POLYMYXIN B SULFATES AND BACITRACIN ZINC 400; 3.5; 1 [USP'U]/G; MG/G; [USP'U]/G
OINTMENT OPHTHALMIC 3 TIMES DAILY
Qty: 30 G | Refills: 0 | Status: SHIPPED | OUTPATIENT
Start: 2025-05-02 | End: 2025-05-09

## 2025-05-02 RX ORDER — NITROFURANTOIN 25; 75 MG/1; MG/1
100 CAPSULE ORAL 2 TIMES DAILY
Qty: 14 CAPSULE | Refills: 0 | Status: SHIPPED | OUTPATIENT
Start: 2025-05-02 | End: 2025-05-09

## 2025-05-02 NOTE — PROGRESS NOTES
Subjective:      Patient ID: Jackelyn Kendrick is a 74 y.o. female.    Vitals:  weight is 62.1 kg (137 lb). Her oral temperature is 98.6 °F (37 °C). Her blood pressure is 109/65 and her pulse is 64. Her respiration is 19 and oxygen saturation is 97%.     Chief Complaint: Dysuria    This is a 74 y.o. female who presents today with a chief complaint of  urgency, and frequency that has been going on for 3 days.    Dysuria   This is a new problem. The current episode started acute onset. The problem occurs every urination. The problem has been gradually worsening. The patient is experiencing no pain. There has been no fever. She is Not sexually active. There is No history of pyelonephritis. Associated symptoms include frequency, hesitancy and urgency. Pertinent negatives include no behavior changes, chills, discharge, flank pain, hematuria, nausea, possible pregnancy, sweats, vomiting, weight loss, bubble bath use, constipation, rash or withholding. She has tried nothing for the symptoms. The treatment provided no relief. There is no history of catheterization, diabetes insipidus, diabetes mellitus, genitourinary reflux, hypertension, kidney stones, recurrent UTIs, a single kidney, STD, urinary stasis or a urological procedure.       Constitution: Negative for chills.   Gastrointestinal:  Negative for nausea, vomiting and constipation.   Genitourinary:  Positive for dysuria, frequency and urgency. Negative for flank pain and hematuria.   Skin:  Negative for rash.      Objective:     Physical Exam   Constitutional: She is oriented to person, place, and time. She appears well-developed.   HENT:   Head: Normocephalic and atraumatic.   Ears:   Right Ear: External ear normal.   Left Ear: External ear normal.   Nose: Nose normal.   Mouth/Throat: Mucous membranes are normal.   Eyes: Lids are normal. Lids are everted and swept, no foreign bodies found. Left eye exhibits discharge and exudate. Left conjunctiva is injected.  vision grossly intact gaze aligned appropriately   Neck: Trachea normal. Neck supple.   Cardiovascular: Normal rate, regular rhythm and normal heart sounds.   Pulmonary/Chest: Effort normal and breath sounds normal. No respiratory distress.   Abdominal: Normal appearance and bowel sounds are normal. She exhibits no distension and no mass. Soft. There is abdominal tenderness in the suprapubic area.   Musculoskeletal: Normal range of motion.         General: Normal range of motion.   Neurological: She is alert and oriented to person, place, and time. She has normal strength.   Skin: Skin is warm, dry, intact, not diaphoretic and not pale.   Psychiatric: Her speech is normal and behavior is normal. Judgment and thought content normal.   Nursing note and vitals reviewed.      Assessment:     1. Dysuria    2. Acute cystitis without hematuria    3. Bacterial conjunctivitis      Results for orders placed or performed in visit on 05/02/25   POCT Urinalysis(Instrument)    Collection Time: 05/02/25  8:50 AM   Result Value Ref Range    Color, POC UA Yellow Yellow, Straw, Colorless    Clarity, POC UA Clear Clear    Glucose, POC UA Negative Negative    Bilirubin, POC UA Negative Negative    Ketones, POC UA Negative Negative    Spec Grav POC UA >=1.030 1.005 - 1.030    Blood, POC UA Trace-intact (A) Negative    pH, POC UA 6.5 5.0 - 8.0    Protein, POC UA Negative Negative    Urobilinogen, POC UA 0.2 <=1.0    Nitrite, POC UA Negative Negative    WBC, POC UA Trace (A) Negative     *Note: Due to a large number of results and/or encounters for the requested time period, some results have not been displayed. A complete set of results can be found in Results Review.       Plan:       Dysuria  -     POCT Urinalysis(Instrument)    Acute cystitis without hematuria  -     Urine Culture High Risk  -     nitrofurantoin, macrocrystal-monohydrate, (MACROBID) 100 MG capsule; Take 1 capsule (100 mg total) by mouth 2 (two) times daily. for 7 days   Dispense: 14 capsule; Refill: 0    Bacterial conjunctivitis  -     neomycin-bacitracin-polymyxin (POLYSPORIN) ophthalmic ointment; Place into the left eye 3 (three) times daily. for 7 days  Dispense: 30 g; Refill: 0    [facility] [phone] if not any better or symptoms are worse      Wash eyes with Jaylon and Jaylon baby soap 2 times a day.    Proceed to the ED if symptoms progress    Tylenol/Ibuprofen as needed for pain    Warm compress for 10 mins every 4 hours prn       Drink plenty of fluids. ED for any fever chills or confusion or any worsening of symptoms.  Please follow up with your Primary care provider within 2-5 days if your signs and symptoms have not resolved or worsen.     If your condition worsens or fails to improve we recommend that you receive another evaluation at the emergency room immediately or contact your primary medical clinic to discuss your concerns.   You must understand that you have received an Urgent Care treatment only and that you may be released before all of your medical problems are known or treated. You, the patient, will arrange for follow up care as instructed.     RED FLAGS/WARNING SYMPTOMS DISCUSSED WITH PATIENT THAT WOULD WARRANT EMERGENT MEDICAL ATTENTION. PATIENT VERBALIZED UNDERSTANDING.

## 2025-05-02 NOTE — PATIENT INSTRUCTIONS
[facility] [phone] if not any better or symptoms are worse      Wash eyes with Jaylon and Jaylon baby soap 2 times a day.    Proceed to the ED if symptoms progress    Tylenol/Ibuprofen as needed for pain    Warm compress for 10 mins every 4 hours prn       Drink plenty of fluids. ED for any fever chills or confusion or any worsening of symptoms.  Please follow up with your Primary care provider within 2-5 days if your signs and symptoms have not resolved or worsen.     If your condition worsens or fails to improve we recommend that you receive another evaluation at the emergency room immediately or contact your primary medical clinic to discuss your concerns.   You must understand that you have received an Urgent Care treatment only and that you may be released before all of your medical problems are known or treated. You, the patient, will arrange for follow up care as instructed.     RED FLAGS/WARNING SYMPTOMS DISCUSSED WITH PATIENT THAT WOULD WARRANT EMERGENT MEDICAL ATTENTION. PATIENT VERBALIZED UNDERSTANDING.

## 2025-05-04 ENCOUNTER — RESULTS FOLLOW-UP (OUTPATIENT)
Dept: URGENT CARE | Facility: CLINIC | Age: 75
End: 2025-05-04

## 2025-05-12 ENCOUNTER — TELEPHONE (OUTPATIENT)
Dept: PAIN MEDICINE | Facility: CLINIC | Age: 75
End: 2025-05-12
Payer: MEDICARE

## 2025-05-12 NOTE — TELEPHONE ENCOUNTER
Called pt to follow up in regards to rescheduling appointment with Dr. Nam    Pt agreed to be seen for next available at [facility] 06-23-25 for 3:45 pm    No further questions    ND

## 2025-05-20 ENCOUNTER — CLINICAL SUPPORT (OUTPATIENT)
Dept: AUDIOLOGY | Facility: CLINIC | Age: 75
End: 2025-05-20
Payer: MEDICARE

## 2025-05-20 DIAGNOSIS — H90.3 SENSORINEURAL HEARING LOSS, BILATERAL: Primary | ICD-10-CM

## 2025-05-20 PROCEDURE — 92557 COMPREHENSIVE HEARING TEST: CPT | Mod: PBBFAC | Performed by: AUDIOLOGIST

## 2025-05-20 PROCEDURE — 99499 UNLISTED E&M SERVICE: CPT | Mod: S$PBB,,, | Performed by: AUDIOLOGIST

## 2025-05-20 PROCEDURE — 92567 TYMPANOMETRY: CPT | Mod: PBBFAC | Performed by: AUDIOLOGIST

## 2025-05-20 PROCEDURE — 99211 OFF/OP EST MAY X REQ PHY/QHP: CPT | Mod: PBBFAC | Performed by: AUDIOLOGIST

## 2025-05-20 PROCEDURE — 99999 PR PBB SHADOW E&M-EST. PATIENT-LVL I: CPT | Mod: PBBFAC,,, | Performed by: AUDIOLOGIST

## 2025-05-20 NOTE — PROGRESS NOTES
Jackelyn Kendrick was seen today in the clinic for an annual audiologic evaluation.  Patient's main complaint was difficulty manipulating the audio source when using her phone with the hearing aids and making sure she is putting the hearing aid in her ear correctly.  Mrs. Kendrick reported she has used drops for wax build up but denied any fullness feeling today.     Otoscopy revealed a white, purulent debris build up in the right ear and moderately occluding cerumen in the left ear. Cerumen was removed without incident in the left ear using illuminated alligator forceps. TM was visible and WNL in the left ear following cerumen management.     Tympanometry revealed Type A in the right ear and Type A in the left ear.     Audiogram results revealed a mild to severe SNHL in the right ear and mild high frequency SNHL in the left ear.      Speech reception thresholds were noted at 50 dB in the right ear and 15 dB in the left ear.    Speech discrimination scores were 64% in the right ear and 96% in the left ear.    Results were reviewed with patient; there was a significant change in hearing in the left ear. We discussed possibility of getting a hearing aid for the left ear after medical clearance. Mrs. Kendrick was seen for a hearing aid follow-up immediately after testing.     Recommendations:  Otologic evaluation with ENT due to change in hearing and debris build up in the right ear  Annual audiogram  Hearing protection when in noise  Daily use of right hearing aid  Recommend purchasing hearing aid for left ear for binaural fitting upon medical clearance

## 2025-06-15 ENCOUNTER — OFFICE VISIT (OUTPATIENT)
Dept: URGENT CARE | Facility: CLINIC | Age: 75
End: 2025-06-15
Payer: MEDICARE

## 2025-06-15 VITALS
OXYGEN SATURATION: 95 % | BODY MASS INDEX: 26.9 KG/M2 | RESPIRATION RATE: 18 BRPM | SYSTOLIC BLOOD PRESSURE: 110 MMHG | WEIGHT: 137 LBS | HEIGHT: 60 IN | DIASTOLIC BLOOD PRESSURE: 70 MMHG | HEART RATE: 69 BPM | TEMPERATURE: 99 F

## 2025-06-15 DIAGNOSIS — N30.01 ACUTE CYSTITIS WITH HEMATURIA: ICD-10-CM

## 2025-06-15 DIAGNOSIS — R35.0 FREQUENCY OF URINATION: Primary | ICD-10-CM

## 2025-06-15 LAB
BILIRUBIN, UA POC OHS: NEGATIVE
BLOOD, UA POC OHS: ABNORMAL
CLARITY, UA POC OHS: ABNORMAL
COLOR, UA POC OHS: YELLOW
GLUCOSE, UA POC OHS: NEGATIVE
KETONES, UA POC OHS: NEGATIVE
LEUKOCYTES, UA POC OHS: ABNORMAL
NITRITE, UA POC OHS: POSITIVE
PH, UA POC OHS: 7.5
PROTEIN, UA POC OHS: >=300
SPECIFIC GRAVITY, UA POC OHS: 1.02
UROBILINOGEN, UA POC OHS: 0.2

## 2025-06-15 PROCEDURE — 99214 OFFICE O/P EST MOD 30 MIN: CPT | Mod: S$GLB,,,

## 2025-06-15 PROCEDURE — 87086 URINE CULTURE/COLONY COUNT: CPT

## 2025-06-15 PROCEDURE — 81003 URINALYSIS AUTO W/O SCOPE: CPT | Mod: QW,S$GLB,,

## 2025-06-15 RX ORDER — SULFAMETHOXAZOLE AND TRIMETHOPRIM 800; 160 MG/1; MG/1
1 TABLET ORAL 2 TIMES DAILY
Qty: 14 TABLET | Refills: 0 | Status: SHIPPED | OUTPATIENT
Start: 2025-06-15 | End: 2025-06-15

## 2025-06-15 RX ORDER — SULFAMETHOXAZOLE AND TRIMETHOPRIM 800; 160 MG/1; MG/1
1 TABLET ORAL 2 TIMES DAILY
Qty: 14 TABLET | Refills: 0 | Status: SHIPPED | OUTPATIENT
Start: 2025-06-15 | End: 2025-06-22

## 2025-06-15 NOTE — PATIENT INSTRUCTIONS
Take antibiotics for the full 5 days.  May take over the counter AZO or pyridium for pain and burning symptoms.     Drink plenty of non-sugary liquids (water!!), you should aim to have light-yellow or clear urine.    If symptoms are not improving within 3 days or worsen, including fever, flank pain, nausea, vomiting, or dizziness, return to urgent care or ED.    Please follow up with your primary care doctor or specialist as needed.    - You must understand that you have received an Urgent Care treatment only and that you may be released before all of your medical problems are known or treated.   - You, the patient, will arrange for follow up care as instructed.   - If your condition worsens or fails to improve we recommend that you receive another evaluation at the ER immediately or contact your PCP to discuss your concerns or return here.   - Follow up with your PCP or specialty clinic as directed in the next 1-2 weeks if not improved or as needed.  You can call [phone] to schedule an appointment with the appropriate provider.      If your symptoms do not improve or worsen, go to the emergency room immediately.

## 2025-06-15 NOTE — PROGRESS NOTES
Subjective:      Patient ID: Jackelyn Kendrick is a 74 y.o. female.    Vitals:  vitals were not taken for this visit.     Chief Complaint: Urinary Tract Infection    This is a 74 y.o. female who presents today with a chief complaint of  UTI.  Patient states she has burning with urination.  She has been taking cranberry pills and Pyridium without any relief.  Patient states she does have frequent UTIs, she believes they are linked to her IBS.  She states she took Macrobid last month but it did not work well, patient prefers Bactrim for her UTIs.  Denies any fever, nausea, vomiting, flank pain or other complaints      Urinary Tract Infection   This is a new problem. The current episode started in the past 7 days. The problem occurs every urination. The problem has been unchanged. The quality of the pain is described as burning. The pain is at a severity of 7/10. The pain is moderate. There has been no fever. She is Sexually active. There is No history of pyelonephritis. Associated symptoms include frequency and urgency. Pertinent negatives include no flank pain or hematuria. She has tried nothing for the symptoms. The treatment provided no relief.       Constitution: Negative for fever and generalized weakness.   HENT:  Negative for ear pain, sinus pain and sore throat.    Neck: Negative for neck pain.   Cardiovascular:  Negative for chest pain.   Respiratory:  Negative for cough and shortness of breath.    Gastrointestinal:  Negative for abdominal pain.   Genitourinary:  Positive for dysuria, frequency and urgency. Negative for flank pain and hematuria.   Neurological:  Negative for headaches.      Objective:     Physical Exam   Constitutional: She is oriented to person, place, and time. She appears well-developed. She is cooperative.  Non-toxic appearance. She does not appear ill. No distress.   HENT:   Head: Normocephalic and atraumatic.   Ears:   Right Ear: Hearing, tympanic membrane, external ear and ear canal  normal.   Left Ear: Hearing, tympanic membrane, external ear and ear canal normal.   Nose: Nose normal. No mucosal edema, rhinorrhea or nasal deformity. No epistaxis. Right sinus exhibits no maxillary sinus tenderness and no frontal sinus tenderness. Left sinus exhibits no maxillary sinus tenderness and no frontal sinus tenderness.   Mouth/Throat: Uvula is midline, oropharynx is clear and moist and mucous membranes are normal. No trismus in the jaw. Normal dentition. No uvula swelling. No oropharyngeal exudate, posterior oropharyngeal edema or posterior oropharyngeal erythema.   Eyes: Conjunctivae and lids are normal. No scleral icterus.   Neck: Trachea normal and phonation normal. Neck supple. No edema present. No erythema present. No neck rigidity present.   Cardiovascular: Normal rate, regular rhythm, normal heart sounds and normal pulses.   Pulmonary/Chest: Effort normal and breath sounds normal. No respiratory distress. She has no decreased breath sounds. She has no rhonchi.   Abdominal: Normal appearance and bowel sounds are normal. She exhibits no distension and no mass. Soft. There is no abdominal tenderness. There is no rebound, no guarding, no left CVA tenderness and no right CVA tenderness.   Musculoskeletal: Normal range of motion.         General: No deformity. Normal range of motion.   Neurological: She is alert and oriented to person, place, and time. She has normal strength. She exhibits normal muscle tone. Coordination normal.   Skin: Skin is warm, dry, intact, not diaphoretic and not pale.   Psychiatric: Her speech is normal and behavior is normal. Judgment and thought content normal.   Nursing note and vitals reviewed.      Assessment:     1. Frequency of urination        Plan:   Physical exam unremarkable.  No abdominal pain or tenderness.  We will treat with Bactrim as patient states she has had good success in this in the past for UTIs.  On chart review she has had Bactrim for UTIs multiple  times.  We will also send urine for culture, informed patient that if we need to change antibiotics based on culture results we will give her a call.  She acknowledges understanding.    Results for orders placed or performed in visit on 06/15/25   POCT Urinalysis(Instrument)    Collection Time: 06/15/25  9:50 AM   Result Value Ref Range    Color, POC UA Yellow Yellow, Straw, Colorless    Clarity, POC UA Cloudy (A) Clear    Glucose, POC UA Negative Negative    Bilirubin, POC UA Negative Negative    Ketones, POC UA Negative Negative    Spec Grav POC UA 1.020 1.005 - 1.030    Blood, POC UA Small (A) Negative    pH, POC UA 7.5 5.0 - 8.0    Protein, POC UA >=300 (A) Negative    Urobilinogen, POC UA 0.2 <=1.0    Nitrite, POC UA Positive (A) Negative    WBC, POC UA Moderate (A) Negative     *Note: Due to a large number of results and/or encounters for the requested time period, some results have not been displayed. A complete set of results can be found in Results Review.         Frequency of urination

## 2025-06-16 LAB — BACTERIA UR CULT: NORMAL

## 2025-06-17 ENCOUNTER — RESULTS FOLLOW-UP (OUTPATIENT)
Dept: URGENT CARE | Facility: CLINIC | Age: 75
End: 2025-06-17

## 2025-06-23 ENCOUNTER — OFFICE VISIT (OUTPATIENT)
Dept: PAIN MEDICINE | Facility: CLINIC | Age: 75
End: 2025-06-23
Payer: MEDICARE

## 2025-06-23 VITALS
SYSTOLIC BLOOD PRESSURE: 103 MMHG | BODY MASS INDEX: 27.66 KG/M2 | WEIGHT: 140.88 LBS | HEIGHT: 60 IN | HEART RATE: 85 BPM | DIASTOLIC BLOOD PRESSURE: 64 MMHG

## 2025-06-23 DIAGNOSIS — M96.1 POST LAMINECTOMY SYNDROME: Primary | ICD-10-CM

## 2025-06-23 DIAGNOSIS — M51.362 DEGENERATION OF INTERVERTEBRAL DISC OF LUMBAR REGION WITH DISCOGENIC BACK PAIN AND LOWER EXTREMITY PAIN: ICD-10-CM

## 2025-06-23 DIAGNOSIS — M51.360 DEGENERATION OF INTERVERTEBRAL DISC OF LUMBAR REGION WITH DISCOGENIC BACK PAIN: ICD-10-CM

## 2025-06-23 DIAGNOSIS — M54.16 LUMBAR RADICULOPATHY: ICD-10-CM

## 2025-06-23 PROCEDURE — 99999 PR PBB SHADOW E&M-EST. PATIENT-LVL IV: CPT | Mod: PBBFAC,,, | Performed by: STUDENT IN AN ORGANIZED HEALTH CARE EDUCATION/TRAINING PROGRAM

## 2025-06-23 PROCEDURE — 99214 OFFICE O/P EST MOD 30 MIN: CPT | Mod: S$PBB,,, | Performed by: STUDENT IN AN ORGANIZED HEALTH CARE EDUCATION/TRAINING PROGRAM

## 2025-06-23 PROCEDURE — 99214 OFFICE O/P EST MOD 30 MIN: CPT | Mod: PBBFAC | Performed by: STUDENT IN AN ORGANIZED HEALTH CARE EDUCATION/TRAINING PROGRAM

## 2025-06-23 NOTE — PROGRESS NOTES
Chronic Pain - f/u    Referring Physician: No ref. provider found    Date: 06/23/2025     Re: Jackelyn Kendrick  MR#: [id number]  YOB: 1950  Age: 74 y.o.    Chief Complaint:   Chief Complaint   Patient presents with    Low-back Pain     **This note is dictated using the M*Modal Fluency Direct word recognition program. There are word recognition mistakes that are occasionally missed on review.**    ASSESSMENT: 74 y.o. year old female with back and leg pain, consistent with     1. Post laminectomy syndrome        2. Lumbar radiculopathy        3. Degeneration of intervertebral disc of lumbar region with discogenic back pain        4. Degeneration of intervertebral disc of lumbar region with discogenic back pain and lower extremity pain          PLAN:     Right lumbar radiculopathy and post-laminectomy syndrome  4/29/24 - Right L3-4, L4-5 TFESI - RN sed - no AC - no improvement  -I would not recommend any additional epidurals given that they have not provided any noticeable benefit.  -stop gabapentin to 600mg qhs. This is causing excess sedation during the day.  She went back to 300mg in the morning and 300mg at night.  Rx provided.  -discussed SCS with [facility]. Information provided. She can send me a message if she wants to proceed with the stimulator  -healthy back PT - she completed this.  She states that they are working her out good.  She is working with the healthy back .  She thinks this has helped the strength in her back.  -She is going to the gym 2x/week  -vertiflex may be an option, but I would be concerned that it might make her back pain worse while helping the leg pain due to the severity of her disc disease.  -stopped Lyrica 50mg BID. This made her too sleepy so she stopped but she was taking this with trazodone.    -discussed stopping the trazodone at night and taking just gabapentin or just Lyrica to see if that helps with pain and sleep.  -another long time discussing  options between surgery referral, stimulator trial, or vertiflex.  I do not recommend vertiflex given her prior L4-5 hemilaminectomy and I do not think that it would provide enough force to open the compressed side.  -For her leg pain the options are:   1) referral to surgery. 2) try a spinal cord stimulator. 3) tweak medications and live with it  -For her back pain the options are 1) ViaDisc. 2) SCS 3) tweak medications and live with it    Lumbar spondylosis  -failed 2 RFAs in the past    DDD  -patient has substantial DDD at multiple levels.  Could consider Viadisc for disc regeneration. This would be for back pain.    - RTC 6 months  - Counseled patient regarding the importance of activity modification and physical therapy.    The above plan and management options were discussed at length with patient. Patient is in agreement with the above and verbalized understanding. It will be communicated with the referring physician via electronic record, fax, or mail.  Lab/study reports reviewed were important and necessary because subsequent medical and treatment recommendations required review of the above lab/study reports. Images viewed/reviewed above were important and necessary because subsequent medical and treatment recommendations required review of the reviewed image(s).     Electronically signed by:  Mick Pineda DO  06/23/2025    =========================================================================================================    SUBJECTIVE:    Interval History 6/23/2025:   Jackelyn Kendrick is a 74 y.o. female presents to the clinic for follow up.  Since last visit the pain has is unchanged.    The pain is located in the lower back area and radiates to the right leg.  The pain is described as aching    At BEST  3/10   At WORST  5/10 on the WORST day.    On average pain is rated as 3/10.   Today the pain is rated as 3/10  Symptoms interfere with daily activity.   Exacerbating factors: nothing  in particular.    Mitigating factors heat and physical therapy.     Current pain medications: gabapentin 300mg BID, tylenol, Trazodone 100mg qhs  Failed Pain Medications: celebrex (due to CKD3), Lyrica 50mg (sedation), gabapentin (sedation)    Pain procedures:   Pain injections (epidurals and nerve ablations) - [id number]  Nerve ablations - not helpful x2  4/29/24 - Right L3-4, L4-5 TFESI - RN sed - no AC - 0%    Interval History 12/6/2024:   Jackelyn Kendrick is a 74 y.o. female presents to the clinic for follow up.  Since last visit the pain has has significantly improved.  Patient states that she is doing better.  Since healthy back this has helped a lot.  She thinks her back is stronger.  She is finishing with healthy back this month and continuing at her gym    The pain is located in the lower back  area and does not radiate.  The pain is described as not hurting     At BEST  1/10   At WORST  6/10 on the WORST day.    On average pain is rated as 4/10.   Today the pain is rated as 1/10  Symptoms interfere with daily activity.   Exacerbating factors: Walking.    Mitigating factors physical therapy.       Interval History 9/6/2024:   Jackelyn Kendrick is a 73 y.o. female presents to the clinic for follow up.  Since last visit the pain has is unchanged.    The pain is located in the right lower back area and does not radiate.  The pain is described as aching    At BEST  5/10   At WORST  5/10 on the WORST day.    On average pain is rated as 5/10.   Today the pain is rated as 5/10  Symptoms interfere with daily activity and sleeping.   Exacerbating factors:  Standing, and Bending.    Mitigating factors heat, ice, and medications.     Interval History 5/30/2024:   Jackelyn Kendrick is a 74 y.o. female presents to the clinic for follow up.  Since last visit the pain has has slightly improved.  The pain is equal back and right leg.    The pain is located in the lower back area and radiates to the right hip, knee.  The  pain is described as aching    At BEST  3/10   At WORST  8/10 on the WORST day.    On average pain is rated as 4/10.   Today the pain is rated as 5/10  Symptoms interfere with daily activity.   Exacerbating factors: Sitting, Standing, Laying, and Walking.    Mitigating factors rest.     Initial hx:  Jackelyn Kendrick is a 74 y.o. female presents to the clinic for the evaluation of lower back pain. The pain started years ago following no inciting event and symptoms have been worsening.  The patient has a hx of right L4-5 hemilaminectomy for sciatic pain in the 90s. Pain now is different.  This pain has been ongoing for a number of years.  It would come and go.  She used to see a pain management doctor and would get some injections in the back which would help.  The pain is located in the right buttocks and travels down the right lateral thigh to the side of the knee.  It does not travel down the knee.  If she is standing or doing household chores then she gets an achy back pain.  When she first stands up it is painful, but then it works itself out.      She saw spine surgery and was started on gabapentin.  She can walk a little further (about 4 blocks) before she has to stop.     Pain Description:    The pain is located in the lower back area and radiates to the right leg.    At BEST  3/10   At WORST  8/10 on the WORST day.    On average pain is rated as 4/10.   Today the pain is rated as 4/10  The pain is continuous.  The pain is described as aching    Symptoms interfere with daily activity.   Exacerbating factors: Sitting, Standing, Walking, and Getting out of bed/chair.    Mitigating factors heat and medications.   She reports 7 hours of sleep per night.    Physical Therapy/Home Exercise: No, not currently in physical therapy or home exercise program    Current Pain Medications:    - gabapentin 300mg qhs, tylenol    Failed Pain Medications:    - celebrex (due to CKD3)    Pain Treatment Therapies:    Pain  procedures:   Pain injections (epidurals and nerve ablations) - [id number]  Nerve ablations - not helpful x2  Physical Therapy: none  Chiropractor: none  Acupuncture: none  TENS unit: none  Spinal decompression:   Right L4-5 Hemilaminectomy x2 (1990s)  Joint replacement: none    Patient denies urinary incontinence, bowel incontinence, significant motor weakness, and loss of sensations.  Patient denies any suicidal or homicidal ideations     report:  Reviewed and consistent with medication use as prescribed.    Imaging:   MRI Lumbar 03/2024:  FINDINGS:  Alignment: Mild levocurvature of the lumbar spine.  Minimal retrolisthesis L4 on L5.     Vertebrae: Postsurgical changes of right L4-L5 hemilaminectomy.  No acute fracture or marrow infiltrative process.     Discs: Multilevel disc height loss most notable at at L3-L4 and L4-L5.     Cord: Normal.  Conus terminates at L1     Degenerative findings:     T11-T12: Posterior disc bulge.  No significant spinal canal stenosis or neural foraminal narrowing.     T12-L1: No significant spinal canal stenosis or neural foraminal narrowing.     L1-L2: Mild diffuse posterior disc bulge and bilateral facet arthropathy.  No significant spinal canal stenosis or neural foraminal narrowing.     L2-L3: Diffuse posterior disc bulge, bilateral facet arthropathy and ligament flavum buckling.  Findings result in mild spinal canal stenosis, mild right and moderate left neural foraminal narrowing.     L3-L4: Diffuse posterior disc bulge with superimposed central disc protrusion, bilateral facet arthropathy and ligament flavum buckling.  Findings result in mild spinal canal stenosis and mild right neural foraminal narrowing.     L4-L5: Right L4 hemilaminectomy.  Diffuse posterior disc bulge, facet arthropathy and left ligamentum flavum buckling.  Findings result in severe right and moderate left neural foraminal narrowing.     L5-S1: Left eccentric disc bulge.  Findings result in mild right and  moderate left neural foraminal narrowing.     Paraspinal muscles & soft tissues: Unremarkable.     Impression:     Lumbar spondylosis most notable at L2-L3 and L3-L4 with mild spinal canal stenosis mild/moderate neural foraminal narrowing.  Severe right and moderate left neural foraminal narrowing at L4-L5.        6/23/2025     3:49 PM 12/6/2024     9:12 AM 9/6/2024     9:14 AM 5/30/2024     2:35 PM 5/30/2024     2:33 PM 4/15/2024     8:38 AM   Pain Disability Index (PDI)   Family/Home Responsibilities: 3 1 5 5 5 4   Recreation: 3 1 5 5 5 4   Occupation: 3 1 5 5 5 4   Sexual Behavior: 3 1 5 5 5 4   Self Care: 3 1 5 5 5 4   Life-Support Activities: 3 1 5 5 5 4   Pain Disability Index (PDI) 21 7 35 35 35 28        Past Medical History:   Diagnosis Date    Arthritis     Basal cell carcinoma     Bronchitis     seasonal    Cataract     Disorder of kidney and ureter     Diverticulitis     Dry eye syndrome     GERD (gastroesophageal reflux disease)     Hyperlipidemia     Hypertension     Hypothyroidism 07/19/2012    Obese     PONV (postoperative nausea and vomiting)     Thyroid disease      Past Surgical History:   Procedure Laterality Date    ANORECTAL MANOMETRY N/A 06/01/2023    Procedure: MANOMETRY, ANORECTAL;  Surgeon: Paulo Pritchett MD;  Location: [address];  Service: Endoscopy;  Laterality: N/A;  instructions sent to [id number]  5/26 pre-call no answer; MB    ARTHROSCOPIC REPAIR OF ROTATOR CUFF OF SHOULDER Right 09/23/2020    Procedure: REPAIR, ROTATOR CUFF, ARTHROSCOPIC;  Surgeon: Reginald Pickens MD;  Location: [facility];  Service: Orthopedics;  Laterality: Right;  regional w/catheter (interscalene)    BACK SURGERY      CARPAL TUNNEL RELEASE Left 01/11/2024    Procedure: RELEASE, CARPAL TUNNEL;  Surgeon: Suzy Dominguez MD;  Location: [facility] OR;  Service: Orthopedics;  Laterality: Left;    CATARACT EXTRACTION W/  INTRAOCULAR LENS IMPLANT Left 10/20/2021        CHOLECYSTECTOMY       COLONOSCOPY  2007    diverticulosis    COLONOSCOPY N/A 09/03/2020    Procedure: COLONOSCOPY;  Surgeon: Alberta Henriquez MD;  Location: [facility] ENDO (4TH FLR);  Service: Colon and Rectal;  Laterality: N/A;  pt requested this time-8/31-covid-uc [place]-[facility]    CYSTOSCOPY      DE QUERVAIN'S RELEASE Left 03/2017    DECOMPRESSION OF NERVE Left 01/11/2024    Procedure: DECOMPRESSION, NERVE ULNAR;  Surgeon: Suzy Dominguez MD;  Location: [facility] OR;  Service: Orthopedics;  Laterality: Left;    DUPUYTREN CONTRACTURE RELEASE Left 01/11/2024    Procedure: RELEASE, DUPUYTREN CONTRACTURE, PALM;  Surgeon: Suzy Dominguez MD;  Location: [facility] OR;  Service: Orthopedics;  Laterality: Left;    EPIDURAL STEROID INJECTION INTO LUMBAR SPINE Right 4/29/2024    Procedure: RT L3-4 L4-5 TFESI;  Surgeon: Mick Pineda DO;  Location: [facility];  Service: Pain Management;  Laterality: Right;  20 mins    FIXATION OF TENDON Right 09/23/2020    Procedure: FIXATION, TENDON;  Surgeon: Reginald Pickens MD;  Location: [facility] OR;  Service: Orthopedics;  Laterality: Right;    HERNIA REPAIR      HYSTERECTOMY      INJECTION OF STEROID Right 12/02/2021    Procedure: INJECTION, STEROID;  Surgeon: Suzy Dominguez MD;  Location: [facility] OR;  Service: Orthopedics;  Laterality: Right;    INJECTION OF STEROID Right 01/11/2024    Procedure: INJECTION, STEROID 1ST DORSAL COMPARTMENT;  Surgeon: Suzy Dominguez MD;  Location: [facility] OR;  Service: Orthopedics;  Laterality: Right;    INTRAOCULAR PROSTHESES INSERTION Left 10/20/2021    Procedure: INSERTION, IOL PROSTHESIS;  Surgeon: Pippa Ashby MD;  Location: [facility] OR 1ST FLR;  Service: Ophthalmology;  Laterality: Left;    INTRAOCULAR PROSTHESES INSERTION Right 12/29/2021    Procedure: INSERTION, IOL PROSTHESIS;  Surgeon: Pippa Ashby MD;  Location: [facility] OR 1ST FLR;  Service: Ophthalmology;  Laterality: Right;    NASAL SEPTUM SURGERY      PHACOEMULSIFICATION OF CATARACT Left 10/20/2021     Procedure: PHACOEMULSIFICATION, CATARACT/ COMPLEX- PHACO / IOL - OS SMALL PUPIL-OLE RING AND TRYPAN BLUE;  Surgeon: Pippa Ashby MD;  Location: [address];  Service: Ophthalmology;  Laterality: Left;    PHACOEMULSIFICATION OF CATARACT Right 12/29/2021    Procedure: PHACOEMULSIFICATION, CATARACT;  Surgeon: Pippa Ashby MD;  Location: [address];  Service: Ophthalmology;  Laterality: Right;    SHOULDER SURGERY      TONSILLECTOMY      TRIGGER FINGER RELEASE Left 12/02/2021    Procedure: RELEASE, TRIGGER FINGER;  Surgeon: Suzy Dominguez MD;  Location: [facility];  Service: Orthopedics;  Laterality: Left;     Social History     Socioeconomic History    Marital status:    Tobacco Use    Smoking status: Never     Passive exposure: Never    Smokeless tobacco: Never   Substance and Sexual Activity    Alcohol use: No     Comment: rare on a special occasion    Drug use: No    Sexual activity: Never   Social History Narrative    , 3 children, nonsmoker ,     Social Drivers of Health     Financial Resource Strain: Low Risk  (4/7/2025)    Overall Financial Resource Strain (CARDIA)     Difficulty of Paying Living Expenses: Not hard at all   Food Insecurity: No Food Insecurity (4/7/2025)    Hunger Vital Sign     Worried About Running Out of Food in the Last Year: Never true     Ran Out of Food in the Last Year: Never true   Transportation Needs: No Transportation Needs (4/7/2025)    PRAPARE - Transportation     Lack of Transportation (Medical): No     Lack of Transportation (Non-Medical): No   Physical Activity: Insufficiently Active (4/7/2025)    Exercise Vital Sign     Days of Exercise per Week: 2 days     Minutes of Exercise per Session: 50 min   Stress: Stress Concern Present (4/7/2025)    [employer] [place] [employer] - Occupational Stress Questionnaire     Feeling of Stress : To some extent   Housing Stability: Low Risk  (4/7/2025)    Housing Stability Vital Sign     Unable  to Pay for Housing in the Last Year: No     Homeless in the Last Year: No     Family History   Problem Relation Name Age of Onset    Cataracts Mother      Breast cancer Mother      Diabetes Mother      Hypertension Mother      Heart failure Mother      Heart disease Mother      Cataracts Father      Hypertension Father      Stroke Father      No Known Problems Daughter      No Known Problems Son      Diabetes Paternal Grandmother      Hyperlipidemia Sister x1     Arthritis Sister x1     Hyperlipidemia Brother x5     Arthritis Brother x5     No Known Problems Son      Amblyopia Neg Hx      Blindness Neg Hx      Glaucoma Neg Hx      Macular degeneration Neg Hx      Retinal detachment Neg Hx      Strabismus Neg Hx      Ovarian cancer Neg Hx      Melanoma Neg Hx      Celiac disease Neg Hx      Colon cancer Neg Hx      Colon polyps Neg Hx      Esophageal cancer Neg Hx      Liver cancer Neg Hx      Liver disease Neg Hx      Rectal cancer Neg Hx      Stomach cancer Neg Hx         Review of patient's allergies indicates:   Allergen Reactions    Levaquin [levofloxacin] Swelling and Edema    Erythromycin (bulk) Nausea And Vomiting     Not true allergy       Current Outpatient Medications   Medication Sig    albuterol (PROVENTIL/VENTOLIN HFA) 90 mcg/actuation inhaler Inhale 2 puffs into the lungs every 6 (six) hours as needed for Wheezing.    cranberry fruit extract (CRANBERRY EXTRACT ORAL) Take by mouth.    diclofenac sodium (VOLTAREN) 1 % Gel Apply 2 g topically 2 (two) times daily as needed (musculoskeletal pain).    dicyclomine (BENTYL) 20 mg tablet TAKE ONE TABLET BY MOUTH FOUR TIMES A DAY BEFORE MEALS AND NIGHTLY    estradioL (ESTRACE) 0.01 % (0.1 mg/gram) vaginal cream Place 1 g vaginally 3 (three) times a week.    fluocinonide (LIDEX) 0.05 % external solution Apply topically once daily.    gabapentin (NEURONTIN) 300 MG capsule Take 1 capsule (300 mg total) by mouth 2 (two) times daily.    ketoconazole (NIZORAL) 2 %  shampoo Apply topically twice a week.    levothyroxine (SYNTHROID) 75 MCG tablet TAKE ONE TABLET BY MOUTH EVERY DAY ON AN EMPTY STOMACH    LIDOcaine-prilocaine (EMLA) cream Apply topically 2 (two) times daily as needed. To hands.    losartan (COZAAR) 100 MG tablet Take 1 tablet (100 mg total) by mouth once daily.    MAGNESIUM ORAL Take 1 tablet by mouth once daily.    memantine (NAMENDA) 5 MG Tab TAKE ONE TABLET BY MOUTH TWICE A DAY    Multi-Vitamin tablet Take 1 tablet by mouth once daily.     omeprazole (PRILOSEC) 40 MG capsule Take 1 capsule (40 mg total) by mouth every morning.    ondansetron (ZOFRAN) 8 MG tablet Take 1 tablet (8 mg total) by mouth every 8 (eight) hours as needed for Nausea.    oxybutynin (DITROPAN-XL) 10 MG 24 hr tablet TAKE ONE TABLET BY MOUTH EVERY DAY    pregabalin (LYRICA) 50 MG capsule TAKE ONE CAPSULE BY MOUTH EVERY NIGHT FOR 1 WEEK THEN ADD 1 CAPSULE DURING THE DAY IF TOLERATED    PSYLLIUM SEED, WITH SUGAR, (METAMUCIL ORAL) Take by mouth as needed.     RESTASIS 0.05 % ophthalmic emulsion INSTILL ONE DROP INTO BOTH EYES TWO TIMES A DAY    rosuvastatin (CRESTOR) 40 MG Tab TAKE ONE TABLET BY MOUTH EVERY DAY    traZODone (DESYREL) 100 MG tablet TAKE ONE TABLET BY MOUTH EVERY NIGHT AS NEEDED FOR INSOMNIA    traZODone (DESYREL) 50 MG tablet Take 100 mg by mouth nightly as needed.    traZODone (DESYREL) 50 MG tablet Take 1 tablet (50 mg total) by mouth nightly as needed for Insomnia.    triamcinolone acetonide 0.1% (KENALOG) 0.1 % cream AAA bid    amoxicillin-clavulanate 875-125mg (AUGMENTIN) 875-125 mg per tablet Take 1 tablet by mouth every 12 (twelve) hours.    docusate sodium (COLACE) 100 MG capsule Take 1 capsule (100 mg total) by mouth 2 (two) times daily.     No current facility-administered medications for this visit.     REVIEW OF SYSTEMS:    GENERAL:  No weight loss, malaise or fevers.  HEENT:   No recent changes in vision or hearing  NECK:  Negative for lumps, no difficulty with  swallowing.  RESPIRATORY:  Negative for cough, wheezing or shortness of breath, patient denies any recent URI.  CARDIOVASCULAR:  Negative for chest pain, leg swelling or palpitations.  GI:  Negative for abdominal discomfort, blood in stools or black stools or change in bowel habits.  MUSCULOSKELETAL:  See HPI.  SKIN:  Negative for lesions, rash, and itching.  PSYCH:  No mood disorder or recent psychosocial stressors.  Patients sleep is not disturbed secondary to pain.  HEMATOLOGY/LYMPHOLOGY:  Negative for prolonged bleeding, bruising easily or swollen nodes.  Patient is not currently taking any anti-coagulants  NEURO:   No history of headaches, syncope, paralysis, seizures or tremors.  All other reviewed and negative other than HPI.    OBJECTIVE:    /64 (BP Location: Left arm, Patient Position: Sitting)   Pulse 85   Ht 5' (1.524 m)   Wt 63.9 kg (140 lb 14 oz)   BMI 27.51 kg/m²     PHYSICAL EXAMINATION:    GENERAL: Well appearing, in no acute distress, alert and oriented x3.  PSYCH:  Mood and affect appropriate.  SKIN: Skin color, texture, turgor normal, no rashes or lesions.  HEAD/FACE:  Normocephalic, atraumatic. Cranial nerves grossly intact.  CV: RRR with palpation of the radial artery.  PULM: CTAB. No evidence of respiratory difficulty, symmetric chest rise.  GI:  Soft and non-tender.    BACK:   - No obvious deformity or signs of trauma, Normal lumbar lordotic curve  - Negative spinous process tenderness  - Positive paravertebral tenderness  - Negative pain to palpation over the facet joints of the lumbar spine.   - Negative QL / Iliac crest / Glut tenderness  - Slump test is Negative for radicular pain  - Slump test is Negative for back pain  - Supine Straight leg raising is Negative for radicular pain  - Supine Straight leg raising is Negative for back pain  - Lumbar ROM is normal in Flexion without pain  - Lumbar ROM is normal in Extension without pain  - Lumbar ROM is normal in Lateral Flexion  without pain  - Positive Sustained Hip Flexion test (for discogenic pain)  - Negative Altered Gait, Posture  - Axial facet loading test Negative on the bilateral side(s)    SI Joint exam:  - Negative SI joint tenderness to palpation  - Hardik's sign Positive  - Yeoman's Test: Did not perform for SI joint pain indicating anterior SI ligament involvement. Did not perform for anterior thigh pain/paresthesia which indicates femoral nerve stretch.  - Gaenslen's Test:Negative  - Finger Geraldine's Sign:Negative  - SI compression test:Did not perform  - SI distraction test:Negative  - Thigh Thrust: Negative  - SI Thrust: Did not perform    MUSKULOSKELETAL:    EXTREMITIES:   Hip Exam:  - Log Roll Negative  - FADIR Negative  - Stinchfield Did not perform  - Hip Scour Negative  - GTB Tenderness Negative    MUSCULOSKELETAL:  No atrophy or tone abnormalities are noted in the UE or LE.  No deformities, edema, or skin discoloration are noted on visible skin. Good capillary refill.    NEURO: Bilateral upper and lower extremity coordination and muscle stretch reflexes are physiologic and symmetric.      NEUROLOGICAL EXAM:  MENTAL STATUS: A x O x 3, good concentration, speech is fluent and goal directed  MEMORY: recent and remote are intact  CN: CN2-12 grossly intact  MOTOR: 5/5 in all muscle groups  DTRs: 2+ intact symmetric  Sensation:    -no Loss of sensation in a left lower and right lower L-1, L-2, L-3, L-4, and L-5 bilaterally distribution.  Sterling: absent on the bilateral side(s)  Clonus: absent on the bilateral side(s)    GAIT: normal.

## 2025-07-15 ENCOUNTER — APPOINTMENT (OUTPATIENT)
Dept: RADIOLOGY | Facility: CLINIC | Age: 75
End: 2025-07-15
Attending: FAMILY MEDICINE
Payer: MEDICARE

## 2025-07-15 DIAGNOSIS — Z78.0 MENOPAUSE: ICD-10-CM

## 2025-07-15 PROCEDURE — 77080 DXA BONE DENSITY AXIAL: CPT | Mod: TC,FY,PO

## 2025-07-15 PROCEDURE — 77080 DXA BONE DENSITY AXIAL: CPT | Mod: 26,,, | Performed by: INTERNAL MEDICINE

## 2025-07-28 ENCOUNTER — OFFICE VISIT (OUTPATIENT)
Facility: CLINIC | Age: 75
End: 2025-07-28
Payer: MEDICARE

## 2025-07-28 ENCOUNTER — HOSPITAL ENCOUNTER (OUTPATIENT)
Dept: RADIOLOGY | Facility: HOSPITAL | Age: 75
Discharge: HOME | End: 2025-07-28
Payer: MEDICARE

## 2025-07-28 VITALS
BODY MASS INDEX: 27.99 KG/M2 | WEIGHT: 143.31 LBS | DIASTOLIC BLOOD PRESSURE: 77 MMHG | SYSTOLIC BLOOD PRESSURE: 162 MMHG | HEART RATE: 79 BPM

## 2025-07-28 DIAGNOSIS — M25.511 BILATERAL SHOULDER PAIN, UNSPECIFIED CHRONICITY: ICD-10-CM

## 2025-07-28 DIAGNOSIS — M75.21 BICEPS TENDINITIS OF RIGHT UPPER EXTREMITY: Primary | ICD-10-CM

## 2025-07-28 DIAGNOSIS — M75.41 IMPINGEMENT SYNDROME OF RIGHT SHOULDER: ICD-10-CM

## 2025-07-28 DIAGNOSIS — M25.512 BILATERAL SHOULDER PAIN, UNSPECIFIED CHRONICITY: ICD-10-CM

## 2025-07-28 DIAGNOSIS — S46.011A STRAIN OF RIGHT ROTATOR CUFF CAPSULE, INITIAL ENCOUNTER: ICD-10-CM

## 2025-07-28 PROCEDURE — 99213 OFFICE O/P EST LOW 20 MIN: CPT | Mod: PBBFAC,25

## 2025-07-28 PROCEDURE — 99999 PR PBB SHADOW E&M-EST. PATIENT-LVL III: CPT | Mod: PBBFAC,,,

## 2025-07-28 PROCEDURE — 73030 X-RAY EXAM OF SHOULDER: CPT | Mod: 26,50,, | Performed by: RADIOLOGY

## 2025-07-28 PROCEDURE — 73030 X-RAY EXAM OF SHOULDER: CPT | Mod: TC,50

## 2025-07-28 PROCEDURE — 99214 OFFICE O/P EST MOD 30 MIN: CPT | Mod: S$PBB,,,

## 2025-07-28 RX ORDER — MELOXICAM 15 MG/1
15 TABLET ORAL DAILY
Qty: 30 TABLET | Refills: 0 | Status: SHIPPED | OUTPATIENT
Start: 2025-07-28 | End: 2025-08-27

## 2025-07-28 RX ORDER — DIAZEPAM 5 MG/1
TABLET ORAL
Qty: 2 TABLET | Refills: 0 | Status: SHIPPED | OUTPATIENT
Start: 2025-07-28

## 2025-07-28 NOTE — PROGRESS NOTES
Subjective:      Patient ID: Jackelyn Kendrick is a 74 y.o. female.    Chief Complaint: Pain of the Right Shoulder (Pain began ~2 weeks ago when pt tried to lift // pain has been increasing since) and Pain of the Left Shoulder    HPI:   Jackelyn presents with bilateral shoulder pain following an incident approximately 2 weeks ago when she attempted to lift her  who had tripped over their dog. The right shoulder is worse than the left. She describes pain in both shoulders, with increased pain in the right shoulder on cross-body movement. Pain has caused significant functional limitations since the incident. She reports inability to put her hand behind her back, put on a bra, or lift anything (right).    She reports a history of bilateral shoulder rotator cuff surgeries with Dr. Pickens stating the most recent on the right shoulder was 3 years ago. She has been receiving injections in the left shoulder over the past year. She reports a constant stiff neck but denies any new neck pain.    The pain is becoming progressively worse.  Pain is located over (points to) anterior bilateral shoulder. She reports that the pain is a 6 /10 sharp, radiating, and pain with movement today. The pain is affecting ADLs and limiting desired level of activity. Denies numbness, tingling. Moderate pain to bear weight. Pain is 10 /10 at its worst.     Occupation: retired    Ambulating: unassisted  Diabetic:  No  Smoking:  She has never smoked.  History of DVT/PE: Negative    PAST MEDICAL HISTORY:    Past Medical History:   Diagnosis Date    Arthritis     Basal cell carcinoma     Bronchitis     seasonal    Cataract     Disorder of kidney and ureter     Diverticulitis     Dry eye syndrome     GERD (gastroesophageal reflux disease)     Hyperlipidemia     Hypertension     Hypothyroidism 07/19/2012    Obese     PONV (postoperative nausea and vomiting)     Thyroid disease      PAST SURGICAL HISTORY:    Past Surgical History:    Procedure Laterality Date    ANORECTAL MANOMETRY N/A 06/01/2023    Procedure: MANOMETRY, ANORECTAL;  Surgeon: Paulo Pritchett MD;  Location: [facility] ENDO (4TH FLR);  Service: Endoscopy;  Laterality: N/A;  instructions sent to [facility]  5/26 pre-call no answer; MB    ARTHROSCOPIC REPAIR OF ROTATOR CUFF OF SHOULDER Right 09/23/2020    Procedure: REPAIR, ROTATOR CUFF, ARTHROSCOPIC;  Surgeon: Reginald Pickens MD;  Location: [facility] OR;  Service: Orthopedics;  Laterality: Right;  regional w/catheter (interscalene)    BACK SURGERY      CARPAL TUNNEL RELEASE Left 01/11/2024    Procedure: RELEASE, CARPAL TUNNEL;  Surgeon: Suzy Dominguez MD;  Location: [facility] OR;  Service: Orthopedics;  Laterality: Left;    CATARACT EXTRACTION W/  INTRAOCULAR LENS IMPLANT Left 10/20/2021        CHOLECYSTECTOMY      COLONOSCOPY  2007    diverticulosis    COLONOSCOPY N/A 09/03/2020    Procedure: COLONOSCOPY;  Surgeon: Alberta Henriquez MD;  Location: [facility] ENDO (4TH FLR);  Service: Colon and Rectal;  Laterality: N/A;  pt requested this time-8/31-covid-uc [place]-[facility]    CYSTOSCOPY      DE QUERVAIN'S RELEASE Left 03/2017    DECOMPRESSION OF NERVE Left 01/11/2024    Procedure: DECOMPRESSION, NERVE ULNAR;  Surgeon: Suzy Dominguez MD;  Location: [facility] OR;  Service: Orthopedics;  Laterality: Left;    DUPUYTREN CONTRACTURE RELEASE Left 01/11/2024    Procedure: RELEASE, DUPUYTREN CONTRACTURE, PALM;  Surgeon: Suzy Dominguez MD;  Location: [facility] OR;  Service: Orthopedics;  Laterality: Left;    EPIDURAL STEROID INJECTION INTO LUMBAR SPINE Right 4/29/2024    Procedure: RT L3-4 L4-5 TFESI;  Surgeon: Mick Pineda DO;  Location: [facility] PAIN MANAGEMENT;  Service: Pain Management;  Laterality: Right;  20 mins    FIXATION OF TENDON Right 09/23/2020    Procedure: FIXATION, TENDON;  Surgeon: Reginald Pickens MD;  Location: [facility] OR;  Service: Orthopedics;  Laterality: Right;    HERNIA REPAIR      HYSTERECTOMY      INJECTION OF  STEROID Right 12/02/2021    Procedure: INJECTION, STEROID;  Surgeon: Suzy Dominguez MD;  Location: [facility] OR;  Service: Orthopedics;  Laterality: Right;    INJECTION OF STEROID Right 01/11/2024    Procedure: INJECTION, STEROID 1ST DORSAL COMPARTMENT;  Surgeon: Suzy Dominguez MD;  Location: [facility] OR;  Service: Orthopedics;  Laterality: Right;    INTRAOCULAR PROSTHESES INSERTION Left 10/20/2021    Procedure: INSERTION, IOL PROSTHESIS;  Surgeon: Pippa Ashby MD;  Location: [facility] OR 1ST FLR;  Service: Ophthalmology;  Laterality: Left;    INTRAOCULAR PROSTHESES INSERTION Right 12/29/2021    Procedure: INSERTION, IOL PROSTHESIS;  Surgeon: Pippa Ashby MD;  Location: [facility] OR 1ST FLR;  Service: Ophthalmology;  Laterality: Right;    NASAL SEPTUM SURGERY      PHACOEMULSIFICATION OF CATARACT Left 10/20/2021    Procedure: PHACOEMULSIFICATION, CATARACT/ COMPLEX- PHACO / IOL - OS SMALL PUPIL-OLE RING AND TRYPAN BLUE;  Surgeon: Pippa Ashby MD;  Location: [facility] OR 1ST FLR;  Service: Ophthalmology;  Laterality: Left;    PHACOEMULSIFICATION OF CATARACT Right 12/29/2021    Procedure: PHACOEMULSIFICATION, CATARACT;  Surgeon: Pippa Ashby MD;  Location: [facility] OR 1ST FLR;  Service: Ophthalmology;  Laterality: Right;    SHOULDER SURGERY      TONSILLECTOMY      TRIGGER FINGER RELEASE Left 12/02/2021    Procedure: RELEASE, TRIGGER FINGER;  Surgeon: Suzy Dominguez MD;  Location: [facility] OR;  Service: Orthopedics;  Laterality: Left;     FAMILY HISTORY:    Family History   Problem Relation Name Age of Onset    Cataracts Mother      Breast cancer Mother      Diabetes Mother      Hypertension Mother      Heart failure Mother      Heart disease Mother      Cataracts Father      Hypertension Father      Stroke Father      No Known Problems Daughter      No Known Problems Son      Diabetes Paternal Grandmother      Hyperlipidemia Sister x1     Arthritis Sister x1     Hyperlipidemia Brother x5     Arthritis Brother x5      No Known Problems Son      Amblyopia Neg Hx      Blindness Neg Hx      Glaucoma Neg Hx      Macular degeneration Neg Hx      Retinal detachment Neg Hx      Strabismus Neg Hx      Ovarian cancer Neg Hx      Melanoma Neg Hx      Celiac disease Neg Hx      Colon cancer Neg Hx      Colon polyps Neg Hx      Esophageal cancer Neg Hx      Liver cancer Neg Hx      Liver disease Neg Hx      Rectal cancer Neg Hx      Stomach cancer Neg Hx       SOCIAL HISTORY:    Social History     Occupational History    Not on file   Tobacco Use    Smoking status: Never     Passive exposure: Never    Smokeless tobacco: Never   Substance and Sexual Activity    Alcohol use: No     Comment: rare on a special occasion    Drug use: No    Sexual activity: Never      MEDICATIONS: Current Medications[1]  ALLERGIES:   Review of patient's allergies indicates:   Allergen Reactions    Levaquin [levofloxacin] Swelling and Edema    Erythromycin (bulk) Nausea And Vomiting     Not true allergy     Review of Systems:  Constitution: Negative for chills, fever and night sweats.   HENT: Negative for congestion and headaches.    Eyes: Negative for blurred vision or vision loss.  Cardiovascular: Negative for chest pain and syncope.   Respiratory: Negative for cough and shortness of breath.    Hematologic/Lymphatic: Negative for bleeding problem. Does not bruise/bleed easily.   Skin: Negative for dry skin, itching and rash.   Musculoskeletal: See HPI.   Gastrointestinal: Negative for abdominal pain and bowel incontinence.   Genitourinary: Negative for bladder incontinence and nocturia.   Neurological: Negative for disturbances in coordination, loss of balance and seizures.   Psychiatric/Behavioral: Negative for depression. The patient does not have insomnia.        Objective:      Vitals:    07/28/25 1225   BP: (!) 162/77   Pulse: 79     PHYSICAL EXAM:  General: Alert & oriented x3, well-developed and well-nourished, in no acute distress, sitting comfortably  in the exam room.  Skin: Warm and dry. Capillary refill less than 2 seconds.   Head: Normocephalic and atraumatic.   Eyes: Sclera appear normal.   Nose: No deformities seen.   Ears: No deformities seen.   Neck: No tracheal deviation present.   Pulmonary/Chest: Breathing unlabored.   Neurological: Alert and oriented to person, place, and time.   Psychiatric: Mood is pleasant and affect appropriate.     RIGHT SHOULDER     OBSERVATION:     Swelling  none  Deformity  none   Discoloration  none   Scapular winging none   Scars   none  Atrophy  none    TENDERNESS           Clavicle   Negative         AC Jt.    +        SC Jt.    Negative          Acromion:  Negative        Scapular Spine Negative   Supraspinatus  Negative       Infraspinatus  Negative   LH Biceps   +   Greater Tub.  Negative   Trapezius  Negative   Cervical spine  Negative        ROM: (* = with pain)           FE    *120°       ER at 0°    *60°        ER at 90° ABD  *90°           IR (spine level)   Patient unable to complete        STRENGTH: (* = with pain)    SCAPTION   5/5        IR    5/5       ER    5/5       BICEPS   5/5       Deltoid    5/5         SPECIAL TESTS: Painful side   Empty can test  Positive  Full can test   Positive  Resisted internal rotation Positive  Resisted external rotation Positive  Neer's test   Positive  Page'-Abner test Positive  Drop Arm                                Negative  Cross Body Adduction            Positive  Lift Off                                     Patient unable to complete    STABILITY TESTING      Translation       Anterior  Normal      Posterior  Normal     Sulcus   < 10mm      LEFT SHOULDER     OBSERVATION:     Swelling  none  Deformity  none   Discoloration  none   Scapular winging none   Scars   none  Atrophy  none    TENDERNESS           Clavicle   Negative         AC Jt.    Negative        SC Jt.    Negative          Acromion:  Negative        Scapular  Spine Negative   Supraspinatus  Negative       Infraspinatus  Negative   LH Biceps   Negative   Greater Tub.  Negative   Trapezius  Negative   Cervical spine  Negative        ROM: (* = with pain)          FE    *160°  (R>L)     ER at 0°    *60°   (R>L)     ER at 90° ABD  90°          IR (spine level)   T10         STRENGTH: (* = with pain)    SCAPTION   5/5        IR    5/5       ER    5/5       BICEPS   5/5       Deltoid    5/5         SPECIAL TESTS: Painful side (R>L)   Empty can test  Positive  Full can test   Positive  Resisted internal rotation Positive  Resisted external rotation Negative  Neer's test   Positive  Page'-Abner test Positive  Drop Arm                                Negative  Cross Body Adduction            Negative  Lift Off                                     Negative    STABILITY TESTING      Translation    Anterior  Normal      Posterior  Normal     Sulcus   < 10mm    Neurovascular Exam Bilateral UEs:  Sensation intact to light touch in the distal median, radial, and ulnar nerve distributions bilaterally.  Capillary refill intact <2 seconds in all digits bilaterally     NECK:  Painless FROM and spinous processes non-tender. Negative Spurlings sign.      Imaging:   X-Rays: 3 view  bilateral Shoulder X-rays ordered/reviewed by me showing DJD with no evidence of fracture or dislocation. There is no obvious malalignment. No evidence of masses, lesions or foreign bodies. Calcification noted near humeral head of left shoulder.    Assessment:      1. Biceps tendinitis of right upper extremity  MRI Shoulder Without Contrast Right    Ambulatory referral/consult to Sports Medicine    meloxicam (MOBIC) 15 MG tablet    Ambulatory Referral/Consult to Physical Therapy    diazePAM (VALIUM) 5 MG tablet      2. Strain of right rotator cuff capsule, initial encounter  MRI Shoulder Without Contrast Right    Ambulatory referral/consult to Sports Medicine    meloxicam (MOBIC) 15 MG tablet    Ambulatory  Referral/Consult to Physical Therapy    diazePAM (VALIUM) 5 MG tablet      3. Impingement syndrome of right shoulder        4. Bilateral shoulder pain, unspecified chronicity  X-Ray Shoulder Complete Bilateral    meloxicam (MOBIC) 15 MG tablet    Ambulatory Referral/Consult to Physical Therapy         Plan:       Orders Placed This Encounter    X-Ray Shoulder Complete Bilateral    MRI Shoulder Without Contrast Right    Ambulatory referral/consult to Sports Medicine    Ambulatory Referral/Consult to Physical Therapy    meloxicam (MOBIC) 15 MG tablet    diazePAM (VALIUM) 5 MG tablet     I made the decision to obtain old records of the patient including previous notes and imaging. New imaging was ordered today of the extremity or extremities evaluated. I independently reviewed and interpreted the radiographs today as well as prior imaging. Reviewed imaging in detail with patient.     I explained the nature of the problem to the patient. I discussed at length with the patient all the different treatment options available for her bilateral shoulders including anti-inflammatories, acetaminophen, rest, ice, heat, formal physical therapy including strengthening and stretching exercises, home exercise programs, dry needling and corticosteroid injections. I explained the potential role of surgery in the treatment of this condition. The patient understands that if non-surgical measures do not adequately control symptoms, surgery will be considered in the future.     Medications:  Prescription for Meloxicam (Mobic) 15 mg once daily provided today for PRN pain management. Patient understands to take with food and/or OTC prilosec to decrease GI side effects. Advised patient to refrain from taking other anti-inflammatory medications while taking this medication, however she may alternate with acetaminophen as needed.  Mobic daily for 7 days, then as needed.  Valium rx for MRI procedure due to patient's claustrophobia.  Physical  Therapy: Ambulatory referral to physical therapy for RC protocol and bilateral shoulder ROM, stretching, strengthening, and conditioning pending MRI completion and discussion of results.  Procedures: None today. Consider CSI if MRI WNL.  Pain Management: Ice compress to the affected area 2-3x a day for 15-20 minutes as needed for pain management.  Ordered MRI Right Shoulder to evaluate for potential RC vs biceps injury or exacerbation of previous condition. Scheduled for 8/4/25.        Follow-Up: Sports medicine for follow-up. Established patient of Dr. Pickens. Appointment to be scheduled on 8/7/25 for MRI review.    All questions were answered and patient is agreeable to the above plan. The patient will contact us if they have any questions or concerns in the interim.      CHARLES Coelho  [facility]    This note was generated with the assistance of ambient listening technology. Verbal consent was obtained by the patient and accompanying visitor(s) for the recording of patient appointment to facilitate this note. I attest to having reviewed and edited the generated note for accuracy, though some syntax or spelling errors may persist. Please contact the author of this note for any clarification.       [1]   Current Outpatient Medications:     albuterol (PROVENTIL/VENTOLIN HFA) 90 mcg/actuation inhaler, Inhale 2 puffs into the lungs every 6 (six) hours as needed for Wheezing., Disp: 6.7 g, Rfl: 11    cranberry fruit extract (CRANBERRY EXTRACT ORAL), Take by mouth., Disp: , Rfl:     diclofenac sodium (VOLTAREN) 1 % Gel, Apply 2 g topically 2 (two) times daily as needed (musculoskeletal pain)., Disp: 100 g, Rfl: 11    dicyclomine (BENTYL) 20 mg tablet, TAKE ONE TABLET BY MOUTH FOUR TIMES A DAY BEFORE MEALS AND NIGHTLY, Disp: 120 tablet, Rfl: 0    estradioL (ESTRACE) 0.01 % (0.1 mg/gram) vaginal cream, Place 1 g vaginally 3 (three) times a week., Disp: 42.5 g, Rfl: 3     fluocinonide (LIDEX) 0.05 % external solution, Apply topically once daily., Disp: 60 mL, Rfl: 11    gabapentin (NEURONTIN) 300 MG capsule, Take 1 capsule (300 mg total) by mouth 2 (two) times daily., Disp: 180 capsule, Rfl: 3    ketoconazole (NIZORAL) 2 % shampoo, Apply topically twice a week., Disp: 120 mL, Rfl: 11    levothyroxine (SYNTHROID) 75 MCG tablet, TAKE ONE TABLET BY MOUTH EVERY DAY ON AN EMPTY STOMACH, Disp: 90 tablet, Rfl: 3    LIDOcaine-prilocaine (EMLA) cream, Apply topically 2 (two) times daily as needed. To hands., Disp: 30 g, Rfl: 2    losartan (COZAAR) 100 MG tablet, Take 1 tablet (100 mg total) by mouth once daily., Disp: 90 tablet, Rfl: 3    MAGNESIUM ORAL, Take 1 tablet by mouth once daily., Disp: , Rfl:     memantine (NAMENDA) 5 MG Tab, TAKE ONE TABLET BY MOUTH TWICE A DAY, Disp: 180 tablet, Rfl: 0    Multi-Vitamin tablet, Take 1 tablet by mouth once daily. , Disp: , Rfl:     omeprazole (PRILOSEC) 40 MG capsule, Take 1 capsule (40 mg total) by mouth every morning., Disp: 90 capsule, Rfl: 3    ondansetron (ZOFRAN) 8 MG tablet, Take 1 tablet (8 mg total) by mouth every 8 (eight) hours as needed for Nausea., Disp: 30 tablet, Rfl: 0    oxybutynin (DITROPAN-XL) 10 MG 24 hr tablet, TAKE ONE TABLET BY MOUTH EVERY DAY, Disp: 90 tablet, Rfl: 3    PSYLLIUM SEED, WITH SUGAR, (METAMUCIL ORAL), Take by mouth as needed. , Disp: , Rfl:     RESTASIS 0.05 % ophthalmic emulsion, INSTILL ONE DROP INTO BOTH EYES TWO TIMES A DAY, Disp: 60 each, Rfl: 12    rosuvastatin (CRESTOR) 40 MG Tab, TAKE ONE TABLET BY MOUTH EVERY DAY, Disp: 90 tablet, Rfl: 3    traZODone (DESYREL) 100 MG tablet, TAKE ONE TABLET BY MOUTH EVERY NIGHT AS NEEDED FOR INSOMNIA, Disp: 90 tablet, Rfl: 3    traZODone (DESYREL) 50 MG tablet, Take 100 mg by mouth nightly as needed., Disp: , Rfl:     traZODone (DESYREL) 50 MG tablet, Take 1 tablet (50 mg total) by mouth nightly as needed for Insomnia., Disp: 90 tablet, Rfl: 3    triamcinolone acetonide  0.1% (KENALOG) 0.1 % cream, AAA bid, Disp: 60 g, Rfl: 3    diazePAM (VALIUM) 5 MG tablet, Take 1 tablet 1 hour prior to the MRI.  Take another tablet immediately prior if needed for anxiety., Disp: 2 tablet, Rfl: 0    meloxicam (MOBIC) 15 MG tablet, Take 1 tablet (15 mg total) by mouth once daily., Disp: 30 tablet, Rfl: 0

## 2025-07-29 ENCOUNTER — OFFICE VISIT (OUTPATIENT)
Dept: ORTHOPEDICS | Facility: CLINIC | Age: 75
End: 2025-07-29
Payer: MEDICARE

## 2025-07-29 ENCOUNTER — HOSPITAL ENCOUNTER (OUTPATIENT)
Dept: RADIOLOGY | Facility: HOSPITAL | Age: 75
Discharge: HOME | End: 2025-07-29
Attending: ORTHOPAEDIC SURGERY
Payer: MEDICARE

## 2025-07-29 VITALS — HEIGHT: 60 IN | BODY MASS INDEX: 28.13 KG/M2 | WEIGHT: 143.31 LBS

## 2025-07-29 DIAGNOSIS — M25.571 PAIN IN JOINT INVOLVING RIGHT ANKLE AND FOOT: ICD-10-CM

## 2025-07-29 DIAGNOSIS — R52 PAIN: ICD-10-CM

## 2025-07-29 DIAGNOSIS — S86.301A INJURY OF PERONEAL TENDON OF RIGHT FOOT, INITIAL ENCOUNTER: ICD-10-CM

## 2025-07-29 DIAGNOSIS — S93.401S MODERATE RIGHT ANKLE SPRAIN, SEQUELA: Primary | ICD-10-CM

## 2025-07-29 PROCEDURE — 73630 X-RAY EXAM OF FOOT: CPT | Mod: TC,RT

## 2025-07-29 PROCEDURE — 99213 OFFICE O/P EST LOW 20 MIN: CPT | Mod: S$PBB,,, | Performed by: ORTHOPAEDIC SURGERY

## 2025-07-29 PROCEDURE — 73610 X-RAY EXAM OF ANKLE: CPT | Mod: 26,RT,, | Performed by: RADIOLOGY

## 2025-07-29 PROCEDURE — 99999 PR PBB SHADOW E&M-EST. PATIENT-LVL V: CPT | Mod: PBBFAC,,, | Performed by: ORTHOPAEDIC SURGERY

## 2025-07-29 PROCEDURE — 73630 X-RAY EXAM OF FOOT: CPT | Mod: 26,RT,, | Performed by: RADIOLOGY

## 2025-07-29 PROCEDURE — 99215 OFFICE O/P EST HI 40 MIN: CPT | Mod: PBBFAC,25 | Performed by: ORTHOPAEDIC SURGERY

## 2025-07-29 PROCEDURE — 73610 X-RAY EXAM OF ANKLE: CPT | Mod: TC,RT

## 2025-07-29 NOTE — PROGRESS NOTES
Subjective:      Patient ID: Jackelyn Kendrick is a 74 y.o. female.    Chief Complaint: Pain of the Right Foot and Pain of the Right Ankle      HPI:   History of Present Illness    HPI:  Patient presents with right foot pain that began approximately two months ago after twisting her foot when she stood up after it had fallen asleep, causing it to roll. The pain is located on the side of her foot. Initially, there was significant swelling and bruising, which have resolved. However, pain persists and has not improved over the past two months. She describes pain extending around the foot, particularly when moving it in certain ways. Pain is not incapacitating but causes discomfort. She reports discomfort with specific movements, notably pain when moving her ankle downward and when making circular motions.    She denies pain over the lateral malleolus, pain in the posterior ankle, and pain with resisted ankle eversion and inversion.    PREVIOUS TREATMENTS:  - Ankle support: Provides comfort when worn    WORK STATUS:  - Foot injury has not prevented patient from performing activities  - Continues to experience pain, but not incapacitating      ROS:  Musculoskeletal: +joint pain, -joint swelling, +limb pain, -limb swelling, +pain with movement, +limited movement  Neurological: +numbness  Hematologic/Lymphatic: -easy bruising          Past Medical History:   Diagnosis Date    Arthritis     Basal cell carcinoma     Bronchitis     seasonal    Cataract     Disorder of kidney and ureter     Diverticulitis     Dry eye syndrome     GERD (gastroesophageal reflux disease)     Hyperlipidemia     Hypertension     Hypothyroidism 07/19/2012    Obese     PONV (postoperative nausea and vomiting)     Thyroid disease      Current Medications[1]  Review of patient's allergies indicates:   Allergen Reactions    Levaquin [levofloxacin] Swelling and Edema    Erythromycin (bulk) Nausea And Vomiting     Not true allergy       Ht 5' (1.524 m)    Wt 65 kg (143 lb 4.8 oz)   BMI 27.99 kg/m²     Objective:    Ortho Exam   Physical Exam    Is a well-developed well-nourished female who walks in with a slight antalgic gait.  Inspection of the right ankle reveals no significant swelling compared to the left this morning.  On sitting exam she has functional motion of her ankle but reports pain with plantar flexion as well as eversion of her ankle and hindfoot.  There is no tenderness over the distal fibula.  There is mild tenderness over the anterolateral ligaments.  There is some tenderness along the course of the peroneal tendons and she has pain and weakness with resistance to eversion of her hindfoot.  There is no gross instability of the ankle.  There is no medial-sided tenderness.  She is neurovascularly intact.      Imaging:  I ordered and reviewed x-ray of the right foot and ankle today.  The right ankle x-ray reveals no acute bone injuries.  The right foot x-ray also does not show any significant bony injuries but there is a small ossicle and calcification off of the base of the 5th metatarsal.             Assessment:       1. Moderate right ankle sprain, sequela    2. Pain in joint involving right ankle and foot    3. Possible Injury of peroneal tendon of right foot, initial encounter          Plan:       Orders Placed This Encounter    X-Ray Foot Complete Right    X-Ray Ankle Complete Right    MRI Ankle Without Contrast Right     Assessment & Plan    - Ordered XR Right Ankle and Foot to evaluate for structural abnormalities.  - Follow up after imaging study.        Recommendation:  This patient has sustained an inversion injury of her right ankle about two months ago without any improvement over time.  I would like to obtain an MRI at this point to evaluate the lateral ankle structures especially the peroneal tendons.    Follow-up with results          This note was generated with the assistance of ambient listening technology. Verbal consent was  obtained by the patient and accompanying visitor(s) for the recording of patient appointment to facilitate this note. I attest to having reviewed and edited the generated note for accuracy, though some syntax or spelling errors may persist. Please contact the author of this note for any clarification.               [1]   Current Outpatient Medications:     albuterol (PROVENTIL/VENTOLIN HFA) 90 mcg/actuation inhaler, Inhale 2 puffs into the lungs every 6 (six) hours as needed for Wheezing., Disp: 6.7 g, Rfl: 11    cranberry fruit extract (CRANBERRY EXTRACT ORAL), Take by mouth., Disp: , Rfl:     diazePAM (VALIUM) 5 MG tablet, Take 1 tablet 1 hour prior to the MRI.  Take another tablet immediately prior if needed for anxiety., Disp: 2 tablet, Rfl: 0    diclofenac sodium (VOLTAREN) 1 % Gel, Apply 2 g topically 2 (two) times daily as needed (musculoskeletal pain)., Disp: 100 g, Rfl: 11    dicyclomine (BENTYL) 20 mg tablet, TAKE ONE TABLET BY MOUTH FOUR TIMES A DAY BEFORE MEALS AND NIGHTLY, Disp: 120 tablet, Rfl: 0    estradioL (ESTRACE) 0.01 % (0.1 mg/gram) vaginal cream, Place 1 g vaginally 3 (three) times a week., Disp: 42.5 g, Rfl: 3    fluocinonide (LIDEX) 0.05 % external solution, Apply topically once daily., Disp: 60 mL, Rfl: 11    gabapentin (NEURONTIN) 300 MG capsule, Take 1 capsule (300 mg total) by mouth 2 (two) times daily., Disp: 180 capsule, Rfl: 3    ketoconazole (NIZORAL) 2 % shampoo, Apply topically twice a week., Disp: 120 mL, Rfl: 11    levothyroxine (SYNTHROID) 75 MCG tablet, TAKE ONE TABLET BY MOUTH EVERY DAY ON AN EMPTY STOMACH, Disp: 90 tablet, Rfl: 3    LIDOcaine-prilocaine (EMLA) cream, Apply topically 2 (two) times daily as needed. To hands., Disp: 30 g, Rfl: 2    losartan (COZAAR) 100 MG tablet, Take 1 tablet (100 mg total) by mouth once daily., Disp: 90 tablet, Rfl: 3    MAGNESIUM ORAL, Take 1 tablet by mouth once daily., Disp: , Rfl:     meloxicam (MOBIC) 15 MG tablet, Take 1 tablet (15 mg  total) by mouth once daily., Disp: 30 tablet, Rfl: 0    memantine (NAMENDA) 5 MG Tab, TAKE ONE TABLET BY MOUTH TWICE A DAY, Disp: 180 tablet, Rfl: 0    Multi-Vitamin tablet, Take 1 tablet by mouth once daily. , Disp: , Rfl:     omeprazole (PRILOSEC) 40 MG capsule, Take 1 capsule (40 mg total) by mouth every morning., Disp: 90 capsule, Rfl: 3    ondansetron (ZOFRAN) 8 MG tablet, Take 1 tablet (8 mg total) by mouth every 8 (eight) hours as needed for Nausea., Disp: 30 tablet, Rfl: 0    oxybutynin (DITROPAN-XL) 10 MG 24 hr tablet, TAKE ONE TABLET BY MOUTH EVERY DAY, Disp: 90 tablet, Rfl: 3    PSYLLIUM SEED, WITH SUGAR, (METAMUCIL ORAL), Take by mouth as needed. , Disp: , Rfl:     RESTASIS 0.05 % ophthalmic emulsion, INSTILL ONE DROP INTO BOTH EYES TWO TIMES A DAY, Disp: 60 each, Rfl: 12    rosuvastatin (CRESTOR) 40 MG Tab, TAKE ONE TABLET BY MOUTH EVERY DAY, Disp: 90 tablet, Rfl: 3    traZODone (DESYREL) 100 MG tablet, TAKE ONE TABLET BY MOUTH EVERY NIGHT AS NEEDED FOR INSOMNIA, Disp: 90 tablet, Rfl: 3    traZODone (DESYREL) 50 MG tablet, Take 100 mg by mouth nightly as needed., Disp: , Rfl:     traZODone (DESYREL) 50 MG tablet, Take 1 tablet (50 mg total) by mouth nightly as needed for Insomnia., Disp: 90 tablet, Rfl: 3    triamcinolone acetonide 0.1% (KENALOG) 0.1 % cream, AAA bid, Disp: 60 g, Rfl: 3

## 2025-08-01 ENCOUNTER — OFFICE VISIT (OUTPATIENT)
Dept: URGENT CARE | Facility: CLINIC | Age: 75
End: 2025-08-01
Payer: MEDICARE

## 2025-08-01 VITALS
SYSTOLIC BLOOD PRESSURE: 153 MMHG | DIASTOLIC BLOOD PRESSURE: 80 MMHG | OXYGEN SATURATION: 96 % | BODY MASS INDEX: 28.07 KG/M2 | HEIGHT: 60 IN | TEMPERATURE: 98 F | RESPIRATION RATE: 18 BRPM | WEIGHT: 143 LBS | HEART RATE: 70 BPM

## 2025-08-01 DIAGNOSIS — N30.00 ACUTE CYSTITIS WITHOUT HEMATURIA: Primary | ICD-10-CM

## 2025-08-01 DIAGNOSIS — R30.0 DYSURIA: ICD-10-CM

## 2025-08-01 LAB
BILIRUBIN, UA POC OHS: NEGATIVE
BLOOD, UA POC OHS: ABNORMAL
CLARITY, UA POC OHS: ABNORMAL
COLOR, UA POC OHS: YELLOW
GLUCOSE, UA POC OHS: NEGATIVE
KETONES, UA POC OHS: NEGATIVE
LEUKOCYTES, UA POC OHS: ABNORMAL
NITRITE, UA POC OHS: POSITIVE
PH, UA POC OHS: 5.5
PROTEIN, UA POC OHS: 100
SPECIFIC GRAVITY, UA POC OHS: >=1.03
UROBILINOGEN, UA POC OHS: 0.2

## 2025-08-01 PROCEDURE — 87086 URINE CULTURE/COLONY COUNT: CPT | Performed by: SURGERY

## 2025-08-01 RX ORDER — SULFAMETHOXAZOLE AND TRIMETHOPRIM 800; 160 MG/1; MG/1
1 TABLET ORAL 2 TIMES DAILY
Qty: 14 TABLET | Refills: 0 | Status: SHIPPED | OUTPATIENT
Start: 2025-08-01 | End: 2025-08-08

## 2025-08-01 NOTE — TELEPHONE ENCOUNTER
No care due was identified.  [facility] Embedded Care Due Messages. Reference number: [id number].   7/31/2025 8:23:14 PM CDT

## 2025-08-01 NOTE — PROGRESS NOTES
Subjective:      Patient ID: Jackelyn Kendrick is a 74 y.o. female.    Vitals:  height is 5' (1.524 m) and weight is 64.9 kg (143 lb). Her oral temperature is 98 °F (36.7 °C). Her blood pressure is 153/80 (abnormal) and her pulse is 70. Her respiration is 18 and oxygen saturation is 96%.     Chief Complaint: Dysuria    Dysuria   This is a new problem. The current episode started in the past 7 days (Tuesday). The problem has been gradually worsening. The quality of the pain is described as aching and burning. The pain is moderate. Associated symptoms include frequency and urgency. Pertinent negatives include no behavior changes, chills, discharge, flank pain, hematuria, hesitancy, nausea, possible pregnancy, sweats, vomiting, weight loss, bubble bath use, constipation, rash or withholding. She has tried nothing for the symptoms. Her past medical history is significant for recurrent UTIs. There is no history of catheterization, diabetes insipidus, diabetes mellitus, genitourinary reflux, hypertension, kidney stones, a single kidney, STD, urinary stasis or a urological procedure.       Constitution: Negative for chills.   Gastrointestinal:  Negative for nausea, vomiting and constipation.   Genitourinary:  Positive for dysuria, frequency and urgency. Negative for flank pain and hematuria.   Skin:  Negative for rash.      Objective:     Physical Exam   Constitutional: She is oriented to person, place, and time. She appears well-developed.   HENT:   Head: Normocephalic and atraumatic.   Ears:   Right Ear: External ear normal.   Left Ear: External ear normal.   Nose: Nose normal. No nasal deformity. No epistaxis.   Mouth/Throat: Oropharynx is clear and moist and mucous membranes are normal.   Eyes: Lids are normal.   Neck: Trachea normal and phonation normal. Neck supple.   Cardiovascular: Normal pulses.   Pulmonary/Chest: Effort normal.   Abdominal: Normal appearance and bowel sounds are normal. She exhibits no  distension. Soft. There is no abdominal tenderness.   Neurological: She is alert and oriented to person, place, and time.   Skin: Skin is warm, dry and intact.   Psychiatric: Her speech is normal and behavior is normal.   Nursing note and vitals reviewed.      Assessment:     1. Acute cystitis without hematuria    2. Dysuria        Plan:       Acute cystitis without hematuria  -     sulfamethoxazole-trimethoprim 800-160mg (BACTRIM DS) 800-160 mg Tab; Take 1 tablet by mouth 2 (two) times daily. for 7 days  Dispense: 14 tablet; Refill: 0  -     Urine Culture High Risk ($$)    Dysuria  -     POCT Urinalysis(Instrument)      Results for orders placed or performed in visit on 08/01/25   POCT Urinalysis(Instrument)    Collection Time: 08/01/25 10:06 AM   Result Value Ref Range    Color, POC UA Yellow Yellow, Straw, Colorless    Clarity, POC UA Cloudy (A) Clear    Glucose, POC UA Negative Negative    Bilirubin, POC UA Negative Negative    Ketones, POC UA Negative Negative    Spec Grav POC UA >=1.030 1.005 - 1.030    Blood, POC UA Moderate (A) Negative    pH, POC UA 5.5 5.0 - 8.0    Protein, POC  (A) Negative    Urobilinogen, POC UA 0.2 <=1.0    Nitrite, POC UA Positive (A) Negative    WBC, POC UA Large (A) Negative     *Note: Due to a large number of results and/or encounters for the requested time period, some results have not been displayed. A complete set of results can be found in Results Review.

## 2025-08-02 RX ORDER — DICYCLOMINE HYDROCHLORIDE 20 MG/1
TABLET ORAL
Qty: 120 TABLET | Refills: 3 | Status: SHIPPED | OUTPATIENT
Start: 2025-08-02

## 2025-08-04 ENCOUNTER — HOSPITAL ENCOUNTER (OUTPATIENT)
Dept: RADIOLOGY | Facility: HOSPITAL | Age: 75
Discharge: HOME | End: 2025-08-04
Payer: MEDICARE

## 2025-08-04 DIAGNOSIS — M75.21 BICEPS TENDINITIS OF RIGHT UPPER EXTREMITY: ICD-10-CM

## 2025-08-04 DIAGNOSIS — S46.011A STRAIN OF RIGHT ROTATOR CUFF CAPSULE, INITIAL ENCOUNTER: ICD-10-CM

## 2025-08-04 PROCEDURE — 73221 MRI JOINT UPR EXTREM W/O DYE: CPT | Mod: 26,RT,, | Performed by: RADIOLOGY

## 2025-08-04 PROCEDURE — 73221 MRI JOINT UPR EXTREM W/O DYE: CPT | Mod: TC,RT

## 2025-08-07 ENCOUNTER — HOSPITAL ENCOUNTER (OUTPATIENT)
Dept: RADIOLOGY | Facility: HOSPITAL | Age: 75
Discharge: HOME | End: 2025-08-07
Attending: ORTHOPAEDIC SURGERY
Payer: MEDICARE

## 2025-08-07 ENCOUNTER — OFFICE VISIT (OUTPATIENT)
Dept: SPORTS MEDICINE | Facility: CLINIC | Age: 75
End: 2025-08-07
Payer: MEDICARE

## 2025-08-07 VITALS
BODY MASS INDEX: 27.45 KG/M2 | HEART RATE: 87 BPM | SYSTOLIC BLOOD PRESSURE: 133 MMHG | DIASTOLIC BLOOD PRESSURE: 74 MMHG | WEIGHT: 140.56 LBS

## 2025-08-07 DIAGNOSIS — M25.512 BILATERAL SHOULDER PAIN, UNSPECIFIED CHRONICITY: ICD-10-CM

## 2025-08-07 DIAGNOSIS — M25.511 RIGHT SHOULDER PAIN, UNSPECIFIED CHRONICITY: ICD-10-CM

## 2025-08-07 DIAGNOSIS — S46.011A STRAIN OF RIGHT ROTATOR CUFF CAPSULE, INITIAL ENCOUNTER: ICD-10-CM

## 2025-08-07 DIAGNOSIS — M25.511 RIGHT SHOULDER PAIN, UNSPECIFIED CHRONICITY: Primary | ICD-10-CM

## 2025-08-07 DIAGNOSIS — M25.511 BILATERAL SHOULDER PAIN, UNSPECIFIED CHRONICITY: ICD-10-CM

## 2025-08-07 DIAGNOSIS — Z98.890 HISTORY OF REPAIR OF RIGHT ROTATOR CUFF: ICD-10-CM

## 2025-08-07 DIAGNOSIS — M75.21 BICEPS TENDINITIS OF RIGHT UPPER EXTREMITY: ICD-10-CM

## 2025-08-07 PROCEDURE — 99999PBSHW PR PBB SHADOW TECHNICAL ONLY FILED TO HB: Mod: PBBFAC,,,

## 2025-08-07 PROCEDURE — 99999 PR PBB SHADOW E&M-EST. PATIENT-LVL IV: CPT | Mod: PBBFAC,,, | Performed by: ORTHOPAEDIC SURGERY

## 2025-08-07 PROCEDURE — 99214 OFFICE O/P EST MOD 30 MIN: CPT | Mod: PBBFAC,25 | Performed by: ORTHOPAEDIC SURGERY

## 2025-08-07 PROCEDURE — 20610 DRAIN/INJ JOINT/BURSA W/O US: CPT | Mod: PBBFAC,RT | Performed by: ORTHOPAEDIC SURGERY

## 2025-08-07 PROCEDURE — 99214 OFFICE O/P EST MOD 30 MIN: CPT | Mod: 25,S$PBB,, | Performed by: ORTHOPAEDIC SURGERY

## 2025-08-07 RX ORDER — MELOXICAM 15 MG/1
15 TABLET ORAL DAILY
Qty: 30 TABLET | Refills: 0 | Status: SHIPPED | OUTPATIENT
Start: 2025-08-07 | End: 2025-09-06

## 2025-08-07 RX ORDER — TRIAMCINOLONE ACETONIDE 40 MG/ML
40 INJECTION, SUSPENSION INTRA-ARTICULAR; INTRAMUSCULAR
Status: DISCONTINUED | OUTPATIENT
Start: 2025-08-07 | End: 2025-08-07 | Stop reason: HOSPADM

## 2025-08-07 RX ADMIN — TRIAMCINOLONE ACETONIDE 40 MG: 40 INJECTION, SUSPENSION INTRA-ARTICULAR; INTRAMUSCULAR at 09:08

## 2025-08-07 NOTE — PROGRESS NOTES
CC: RIGHT shoulder pain     74 y.o. Female with a 2 week history of right shoulder pain. This started after she tried to lift her  up after he fell. The pain is global in location at the right shoulder. It is worse with overhead reaching and reaching behind her back. It also bothers her to lay on the shoulder. She rates it a 3-4/10. No associated numbness or tingling. She has tried rest, activity modifications, tylenol, and recently started taking meloxicam which was prescribed by an urgent care provider. She was also prescribed PT for her right shoulder but has not started yet. She had an MRI done 8/4/25.      Past Medical History:   Diagnosis Date    Arthritis     Basal cell carcinoma     Bronchitis     seasonal    Cataract     Disorder of kidney and ureter     Diverticulitis     Dry eye syndrome     GERD (gastroesophageal reflux disease)     Hyperlipidemia     Hypertension     Hypothyroidism 07/19/2012    Obese     PONV (postoperative nausea and vomiting)     Thyroid disease        Past Surgical History:   Procedure Laterality Date    ANORECTAL MANOMETRY N/A 06/01/2023    Procedure: MANOMETRY, ANORECTAL;  Surgeon: Paulo Pritchett MD;  Location: [address]);  Service: Endoscopy;  Laterality: N/A;  instructions sent to [id number]  5/26 pre-call no answer; MB    ARTHROSCOPIC REPAIR OF ROTATOR CUFF OF SHOULDER Right 09/23/2020    Procedure: REPAIR, ROTATOR CUFF, ARTHROSCOPIC;  Surgeon: Reginald Pickens MD;  Location: [facility] OR;  Service: Orthopedics;  Laterality: Right;  regional w/catheter (interscalene)    BACK SURGERY      CARPAL TUNNEL RELEASE Left 01/11/2024    Procedure: RELEASE, CARPAL TUNNEL;  Surgeon: Suzy Dominguez MD;  Location: [facility] OR;  Service: Orthopedics;  Laterality: Left;    CATARACT EXTRACTION W/  INTRAOCULAR LENS IMPLANT Left 10/20/2021        CHOLECYSTECTOMY      COLONOSCOPY  2007    diverticulosis    COLONOSCOPY N/A 09/03/2020    Procedure: COLONOSCOPY;   Surgeon: Alberta Henriquez MD;  Location: [facility] ENDO (4TH FLR);  Service: Colon and Rectal;  Laterality: N/A;  pt requested this time-8/31-covid-uc [place]-[facility]    CYSTOSCOPY      DE QUERVAIN'S RELEASE Left 03/2017    DECOMPRESSION OF NERVE Left 01/11/2024    Procedure: DECOMPRESSION, NERVE ULNAR;  Surgeon: Suzy Dominguez MD;  Location: [facility] OR;  Service: Orthopedics;  Laterality: Left;    DUPUYTREN CONTRACTURE RELEASE Left 01/11/2024    Procedure: RELEASE, DUPUYTREN CONTRACTURE, PALM;  Surgeon: Suzy Dominguez MD;  Location: [facility] OR;  Service: Orthopedics;  Laterality: Left;    EPIDURAL STEROID INJECTION INTO LUMBAR SPINE Right 4/29/2024    Procedure: RT L3-4 L4-5 TFESI;  Surgeon: Mick Pineda DO;  Location: [facility] PAIN MANAGEMENT;  Service: Pain Management;  Laterality: Right;  20 mins    FIXATION OF TENDON Right 09/23/2020    Procedure: FIXATION, TENDON;  Surgeon: Reginadl Pickens MD;  Location: [facility] OR;  Service: Orthopedics;  Laterality: Right;    HERNIA REPAIR      HYSTERECTOMY      INJECTION OF STEROID Right 12/02/2021    Procedure: INJECTION, STEROID;  Surgeon: Suzy Dominguez MD;  Location: [facility] OR;  Service: Orthopedics;  Laterality: Right;    INJECTION OF STEROID Right 01/11/2024    Procedure: INJECTION, STEROID 1ST DORSAL COMPARTMENT;  Surgeon: Suzy Dominguez MD;  Location: [facility] OR;  Service: Orthopedics;  Laterality: Right;    INTRAOCULAR PROSTHESES INSERTION Left 10/20/2021    Procedure: INSERTION, IOL PROSTHESIS;  Surgeon: Pippa Ashby MD;  Location: [facility] OR 1ST FLR;  Service: Ophthalmology;  Laterality: Left;    INTRAOCULAR PROSTHESES INSERTION Right 12/29/2021    Procedure: INSERTION, IOL PROSTHESIS;  Surgeon: Pippa Ashby MD;  Location: [facility] OR 1ST FLR;  Service: Ophthalmology;  Laterality: Right;    NASAL SEPTUM SURGERY      PHACOEMULSIFICATION OF CATARACT Left 10/20/2021    Procedure: PHACOEMULSIFICATION, CATARACT/ COMPLEX- PHACO / IOL - OS SMALL PUPIL-OLE RING  AND TRYPAN BLUE;  Surgeon: Pippa Ashby MD;  Location: [address];  Service: Ophthalmology;  Laterality: Left;    PHACOEMULSIFICATION OF CATARACT Right 12/29/2021    Procedure: PHACOEMULSIFICATION, CATARACT;  Surgeon: Pippa Ashby MD;  Location: [address];  Service: Ophthalmology;  Laterality: Right;    SHOULDER SURGERY      TONSILLECTOMY      TRIGGER FINGER RELEASE Left 12/02/2021    Procedure: RELEASE, TRIGGER FINGER;  Surgeon: Suzy Dominguez MD;  Location: [facility];  Service: Orthopedics;  Laterality: Left;       Family History   Problem Relation Name Age of Onset    Cataracts Mother      Breast cancer Mother      Diabetes Mother      Hypertension Mother      Heart failure Mother      Heart disease Mother      Cataracts Father      Hypertension Father      Stroke Father      No Known Problems Daughter      No Known Problems Son      Diabetes Paternal Grandmother      Hyperlipidemia Sister x1     Arthritis Sister x1     Hyperlipidemia Brother x5     Arthritis Brother x5     No Known Problems Son      Amblyopia Neg Hx      Blindness Neg Hx      Glaucoma Neg Hx      Macular degeneration Neg Hx      Retinal detachment Neg Hx      Strabismus Neg Hx      Ovarian cancer Neg Hx      Melanoma Neg Hx      Celiac disease Neg Hx      Colon cancer Neg Hx      Colon polyps Neg Hx      Esophageal cancer Neg Hx      Liver cancer Neg Hx      Liver disease Neg Hx      Rectal cancer Neg Hx      Stomach cancer Neg Hx         Current Medications[1]    Review of patient's allergies indicates:   Allergen Reactions    Levaquin [levofloxacin] Swelling and Edema    Erythromycin (bulk) Nausea And Vomiting     Not true allergy          REVIEW OF SYSTEMS:  Constitution: Negative. Negative for chills, fever and night sweats.   HENT: Negative for congestion and headaches.    Eyes: Negative for blurred vision, left vision loss and right vision loss.   Cardiovascular: Negative for chest pain and syncope.   Respiratory:  Negative for cough and shortness of breath.    Endocrine: Negative for polydipsia, polyphagia and polyuria.   Hematologic/Lymphatic: Negative for bleeding problem. Does not bruise/bleed easily.   Skin: Negative for dry skin, itching and rash.   Musculoskeletal: Negative for falls.  Positive for right shoulder pain and muscle weakness.   Gastrointestinal: Negative for abdominal pain and bowel incontinence.   Genitourinary: Negative for bladder incontinence and nocturia.   Neurological: Negative for disturbances in coordination, loss of balance and seizures.   Psychiatric/Behavioral: Negative for depression. The patient does not have insomnia.    Allergic/Immunologic: Negative for hives and persistent infections.      PHYSICAL EXAMINATION:  Vitals:  /74 (BP Location: Right arm)   Pulse 87   Wt 63.7 kg (140 lb 8.7 oz)   BMI 27.45 kg/m²    General: The patient is alert and oriented x 3.  Mood is pleasant.  Observation of ears, eyes and nose reveal no gross abnormalities.  No labored breathing observed.  Gait is coordinated. Patient can toe walk and heel walk without difficulty.      RIGHT SHOULDER / UPPER EXTREMITY EXAM    OBSERVATION:     Swelling  none  Deformity  none   Discoloration  none   Scapular winging none   Scars   none  Atrophy  none    TENDERNESS / CREPITUS (T/C):          T/C      T/C   Clavicle   -/-  SUPRAspinatus    -/-     AC Jt.    -/-  INFRAspinatus  -/-    SC Jt.    -/-  Deltoid    -/-      G. Tuberosity  -/-  LH BICEP groove  -/-   Acromion:  -/-  Midline Neck   -/-     Scapular Spine -/-  Trapezium   -/-   SMA Scapula  -/-  GH jt. line - post  -/-     Scapulothoracic  -/-         ROM: (* = with pain)  Left shoulder   Right shoulder        AROM (PROM)   AROM (PROM)   FE    170° (175°)     *150° (*175°)     ER at 0°    60°  (65°)    60°  (65°)   IR (spine level)   T10     *L2    STRENGTH: (* = with pain) Left shoulder   Right  shoulder   SCAPTION   5/5    5/5    IR    5/5    5/5   ER    5/5    5/5   BICEPS   5/5    5/5   Deltoid    5/5    5/5     SIGNS:  Painful side       NEER   +    OYEES  +   BLACK   +    SPEEDS  +     DROP ARM   -   BELLY PRESS neg   Superior escape none    LIFT-OFF  neg   X-Body ADD    +   MOVING VALGUS neg        STABILITY TESTING    Left shoulder   Right shoulder    Translation     Anterior  up face     up face    Posterior  up face    up face    Sulcus   < 10mm    < 10 mm     Signs   Apprehension   neg      neg       Relocation   no change     no change      Jerk test  neg     neg    EXTREMITY NEURO-VASCULAR EXAM:    Sensation grossly intact to light touch all dermatomal regions.    DTR 2+ Biceps, Triceps, BR and Negative Janias sign   Grossly intact motor function at Elbow, Wrist and Hand   Distal pulses radial and ulnar 2+, brisk cap refill, symmetric.      NECK:  Painless FROM and spinous processes non-tender. Negative Spurlings sign.      OTHER FINDINGS:  No scapular dyskinesia    XRAYS:  Xrays including AP, Outlet and Axillary Lateral of shoulder are ordered / images reviewed by me:   No fracture or dislocation   Acromion type 1   Proximal migration of humeral head: none   GH arthritis: mild, spurring at the humeral head   Appropriate findings sp subpectoral tenodesis    MRI right shoulder without contrast from 8/4/25 independently reviewed and interpreted by myself 8.7.25:  S/P bicipital tenodesis, cuff/labral debridement. No evidence for full-thickness rotator cuff tear. Mild undersurface fraying of the supraspinatus footprint with associated tendinosis           ASSESSMENT:   Right shoulder pain:  1. Right shoulder pain, unspecified chronicity    2. History of repair of right rotator cuff        PLAN:    Trial of conservative management  Continue tylenol and meloxicam  Physical therapy for right shoulder to work on rotator cuff strengthening and range of motion  Corticosteroid injection right  shoulder today in clinic  FU in 6 weeks for repeat evaluation     All questions were answered, patient will contact us for questions or concerns in the interim.            [1]   Current Outpatient Medications:     albuterol (PROVENTIL/VENTOLIN HFA) 90 mcg/actuation inhaler, Inhale 2 puffs into the lungs every 6 (six) hours as needed for Wheezing., Disp: 6.7 g, Rfl: 11    cranberry fruit extract (CRANBERRY EXTRACT ORAL), Take by mouth., Disp: , Rfl:     diazePAM (VALIUM) 5 MG tablet, Take 1 tablet 1 hour prior to the MRI.  Take another tablet immediately prior if needed for anxiety., Disp: 2 tablet, Rfl: 0    diclofenac sodium (VOLTAREN) 1 % Gel, Apply 2 g topically 2 (two) times daily as needed (musculoskeletal pain)., Disp: 100 g, Rfl: 11    dicyclomine (BENTYL) 20 mg tablet, TAKE ONE TABLET BY MOUTH FOUR TIMES A DAY BEFORE MEALS AND NIGHTLY, Disp: 120 tablet, Rfl: 3    estradioL (ESTRACE) 0.01 % (0.1 mg/gram) vaginal cream, Place 1 g vaginally 3 (three) times a week., Disp: 42.5 g, Rfl: 3    fluocinonide (LIDEX) 0.05 % external solution, Apply topically once daily., Disp: 60 mL, Rfl: 11    gabapentin (NEURONTIN) 300 MG capsule, Take 1 capsule (300 mg total) by mouth 2 (two) times daily., Disp: 180 capsule, Rfl: 3    ketoconazole (NIZORAL) 2 % shampoo, Apply topically twice a week., Disp: 120 mL, Rfl: 11    levothyroxine (SYNTHROID) 75 MCG tablet, TAKE ONE TABLET BY MOUTH EVERY DAY ON AN EMPTY STOMACH, Disp: 90 tablet, Rfl: 3    LIDOcaine-prilocaine (EMLA) cream, Apply topically 2 (two) times daily as needed. To hands., Disp: 30 g, Rfl: 2    losartan (COZAAR) 100 MG tablet, Take 1 tablet (100 mg total) by mouth once daily., Disp: 90 tablet, Rfl: 3    MAGNESIUM ORAL, Take 1 tablet by mouth once daily., Disp: , Rfl:     meloxicam (MOBIC) 15 MG tablet, Take 1 tablet (15 mg total) by mouth once daily., Disp: 30 tablet, Rfl: 0    memantine (NAMENDA) 5 MG Tab, TAKE ONE TABLET BY MOUTH TWICE A DAY, Disp: 180 tablet, Rfl:  0    Multi-Vitamin tablet, Take 1 tablet by mouth once daily. , Disp: , Rfl:     omeprazole (PRILOSEC) 40 MG capsule, Take 1 capsule (40 mg total) by mouth every morning., Disp: 90 capsule, Rfl: 3    ondansetron (ZOFRAN) 8 MG tablet, Take 1 tablet (8 mg total) by mouth every 8 (eight) hours as needed for Nausea., Disp: 30 tablet, Rfl: 0    oxybutynin (DITROPAN-XL) 10 MG 24 hr tablet, TAKE ONE TABLET BY MOUTH EVERY DAY, Disp: 90 tablet, Rfl: 3    PSYLLIUM SEED, WITH SUGAR, (METAMUCIL ORAL), Take by mouth as needed. , Disp: , Rfl:     RESTASIS 0.05 % ophthalmic emulsion, INSTILL ONE DROP INTO BOTH EYES TWO TIMES A DAY, Disp: 60 each, Rfl: 12    rosuvastatin (CRESTOR) 40 MG Tab, TAKE ONE TABLET BY MOUTH EVERY DAY, Disp: 90 tablet, Rfl: 3    sulfamethoxazole-trimethoprim 800-160mg (BACTRIM DS) 800-160 mg Tab, Take 1 tablet by mouth 2 (two) times daily. for 7 days, Disp: 14 tablet, Rfl: 0    traZODone (DESYREL) 100 MG tablet, TAKE ONE TABLET BY MOUTH EVERY NIGHT AS NEEDED FOR INSOMNIA, Disp: 90 tablet, Rfl: 3    traZODone (DESYREL) 50 MG tablet, Take 100 mg by mouth nightly as needed., Disp: , Rfl:     traZODone (DESYREL) 50 MG tablet, Take 1 tablet (50 mg total) by mouth nightly as needed for Insomnia., Disp: 90 tablet, Rfl: 3    triamcinolone acetonide 0.1% (KENALOG) 0.1 % cream, AAA bid, Disp: 60 g, Rfl: 3

## 2025-08-07 NOTE — PROCEDURES
Large Joint Aspiration/Injection: R subacromial bursa    Date/Time: 8/7/2025 9:30 AM    Performed by: Reginald Pickens MD  Authorized by: Reginald Pickens MD    Site marked: the procedure site was marked    Timeout: prior to procedure the correct patient, procedure, and site was verified    Prep: patient was prepped and draped in usual sterile fashion    Local anesthesia used?: No    Local anesthetic:  Topical anesthetic    Details:  Needle Size:  22 G  Ultrasonic Guidance for needle placement?: No    Approach:  Posterior  Location:  Shoulder  Site:  R subacromial bursa  Medications:  40 mg triamcinolone acetonide 40 mg/mL  Patient tolerance:  Patient tolerated the procedure well with no immediate complications

## 2025-08-08 ENCOUNTER — HOSPITAL ENCOUNTER (OUTPATIENT)
Dept: RADIOLOGY | Facility: HOSPITAL | Age: 75
Discharge: HOME | End: 2025-08-08
Attending: ORTHOPAEDIC SURGERY
Payer: MEDICARE

## 2025-08-08 DIAGNOSIS — S93.401S MODERATE RIGHT ANKLE SPRAIN, SEQUELA: ICD-10-CM

## 2025-08-08 DIAGNOSIS — M25.571 PAIN IN JOINT INVOLVING RIGHT ANKLE AND FOOT: ICD-10-CM

## 2025-08-08 DIAGNOSIS — S86.301A INJURY OF PERONEAL TENDON OF RIGHT FOOT, INITIAL ENCOUNTER: ICD-10-CM

## 2025-08-08 PROCEDURE — 73721 MRI JNT OF LWR EXTRE W/O DYE: CPT | Mod: TC,RT

## 2025-08-18 ENCOUNTER — OFFICE VISIT (OUTPATIENT)
Dept: ORTHOPEDICS | Facility: CLINIC | Age: 75
End: 2025-08-18
Payer: MEDICARE

## 2025-08-18 VITALS — WEIGHT: 140.44 LBS | HEIGHT: 60 IN | BODY MASS INDEX: 27.57 KG/M2

## 2025-08-18 DIAGNOSIS — S86.311D PERONEAL TENDON TEAR, RIGHT, SUBSEQUENT ENCOUNTER: ICD-10-CM

## 2025-08-18 DIAGNOSIS — S93.401S MODERATE RIGHT ANKLE SPRAIN, SEQUELA: Primary | ICD-10-CM

## 2025-08-18 PROCEDURE — 99999 PR PBB SHADOW E&M-EST. PATIENT-LVL IV: CPT | Mod: PBBFAC,,, | Performed by: ORTHOPAEDIC SURGERY

## 2025-08-18 PROCEDURE — 99213 OFFICE O/P EST LOW 20 MIN: CPT | Mod: S$PBB,,, | Performed by: ORTHOPAEDIC SURGERY

## 2025-08-18 PROCEDURE — 99214 OFFICE O/P EST MOD 30 MIN: CPT | Mod: PBBFAC | Performed by: ORTHOPAEDIC SURGERY

## 2025-08-28 ENCOUNTER — OFFICE VISIT (OUTPATIENT)
Dept: URGENT CARE | Facility: CLINIC | Age: 75
End: 2025-08-28
Payer: MEDICARE

## 2025-08-28 VITALS
HEIGHT: 60 IN | RESPIRATION RATE: 18 BRPM | WEIGHT: 140 LBS | BODY MASS INDEX: 27.48 KG/M2 | DIASTOLIC BLOOD PRESSURE: 58 MMHG | OXYGEN SATURATION: 95 % | TEMPERATURE: 98 F | HEART RATE: 64 BPM | SYSTOLIC BLOOD PRESSURE: 139 MMHG

## 2025-08-28 DIAGNOSIS — N39.0 RECURRENT UTI: Primary | ICD-10-CM

## 2025-08-28 DIAGNOSIS — R35.0 FREQUENCY OF URINATION: ICD-10-CM

## 2025-08-28 LAB
BILIRUBIN, UA POC OHS: NEGATIVE
BLOOD, UA POC OHS: ABNORMAL
CLARITY, UA POC OHS: ABNORMAL
COLOR, UA POC OHS: YELLOW
GLUCOSE, UA POC OHS: NEGATIVE
KETONES, UA POC OHS: NEGATIVE
LEUKOCYTES, UA POC OHS: ABNORMAL
NITRITE, UA POC OHS: NEGATIVE
PH, UA POC OHS: 6.5
PROTEIN, UA POC OHS: >=300
SPECIFIC GRAVITY, UA POC OHS: 1.02
UROBILINOGEN, UA POC OHS: 0.2

## 2025-08-28 PROCEDURE — 87077 CULTURE AEROBIC IDENTIFY: CPT

## 2025-08-28 RX ORDER — NITROFURANTOIN 25; 75 MG/1; MG/1
100 CAPSULE ORAL 2 TIMES DAILY
Qty: 14 CAPSULE | Refills: 0 | Status: SHIPPED | OUTPATIENT
Start: 2025-08-28 | End: 2025-09-04

## 2025-08-28 RX ORDER — CEFTRIAXONE 1 G/1
1 INJECTION, POWDER, FOR SOLUTION INTRAMUSCULAR; INTRAVENOUS
Status: COMPLETED | OUTPATIENT
Start: 2025-08-28 | End: 2025-08-28

## 2025-08-28 RX ORDER — LIDOCAINE HYDROCHLORIDE 10 MG/ML
2.1 INJECTION, SOLUTION INFILTRATION; PERINEURAL
Status: COMPLETED | OUTPATIENT
Start: 2025-08-28 | End: 2025-08-28

## 2025-08-28 RX ADMIN — LIDOCAINE HYDROCHLORIDE 2.1 ML: 10 INJECTION, SOLUTION INFILTRATION; PERINEURAL at 11:08

## 2025-08-28 RX ADMIN — CEFTRIAXONE 1 G: 1 INJECTION, POWDER, FOR SOLUTION INTRAMUSCULAR; INTRAVENOUS at 10:08

## 2025-08-30 LAB — BACTERIA UR CULT: ABNORMAL

## 2025-08-31 ENCOUNTER — RESULTS FOLLOW-UP (OUTPATIENT)
Dept: URGENT CARE | Facility: CLINIC | Age: 75
End: 2025-08-31
Payer: MEDICARE

## (undated) DEVICE — BANDAGE CONFORM 3IN STRL

## (undated) DEVICE — GLOVE BIOGEL ECLIPSE SZ 6.5

## (undated) DEVICE — GLOVE BIOGEL SKINSENSE PI 7.5

## (undated) DEVICE — SEE MEDLINE ITEM 157131

## (undated) DEVICE — DRAPE SURG W/TWL 17 5/8X23

## (undated) DEVICE — DRAPE INCISE IOBAN 2 23X17IN

## (undated) DEVICE — GOWN SURGICAL X-LARGE

## (undated) DEVICE — REPAIR KIT SMALL JOINT BIO TEN

## (undated) DEVICE — FORCEP STRAIGHT DISP

## (undated) DEVICE — DRAPE STERI-DRAPE APERTURE

## (undated) DEVICE — ELECTRODE PENCIL W/ROCKER NDL

## (undated) DEVICE — SEE MEDLINE ITEM 159592

## (undated) DEVICE — TOURNIQUET SB QC SP 18X4IN

## (undated) DEVICE — GAUZE SPONGE 4X4 12PLY

## (undated) DEVICE — NDL 18GA X1 1/2 REG BEVEL

## (undated) DEVICE — COVER CAMERA OPERATING ROOM

## (undated) DEVICE — SUT PROLENE 0 CT1 30IN BLUE

## (undated) DEVICE — NDL HYPO REG 25G X 1 1/2

## (undated) DEVICE — BNDG COFLEX FOAM LF2 ST 3X5YD

## (undated) DEVICE — GLOVE BIOGEL PI MICRO SZ 7

## (undated) DEVICE — SUT 4/0 18IN ETHILON BL P3

## (undated) DEVICE — PAD UNDERPAD 30X30

## (undated) DEVICE — PAD CAST SPECIALIST STRL 3

## (undated) DEVICE — NDL HYPO SAFETY 22 X 1 1/2

## (undated) DEVICE — BLADE SURG STAINLESS STEEL #15

## (undated) DEVICE — BLADE SHAVER LANZA 4.2X13CM

## (undated) DEVICE — SHIELD EYE PLASTIC 3100G

## (undated) DEVICE — SKIN MARKER DEVON 160

## (undated) DEVICE — GLOVE BIOGEL SKINSENSE PI 8.0

## (undated) DEVICE — SUT 0 VICRYL / CT-1

## (undated) DEVICE — SHEILD PLASTIC EYE

## (undated) DEVICE — BURR OVAL CUTTING 6 MM

## (undated) DEVICE — KIT GREY EYE

## (undated) DEVICE — STOCKINETTE DBL PLY ST 4X

## (undated) DEVICE — GLOVE BIOGEL SKINSENSE PI 7.0

## (undated) DEVICE — SOL IRR BSS OPHTH 500ML STRL

## (undated) DEVICE — SPLINT PLASTER F.S 4INX15IN

## (undated) DEVICE — PAD ELECTRODE STER 1.5X3

## (undated) DEVICE — SUT MONOCRYL 4-0 PS-2

## (undated) DEVICE — SEE MEDLINE ITEM 153151

## (undated) DEVICE — SEE MEDLINE ITEM 146270

## (undated) DEVICE — GLOVE BIOGEL PI ORTHO PRO 7.5

## (undated) DEVICE — PAD ABD 8X10 STERILE

## (undated) DEVICE — UNDERGLOVES BIOGEL PI SZ 7 LF

## (undated) DEVICE — PAD SHOULDER POLAR CARE XL

## (undated) DEVICE — GLOVE BIOGEL PI MICRO INDIC 7

## (undated) DEVICE — PAD CAST 2 IN X 4YDS STERILE

## (undated) DEVICE — DRAPE PLASTIC U 60X72

## (undated) DEVICE — KIT SHOULDER POSITIONER SPIDER

## (undated) DEVICE — DRESSING TRANS 4X4 TEGADERM

## (undated) DEVICE — BANDAGE ELASTIC ACE 2IN 10/CA

## (undated) DEVICE — SOL WATER STRL IRR 1000ML

## (undated) DEVICE — UNDERGLOVES BIOGEL PI SIZE 8

## (undated) DEVICE — DRESSING N ADH OIL EMUL 3X3

## (undated) DEVICE — NDL SAFETY 22G X 1.5 ECLIPSE

## (undated) DEVICE — Device

## (undated) DEVICE — ELECTRODE REM PLYHSV RETURN 9

## (undated) DEVICE — IMMOBILIZER HAND ALUMINUM LRG

## (undated) DEVICE — SEE MEDLINE ITEM 152530

## (undated) DEVICE — DRAPE STERI INSTRUMENT 1018

## (undated) DEVICE — PACK UPPER EXTREMITY BAPTIST

## (undated) DEVICE — SYR 3CC 21 X 1 LUER LOCK

## (undated) DEVICE — PAD CAST SPECIALIST 2X4

## (undated) DEVICE — BANDAGE GAUZE 6PLY FLUFF 2X3

## (undated) DEVICE — CLOSURE SKIN STERI STRIP 1/2X4

## (undated) DEVICE — UNDERGLOVES BIOGEL PI SIZE 7.5

## (undated) DEVICE — SEE MEDLINE ITEM 152529

## (undated) DEVICE — SOL BETADINE 5%

## (undated) DEVICE — POSITIONER IV ARMBOARD FOAM

## (undated) DEVICE — SUT MONOCRYL PLUS 4-0 P3

## (undated) DEVICE — KNIFE CARPAL TUNNEL

## (undated) DEVICE — SEE MEDLINE ITEM 157160

## (undated) DEVICE — PAD EYE OVAL CNTOUR 1.62X2.62

## (undated) DEVICE — SUT PROLENE 3-0 FS-2 18

## (undated) DEVICE — PAD CAST SPECIALIST STRL 4

## (undated) DEVICE — SOL IRR NACL .9% 3000ML

## (undated) DEVICE — TOURNIQUET SB QC DP 18X4IN

## (undated) DEVICE — SYR ONLY LUER LOCK 20CC

## (undated) DEVICE — SOL 9P NACL IRR PIC IL

## (undated) DEVICE — SUT 5-0 PROLENE BV1 HS7 24

## (undated) DEVICE — CONTAINER SPECIMEN STRL 4OZ

## (undated) DEVICE — SLING SHOT II LARGE

## (undated) DEVICE — CORD FOR BIPOLAR FORCEPS 12

## (undated) DEVICE — SHIELD COLLAGEN 12HR CORNEAL

## (undated) DEVICE — ADHESIVE DERMABOND ADVANCED

## (undated) DEVICE — SEE MEDLINE ITEM 157150

## (undated) DEVICE — SUT 2/0 36IN COATED VICRYL

## (undated) DEVICE — TAPE SURG DURAPORE 2 SGL USE

## (undated) DEVICE — TUBE SET INFLOW/OUTFLOW

## (undated) DEVICE — ELECTRODE 90 DEGREE ANGLE

## (undated) DEVICE — SUT MCRYL PLUS 4-0 PS2 27IN

## (undated) DEVICE — SEE MEDLINE ITEM 146313

## (undated) DEVICE — SUT VICRYL PLUS 3-0 SH 18IN

## (undated) DEVICE — ELECTRODES DISPOSABLE

## (undated) DEVICE — KIT TRIMANO

## (undated) DEVICE — SOL PVP-I SCRUB 7.5% 4OZ

## (undated) DEVICE — SOL IRR SOD CHL .9% POUR

## (undated) DEVICE — SUT ETHIBOND 3-0 RB1 30IN

## (undated) DEVICE — SUPPORT SLING SHOT II MEDIUM

## (undated) DEVICE — ADHESIVE MASTISOL VIAL 48/BX

## (undated) DEVICE — BUCKET PLASTER DISPOSABLE

## (undated) DEVICE — SEE MEDLINE ITEM 152622

## (undated) DEVICE — PAD SHOULDER CARE POLAR

## (undated) DEVICE — DRESSING AQUACEL AG FOAM 4X4

## (undated) DEVICE — SEE MEDLINE ITEM 157166

## (undated) DEVICE — SYR 10CC LUER LOCK

## (undated) DEVICE — GOWN SMARTGOWN LVL4 X-LONG XL

## (undated) DEVICE — PLASTER SPLNT FST SET 4X15 BLU

## (undated) DEVICE — APPLICATOR CHLORAPREP ORN 26ML

## (undated) DEVICE — BANDAGE ELASTIC 2X5 VELCRO ST

## (undated) DEVICE — SEE MEDLINE ITEM 157110

## (undated) DEVICE — SLING ARM MEDIUM FOAM STRAP

## (undated) DEVICE — SUT VICRYL 4-0 18 P-3

## (undated) DEVICE — KIT TRIMANO CHIN

## (undated) DEVICE — COVER LIGHT HANDLE 80/CA

## (undated) DEVICE — CHLORAPREP 10.5 ML APPLICATOR

## (undated) DEVICE — SOCKINETTE DOUBLE PLY 4X48IN

## (undated) DEVICE — DRESSING EYE OVAL LF